# Patient Record
Sex: MALE | Race: WHITE | NOT HISPANIC OR LATINO | Employment: OTHER | URBAN - METROPOLITAN AREA
[De-identification: names, ages, dates, MRNs, and addresses within clinical notes are randomized per-mention and may not be internally consistent; named-entity substitution may affect disease eponyms.]

---

## 2017-01-03 ENCOUNTER — HOSPITAL ENCOUNTER (OUTPATIENT)
Dept: CARDIOLOGY | Facility: HOSPITAL | Age: 82
Discharge: HOME | End: 2017-01-03
Attending: THORACIC SURGERY (CARDIOTHORACIC VASCULAR SURGERY)
Payer: MEDICARE

## 2017-01-03 ENCOUNTER — HOSPITAL ENCOUNTER (OUTPATIENT)
Dept: RADIOLOGY | Facility: HOSPITAL | Age: 82
Discharge: HOME | End: 2017-01-03
Attending: THORACIC SURGERY (CARDIOTHORACIC VASCULAR SURGERY)
Payer: MEDICARE

## 2017-01-03 DIAGNOSIS — Q39.6 ESOPHAGEAL DIVERTICULUM: ICD-10-CM

## 2017-01-03 DIAGNOSIS — K44.9 PARAESOPHAGEAL HERNIA: ICD-10-CM

## 2017-01-03 DIAGNOSIS — I50.32 CHRONIC DIASTOLIC HEART FAILURE: ICD-10-CM

## 2017-01-03 LAB
AORTIC VALVE STENOSIS: ABNORMAL
DIASTOLIC DYSFUNCTION: YES
ESTIMATED PA SYSTOLIC PRESSURE: 66.68
GLOBAL PERICARDIAL EFFUSION: ABNORMAL
MITRAL VALVE REGURGITATION: ABNORMAL
RETIRED EF AND QEF - SEE NOTES: 50 (ref 55–65)
TRICUSPID VALVE REGURGITATION: ABNORMAL

## 2017-01-03 PROCEDURE — 93306 TTE W/DOPPLER COMPLETE: CPT | Mod: 26,,, | Performed by: INTERNAL MEDICINE

## 2017-01-03 PROCEDURE — 74220 X-RAY XM ESOPHAGUS 1CNTRST: CPT | Mod: 26,,, | Performed by: RADIOLOGY

## 2017-01-03 PROCEDURE — 74220 X-RAY XM ESOPHAGUS 1CNTRST: CPT | Mod: TC

## 2017-01-03 PROCEDURE — 93306 TTE W/DOPPLER COMPLETE: CPT

## 2017-01-05 ENCOUNTER — OFFICE VISIT (OUTPATIENT)
Dept: CARDIOTHORACIC SURGERY | Facility: CLINIC | Age: 82
End: 2017-01-05
Payer: MEDICARE

## 2017-01-05 VITALS
DIASTOLIC BLOOD PRESSURE: 74 MMHG | HEIGHT: 70 IN | WEIGHT: 214.5 LBS | HEART RATE: 55 BPM | BODY MASS INDEX: 30.71 KG/M2 | OXYGEN SATURATION: 94 % | SYSTOLIC BLOOD PRESSURE: 170 MMHG

## 2017-01-05 DIAGNOSIS — Q39.6 ESOPHAGEAL DIVERTICULUM: Primary | ICD-10-CM

## 2017-01-05 PROCEDURE — 99999 PR PBB SHADOW E&M-EST. PATIENT-LVL III: CPT | Mod: PBBFAC,,, | Performed by: THORACIC SURGERY (CARDIOTHORACIC VASCULAR SURGERY)

## 2017-01-05 PROCEDURE — 99213 OFFICE O/P EST LOW 20 MIN: CPT | Mod: PBBFAC | Performed by: THORACIC SURGERY (CARDIOTHORACIC VASCULAR SURGERY)

## 2017-01-05 PROCEDURE — 99205 OFFICE O/P NEW HI 60 MIN: CPT | Mod: S$PBB,,, | Performed by: THORACIC SURGERY (CARDIOTHORACIC VASCULAR SURGERY)

## 2017-01-05 NOTE — PROGRESS NOTES
History & Physical    SUBJECTIVE:     History of Present Illness:  Patient is a 81 y.o. male presents with PMH of HTN, AS, GERD with esophagitis, DMII, and esophageal diverticulum for evaluation of surgical resection of large diverticulum. Reports longstanding dysphagia and emesis of undigested food with solids and liquids. Reports intermittent pain with swallowing. Past surgical history only significant for cataract extraction and inguinal hernia repair. Denies history of CVA, MI or cardiac revascularizations. Denies fever, chills, dyspnea on exertion, angina, syncope or changes in bowel, bladder or appetite. Reports he remains active. 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     Former smoker. Quit 20 years ago. 120 pack year history prior to quitting.        Chief Complaint   Patient presents with    Consult       Review of patient's allergies indicates:   Allergen Reactions    Vancomycin analogues Itching    Ciprofloxacin (mixture) Itching     Extreme itching and redness        Current Outpatient Prescriptions   Medication Sig Dispense Refill    ascorbic acid, vitamin C, (VITAMIN C) 1000 MG tablet 1,000 mg.      furosemide (LASIX) 20 MG tablet Take 1 tablet (20 mg total) by mouth once daily. 10 tablet 0    GLUCOSAMINE HCL/CHONDR BAUTISTA A NA (OSTEO BI-FLEX ORAL) Take 2 tablets by mouth once daily.      lisinopril 10 MG tablet Take 1 tablet (10 mg total) by mouth once daily. 30 tablet 0    metformin (GLUCOPHAGE) 500 MG tablet Take 1,000 mg by mouth 2 (two) times daily with meals.       metoprolol succinate (TOPROL-XL) 25 MG 24 hr tablet Take 25 mg by mouth every evening.       No current facility-administered medications for this visit.        Past Medical History   Diagnosis Date    Arthritis     Blind right eye     BPH (benign prostatic hypertrophy)     Bronchitis, chronic     Colon polyp     Diabetes mellitus     GERD (gastroesophageal reflux  "disease)     Hearing aid worn      bilateral    Hernia     Hypertension     Irregular heart beat     Snoring      Past Surgical History   Procedure Laterality Date    Eye surgery      Cataract extraction, bilateral      Retinal detachment surgery       No family history on file.  Social History   Substance Use Topics    Smoking status: Former Smoker     Packs/day: 3.00     Years: 40.00     Quit date: 8/2/1992    Smokeless tobacco: Never Used    Alcohol use No        Review of Systems:  Review of Systems   Constitutional: Negative for activity change, appetite change, fatigue and fever.   HENT: Positive for trouble swallowing.    Eyes: Positive for visual disturbance. Negative for pain.   Respiratory: Positive for shortness of breath. Negative for cough, choking, chest tightness and wheezing.    Cardiovascular: Positive for leg swelling. Negative for chest pain and palpitations.   Gastrointestinal: Positive for vomiting. Negative for abdominal pain, constipation, diarrhea and nausea.   Endocrine: Negative.    Genitourinary: Negative.    Musculoskeletal: Positive for arthralgias and gait problem. Negative for back pain, myalgias and neck stiffness.   Skin: Negative.    Allergic/Immunologic: Negative.    Hematological: Negative.    Psychiatric/Behavioral: Negative.        OBJECTIVE:     Vital Signs (Most Recent)  Pulse: (!) 55 (01/05/17 0940)  BP: (!) 170/74 (01/05/17 0940)  SpO2: (!) 94 % (01/05/17 0940)  5' 10" (1.778 m)  97.3 kg (214 lb 8.1 oz)     Physical Exam:  Physical Exam   Constitutional: He is oriented to person, place, and time. He appears well-developed and well-nourished.   HENT:   Head: Normocephalic and atraumatic.   Mouth/Throat: Oropharynx is clear and moist.   Eyes: Pupils are equal, round, and reactive to light.   Neck: Normal range of motion. Neck supple. No thyromegaly present.   Cardiovascular: Regular rhythm and intact distal pulses.    Murmur heard.   Crescendo systolic murmur is " present   Pulmonary/Chest: Effort normal and breath sounds normal. No respiratory distress. He exhibits no tenderness.   Abdominal: Soft. Bowel sounds are normal. He exhibits no distension. There is no tenderness.   Musculoskeletal: Normal range of motion. He exhibits no edema or deformity.        Right ankle: He exhibits swelling.        Left ankle: He exhibits swelling.   Lymphadenopathy:     He has no cervical adenopathy.   Neurological: He is alert and oriented to person, place, and time.   Skin: Skin is warm and dry.   Psychiatric: He has a normal mood and affect.   Vitals reviewed.      Diagnostic Results:  2D ECHO:   CONCLUSIONS     1 - Low normal to mildly depressed left ventricular systolic function (EF 50-55%).     2 - Eccentric hypertrophy.     3 - Left ventricular diastolic dysfunction.     4 - Pulmonary hypertension. The estimated PA systolic pressure is 67 mmHg.     5 - Moderate aortic stenosis, UNA = 0.79 cm2, mean gradient = 30.25 mmHg.     6 - Moderate mitral regurgitation.     7 - Intermediate central venous pressure.     Chest and Abdomen CT:   1.  Large lower esophageal diverticulum similar to prior study.  Contrast pooling within the diverticulum with reflux to the midesophagus, which is mildly dilated.  2.  Multiple pulmonary nodules, some of which are new.  Recommend followup chest CT in 3 months.  3.  Colonic diverticulosis.    FL Esophagram:   #1. Large grossly stable distal esophageal diverticulum with reflux as above.  #2.  Esophageal dysmotility.    ASSESSMENT/PLAN:     81 y.o. male presents with PMH of HTN, AS, GERD with esophagitis, DMII, and esophageal diverticulum for evaluation of surgical resection of large diverticulum.     PLAN:Plan    Patient with significant aortic stenosis and depressed EF. Will schedule for cardiology visit with Dr. Manzo on the [facility] for cardiac clearance and optimization.   Discussed surgical resection of diverticulum today with patient and his family.  This would require a right thoracotomy for diverticulectomy and repair. RTC after cardiology work up.     ATTENDING ATTESTATION:    I evaluated the patient and I agree with the assessment and plan  82 yo male with large epiphrenic esophageal diverticulum complicated by recurrent aspiration pneumonia.  Needs thoracoscopy, likely thoracotomy for diverticulectomy with esophagogastric myotomy.  Recent ECHO shows mildly depressed EF, moderate MR, moderate to severe TR, PH, and moderate aortic stenosis (appears severe based on UNA 0.7; mean gradient only 30 but may be falsely low due to depressed EF).  He is also symptomatic with RIOS and LE edema.  Arranged appointment with his cardiologist, Dr. Manzo for evaluation and will re-evaluate after his visit on 2/6/2017.

## 2017-01-05 NOTE — LETTER
January 5, 2017      Grayson Palacios MD  [address] [place] [id number]           [facility] Thoracic Surgery  [address] [place] [id number]  Phone: [phone]  Fax: [phone]          Patient: Timbo Gallagher   MR Number: [id number]   YOB: 1935   Date of Visit: 1/5/2017       Dear Dr. Grayson Palacios:    Thank you for referring Timbo Gallagher to me for evaluation. Attached you will find relevant portions of my assessment and plan of care.    If you have questions, please do not hesitate to call me. I look forward to following Timbo Gallagher along with you.    Sincerely,    Ramesh Whiting MD    Enclosure  CC:  No Recipients    If you would like to receive this communication electronically, please contact [email or web address] or [phone] to request more information on [facility] Link access.    For providers and/or their staff who would like to refer a patient to [facility], please contact us through our one-stop-shop provider referral line, [facility] , at [phone].    If you feel you have received this communication in error or would no longer like to receive these types of communications, please e-mail [email or web address]

## 2017-01-06 ENCOUNTER — TELEPHONE (OUTPATIENT)
Dept: CARDIOTHORACIC SURGERY | Facility: CLINIC | Age: 82
End: 2017-01-06

## 2017-01-06 DIAGNOSIS — I50.22 CHRONIC SYSTOLIC HEART FAILURE: Primary | ICD-10-CM

## 2017-01-06 DIAGNOSIS — Q39.6 ESOPHAGEAL DIVERTICULUM: ICD-10-CM

## 2017-01-06 DIAGNOSIS — I50.32 CHRONIC DIASTOLIC HEART FAILURE: ICD-10-CM

## 2017-01-06 NOTE — TELEPHONE ENCOUNTER
Pt did not show up for PFT's after CT scan on 12/30. Called pulmonary lab on [facility], they scheduled the pt for 2/6 at 9a prior to Dr. Manzo's appt on 2/6 at 11a. Attempted the pt twice, no answer, mailed appt slip with both PFT and Dr. Manzo information.

## 2017-01-10 ENCOUNTER — TELEPHONE (OUTPATIENT)
Dept: CARDIOTHORACIC SURGERY | Facility: CLINIC | Age: 82
End: 2017-01-10

## 2017-02-06 ENCOUNTER — HOSPITAL ENCOUNTER (OUTPATIENT)
Dept: RESPIRATORY THERAPY | Facility: HOSPITAL | Age: 82
Discharge: HOME | End: 2017-02-06
Attending: THORACIC SURGERY (CARDIOTHORACIC VASCULAR SURGERY)
Payer: MEDICARE

## 2017-02-06 VITALS — HEART RATE: 67 BPM | OXYGEN SATURATION: 95 % | RESPIRATION RATE: 18 BRPM

## 2017-02-06 DIAGNOSIS — I50.32 CHRONIC DIASTOLIC HEART FAILURE: ICD-10-CM

## 2017-02-06 PROCEDURE — 94060 EVALUATION OF WHEEZING: CPT

## 2017-02-06 PROCEDURE — 25000242 PHARM REV CODE 250 ALT 637 W/ HCPCS: Performed by: THORACIC SURGERY (CARDIOTHORACIC VASCULAR SURGERY)

## 2017-02-06 RX ORDER — ALBUTEROL SULFATE 2.5 MG/.5ML
2.5 SOLUTION RESPIRATORY (INHALATION) ONCE
Status: COMPLETED | OUTPATIENT
Start: 2017-02-06 | End: 2017-02-06

## 2017-02-06 RX ADMIN — ALBUTEROL SULFATE 2.5 MG: 2.5 SOLUTION RESPIRATORY (INHALATION) at 09:02

## 2017-02-21 ENCOUNTER — TELEPHONE (OUTPATIENT)
Dept: CARDIOTHORACIC SURGERY | Facility: CLINIC | Age: 82
End: 2017-02-21

## 2017-02-21 DIAGNOSIS — Z01.818 PREOP TESTING: Primary | ICD-10-CM

## 2017-02-21 NOTE — TELEPHONE ENCOUNTER
Call returned scheduled Dobutamine stress test for 3/6 @1pm.  Agreeable with appointment and time. Appointment slip mailed.

## 2017-03-06 ENCOUNTER — HOSPITAL ENCOUNTER (OUTPATIENT)
Dept: CARDIOLOGY | Facility: CLINIC | Age: 82
Discharge: HOME | End: 2017-03-06
Payer: MEDICARE

## 2017-03-06 DIAGNOSIS — I35.0 NONRHEUMATIC AORTIC VALVE STENOSIS: ICD-10-CM

## 2017-03-06 DIAGNOSIS — Z01.818 PREOP TESTING: ICD-10-CM

## 2017-03-06 LAB
AORTIC VALVE STENOSIS: ABNORMAL
DIASTOLIC DYSFUNCTION: YES
ESTIMATED PA SYSTOLIC PRESSURE: 58
MITRAL VALVE MOBILITY: ABNORMAL
MITRAL VALVE REGURGITATION: ABNORMAL
RETIRED EF AND QEF - SEE NOTES: 40 (ref 55–65)
TRICUSPID VALVE REGURGITATION: ABNORMAL

## 2017-03-06 PROCEDURE — 93306 TTE W/DOPPLER COMPLETE: CPT | Mod: PBBFAC | Performed by: INTERNAL MEDICINE

## 2017-03-23 ENCOUNTER — TELEPHONE (OUTPATIENT)
Dept: CARDIOTHORACIC SURGERY | Facility: CLINIC | Age: 82
End: 2017-03-23

## 2017-03-23 NOTE — TELEPHONE ENCOUNTER
----- Message from Ramesh Whiting MD sent at 3/23/2017  9:24 AM CDT -----  Regarding: follow-up  Can you check with Dr. Manzo's office and see if they have seen him or come up with a plan since his visit on 2/6?    Thanks

## 2017-03-23 NOTE — TELEPHONE ENCOUNTER
Called to confirm rescheduling of April 17th appointment with Dr. Manzo.  New appointment time and date 3/27 for 2:45 @ the [facility].  Patient verbalized understanding and is agreeable with changed date and time.

## 2017-03-27 ENCOUNTER — TELEPHONE (OUTPATIENT)
Dept: CARDIOLOGY | Facility: CLINIC | Age: 82
End: 2017-03-27

## 2017-03-27 DIAGNOSIS — N18.9 CHRONIC KIDNEY DISEASE, UNSPECIFIED STAGE: ICD-10-CM

## 2017-03-27 DIAGNOSIS — I35.0 AORTIC VALVE STENOSIS, UNSPECIFIED ETIOLOGY: Primary | ICD-10-CM

## 2017-03-27 NOTE — TELEPHONE ENCOUNTER
Called patient tos et up to see Dr Odonnell again for TAVR eval.  Patient was scheduled for esophageal surgery and had  echo set up for clearance and Dr Martinez does not want to do until there is more clinical information on the patient.  Set up to see Dr Odonnell then will proceed with TAVR mccoy.

## 2017-04-26 ENCOUNTER — INITIAL CONSULT (OUTPATIENT)
Dept: CARDIOLOGY | Facility: CLINIC | Age: 82
End: 2017-04-26
Payer: MEDICARE

## 2017-04-26 ENCOUNTER — HOSPITAL ENCOUNTER (OUTPATIENT)
Dept: CARDIOLOGY | Facility: CLINIC | Age: 82
Discharge: HOME | End: 2017-04-26
Payer: MEDICARE

## 2017-04-26 VITALS
OXYGEN SATURATION: 95 % | DIASTOLIC BLOOD PRESSURE: 85 MMHG | SYSTOLIC BLOOD PRESSURE: 160 MMHG | BODY MASS INDEX: 29.98 KG/M2 | HEIGHT: 69 IN | WEIGHT: 202.38 LBS

## 2017-04-26 DIAGNOSIS — I35.0 AORTIC STENOSIS, SEVERE: ICD-10-CM

## 2017-04-26 DIAGNOSIS — I35.0 AORTIC STENOSIS, SEVERE: Primary | ICD-10-CM

## 2017-04-26 PROCEDURE — 99204 OFFICE O/P NEW MOD 45 MIN: CPT | Mod: S$PBB,,, | Performed by: INTERNAL MEDICINE

## 2017-04-26 PROCEDURE — 99999 PR PBB SHADOW E&M-EST. PATIENT-LVL III: CPT | Mod: PBBFAC,,, | Performed by: INTERNAL MEDICINE

## 2017-04-26 RX ORDER — LOSARTAN POTASSIUM AND HYDROCHLOROTHIAZIDE 12.5; 1 MG/1; MG/1
1 TABLET ORAL DAILY
COMMUNITY
End: 2018-03-28

## 2017-04-26 NOTE — LETTER
April 26, 2017      David Manzo MD  [address] [facility]  [address]  [facility]  [place] [address]           [facility]  [address]  [facility] [address]  Phone: [phone]          Patient: Timbo Gallagher   MR Number: [id number]   YOB: 1935   Date of Visit: 4/26/2017       Dear Dr. David Manzo:    Thank you for referring Timbo Gallagher to me for evaluation. Attached you will find relevant portions of my assessment and plan of care.    If you have questions, please do not hesitate to call me. I look forward to following Timbo Gallagher along with you.    Sincerely,    Robin Odonnell MD    Enclosure  CC:  No Recipients    If you would like to receive this communication electronically, please contact [email or web address] or [phone] to request more information on "[employer]" Link access.    For providers and/or their staff who would like to refer a patient to [facility], please contact us through our one-stop-shop provider referral line, [facility] , at [phone].    If you feel you have received this communication in error or would no longer like to receive these types of communications, please e-mail [email or web address]

## 2017-04-26 NOTE — PROGRESS NOTES
Subjective:    Patient ID:  Timbo Gallagher is a 82 y.o. male who presents for evaluation of Aortic Stenosis      HPI    81 Y/O M referred from Dr Rachel office in [place] for evaluation of severe AS, severe MR.  Patient has PMH significant for HTN, DM. Over the past 2 years patient has been complaining of fatigue and low energy. In 12/2017 patient was admitted with SOB and was diagnosed with PNA, he was told that he has congestive heart failure. In January patient was seen by CTS for evaluation of Paraesophageal hernia with esophagitis, was seen by CTS who were planning for surgical resection of large diverticulum after cardiology clearance. Patient underwent Echo that revealed depressed EF with low flow severe AS vs mod to severe AS with new drop in EF over 3 months period.   He went to see Dr rachel and was referred here for TAVR evaluation.    Patient underwent Echo in 3/6/2017 that revealed drop in EF now 40% from 50% on prior echo with UNA = 0.78 cm2, peak velocity = 3.3 m/s, mean gradient = 30.0 mmHg.   Severe MR; mild TR. Severe COPD with FEV1 < 53%.    ROS    Constitutional: negative for chills, fevers and night sweats  Ears, nose, mouth, throat, and face: negative for nasal congestion, sore throat and tinnitus  Respiratory: negative for cough, dyspnea on exertion, pleurisy/chest pain, sputum and wheezing  Cardiovascular: See HPI  Gastrointestinal: negative  Genitourinary:negative for dysuria, frequency, hematuria, hesitancy and nocturia  Hematologic/lymphatic: negative for bleeding and easy bruising  Musculoskeletal:negative for muscle weakness and myalgias  Neurological: negative for dizziness, headaches, paresthesia and weakness  Behavioral/Psych: negative for anxiety and bad mood    Objective:    Physical Exam    General: Patient in no acute distress or discomfort  HEENT: No JVD, moist mucous membranes  Cardiac: S1S2 STAR on R parasternum, pan systolic apical murmur radiating to L sternal border.  Chest:  CTABL, no wheezing or rales  Abd:Soft NTND  Ext: No Edema No swelling  Neuro: A and O X 3, non focal      Vitals:    04/26/17 0836   BP: (!) 160/85         Assessment:       1. Aortic stenosis, severe        Plan:       81 Y/O M with PMH of DM, HTN, Esophageal hernia who is here for evaluation of symptomatic Severe AS and depressed EF, severe MR.    Plan:    -Obtain records from Dr Manzo office.  -Plan for Dobutamine stress testing PARTNER Study. (to help differentiate low from AS from mod-severe AS)  -Will refer to CTS for evaluation of SAVR/ MVR    Calculated STS score of 5.6      Mimi Wilson MD

## 2017-05-01 ENCOUNTER — HOSPITAL ENCOUNTER (OUTPATIENT)
Dept: CARDIOLOGY | Facility: CLINIC | Age: 82
Discharge: HOME | End: 2017-05-01
Payer: MEDICARE

## 2017-05-01 LAB
AORTIC VALVE STENOSIS: ABNORMAL
ESTIMATED PA SYSTOLIC PRESSURE: 67.91
MITRAL VALVE REGURGITATION: ABNORMAL
RETIRED EF AND QEF - SEE NOTES: 40 (ref 55–65)
TRICUSPID VALVE REGURGITATION: ABNORMAL

## 2017-05-01 PROCEDURE — 93351 STRESS TTE COMPLETE: CPT | Mod: 26,S$PBB,, | Performed by: INTERNAL MEDICINE

## 2017-05-01 PROCEDURE — 93325 DOPPLER ECHO COLOR FLOW MAPG: CPT | Mod: PBBFAC | Performed by: INTERNAL MEDICINE

## 2017-05-01 PROCEDURE — 93320 DOPPLER ECHO COMPLETE: CPT | Mod: 26,S$PBB,, | Performed by: INTERNAL MEDICINE

## 2017-05-01 NOTE — PROGRESS NOTES
Dobutamine PARTNER echo complete. Pt tolerated test well. IV discontinued and catheter intact. VSS. NAD.

## 2017-05-02 ENCOUNTER — DOCUMENTATION ONLY (OUTPATIENT)
Dept: CARDIOLOGY | Facility: CLINIC | Age: 82
End: 2017-05-02

## 2017-05-02 ENCOUNTER — TELEPHONE (OUTPATIENT)
Dept: CARDIOLOGY | Facility: CLINIC | Age: 82
End: 2017-05-02

## 2017-05-02 DIAGNOSIS — I35.0 AORTIC VALVE STENOSIS, UNSPECIFIED ETIOLOGY: Primary | ICD-10-CM

## 2017-05-02 RX ORDER — SODIUM CHLORIDE 9 MG/ML
3 INJECTION, SOLUTION INTRAVENOUS CONTINUOUS
Status: CANCELLED | OUTPATIENT
Start: 2017-05-02 | End: 2017-05-02

## 2017-05-02 RX ORDER — DIPHENHYDRAMINE HCL 25 MG
50 CAPSULE ORAL ONCE
Status: CANCELLED | OUTPATIENT
Start: 2017-05-02 | End: 2017-05-02

## 2017-05-02 NOTE — PROGRESS NOTES
OUTPATIENT CATHETERIZATION INSTRUCTIONS    You have been scheduled for a procedure in the catheterization lab on Monday, May 8,  2017.     Please report to the Cardiology Waiting Area on the Third floor of the hospital and check in at 7 AM.   You will then be taken to the SSCU (Short Stay Cardiac Unit) and prepared for your procedure. Please be aware that this is not the time of your procedure but the time you are to arrive. The procedures are scheduled on an hourly basis; however, emergency cases take precedence over all other cases.       You may not have anything to eat or drink after midnight the night before your test. You may take your regular morning medications with water. If there are any medications that you should not take you will be instructed to hold them that morning. If you are diabetic and on Metformin (Glucophage) do not take it the day before, the day of, and for 2 days after your procedure.      The procedure will take 1-2 hours to perform. After the procedure, you will return to SSCU on the third floor of the hospital. You will need to lie still (or keep your arm still) for the next 4 to 6 hours to minimize bleeding from the puncture site. Your family may remain in the room with you during this time.       You may be able to be discharged home that same afternoon if there is someone to drive you home and there were no complications. If you have one of the balloon, stent, or device procedures you may spend the night in the hospital. Your doctor will determine, based on your progress, the date and time of your discharge. The results of your procedure will be discussed with you before you are discharged. Any further testing or procedures will be scheduled for you either before you leave or you will be called with these appointments.       If you should have any questions, concerns, or need to change the date of your procedure, please call HOLA Ordonez @ [phone]    Special  Instructions:    Stop taking your Metformin (Glucophage) on Alvaro, May 7, 2017.             THE ABOVE INSTRUCTIONS WERE GIVEN TO THE PATIENT VERBALLY AND THEY VERBALIZED UNDERSTANDING.  THEY DO NOT REQUIRE ANY SPECIAL NEEDS AND DO NOT HAVE ANY LEARNING BARRIERS.          Directions for Reporting to Cardiology Waiting Area in the Hospital  If you park in the Parking Garage:  Take elevators to the1st floor of the parking garage.  Continue past the gift shop, coffee shop, and piano.  Take a right and go to the gold elevators. (Elevator B)  Take the elevator to the 3rd floor.  Follow the arrow on the sign on the wall that says Cath Lab Registration/EP Lab Registration.  Follow the long hallway all the way around until you come to a big open area.  This is the registration area.  Check in at Reception Desk.    OR    If family is dropping you off:  Have them drop you off at the front of the Hospital under the green overhang.  Enter through the doors and take a right.  Take the E elevators to the 3rd floor Cardiology Waiting Area.  Check in at the Reception Desk in the waiting room.

## 2017-05-02 NOTE — TELEPHONE ENCOUNTER
Called patient with results of echo. Patient scheduled for C+/- on 5/8/17. Verbalized understanding.

## 2017-05-08 ENCOUNTER — HOSPITAL ENCOUNTER (OUTPATIENT)
Facility: HOSPITAL | Age: 82
Discharge: HOME | End: 2017-05-08
Attending: INTERNAL MEDICINE | Admitting: INTERNAL MEDICINE
Payer: MEDICARE

## 2017-05-08 VITALS
WEIGHT: 196.38 LBS | HEART RATE: 74 BPM | OXYGEN SATURATION: 94 % | BODY MASS INDEX: 29.09 KG/M2 | TEMPERATURE: 98 F | DIASTOLIC BLOOD PRESSURE: 64 MMHG | SYSTOLIC BLOOD PRESSURE: 137 MMHG | RESPIRATION RATE: 16 BRPM | HEIGHT: 69 IN

## 2017-05-08 DIAGNOSIS — J18.1 LOBAR PNEUMONIA: ICD-10-CM

## 2017-05-08 DIAGNOSIS — I35.0 AORTIC VALVE STENOSIS, UNSPECIFIED ETIOLOGY: ICD-10-CM

## 2017-05-08 LAB
ABO + RH BLD: NORMAL
ANION GAP SERPL CALC-SCNC: 10 MMOL/L
BLD GP AB SCN CELLS X3 SERPL QL: NORMAL
BUN SERPL-MCNC: 20 MG/DL
CALCIUM SERPL-MCNC: 9.5 MG/DL
CHLORIDE SERPL-SCNC: 107 MMOL/L
CO2 SERPL-SCNC: 23 MMOL/L
CORONARY STENOSIS: ABNORMAL
CREAT SERPL-MCNC: 1.1 MG/DL
ERYTHROCYTE [DISTWIDTH] IN BLOOD BY AUTOMATED COUNT: 13.5 %
EST. GFR  (AFRICAN AMERICAN): >60 ML/MIN/1.73 M^2
EST. GFR  (NON AFRICAN AMERICAN): >60 ML/MIN/1.73 M^2
GLUCOSE SERPL-MCNC: 174 MG/DL
HCT VFR BLD AUTO: 37.3 %
HGB BLD-MCNC: 12.7 G/DL
INR PPP: 1
MCH RBC QN AUTO: 31 PG
MCHC RBC AUTO-ENTMCNC: 34 %
MCV RBC AUTO: 91 FL
PLATELET # BLD AUTO: 219 K/UL
PMV BLD AUTO: 11.3 FL
POCT GLUCOSE: 147 MG/DL (ref 70–110)
POCT GLUCOSE: 196 MG/DL (ref 70–110)
POTASSIUM SERPL-SCNC: 3.7 MMOL/L
PROTHROMBIN TIME: 10.3 SEC
RBC # BLD AUTO: 4.1 M/UL
SODIUM SERPL-SCNC: 140 MMOL/L
WBC # BLD AUTO: 9.87 K/UL

## 2017-05-08 PROCEDURE — 93010 ELECTROCARDIOGRAM REPORT: CPT | Mod: ,,, | Performed by: INTERNAL MEDICINE

## 2017-05-08 PROCEDURE — 80048 BASIC METABOLIC PNL TOTAL CA: CPT

## 2017-05-08 PROCEDURE — 82962 GLUCOSE BLOOD TEST: CPT

## 2017-05-08 PROCEDURE — 25000003 PHARM REV CODE 250

## 2017-05-08 PROCEDURE — 25000003 PHARM REV CODE 250: Performed by: INTERNAL MEDICINE

## 2017-05-08 PROCEDURE — 63600175 PHARM REV CODE 636 W HCPCS

## 2017-05-08 PROCEDURE — 93005 ELECTROCARDIOGRAM TRACING: CPT

## 2017-05-08 PROCEDURE — 86900 BLOOD TYPING SEROLOGIC ABO: CPT

## 2017-05-08 PROCEDURE — 85027 COMPLETE CBC AUTOMATED: CPT

## 2017-05-08 PROCEDURE — 85610 PROTHROMBIN TIME: CPT

## 2017-05-08 PROCEDURE — 25500020 PHARM REV CODE 255: Performed by: INTERNAL MEDICINE

## 2017-05-08 PROCEDURE — 86850 RBC ANTIBODY SCREEN: CPT

## 2017-05-08 PROCEDURE — 99205 OFFICE O/P NEW HI 60 MIN: CPT | Mod: ,,, | Performed by: NURSE PRACTITIONER

## 2017-05-08 PROCEDURE — 93010 ELECTROCARDIOGRAM REPORT: CPT | Mod: 77,,, | Performed by: INTERNAL MEDICINE

## 2017-05-08 PROCEDURE — 99152 MOD SED SAME PHYS/QHP 5/>YRS: CPT | Mod: GC,,, | Performed by: INTERNAL MEDICINE

## 2017-05-08 PROCEDURE — 93454 CORONARY ARTERY ANGIO S&I: CPT | Mod: 26,GC,, | Performed by: INTERNAL MEDICINE

## 2017-05-08 RX ORDER — ASPIRIN 325 MG
325 TABLET ORAL ONCE
Status: COMPLETED | OUTPATIENT
Start: 2017-05-08 | End: 2017-05-08

## 2017-05-08 RX ORDER — CLOPIDOGREL 300 MG/1
600 TABLET, FILM COATED ORAL ONCE
Status: COMPLETED | OUTPATIENT
Start: 2017-05-08 | End: 2017-05-08

## 2017-05-08 RX ORDER — SODIUM CHLORIDE 9 MG/ML
3 INJECTION, SOLUTION INTRAVENOUS CONTINUOUS
Status: ACTIVE | OUTPATIENT
Start: 2017-05-08 | End: 2017-05-08

## 2017-05-08 RX ORDER — DIPHENHYDRAMINE HCL 50 MG
50 CAPSULE ORAL ONCE
Status: COMPLETED | OUTPATIENT
Start: 2017-05-08 | End: 2017-05-08

## 2017-05-08 RX ORDER — SODIUM CHLORIDE 9 MG/ML
20 INJECTION, SOLUTION INTRAVENOUS CONTINUOUS
Status: DISCONTINUED | OUTPATIENT
Start: 2017-05-08 | End: 2017-05-08 | Stop reason: HOSPADM

## 2017-05-08 RX ADMIN — IOHEXOL 100 ML: 350 INJECTION, SOLUTION INTRAVENOUS at 11:05

## 2017-05-08 RX ADMIN — ASPIRIN 325 MG ORAL TABLET 325 MG: 325 PILL ORAL at 08:05

## 2017-05-08 RX ADMIN — SODIUM CHLORIDE 3 ML/KG/HR: 0.9 INJECTION, SOLUTION INTRAVENOUS at 08:05

## 2017-05-08 RX ADMIN — CLOPIDOGREL BISULFATE 600 MG: 300 TABLET, FILM COATED ORAL at 08:05

## 2017-05-08 RX ADMIN — DIPHENHYDRAMINE HYDROCHLORIDE 50 MG: 50 CAPSULE ORAL at 08:05

## 2017-05-08 NOTE — CONSULTS
Consult Note  Cardiothoracic Surgery    Consults  SUBJECTIVE:     History of Present Illness:  Patient is a 82 y.o. male presents for TAVR evaluation.   He has a PMH significant for HTN and diabetes. Over the past 2 years patient has been complaining of fatigue.   In January patient was seen by CTS for evaluation of Paraesophageal hernia with esophagitis, was seen by CTS who were planning for surgical resection of large diverticulum after cardiology clearance. Patient underwent Echo that revealed depressed EF with low flow severe AS vs mod to severe AS with new drop in EF over 3 months period.  He is admitted today for C and completion of his TAVR work up.  We have been asked to evaluate cohort status.    Scheduled Meds:   Infusions/Drips:   sodium chloride 0.9% 1,782 mL (05/08/17 1200)     PRN Meds:    Review of patient's allergies indicates:   Allergen Reactions    Vancomycin analogues Itching    Ciprofloxacin (mixture) Itching     Extreme itching and redness        Past Medical History:   Diagnosis Date    Arthritis     Blind right eye     BPH (benign prostatic hypertrophy)     Bronchitis, chronic     CHF (congestive heart failure)     Colon polyp     Diabetes mellitus     Encounter for blood transfusion     GERD (gastroesophageal reflux disease)     Hearing aid worn     bilateral    Hernia     Hypertension     Irregular heart beat     Snoring      Past Surgical History:   Procedure Laterality Date    CATARACT EXTRACTION, BILATERAL      EYE SURGERY      RETINAL DETACHMENT SURGERY       Family History   Problem Relation Age of Onset    No Known Problems Mother     No Known Problems Father     No Known Problems Sister     No Known Problems Brother     No Known Problems Maternal Aunt     No Known Problems Maternal Uncle     No Known Problems Paternal Aunt     No Known Problems Paternal Uncle     No Known Problems Maternal Grandmother     No Known Problems Maternal Grandfather     No  Known Problems Paternal Grandmother     No Known Problems Paternal Grandfather     Anemia Neg Hx     Arrhythmia Neg Hx     Asthma Neg Hx     Clotting disorder Neg Hx     Fainting Neg Hx     Heart attack Neg Hx     Heart disease Neg Hx     Heart failure Neg Hx     Hyperlipidemia Neg Hx     Hypertension Neg Hx     Stroke Neg Hx     Atrial Septal Defect Neg Hx      Social History   Substance Use Topics    Smoking status: Former Smoker     Packs/day: 3.00     Years: 40.00     Quit date: 8/2/1992    Smokeless tobacco: Never Used    Alcohol use No        Review of Systems:  Review of Systems   Constitutional: Negative for fever.  + for fatigue  HENT: Negative for congestion and sore throat.    Eyes: Negative for blurred vision, double vision and discharge.   Respiratory: see HPI   Cardiovascular: see HPI  Gastrointestinal: Negative for abdominal pain, constipation, diarrhea, nausea and vomiting.   Genitourinary: Negative for dysuria, frequency and urgency.   Musculoskeletal: Negative for back pain and myalgias.   Skin: Negative for itching and rash.   Neurological: Negative for dizziness, speech change, seizures, weakness and headaches.   Endo/Heme/Allergies: Does not bruise/bleed easily.   Psychiatric/Behavioral: Negative for depression. The patient is not nervous/anxious.        OBJECTIVE:     Vital Signs (Most Recent)  Temp: 98.2 °F (36.8 °C) (05/08/17 0950)  Pulse: 66 (Simultaneous filing. User may not have seen previous data.) (05/08/17 1230)  Resp: 16 (05/08/17 1230)  BP: (!) 148/69 (Simultaneous filing. User may not have seen previous data.) (05/08/17 1230)  SpO2: (!) 94 % (05/08/17 0950)    Admission Weight: 89.1 kg (196 lb 6.4 oz) (05/08/17 0725)   Most Recent Weight: 89.1 kg (196 lb 6.4 oz) (05/08/17 0725)    Vital Signs Range (Last 24H):  Temp:  [97.7 °F (36.5 °C)-98.2 °F (36.8 °C)]   Pulse:  [66-97]   Resp:  [16-20]   BP: (148-183)/()   SpO2:  [94 %-96 %]     Physical Exam:    Physical  Exam   Constitutional: He is oriented to person, place, and time. He appears well-developed and well-nourished.   HENT:   Head: Normocephalic and atraumatic.   Eyes: Pupils are equal, round, and reactive to light.   Neck: Normal range of motion. Neck supple.   Cardiovascular: Normal rate, regular rhythm and normal heart sounds.  + STAR  Pulmonary/Chest: Effort normal and breath sounds normal.   Abdominal: Soft. Bowel sounds are normal.   Musculoskeletal: Normal range of motion.   Neurological: He is alert and oriented to person, place, and time.   Skin: Skin is warm and dry.   Psychiatric: He has a normal mood and affect. His behavior is normal.       Diagnostic Results:  [facility];reviewed    ASSESSMENT/PLAN:     82 year old male with severe AS presents for TAVR work up.    His STS score is 6%.  He is high risk given his age and severe COPD.

## 2017-05-08 NOTE — PLAN OF CARE
Problem: Patient Care Overview  Goal: Plan of Care Review  Outcome: Ongoing (interventions implemented as appropriate)  Admit assessment completed. IV placed x 2.  Plan of care initiated. Report given to HOLA Davenport.  IVF infusing.  Benadryl, Aspirin, and Plavix given.  Will continue to monitor.

## 2017-05-08 NOTE — PROGRESS NOTES
Pt is AAOx3 and in no apparent distress.  Provided a copy of discharge instructions.  Teaching performed.  Pt verbalized understanding and denied any questions. PIV d/c catheter tip intact.  2x2 applied and no active bleeding noted.  Gave pt's family a wheelchair to escort pt to front of hospital for discharge home.

## 2017-05-08 NOTE — INTERVAL H&P NOTE
The patient has been examined and the H&P has been reviewed:    I concur with the findings and no changes have occurred since H&P was written.   Patient did not get aspirin and plavix pre-procedure, so he is being given those this AM.    He has undergone the following TAVR work-up:   ECHO (Date 5/1/17): UNA= 0.75 cm2, MG= 36mmHg, Peak David= 3.9 m/s, EF= 40%.   [facility] (Date 5/8/17): Pending   STS: ?%   Frailty: ?/4   Iliacs are >? on L and > ? on R   Subclavian angiogram: ?  LVOT area by CTA is ? cm2 and Avg Diameter is ?  He is currently Cohort ?, per Dr ? due to ?    PFTs: FEV1 ?% predicted, FVC ?% predicted, MVV ?% predicted, DLCO ?% predicted     Anesthesia/Surgery risks, benefits and alternative options discussed and understood by patient/family.          Active Hospital Problems    Diagnosis  POA    Aortic valve stenosis [I35.0]  Yes      Resolved Hospital Problems    Diagnosis Date Resolved POA   No resolved problems to display.

## 2017-05-08 NOTE — H&P (VIEW-ONLY)
Subjective:    Patient ID:  Timbo Gallagher is a 82 y.o. male who presents for evaluation of Aortic Stenosis      HPI    81 Y/O M referred from Dr Rachel office in [place] for evaluation of severe AS, severe MR.  Patient has PMH significant for HTN, DM. Over the past 2 years patient has been complaining of fatigue and low energy. In 12/2017 patient was admitted with SOB and was diagnosed with PNA, he was told that he has congestive heart failure. In January patient was seen by CTS for evaluation of Paraesophageal hernia with esophagitis, was seen by CTS who were planning for surgical resection of large diverticulum after cardiology clearance. Patient underwent Echo that revealed depressed EF with low flow severe AS vs mod to severe AS with new drop in EF over 3 months period.   He went to see Dr rachel and was referred here for TAVR evaluation.    Patient underwent Echo in 3/6/2017 that revealed drop in EF now 40% from 50% on prior echo with UNA = 0.78 cm2, peak velocity = 3.3 m/s, mean gradient = 30.0 mmHg.   Severe MR; mild TR. Severe COPD with FEV1 < 53%.    ROS    Constitutional: negative for chills, fevers and night sweats  Ears, nose, mouth, throat, and face: negative for nasal congestion, sore throat and tinnitus  Respiratory: negative for cough, dyspnea on exertion, pleurisy/chest pain, sputum and wheezing  Cardiovascular: See HPI  Gastrointestinal: negative  Genitourinary:negative for dysuria, frequency, hematuria, hesitancy and nocturia  Hematologic/lymphatic: negative for bleeding and easy bruising  Musculoskeletal:negative for muscle weakness and myalgias  Neurological: negative for dizziness, headaches, paresthesia and weakness  Behavioral/Psych: negative for anxiety and bad mood    Objective:    Physical Exam    General: Patient in no acute distress or discomfort  HEENT: No JVD, moist mucous membranes  Cardiac: S1S2 STAR on R parasternum, pan systolic apical murmur radiating to L sternal border.  Chest:  CTABL, no wheezing or rales  Abd:Soft NTND  Ext: No Edema No swelling  Neuro: A and O X 3, non focal      Vitals:    04/26/17 0836   BP: (!) 160/85         Assessment:       1. Aortic stenosis, severe        Plan:       83 Y/O M with PMH of DM, HTN, Esophageal hernia who is here for evaluation of symptomatic Severe AS and depressed EF, severe MR.    Plan:    -Obtain records from Dr Manzo office.  -Plan for Dobutamine stress testing PARTNER Study. (to help differentiate low from AS from mod-severe AS)  -Will refer to CTS for evaluation of SAVR/ MVR    Calculated STS score of 5.6      Mimi Wilson MD

## 2017-05-08 NOTE — DISCHARGE SUMMARY
[facility]  Short Stay  Discharge Summary    Admit Date: 5/8/2017    Discharge Date and Time:  05/08/2017      Discharge Attending Physician: Robin Odonnell MD     Hospital Course   Mr. Gallagher is a 82-yo male with severe AS, severe MR, who presented for diagnostic LHC for AVR workup.  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  CTS was consulted for evaluation, and CTA was performed for further evaluation.  TAVR workup is as follows:     He has undergone the following TAVR work-up:   · ECHO (Date 5/1/17): UNA= 0.75 cm2, MG= 36mmHg, Peak David= 3.9 m/s, EF= 40%.    · LHC (Date 5/8/17): 50% diag, 60% PDA   · STS: 4.6%   · Frailty: ?/4   · Iliacs are >? on L and > ? on R   · Subclavian angiogram: ?  · LVOT area by CTA is ? cm2 and Avg Diameter is ?  · PFT (in media): FEV1 53% predicted, FEV1/FVC 0.56 (90% predicted)    Final Diagnoses:    Principal Problem: Aortic valve stenosis   Secondary Diagnoses:   Active Hospital Problems    Diagnosis  POA    *Aortic valve stenosis [I35.0]  Yes      Resolved Hospital Problems    Diagnosis Date Resolved POA   No resolved problems to display.       Discharged Condition: good    Disposition: Home or Self Care    Follow up/Patient Instructions:    Medications:  Reconciled Home Medications:   Current Discharge Medication List      CONTINUE these medications which have NOT CHANGED    Details   ascorbic acid, vitamin C, (VITAMIN C) 1000 MG tablet 1,000 mg.      furosemide (LASIX) 20 MG tablet Take 1 tablet (20 mg total) by mouth once daily.  Qty: 10 tablet, Refills: 0      GLUCOSAMINE HCL/CHONDR BAUTISTA A NA (OSTEO BI-FLEX ORAL) Take 2 tablets by mouth once daily.      losartan-hydrochlorothiazide 100-12.5 mg (HYZAAR) 100-12.5 mg Tab Take 1 tablet by mouth once daily.      metoprolol succinate (TOPROL-XL) 25 MG 24 hr tablet Take 25 mg by mouth 2 (two) times daily.       lisinopril 10 MG tablet Take 1 tablet (10 mg total) by mouth once  daily.  Qty: 30 tablet, Refills: 0      metformin (GLUCOPHAGE) 500 MG tablet Take 1,000 mg by mouth 2 (two) times daily with meals.            No discharge procedures on file.

## 2017-05-08 NOTE — PLAN OF CARE
Problem: Patient Care Overview  Goal: Plan of Care Review  Outcome: Ongoing (interventions implemented as appropriate)  Received report from [facility]. Patient s/p [facility], AAOx3. VSS, no c/o pain or discomfort at this time, resp even and unlabored. Vasc Band to R wrist is CDI. No active bleeding. No hematoma noted. Post procedure protocol reviewed with patient and patient's family. Understanding verbalized. Family members at bedside. Nurse call bell within reach. Will continue to monitor per post procedure protocol.

## 2017-05-08 NOTE — IP AVS SNAPSHOT
[facility]  [address]  [facility] [address]  Phone: [phone]           Patient Discharge Instructions   Our goal is to set you up for success. This packet includes information on your condition, medications, and your home care.  It will help you care for yourself to prevent having to return to the hospital.     Please ask your nurse if you have any questions.      There are many details to remember when preparing to leave the hospital. Here is what you will need to do:    1. Take your medicine. If you are prescribed medications, review your Medication List on the following pages. You may have new medications to  at the pharmacy and others that you'll need to stop taking. Review the instructions for how and when to take your medications. Talk with your doctor or nurses if you are unsure of what to do.     2. Go to your follow-up appointments. Specific follow-up information is listed in the following pages. Your may be contacted by a nurse or clinical provider about future appointments. Be sure we have all of the phone numbers to reach you. Please contact your provider's office if you are unable to make an appointment.     3. Watch for warning signs. Your doctor or nurse will give you detailed warning signs to watch for and when to call for assistance. These instructions may also include educational information about your condition. If you experience any of warning signs to your health, call your doctor.           [facility]  Unless otherwise directed by your provider, please   contact [facility], our nurse care line   that is available for 24/7 assistance.     [phone] (toll-free)     Registered nurses in the [facility]   provide: appointment scheduling, clinical advisement, health education, and other advisory services.                  ** Verify the list of medication(s) below is accurate and up to date. Carry this with you in case of  emergency. If your medications have changed, please notify your healthcare provider.             Medication List      ASK your doctor about these medications        Additional Info                      furosemide 20 MG tablet   Commonly known as:  LASIX   Quantity:  10 tablet   Refills:  0   Dose:  20 mg    Instructions:  Take 1 tablet (20 mg total) by mouth once daily.     Begin Date    AM    Noon    PM    Bedtime       lisinopril 10 MG tablet   Quantity:  30 tablet   Refills:  0   Dose:  10 mg    Instructions:  Take 1 tablet (10 mg total) by mouth once daily.     Begin Date    AM    Noon    PM    Bedtime       losartan-hydrochlorothiazide 100-12.5 mg 100-12.5 mg Tab   Commonly known as:  HYZAAR   Refills:  0   Dose:  1 tablet    Instructions:  Take 1 tablet by mouth once daily.     Begin Date    AM    Noon    PM    Bedtime       metformin 500 MG tablet   Commonly known as:  GLUCOPHAGE   Refills:  0   Dose:  1000 mg    Instructions:  Take 1,000 mg by mouth 2 (two) times daily with meals.     Begin Date    AM    Noon    PM    Bedtime       metoprolol succinate 25 MG 24 hr tablet   Commonly known as:  TOPROL-XL   Refills:  0   Dose:  25 mg    Instructions:  Take 25 mg by mouth 2 (two) times daily.     Begin Date    AM    Noon    PM    Bedtime       OSTEO BI-FLEX ORAL   Refills:  0   Dose:  2 tablet    Instructions:  Take 2 tablets by mouth once daily.     Begin Date    AM    Noon    PM    Bedtime       VITAMIN C 1000 MG tablet   Refills:  0   Dose:  1000 mg   Generic drug:  ascorbic acid (vitamin C)    Instructions:  1,000 mg.     Begin Date    AM    Noon    PM    Bedtime                  Please bring to all follow up appointments:    1. A copy of your discharge instructions.  2. All medicines you are currently taking in their original bottles.  3. Identification and insurance card.    Please arrive 15 minutes ahead of scheduled appointment time.    Please call 24 hours in advance if you must reschedule your  "appointment and/or time.            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      Admission Information     Date & Time Provider Department CSN    5/8/2017  6:45 AM Robin Odonnell MD [facility] [id number]      Care Providers     Provider Role Specialty Primary office phone    Robin Odonnell MD Attending Provider Cardiology [phone]      Your Vitals Were     BP Pulse Temp Resp Height Weight    137/64 74 98.2 °F (36.8 °C) (Oral) 16 5' 8.5" (1.74 m) 89.1 kg (196 lb 6.4 oz)    SpO2 BMI             94% 29.43 kg/m2         Recent Lab Values        12/3/2016                           5:07 AM           A1C 7.2 (H)           Comment for A1C at  5:07 AM on 12/3/2016:  According to ADA guidelines, hemoglobin A1C <7.0% represents  optimal control in non-pregnant diabetic patients.  Different  metrics may apply to specific populations.   Standards of Medical Care in Diabetes - 2016.  For the purpose of screening for the presence of diabetes:  <5.7%     Consistent with the absence of diabetes  5.7-6.4%  Consistent with increasing risk for diabetes   (prediabetes)  >or=6.5%  Consistent with diabetes  Currently no consensus exists for use of hemoglobin A1C  for diagnosis of diabetes for children.        Allergies as of 5/8/2017        Reactions    Vancomycin Analogues Itching    Ciprofloxacin (Mixture) Itching    Extreme itching and redness       Advance Directives     An advance directive is a document which, in the event you are no longer able to make decisions for yourself, tells your healthcare team what kind of treatment you do or do not want to receive, or who you would like to make those decisions for you.  If you do not currently have an advance directive, [facility] encourages you to create one.  For more information call:  [phone]), [phone] ([phone]),  or log on to [email or web address].        Smoking Cessation     If you " would like to quit smoking:   You may be eligible for free services if you are a [place] resident and started smoking cigarettes before September 1, 1988.  Call the Smoking Cessation Trust (SCT) toll free at (671) 710-0644 or (998) 137-6601.   Call 1-800-QUIT-NOW if you do not meet the above criteria.   Contact us via email: [email or web address]   View our website for more information: [email or web address]        Language Assistance Services     ATTENTION: Language assistance services are available, free of charge. Please call [phone].      ATENCIÓN: Si habla español, tiene a herron disposición servicios gratuitos de asistencia lingüística. Llame al [phone].     CHÚ Ý: N?u b?n nói Ti?ng Vi?t, có các d?ch v? h? tr? ngôn ng? mi?n phí dành cho b?n. G?i s? [phone].        Heart Failure Education       Heart Failure: Being Active  You have a condition called heart failure. Being active doesnt mean that you have to wear yourself out. Even a little movement each day helps to strengthen your heart. If you cant get out to exercise, you can do simple stretching and strengthening exercises at home. These are good ways to keep you well-conditioned and prevent you and your heart from becoming excessively weak.    Ideas to get you started  · Add a little movement to things you do now. Walk to mail letters. Park your car at the far end of the parking lot and walk to the store. Walk up a flight of stairs instead of taking the elevator.  · Choose activities you enjoy. You might walk, swim, or ride an exercise bike. Things like gardening and washing the car count, too. Other possibilities include: washing dishes, walking the dog, walking around the mall, and doing aerobic activities with friends.  · Join a group exercise program at a [facility] or [facility], a senior center, or a community center. Or look into a hospital cardiac rehabilitation program. Ask your doctor if you qualify.  Tips to keep you  going  · Get up and get dressed each day. Go to a coffee shop and read a newspaper or go somewhere that you'll be in the presence of other active people. Youll feel more like being active.  · Make a plan. Choose one or more activities that you enjoy and that you can easily do. Then plan to do at least one each day. You might write your plan on a calendar.  · Go with a friend or a group if you like company. This can help you feel supported and stay motivated, too.  · Plan social events that you enjoy. This will keep you mentally engaged as well as physically motivated to do things you find pleasure in.  For your safety  · Talk with your healthcare provider before starting an exercise program.  · Exercise indoors when its too hot or too cold outside, or when the air quality is poor. Try walking at a shopping mall.  · Wear socks and sturdy shoes to maintain your balance and prevent falls.  · Start slowly. Do a few minutes several times a day at first. Increase your time and speed little by little.  · Stop and rest whenever you feel tired or get short of breath.  · Dont push yourself on days when you dont feel well.  Date Last Reviewed: 3/20/2016  © 0405-8317 [employer]. [address] [place] [address], [place] [address]. All rights reserved. This information is not intended as a substitute for professional medical care. Always follow your healthcare professional's instructions.              Heart Failure: Evaluating Your Heart  You have a condition called heart failure. To evaluate your condition, your doctor will examine you, ask questions, and do some tests. Along with looking for signs of heart failure, the doctor looks for any other health problems that may have led to heart failure. The results of your evaluation will help your doctor form a treatment plan.  Health history and physical exam  Your visit will start with a health history. Tell the doctor about any symptoms youve noticed and about all  medicines you take. Then youll have a physical exam. This includes listening to your heartbeat and breathing. Youll also be checked for swelling (edema) in your legs and neck. When you have fluid buildup or fluid in the lungs, it may be called congestive heart failure.  Diagnosing heart failure     During an echocardiogram, sound waves bounce off the heart. These are converted into a picture on the screen.   The following may be done to help your doctor form a diagnosis:  · X-rays show the size and shape of your heart. These pictures can also show fluid in your lungs.  · An electrocardiogram (ECG or EKG) shows the pattern of your heartbeat. Small pads (electrodes) are placed on your chest, arms, and legs. Wires connect the pads to the ECG machine, which records your hearts electrical signals. This can give the doctor information about heart function.  · An echocardiogram uses ultrasound waves to show the structure and movement of your heart muscle. This shows how well the heart pumps. It also shows the thickness of the heart walls, and if the heart is enlarged. It is one of the most useful, non-invasive tests as it provides information about the heart's general function. This helps your doctor make treatment decisions.  · Lab tests evaluate small amounts of blood or urine for signs of problems. A BNP lab test can help diagnose and evaluate heart failure. BNP stands for B-type natriuretic peptide. The ventricles secrete more BNP when heart failure worsens. Lab tests can also provide information about metabolic dysfunction or heart dysfunction.  Your treatment plan  Based on the results of your evaluation and tests, your doctor will develop a treatment plan. This plan is designed to relieve some of your heart failure symptoms and help make you more comfortable. Your treatment plan may include:  · Medicine to help your heart work better and improve your quality of life  · Changes in what you eat and drink to help  prevent fluid from backing up in your body  · Daily monitoring of your weight and heart failure symptoms to see how well your treatment plan is working  · Exercise to help you stay healthy  · Help with quitting smoking  · Emotional and psychological support to help adjust to the changes  · Referrals to other specialists to make sure you are being treated comprehensively  Date Last Reviewed: 3/21/2016  © 6019-3224 [employer]. [address] [place] [address], [place] [address]. All rights reserved. This information is not intended as a substitute for professional medical care. Always follow your healthcare professional's instructions.              Heart Failure: Making Changes to Your Diet  You have a condition called heart failure. When you have heart failure, excess fluid is more likely to build up in your body because your heart isn't working well. This makes the heart work harder to pump blood. Fluid buildup causes symptoms such as shortness of breath and swelling (edema). This is often referred to as congestive heart failure or CHF. Controlling the amount of salt (sodium) you eat may help stop fluid from building up. Your doctor may also tell you to reduce the amount of fluid you drink.  Reading food labels    Your healthcare provider will tell you how much sodium you can eat each day. Read food labels to keep track. Keep in mind that certain foods are high in salt. These include canned, frozen, and processed foods. Check the amount of sodium in each serving. Watch out for high-sodium ingredients. These include MSG (monosodium glutamate), baking soda, and sodium phosphate.   Eating less salt  Give yourself time to get used to eating less salt. It may take a little while. Here are some tips to help:  · Take the saltshaker off the table. Replace it with salt-free herb mixes and spices.  · Eat fresh or plain frozen vegetables. These have much less salt than canned vegetables.  · Choose low-sodium snacks like  sodium-free pretzels, crackers, or air-popped popcorn.  · Dont add salt to your food when youre cooking. Instead, season your foods with pepper, lemon, garlic, or onion.  · When you eat out, ask that your food be cooked without added salt.  · Avoid eating fried foods as these often have a great deal of salt.  If youre told to limit fluids  You may need to limit how much fluid you have to help prevent swelling. This includes anything that is liquid at room temperature, such as ice cream and soup. If your doctor tells you to limit fluid, try these tips:  · Measure drinks in a measuring cup before you drink them. This will help you meet daily goals.  · Chill drinks to make them more refreshing.  · Suck on frozen lemon wedges to quench thirst.  · Only drink when youre thirsty.  · Chew sugarless gum or suck on hard candy to keep your mouth moist.  · Weigh yourself daily to know if your body's fluid content is rising.  My sodium goal  Your healthcare provider may give you a sodium goal to meet each day. This includes sodium found in food as well as salt that you add. My goal is to eat no more than ___________ mg of sodium per day.     When to call your doctor  Call your doctor right away if you have any symptoms of worsening heart failure. These can include:  · Sudden weight gain  · Increased swelling of your legs or ankles  · Trouble breathing when youre resting or at night  · Increase in the number of pillows you have to sleep on  · Chest pain, pressure, discomfort, or pain in the jaw, neck, or back   Date Last Reviewed: 3/21/2016  © 9980-2883 [employer]. [address] [place] [address], [place] [address]. All rights reserved. This information is not intended as a substitute for professional medical care. Always follow your healthcare professional's instructions.              Heart Failure: Medicines to Help Your Heart    You have a condition called heart failure (also known as congestive heart failure, or  CHF). Your doctor will likely prescribe medicines for heart failure and any underlying health problems you have. Most heart failure patients take one or more types of medicinen. Your healthcare provider will work to find the combination of medicines that works best for you.  Heart failure medicines  Here are the most common heart failure medicines:  · ACE inhibitors lower blood pressure and decrease strain on the heart. This makes it easier for the heart to pump. Angiotensin receptor blockers have similar effects. These are prescribed for some patients instead of ACE inhibitors.  · Beta-blockers relieve stress on the heart. They also improve symptoms. They may also improve the heart's pumping action over time.  · Diuretics (also called water pills) help rid your body of excess water. This can help rid your body of swelling (edema). Having less fluid to pump means your heart doesnt have to work as hard. Some diuretics make your body lose a mineral called potassium. Your doctor will tell you if you need to take supplements or eat more foods high in potassium.  · Digoxin helps your heart pump with more strength. This helps your heart pump more blood with each beat. So, more oxygen-rich blood travels to the rest of the body.  · Aldosterone antagonists help alter hormones and decrease strain on the heart.  · Hydralazine and nitrates are two separate medicines used together to treat heart failure. They may come in one combination pill. They lower blood pressure and decrease how hard the heart has to pump.  Medicines for related conditions  Controlling other heart problems helps keep heart failure under control, too. Depending on other heart problems you have, medicines may be prescribed to:  · Lower blood pressure (antihypertensives).  · Lower cholesterol levels (statins).  · Prevent blood clots (anticoagulants or aspirin).  · Keep the heartbeat steady (antiarrhythmics).  Date Last Reviewed: 3/5/2016  © 1506-0759 [employer]. [address] [place] [address], [place] [address]. All rights reserved. This information is not intended as a substitute for professional medical care. Always follow your healthcare professional's instructions.              Heart Failure: Procedures That May Help    The heart is a muscle that pumps oxygen-rich blood to all parts of the body. When you have heart failure, the heart is not able to pump as well as it should. Blood and fluid may back up into the lungs (congestive heart failure), and some parts of the body dont get enough oxygen-rich blood to work normally. These problems lead to the symptoms of heart failure.     Certain procedures may help the heart pump better in some cases of heart failure. Some procedures are done to treat health problems that may have caused the heart failure such as coronary artery disease or heart rhythm problems. For more serious heart failure, other options are available.  Treating artery and valve problems  If you have coronary artery disease or valve disease, procedures may be done to improve blood flow. This helps the heart pump better, which can improve heart failure symptoms. First, your doctor may do a cardiac catheterization to help detect clogged blood vessels or valve damage. During this procedure, a  thin tube (catheter) in inserted into a blood vessel and guided to the heart. There a dye is injected and a special type of X-ray (angiogram) is taken of the blood vessels. Procedures to open a blocked artery or fix damaged valves can also be done using catheterization.  · Angioplasty uses a balloon-tipped instrument at the end of the catheter. The balloon is inflated to widen the narrowed artery. In many cases, a stent is expanded to further support the narrowed artery. A stent is a metal mesh tube.  · Valve surgery repairs or replacement of faulty valves can also be done during catheterization so blood can flow properly through the chambers of the  heart.  Bypass surgery is another option to help treat blocked arteries. It uses a healthy blood vessel from elsewhere in the body. The healthy blood vessel is attached above and below the blocked area so that blood can flow around the blocked artery.  Treating heart rhythm problems  A device may be placed in the chest to help a weak heart maintain a healthy, heartbeat so the heart can pump more effectively:  · Pacemaker. A pacemaker is an implanted device that regulates your heartbeat electronically. It monitors your heart's rhythm and generates a painless electric impulse that helps the heart beat in a regular rhythm. A pacemaker is programmed to meet your specific heart rhythm needs.  · Biventricular pacing/cardiac resynchronization therapy. A type of pacemaker that paces both pumping chambers of the heart at the same time to coordinate contractions and to improve the heart's function. Some people with heart failure are candidates for this therapy.  · Implantable cardioverter defibrillator. A device similar to a pacemaker that senses when the heart is beating too fast and delivers an electrical shock to convert the fast rhythm to a normal rhythm. This can be a life saving device.  In severe cases  In more serious cases of heart failure when other treatments no longer work, other options may include:  · Ventricular assist devices (VADs). These are mechanical devices used to take over the pumping function for one or both of the heart's ventricles, or pumping chambers. A VAD may be necessary when heart failure progresses to the point that medicines and other treatments no longer help. In some cases, a VAD may be used as a bridge to transplant.  · Heart transplant. This is replacing the diseased heart with a healthy one from a donor. This is an option for a few people who are very sick. A heart transplant is very serious and not an option for all patients. Your doctor can tell you more.  Date Last Reviewed:  3/20/2016  © 1001-2889 [employer]. [address] [place] [address], [place] [address]. All rights reserved. This information is not intended as a substitute for professional medical care. Always follow your healthcare professional's instructions.              Heart Failure: Tracking Your Weight  You have a condition called heart failure. When you have heart failure, a sudden weight gain or a steady rise in weight is a warning sign that your body is retaining too much water and salt. This could mean your heart failure is getting worse. If left untreated, it can cause problems for your lungs and result in shortness of breath. Weighing yourself each day is the best way to know if youre retaining water. If your weight goes up quickly, call your doctor. You will be given instructions on how to get rid of the excess water. You will likely need medicines and to avoid salt. This will help your heart work better.  Call your doctor if you gain more than 2 pounds in 1 day, more than 5 pounds in 1 week, or whatever weight gain you were told to report by your doctor. This is often a sign of worsening heart failure and needs to be evaluated and treated. Your doctor will tell you what to do next.   Tips for weighing yourself    · Weigh yourself at the same time each morning, wearing the same clothes. Weigh yourself after urinating and before eating.  · Use the same scale each day. Make sure the numbers are easy to read. Put the scale on a flat, hard surface -- not on a rug or carpet.  · Do not stop weighing yourself. If you forget one day, weigh again the next morning.  How to use your weight chart  · Keep your weight chart near the scale. Write your weight on the chart as soon as you get off the scale.  · Fill in the month and the start date on the chart. Then write down your weight each day. Your chart will look like this:    · If you miss a day, leave the space blank. Weigh yourself the next day and write your weight in  the next space.  · Take your weight chart with you when you go to see your doctor.  Date Last Reviewed: 3/20/2016  © 4061-5561 [employer]. [address] [place] [address], [place] [address]. All rights reserved. This information is not intended as a substitute for professional medical care. Always follow your healthcare professional's instructions.              Heart Failure: Warning Signs of a Flare-Up  You have a condition called heart failure. Once you have heart failure, flare-ups can happen. Below are signs that can mean your heart failure is getting worse. If you notice any of these warning signs, call your healthcare provider.  Swelling    · Your feet, ankles, or lower legs get puffier.  · You notice skin changes on your lower legs.  · Your shoes feel too tight.  · Your clothes are tighter in the waist.  · You have trouble getting rings on or off your fingers.  Shortness of breath  · You have to breathe harder even when youre doing your normal activities or when youre resting.  · You are short of breath walking up stairs or even short distances.  · You wake up at night short of breath or coughing.  · You need to use more pillows or sit up to sleep.  · You wake up tired or restless.  Other warning signs  · You feel weaker, dizzy, or more tired.  · You have chest pain or changes in your heartbeat.  · You have a cough that wont go away.  · You cant remember things or dont feel like eating.  Tracking your weight  Gaining weight is often the first warning sign that heart failure is getting worse. Gaining even a few pounds can be a sign that your body is retaining excess water and salt. Weighing yourself each day in the morning after you urinate and before you eat, is the best way to know if you're retaining water. Get a scale that is easy to read and make sure you wear the same clothes and use the same scale every time you weigh. Your healthcare provider will show you how to track your weight. Call your  doctor if you gain more than 2 pounds in 1 day, 5 pounds in 1 week, or whatever weight gain you were told to report by your doctor. This is often a sign of worsening heart failure and needs to be evaluated and treated before it compromises your breathing. Your doctor will tell you what to do next.    Date Last Reviewed: 3/15/2016  © 4028-7604 [employer]. [address] [place] [address], [place] [address]. All rights reserved. This information is not intended as a substitute for professional medical care. Always follow your healthcare professional's instructions.              Pneumonmia Discharge Instructions                Diabetes Discharge Instructions                                   [facility] Sign-Up     Activating your [facility] account is as easy as 1-2-3!     1) Visit [email or web address], select Sign Up Now, enter this activation code and your date of birth, then select Next.  [email or web address]  Expires: 6/22/2017  3:02 PM      2) Create a username and password to use when you visit [facility] in the future and select a security question in case you lose your password and select Next.    3) Enter your e-mail address and click Sign Up!    Additional Information  If you have questions, please e-mail [email or web address] or call [phone] to talk to our [facility] staff. Remember, [facility] is NOT to be used for urgent needs. For medical emergencies, dial 911.          [facility] complies with applicable Federal civil rights laws and does not discriminate on the basis of race, color, national origin, age, disability, or sex.

## 2017-07-18 DIAGNOSIS — R91.8 LUNG NODULES: Primary | ICD-10-CM

## 2017-08-16 ENCOUNTER — DOCUMENTATION ONLY (OUTPATIENT)
Dept: CARDIOLOGY | Facility: CLINIC | Age: 82
End: 2017-08-16

## 2017-08-16 NOTE — PROGRESS NOTES
Mr. Gallagher is referred by Dr. Manzo for evaluation of severe AS (NYHA Class II sx)    He has undergone the following TAVR work-up:  · Dobutamine Echo (5/1/17): UNA= 0.75 cm2, MG= 57 mmHg, PV= 4.8 m/s, EF= 40%  · LHC (5/8/17): Normal LM and LCx, LI in LAD and RCA, 70% mid-PDA stenosis, 50% D1 stenosis  · STS= 4.7%  · Needs frailty  · PFTs: FEV1= 1.37/53% predicted, FVC = 2.42/56% predicted, DLCO = 17.93/78% predicted   · Iliacs are >6.3 on R and >5.0 on L  · LVOT (per JDT): Area= 4.48 cm2, Avg Diam= 23.9 mm (27.7 x 21.4 mm)  · He is high risk for SAVR due to age and severe COPD  · Rhythm issues: IRBBB  · Incidental CT findings: Innumerable scattered pulmonary nodules, the largest of which measures 1.0 cm within the right upper lobe, increased in size from prior examination.  While these findings can be seen in the setting of aspiration and multiple infectious etiologies, metastatic disease cannot be excluded. Consultation with pulmonary medicine and further followup are recommended.            He is scheduled to see Pulmonology ([facility]) on 8/18 for further evaluation of his pulmonary nodules.     Screen fail for [place] due to severe MRBriana   Will proceed with 26mm Brent S3 (18% oversized) TAVR via R TF access when he is cleared by Pulmonology.

## 2017-09-07 ENCOUNTER — DOCUMENTATION ONLY (OUTPATIENT)
Dept: CARDIOLOGY | Facility: CLINIC | Age: 82
End: 2017-09-07

## 2017-09-07 NOTE — PROGRESS NOTES
[facility]    I spoke with Dr Fox, pulmonologist at [facility] regarding incidental findings of pulmonary nodules. He saw the patient and reviewed the CT scan. He believes findings most likely represent benign etiology especially with history of infectious process. He has him scheduled to repeat study in the next 3-6 months.    Will proceed with TAVR as planned and let him follow for his pulmonary nodules with Dr Dominique Reeder MD  Interventional Cardiology  Structural/Valvular heart disease  [phone]

## 2017-09-19 DIAGNOSIS — N18.9 CHRONIC KIDNEY DISEASE, UNSPECIFIED CKD STAGE: ICD-10-CM

## 2017-09-19 DIAGNOSIS — I35.0 AORTIC VALVE STENOSIS, UNSPECIFIED ETIOLOGY: Primary | ICD-10-CM

## 2017-09-19 RX ORDER — DEXTROSE MONOHYDRATE AND SODIUM CHLORIDE 5; .45 G/100ML; G/100ML
INJECTION, SOLUTION INTRAVENOUS CONTINUOUS
Status: CANCELLED | OUTPATIENT
Start: 2017-09-19

## 2017-09-19 RX ORDER — DIPHENHYDRAMINE HCL 25 MG
50 CAPSULE ORAL ONCE
Status: CANCELLED | OUTPATIENT
Start: 2017-09-19 | End: 2017-09-19

## 2017-10-16 ENCOUNTER — OFFICE VISIT (OUTPATIENT)
Dept: CARDIOLOGY | Facility: CLINIC | Age: 82
DRG: 266 | End: 2017-10-16
Payer: MEDICARE

## 2017-10-16 ENCOUNTER — ANESTHESIA EVENT (OUTPATIENT)
Dept: MEDSURG UNIT | Facility: HOSPITAL | Age: 82
DRG: 266 | End: 2017-10-16
Payer: MEDICARE

## 2017-10-16 VITALS
WEIGHT: 196 LBS | HEART RATE: 60 BPM | SYSTOLIC BLOOD PRESSURE: 156 MMHG | OXYGEN SATURATION: 96 % | HEIGHT: 67 IN | DIASTOLIC BLOOD PRESSURE: 74 MMHG | BODY MASS INDEX: 30.76 KG/M2

## 2017-10-16 DIAGNOSIS — I35.0 AORTIC VALVE STENOSIS, ETIOLOGY OF CARDIAC VALVE DISEASE UNSPECIFIED: ICD-10-CM

## 2017-10-16 DIAGNOSIS — K44.9 HIATAL HERNIA WITH GERD AND ESOPHAGITIS: ICD-10-CM

## 2017-10-16 DIAGNOSIS — D63.8 ANEMIA OF CHRONIC DISEASE: ICD-10-CM

## 2017-10-16 DIAGNOSIS — I10 HTN (HYPERTENSION), MALIGNANT: Primary | ICD-10-CM

## 2017-10-16 DIAGNOSIS — I10 ESSENTIAL HYPERTENSION: ICD-10-CM

## 2017-10-16 DIAGNOSIS — E13.9 DM (DIABETES MELLITUS), SECONDARY: ICD-10-CM

## 2017-10-16 DIAGNOSIS — I50.32 CHRONIC DIASTOLIC HEART FAILURE: ICD-10-CM

## 2017-10-16 DIAGNOSIS — K21.00 HIATAL HERNIA WITH GERD AND ESOPHAGITIS: ICD-10-CM

## 2017-10-16 PROCEDURE — 99999 PR PBB SHADOW E&M-EST. PATIENT-LVL IV: CPT | Mod: PBBFAC,,,

## 2017-10-16 PROCEDURE — 99214 OFFICE O/P EST MOD 30 MIN: CPT | Mod: PBBFAC

## 2017-10-16 PROCEDURE — 99214 OFFICE O/P EST MOD 30 MIN: CPT | Mod: S$PBB,,, | Performed by: NURSE PRACTITIONER

## 2017-10-16 RX ORDER — ASPIRIN 81 MG/1
81 TABLET ORAL DAILY
Refills: 0 | COMMUNITY
Start: 2017-10-16 | End: 2018-12-03 | Stop reason: HOSPADM

## 2017-10-16 RX ORDER — CLOPIDOGREL BISULFATE 75 MG/1
75 TABLET ORAL DAILY
Qty: 30 TABLET | Refills: 11 | Status: SHIPPED | OUTPATIENT
Start: 2017-10-16 | End: 2018-08-27 | Stop reason: ALTCHOICE

## 2017-10-16 NOTE — PROGRESS NOTES
"Subjective:    Patient ID:  Timbo Gallagher is a 82 y.o. male who presents for evaluation of TAVR.  Referring: Dr. Manzo     HPI    83 Y/O M referred from Dr Manzo's office in [facility] for evaluation of severe AS, severe MR. Patient has PMH significant for HTN, Dm, paraesophageal hernia with esophagitis severe As, severe Mr, COPD, A Fib (now NSR). Over the past 2 years patient has been complaining of fatigue and low energy. In 12/2017 patient was admitted with SOB and was diagnosed with PNA, he was told that he has congestive heart failure for which he was started on Lasix. In January patient was seen by CTS for evaluation of Paraesophageal hernia with esophagitis, was seen by CTS who were planning for surgical resection of large diverticulum after cardiology clearance. Patient underwent Echo that revealed depressed EF with low flow severe AS vs mod to severe AS with new drop in EF over 3 months period. He was told not to proceed with surgical resection of diverticulum. He presented to Dr. Manzo who was referred to TAVR evaluation. He states he walks outside in his yard but admits to SOB with walking "~5 minutes".  He denies chest pain, syncope, claudication, PND, LE edema.  He was seen by CTS Dr. Barnhart for SAVR risk assessment.     (NYHA Class II sx)    He has undergone the following TAVR work-up:  · Dobutamine Echo (5/1/17): UNA= 0.75 cm2, MG= 57 mmHg, PV= 4.8 m/s, EF= 40%  · LHC (5/8/17): Normal LM and LCx, LI in LAD and RCA, 70% mid-PDA stenosis, 50% D1 stenosis  · STS= 4.7%  · 1/4 (albumin 3.4)  · PFTs: Severe, FEV1= 1.37/53% predicted, FVC = 2.42/56% predicted, DLCO = 17.93/78% predicted   · Iliacs are >6.3 on R and >5.0 on L  · LVOT (per JDT): Area= 4.48 cm2, Avg Diam= 23.9 mm (27.7 x 21.4 mm)  · He is high risk for SAVR due to age and severe COPD  · Rhythm issues: IRBBB  · Incidental CT findings: Innumerable scattered pulmonary nodules, the largest of which measures 1.0 cm within the right upper lobe, " increased in size from prior examination.  While these findings can be seen in the setting of aspiration and multiple infectious etiologies, metastatic disease cannot be excluded. Dr. Reeder spoke with Dr. Fox who believes findings most likely represent benign etiology especially with history of infectious process. Pt is scheduled to repeat study in the next 3-6 months.          Will proceed with 26mm Brent S3 (18% oversized) TAVR via R TF access      Review of Systems   Constitution: Positive for weakness and malaise/fatigue. Negative for chills, diaphoresis, fever, weight gain and weight loss.   HENT: Negative for sore throat.    Eyes: Negative for blurred vision, vision loss in left eye, vision loss in right eye and visual disturbance.   Cardiovascular: Positive for dyspnea on exertion and irregular heartbeat. Negative for chest pain, claudication, leg swelling, near-syncope, orthopnea, palpitations, paroxysmal nocturnal dyspnea and syncope.   Respiratory: Positive for shortness of breath. Negative for cough, hemoptysis, sputum production and wheezing.    Endocrine: Negative for cold intolerance and heat intolerance.   Hematologic/Lymphatic: Negative for adenopathy. Does not bruise/bleed easily.   Skin: Negative for rash.   Musculoskeletal: Negative for falls, muscle weakness and myalgias.   Gastrointestinal: Negative for abdominal pain, change in bowel habit, constipation, diarrhea, melena and nausea.   Genitourinary: Negative for bladder incontinence.   Neurological: Negative for dizziness, focal weakness, headaches, light-headedness and numbness.   Psychiatric/Behavioral: Negative for altered mental status.       Constitutional: negative for chills, fevers and night sweats  Ears, nose, mouth, throat, and face: negative for nasal congestion, sore throat and tinnitus  Respiratory: negative for cough, dyspnea on exertion, pleurisy/chest pain, sputum and wheezing  Cardiovascular: See HPI  Gastrointestinal:  negative  Genitourinary:negative for dysuria, frequency, hematuria, hesitancy and nocturia  Hematologic/lymphatic: negative for bleeding and easy bruising  Musculoskeletal:negative for muscle weakness and myalgias  Neurological: negative for dizziness, headaches, paresthesia and weakness  Behavioral/Psych: negative for anxiety and bad mood    Objective:    Physical Exam   Constitutional: He is oriented to person, place, and time. He appears well-developed and well-nourished.   HENT:   Head: Normocephalic and atraumatic.   Eyes: EOM are normal. Pupils are equal, round, and reactive to light.   Neck: Neck supple. No JVD present. No tracheal deviation present. No thyromegaly present.   Cardiovascular: Normal rate, regular rhythm, S1 normal, S2 normal, intact distal pulses and normal pulses.  PMI is not displaced.  Exam reveals no gallop and no friction rub.    Murmur heard.  Pulmonary/Chest: Effort normal and breath sounds normal. No respiratory distress. He has no wheezes. He has no rales. He exhibits no tenderness.   Abdominal: Soft. Bowel sounds are normal. He exhibits no distension and no mass. There is no hepatosplenomegaly. There is no tenderness.   Musculoskeletal: Normal range of motion. He exhibits no edema or tenderness.   Neurological: He is alert and oriented to person, place, and time.   Skin: Skin is warm and dry. No rash noted.   Psychiatric: He has a normal mood and affect. His behavior is normal.       General: Patient in no acute distress or discomfort  HEENT: No JVD, moist mucous membranes  Cardiac: S1S2 STAR on R parasternum, pan systolic apical murmur radiating to L sternal border.  Chest: CTABL, no wheezing or rales  Abd:Soft NTND  Ext: No Edema No swelling  Neuro: A and O X 3, non focal      There were no vitals filed for this visit.      Assessment:       1.  Aortic valve stenosis, etiology of cardiac valve disease unspecified - He has undergone the following TAVR work-up:  · Dobutamine Echo  (5/1/17): UNA= 0.75 cm2, MG= 57 mmHg, PV= 4.8 m/s, EF= 40%  · LHC (5/8/17): Normal LM and LCx, LI in LAD and RCA, 70% mid-PDA stenosis, 50% D1 stenosis  · STS= 4.7%  · 1/4 (albumin 3.4)  · PFTs: Severe, FEV1= 1.37/53% predicted, FVC = 2.42/56% predicted, DLCO = 17.93/78% predicted   · Iliacs are >6.3 on R and >5.0 on L  · LVOT (per JDT): Area= 4.48 cm2, Avg Diam= 23.9 mm (27.7 x 21.4 mm)  · He is high risk for SAVR due to age and severe COPD  · Rhythm issues: IRBBB  · Incidental CT findings: Innumerable scattered pulmonary nodules, the largest of which measures 1.0 cm within the right upper lobe, increased in size from prior examination.  While these findings can be seen in the setting of aspiration and multiple infectious etiologies, metastatic disease cannot be excluded. Dr. Reeder spoke with Dr. Fox who believes findings most likely represent benign etiology especially with history of infectious process. Pt is scheduled to repeat study in the next 3-6 months.          Will proceed with 26mm Brent S3 (18% oversized) TAVR via R TF access   2. Chronic diastolic heart failure - On Lasix    3. Essential hypertension - Controlled on Toprol Xl, Hyzaar, Lisinopril    4. HTN (hypertension), malignant   5. DM (diabetes mellitus), secondary - On Metformin    6. Hiatal hernia with GERD and esophagitis    7. Anemia of chronic disease        Plan:       Proceed with 26mm S3 TAVR via R TF access scheduled for tomorrow, 10/17/2017  Risks, Benefits, and alternatives of the procedure were discussed in detail with the patient. Questions were answered and patient has voiced understanding. He is agreeable to proceed. Informed consent obtained.   Load ASA/Plavix tonight then 1 tab daily. DAPT with ASA/Plavix s/p TAVR   Pt to follow with Dr. Fox in 3-6 months as scheduled for f/u of pulm nodules     Staff:  I have personally taken the history and examined this patient and agree with the fellow's note as stated above and amended  it accordingly :-)

## 2017-10-16 NOTE — ANESTHESIA PREPROCEDURE EVALUATION
10/16/2017  Timbo Gallagher is a 82 y.o., male with aortic stenosis, DM2, GERD, and diastolic HF here for the procedure below.    Pre-operative evaluation for REPLACEMENT-VALVE-AORTIC (N/A)    Previous Airway:  Placement Date: 09/11/13; Placement Time: 1021; Method of Intubation: Direct laryngoscopy; Inserted by: CRNA; Airway Device: Endotracheal Tube-Hi/Lo; Mask Ventilation: Easy - oral; Intubated: Postinduction; Blade: Gastelum #2; Airway Device Size: 7.5; Style: Cuffed (lido 2% jelly applied to ETT); Cuff Inflation: Minimal occlusive pressure; Placement Verified By: Auscultation, Capnometry; Grade: Grade I; Complicating Factors: None; Intubation Findings: Positive EtCO2, Bilateral breath sounds, Atraumatic/Condition of teeth unchanged;  Depth of Insertion: 21; Securment: Lips; Complications: None; Breath Sounds: Equal Bilateral; Insertion Attempts: 1; Removal Date: 09/11/13;  Removal Time: 1130       Past Surgical History:   Procedure Laterality Date    CATARACT EXTRACTION, BILATERAL      EYE SURGERY      RETINAL DETACHMENT SURGERY           Vital Signs Range (Last 24H):  BP: ()/()   Arterial Line BP: ()/()       CBC:   No results for input(s): WBC, RBC, HGB, HCT, PLT, MCV, MCH, MCHC in the last 720 hours.    CMP: No results for input(s): NA, K, CL, CO2, BUN, CREATININE, GLU, MG, PHOS, CALCIUM, ALBUMIN, PROT, ALKPHOS, ALT, AST, BILITOT in the last 720 hours.    INR:  No results for input(s): INR, PROTIME, APTT in the last 720 hours.    Invalid input(s): PT      Diagnostic Studies:  Stress test 5/1/17:  EKG Conclusions:  1. The EKG portion of this study is negative for ischemia at a peak heart rate of 131 bpm (95% of predicted).   2. Blood pressure remained stable throughout the protocol  (Presenting BP: 183/84 Peak BP: 185/77).   3. The following arrhythmias were present: very frequent PVCs.   4.  There were no symptoms of chest discomfort or significant dyspnea throughout the protocol.  ECHO:    1 - Mild left ventricular enlargement.     2 - Mildly to moderately depressed left ventricular systolic function (EF 40-45%).     3 - Normal right ventricular systolic function .     4 - Biatrial enlargement.     5 - Moderate to severe tricuspid regurgitation.     6 - Severe mitral regurgitation.     7 - Severe aortic stenosis, UNA = 0.75 cm2, peak velocity = 3.9 m/s, mean gradient = 36.0 mmHg.  Velocity and gradient increase with dobutamine as above    8 - Pulmonary hypertension. The estimated PA systolic pressure is 68 mmHg.     9 - Mildly elevated central venous pressure.        Left Heart Cath:     - Left Main Coronary Artery:             The LM is normal. There is ANASTASIIA 3 flow.     - Left Anterior Descending Artery:             The LAD has luminal irregularities. There is ANASTASIIA 3 flow.     - Left Circumflex Artery:             The LCX is normal. There is ANASTASIIA 3 flow.     - Right Coronary Artery:             The RCA has luminal irregularities. There is ANASTASIIA 3 flow.     - Radial Artery:             The Radial artery was not studied.     - D1:             The D1 has a 50% stenosis. There is ANASTASIIA 3 flow.     - Posterior Descending Artery:             The mid PDA has a 70% stenosis. There is ANASTASIIA 3 flow.    EKG:  Vent. Rate : 077 BPM     Atrial Rate : 077 BPM     P-R Int : 146 ms          QRS Dur : 100 ms      QT Int : 426 ms       P-R-T Axes : 066 067 058 degrees     QTc Int : 482 ms    Normal sinus rhythm  Intermittent Premature ventricular complexes and prematutre atrial  complexes  Incomplete right bundle branch block  Nonspecific ST abnormality  Abnormal ECG  when compared to previous EKG there were no significant changes    Confirmed by fellow MD Alessandro (Fellow's Unofficial Report), Vernon ([id number])  on 5/8/2017 10:34:06 AM  Confirmed by Marleen Huerta MD ([id number]) on 5/8/2017 11:31:53 AM    2D Echo:  Aorta: The aortic  root is normal in size, measuring 2.7 cm at sinotubular junction and 3.5 cm at Sinuses of Valsalva. The proximal ascending aorta is normal in size, measuring 3.0 cm across.     Left Atrium: The left atrial volume index is moderately enlarged, measuring 47.78 cc/m2.     Left Ventricle: The left ventricle is normal in size, with an end-diastolic diameter of 5.3 cm, and an end-systolic diameter of 3.9 cm. LV wall thickness is normal, with the septum measuring 1.0 cm and the posterior wall measuring 0.8 cm across. Relative   wall thickness was normal at 0.30, and the LV mass index was 91.0 g/m2 consistent with normal left ventricular mass. The inferolateral wall is akinetic. The lateral wall is hypokinetic. The following segments were akinetic: basal inferior wall.  The following segments were hypokinetic: mid anterior wall.  Global left ventricular systolic function appears mildly to moderately depressed. Visually estimated ejection fraction is 40-45%. The LV Doppler derived stroke volume equals 61.0 ccs.   There is blunted systolic/diastolic flow in the pulmonary vein indicating increased left atrial pressures. The E/e'(sep) is 15.  This along with the following abnormalities (RICHARD = 47.78) suggests significant diastolic dysfunction.     Right Atrium: The right atrium is normal in size, measuring 5.5 cm in length and 4.0 cm in width in the apical view.     Right Ventricle: The right ventricle is normal in size measuring 3.7 cm at the base in the apical right ventricle-focused view. Global right ventricular systolic function appears normal. Tricuspid annular plane systolic excursion (TAPSE) is 3.0 cm.   Tissue Doppler-derived tricuspid annular peak systolic velocity (S prime) is 14.3 cm/s. The estimated PA systolic pressure is 58 mmHg.     Aortic Valve:  The aortic valve is mildly sclerotic with markedly restricted leaflet mobility. The aortic valve is tri-leaflet in structure. The peak velocity obtained across the  aortic valve is 3.3 m/s, which translates to a peak gradient of 44.0 mmHg.   The mean gradient is 30.0 mmHg. Using a left ventricular outflow tract diameter of 2.2 cm, a left ventricular outflow tract velocity time integral of 16 cm, and a peak instantaneous transvalvular velocity time integral of 78 cm, the calculated aortic   valve area is 0.78 cm2, consistent with moderate to severe aortic stenosis. Consistent with low output AS.    Mitral Valve:  The mitral valve is normal in structure with mildly restricted leaflet mobility. There is severe mitral regurgitation.     Tricuspid Valve:  The tricuspid valve is normal in structure with normal leaflet mobility. There is mild tricuspid regurgitation.     Pulmonary Valve:  The pulmonic valve is normal in structure with normal leaflet mobility.     IVC: IVC is enlarged but collapses > 50% with a sniff, suggesting intermediate right atrial pressure of 8 mmHg.     Intracavitary: There is no evidence of pericardial effusion, intracavity mass, thrombi, or vegetation.     CONCLUSIONS     1 - Mildly to moderately depressed left ventricular systolic function (EF 40-45%).     2 - Wall motion abnormality.     3 - Moderate left atrial enlargement.     4 - Left ventricular diastolic dysfunction.     5 - Low output AS.moderate to severe aortic stenosis, UNA = 0.78 cm2, peak velocity = 3.3 m/s, mean gradient = 30.0 mmHg.     6 - Severe mitral regurgitation.     7 - Mild tricuspid regurgitation.     8 - Pulmonary hypertension. The estimated PA systolic pressure is 58 mmHg.       Anesthesia Evaluation    I have reviewed the Patient Summary Reports.    I have reviewed the Nursing Notes.   I have reviewed the Medications.     Review of Systems  Anesthesia Hx:  No problems with previous Anesthesia  History of prior surgery of interest to airway management or planning: Denies Family Hx of Anesthesia complications.   Denies Personal Hx of Anesthesia complications.   Social:  Smoker     Cardiovascular:   Hypertension Valvular problems/Murmurs, AS Dysrhythmias CHF    Pulmonary:   Denies COPD.  Denies Asthma.    Hepatic/GI:   Hiatal Hernia, GERD    Neurological:   Denies CVA. Denies Seizures.    Endocrine:   Diabetes        Physical Exam  General:  Well nourished    Airway/Jaw/Neck:  Airway Findings: Mouth Opening: Normal Tongue: Normal  General Airway Assessment: Adult  Mallampati: III  Improves to II with phonation.  TM Distance: Normal, at least 6 cm  Jaw/Neck Findings:  Neck ROM: Normal ROM      Dental:  Dental Findings: In tact, Upper Dentures, Lower Dentures   Chest/Lungs:  Chest/Lungs Findings: Clear to auscultation, Normal Respiratory Rate     Heart/Vascular:  Heart Findings: Rate: Normal  Rhythm: Regular Rhythm  Sounds: Normal        Mental Status:  Mental Status Findings:  Cooperative, Alert and Oriented         Anesthesia Plan  Type of Anesthesia, risks & benefits discussed:  Anesthesia Type:  general  Patient's Preference:   Intra-op Monitoring Plan: arterial line, central line, [place] and standard ASA monitors  Intra-op Monitoring Plan Comments:   Post Op Pain Control Plan: multimodal analgesia  Post Op Pain Control Plan Comments:   Induction:   IV  Beta Blocker:  Patient is on a Beta-Blocker and has received one dose within the past 24 hours (No further documentation required).       Informed Consent: Patient understands risks and agrees with Anesthesia plan.  Questions answered. Anesthesia consent signed with patient.  ASA Score: 4     Day of Surgery Review of History & Physical:    H&P update referred to the surgeon.         Ready For Surgery From Anesthesia Perspective.

## 2017-10-17 ENCOUNTER — ANESTHESIA (OUTPATIENT)
Dept: MEDSURG UNIT | Facility: HOSPITAL | Age: 82
DRG: 266 | End: 2017-10-17
Payer: MEDICARE

## 2017-10-17 ENCOUNTER — HOSPITAL ENCOUNTER (INPATIENT)
Facility: HOSPITAL | Age: 82
LOS: 1 days | Discharge: HOME | DRG: 266 | End: 2017-10-18
Attending: INTERNAL MEDICINE | Admitting: INTERNAL MEDICINE
Payer: MEDICARE

## 2017-10-17 ENCOUNTER — SURGERY (OUTPATIENT)
Age: 82
End: 2017-10-17

## 2017-10-17 DIAGNOSIS — I35.0 AORTIC VALVE STENOSIS, UNSPECIFIED ETIOLOGY: Primary | ICD-10-CM

## 2017-10-17 DIAGNOSIS — J18.1 LOBAR PNEUMONIA: ICD-10-CM

## 2017-10-17 DIAGNOSIS — Z95.2 S/P TAVR (TRANSCATHETER AORTIC VALVE REPLACEMENT): ICD-10-CM

## 2017-10-17 LAB
ABO + RH BLD: NORMAL
BLD GP AB SCN CELLS X3 SERPL QL: NORMAL
POCT GLUCOSE: 199 MG/DL (ref 70–110)

## 2017-10-17 PROCEDURE — D9220A PRA ANESTHESIA: Mod: Q0,,, | Performed by: ANESTHESIOLOGY

## 2017-10-17 PROCEDURE — 25000003 PHARM REV CODE 250: Performed by: ANESTHESIOLOGY

## 2017-10-17 PROCEDURE — 86901 BLOOD TYPING SEROLOGIC RH(D): CPT

## 2017-10-17 PROCEDURE — S5010 5% DEXTROSE AND 0.45% SALINE: HCPCS | Performed by: INTERNAL MEDICINE

## 2017-10-17 PROCEDURE — 25000003 PHARM REV CODE 250: Performed by: INTERNAL MEDICINE

## 2017-10-17 PROCEDURE — 93010 ELECTROCARDIOGRAM REPORT: CPT | Mod: ,,, | Performed by: INTERNAL MEDICINE

## 2017-10-17 PROCEDURE — 63600175 PHARM REV CODE 636 W HCPCS: Performed by: INTERNAL MEDICINE

## 2017-10-17 PROCEDURE — 37000009 HC ANESTHESIA EA ADD 15 MINS: Performed by: INTERNAL MEDICINE

## 2017-10-17 PROCEDURE — 33361 REPLACE AORTIC VALVE PERQ: CPT | Mod: 62,Q0,, | Performed by: THORACIC SURGERY (CARDIOTHORACIC VASCULAR SURGERY)

## 2017-10-17 PROCEDURE — 33361 REPLACE AORTIC VALVE PERQ: CPT | Mod: 62,Q0,, | Performed by: INTERNAL MEDICINE

## 2017-10-17 PROCEDURE — 33210 INSERT ELECTRD/PM CATH SNGL: CPT | Mod: 59,GC,, | Performed by: ANESTHESIOLOGY

## 2017-10-17 PROCEDURE — 37000008 HC ANESTHESIA 1ST 15 MINUTES: Performed by: INTERNAL MEDICINE

## 2017-10-17 PROCEDURE — A4216 STERILE WATER/SALINE, 10 ML: HCPCS | Performed by: ANESTHESIOLOGY

## 2017-10-17 PROCEDURE — 63600175 PHARM REV CODE 636 W HCPCS: Performed by: ANESTHESIOLOGY

## 2017-10-17 PROCEDURE — 20000000 HC ICU ROOM

## 2017-10-17 PROCEDURE — 93005 ELECTROCARDIOGRAM TRACING: CPT

## 2017-10-17 PROCEDURE — 99223 1ST HOSP IP/OBS HIGH 75: CPT | Mod: ,,, | Performed by: INTERNAL MEDICINE

## 2017-10-17 PROCEDURE — 02RF3JZ REPLACEMENT OF AORTIC VALVE WITH SYNTHETIC SUBSTITUTE, PERCUTANEOUS APPROACH: ICD-10-PCS | Performed by: INTERNAL MEDICINE

## 2017-10-17 PROCEDURE — 86900 BLOOD TYPING SEROLOGIC ABO: CPT

## 2017-10-17 PROCEDURE — 27000239 HC STAND-BY BYPASS PUMP

## 2017-10-17 PROCEDURE — 99900035 HC TECH TIME PER 15 MIN (STAT)

## 2017-10-17 PROCEDURE — 36620 INSERTION CATHETER ARTERY: CPT | Mod: 59,,, | Performed by: ANESTHESIOLOGY

## 2017-10-17 PROCEDURE — 27000191 HC C-V MONITORING

## 2017-10-17 PROCEDURE — 82962 GLUCOSE BLOOD TEST: CPT

## 2017-10-17 RX ORDER — POTASSIUM CHLORIDE 20 MEQ/15ML
40 SOLUTION ORAL
Status: DISCONTINUED | OUTPATIENT
Start: 2017-10-17 | End: 2017-10-18 | Stop reason: HOSPADM

## 2017-10-17 RX ORDER — SODIUM CHLORIDE 9 MG/ML
INJECTION, SOLUTION INTRAVENOUS CONTINUOUS
Status: ACTIVE | OUTPATIENT
Start: 2017-10-17 | End: 2017-10-17

## 2017-10-17 RX ORDER — CLOPIDOGREL BISULFATE 75 MG/1
75 TABLET ORAL DAILY
Status: DISCONTINUED | OUTPATIENT
Start: 2017-10-17 | End: 2017-10-17

## 2017-10-17 RX ORDER — PHENYLEPHRINE HYDROCHLORIDE 10 MG/ML
INJECTION INTRAVENOUS
Status: DISCONTINUED | OUTPATIENT
Start: 2017-10-17 | End: 2017-10-17

## 2017-10-17 RX ORDER — HEPARIN SODIUM 1000 [USP'U]/ML
INJECTION, SOLUTION INTRAVENOUS; SUBCUTANEOUS
Status: DISCONTINUED | OUTPATIENT
Start: 2017-10-17 | End: 2017-10-17

## 2017-10-17 RX ORDER — DEXTROSE MONOHYDRATE AND SODIUM CHLORIDE 5; .45 G/100ML; G/100ML
INJECTION, SOLUTION INTRAVENOUS CONTINUOUS
Status: DISCONTINUED | OUTPATIENT
Start: 2017-10-17 | End: 2017-10-17

## 2017-10-17 RX ORDER — CLOPIDOGREL BISULFATE 75 MG/1
75 TABLET ORAL DAILY
Status: DISCONTINUED | OUTPATIENT
Start: 2017-10-18 | End: 2017-10-18 | Stop reason: HOSPADM

## 2017-10-17 RX ORDER — LANOLIN ALCOHOL/MO/W.PET/CERES
800 CREAM (GRAM) TOPICAL
Status: DISCONTINUED | OUTPATIENT
Start: 2017-10-17 | End: 2017-10-18 | Stop reason: HOSPADM

## 2017-10-17 RX ORDER — ASPIRIN 81 MG/1
81 TABLET ORAL DAILY
Status: DISCONTINUED | OUTPATIENT
Start: 2017-10-18 | End: 2017-10-18 | Stop reason: HOSPADM

## 2017-10-17 RX ORDER — ACETAMINOPHEN 325 MG/1
650 TABLET ORAL EVERY 4 HOURS PRN
Status: DISCONTINUED | OUTPATIENT
Start: 2017-10-17 | End: 2017-10-18 | Stop reason: HOSPADM

## 2017-10-17 RX ORDER — POTASSIUM CHLORIDE 20 MEQ/15ML
60 SOLUTION ORAL
Status: DISCONTINUED | OUTPATIENT
Start: 2017-10-17 | End: 2017-10-18 | Stop reason: HOSPADM

## 2017-10-17 RX ORDER — LIDOCAINE HCL/PF 100 MG/5ML
SYRINGE (ML) INTRAVENOUS
Status: DISCONTINUED | OUTPATIENT
Start: 2017-10-17 | End: 2017-10-17

## 2017-10-17 RX ORDER — DIPHENHYDRAMINE HCL 50 MG
50 CAPSULE ORAL ONCE
Status: COMPLETED | OUTPATIENT
Start: 2017-10-17 | End: 2017-10-17

## 2017-10-17 RX ORDER — FENTANYL CITRATE 50 UG/ML
INJECTION, SOLUTION INTRAMUSCULAR; INTRAVENOUS
Status: DISCONTINUED | OUTPATIENT
Start: 2017-10-17 | End: 2017-10-17

## 2017-10-17 RX ORDER — ASPIRIN 81 MG/1
81 TABLET ORAL DAILY
Status: DISCONTINUED | OUTPATIENT
Start: 2017-10-17 | End: 2017-10-17

## 2017-10-17 RX ORDER — CEFAZOLIN SODIUM 2 G/50ML
2 SOLUTION INTRAVENOUS
Status: COMPLETED | OUTPATIENT
Start: 2017-10-17 | End: 2017-10-17

## 2017-10-17 RX ADMIN — PHENYLEPHRINE HYDROCHLORIDE 100 MCG: 10 INJECTION INTRAVENOUS at 02:10

## 2017-10-17 RX ADMIN — EPINEPHRINE 0.02 MCG/KG/MIN: 1 INJECTION INTRAMUSCULAR; INTRAVENOUS; SUBCUTANEOUS at 03:10

## 2017-10-17 RX ADMIN — CEFAZOLIN SODIUM 2 G: 2 SOLUTION INTRAVENOUS at 09:10

## 2017-10-17 RX ADMIN — FENTANYL CITRATE 25 MCG: 50 INJECTION, SOLUTION INTRAMUSCULAR; INTRAVENOUS at 02:10

## 2017-10-17 RX ADMIN — DEXMEDETOMIDINE HYDROCHLORIDE 1 MCG/KG/HR: 100 INJECTION, SOLUTION, CONCENTRATE INTRAVENOUS at 01:10

## 2017-10-17 RX ADMIN — DEXTROSE 2 G: 50 INJECTION, SOLUTION INTRAVENOUS at 02:10

## 2017-10-17 RX ADMIN — DIPHENHYDRAMINE HYDROCHLORIDE 50 MG: 50 CAPSULE ORAL at 11:10

## 2017-10-17 RX ADMIN — NOREPINEPHRINE BITARTRATE 0.05 MCG/KG/MIN: 1 INJECTION, SOLUTION, CONCENTRATE INTRAVENOUS at 03:10

## 2017-10-17 RX ADMIN — CLOPIDOGREL 75 MG: 75 TABLET, FILM COATED ORAL at 11:10

## 2017-10-17 RX ADMIN — HEPARIN SODIUM 10000 UNITS: 1000 INJECTION INTRAVENOUS; SUBCUTANEOUS at 02:10

## 2017-10-17 RX ADMIN — DEXTROSE MONOHYDRATE AND SODIUM CHLORIDE: 5; .45 INJECTION, SOLUTION INTRAVENOUS at 11:10

## 2017-10-17 RX ADMIN — ASPIRIN 81 MG: 81 TABLET, COATED ORAL at 11:10

## 2017-10-17 RX ADMIN — FENTANYL CITRATE 50 MCG: 50 INJECTION, SOLUTION INTRAMUSCULAR; INTRAVENOUS at 01:10

## 2017-10-17 RX ADMIN — SODIUM CHLORIDE: 0.9 INJECTION, SOLUTION INTRAVENOUS at 04:10

## 2017-10-17 RX ADMIN — LIDOCAINE HYDROCHLORIDE 100 MG: 20 INJECTION, SOLUTION INTRAVENOUS at 03:10

## 2017-10-17 RX ADMIN — SODIUM CHLORIDE, SODIUM GLUCONATE, SODIUM ACETATE, POTASSIUM CHLORIDE, MAGNESIUM CHLORIDE, SODIUM PHOSPHATE, DIBASIC, AND POTASSIUM PHOSPHATE: .53; .5; .37; .037; .03; .012; .00082 INJECTION, SOLUTION INTRAVENOUS at 01:10

## 2017-10-17 NOTE — PROGRESS NOTES
REceived from anesthesia post-op.  Awake follows commands.  Transvenous pacer set at 45 in sinus daniel at present time.

## 2017-10-17 NOTE — ANESTHESIA PROCEDURE NOTES
[place] [facility]    Diagnosis: aortic stenosis  Patient location during procedure: done in OR  Procedure start time: 10/17/2017 1:55 PM  Timeout: 10/17/2017 1:55 PM  Procedure end time: 10/17/2017 2:25 PM  Staffing  Anesthesiologist: NATHANAEL WEI  Resident/CRNA: LUISANA MCINTOSH  Performed: resident/CRNA   Anesthesiologist was present at the time of the procedure.  Preanesthetic Checklist  Completed: patient identified, site marked, surgical consent, pre-op evaluation, timeout performed, IV checked, risks and benefits discussed, monitors and equipment checked and anesthesia consent given  [place] [facility]  Skin Prep: chlorhexidine gluconate and isopropyl alcohol  Local Infiltration: lidocaine  Location: right,  internal jugular vein  Vessel Caliber: medium, patent, compressibility normal  Coaxial introducer size: 6.5 Fr. manometry used.  Device: transvenous pacing catheter  Catheter Size: 6.5 Fr  Catheter placement by yes. Heme positive aspiration all ports.Sterile sheath used  Indication: intravenous therapy, hemodynamic monitoring, transvenous pacing  Ultrasound Guidance  Sterile sheath used.  Assessment  Central Line Bundle Protocol followed. Hand hygiene before procedure, surgical cap worn, surgical mask worn, sterile surgical gloves worn, large sterile drape used.  Dressing: secured with tape and tegaderm  Patient: Tolerated Well

## 2017-10-17 NOTE — ANESTHESIA PROCEDURE NOTES
Arterial    Diagnosis: aortic stenosis    Patient location during procedure: done in OR  Procedure start time: 10/17/2017 1:45 PM  Timeout: 10/17/2017 1:45 PM  Procedure end time: 10/17/2017 1:51 PM  Staffing  Anesthesiologist: TATIANNA CANO  Resident/CRNA: LUISANA MCINTOSH  Performed: resident/CRNA   Anesthesiologist was present at the time of the procedure.  Preanesthetic Checklist  Completed: patient identified, site marked, surgical consent, pre-op evaluation, timeout performed, IV checked, risks and benefits discussed, monitors and equipment checked and anesthesia consent givenArterial  Skin Prep: chlorhexidine gluconate and isopropyl alcohol  Local Infiltration: lidocaine  Orientation: right  Location: radial  Catheter Size: 20 G  Catheter placement by Ultrasound guidance. Heme positive aspiration all ports.  Vessel Caliber: medium, patent, compressibility normalInsertion Attempts: 1  Assessment  Dressing: secured with tape and tegaderm  Patient: Tolerated well

## 2017-10-17 NOTE — INTERVAL H&P NOTE
The patient has been examined and the H&P has been reviewed:    I concur with the findings and no changes have occurred since H&P was written.   NPO since 8PM yesterday.  Did not take ASA and Plavix this AM, will give now.    Anesthesia/Surgery risks, benefits and alternative options discussed and understood by patient/family.          Active Hospital Problems    Diagnosis  POA    Aortic valve stenosis, unspecified etiology [I35.0]  Yes      Resolved Hospital Problems    Diagnosis Date Resolved POA   No resolved problems to display.

## 2017-10-17 NOTE — TRANSFER OF CARE
"Anesthesia Transfer of Care Note    Patient: Timbo Gallagher    Procedure(s) Performed: Procedure(s) (LRB):  REPLACEMENT-VALVE-AORTIC (N/A)    Patient location: ICU    Anesthesia Type: MAC    Transport from OR: Transported from OR on 6-10 L/min O2 by face mask with adequate spontaneous ventilation    Post pain: adequate analgesia    Post assessment: no apparent anesthetic complications    Post vital signs: stable    Level of consciousness: sedated    Nausea/Vomiting: no nausea/vomiting    Complications: none    Transfer of care protocol was followed      Last vitals:   Visit Vitals  BP (!) 162/101 (BP Location: Left arm, Patient Position: Sitting)   Pulse (!) 47   Temp 36.1 °C (96.9 °F)   Resp 17   Ht 5' 7" (1.702 m)   Wt 88.9 kg (195 lb 15.8 oz)   SpO2 95%   BMI 30.70 kg/m²     "

## 2017-10-17 NOTE — NURSING
Pt ambulated on unit with wife at side.  No c/o pain or SOB.  Verbalized understanding of procedure.  Will continue to monitor.

## 2017-10-17 NOTE — H&P (VIEW-ONLY)
"Subjective:    Patient ID:  Timbo Gallagher is a 82 y.o. male who presents for evaluation of TAVR.  Referring: Dr. Manzo     HPI    81 Y/O M referred from Dr Manzo's office in [place] for evaluation of severe AS, severe MR. Patient has PMH significant for HTN, Dm, paraesophageal hernia with esophagitis severe As, severe Mr, COPD, A Fib (now NSR). Over the past 2 years patient has been complaining of fatigue and low energy. In 12/2017 patient was admitted with SOB and was diagnosed with PNA, he was told that he has congestive heart failure for which he was started on Lasix. In January patient was seen by CTS for evaluation of Paraesophageal hernia with esophagitis, was seen by CTS who were planning for surgical resection of large diverticulum after cardiology clearance. Patient underwent Echo that revealed depressed EF with low flow severe AS vs mod to severe AS with new drop in EF over 3 months period. He was told not to proceed with surgical resection of diverticulum. He presented to Dr. Manzo who was referred to TAVR evaluation. He states he walks outside in his yard but admits to SOB with walking "~5 minutes".  He denies chest pain, syncope, claudication, PND, LE edema.  He was seen by CTS Dr. Barnhart for SAVR risk assessment.     (NYHA Class II sx)    He has undergone the following TAVR work-up:  · Dobutamine Echo (5/1/17): UNA= 0.75 cm2, MG= 57 mmHg, PV= 4.8 m/s, EF= 40%  · LHC (5/8/17): Normal LM and LCx, LI in LAD and RCA, 70% mid-PDA stenosis, 50% D1 stenosis  · STS= 4.7%  · 1/4 (albumin 3.4)  · PFTs: Severe, FEV1= 1.37/53% predicted, FVC = 2.42/56% predicted, DLCO = 17.93/78% predicted   · Iliacs are >6.3 on R and >5.0 on L  · LVOT (per JDT): Area= 4.48 cm2, Avg Diam= 23.9 mm (27.7 x 21.4 mm)  · He is high risk for SAVR due to age and severe COPD  · Rhythm issues: IRBBB  · Incidental CT findings: Innumerable scattered pulmonary nodules, the largest of which measures 1.0 cm within the right upper lobe, " increased in size from prior examination.  While these findings can be seen in the setting of aspiration and multiple infectious etiologies, metastatic disease cannot be excluded. Dr. Reeder spoke with Dr. Fox who believes findings most likely represent benign etiology especially with history of infectious process. Pt is scheduled to repeat study in the next 3-6 months.          Will proceed with 26mm Brent S3 (18% oversized) TAVR via R TF access      Review of Systems   Constitution: Positive for weakness and malaise/fatigue. Negative for chills, diaphoresis, fever, weight gain and weight loss.   HENT: Negative for sore throat.    Eyes: Negative for blurred vision, vision loss in left eye, vision loss in right eye and visual disturbance.   Cardiovascular: Positive for dyspnea on exertion and irregular heartbeat. Negative for chest pain, claudication, leg swelling, near-syncope, orthopnea, palpitations, paroxysmal nocturnal dyspnea and syncope.   Respiratory: Positive for shortness of breath. Negative for cough, hemoptysis, sputum production and wheezing.    Endocrine: Negative for cold intolerance and heat intolerance.   Hematologic/Lymphatic: Negative for adenopathy. Does not bruise/bleed easily.   Skin: Negative for rash.   Musculoskeletal: Negative for falls, muscle weakness and myalgias.   Gastrointestinal: Negative for abdominal pain, change in bowel habit, constipation, diarrhea, melena and nausea.   Genitourinary: Negative for bladder incontinence.   Neurological: Negative for dizziness, focal weakness, headaches, light-headedness and numbness.   Psychiatric/Behavioral: Negative for altered mental status.       Constitutional: negative for chills, fevers and night sweats  Ears, nose, mouth, throat, and face: negative for nasal congestion, sore throat and tinnitus  Respiratory: negative for cough, dyspnea on exertion, pleurisy/chest pain, sputum and wheezing  Cardiovascular: See HPI  Gastrointestinal:  negative  Genitourinary:negative for dysuria, frequency, hematuria, hesitancy and nocturia  Hematologic/lymphatic: negative for bleeding and easy bruising  Musculoskeletal:negative for muscle weakness and myalgias  Neurological: negative for dizziness, headaches, paresthesia and weakness  Behavioral/Psych: negative for anxiety and bad mood    Objective:    Physical Exam   Constitutional: He is oriented to person, place, and time. He appears well-developed and well-nourished.   HENT:   Head: Normocephalic and atraumatic.   Eyes: EOM are normal. Pupils are equal, round, and reactive to light.   Neck: Neck supple. No JVD present. No tracheal deviation present. No thyromegaly present.   Cardiovascular: Normal rate, regular rhythm, S1 normal, S2 normal, intact distal pulses and normal pulses.  PMI is not displaced.  Exam reveals no gallop and no friction rub.    Murmur heard.  Pulmonary/Chest: Effort normal and breath sounds normal. No respiratory distress. He has no wheezes. He has no rales. He exhibits no tenderness.   Abdominal: Soft. Bowel sounds are normal. He exhibits no distension and no mass. There is no hepatosplenomegaly. There is no tenderness.   Musculoskeletal: Normal range of motion. He exhibits no edema or tenderness.   Neurological: He is alert and oriented to person, place, and time.   Skin: Skin is warm and dry. No rash noted.   Psychiatric: He has a normal mood and affect. His behavior is normal.       General: Patient in no acute distress or discomfort  HEENT: No JVD, moist mucous membranes  Cardiac: S1S2 STAR on R parasternum, pan systolic apical murmur radiating to L sternal border.  Chest: CTABL, no wheezing or rales  Abd:Soft NTND  Ext: No Edema No swelling  Neuro: A and O X 3, non focal      There were no vitals filed for this visit.      Assessment:       1.  Aortic valve stenosis, etiology of cardiac valve disease unspecified - He has undergone the following TAVR work-up:  · Dobutamine Echo  (5/1/17): UNA= 0.75 cm2, MG= 57 mmHg, PV= 4.8 m/s, EF= 40%  · LHC (5/8/17): Normal LM and LCx, LI in LAD and RCA, 70% mid-PDA stenosis, 50% D1 stenosis  · STS= 4.7%  · 1/4 (albumin 3.4)  · PFTs: Severe, FEV1= 1.37/53% predicted, FVC = 2.42/56% predicted, DLCO = 17.93/78% predicted   · Iliacs are >6.3 on R and >5.0 on L  · LVOT (per JDT): Area= 4.48 cm2, Avg Diam= 23.9 mm (27.7 x 21.4 mm)  · He is high risk for SAVR due to age and severe COPD  · Rhythm issues: IRBBB  · Incidental CT findings: Innumerable scattered pulmonary nodules, the largest of which measures 1.0 cm within the right upper lobe, increased in size from prior examination.  While these findings can be seen in the setting of aspiration and multiple infectious etiologies, metastatic disease cannot be excluded. Dr. Reeder spoke with Dr. Fox who believes findings most likely represent benign etiology especially with history of infectious process. Pt is scheduled to repeat study in the next 3-6 months.          Will proceed with 26mm Brent S3 (18% oversized) TAVR via R TF access   2. Chronic diastolic heart failure - On Lasix    3. Essential hypertension - Controlled on Toprol Xl, Hyzaar, Lisinopril    4. HTN (hypertension), malignant   5. DM (diabetes mellitus), secondary - On Metformin    6. Hiatal hernia with GERD and esophagitis    7. Anemia of chronic disease        Plan:       Proceed with 26mm S3 TAVR via R TF access scheduled for tomorrow, 10/17/2017  Risks, Benefits, and alternatives of the procedure were discussed in detail with the patient. Questions were answered and patient has voiced understanding. He is agreeable to proceed. Informed consent obtained.   Load ASA/Plavix tonight then 1 tab daily. DAPT with ASA/Plavix s/p TAVR   Pt to follow with Dr. Fox in 3-6 months as scheduled for f/u of pulm nodules     Staff:  I have personally taken the history and examined this patient and agree with the fellow's note as stated above and amended  it accordingly :-)

## 2017-10-18 VITALS
BODY MASS INDEX: 30.76 KG/M2 | TEMPERATURE: 98 F | HEIGHT: 67 IN | RESPIRATION RATE: 26 BRPM | SYSTOLIC BLOOD PRESSURE: 138 MMHG | WEIGHT: 196 LBS | HEART RATE: 93 BPM | OXYGEN SATURATION: 93 % | DIASTOLIC BLOOD PRESSURE: 93 MMHG

## 2017-10-18 DIAGNOSIS — I35.0 NODULAR CALCIFIC AORTIC VALVE STENOSIS: Primary | ICD-10-CM

## 2017-10-18 PROBLEM — Z95.2 S/P TAVR (TRANSCATHETER AORTIC VALVE REPLACEMENT): Status: ACTIVE | Noted: 2017-10-18

## 2017-10-18 LAB
ALBUMIN SERPL BCP-MCNC: 3.2 G/DL
ALP SERPL-CCNC: 93 U/L
ALT SERPL W/O P-5'-P-CCNC: 5 U/L
ANION GAP SERPL CALC-SCNC: 11 MMOL/L
AST SERPL-CCNC: 15 U/L
BASOPHILS # BLD AUTO: 0.03 K/UL
BASOPHILS NFR BLD: 0.2 %
BILIRUB SERPL-MCNC: 0.9 MG/DL
BUN SERPL-MCNC: 18 MG/DL
CALCIUM SERPL-MCNC: 10 MG/DL
CHLORIDE SERPL-SCNC: 105 MMOL/L
CO2 SERPL-SCNC: 24 MMOL/L
CREAT SERPL-MCNC: 1.2 MG/DL
DIFFERENTIAL METHOD: ABNORMAL
EOSINOPHIL # BLD AUTO: 0.1 K/UL
EOSINOPHIL NFR BLD: 0.9 %
ERYTHROCYTE [DISTWIDTH] IN BLOOD BY AUTOMATED COUNT: 13.2 %
EST. GFR  (AFRICAN AMERICAN): >60 ML/MIN/1.73 M^2
EST. GFR  (NON AFRICAN AMERICAN): 56 ML/MIN/1.73 M^2
GLUCOSE SERPL-MCNC: 215 MG/DL
HCT VFR BLD AUTO: 38.3 %
HGB BLD-MCNC: 12.7 G/DL
IMM GRANULOCYTES # BLD AUTO: 0.05 K/UL
IMM GRANULOCYTES NFR BLD AUTO: 0.4 %
LYMPHOCYTES # BLD AUTO: 1.2 K/UL
LYMPHOCYTES NFR BLD: 9.7 %
MCH RBC QN AUTO: 31 PG
MCHC RBC AUTO-ENTMCNC: 33.2 G/DL
MCV RBC AUTO: 93 FL
MONOCYTES # BLD AUTO: 1.2 K/UL
MONOCYTES NFR BLD: 9.1 %
NEUTROPHILS # BLD AUTO: 10.1 K/UL
NEUTROPHILS NFR BLD: 79.7 %
NRBC BLD-RTO: 0 /100 WBC
PLATELET # BLD AUTO: 177 K/UL
PMV BLD AUTO: 11.5 FL
POC ACTIVATED CLOTTING TIME K: 136 SEC (ref 74–137)
POC ACTIVATED CLOTTING TIME K: 175 SEC (ref 74–137)
POC ACTIVATED CLOTTING TIME K: 279 SEC (ref 74–137)
POCT GLUCOSE: 220 MG/DL (ref 70–110)
POTASSIUM SERPL-SCNC: 3.8 MMOL/L
PROT SERPL-MCNC: 7.4 G/DL
RBC # BLD AUTO: 4.1 M/UL
SAMPLE: ABNORMAL
SAMPLE: ABNORMAL
SAMPLE: NORMAL
SODIUM SERPL-SCNC: 140 MMOL/L
WBC # BLD AUTO: 12.62 K/UL

## 2017-10-18 PROCEDURE — 97161 PT EVAL LOW COMPLEX 20 MIN: CPT

## 2017-10-18 PROCEDURE — 93010 ELECTROCARDIOGRAM REPORT: CPT | Mod: ,,, | Performed by: INTERNAL MEDICINE

## 2017-10-18 PROCEDURE — 93005 ELECTROCARDIOGRAM TRACING: CPT

## 2017-10-18 PROCEDURE — 94761 N-INVAS EAR/PLS OXIMETRY MLT: CPT

## 2017-10-18 PROCEDURE — G8980 MOBILITY D/C STATUS: HCPCS | Mod: CK

## 2017-10-18 PROCEDURE — G8979 MOBILITY GOAL STATUS: HCPCS | Mod: CK

## 2017-10-18 PROCEDURE — 85025 COMPLETE CBC W/AUTO DIFF WBC: CPT

## 2017-10-18 PROCEDURE — 25000003 PHARM REV CODE 250: Performed by: INTERNAL MEDICINE

## 2017-10-18 PROCEDURE — 63600175 PHARM REV CODE 636 W HCPCS: Performed by: INTERNAL MEDICINE

## 2017-10-18 PROCEDURE — 80053 COMPREHEN METABOLIC PANEL: CPT

## 2017-10-18 PROCEDURE — 99239 HOSP IP/OBS DSCHRG MGMT >30: CPT | Mod: ,,, | Performed by: INTERNAL MEDICINE

## 2017-10-18 PROCEDURE — G8978 MOBILITY CURRENT STATUS: HCPCS | Mod: CK

## 2017-10-18 PROCEDURE — 99232 SBSQ HOSP IP/OBS MODERATE 35: CPT | Mod: ,,, | Performed by: INTERNAL MEDICINE

## 2017-10-18 RX ORDER — FUROSEMIDE 10 MG/ML
10 INJECTION INTRAMUSCULAR; INTRAVENOUS ONCE
Status: COMPLETED | OUTPATIENT
Start: 2017-10-18 | End: 2017-10-18

## 2017-10-18 RX ADMIN — ACETAMINOPHEN 650 MG: 325 TABLET ORAL at 10:10

## 2017-10-18 RX ADMIN — ACETAMINOPHEN 650 MG: 325 TABLET ORAL at 02:10

## 2017-10-18 RX ADMIN — CLOPIDOGREL 75 MG: 75 TABLET, FILM COATED ORAL at 08:10

## 2017-10-18 RX ADMIN — ASPIRIN 81 MG: 81 TABLET, COATED ORAL at 08:10

## 2017-10-18 RX ADMIN — FUROSEMIDE 10 MG: 10 INJECTION, SOLUTION INTRAMUSCULAR; INTRAVENOUS at 08:10

## 2017-10-18 NOTE — DISCHARGE SUMMARY
"[facility]  Discharge Summary      Admit Date: 10/17/2017    Discharge Date and Time:  10/18/2017 2:51 PM    Attending Physician: Martínez Keene MD     Reason for Admission: 83 Y/O M referred from Dr Manzo's office in [place] for evaluation of severe AS, severe MR. Patient has PMH significant for HTN, Dm, paraesophageal hernia with esophagitis severe As, severe Mr, COPD, A Fib (now NSR). Over the past 2 years patient has been complaining of fatigue and low energy. In 12/2017 patient was admitted with SOB and was diagnosed with PNA, he was told that he has congestive heart failure for which he was started on Lasix. In January patient was seen by CTS for evaluation of Paraesophageal hernia with esophagitis, was seen by CTS who were planning for surgical resection of large diverticulum after cardiology clearance. Patient underwent Echo that revealed depressed EF with low flow severe AS vs mod to severe AS with new drop in EF over 3 months period. He was told not to proceed with surgical resection of diverticulum. He presented to Dr. Manzo who was referred to TAVR evaluation. He states he walks outside in his yard but admits to SOB with walking "~5 minutes".  He denies chest pain, syncope, claudication, PND, LE edema.  He was seen by CTS Dr. Barnhart for SAVR risk assessment.      (NYHA Class II sx)     He has undergone the following TAVR work-up:  ? Dobutamine Echo (5/1/17): UNA= 0.75 cm2, MG= 57 mmHg, PV= 4.8 m/s, EF= 40%  ? LHC (5/8/17): Normal LM and LCx, LI in LAD and RCA, 70% mid-PDA stenosis, 50% D1 stenosis  ? STS= 4.7%  ? 1/4 (albumin 3.4)  ? PFTs: Severe, FEV1= 1.37/53% predicted, FVC = 2.42/56% predicted, DLCO = 17.93/78% predicted   ? Iliacs are >6.3 on R and >5.0 on L  ? LVOT (per JDT): Area= 4.48 cm2, Avg Diam= 23.9 mm (27.7 x 21.4 mm)  ? He is high risk for SAVR due to age and severe COPD  ? Rhythm issues: IRBBB  ? Incidental CT findings: Innumerable scattered pulmonary nodules, the " largest of which measures 1.0 cm within the right upper lobe, increased in size from prior examination.  While these findings can be seen in the setting of aspiration and multiple infectious etiologies, metastatic disease cannot be excluded. Dr. Reeder spoke with Dr. Fox who believes findings most likely represent benign etiology especially with history of infectious process. Pt is scheduled to repeat study in the next 3-6 months.          Will proceed with 26mm Brent S3 (18% oversized) TAVR via R TF access    Procedures Performed: Procedure(s) (LRB):  REPLACEMENT-VALVE-AORTIC (N/A)    Hospital Course (synopsis of major diagnoses, care, treatment, and services provided during the course of the hospital stay): Mr. Gallagher was admitted and underwent successful placement of a 26 mm Brent S3 TAVR via TF access. There were no complications and he was taken to the CCU in stable condition. TVP was left in place and EP was consulted per protocol. He remained stable overnight. The following morning, he required diuresis for acute on chronic diastolic heart failure. EP felt there was no indication for PPM and his TVP was removed. That afternoon, he ambulated in the mills without difficulty. He was eager to go home. It was felt he was stable for discharge.        Consults: PT and EP    Final Diagnoses:    Principal Problem: Aortic valve stenosis, unspecified etiology   Secondary Diagnoses:   Active Hospital Problems    Diagnosis  POA    *Aortic valve stenosis, unspecified etiology [I35.0]  Yes    S/P TAVR (transcatheter aortic valve replacement) [Z95.2]  Not Applicable    Essential hypertension [I10]  Yes    Chronic diastolic heart failure [I50.32]  Yes      Resolved Hospital Problems    Diagnosis Date Resolved POA   No resolved problems to display.       Discharged Condition: stable    Disposition: Home or Self Care    Follow Up/Patient Instructions: Follow up in 1 month with echo    Medications:  Reconciled Home  Medications:   Discharge Medication List as of 10/18/2017 12:40 PM      CONTINUE these medications which have NOT CHANGED    Details   ascorbic acid, vitamin C, (VITAMIN C) 1000 MG tablet 1,000 mg., Until Discontinued, Historical Med      aspirin (ECOTRIN) 81 MG EC tablet Take 1 tablet (81 mg total) by mouth once daily. Take 4 tab tonight then 1 tab daily, Starting Mon 10/16/2017, Until Tue 10/16/2018, OTC      clopidogrel (PLAVIX) 75 mg tablet Take 1 tablet (75 mg total) by mouth once daily. Take 4 tab tonight then 1 tab daily, Starting Mon 10/16/2017, Until Tue 10/16/2018, Normal      furosemide (LASIX) 20 MG tablet Take 1 tablet (20 mg total) by mouth once daily., Starting Sun 10/16/2016, Until Tue 10/17/2017, Print      GLUCOSAMINE HCL/CHONDR BAUTISTA A NA (OSTEO BI-FLEX ORAL) Take 2 tablets by mouth once daily., Until Discontinued, Historical Med      lisinopril 10 MG tablet Take 1 tablet (10 mg total) by mouth once daily., Starting Tue 12/6/2016, Until Tue 10/17/2017, Normal      losartan-hydrochlorothiazide 100-12.5 mg (HYZAAR) 100-12.5 mg Tab Take 1 tablet by mouth once daily., Until Discontinued, Historical Med      metformin (GLUCOPHAGE) 500 MG tablet Take 1,000 mg by mouth 2 (two) times daily with meals. , Until Discontinued, Historical Med      metoprolol succinate (TOPROL-XL) 25 MG 24 hr tablet Take 25 mg by mouth 2 (two) times daily. , Until Discontinued, Historical Med             Discharge Procedure Orders  Diet general   Order Specific Question Answer Comments   Additional restrictions: Cardiac (Low Na/Chol)      Activity as tolerated     Lifting restrictions   Scheduling Instructions: No lifting more than 5 lbs for 5 days     Call MD for:  temperature >100.4     Call MD for:  persistent nausea and vomiting or diarrhea     Call MD for:  severe uncontrolled pain     Call MD for:  redness, tenderness, or signs of infection (pain, swelling, redness, odor or green/yellow discharge around incision site)      Call MD for:  difficulty breathing or increased cough     Call MD for:  severe persistent headache     Call MD for:  worsening rash     Call MD for:  persistent dizziness, light-headedness, or visual disturbances     Call MD for:  increased confusion or weakness

## 2017-10-18 NOTE — PLAN OF CARE
Problem: Patient Care Overview  Goal: Plan of Care Review  Outcome: Ongoing (interventions implemented as appropriate)  All VSS. No significant events occurred throughout the shift. Patient got up from the bed to the chair with minimal assist. Patient on room air with no distress noted. Ricco lcontinue to monitor. Patient and spouse aware of the plan of care. Plan for today is to discharge home

## 2017-10-18 NOTE — PT/OT/SLP EVAL
Physical Therapy  Evaluation/Discharge    Timbo Gallagher   MRN: [id number]   Admitting Diagnosis: Aortic valve stenosis, unspecified etiology    PT Received On: 10/18/17  PT Start Time: 1030     PT Stop Time: 1039    PT Total Time (min): 9 min       Billable Minutes:  Evaluation 9 min    Diagnosis: Aortic valve stenosis, unspecified etiology  S/p TAVR 10/17/17    Past Medical History:   Diagnosis Date    Arthritis     Blind right eye     BPH (benign prostatic hypertrophy)     Bronchitis, chronic     CHF (congestive heart failure)     Colon polyp     Diabetes mellitus     Encounter for blood transfusion     GERD (gastroesophageal reflux disease)     Hearing aid worn     bilateral    Hernia     Hypertension     Irregular heart beat     Snoring       Past Surgical History:   Procedure Laterality Date    CATARACT EXTRACTION, BILATERAL      EYE SURGERY      RETINAL DETACHMENT SURGERY         Referring physician: Robin Bello  Date referred to PT: 10/17/17    General Precautions: Standard, fall  Orthopedic Precautions:     Braces:         Do you have any cultural, spiritual, Denominational conflicts, given your current situation?: none    Patient History:  Lives With: spouse (pt is retired and lives with his homemaker wife who will be of little physical assist after discharge. )  Living Arrangements: house (1 story, 3 steps with B handrails)  Equipment Currently Used at Home: none  DME owned (not currently used): none    Previous Level of Function:  Ambulation Skills: independent  Transfer Skills: independent    Subjective:  Communicated with nurse prior to session.    Chief Complaint: pt c/o pain during treatment.   Patient goals: to get better and go home.     Pain/Comfort  Pain Rating 1: 7/10 back, B hips and B shoulders  Pain Rating Post-Intervention 1: 7/10      Objective:   Patient found with: telemetry, pulse ox (continuous)     Cognitive Exam:  Oriented to: Person, Place, Time and  Situation    Follows Commands/attention: Follows multistep  commands  Communication: clear/fluent  Safety awareness/insight to disability: intact    Physical Exam:  Lower Extremity Range of Motion:  Right Lower Extremity: WFL  Left Lower Extremity: WFL    Lower Extremity Strength:  Right Lower Extremity: WFL  Left Lower Extremity: WFL       Functional Mobility:  Bed Mobility:  Rolling/Turning to Left:  (not tested. pt was sitting in chair for treatment. )    Transfers:  Sit <> Stand Assistance: Contact Guard Assistance  Sit <> Stand Assistive Device: No Assistive Device    Gait:   Gait Distance: 290 ft with nursing with portable monitor.  Assistance 1: Contact Guard Assistance (pt had 1 episode of loss of balance and was min assist to recover. )  Gait Assistive Device: Hand held assist  Gait Pattern: 2-point gait    AM-PAC 6 CLICK MOBILITY  How much help from another person does this patient currently need?   1 = Unable, Total/Dependent Assistance  2 = A lot, Maximum/Moderate Assistance  3 = A little, Minimum/Contact Guard/Supervision  4 = None, Modified [place]/Independent    Turning over in bed (including adjusting bedclothes, sheets and blankets)?: 4  Sitting down on and standing up from a chair with arms (e.g., wheelchair, bedside commode, etc.): 3  Moving from lying on back to sitting on the side of the bed?: 3  Moving to and from a bed to a chair (including a wheelchair)?: 3  Need to walk in hospital room?: 3  Climbing 3-5 steps with a railing?: 3  Total Score: 19     AM-PAC Raw Score CMS G-Code Modifier Level of Impairment Assistance   6 % Total / Unable   7 - 9 CM 80 - 100% Maximal Assist   10 - 14 CL 60 - 80% Moderate Assist   15 - 19 CK 40 - 60% Moderate Assist   20 - 22 CJ 20 - 40% Minimal Assist   23 CI 1-20% SBA / CGA   24 CH 0% Independent/ Mod I     Patient left up in chair with all lines intact, call button in reach and wife. present.    Assessment:   Timbo Gallagher is a 82 y.o.  male with a medical diagnosis of Aortic valve stenosis, unspecified etiology and presents with no therapy needs. Pt is safe to discharge home with no therapy or DME needs. Pt is s/p TAVR 10/17/17.    Rehab identified problem list/impairments: Rehab identified problem list/impairments:  (no rehab problems.)    Rehab potential is good.    Activity tolerance: Good    Discharge recommendations: Discharge Facility/Level Of Care Needs:  (no further PT will be needed.)     Barriers to discharge:      Equipment recommendations: Equipment Needed After Discharge: none     GOALS:    Physical Therapy Goals     Not on file                PLAN:    Patient to be seen  (pt is being discharged from PT.) to address the above listed problems via    Plan of Care expires:    Plan of Care reviewed with: patient, spouse    Functional Assessment Tool Used: FIM  Score: 4  Functional Limitation: Mobility: Walking and moving around  Mobility: Walking and Moving Around Current Status (): CK  Mobility: Walking and Moving Around Goal Status (): CK  Mobility: Walking and Moving Around Discharge Status (): DENIS Swann, PT  10/18/2017

## 2017-10-18 NOTE — DISCHARGE INSTRUCTIONS
Weigh yourself on the same scale every morning. If you gain more than 3 lbs in 1 day or more than 5 lbs in 1 week, or if you experience worsening shortness of breath, swelling in your feet or legs, or difficulty laying flat, take an extra Lasix pill until you reach your normal weight or your symptoms have resolved. If you have any questions, you can call our office at [phone].     You will need to take a single dose of antibiotics prior to certain dental procedures, colonoscopies, or bladder procedures. We can call this prescription in for you or the physician performing the procedure can call it in for you. If you have questions about whether or not a procedure will require antibiotics, you can call our office. Always let your physician know that you have an artificial heart valve.

## 2017-10-18 NOTE — NURSING
Discharge instructions given to pt and wife. Pt and wife verbalized understanding. Vital signs stable. There are no new prescriptions to take home. IV to left forearm d/c'd, dressing applied. Pt has no complaints at present.

## 2017-10-18 NOTE — NURSING
Blood pressure 200/110 on automatic blood pressure cuff on right arm. Manual  Blood pressure to right arm

## 2017-10-18 NOTE — PROGRESS NOTES
Progress Note  Interventional Cardiology  [facility]      Hospital Day: 2    Subjective:  Interval History: No events overnight. No complaints this AM. OK to remove TVP per EP.     Scheduled Meds:   aspirin  81 mg Oral Daily    clopidogrel  75 mg Oral Daily     Continuous Infusions:   PRN Meds:acetaminophen, magnesium oxide, magnesium oxide, potassium chloride 10%, potassium chloride 10%, potassium chloride 10%    Objective:  Vital signs in last 24 hours:   Temp:  [97.6 °F (36.4 °C)-99 °F (37.2 °C)] 97.9 °F (36.6 °C)  Pulse:  [] 86  Resp:  [17-48] 30  SpO2:  [91 %-100 %] 93 %  BP: (141-170)/(65-80) 170/80  Arterial Line BP: ()/(36-98) 170/90    Intake/Output last 3 shifts:  I/O last 3 completed shifts:  In: 1156 [I.V.:1156]  Out: 700 [Urine:700]  Intake/Output this shift:  I/O this shift:  In: -   Out: 400 [Urine:400]    Physical Exam:  General Appearance:    Alert, cooperative, no distress, appears stated age   Head:    Normocephalic, without obvious abnormality, atraumatic   Eyes:    PERRL, conjunctiva/corneas clear, EOM's intact, both eyes   Neck:   Supple, symmetrical, trachea midline, no carotid    bruit or JVD   Lungs:     Decreased bibasilar BS, respirations unlabored   Chest Wall:    No tenderness or deformity    Heart:    Regular rate and rhythm, S1 and S2 normal, no murmur, rub   or gallop   Abdomen:     Soft, non-tender, bowel sounds active all four quadrants,     no masses, no organomegaly   Extremities:   Extremities normal, atraumatic, no cyanosis or edema   Pulses:   2+ and symmetric all extremities   Skin:   Skin color, texture, turgor normal, no rashes or lesions   Neurologic:   Grossly intact       Lab Review  Lab Results   Component Value Date     10/18/2017    K 3.8 10/18/2017     10/18/2017    CO2 24 10/18/2017    BUN 18 10/18/2017    CREATININE 1.2 10/18/2017    CALCIUM 10.0 10/18/2017     Lab Results   Component Value Date    WBC 12.62 10/18/2017    HGB 12.7 (L)  10/18/2017    HCT 38.3 (L) 10/18/2017    MCV 93 10/18/2017     10/18/2017        Assessment/Plan:        1. Aortic valve stenosis, etiology of cardiac valve disease unspecified - s/p 26mm Brent S3 TAVR via TF access. Doing well. On ASA and Plavix.    2. Acute on Chronic diastolic heart failure - Evidence of volume overload on physical exam. WIll diurese with IV Lasix and continue PO Lasix at discharge.    3. Essential hypertension - Controlled on Toprol Xl, Hyzaar, Lisinopril    5. DM (diabetes mellitus), secondary - On Metformin    7. Stage III CKD - stable     IV Lasix for diuresis.   OOB to Chair, PT today.  Anticipate d/c home this afternoon if stable.

## 2017-10-18 NOTE — ASSESSMENT & PLAN NOTE
81 Y/O M with severe AS S/P TAVR S3 26 mm valve. TVP was placed and EP consulted to evaluate patient for possible requirement for PPM. EKG with am with NSR and narrow QRS. Tele with no sign of advanced blocks. OK to remove TVP, no further intervention from EP standpoint.

## 2017-10-18 NOTE — CONSULTS
"Cardiac Rehab     Timbo Gallagher   [id number]   10/18/2017       Activity taught: Yes    Cardiac Rehab Phase Taught: Phase 1 & 2    Risk Factors-Modifiable: diabetes, nutrition, hypertension, exercise    Risk Factors-Non modifiable: age, gender    Teaching Method: Verbal, Written, Living with Heart Disease Booklet    Understanding: Yes    Response: Patient and wife verbalized understanding. He is interested in doing cardaic rehab, but the distance to either [facility] or [facility] is too far a drive. He would prefer to go to the gym close to his him with a specified program form the cardiologist.    Comments: S/P TAVR. Discussed cardiac rehab and risk factor modification. Team to refer patient to [facility] Phase II. Educational materials were used in the process and given to the patient. They included "Your Guide to Living with Heart Disease", Phase Two Cardiac Rehabilitation information along with a sample Mediterranean diet.The patient expressed understanding of the teaching and expressed desire to take a role in modifying the risk factors when they return home.        "

## 2017-10-18 NOTE — PROGRESS NOTES
[facility]  Cardiac Electrophysiology  Progress Note    Admission Date: 10/17/2017  Code Status: Prior   Attending Physician: Martínez Keene MD   Expected Discharge Date:   Principal Problem:<principal problem not specified>    Subjective:     Interval History: Pt feels well ready to go home.     Review of Systems   Constitution: Negative for decreased appetite and weakness.   HENT: Negative for congestion and hearing loss.    Eyes: Negative for double vision.   Cardiovascular: Negative for chest pain and palpitations.   Respiratory: Negative for cough and shortness of breath.    Hematologic/Lymphatic: Does not bruise/bleed easily.   Gastrointestinal: Negative for abdominal pain, nausea and vomiting.   Neurological: Negative for dizziness and light-headedness.     Objective:     Vital Signs (Most Recent):  Temp: 97.9 °F (36.6 °C) (10/18/17 0700)  Pulse: 89 (10/18/17 0800)  Resp: (!) 31 (10/18/17 0800)  BP: (!) 141/65 (10/17/17 2200)  SpO2: (!) 94 % (10/18/17 0745) Vital Signs (24h Range):  Temp:  [96.9 °F (36.1 °C)-99 °F (37.2 °C)] 97.9 °F (36.6 °C)  Pulse:  [47-89] 89  Resp:  [17-41] 31  SpO2:  [91 %-100 %] 94 %  BP: (141-162)/() 141/65  Arterial Line BP: ()/(36-98) 165/51     Weight: 88.9 kg (195 lb 15.8 oz)  Body mass index is 30.7 kg/m².     SpO2: (!) 94 %  O2 Device (Oxygen Therapy): room air    Physical Exam   Constitutional: He is oriented to person, place, and time. He appears well-developed and well-nourished.   HENT:   Head: Normocephalic and atraumatic.   Neck: Normal range of motion. Neck supple.   Cardiovascular: Normal rate and regular rhythm.    Pulmonary/Chest: Effort normal and breath sounds normal.   Abdominal: Soft. Bowel sounds are normal.   Musculoskeletal: He exhibits no edema.   Neurological: He is alert and oriented to person, place, and time.   Skin: Skin is warm and dry.       Significant Labs: All pertinent lab results from the last 24 hours have been  reviewed.    Significant Imaging: EKG: NSR with narrow QRS     Assessment and Plan:     S/P TAVR (transcatheter aortic valve replacement)    81 Y/O M with severe AS S/P TAVR S3 26 mm valve. TVP was placed and EP consulted to evaluate patient for possible requirement for PPM. EKG with am with NSR and narrow QRS. Tele with no sign of advanced blocks. OK to remove TVP, no further intervention from EP standpoint.             Suzanne Servin MD  Cardiac Electrophysiology  [facility]

## 2017-10-18 NOTE — ANESTHESIA POSTPROCEDURE EVALUATION
"Anesthesia Post Evaluation    Patient: Timbo Gallagher    Procedure(s) Performed: Procedure(s) (LRB):  REPLACEMENT-VALVE-AORTIC (N/A)    Final Anesthesia Type: general  Patient location during evaluation: ICU  Patient participation: Yes- Able to Participate  Level of consciousness: awake and alert and oriented  Post-procedure vital signs: reviewed and stable  Pain management: adequate  Airway patency: patent  PONV status at discharge: No PONV  Anesthetic complications: no      Cardiovascular status: blood pressure returned to baseline and hemodynamically stable  Respiratory status: unassisted and spontaneous ventilation  Hydration status: euvolemic  Follow-up not needed.        Visit Vitals  BP (!) 141/65 (BP Location: Left arm, Patient Position: Lying)   Pulse 72   Temp 36.7 °C (98.1 °F) (Oral)   Resp (!) 23   Ht 5' 7" (1.702 m)   Wt 88.9 kg (195 lb 15.8 oz)   SpO2 (!) 92%   BMI 30.70 kg/m²       Pain/Asael Score: Pain Assessment Performed: Yes (10/18/2017  3:00 AM)  Presence of Pain: denies (10/18/2017  3:00 AM)  Pain Rating Prior to Med Admin: 6 (10/18/2017  2:41 AM)      "

## 2017-10-18 NOTE — SUBJECTIVE & OBJECTIVE
Interval History: Pt feels well ready to go home.     Review of Systems   Constitution: Negative for decreased appetite and weakness.   HENT: Negative for congestion and hearing loss.    Eyes: Negative for double vision.   Cardiovascular: Negative for chest pain and palpitations.   Respiratory: Negative for cough and shortness of breath.    Hematologic/Lymphatic: Does not bruise/bleed easily.   Gastrointestinal: Negative for abdominal pain, nausea and vomiting.   Neurological: Negative for dizziness and light-headedness.     Objective:     Vital Signs (Most Recent):  Temp: 97.9 °F (36.6 °C) (10/18/17 0700)  Pulse: 89 (10/18/17 0800)  Resp: (!) 31 (10/18/17 0800)  BP: (!) 141/65 (10/17/17 2200)  SpO2: (!) 94 % (10/18/17 0745) Vital Signs (24h Range):  Temp:  [96.9 °F (36.1 °C)-99 °F (37.2 °C)] 97.9 °F (36.6 °C)  Pulse:  [47-89] 89  Resp:  [17-41] 31  SpO2:  [91 %-100 %] 94 %  BP: (141-162)/() 141/65  Arterial Line BP: ()/(36-98) 165/51     Weight: 88.9 kg (195 lb 15.8 oz)  Body mass index is 30.7 kg/m².     SpO2: (!) 94 %  O2 Device (Oxygen Therapy): room air    Physical Exam   Constitutional: He is oriented to person, place, and time. He appears well-developed and well-nourished.   HENT:   Head: Normocephalic and atraumatic.   Neck: Normal range of motion. Neck supple.   Cardiovascular: Normal rate and regular rhythm.    Pulmonary/Chest: Effort normal and breath sounds normal.   Abdominal: Soft. Bowel sounds are normal.   Musculoskeletal: He exhibits no edema.   Neurological: He is alert and oriented to person, place, and time.   Skin: Skin is warm and dry.       Significant Labs: All pertinent lab results from the last 24 hours have been reviewed.    Significant Imaging: EKG: NSR with narrow QRS

## 2017-10-18 NOTE — PLAN OF CARE
10/18/17 1113   Discharge Assessment   Assessment Type Discharge Planning Assessment   Confirmed/corrected address and phone number on facesheet? Yes   Assessment information obtained from? Medical Record   Expected Length of Stay (days) 1   Communicated expected length of stay with patient/caregiver yes   Prior to hospitilization cognitive status: Alert/Oriented   Prior to hospitalization functional status: Independent   Current cognitive status: Alert/Oriented   Current Functional Status: Independent   Lives With sibling(s)   Able to Return to Prior Arrangements yes   Is patient able to care for self after discharge? Yes   Readmission Within The Last 30 Days no previous admission in last 30 days   Patient currently being followed by outpatient case management? No   Patient currently receives any other outside agency services? No   Equipment Currently Used at Home none   Do you have any problems affording any of your prescribed medications? No   Is the patient taking medications as prescribed? yes   Does the patient have transportation home? Yes   Transportation Available car;family or friend will provide   Does the patient receive services at the [facility]? No   Discharge Plan A Home with family   Patient/Family In Agreement With Plan yes     Patient admitted for TAVR. Independent with ADL's. Has transportation home with family. No needs.

## 2017-10-18 NOTE — NURSING
Dr. Stanley at bedside. She d/c'd transvenous pacer and cordis introducer to right ij. Pt tolerated well. Orders received for right radial alex to be d/c'd, done. IS to bedside. Will work with pt when done breakfast. Pt up to chair eating breakfast. No complaints at present. Will continue to monitor.

## 2017-10-19 ENCOUNTER — PATIENT OUTREACH (OUTPATIENT)
Dept: ADMINISTRATIVE | Facility: CLINIC | Age: 82
End: 2017-10-19

## 2017-10-19 NOTE — PATIENT INSTRUCTIONS
AVS DISCHARGE SUMMARY - POST-OP  INSTRUCTIONS REVIEWED IN FULL WITH PATIENT.    After Your Transcatheter Aortic Valve Replacement (TAVR)  You have just had surgery to replace your aortic valve with a new biological tissue valve. When you go home, follow all instructions for medicines, pain control, diet, activity, and wound care. Make sure to keep all your follow-up appointments. You should feel better after your surgery. Complete recovery may take several weeks. How long depends on whether the surgery was done through your groin, underneath the collarbone, or between your ribs. All of these methods are considered less invasive than open heart surgery. This means fewer complications and a shorter recovery time. Below are some general guidelines to follow as you heal.     A pill organizer can help you keep track of the medicines you take each day.   Recovering at home  Follow your healthcare providers directions for recovery at home. It may take several weeks to get back to your normal routine. Using the groin artery is the least invasive method and heals the fastest. TAVR done between your ribs requires a larger incision takes longer to recover from.  During your recovery:  · Take medicine as directed. Take pain medicine, blood thinners, and any other medicine exactly as your healthcare provider advises. You will likely need to take aspirin and another blood thinner (antiplatelets) for a period. Youll need to take the aspirin for the rest of your life. Be sure your doctor knows about all other medicines you take, including over-the-counter medicines and dietary supplements.  · Care for your incision. Its normal for your incision to be bruised, itchy, or sore while its healing. Your incision may take a week or more to heal. A surgery site between your ribs will take longer to heal than one in your groin. Care for the bandage and incision as advised. Wash it every day with warm water and soap. Gently pat it dry.  Dont put powder, lotion, or ointment on the incision until its healed.  · Shower carefully. Unless youre told otherwise, you can shower once you get home. Use warm water and a mild soap. Avoid hot water because it can make you feel lightheaded. Dont take a bath until your healthcare provider says its OK. Also dont sit in a swimming pool or hot tub until your doctor says its OK. If your surgery was between your ribs, you may need to keep the incision site dry for a certain period.  · Avoid activities as directed. Your doctor may tell you to avoid strenuous activities for a week or more. This includes no heavy lifting. Instructions on what to do are different depending on how your surgery was done. Your doctor will give you specific instructions that are appropriate for you.  · Dont drive if you are taking opioid pain medicine. These medicines can make you feel sleepy and it is unsafe to drive or operate heavy machinery while taking them.  · Walk regularly. One of the best ways to get stronger is to walk. If your doctor agrees, start with short walks at home. Walk a little more each day. Take someone with you until you feel OK to walk alone. Your healthcare provider may recommend a cardiac rehab program. This will allow you to be closely monitored  by trained personnel during exercise. Most insurance companies will cover these programs.  Call 911  Call 911 for immediate care if you have:  · Chest pain  · Severe difficulty breathing  · Signs of stroke such as sudden numbness or weakness in the face, arms, or legs  When should I call my healthcare provider?  Seek immediate medical help if you have any of the following symptoms:  · Bowel movement that is bright red  · Bleeding that is frequent or persistent, such as nosebleeds  · Chills or fever of 100.4°F (38°C) or higher  · Dizziness, lightheadedness, or fainting  · Redness, swelling, bleeding, warmth, or fluid draining at the incision site  · Weight gain of  more than 2 to 3 pounds in 24 hours or more than 5 pounds in 1 week  · Swelling in your hands, feet, or ankles  · Shortness of breath that doesnt get better when you rest  · Pain that gets worse or doesnt go away  · Fast, slow, or irregular pulse  · Worsening or severe fatigue  · Other signs or symptoms as indicated by your healthcare provider   Follow-up care  Follow-up visits with your healthcare provider help make sure youre recovering well. In fact, to keep feeling your best, youll need regular checkups for the rest of your life. During these visits, you may have:  · Blood tests to monitor the function of your heart and kidneys, and check for anemia  · Echocardiograms to check how well your heart and new heart valve are working  · Electrocardiograms (ECGs) to show if your heart rhythm has changed after your valve replacement  Staying healthy after a heart valve replacement  · Learn to take your own blood pressure and pulse. Keep a record of your results. Ask your healthcare provider which readings mean that you need medical care.  · Weigh yourself everyday and keep a record of this. It is normal for your weight to fluctuate 2 to 3 pounds in a day, anything more than this could mean you are retaining fluid and developing heart failure  · Tell all your healthcare providers and dentists youve had a valve replacement. Before any dental procedure, you will need to take an antibiotic to protect your new heart valve.  · Take any prescribed blood thinners exactly as directed.  · Make lifestyle changes as advised by your healthcare provider. Keep in mind that healthy habits such as exercise, and a healthy diet can strengthen your heart.  · Alert your healthcare provider to any new symptoms.  Date Last Reviewed: 5/1/2016  © 8105-1237 [employer]. [address] [place] [address], [place] [address]. All rights reserved. This information is not intended as a substitute for professional medical care. Always follow  your healthcare professional's instructions.

## 2017-10-27 NOTE — OP NOTE
DATE OF PROCEDURE: 10/17/2017    ATTENDING SURGEONS: CHYNA Love MD, and Ar Garrido M.D.    PREOPERATIVE DIAGNOSES:  1. Severe aortic stenosis.    POSTOPERATIVE DIAGNOSES:  1. Severe aortic stenosis      OPERATION PERFORMED: Transcatheter aortic valve insertion via right transfemoral   approach using a 26 mm [employer] S3 valve    ANESTHESIA: General.    ESTIMATED BLOOD LOSS: 10 mL.    BRIEF HISTORY: This patient is a 82-year-old man with severe aortic stenosis.  The patient has had progressive dyspnea on exertion. This prompted a thoughtful and thorough evaluation, which demonstrated severe aortic stenosis. Given the comorbid conditions, a transcatheter valve insertion was recommended. The patient now presents for transcatheter aortic valve insertion.    PROCEDURE: After obtaining informed and written consent, the patient was   brought to the cath lab and placed on the cath table in supine position. After   induction of adequate anesthesia, a transvenous pacing wire was placed.   Bilateral femoral arterial and femoral venous access was obtained.  A wire was advanced across the aortic valve. It was exchanged for stiff wire, and  a 22 mm Comfrey balloon was placed. Under rapid ventricular pacing, balloon valvuloplasty was performed. The balloon was then withdrawn, and a 26 mm S3 valve was advanced to the level of the aortic annulus. Once the team was satisfied that the valve was in proper position, it was deployed. Excellent positioning was obtained. Post-procedure echo demonstrated no aortic insufficiency. The femoral access sites were controlled using percutaneous techniques. The patient tolerated the procedure well, there were no complications. At the conclusion of the case, sponge and instrument counts were correct.

## 2017-11-22 ENCOUNTER — HOSPITAL ENCOUNTER (OUTPATIENT)
Dept: CARDIOLOGY | Facility: CLINIC | Age: 82
Discharge: HOME | End: 2017-11-22
Payer: MEDICARE

## 2017-11-22 ENCOUNTER — OFFICE VISIT (OUTPATIENT)
Dept: CARDIOLOGY | Facility: CLINIC | Age: 82
End: 2017-11-22
Payer: MEDICARE

## 2017-11-22 VITALS
SYSTOLIC BLOOD PRESSURE: 154 MMHG | WEIGHT: 184.94 LBS | HEIGHT: 67 IN | OXYGEN SATURATION: 98 % | DIASTOLIC BLOOD PRESSURE: 72 MMHG | BODY MASS INDEX: 29.03 KG/M2 | HEART RATE: 60 BPM

## 2017-11-22 DIAGNOSIS — N18.30 TYPE 2 DIABETES MELLITUS WITH STAGE 3 CHRONIC KIDNEY DISEASE, WITHOUT LONG-TERM CURRENT USE OF INSULIN: ICD-10-CM

## 2017-11-22 DIAGNOSIS — I10 ESSENTIAL HYPERTENSION: ICD-10-CM

## 2017-11-22 DIAGNOSIS — Z95.2 S/P TAVR (TRANSCATHETER AORTIC VALVE REPLACEMENT): Primary | ICD-10-CM

## 2017-11-22 DIAGNOSIS — E11.22 TYPE 2 DIABETES MELLITUS WITH STAGE 3 CHRONIC KIDNEY DISEASE, WITHOUT LONG-TERM CURRENT USE OF INSULIN: ICD-10-CM

## 2017-11-22 DIAGNOSIS — I50.32 CHRONIC DIASTOLIC HEART FAILURE: ICD-10-CM

## 2017-11-22 DIAGNOSIS — N18.30 CKD (CHRONIC KIDNEY DISEASE), STAGE III: ICD-10-CM

## 2017-11-22 DIAGNOSIS — I35.0 NODULAR CALCIFIC AORTIC VALVE STENOSIS: ICD-10-CM

## 2017-11-22 LAB
AORTIC VALVE REGURGITATION: ABNORMAL
ESTIMATED PA SYSTOLIC PRESSURE: 42.69
MITRAL VALVE MOBILITY: ABNORMAL
MITRAL VALVE REGURGITATION: ABNORMAL
RETIRED EF AND QEF - SEE NOTES: 45 (ref 55–65)
TRICUSPID VALVE REGURGITATION: ABNORMAL

## 2017-11-22 PROCEDURE — 99999 PR PBB SHADOW E&M-EST. PATIENT-LVL III: CPT | Mod: PBBFAC,,,

## 2017-11-22 PROCEDURE — 99214 OFFICE O/P EST MOD 30 MIN: CPT | Mod: S$PBB,,, | Performed by: INTERNAL MEDICINE

## 2017-11-22 PROCEDURE — 93306 TTE W/DOPPLER COMPLETE: CPT | Mod: PBBFAC | Performed by: INTERNAL MEDICINE

## 2017-11-22 PROCEDURE — 99213 OFFICE O/P EST LOW 20 MIN: CPT | Mod: PBBFAC

## 2017-11-22 NOTE — ASSESSMENT & PLAN NOTE
S/p 26mm Brent S3 TAVR via TF access.   Doing well.   On ASA and Plavix. OK to d/c Plavix in 5 months.   SBEP for life.

## 2017-11-22 NOTE — PROGRESS NOTES
"Subjective:    Patient ID:  Timbo Gallagher is a 82 y.o. male who presents for follow-up of TAVR.     Referring: Dr. Manzo    HPI  Mr. Gallagher presents for 1 mon f/u s/p 26mm Brent S3 TAVR via TF access. He has done well since his TAVR and he is without complaints today. His RIOS and stamina have improved. He is eager to resume exercising. He denies CP, PND, orthopnea, or LE edema. He continues to follow with Dr. Manzo.     NYHA Class II, CCS Class I    Review of Systems   Constitution: Negative for chills, diaphoresis, fever, weakness, weight gain and weight loss.   HENT: Negative for sore throat.    Eyes: Negative for blurred vision, vision loss in left eye, vision loss in right eye and visual disturbance.   Cardiovascular: Negative for chest pain, claudication, dyspnea on exertion, leg swelling, near-syncope, orthopnea, palpitations, paroxysmal nocturnal dyspnea and syncope.   Respiratory: Negative for cough, hemoptysis, shortness of breath, sputum production and wheezing.    Endocrine: Negative for cold intolerance and heat intolerance.   Hematologic/Lymphatic: Negative for adenopathy. Does not bruise/bleed easily.   Skin: Negative for rash.   Musculoskeletal: Negative for falls, muscle weakness and myalgias.   Gastrointestinal: Negative for abdominal pain, change in bowel habit, constipation, diarrhea, melena and nausea.   Genitourinary: Negative for bladder incontinence.   Neurological: Negative for dizziness, focal weakness, headaches, light-headedness and numbness.   Psychiatric/Behavioral: Negative for altered mental status.         Vitals:    11/22/17 1125 11/22/17 1128   BP: (!) 155/64 (!) 154/72   BP Location: Right arm Left arm   Patient Position: Sitting Sitting   BP Method: Large (Automatic) Large (Automatic)   Pulse: 60 60   SpO2: 98%    Weight: 83.9 kg (184 lb 15.5 oz)    Height: 5' 7.4" (1.712 m)    Body mass index is 28.63 kg/m².    Objective:    Physical Exam   Constitutional: He is oriented " to person, place, and time. He appears well-developed and well-nourished.   HENT:   Head: Normocephalic and atraumatic.   Eyes: EOM are normal. Pupils are equal, round, and reactive to light.   Neck: Neck supple. No JVD present. No tracheal deviation present. No thyromegaly present.   Cardiovascular: Normal rate, regular rhythm, S1 normal, S2 normal, intact distal pulses and normal pulses.  PMI is not displaced.  Exam reveals no gallop and no friction rub.    Murmur heard.  Pulmonary/Chest: Effort normal and breath sounds normal. No respiratory distress. He has no wheezes. He has no rales. He exhibits no tenderness.   Abdominal: Soft. Bowel sounds are normal. He exhibits no distension and no mass. There is no tenderness.   Musculoskeletal: Normal range of motion. He exhibits no edema or tenderness.   Neurological: He is alert and oriented to person, place, and time.   Skin: Skin is warm and dry. No rash noted.   Psychiatric: He has a normal mood and affect. His behavior is normal.         Assessment and Plan:           Chronic diastolic heart failure  Well compensated clinically at this time.   On Lasix 20mg daily.     Essential hypertension  Controlled on lisinopril, Toprol XL, and Hyzaar.     S/P TAVR (transcatheter aortic valve replacement)  S/p 26mm Brent S3 TAVR via TF access.   Doing well.   On ASA and Plavix. OK to d/c Plavix in 5 months.   SBEP for life.     Type 2 diabetes mellitus with renal complication  On PO meds.     CKD (chronic kidney disease), stage III  GFR 50.8. Stable.

## 2018-01-27 ENCOUNTER — HOSPITAL ENCOUNTER (EMERGENCY)
Facility: HOSPITAL | Age: 83
Discharge: HOME | End: 2018-01-27
Attending: EMERGENCY MEDICINE
Payer: MEDICARE

## 2018-01-27 VITALS
RESPIRATION RATE: 16 BRPM | TEMPERATURE: 101 F | BODY MASS INDEX: 28.88 KG/M2 | WEIGHT: 184 LBS | HEIGHT: 67 IN | OXYGEN SATURATION: 93 % | HEART RATE: 104 BPM | SYSTOLIC BLOOD PRESSURE: 157 MMHG | DIASTOLIC BLOOD PRESSURE: 67 MMHG

## 2018-01-27 DIAGNOSIS — R53.1 WEAKNESS: ICD-10-CM

## 2018-01-27 DIAGNOSIS — J10.1 INFLUENZA A: Primary | ICD-10-CM

## 2018-01-27 LAB
ALBUMIN SERPL BCP-MCNC: 3.3 G/DL
ALP SERPL-CCNC: 89 U/L
ALT SERPL W/O P-5'-P-CCNC: 11 U/L
ANION GAP SERPL CALC-SCNC: 10 MMOL/L
AST SERPL-CCNC: 16 U/L
BACTERIA #/AREA URNS HPF: NORMAL /HPF
BASOPHILS # BLD AUTO: 0.02 K/UL
BASOPHILS NFR BLD: 0.2 %
BILIRUB SERPL-MCNC: 0.4 MG/DL
BILIRUB UR QL STRIP: NEGATIVE
BNP SERPL-MCNC: 356 PG/ML
BUN SERPL-MCNC: 36 MG/DL
CALCIUM SERPL-MCNC: 9.1 MG/DL
CHLORIDE SERPL-SCNC: 103 MMOL/L
CLARITY UR: CLEAR
CO2 SERPL-SCNC: 23 MMOL/L
COLOR UR: YELLOW
CREAT SERPL-MCNC: 1.8 MG/DL
DIFFERENTIAL METHOD: ABNORMAL
EOSINOPHIL # BLD AUTO: 0 K/UL
EOSINOPHIL NFR BLD: 0 %
ERYTHROCYTE [DISTWIDTH] IN BLOOD BY AUTOMATED COUNT: 13.2 %
EST. GFR  (AFRICAN AMERICAN): 40 ML/MIN/1.73 M^2
EST. GFR  (NON AFRICAN AMERICAN): 34 ML/MIN/1.73 M^2
FLUAV AG SPEC QL IA: POSITIVE
FLUBV AG SPEC QL IA: NEGATIVE
GLUCOSE SERPL-MCNC: 171 MG/DL
GLUCOSE UR QL STRIP: NEGATIVE
HCT VFR BLD AUTO: 36.9 %
HGB BLD-MCNC: 12.3 G/DL
HGB UR QL STRIP: NEGATIVE
HYALINE CASTS #/AREA URNS LPF: 0 /LPF
KETONES UR QL STRIP: ABNORMAL
LACTATE SERPL-SCNC: 1.7 MMOL/L
LEUKOCYTE ESTERASE UR QL STRIP: NEGATIVE
LIPASE SERPL-CCNC: 37 U/L
LYMPHOCYTES # BLD AUTO: 0.5 K/UL
LYMPHOCYTES NFR BLD: 4.2 %
MAGNESIUM SERPL-MCNC: 1.8 MG/DL
MCH RBC QN AUTO: 31.9 PG
MCHC RBC AUTO-ENTMCNC: 33.3 G/DL
MCV RBC AUTO: 96 FL
MICROSCOPIC COMMENT: NORMAL
MONOCYTES # BLD AUTO: 1.2 K/UL
MONOCYTES NFR BLD: 10.4 %
NEUTROPHILS # BLD AUTO: 10.1 K/UL
NEUTROPHILS NFR BLD: 84.9 %
NITRITE UR QL STRIP: NEGATIVE
PH UR STRIP: 5 [PH] (ref 5–8)
PLATELET # BLD AUTO: 168 K/UL
PMV BLD AUTO: 10.9 FL
POTASSIUM SERPL-SCNC: 4.2 MMOL/L
PROT SERPL-MCNC: 6.8 G/DL
PROT UR QL STRIP: ABNORMAL
RBC # BLD AUTO: 3.86 M/UL
RBC #/AREA URNS HPF: 1 /HPF (ref 0–4)
SODIUM SERPL-SCNC: 136 MMOL/L
SP GR UR STRIP: 1.01 (ref 1–1.03)
SPECIMEN SOURCE: ABNORMAL
SQUAMOUS #/AREA URNS HPF: 2 /HPF
URN SPEC COLLECT METH UR: ABNORMAL
UROBILINOGEN UR STRIP-ACNC: NEGATIVE EU/DL
WBC # BLD AUTO: 11.91 K/UL
WBC #/AREA URNS HPF: 0 /HPF (ref 0–5)

## 2018-01-27 PROCEDURE — 87400 INFLUENZA A/B EACH AG IA: CPT

## 2018-01-27 PROCEDURE — 96361 HYDRATE IV INFUSION ADD-ON: CPT

## 2018-01-27 PROCEDURE — 85025 COMPLETE CBC W/AUTO DIFF WBC: CPT

## 2018-01-27 PROCEDURE — 81000 URINALYSIS NONAUTO W/SCOPE: CPT

## 2018-01-27 PROCEDURE — 93010 ELECTROCARDIOGRAM REPORT: CPT | Mod: ,,, | Performed by: INTERNAL MEDICINE

## 2018-01-27 PROCEDURE — 83880 ASSAY OF NATRIURETIC PEPTIDE: CPT

## 2018-01-27 PROCEDURE — 83605 ASSAY OF LACTIC ACID: CPT

## 2018-01-27 PROCEDURE — 94761 N-INVAS EAR/PLS OXIMETRY MLT: CPT

## 2018-01-27 PROCEDURE — 99285 EMERGENCY DEPT VISIT HI MDM: CPT | Mod: 25

## 2018-01-27 PROCEDURE — 80053 COMPREHEN METABOLIC PANEL: CPT

## 2018-01-27 PROCEDURE — 94640 AIRWAY INHALATION TREATMENT: CPT

## 2018-01-27 PROCEDURE — 96374 THER/PROPH/DIAG INJ IV PUSH: CPT

## 2018-01-27 PROCEDURE — 25000242 PHARM REV CODE 250 ALT 637 W/ HCPCS: Performed by: EMERGENCY MEDICINE

## 2018-01-27 PROCEDURE — 63600175 PHARM REV CODE 636 W HCPCS: Performed by: EMERGENCY MEDICINE

## 2018-01-27 PROCEDURE — 83735 ASSAY OF MAGNESIUM: CPT

## 2018-01-27 PROCEDURE — 93005 ELECTROCARDIOGRAM TRACING: CPT

## 2018-01-27 PROCEDURE — 25000003 PHARM REV CODE 250: Performed by: EMERGENCY MEDICINE

## 2018-01-27 PROCEDURE — 83690 ASSAY OF LIPASE: CPT

## 2018-01-27 RX ORDER — IPRATROPIUM BROMIDE AND ALBUTEROL SULFATE 2.5; .5 MG/3ML; MG/3ML
3 SOLUTION RESPIRATORY (INHALATION)
Status: COMPLETED | OUTPATIENT
Start: 2018-01-27 | End: 2018-01-27

## 2018-01-27 RX ORDER — OSELTAMIVIR PHOSPHATE 75 MG/1
75 CAPSULE ORAL 2 TIMES DAILY
Qty: 10 CAPSULE | Refills: 0 | Status: SHIPPED | OUTPATIENT
Start: 2018-01-27 | End: 2018-02-01

## 2018-01-27 RX ORDER — ONDANSETRON 2 MG/ML
4 INJECTION INTRAMUSCULAR; INTRAVENOUS
Status: COMPLETED | OUTPATIENT
Start: 2018-01-27 | End: 2018-01-27

## 2018-01-27 RX ORDER — ACETAMINOPHEN 325 MG/1
650 TABLET ORAL
Status: COMPLETED | OUTPATIENT
Start: 2018-01-27 | End: 2018-01-27

## 2018-01-27 RX ORDER — OSELTAMIVIR PHOSPHATE 75 MG/1
75 CAPSULE ORAL
Status: COMPLETED | OUTPATIENT
Start: 2018-01-27 | End: 2018-01-27

## 2018-01-27 RX ADMIN — SODIUM CHLORIDE 1000 ML: 0.9 INJECTION, SOLUTION INTRAVENOUS at 07:01

## 2018-01-27 RX ADMIN — ONDANSETRON 4 MG: 2 INJECTION INTRAMUSCULAR; INTRAVENOUS at 07:01

## 2018-01-27 RX ADMIN — IPRATROPIUM BROMIDE AND ALBUTEROL SULFATE 3 ML: .5; 3 SOLUTION RESPIRATORY (INHALATION) at 08:01

## 2018-01-27 RX ADMIN — ACETAMINOPHEN 650 MG: 325 TABLET ORAL at 07:01

## 2018-01-27 RX ADMIN — OSELTAMIVIR PHOSPHATE 75 MG: 75 CAPSULE ORAL at 08:01

## 2018-01-27 NOTE — ED TRIAGE NOTES
"Pt arrived via [facility] from home. CC of cough and fatigue. Pt stated "I think I have the flu, I just feel weak and I have this cough." Pt presents with infrequent; productive cough, pt reports "white mucous", pt also reports nausea, weakness and decreased appetite. Pt denies F/V/D/SOB. NAD noted at this time.  "

## 2018-01-27 NOTE — ED PROVIDER NOTES
Encounter Date: 1/27/2018    SCRIBE #1 NOTE: I, Ericka Solo, am scribing for, and in the presence of, Saad Lopez MD. Other sections scribed: HPI/ROS.       History     Chief Complaint   Patient presents with    Cough     x 1 week, white sputum    Fatigue     x 1 week , with n/v/d      CC: Cough    Pt is a 82 y.o. male former smoker w/ DM, HTN, GERD, BPH, arthritis, CHF and hx of chronic bronchitis, blood transfusion, cardiac valve replacement (10/17/18) presenting to the ED via EMS with his stepdaughter c/o generalized weakness, fatigue, nausea, decreased appetite, cough (intermittently productive w/ white sputum), sinus congestion, and sore throat x 1 week. Pt also c/o insomnia 2nd/to increased nocturnal polyuria and weaker urinary stream than usual. Pt reports last BM 2 days ago. Pt c/o frequent reflux at night, but denies heartburn currently.    Pt denies fever, SOB, V/D, abdominal pain, or dysuria. Pt reports no further symptoms. No prior attempted treatment.      The history is provided by the patient and a relative.     Review of patient's allergies indicates:   Allergen Reactions    Vancomycin analogues Itching    Ciprofloxacin (mixture) Itching     Extreme itching and redness      Past Medical History:   Diagnosis Date    Arthritis     Blind right eye     BPH (benign prostatic hypertrophy)     Bronchitis, chronic     CHF (congestive heart failure)     Colon polyp     Diabetes mellitus     GERD (gastroesophageal reflux disease)     Hearing aid worn     bilateral    Hernia     Hypertension     Irregular heart beat     Snoring      Past Surgical History:   Procedure Laterality Date    CARDIAC VALVE SURGERY      CATARACT EXTRACTION, BILATERAL      EYE SURGERY      RETINAL DETACHMENT SURGERY       Family History   Problem Relation Age of Onset    Arthritis Mother     Heart disease Father     Heart failure Father     Diabetes Sister     Heart attack Brother     No Known  Problems Maternal Aunt     No Known Problems Maternal Uncle     No Known Problems Paternal Aunt     No Known Problems Paternal Uncle     No Known Problems Maternal Grandmother     No Known Problems Maternal Grandfather     No Known Problems Paternal Grandmother     No Known Problems Paternal Grandfather     Anemia Neg Hx     Arrhythmia Neg Hx     Asthma Neg Hx     Clotting disorder Neg Hx     Fainting Neg Hx     Hyperlipidemia Neg Hx     Hypertension Neg Hx     Stroke Neg Hx     Atrial Septal Defect Neg Hx      Social History   Substance Use Topics    Smoking status: Former Smoker     Packs/day: 3.00     Years: 40.00     Quit date: 8/2/1990    Smokeless tobacco: Never Used    Alcohol use No     Review of Systems   Constitutional: Positive for appetite change (+) decrease and fatigue. Negative for fever.   HENT: Positive for congestion and sore throat.    Eyes: Negative for pain.   Respiratory: Positive for cough. Negative for shortness of breath.    Cardiovascular: Negative for chest pain.   Gastrointestinal: Positive for nausea. Negative for abdominal pain, diarrhea and vomiting.   Genitourinary: Negative for dysuria.   Musculoskeletal: Negative for neck pain.   Skin: Negative for rash and wound.   Neurological: Positive for weakness.       Physical Exam     Initial Vitals   BP Pulse Resp Temp SpO2   01/27/18 0709 01/27/18 0647 01/27/18 0647 01/27/18 0647 01/27/18 0647   (!) 186/76 110 20 99.8 °F (37.7 °C) 97 %      MAP       01/27/18 0709       112.67         Physical Exam    Nursing note and vitals reviewed.  Constitutional: He appears well-developed and well-nourished. No distress.   HENT:   Head: Atraumatic.   Mouth/Throat: Oropharynx is clear and moist.   Eyes: EOM are normal. Pupils are equal, round, and reactive to light.   Neck: Normal range of motion. Neck supple. No JVD present.   Cardiovascular: Normal rate, regular rhythm, normal heart sounds and intact distal pulses. Exam reveals no  gallop and no friction rub.    No murmur heard.  Pulmonary/Chest: Breath sounds normal. No respiratory distress. He has no wheezes. He has no rhonchi. He has no rales.   Abdominal: Soft. Bowel sounds are normal. He exhibits no distension. There is no tenderness. There is no rebound and no guarding.   Musculoskeletal: Normal range of motion.   Lymphadenopathy:     He has no cervical adenopathy.   Neurological: He is alert and oriented to person, place, and time. He has normal strength.   Skin: Skin is warm and dry.   Psychiatric: He has a normal mood and affect. Thought content normal.         ED Course   Procedures  Labs Reviewed   CBC W/ AUTO DIFFERENTIAL - Abnormal; Notable for the following:        Result Value    RBC 3.86 (*)     Hemoglobin 12.3 (*)     Hematocrit 36.9 (*)     MCH 31.9 (*)     Gran # (ANC) 10.1 (*)     Lymph # 0.5 (*)     Mono # 1.2 (*)     Gran% 84.9 (*)     Lymph% 4.2 (*)     All other components within normal limits   COMPREHENSIVE METABOLIC PANEL - Abnormal; Notable for the following:     Glucose 171 (*)     BUN, Bld 36 (*)     Creatinine 1.8 (*)     Albumin 3.3 (*)     eGFR if  40 (*)     eGFR if non  34 (*)     All other components within normal limits   URINALYSIS - Abnormal; Notable for the following:     Protein, UA 2+ (*)     Ketones, UA Trace (*)     All other components within normal limits   B-TYPE NATRIURETIC PEPTIDE - Abnormal; Notable for the following:      (*)     All other components within normal limits   INFLUENZA A AND B ANTIGEN - Abnormal; Notable for the following:     Influenza A Ag, EIA Positive (*)     All other components within normal limits   LACTIC ACID, PLASMA   LIPASE   MAGNESIUM   URINALYSIS MICROSCOPIC     EKG Readings: (Independently Interpreted)   Initial Reading: No STEMI. Rhythm: Normal Sinus Rhythm. Heart Rate: 92. Ectopy: PVCs. Conduction: RBBB.       X-Rays:   Independently Interpreted Readings:   Chest X-Ray:  Normal heart size.  No infiltrates.  No acute abnormalities. Hiatal hernia       patient is influenza positive.  We'll treat with Tamiflu.          Scribe Attestation:   Scribe #1: I performed the above scribed service and the documentation accurately describes the services I performed. I attest to the accuracy of the note.    Attending Attestation:           Physician Attestation for Scribe:  Physician Attestation Statement for Scribe #1: I, Saad Lopez MD, reviewed documentation, as scribed by Ericka Solo in my presence, and it is both accurate and complete.                    Clinical Impression:   The primary encounter diagnosis was Influenza A. A diagnosis of Weakness was also pertinent to this visit.                           Saad Lopez MD  02/26/18 0002

## 2018-01-27 NOTE — ED NOTES
Pt step daughter called and made aware of pt discharge. Pt daughter stated she will come pick him up shortly

## 2018-02-02 ENCOUNTER — HOSPITAL ENCOUNTER (OUTPATIENT)
Dept: RADIOLOGY | Facility: HOSPITAL | Age: 83
Discharge: HOME | End: 2018-02-02
Payer: MEDICARE

## 2018-02-02 DIAGNOSIS — I49.9 IRREGULAR HEARTBEAT: ICD-10-CM

## 2018-02-02 DIAGNOSIS — J10.1 INFLUENZA A: ICD-10-CM

## 2018-02-02 DIAGNOSIS — G45.3 AMAUROSIS FUGAX OF LEFT EYE: Primary | ICD-10-CM

## 2018-02-02 PROCEDURE — 71046 X-RAY EXAM CHEST 2 VIEWS: CPT | Mod: 26,,, | Performed by: RADIOLOGY

## 2018-02-02 PROCEDURE — 71046 X-RAY EXAM CHEST 2 VIEWS: CPT | Mod: TC,FY

## 2018-02-17 ENCOUNTER — HOSPITAL ENCOUNTER (INPATIENT)
Facility: HOSPITAL | Age: 83
LOS: 4 days | Discharge: NURSING, REHAB OR LONG-TERM CARE | DRG: 037 | End: 2018-02-22
Attending: EMERGENCY MEDICINE
Payer: MEDICARE

## 2018-02-17 DIAGNOSIS — N17.9 AKI (ACUTE KIDNEY INJURY): ICD-10-CM

## 2018-02-17 DIAGNOSIS — G45.1 TIA INVOLVING CAROTID ARTERY: ICD-10-CM

## 2018-02-17 DIAGNOSIS — R79.89 CREATININE ELEVATION: ICD-10-CM

## 2018-02-17 DIAGNOSIS — N18.30 TYPE 2 DIABETES MELLITUS WITH STAGE 3 CHRONIC KIDNEY DISEASE, WITHOUT LONG-TERM CURRENT USE OF INSULIN: ICD-10-CM

## 2018-02-17 DIAGNOSIS — E11.22 TYPE 2 DIABETES MELLITUS WITH STAGE 3 CHRONIC KIDNEY DISEASE, WITHOUT LONG-TERM CURRENT USE OF INSULIN: ICD-10-CM

## 2018-02-17 DIAGNOSIS — G45.9 TIA (TRANSIENT ISCHEMIC ATTACK): ICD-10-CM

## 2018-02-17 DIAGNOSIS — N18.30 CKD (CHRONIC KIDNEY DISEASE), STAGE III: ICD-10-CM

## 2018-02-17 DIAGNOSIS — I50.32 CHRONIC DIASTOLIC HEART FAILURE: ICD-10-CM

## 2018-02-17 DIAGNOSIS — Z95.2 S/P TAVR (TRANSCATHETER AORTIC VALVE REPLACEMENT): ICD-10-CM

## 2018-02-17 DIAGNOSIS — G45.3 AMAUROSIS FUGAX: Primary | ICD-10-CM

## 2018-02-17 DIAGNOSIS — H54.40 BLINDNESS OF ONE EYE: ICD-10-CM

## 2018-02-17 DIAGNOSIS — R00.2 PALPITATIONS: ICD-10-CM

## 2018-02-17 DIAGNOSIS — J96.01 ACUTE RESPIRATORY FAILURE WITH HYPOXIA: ICD-10-CM

## 2018-02-17 DIAGNOSIS — I10 ESSENTIAL HYPERTENSION: ICD-10-CM

## 2018-02-17 DIAGNOSIS — I65.22 CAROTID STENOSIS, LEFT: ICD-10-CM

## 2018-02-17 PROBLEM — Z87.09 HISTORY OF INFLUENZA: Status: ACTIVE | Noted: 2018-02-17

## 2018-02-17 LAB
ALBUMIN SERPL BCP-MCNC: 3.3 G/DL
ALP SERPL-CCNC: 90 U/L
ALT SERPL W/O P-5'-P-CCNC: <5 U/L
ANION GAP SERPL CALC-SCNC: 10 MMOL/L
AST SERPL-CCNC: 11 U/L
BASOPHILS # BLD AUTO: 0.04 K/UL
BASOPHILS NFR BLD: 0.3 %
BILIRUB SERPL-MCNC: 0.3 MG/DL
BUN SERPL-MCNC: 33 MG/DL
CALCIUM SERPL-MCNC: 9.6 MG/DL
CHLORIDE SERPL-SCNC: 109 MMOL/L
CHOLEST SERPL-MCNC: 147 MG/DL
CHOLEST/HDLC SERPL: 4 {RATIO}
CO2 SERPL-SCNC: 23 MMOL/L
CREAT SERPL-MCNC: 1.3 MG/DL
CRP SERPL-MCNC: 4 MG/L
DIFFERENTIAL METHOD: ABNORMAL
EOSINOPHIL # BLD AUTO: 0.2 K/UL
EOSINOPHIL NFR BLD: 1.7 %
ERYTHROCYTE [DISTWIDTH] IN BLOOD BY AUTOMATED COUNT: 13 %
EST. GFR  (AFRICAN AMERICAN): 59 ML/MIN/1.73 M^2
EST. GFR  (NON AFRICAN AMERICAN): 51 ML/MIN/1.73 M^2
GLUCOSE SERPL-MCNC: 188 MG/DL
HCT VFR BLD AUTO: 37.8 %
HDLC SERPL-MCNC: 37 MG/DL
HDLC SERPL: 25.2 %
HGB BLD-MCNC: 12.5 G/DL
INR PPP: 1
LDLC SERPL CALC-MCNC: 95.6 MG/DL
LYMPHOCYTES # BLD AUTO: 1.7 K/UL
LYMPHOCYTES NFR BLD: 14.6 %
MCH RBC QN AUTO: 31.3 PG
MCHC RBC AUTO-ENTMCNC: 33.1 G/DL
MCV RBC AUTO: 95 FL
MONOCYTES # BLD AUTO: 1 K/UL
MONOCYTES NFR BLD: 8.2 %
NEUTROPHILS # BLD AUTO: 8.7 K/UL
NEUTROPHILS NFR BLD: 74.9 %
NONHDLC SERPL-MCNC: 110 MG/DL
PLATELET # BLD AUTO: 222 K/UL
PMV BLD AUTO: 11.5 FL
POCT GLUCOSE: 136 MG/DL (ref 70–110)
POTASSIUM SERPL-SCNC: 4.4 MMOL/L
PROT SERPL-MCNC: 7.3 G/DL
PROTHROMBIN TIME: 10.5 SEC
RBC # BLD AUTO: 3.99 M/UL
SODIUM SERPL-SCNC: 142 MMOL/L
TRIGL SERPL-MCNC: 72 MG/DL
TROPONIN I SERPL DL<=0.01 NG/ML-MCNC: 0.02 NG/ML
TROPONIN I SERPL DL<=0.01 NG/ML-MCNC: 0.03 NG/ML
TSH SERPL DL<=0.005 MIU/L-ACNC: 1.45 UIU/ML
WBC # BLD AUTO: 11.64 K/UL

## 2018-02-17 PROCEDURE — 93005 ELECTROCARDIOGRAM TRACING: CPT

## 2018-02-17 PROCEDURE — 99285 EMERGENCY DEPT VISIT HI MDM: CPT | Mod: 25

## 2018-02-17 PROCEDURE — 93010 ELECTROCARDIOGRAM REPORT: CPT | Mod: ,,, | Performed by: INTERNAL MEDICINE

## 2018-02-17 PROCEDURE — G0378 HOSPITAL OBSERVATION PER HR: HCPCS

## 2018-02-17 PROCEDURE — 96360 HYDRATION IV INFUSION INIT: CPT

## 2018-02-17 PROCEDURE — 25000003 PHARM REV CODE 250: Performed by: INTERNAL MEDICINE

## 2018-02-17 PROCEDURE — 25000003 PHARM REV CODE 250: Performed by: EMERGENCY MEDICINE

## 2018-02-17 PROCEDURE — 36415 COLL VENOUS BLD VENIPUNCTURE: CPT

## 2018-02-17 PROCEDURE — 99204 OFFICE O/P NEW MOD 45 MIN: CPT | Mod: ,,, | Performed by: PSYCHIATRY & NEUROLOGY

## 2018-02-17 PROCEDURE — 99214 OFFICE O/P EST MOD 30 MIN: CPT | Mod: ,,, | Performed by: INTERNAL MEDICINE

## 2018-02-17 PROCEDURE — 80061 LIPID PANEL: CPT

## 2018-02-17 PROCEDURE — 85025 COMPLETE CBC W/AUTO DIFF WBC: CPT

## 2018-02-17 PROCEDURE — 80053 COMPREHEN METABOLIC PANEL: CPT

## 2018-02-17 PROCEDURE — 84484 ASSAY OF TROPONIN QUANT: CPT

## 2018-02-17 PROCEDURE — 84443 ASSAY THYROID STIM HORMONE: CPT

## 2018-02-17 PROCEDURE — 82962 GLUCOSE BLOOD TEST: CPT

## 2018-02-17 PROCEDURE — 85610 PROTHROMBIN TIME: CPT

## 2018-02-17 PROCEDURE — 86140 C-REACTIVE PROTEIN: CPT

## 2018-02-17 PROCEDURE — 84484 ASSAY OF TROPONIN QUANT: CPT | Mod: 91

## 2018-02-17 RX ORDER — ACETAMINOPHEN 325 MG/1
650 TABLET ORAL EVERY 8 HOURS PRN
Status: DISCONTINUED | OUTPATIENT
Start: 2018-02-17 | End: 2018-02-19

## 2018-02-17 RX ORDER — LOSARTAN POTASSIUM AND HYDROCHLOROTHIAZIDE 12.5; 1 MG/1; MG/1
1 TABLET ORAL DAILY
Status: DISCONTINUED | OUTPATIENT
Start: 2018-02-17 | End: 2018-02-17

## 2018-02-17 RX ORDER — CLOPIDOGREL BISULFATE 75 MG/1
75 TABLET ORAL DAILY
Status: DISCONTINUED | OUTPATIENT
Start: 2018-02-17 | End: 2018-02-17 | Stop reason: SDUPTHER

## 2018-02-17 RX ORDER — ATORVASTATIN CALCIUM 40 MG/1
40 TABLET, FILM COATED ORAL NIGHTLY
Status: DISCONTINUED | OUTPATIENT
Start: 2018-02-17 | End: 2018-02-19

## 2018-02-17 RX ORDER — LOSARTAN POTASSIUM 25 MG/1
100 TABLET ORAL DAILY
Status: DISCONTINUED | OUTPATIENT
Start: 2018-02-17 | End: 2018-02-19

## 2018-02-17 RX ORDER — METOPROLOL SUCCINATE 25 MG/1
25 TABLET, EXTENDED RELEASE ORAL 2 TIMES DAILY
Status: DISCONTINUED | OUTPATIENT
Start: 2018-02-17 | End: 2018-02-19

## 2018-02-17 RX ORDER — NAPROXEN SODIUM 220 MG/1
81 TABLET, FILM COATED ORAL DAILY
Status: DISCONTINUED | OUTPATIENT
Start: 2018-02-17 | End: 2018-02-19

## 2018-02-17 RX ORDER — ASPIRIN 81 MG/1
81 TABLET ORAL DAILY
Status: DISCONTINUED | OUTPATIENT
Start: 2018-02-17 | End: 2018-02-17

## 2018-02-17 RX ORDER — METFORMIN HYDROCHLORIDE 500 MG/1
1000 TABLET ORAL 2 TIMES DAILY WITH MEALS
Status: DISCONTINUED | OUTPATIENT
Start: 2018-02-17 | End: 2018-02-19

## 2018-02-17 RX ORDER — FUROSEMIDE 20 MG/1
20 TABLET ORAL DAILY
Status: DISCONTINUED | OUTPATIENT
Start: 2018-02-17 | End: 2018-02-19

## 2018-02-17 RX ORDER — ONDANSETRON 4 MG/1
8 TABLET, ORALLY DISINTEGRATING ORAL EVERY 8 HOURS PRN
Status: DISCONTINUED | OUTPATIENT
Start: 2018-02-17 | End: 2018-02-19

## 2018-02-17 RX ORDER — CLOPIDOGREL BISULFATE 75 MG/1
75 TABLET ORAL DAILY
Status: DISCONTINUED | OUTPATIENT
Start: 2018-02-17 | End: 2018-02-19

## 2018-02-17 RX ORDER — ASCORBIC ACID 500 MG
1000 TABLET ORAL DAILY
Status: DISCONTINUED | OUTPATIENT
Start: 2018-02-17 | End: 2018-02-19

## 2018-02-17 RX ORDER — SODIUM CHLORIDE 9 MG/ML
500 INJECTION, SOLUTION INTRAVENOUS
Status: COMPLETED | OUTPATIENT
Start: 2018-02-17 | End: 2018-02-17

## 2018-02-17 RX ORDER — HYDROCHLOROTHIAZIDE 12.5 MG/1
12.5 TABLET ORAL DAILY
Status: DISCONTINUED | OUTPATIENT
Start: 2018-02-17 | End: 2018-02-19

## 2018-02-17 RX ADMIN — METOPROLOL SUCCINATE 25 MG: 25 TABLET, EXTENDED RELEASE ORAL at 09:02

## 2018-02-17 RX ADMIN — ATORVASTATIN CALCIUM 40 MG: 40 TABLET, FILM COATED ORAL at 09:02

## 2018-02-17 RX ADMIN — HYDROCHLOROTHIAZIDE 12.5 MG: 12.5 TABLET ORAL at 03:02

## 2018-02-17 RX ADMIN — FUROSEMIDE 20 MG: 20 TABLET ORAL at 03:02

## 2018-02-17 RX ADMIN — ACETAMINOPHEN 650 MG: 325 TABLET, FILM COATED ORAL at 07:02

## 2018-02-17 RX ADMIN — METFORMIN HYDROCHLORIDE 1000 MG: 500 TABLET, FILM COATED ORAL at 05:02

## 2018-02-17 RX ADMIN — SODIUM CHLORIDE 500 ML: 0.9 INJECTION, SOLUTION INTRAVENOUS at 10:02

## 2018-02-17 RX ADMIN — ASPIRIN 81 MG 81 MG: 81 TABLET ORAL at 03:02

## 2018-02-17 RX ADMIN — OXYCODONE HYDROCHLORIDE AND ACETAMINOPHEN 1000 MG: 500 TABLET ORAL at 03:02

## 2018-02-17 RX ADMIN — CLOPIDOGREL BISULFATE 75 MG: 75 TABLET ORAL at 03:02

## 2018-02-17 RX ADMIN — LOSARTAN POTASSIUM 100 MG: 25 TABLET, FILM COATED ORAL at 03:02

## 2018-02-17 NOTE — ED NOTES
Pt states feeling uncomfortable while lying down in the bed. Pt requested sitting in the wheelchair. A wheel chair provide at the bedside. Pt sat in the wheel chair, states that feeling much better, call light provide within reach of pt. Instructed pt to call for help.

## 2018-02-17 NOTE — HPI
82 y.o male who has CHF, DM, Right eye blindness, GERD, HTN, Arthritis, BPH, Colon polyp, Irregular heart beat, and Encounter for blood transfusion presents to the ED for an evaluation for an episode of blurred vision with associated vision loss that occurred last night at approximately 10:30 pm.  Patient reports while seated watching television, wearing his eyeglasses, he suddenly began having blurred vision.  He reports a few seconds after onset, his vision went completely black and was not able to see any images or light.  He reports during the episode, he began having the feeling as though someone was hitting him on his hips, legs, abdomen, and chest.  Patient reports the episode lasting 4-5 minutes in duration.  Patient denies any loss of consciousness and reports he was aware of the entire incident.  Patient reports checking his blood pressure shortly after and reports a .  Patient reports last checking his blood glucose on yesterday and reports having normal readings.  Patient denies fever, chills, nausea, emesis, diarrhea, abdominal pain, chest pain, cough, shortness of breath, generalized weakness, rash, or any other associated symptoms.  No prior tx.  No alleviating factors.  No previous episodes.  Patient is compliant with his Plavix and daily ASA regimen.    Follows with Dr. Manzo.  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  The patient denies any prior history of stroke or TIA, nor any prior history of arrhythmia.  He does have a history of right retinal detachment and is blind in that eye.  He describes a feeling of disequilibrium in association with the several minutes of left eye blindness which is since resolved.  His left cardiac gram in the ER reveals a sinus rhythm with multifocal bigeminal PVCs.  Carotid ultrasonography is notable for significant left ICA stenosis.

## 2018-02-17 NOTE — ED NOTES
Pt was given a cup of water, pt drank water, no aspiration noted. Dr. Lyles notified and verbalized understanding.

## 2018-02-17 NOTE — ASSESSMENT & PLAN NOTE
Dr. Toribio consulted - notes junctional rhythm with fused beats - not yet afib  On tele  To have echo

## 2018-02-17 NOTE — SUBJECTIVE & OBJECTIVE
Past Medical History:   Diagnosis Date    Arthritis     Blind right eye     BPH (benign prostatic hypertrophy)     Bronchitis, chronic     CHF (congestive heart failure)     Colon polyp     Diabetes mellitus     GERD (gastroesophageal reflux disease)     Hearing aid worn     bilateral    Hernia     Hypertension     Irregular heart beat     Snoring        Past Surgical History:   Procedure Laterality Date    CARDIAC VALVE SURGERY      CATARACT EXTRACTION, BILATERAL      EYE SURGERY      RETINAL DETACHMENT SURGERY         Review of patient's allergies indicates:   Allergen Reactions    Vancomycin analogues Itching    Ciprofloxacin (mixture) Itching     Extreme itching and redness        No current facility-administered medications on file prior to encounter.      Current Outpatient Prescriptions on File Prior to Encounter   Medication Sig    ascorbic acid, vitamin C, (VITAMIN C) 1000 MG tablet Take 1,000 mg by mouth once daily.     aspirin (ECOTRIN) 81 MG EC tablet Take 1 tablet (81 mg total) by mouth once daily. Take 4 tab tonight then 1 tab daily    clopidogrel (PLAVIX) 75 mg tablet Take 1 tablet (75 mg total) by mouth once daily. Take 4 tab tonight then 1 tab daily    furosemide (LASIX) 20 MG tablet Take 1 tablet (20 mg total) by mouth once daily.    lisinopril 10 MG tablet Take 1 tablet (10 mg total) by mouth once daily.    losartan-hydrochlorothiazide 100-12.5 mg (HYZAAR) 100-12.5 mg Tab Take 1 tablet by mouth once daily.    metformin (GLUCOPHAGE) 500 MG tablet Take 1,000 mg by mouth 2 (two) times daily with meals.     metoprolol succinate (TOPROL-XL) 25 MG 24 hr tablet Take 25 mg by mouth 2 (two) times daily.     GLUCOSAMINE HCL/CHONDR BAUTISTA A NA (OSTEO BI-FLEX ORAL) Take 2 tablets by mouth once daily.     Family History     Problem Relation (Age of Onset)    Arthritis Mother    Diabetes Sister    Heart attack Brother    Heart disease Father    Heart failure Father    No Known Problems  Maternal Aunt, Maternal Uncle, Paternal Aunt, Paternal Uncle, Maternal Grandmother, Maternal Grandfather, Paternal Grandmother, Paternal Grandfather        Social History Main Topics    Smoking status: Former Smoker     Packs/day: 3.00     Years: 40.00     Quit date: 8/2/1990    Smokeless tobacco: Never Used    Alcohol use No    Drug use: No    Sexual activity: Not on file     Review of Systems   R eye blindness  Resolution of L visual deficit  Loose cough since having influenza    Objective:     Vital Signs (Most Recent):  Temp: 97.7 °F (36.5 °C) (02/17/18 1440)  Pulse: 63 (02/17/18 1444)  Resp: 16 (02/17/18 1211)  BP: 135/64 (02/17/18 1440)  SpO2: 98 % (02/17/18 1440) Vital Signs (24h Range):  Temp:  [97.7 °F (36.5 °C)-98.3 °F (36.8 °C)] 97.7 °F (36.5 °C)  Pulse:  [52-84] 63  Resp:  [16-18] 16  SpO2:  [95 %-98 %] 98 %  BP: (121-188)/(64-82) 135/64     Weight: 80.9 kg (178 lb 5.6 oz)  Body mass index is 26.34 kg/m².    Physical Exam   seen in ER room  Wife and son in attendance  Elderly WM, not in acute distress  At first meeting, he appeared a bit confused (I had woken him)  He seemed to have trouble answering some questions and his  history was unclear.  His details got a bit better over time.  He is aware that he is in the hospital ([facility]) and that today's  Date is Feb 17, 2018.  HIs R eye is darkened.  He has a L carotid bruit.  Heart rhythm is irregular, but appears to be repetitive in it's irregularity (as Dr. Toribio noted)  Lungs are coarse - he has occasional loose cough with mucous noise  abd obese, soft, BS+  No ext edema         Significant Labs:    WBC 11.6  hct 30  plt 222  gfr 51  Glu 188  LDL 95  Trop 0.019  bnp 356  Lactate 1.7  tsh 1.4    Significant Imaging:  CT head:  There is slightly age is generalized cerebral volume loss.  There is prominence of the extra-axial space along the left frontal, parietal and temporal convexity which is most likely related to central atrophy rather than a  chronic subdural hygroma.  There is no evidence for acute intracranial hemorrhage.  No abnormal sulcal effacement is identified.  No mass effect or midline shift.  Ventricles are normal in size and configuration without hydrocephalus.  We'll bring him lucencies within the calvarium may be related to postoperative change.  Correlation with patient's surgical history is recommended.  There is near-complete opacification of the right maxillary antrum.  Remaining paranasal sinuses are clear.  Postoperative changes of the right lobe are noted.    CUS:   Mild plaque is seen in the bilateral carotid bulbs with moderate plaque in the right proximal ICA.  Incidentally noted is an irregular heartbeat.   RIGHT:   CCA: 97cm/s   ICA PSV: 123cm/s  ICA EDV: 30cm/s  ECA: 165cm/s  Right ICA/CCA systolic ratio: 1.3  LEFT:  CCA: 96cm/s   ICA PSV: 241cm/s  ICA EDV: 41cm/s  ECA: 139cm/s  Left ICA/CCA systolic ratio: 2.5  Antegrade flow in the bilateral vertebral arteries.    CXR:  Prominent right cardiophrenic angle fat pad, stable.  Chronic lung markings are seen bilaterally.  No consolidation or pleural effusions.  The heart is normal in size.  Prosthetic cardiac valve is noted.  Calcified atheromatous disease affects the aorta.  Large hiatal hernia is present.  The skeletal structures are intact.

## 2018-02-17 NOTE — ASSESSMENT & PLAN NOTE
One month prior to admit; was treated with Tamiflu  Since then has loose cough and congestion  CXR shows no acute findings of pneumonia

## 2018-02-17 NOTE — SUBJECTIVE & OBJECTIVE
Past Medical History:   Diagnosis Date    Arthritis     Blind right eye     BPH (benign prostatic hypertrophy)     Bronchitis, chronic     CHF (congestive heart failure)     Colon polyp     Diabetes mellitus     Encounter for blood transfusion     GERD (gastroesophageal reflux disease)     Hearing aid worn     bilateral    Hernia     Hypertension     Irregular heart beat     Snoring        Past Surgical History:   Procedure Laterality Date    CARDIAC VALVE SURGERY      CATARACT EXTRACTION, BILATERAL      EYE SURGERY      RETINAL DETACHMENT SURGERY         Review of patient's allergies indicates:   Allergen Reactions    Vancomycin analogues Itching    Ciprofloxacin (mixture) Itching     Extreme itching and redness        No current facility-administered medications on file prior to encounter.      Current Outpatient Prescriptions on File Prior to Encounter   Medication Sig    ascorbic acid, vitamin C, (VITAMIN C) 1000 MG tablet Take 1,000 mg by mouth once daily.     aspirin (ECOTRIN) 81 MG EC tablet Take 1 tablet (81 mg total) by mouth once daily. Take 4 tab tonight then 1 tab daily    clopidogrel (PLAVIX) 75 mg tablet Take 1 tablet (75 mg total) by mouth once daily. Take 4 tab tonight then 1 tab daily    furosemide (LASIX) 20 MG tablet Take 1 tablet (20 mg total) by mouth once daily.    losartan-hydrochlorothiazide 100-12.5 mg (HYZAAR) 100-12.5 mg Tab Take 1 tablet by mouth once daily.    metformin (GLUCOPHAGE) 500 MG tablet Take 1,000 mg by mouth 2 (two) times daily with meals.     metoprolol succinate (TOPROL-XL) 25 MG 24 hr tablet Take 25 mg by mouth 2 (two) times daily.     GLUCOSAMINE HCL/CHONDR BAUTISTA A NA (OSTEO BI-FLEX ORAL) Take 2 tablets by mouth once daily.    lisinopril 10 MG tablet Take 1 tablet (10 mg total) by mouth once daily.     Family History     Problem Relation (Age of Onset)    No Known Problems Mother, Father, Sister, Brother, Maternal Aunt, Maternal Uncle, Paternal  Aunt, Paternal Uncle, Maternal Grandmother, Maternal Grandfather, Paternal Grandmother, Paternal Grandfather        Social History Main Topics    Smoking status: Former Smoker     Packs/day: 3.00     Years: 40.00     Quit date: 8/2/1990    Smokeless tobacco: Never Used    Alcohol use No    Drug use: No    Sexual activity: Not on file     Review of Systems   Constitution: Negative for chills, diaphoresis, fever, weakness and malaise/fatigue.   HENT: Negative for nosebleeds.    Eyes: Positive for vision loss in left eye (acute) and vision loss in right eye (chronic). Negative for blurred vision and double vision.   Cardiovascular: Negative for chest pain, claudication, cyanosis, dyspnea on exertion, leg swelling, orthopnea, palpitations, paroxysmal nocturnal dyspnea and syncope.   Respiratory: Negative for cough, shortness of breath and wheezing.    Skin: Negative for dry skin and poor wound healing.   Musculoskeletal: Negative for back pain, joint swelling and myalgias.   Gastrointestinal: Negative for abdominal pain, nausea and vomiting.   Genitourinary: Negative for hematuria.   Neurological: Positive for dizziness (dysequilibrium). Negative for headaches, numbness and seizures.   Psychiatric/Behavioral: Negative for altered mental status and depression.     Objective:     Vital Signs (Most Recent):  Temp: 98 °F (36.7 °C) (02/17/18 0815)  Pulse: 78 (02/17/18 1211)  Resp: 18 (02/17/18 0815)  BP: (!) 166/76 (02/17/18 1211)  SpO2: 95 % (02/17/18 1211) Vital Signs (24h Range):  Temp:  [98 °F (36.7 °C)] 98 °F (36.7 °C)  Pulse:  [63-84] 78  Resp:  [18] 18  SpO2:  [95 %-98 %] 95 %  BP: (121-188)/(66-82) 166/76     Weight: 81.6 kg (180 lb)  Body mass index is 26.58 kg/m².    SpO2: 95 %  O2 Device (Oxygen Therapy): room air    No intake or output data in the 24 hours ending 02/17/18 1334    Lines/Drains/Airways     Peripheral Intravenous Line                 Peripheral IV - Single Lumen 02/17/18 0830 Right Antecubital  less than 1 day                Physical Exam   Constitutional: He is oriented to person, place, and time. He appears well-developed and well-nourished.   HENT:   Head: Normocephalic and atraumatic.   Eyes: Conjunctivae and EOM are normal. No scleral icterus.   R eye with chr dilated pupil.   Neck: Normal range of motion. Neck supple. No JVD present. Carotid bruit is not present. No tracheal deviation present. No thyromegaly present.   Cardiovascular: Normal rate, S1 normal and S2 normal.  A regularly irregular rhythm present. Frequent extrasystoles are present. Exam reveals no gallop and no friction rub.    Murmur heard.   Systolic murmur is present with a grade of 2/6   Pulses:       Carotid pulses are 2+ on the right side, and 2+ on the left side with bruit.  Pulmonary/Chest: Effort normal and breath sounds normal. No respiratory distress. He has no wheezes. He has no rales. He exhibits no tenderness.   Abdominal: Soft. Bowel sounds are normal. He exhibits no distension.   Musculoskeletal: He exhibits no edema.   Neurological: He is alert and oriented to person, place, and time. He has normal strength. No cranial nerve deficit (R eye blind).   Skin: Skin is warm and dry. No rash noted.   Psychiatric: He has a normal mood and affect. His behavior is normal.       Current Medications:          Laboratory:  CBC:    Recent Labs  Lab 11/22/17  0950 01/27/18  0736 02/17/18  0831   WHITE BLOOD CELL COUNT 12.92 H 11.91 11.64   HEMOGLOBIN 12.2 L 12.3 L 12.5 L   HEMATOCRIT 37.5 L 36.9 L 37.8 L   PLATELETS 232 168 222       CHEMISTRIES:    Recent Labs  Lab 10/16/16  1050 12/02/16  0513  10/18/17  0815 11/22/17  0950 01/27/18  0736 02/17/18  0831   GLUCOSE 219 H 215 H  < > 215 H  --  171 H 188 H   SODIUM 141 144  < > 140  --  136 142   POTASSIUM 4.1 4.6  < > 3.8  --  4.2 4.4   BUN BLD 12 16  < > 18  --  36 H 33 H   CREATININE 0.9 1.2  < > 1.2 1.3 1.8 H 1.3   EGFR IF  >60 >60  < > >60.0 58.7 A 40 A 59 A   EGFR  IF NON- >60 56 A  < > 56.0 A 50.8 A 34 A 51 A   CALCIUM 9.4 9.4  < > 10.0  --  9.1 9.6   MAGNESIUM 1.8 1.8  --   --   --  1.8  --    < > = values in this interval not displayed.    CARDIAC BIOMARKERS:    Recent Labs  Lab 12/02/16  1154 12/02/16  1735 02/17/18  0831   TROPONIN I 0.340 H 0.288 H 0.019       COAGS:    Recent Labs  Lab 05/08/17  0719 10/16/17  1343 02/17/18  0831   INR 1.0 1.0 1.0       LIPIDS/LFTS:    Recent Labs  Lab 10/18/17  0815 01/27/18  0736 02/17/18  0831   CHOLESTEROL  --   --  147   TRIGLYCERIDES  --   --  72   HDL  --   --  37 L   LDL CHOLESTEROL  --   --  95.6   NON-HDL CHOLESTEROL  --   --  110   AST 15 16 11   ALT 5 L 11 <5 L     Lab Results   Component Value Date    TSH 1.450 02/17/2018         Diagnostic Results:  ECG (personally reviewed tracings):   2/17/18 0828 SR 87, multifocal PVCs    Chest X-Ray (personally reviewed image(s)): 2/17/18 NAD    CT-Head 2/17/18  Age-appropriate generalized cerebral volume loss with mild chronic microvascular ischemic change.  Prominence of the extra-axial space along the left frontal, parietal and temporal convexity is most likely related to central atrophy rather than a chronic subdural hygroma.  No evidence for acute intracranial hemorrhage correlation and further evaluation as warranted.    Echo: 11/22/17 (repeat pending)  EF 45-50%, basal inferolat HK  BioAVR (TAVR), UNA 1.3cm2, peak oz 2.1m/s, mean grad 8mmHg, trivial AI  Mod-sev functional MR with tethered posterior leaflet    Carotid US 2/17/18  #1. Findings consistent with less than 50% stenosis in the Right internal carotid artery .  #2. Findings consistent with greater than 70% stenosis in the Left internal carotid artery based on peak systolic velocity.  #3.  Incidentally noted is an irregular heartbeat.    TAVR 10/17/17  B. 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    No perivalvular leak post  procedure per 2D echo.    Post deployment AV mean gradient 2.5 mmHg, Vmax 1.09 m/s.    Cath: 5/8/17  D. Hemodynamic Results  AO: 122/68 (93)  E. Angiographic Results       Patient has a right dominant coronary artery.      - Left Main Coronary Artery:             The LM is normal. There is ANASTASIIA 3 flow.     - Left Anterior Descending Artery:             The LAD has luminal irregularities. There is ANASTASIIA 3 flow.     - Left Circumflex Artery:             The LCX is normal. There is ANASTASIIA 3 flow.     - Right Coronary Artery:             The RCA has luminal irregularities. There is ANASTASIIA 3 flow.     - Radial Artery:             The Radial artery was not studied.     - D1:             The D1 has a 50% stenosis. There is ANASTASIIA 3 flow.     - Posterior Descending Artery:             The mid PDA has a 70% stenosis. There is ANASTASIIA 3 flow.

## 2018-02-17 NOTE — ED TRIAGE NOTES
"Pt presents to ED states that he was watching TV and started having blurry vision, LOC for 5 minutes. Then pt states that his vision was back, "i think I have a stroke". Pt is AAOx3, blood pressure 121/66, 97%RA.   "

## 2018-02-17 NOTE — PLAN OF CARE
Problem: Diabetes, Type 2 (Adult)  Intervention: Support/Optimize Psychosocial Response to Condition   02/17/18 1740   Coping/Psychosocial Interventions   Supportive Measures active listening utilized;self-care encouraged;verbalization of feelings encouraged   Environmental Support calm environment promoted;environmental consistency promoted     Intervention: Optimize Glycemic Control   02/17/18 1740   Nutrition Interventions   Glycemic Management blood glucose monitoring       Goal: Signs and Symptoms of Listed Potential Problems Will be Absent, Minimized or Managed (Diabetes, Type 2)  Signs and symptoms of listed potential problems will be absent, minimized or managed by discharge/transition of care (reference Diabetes, Type 2 (Adult) CPG).   Outcome: Ongoing (interventions implemented as appropriate)   02/17/18 1740   Diabetes, Type 2   Problems Assessed (Type 2 Diabetes) all

## 2018-02-17 NOTE — CONSULTS
[facility]  Neurology  Consult Note    Patient Name: Timbo Gallagher  MRN: [id number]  Admission Date: 2/17/2018  Hospital Length of Stay: 0 days  Code Status: Prior   Attending Provider: Angy Lyles MD   Consulting Provider: Hector Chino MD  Primary Care Physician: Enrique Rivera MD  Principal Problem:Amaurosis fugax    Inpatient consult to Neurology  Consult performed by: HECTOR CHINO  Consult ordered by: ANGY LYLES        Subjective:     Chief Complaint: Left eye blindness     HPI: 81 y/o male with medical Hx as below comes to ED after experiencing loss of vision. Pt states that episode lasted 4-5 minutes. He is already blind on the right eye. Mr. Gallagher reports that he seemed to feel unbalanced as well. No weakness or numbness, speech disturbances. No previous episodes. Denies Hx of stroke.    Past Medical History:   Diagnosis Date    Arthritis     Blind right eye     BPH (benign prostatic hypertrophy)     Bronchitis, chronic     CHF (congestive heart failure)     Colon polyp     Diabetes mellitus     Encounter for blood transfusion     GERD (gastroesophageal reflux disease)     Hearing aid worn     bilateral    Hernia     Hypertension     Irregular heart beat     Snoring        Past Surgical History:   Procedure Laterality Date    CARDIAC VALVE SURGERY      CATARACT EXTRACTION, BILATERAL      EYE SURGERY      RETINAL DETACHMENT SURGERY         Review of patient's allergies indicates:   Allergen Reactions    Vancomycin analogues Itching    Ciprofloxacin (mixture) Itching     Extreme itching and redness        Current Neurological Medications:     No current facility-administered medications on file prior to encounter.      Current Outpatient Prescriptions on File Prior to Encounter   Medication Sig    ascorbic acid, vitamin C, (VITAMIN C) 1000 MG tablet Take 1,000 mg by mouth once daily.     aspirin (ECOTRIN) 81 MG EC tablet Take 1 tablet (81  mg total) by mouth once daily. Take 4 tab tonight then 1 tab daily    clopidogrel (PLAVIX) 75 mg tablet Take 1 tablet (75 mg total) by mouth once daily. Take 4 tab tonight then 1 tab daily    furosemide (LASIX) 20 MG tablet Take 1 tablet (20 mg total) by mouth once daily.    losartan-hydrochlorothiazide 100-12.5 mg (HYZAAR) 100-12.5 mg Tab Take 1 tablet by mouth once daily.    metformin (GLUCOPHAGE) 500 MG tablet Take 1,000 mg by mouth 2 (two) times daily with meals.     metoprolol succinate (TOPROL-XL) 25 MG 24 hr tablet Take 25 mg by mouth 2 (two) times daily.     GLUCOSAMINE HCL/CHONDR BAUTISTA A NA (OSTEO BI-FLEX ORAL) Take 2 tablets by mouth once daily.    lisinopril 10 MG tablet Take 1 tablet (10 mg total) by mouth once daily.      Family History     Problem Relation (Age of Onset)    No Known Problems Mother, Father, Sister, Brother, Maternal Aunt, Maternal Uncle, Paternal Aunt, Paternal Uncle, Maternal Grandmother, Maternal Grandfather, Paternal Grandmother, Paternal Grandfather        Social History Main Topics    Smoking status: Former Smoker     Packs/day: 3.00     Years: 40.00     Quit date: 8/2/1990    Smokeless tobacco: Never Used    Alcohol use No    Drug use: No    Sexual activity: Not on file     Review of Systems   Constitutional: Negative for fever.   HENT: Negative for trouble swallowing.    Eyes: Negative for photophobia.   Respiratory: Negative for shortness of breath.    Cardiovascular: Negative for palpitations.   Gastrointestinal: Negative for abdominal pain.   Genitourinary: Negative for dysuria.   Musculoskeletal: Negative for back pain.   Neurological: Negative for facial asymmetry and light-headedness.          Objective:     Vital Signs (Most Recent):  Temp: 98.3 °F (36.8 °C) (02/17/18 1211)  Pulse: 78 (02/17/18 1211)  Resp: 16 (02/17/18 1211)  BP: (!) 166/76 (02/17/18 1211)  SpO2: 95 % (02/17/18 1211) Vital Signs (24h Range):  Temp:  [97.9 °F (36.6 °C)-98.3 °F (36.8 °C)] 98.3 °F  (36.8 °C)  Pulse:  [63-84] 78  Resp:  [16-18] 16  SpO2:  [95 %-98 %] 95 %  BP: (121-188)/(66-82) 166/76     Weight: 81.6 kg (180 lb)  Body mass index is 26.58 kg/m².    Physical Exam   Constitutional: He is oriented to person, place, and time. No distress.   HENT:   Head: Normocephalic.   Eyes: Right eye exhibits no discharge. Left eye exhibits no discharge.   Neck: Normal range of motion.   Cardiovascular: Normal rate and regular rhythm.    Pulmonary/Chest: Effort normal and breath sounds normal.   Abdominal: Bowel sounds are normal.   Musculoskeletal: He exhibits no deformity.   Neurological: He is oriented to person, place, and time. He has normal strength.   Skin: He is not diaphoretic.   Psychiatric: His speech is normal.       NEUROLOGICAL EXAMINATION:     MENTAL STATUS   Oriented to person, place, and time.   Speech: speech is normal   Level of consciousness: alert    CRANIAL NERVES     CN III, IV, VI   Right pupil: Size: 2 mm. Shape: regular.   Left pupil: Size: 2 mm. Shape: regular.   Nystagmus: none   Ophthalmoparesis: none    CN V   Right facial sensation deficit: none  Left facial sensation deficit: none    CN VII   Right facial weakness: none  Left facial weakness: none    CN XI   Right sternocleidomastoid strength: normal  Left sternocleidomastoid strength: normal  Right trapezius strength: normal  Left trapezius strength: normal    CN XII   Tongue deviation: none    MOTOR EXAM     Strength   Strength 5/5 throughout.       Significant Labs:   CBC:   Recent Labs  Lab 02/17/18  0831   WBC 11.64   HGB 12.5*   HCT 37.8*        CMP:   Recent Labs  Lab 02/17/18  0831   *      K 4.4      CO2 23   BUN 33*   CREATININE 1.3   CALCIUM 9.6   PROT 7.3   ALBUMIN 3.3*   BILITOT 0.3   ALKPHOS 90   AST 11   ALT <5*   ANIONGAP 10   EGFRNONAA 51*       Significant Imaging:  Carotid US      Assessment and Plan:     81 y/o male with transient monocular visual loss    1. 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 Per US pt has a more than 70% stenosis of left ICA.   Temporal arteritis may cause TMVL but normal CRP, no headaches or jaw claudiation. Will obtain ESR.   -Vascular surgery consult. CTA of head and neck vs conventional angiogram to confirm US findings.   -For now continue clopidogrel and ASA as well as statin.   -Do not treat BP aggressively as it may cause symptoms.       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 I will follow-up with patient. Please contact us if you have any additional questions.    Hector Henderson MD  Neurology  [facility]

## 2018-02-17 NOTE — ASSESSMENT & PLAN NOTE
Sxs c/w L ocular TIA.  Significant L ICA stenosis noted.  Pt already on ASA/Plavix.  Case d/w Dr. Coleman ([facility]) who suggests consulting Dr. Alberto ([facility] on call).  Order placed.  Add statin.  Cont antihtn regimen.  Check echo.  MRI/A as per neuro if needed.

## 2018-02-17 NOTE — H&P
"[facility] Hospital Medicine  History & Physical    Patient Name: Timbo Gallagher  MRN: [id number]  Admission Date: 2/17/2018  Attending Physician: Cirilo Montero MD  Primary Care Provider: Cirilo Montero MD       Subjective:     Principal Problem:Amaurosis fugax    Chief Complaint:   Chief Complaint   Patient presents with    Loss of Consciousness     "I think I may have had a stroke last night."  approx 10:30 pm pt states his vision went away and he started shaking.  denies pain throughout.  + DM.  denies n/v/d or fever.        HPI: 83 yo WM who was previously seen by Dr. Jacobsen, now me, presents after experiencing L Amaurosis Fugax last night 2/16.  He said it occurred at 10 pm while he was sitting, and lasted 5 minutes.  He had no headache.  He felt his heart palpitating and felt as though something was spinning him around.  He did not pass out.  He did have some mild nausea.  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  CT of head shows chronic ischemic changes and central frontal atrophy.  He has a history of TAVR in Oct 2017 and follows Dr. Manzo (who no longer comes here).   He has a L carotid bruit (and ER US shows >70% stenosis on L, <50% on R)  He has an irregular heart rhythm - Dr. Toribio saw pt and noted junctional rhythm and fused beats, but not full A fib.  He had the flu about a month ago, and was treated with Tamiflu, and since that time has had residual lower respiratory symptoms with cough and congestion.  He had MDI at home for what he says is chronic bronchitis.  He has a loose cough here.  He is being admitted for cardiac and neuro evaluation.    Past Medical History:   Diagnosis Date    Arthritis     Blind right eye     BPH (benign prostatic hypertrophy)     Bronchitis, chronic     CHF (congestive heart failure)     Colon polyp     Diabetes " mellitus     GERD (gastroesophageal reflux disease)     Hearing aid worn     bilateral    Hernia     Hypertension     Irregular heart beat     Snoring        Past Surgical History:   Procedure Laterality Date    CARDIAC VALVE SURGERY      CATARACT EXTRACTION, BILATERAL      EYE SURGERY      RETINAL DETACHMENT SURGERY         Review of patient's allergies indicates:   Allergen Reactions    Vancomycin analogues Itching    Ciprofloxacin (mixture) Itching     Extreme itching and redness        No current facility-administered medications on file prior to encounter.      Current Outpatient Prescriptions on File Prior to Encounter   Medication Sig    ascorbic acid, vitamin C, (VITAMIN C) 1000 MG tablet Take 1,000 mg by mouth once daily.     aspirin (ECOTRIN) 81 MG EC tablet Take 1 tablet (81 mg total) by mouth once daily. Take 4 tab tonight then 1 tab daily    clopidogrel (PLAVIX) 75 mg tablet Take 1 tablet (75 mg total) by mouth once daily. Take 4 tab tonight then 1 tab daily    furosemide (LASIX) 20 MG tablet Take 1 tablet (20 mg total) by mouth once daily.    lisinopril 10 MG tablet Take 1 tablet (10 mg total) by mouth once daily.    losartan-hydrochlorothiazide 100-12.5 mg (HYZAAR) 100-12.5 mg Tab Take 1 tablet by mouth once daily.    metformin (GLUCOPHAGE) 500 MG tablet Take 1,000 mg by mouth 2 (two) times daily with meals.     metoprolol succinate (TOPROL-XL) 25 MG 24 hr tablet Take 25 mg by mouth 2 (two) times daily.     GLUCOSAMINE HCL/CHONDR BAUTISTA A NA (OSTEO BI-FLEX ORAL) Take 2 tablets by mouth once daily.     Family History     Problem Relation (Age of Onset)    Arthritis Mother    Diabetes Sister    Heart attack Brother    Heart disease Father    Heart failure Father    No Known Problems Maternal Aunt, Maternal Uncle, Paternal Aunt, Paternal Uncle, Maternal Grandmother, Maternal Grandfather, Paternal Grandmother, Paternal Grandfather        Social History Main Topics    Smoking status:  Former Smoker     Packs/day: 3.00     Years: 40.00     Quit date: 8/2/1990    Smokeless tobacco: Never Used    Alcohol use No    Drug use: No    Sexual activity: Not on file     Review of Systems   R eye blindness  Resolution of L visual deficit  Loose cough since having influenza    Objective:     Vital Signs (Most Recent):  Temp: 97.7 °F (36.5 °C) (02/17/18 1440)  Pulse: 63 (02/17/18 1444)  Resp: 16 (02/17/18 1211)  BP: 135/64 (02/17/18 1440)  SpO2: 98 % (02/17/18 1440) Vital Signs (24h Range):  Temp:  [97.7 °F (36.5 °C)-98.3 °F (36.8 °C)] 97.7 °F (36.5 °C)  Pulse:  [52-84] 63  Resp:  [16-18] 16  SpO2:  [95 %-98 %] 98 %  BP: (121-188)/(64-82) 135/64     Weight: 80.9 kg (178 lb 5.6 oz)  Body mass index is 26.34 kg/m².    Physical Exam   seen in ER room  Wife and son in attendance  Elderly WM, not in acute distress  At first meeting, he appeared a bit confused (I had woken him)  He seemed to have trouble answering some questions and his  history was unclear.  His details got a bit better over time.  He is aware that he is in the hospital ([facility]) and that today's  Date is Feb 17, 2018.  HIs R eye is darkened.  He has a L carotid bruit.  Heart rhythm is irregular, but appears to be repetitive in it's irregularity (as Dr. Toribio noted)  Lungs are coarse - he has occasional loose cough with mucous noise  abd obese, soft, BS+  No ext edema         Significant Labs:    WBC 11.6  hct 30  plt 222  gfr 51  Glu 188  LDL 95  Trop 0.019  bnp 356  Lactate 1.7  tsh 1.4    Significant Imaging:  CT head:  There is slightly age is generalized cerebral volume loss.  There is prominence of the extra-axial space along the left frontal, parietal and temporal convexity which is most likely related to central atrophy rather than a chronic subdural hygroma.  There is no evidence for acute intracranial hemorrhage.  No abnormal sulcal effacement is identified.  No mass effect or midline shift.  Ventricles are normal in size and  configuration without hydrocephalus.  We'll bring him lucencies within the calvarium may be related to postoperative change.  Correlation with patient's surgical history is recommended.  There is near-complete opacification of the right maxillary antrum.  Remaining paranasal sinuses are clear.  Postoperative changes of the right lobe are noted.    CUS:   Mild plaque is seen in the bilateral carotid bulbs with moderate plaque in the right proximal ICA.  Incidentally noted is an irregular heartbeat.   RIGHT:   CCA: 97cm/s   ICA PSV: 123cm/s  ICA EDV: 30cm/s  ECA: 165cm/s  Right ICA/CCA systolic ratio: 1.3  LEFT:  CCA: 96cm/s   ICA PSV: 241cm/s  ICA EDV: 41cm/s  ECA: 139cm/s  Left ICA/CCA systolic ratio: 2.5  Antegrade flow in the bilateral vertebral arteries.    CXR:  Prominent right cardiophrenic angle fat pad, stable.  Chronic lung markings are seen bilaterally.  No consolidation or pleural effusions.  The heart is normal in size.  Prosthetic cardiac valve is noted.  Calcified atheromatous disease affects the aorta.  Large hiatal hernia is present.  The skeletal structures are intact.    Assessment/Plan:     * Amaurosis fugax    Transient in L eye  Associated with palpitations and sensation of vertigo  Felt due to L carotid stenosis  Neuro consulted  Dr. Toribio rec's vasc surg consult          History of influenza    One month prior to admit; was treated with Tamiflu  Since then has loose cough and congestion  CXR shows no acute findings of pneumonia        Palpitations    Dr. Toribio consulted - notes junctional rhythm with fused beats - not yet afib  On tele  To have echo        Blindness of one eye    R eye blindness since 2010 due to retinal detachment          S/P TAVR (transcatheter aortic valve replacement)    October 2017  Followed by Dr. Manzo as OP          Essential hypertension    135/64  Had both losartan hct AND lisinopril on home list  Can only have one; cancel lisinopril        Type 2 diabetes  mellitus with renal complication    Glu 136  At home takes metformin 1000 bid  GFR 51 - will need to watch          VTE Risk Mitigation         Ordered     Low Risk of VTE  Once      02/17/18 4135             Cirilo Montero MD  Department of Hospital Medicine   [facility]

## 2018-02-17 NOTE — NURSING
Transferred to unit via wheelchair from ED. AAOx4.  Denies blurry vision, HA, dizziness, SOB, discomfort or pain. Respirations coarse and coughing yellow sputum frequently. 98% - 02 sat RA. SR on tele. No acute distress noted. Bed locked in lowest position. Daughter and son at bedside.

## 2018-02-17 NOTE — HPI
81 yo WM who was previously seen by Dr. Jacobsen, now me, presents after experiencing L Amaurosis Fugax last night 2/16.  He said it occurred at 10 pm while he was sitting, and lasted 5 minutes.  He had no headache.  He felt his heart palpitating and felt as though something was spinning him around.  He did not pass out.  He did have some mild nausea.  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  CT of head shows chronic ischemic changes and central frontal atrophy.  He has a history of TAVR in Oct 2017 and follows Dr. Manzo (who no longer comes here).   He has a L carotid bruit (and ER US shows >70% stenosis on L, <50% on R)  He has an irregular heart rhythm - Dr. Toribio saw pt and noted junctional rhythm and fused beats, but not full A fib.  He had the flu about a month ago, and was treated with Tamiflu, and since that time has had residual lower respiratory symptoms with cough and congestion.  He had MDI at home for what he says is chronic bronchitis.  He has a loose cough here.  He is being admitted for cardiac and neuro evaluation.

## 2018-02-17 NOTE — CONSULTS
[facility]  Cardiology  Consult Note    Patient Name: Timbo Gallagher  MRN: [id number]  Admission Date: 2/17/2018  Hospital Length of Stay: 0 days  Code Status: Prior   Attending Provider: Angy Lyles MD   Consulting Provider: Ned Parr MD  Primary Care Physician: Enrique Rivera MD  Principal Problem:Amaurosis fugax    Patient information was obtained from patient and ER records.     Inpatient consult to Cardiology  Consult performed by: NED PARR  Consult ordered by: ANGY LYLES  Reason for consult: L ocular TIA        Subjective:     Chief Complaint:  L ocular TIA     HPI:   82 y.o male who has CHF, DM, Right eye blindness, GERD, HTN, Arthritis, BPH, Colon polyp, Irregular heart beat, and Encounter for blood transfusion presents to the ED for an evaluation for an episode of blurred vision with associated vision loss that occurred last night at approximately 10:30 pm.  Patient reports while seated watching television, wearing his eyeglasses, he suddenly began having blurred vision.  He reports a few seconds after onset, his vision went completely black and was not able to see any images or light.  He reports during the episode, he began having the feeling as though someone was hitting him on his hips, legs, abdomen, and chest.  Patient reports the episode lasting 4-5 minutes in duration.  Patient denies any loss of consciousness and reports he was aware of the entire incident.  Patient reports checking his blood pressure shortly after and reports a .  Patient reports last checking his blood glucose on yesterday and reports having normal readings.  Patient denies fever, chills, nausea, emesis, diarrhea, abdominal pain, chest pain, cough, shortness of breath, generalized weakness, rash, or any other associated symptoms.  No prior tx.  No alleviating factors.  No previous episodes.  Patient is compliant with his Plavix and daily ASA  regimen.    Follows with Dr. Manzo.  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  The patient denies any prior history of stroke or TIA, nor any prior history of arrhythmia.  He does have a history of right retinal detachment and is blind in that eye.  He describes a feeling of disequilibrium in association with the several minutes of left eye blindness which is since resolved.  His left cardiac gram in the ER reveals a sinus rhythm with multifocal bigeminal PVCs.  Carotid ultrasonography is notable for significant left ICA stenosis.    Past Medical History:   Diagnosis Date    Arthritis     Blind right eye     BPH (benign prostatic hypertrophy)     Bronchitis, chronic     CHF (congestive heart failure)     Colon polyp     Diabetes mellitus     Encounter for blood transfusion     GERD (gastroesophageal reflux disease)     Hearing aid worn     bilateral    Hernia     Hypertension     Irregular heart beat     Snoring        Past Surgical History:   Procedure Laterality Date    CARDIAC VALVE SURGERY      CATARACT EXTRACTION, BILATERAL      EYE SURGERY      RETINAL DETACHMENT SURGERY         Review of patient's allergies indicates:   Allergen Reactions    Vancomycin analogues Itching    Ciprofloxacin (mixture) Itching     Extreme itching and redness        No current facility-administered medications on file prior to encounter.      Current Outpatient Prescriptions on File Prior to Encounter   Medication Sig    ascorbic acid, vitamin C, (VITAMIN C) 1000 MG tablet Take 1,000 mg by mouth once daily.     aspirin (ECOTRIN) 81 MG EC tablet Take 1 tablet (81 mg total) by mouth once daily. Take 4 tab tonight then 1 tab daily    clopidogrel (PLAVIX) 75 mg tablet Take 1 tablet (75 mg total) by mouth once daily. Take 4 tab tonight then 1 tab daily    furosemide (LASIX) 20 MG tablet Take 1 tablet (20 mg total) by mouth once daily.    losartan-hydrochlorothiazide 100-12.5 mg (HYZAAR) 100-12.5 mg Tab Take 1 tablet by  mouth once daily.    metformin (GLUCOPHAGE) 500 MG tablet Take 1,000 mg by mouth 2 (two) times daily with meals.     metoprolol succinate (TOPROL-XL) 25 MG 24 hr tablet Take 25 mg by mouth 2 (two) times daily.     GLUCOSAMINE HCL/CHONDR BAUTISTA A NA (OSTEO BI-FLEX ORAL) Take 2 tablets by mouth once daily.    lisinopril 10 MG tablet Take 1 tablet (10 mg total) by mouth once daily.     Family History     Problem Relation (Age of Onset)    No Known Problems Mother, Father, Sister, Brother, Maternal Aunt, Maternal Uncle, Paternal Aunt, Paternal Uncle, Maternal Grandmother, Maternal Grandfather, Paternal Grandmother, Paternal Grandfather        Social History Main Topics    Smoking status: Former Smoker     Packs/day: 3.00     Years: 40.00     Quit date: 8/2/1990    Smokeless tobacco: Never Used    Alcohol use No    Drug use: No    Sexual activity: Not on file     Review of Systems   Constitution: Negative for chills, diaphoresis, fever, weakness and malaise/fatigue.   HENT: Negative for nosebleeds.    Eyes: Positive for vision loss in left eye (acute) and vision loss in right eye (chronic). Negative for blurred vision and double vision.   Cardiovascular: Negative for chest pain, claudication, cyanosis, dyspnea on exertion, leg swelling, orthopnea, palpitations, paroxysmal nocturnal dyspnea and syncope.   Respiratory: Negative for cough, shortness of breath and wheezing.    Skin: Negative for dry skin and poor wound healing.   Musculoskeletal: Negative for back pain, joint swelling and myalgias.   Gastrointestinal: Negative for abdominal pain, nausea and vomiting.   Genitourinary: Negative for hematuria.   Neurological: Positive for dizziness (dysequilibrium). Negative for headaches, numbness and seizures.   Psychiatric/Behavioral: Negative for altered mental status and depression.     Objective:     Vital Signs (Most Recent):  Temp: 98 °F (36.7 °C) (02/17/18 0815)  Pulse: 78 (02/17/18 1211)  Resp: 18 (02/17/18  0815)  BP: (!) 166/76 (02/17/18 1211)  SpO2: 95 % (02/17/18 1211) Vital Signs (24h Range):  Temp:  [98 °F (36.7 °C)] 98 °F (36.7 °C)  Pulse:  [63-84] 78  Resp:  [18] 18  SpO2:  [95 %-98 %] 95 %  BP: (121-188)/(66-82) 166/76     Weight: 81.6 kg (180 lb)  Body mass index is 26.58 kg/m².    SpO2: 95 %  O2 Device (Oxygen Therapy): room air    No intake or output data in the 24 hours ending 02/17/18 1334    Lines/Drains/Airways     Peripheral Intravenous Line                 Peripheral IV - Single Lumen 02/17/18 0830 Right Antecubital less than 1 day                Physical Exam   Constitutional: He is oriented to person, place, and time. He appears well-developed and well-nourished.   HENT:   Head: Normocephalic and atraumatic.   Eyes: Conjunctivae and EOM are normal. No scleral icterus.   R eye with chr dilated pupil.   Neck: Normal range of motion. Neck supple. No JVD present. Carotid bruit is not present. No tracheal deviation present. No thyromegaly present.   Cardiovascular: Normal rate, S1 normal and S2 normal.  A regularly irregular rhythm present. Frequent extrasystoles are present. Exam reveals no gallop and no friction rub.    Murmur heard.   Systolic murmur is present with a grade of 2/6   Pulses:       Carotid pulses are 2+ on the right side, and 2+ on the left side with bruit.  Pulmonary/Chest: Effort normal and breath sounds normal. No respiratory distress. He has no wheezes. He has no rales. He exhibits no tenderness.   Abdominal: Soft. Bowel sounds are normal. He exhibits no distension.   Musculoskeletal: He exhibits no edema.   Neurological: He is alert and oriented to person, place, and time. He has normal strength. No cranial nerve deficit (R eye blind).   Skin: Skin is warm and dry. No rash noted.   Psychiatric: He has a normal mood and affect. His behavior is normal.       Current Medications:          Laboratory:  CBC:    Recent Labs  Lab 11/22/17  0950 01/27/18  0736 02/17/18  0831   WHITE BLOOD  CELL COUNT 12.92 H 11.91 11.64   HEMOGLOBIN 12.2 L 12.3 L 12.5 L   HEMATOCRIT 37.5 L 36.9 L 37.8 L   PLATELETS 232 168 222       CHEMISTRIES:    Recent Labs  Lab 10/16/16  1050 12/02/16  0513  10/18/17  0815 11/22/17  0950 01/27/18  0736 02/17/18  0831   GLUCOSE 219 H 215 H  < > 215 H  --  171 H 188 H   SODIUM 141 144  < > 140  --  136 142   POTASSIUM 4.1 4.6  < > 3.8  --  4.2 4.4   BUN BLD 12 16  < > 18  --  36 H 33 H   CREATININE 0.9 1.2  < > 1.2 1.3 1.8 H 1.3   EGFR IF  >60 >60  < > >60.0 58.7 A 40 A 59 A   EGFR IF NON- >60 56 A  < > 56.0 A 50.8 A 34 A 51 A   CALCIUM 9.4 9.4  < > 10.0  --  9.1 9.6   MAGNESIUM 1.8 1.8  --   --   --  1.8  --    < > = values in this interval not displayed.    CARDIAC BIOMARKERS:    Recent Labs  Lab 12/02/16  1154 12/02/16  1735 02/17/18  0831   TROPONIN I 0.340 H 0.288 H 0.019       COAGS:    Recent Labs  Lab 05/08/17  0719 10/16/17  1343 02/17/18  0831   INR 1.0 1.0 1.0       LIPIDS/LFTS:    Recent Labs  Lab 10/18/17  0815 01/27/18  0736 02/17/18  0831   CHOLESTEROL  --   --  147   TRIGLYCERIDES  --   --  72   HDL  --   --  37 L   LDL CHOLESTEROL  --   --  95.6   NON-HDL CHOLESTEROL  --   --  110   AST 15 16 11   ALT 5 L 11 <5 L     Lab Results   Component Value Date    TSH 1.450 02/17/2018         Diagnostic Results:  ECG (personally reviewed tracings):   2/17/18 0828 SR 87, multifocal PVCs    Chest X-Ray (personally reviewed image(s)): 2/17/18 NAD    CT-Head 2/17/18  Age-appropriate generalized cerebral volume loss with mild chronic microvascular ischemic change.  Prominence of the extra-axial space along the left frontal, parietal and temporal convexity is most likely related to central atrophy rather than a chronic subdural hygroma.  No evidence for acute intracranial hemorrhage correlation and further evaluation as warranted.    Echo: 11/22/17 (repeat pending)  EF 45-50%, basal inferolat HK  BioAVR (TAVR), UNA 1.3cm2, peak oz 2.1m/s, mean grad  8mmHg, trivial AI  Mod-sev functional MR with tethered posterior leaflet    Carotid US 2/17/18  #1. Findings consistent with less than 50% stenosis in the Right internal carotid artery .  #2. Findings consistent with greater than 70% stenosis in the Left internal carotid artery based on peak systolic velocity.  #3.  Incidentally noted is an irregular heartbeat.    TAVR 10/17/17  B. 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    No perivalvular leak post procedure per 2D echo.    Post deployment AV mean gradient 2.5 mmHg, Vmax 1.09 m/s.    Cath: 5/8/17  D. Hemodynamic Results  AO: 122/68 (93)  E. Angiographic Results       Patient has a right dominant coronary artery.      - Left Main Coronary Artery:             The LM is normal. There is ANASTASIIA 3 flow.     - Left Anterior Descending Artery:             The LAD has luminal irregularities. There is ANASTASIIA 3 flow.     - Left Circumflex Artery:             The LCX is normal. There is ANASTASIIA 3 flow.     - Right Coronary Artery:             The RCA has luminal irregularities. There is ANASTASIIA 3 flow.     - Radial Artery:             The Radial artery was not studied.     - D1:             The D1 has a 50% stenosis. There is ANASTASIIA 3 flow.     - Posterior Descending Artery:             The mid PDA has a 70% stenosis. There is ANASTASIIA 3 flow.            Assessment and Plan:     * Amaurosis fugax    Sxs c/w L ocular TIA.  Significant L ICA stenosis noted.  Pt already on ASA/Plavix.  Case d/w Dr. Coleman ([facility]) who suggests consulting Dr. Alberto ([facility] on call).  Order placed.  Add statin.  Cont antihtn regimen.  Check echo.  MRI/A as per neuro if needed.        Essential hypertension    Cont med rx.        Type 2 diabetes mellitus with renal complication    Per IM  Add statin        S/P TAVR (transcatheter aortic valve replacement)    Normally functioning per echo 11/2017.        Blindness of one eye    L  ocular TIA, vision restored at present  Chr R retinal detachment            VTE Risk Mitigation     None          Thank you for your consult. I will follow-up with patient. Please contact us if you have any additional questions.    Ned Toribio MD  Cardiology   [facility]

## 2018-02-17 NOTE — ASSESSMENT & PLAN NOTE
Transient in L eye  Associated with palpitations and sensation of vertigo  Felt due to L carotid stenosis  Neuro consulted  Dr. Malu romero's vasc surg consult

## 2018-02-18 PROBLEM — G45.1 TIA INVOLVING CAROTID ARTERY: Status: ACTIVE | Noted: 2018-02-18

## 2018-02-18 PROBLEM — Z01.810 PREOP CARDIOVASCULAR EXAM: Status: ACTIVE | Noted: 2018-02-18

## 2018-02-18 LAB
ANION GAP SERPL CALC-SCNC: 7 MMOL/L
BASOPHILS # BLD AUTO: 0.03 K/UL
BASOPHILS NFR BLD: 0.3 %
BUN SERPL-MCNC: 24 MG/DL
CALCIUM SERPL-MCNC: 9.3 MG/DL
CHLORIDE SERPL-SCNC: 111 MMOL/L
CO2 SERPL-SCNC: 24 MMOL/L
CREAT SERPL-MCNC: 1.1 MG/DL
DIASTOLIC DYSFUNCTION: YES
DIFFERENTIAL METHOD: ABNORMAL
EOSINOPHIL # BLD AUTO: 0.3 K/UL
EOSINOPHIL NFR BLD: 3.3 %
ERYTHROCYTE [DISTWIDTH] IN BLOOD BY AUTOMATED COUNT: 13.1 %
EST. GFR  (AFRICAN AMERICAN): >60 ML/MIN/1.73 M^2
EST. GFR  (NON AFRICAN AMERICAN): >60 ML/MIN/1.73 M^2
ESTIMATED AVG GLUCOSE: 140 MG/DL
ESTIMATED PA SYSTOLIC PRESSURE: 61
GLOBAL PERICARDIAL EFFUSION: ABNORMAL
GLUCOSE SERPL-MCNC: 125 MG/DL
HBA1C MFR BLD HPLC: 6.5 %
HCT VFR BLD AUTO: 37.2 %
HGB BLD-MCNC: 12.1 G/DL
LYMPHOCYTES # BLD AUTO: 1.6 K/UL
LYMPHOCYTES NFR BLD: 16.2 %
MCH RBC QN AUTO: 31.4 PG
MCHC RBC AUTO-ENTMCNC: 32.5 G/DL
MCV RBC AUTO: 97 FL
MITRAL VALVE MOBILITY: NORMAL
MITRAL VALVE REGURGITATION: ABNORMAL
MONOCYTES # BLD AUTO: 0.9 K/UL
MONOCYTES NFR BLD: 9.3 %
NEUTROPHILS # BLD AUTO: 6.9 K/UL
NEUTROPHILS NFR BLD: 70.6 %
PLATELET # BLD AUTO: 214 K/UL
PMV BLD AUTO: 11.4 FL
POCT GLUCOSE: 208 MG/DL (ref 70–110)
POTASSIUM SERPL-SCNC: 4.1 MMOL/L
RBC # BLD AUTO: 3.85 M/UL
RETIRED EF AND QEF - SEE NOTES: 45 (ref 55–65)
SODIUM SERPL-SCNC: 142 MMOL/L
TRICUSPID VALVE REGURGITATION: ABNORMAL
TROPONIN I SERPL DL<=0.01 NG/ML-MCNC: 0.02 NG/ML
WBC # BLD AUTO: 9.71 K/UL

## 2018-02-18 PROCEDURE — 83036 HEMOGLOBIN GLYCOSYLATED A1C: CPT

## 2018-02-18 PROCEDURE — 25000003 PHARM REV CODE 250: Performed by: EMERGENCY MEDICINE

## 2018-02-18 PROCEDURE — 25000003 PHARM REV CODE 250: Performed by: INTERNAL MEDICINE

## 2018-02-18 PROCEDURE — 93306 TTE W/DOPPLER COMPLETE: CPT

## 2018-02-18 PROCEDURE — 93306 TTE W/DOPPLER COMPLETE: CPT | Mod: 26,,, | Performed by: INTERNAL MEDICINE

## 2018-02-18 PROCEDURE — 99223 1ST HOSP IP/OBS HIGH 75: CPT | Mod: ,,, | Performed by: SURGERY

## 2018-02-18 PROCEDURE — 85025 COMPLETE CBC W/AUTO DIFF WBC: CPT

## 2018-02-18 PROCEDURE — 99214 OFFICE O/P EST MOD 30 MIN: CPT | Mod: ,,, | Performed by: INTERNAL MEDICINE

## 2018-02-18 PROCEDURE — 80048 BASIC METABOLIC PNL TOTAL CA: CPT

## 2018-02-18 PROCEDURE — 99214 OFFICE O/P EST MOD 30 MIN: CPT | Mod: ,,, | Performed by: PSYCHIATRY & NEUROLOGY

## 2018-02-18 PROCEDURE — 36415 COLL VENOUS BLD VENIPUNCTURE: CPT

## 2018-02-18 PROCEDURE — 21400001 HC TELEMETRY ROOM

## 2018-02-18 RX ADMIN — HYDROCHLOROTHIAZIDE 12.5 MG: 12.5 TABLET ORAL at 08:02

## 2018-02-18 RX ADMIN — LOSARTAN POTASSIUM 100 MG: 25 TABLET, FILM COATED ORAL at 08:02

## 2018-02-18 RX ADMIN — FUROSEMIDE 20 MG: 20 TABLET ORAL at 08:02

## 2018-02-18 RX ADMIN — METFORMIN HYDROCHLORIDE 1000 MG: 500 TABLET, FILM COATED ORAL at 08:02

## 2018-02-18 RX ADMIN — METFORMIN HYDROCHLORIDE 1000 MG: 500 TABLET, FILM COATED ORAL at 05:02

## 2018-02-18 RX ADMIN — ATORVASTATIN CALCIUM 40 MG: 40 TABLET, FILM COATED ORAL at 08:02

## 2018-02-18 RX ADMIN — OXYCODONE HYDROCHLORIDE AND ACETAMINOPHEN 1000 MG: 500 TABLET ORAL at 08:02

## 2018-02-18 RX ADMIN — METOPROLOL SUCCINATE 25 MG: 25 TABLET, EXTENDED RELEASE ORAL at 08:02

## 2018-02-18 RX ADMIN — CLOPIDOGREL BISULFATE 75 MG: 75 TABLET ORAL at 08:02

## 2018-02-18 RX ADMIN — ASPIRIN 81 MG 81 MG: 81 TABLET ORAL at 08:02

## 2018-02-18 NOTE — PROGRESS NOTES
[facility]  Neurology  Progress Note    Patient Name: Timbo Gallagher  MRN: [id number]  Admission Date: 2/17/2018  Hospital Length of Stay: 0 days  Code Status: Full Code   Attending Provider: Cirilo Montero MD  Primary Care Physician: Enrique Rivera MD   Principal Problem:Amaurosis fugax    Subjective:     Interval History: 81 y/o male with medical Hx as below comes to ED after experiencing loss of vision. Pt states that episode lasted 4-5 minutes. He is already blind on the right eye. Mr. Gallagher reports that he seemed to feel unbalanced as well. No weakness or numbness, speech disturbances. No previous episodes. Denies Hx of stroke.    -2/18/18: No visual or speech disturbances. Denies loss of vision.    Current Neurological Medications:     Current Facility-Administered Medications   Medication Dose Route Frequency Provider Last Rate Last Dose    acetaminophen tablet 650 mg  650 mg Oral Q8H PRN Brigid Lyles MD   650 mg at 02/17/18 1955    ascorbic acid (vitamin C) tablet 1,000 mg  1,000 mg Oral Daily Brigid Lyles MD   1,000 mg at 02/18/18 0811    aspirin chewable tablet 81 mg  81 mg Oral Daily Ned Toribio MD   81 mg at 02/18/18 0811    atorvastatin tablet 40 mg  40 mg Oral QHS Ned Toribio MD   40 mg at 02/17/18 2159    clopidogrel tablet 75 mg  75 mg Oral Daily Ned Toribio MD   75 mg at 02/18/18 0811    furosemide tablet 20 mg  20 mg Oral Daily Brigid Lyles MD   20 mg at 02/18/18 0811    losartan tablet 100 mg  100 mg Oral Daily Brigid Lyles MD   100 mg at 02/18/18 0811    And    hydroCHLOROthiazide tablet 12.5 mg  12.5 mg Oral Daily Brigid Lyles MD   12.5 mg at 02/18/18 0811    influenza (FLUZONE HIGH-DOSE) vaccine 0.5 mL  0.5 mL Intramuscular vaccine x 1 dose Brigid Lyles MD        metFORMIN tablet 1,000 mg  1,000 mg Oral BID  Brigid Lyles MD   1,000 mg at 02/18/18 0811    metoprolol succinate  (TOPROL-XL) 24 hr tablet 25 mg  25 mg Oral BID Brigid Lylse MD   25 mg at 02/18/18 0811    ondansetron disintegrating tablet 8 mg  8 mg Oral Q8H PRN Brigid Lyles MD        pneumoc 13-morro conj-dip cr(PF) 0.5 mL  0.5 mL Intramuscular vaccine x 1 dose Brigid Lyles MD           Review of Systems   Constitutional: Negative for fever.   HENT: Negative for trouble swallowing.    Eyes: Negative for photophobia.   Respiratory: Negative for shortness of breath.    Cardiovascular: Negative for palpitations.   Gastrointestinal: Negative for abdominal pain.   Genitourinary: Negative for dysuria.   Musculoskeletal: Negative for back pain.   Neurological: Negative for facial asymmetry and light-headedness.          Objective:     Vital Signs (Most Recent):  Temp: 98.1 °F (36.7 °C) (02/18/18 1110)  Pulse: 75 (02/18/18 1110)  Resp: 18 (02/18/18 1110)  BP: 137/67 (02/18/18 1110)  SpO2: 96 % (02/18/18 1110) Vital Signs (24h Range):  Temp:  [97.7 °F (36.5 °C)-98.8 °F (37.1 °C)] 98.1 °F (36.7 °C)  Pulse:  [34-78] 75  Resp:  [16-20] 18  SpO2:  [95 %-98 %] 96 %  BP: (129-182)/(64-81) 137/67     Weight: 80.9 kg (178 lb 5.6 oz)  Body mass index is 26.34 kg/m².    Physical Exam  Constitutional: He is oriented to person, place, and time. No distress.   HENT:   Head: Normocephalic.   Eyes: Right eye exhibits no discharge. Left eye exhibits no discharge.   Neck: Normal range of motion.   Cardiovascular: Normal rate and regular rhythm.    Pulmonary/Chest: Effort normal and breath sounds normal.   Abdominal: Bowel sounds are normal.   Musculoskeletal: He exhibits no deformity.   Neurological: He is oriented to person, place, and time. He has normal strength.   Skin: He is not diaphoretic.   Psychiatric: His speech is normal.         NEUROLOGICAL EXAMINATION:      MENTAL STATUS   Oriented to person, place, and time.   Speech: speech is normal   Level of consciousness: alert     CRANIAL NERVES      CN III, IV, VI   Right pupil:  Size: 2 mm. Shape: regular.   Left pupil: Size: 2 mm. Shape: regular.   Nystagmus: none   Ophthalmoparesis: none     CN V   Right facial sensation deficit: none  Left facial sensation deficit: none     CN VII   Right facial weakness: none  Left facial weakness: none     CN XI   Right sternocleidomastoid strength: normal  Left sternocleidomastoid strength: normal  Right trapezius strength: normal  Left trapezius strength: normal     CN XII   Tongue deviation: none     MOTOR EXAM      Strength   Strength 5/5 throughout.        Significant Labs:   CBC:   Recent Labs  Lab 02/17/18  0831 02/18/18  0447   WBC 11.64 9.71   HGB 12.5* 12.1*   HCT 37.8* 37.2*    214     CMP:   Recent Labs  Lab 02/17/18  0831 02/18/18 0447   * 125*    142   K 4.4 4.1    111*   CO2 23 24   BUN 33* 24*   CREATININE 1.3 1.1   CALCIUM 9.6 9.3   PROT 7.3  --    ALBUMIN 3.3*  --    BILITOT 0.3  --    ALKPHOS 90  --    AST 11  --    ALT <5*  --    ANIONGAP 10 7*   EGFRNONAA 51* >60         Assessment and Plan:     81 y/o male with transient monocular visual loss (TMVL)     1. TMVL: this suggests involvement of ophthalmic/central retinal artery that is direct tributary of carotid. Per US pt has a more than 70% stenosis of left ICA.           Temporal arteritis may cause TMVL and pt is the age group but normal CRP, no headaches or jaw claudication should rule it out.            -Vascular surgery consult. CTA of head and neck vs conventional angiogram to confirm US findings.           -For now continue clopidogrel and ASA as well as statin.           -Do not treat BP aggressively as it may cause symptoms.    Active Diagnoses:    Diagnosis Date Noted POA    PRINCIPAL PROBLEM:  Amaurosis fugax [G45.3] 02/17/2018 Yes    Preop cardiovascular exam [Z01.810] 02/18/2018 Not Applicable    Blindness of one eye [H54.40] 02/17/2018 Yes    Palpitations [R00.2] 02/17/2018 Unknown    History of influenza [Z87.09] 02/17/2018 Yes    S/P  TAVR (transcatheter aortic valve replacement) [Z95.2] 10/18/2017 Not Applicable    Essential hypertension [I10] 12/02/2016 Yes    Type 2 diabetes mellitus with renal complication [E11.29] 02/15/2016 Yes      Problems Resolved During this Admission:    Diagnosis Date Noted Date Resolved POA       VTE Risk Mitigation         Ordered     Low Risk of VTE  Once      02/17/18 4663          Hector Henderson MD  Neurology  [facility]

## 2018-02-18 NOTE — SUBJECTIVE & OBJECTIVE
"Interval History:  Doing better    Review of Systems   No further visual disturbances  Says he feels "blah"    Objective:     Vital Signs (Most Recent):  Temp: 98.1 °F (36.7 °C) (02/18/18 1110)  Pulse: 75 (02/18/18 1110)  Resp: 18 (02/18/18 1110)  BP: 137/67 (02/18/18 1110)  SpO2: 96 % (02/18/18 1110) Vital Signs (24h Range):  Temp:  [97.7 °F (36.5 °C)-98.8 °F (37.1 °C)] 98.1 °F (36.7 °C)  Pulse:  [34-76] 75  Resp:  [18-20] 18  SpO2:  [95 %-98 %] 96 %  BP: (129-182)/(64-81) 137/67     Weight: 80.9 kg (178 lb 5.6 oz)  Body mass index is 26.34 kg/m².    Intake/Output Summary (Last 24 hours) at 02/18/18 1350  Last data filed at 02/18/18 0800   Gross per 24 hour   Intake              716 ml   Output                0 ml   Net              716 ml      Physical Exam  Awake, alert, conversant  No distress  Sees with his normal clarity out of L eye  Carotid sounds are diminshed  Heart is irregular - rate 71; PVCs and couplets on monitor  abd benign  No leg edema  "

## 2018-02-18 NOTE — PROGRESS NOTES
[facility]  Cardiology  Progress Note    Patient Name: Timbo Gallagher  MRN: [id number]  Admission Date: 2/17/2018  Hospital Length of Stay: 0 days  Code Status: Full Code   Attending Physician: Cirilo Montero MD   Primary Care Physician: Enrique Rivera MD  Expected Discharge Date:   Principal Problem:Amaurosis fugax    Subjective:     Hospital Course:   2/16/18: admitted with L ocular TIA, note made of severe L ICA stenosis.    Interval Hx: c/o sinus congestion.  No cp/sob.  States he is able to climb a flight of stairs.    Tele: SR, bigeminy/trigeminy, no AF (pers rev)        Review of Systems   Gastrointestinal: Negative for melena.   Genitourinary: Negative for hematuria.     Objective:     Vital Signs (Most Recent):  Temp: 97.8 °F (36.6 °C) (02/18/18 0716)  Pulse: 68 (02/18/18 0716)  Resp: 18 (02/18/18 0716)  BP: 129/74 (02/18/18 0716)  SpO2: 95 % (02/18/18 0716) Vital Signs (24h Range):  Temp:  [97.7 °F (36.5 °C)-98.8 °F (37.1 °C)] 97.8 °F (36.6 °C)  Pulse:  [34-84] 68  Resp:  [16-20] 18  SpO2:  [95 %-98 %] 95 %  BP: (129-182)/(64-81) 129/74     Weight: 80.9 kg (178 lb 5.6 oz)  Body mass index is 26.34 kg/m².     SpO2: 95 %  O2 Device (Oxygen Therapy): room air      Intake/Output Summary (Last 24 hours) at 02/18/18 0906  Last data filed at 02/18/18 0800   Gross per 24 hour   Intake              716 ml   Output                0 ml   Net              716 ml       Lines/Drains/Airways     Peripheral Intravenous Line                 Peripheral IV - Single Lumen 02/17/18 0830 Right Antecubital 1 day                Physical Exam   Constitutional: He is oriented to person, place, and time. He appears well-developed and well-nourished.   HENT:   Head: Normocephalic and atraumatic.   Eyes: Conjunctivae and EOM are normal. No scleral icterus.   R pupil chr dilated   Neck: Normal range of motion. Neck supple. No JVD present. Carotid bruit is not present. No tracheal deviation present. No  thyromegaly present.   Cardiovascular: Normal rate, regular rhythm, S1 normal and S2 normal.   Occasional extrasystoles are present. Exam reveals distant heart sounds. Exam reveals no gallop and no friction rub.    Murmur heard.   Systolic murmur is present with a grade of 2/6   Pulses:       Carotid pulses are 2+ on the right side, and 2+ on the left side with bruit.  Pulmonary/Chest: Effort normal and breath sounds normal. No respiratory distress. He has no wheezes. He has no rales. He exhibits no tenderness.   Abdominal: Soft. Bowel sounds are normal. He exhibits no distension. There is no tenderness.   Musculoskeletal: He exhibits no edema.   Neurological: He is alert and oriented to person, place, and time. He has normal strength. A cranial nerve deficit (R eye blind) is present.   Skin: Skin is warm and dry. No rash noted.   Psychiatric: He has a normal mood and affect. His behavior is normal.       Current Medications:   ascorbic acid (vitamin C)  1,000 mg Oral Daily    aspirin  81 mg Oral Daily    atorvastatin  40 mg Oral QHS    clopidogrel  75 mg Oral Daily    furosemide  20 mg Oral Daily    losartan  100 mg Oral Daily    And    hydroCHLOROthiazide  12.5 mg Oral Daily    metFORMIN  1,000 mg Oral BID WM    metoprolol succinate  25 mg Oral BID       acetaminophen, influenza, ondansetron, pneumoc 13-morro conj-dip cr(PF)    Laboratory:  CBC:    Recent Labs  Lab 01/27/18  0736 02/17/18  0831 02/18/18  0447   WHITE BLOOD CELL COUNT 11.91 11.64 9.71   HEMOGLOBIN 12.3 L 12.5 L 12.1 L   HEMATOCRIT 36.9 L 37.8 L 37.2 L   PLATELETS 168 222 214       CHEMISTRIES:    Recent Labs  Lab 10/16/16  1050 12/02/16  0513  01/27/18  0736 02/17/18  0831 02/18/18  0447   GLUCOSE 219 H 215 H  < > 171 H 188 H 125 H   SODIUM 141 144  < > 136 142 142   POTASSIUM 4.1 4.6  < > 4.2 4.4 4.1   BUN BLD 12 16  < > 36 H 33 H 24 H   CREATININE 0.9 1.2  < > 1.8 H 1.3 1.1   EGFR IF  >60 >60  < > 40 A 59 A >60   EGFR IF  NON- >60 56 A  < > 34 A 51 A >60   CALCIUM 9.4 9.4  < > 9.1 9.6 9.3   MAGNESIUM 1.8 1.8  --  1.8  --   --    < > = values in this interval not displayed.    CARDIAC BIOMARKERS:    Recent Labs  Lab 02/17/18  0831 02/17/18  1723 02/17/18  2345   TROPONIN I 0.019 0.029 H 0.023       COAGS:    Recent Labs  Lab 05/08/17  0719 10/16/17  1343 02/17/18  0831   INR 1.0 1.0 1.0       LIPIDS/LFTS:    Recent Labs  Lab 10/18/17  0815 01/27/18  0736 02/17/18  0831   CHOLESTEROL  --   --  147   TRIGLYCERIDES  --   --  72   HDL  --   --  37 L   LDL CHOLESTEROL  --   --  95.6   NON-HDL CHOLESTEROL  --   --  110   AST 15 16 11   ALT 5 L 11 <5 L     Lab Results   Component Value Date    TSH 1.450 02/17/2018           Diagnostic Results:  ECG (personally reviewed tracings):   2/17/18 0828 SR 87, multifocal PVCs     Chest X-Ray (personally reviewed image(s)): 2/17/18 NAD     CT-Head 2/17/18  Age-appropriate generalized cerebral volume loss with mild chronic microvascular ischemic change.  Prominence of the extra-axial space along the left frontal, parietal and temporal convexity is most likely related to central atrophy rather than a chronic subdural hygroma.  No evidence for acute intracranial hemorrhage correlation and further evaluation as warranted.     Echo: 11/22/17 (repeat pending)  EF 45-50%, basal inferolat HK  BioAVR (TAVR), UNA 1.3cm2, peak oz 2.1m/s, mean grad 8mmHg, trivial AI  Mod-sev functional MR with tethered posterior leaflet     Carotid US 2/17/18  #1. Findings consistent with less than 50% stenosis in the Right internal carotid artery .  #2. Findings consistent with greater than 70% stenosis in the Left internal carotid artery based on peak systolic velocity.  #3.  Incidentally noted is an irregular heartbeat.     TAVR 10/17/17  B. 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    No perivalvular leak post procedure per 2D  echo.    Post deployment AV mean gradient 2.5 mmHg, Vmax 1.09 m/s.     Cath: 5/8/17 (images pers rev)  D. Hemodynamic Results  AO: 122/68 (93)  E. Angiographic Results       Patient has a right dominant coronary artery.      - Left Main Coronary Artery:             The LM is normal. There is ANASTASIIA 3 flow.     - Left Anterior Descending Artery:             The LAD has luminal irregularities. There is ANASTASIIA 3 flow.     - Left Circumflex Artery:             The LCX is normal. There is ANASTASIIA 3 flow.     - Right Coronary Artery:             The RCA has luminal irregularities. There is ANASTASIIA 3 flow.     - Radial Artery:             The Radial artery was not studied.     - D1:             The D1 has a 50% stenosis. There is ANASTASIIA 3 flow.     - Posterior Descending Artery:             The mid PDA has a 70% stenosis. There is ANASTASIIA 3 flow.      Assessment and Plan:     * Amaurosis fugax    Sxs c/w L ocular TIA.  Significant L ICA stenosis noted.  Pt already on ASA/Plavix.  Case d/w Dr. Coleman ([facility]) with plans for possible L CEA tomorrow.  Cont atorva 40mg qhs  Check lipids/LFT 3 months (mid May 2018)  Cont antihtn regimen.  Check echo.  MRI/A as per neuro if needed.        Preop cardiovascular exam    The pt has only mild CAD by cath 5/2017 (PDA stenosis noted without PCI prior to TAVR).  Mild LV dysfxn noted on last echo.  His activity capacity is acceptable.  While his cardiac risk is not low, it is not prohibitive for CEA.  Given recent L ocular TIA in setting of significant L ICA stenosis, would suggest proceeding to OR.  No preop CV testing required.  Case d/w Dr. Coleman.        Essential hypertension    Cont med rx.        Type 2 diabetes mellitus with renal complication    Per IM  Cont statin        S/P TAVR (transcatheter aortic valve replacement)    Normally functioning per echo 11/2017.        Blindness of one eye    L ocular TIA, vision restored at present  Chr R retinal detachment            VTE Risk Mitigation          Ordered     Low Risk of VTE  Once      02/17/18 7737          Ned Toribio MD  Cardiology  [facility]

## 2018-02-18 NOTE — PLAN OF CARE
Problem: Fall Risk (Adult)  Intervention: Monitor/Assist with Self Care   02/18/18 0812 02/18/18 0934 02/18/18 1059   Daily Care Interventions   Self-Care Promotion --  --  independence encouraged;BADL personal objects within reach   Functional Level Current   Ambulation 0 - independent --  --    Transferring 0 - independent --  --    Toileting 0 - independent --  --    Bathing 0 - independent --  --    Dressing 0 - independent --  --    Eating 0 - independent --  --    Communication 0 - understands/communicates without difficulty --  --    Swallowing 0 - swallows foods/liquids without difficulty --  --    Activity   Activity Assistance Provided --  independent --      Intervention: Reduce Risk/Promote Restraint Free Environment   02/18/18 1059   Safety Interventions   Environmental Safety Modification assistive device/personal items within reach;clutter free environment maintained;lighting adjusted;room near unit station;room organization consistent     Intervention: Review Medications/Identify Contributors to Fall Risk   02/18/18 0804   Safety Interventions   Medication Review/Management medications reviewed     Intervention: Patient Rounds   02/18/18 0934   Safety Interventions   Patient Rounds bed in low position;bed wheels locked;call light in reach;clutter free environment maintained;ID band on;placement of personal items at bedside;toileting offered;visualized patient     Intervention: Safety Promotion/Fall Prevention   02/18/18 0934   Safety Interventions   Safety Promotion/Fall Prevention assistive device/personal item within reach;commode/urinal/bedpan at bedside;instructed to call staff for mobility;side rails raised x 2;room near unit station;nonskid shoes/socks when out of bed;medications reviewed;lighting adjusted       Goal: Identify Related Risk Factors and Signs and Symptoms  Related risk factors and signs and symptoms are identified upon initiation of Human Response Clinical Practice Guideline  (CPG)   Outcome: Ongoing (interventions implemented as appropriate)   02/18/18 7325   Fall Risk   Related Risk Factors (Fall Risk) age-related changes;environment unfamiliar   Signs and Symptoms (Fall Risk) presence of risk factors

## 2018-02-18 NOTE — ASSESSMENT & PLAN NOTE
Sxs c/w L ocular TIA.  Significant L ICA stenosis noted.  Pt already on ASA/Plavix.  Case d/w Dr. Coleman ([facility]) with plans for possible L CEA tomorrow.  Cont atorva 40mg qhs  Check lipids/LFT 3 months (mid May 2018)  Cont antihtn regimen.  Check echo.  MRI/A as per neuro if needed.
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
## 2018-02-18 NOTE — ASSESSMENT & PLAN NOTE
129/74  Had both losartan hct AND lisinopril on home list  Can only have one; cancel lisinopril  Dr. Hednerson cautions against going too low

## 2018-02-18 NOTE — ASSESSMENT & PLAN NOTE
-L eye amaurosis fugax consistent with TIA and L ICA stenosis 60-69% on US - recommend L CEA to prevent future stroke. Risks/benefit discussed with patient who states understanding and desire to proceed with surgery.  He will discuss with his family today and will contact me or I can be present to discuss any changes or questions  -Echo reviewed by Cardiology without prohibitive risk for L CEA  -Cont ASA, Plavix and statin  -BP control  -NPO at midnight

## 2018-02-18 NOTE — PLAN OF CARE
Problem: Fall Risk (Adult)  Intervention: Reduce Risk/Promote Restraint Free Environment   02/18/18 0600   Safety Interventions   Safety Precautions emergency equipment at bedside     Intervention: Review Medications/Identify Contributors to Fall Risk   02/18/18 0804   Safety Interventions   Medication Review/Management medications reviewed       Goal: Absence of Falls  Patient will demonstrate the desired outcomes by discharge/transition of care.    02/18/18 0804   Fall Risk (Adult)   Absence of Falls making progress toward outcome       Comments: Skin remains intact. Call light and urinal at bedside.

## 2018-02-18 NOTE — ASSESSMENT & PLAN NOTE
The pt has only mild CAD by cath 5/2017 (PDA stenosis noted without PCI prior to TAVR).  Mild LV dysfxn noted on last echo.  His activity capacity is acceptable.  While his cardiac risk is not low, it is not prohibitive for CEA.  Given recent L ocular TIA in setting of significant L ICA stenosis, would suggest proceeding to OR.  No preop CV testing required.  Case d/w Dr. Coleman.

## 2018-02-18 NOTE — SUBJECTIVE & OBJECTIVE
Prescriptions Prior to Admission   Medication Sig Dispense Refill Last Dose    ascorbic acid, vitamin C, (VITAMIN C) 1000 MG tablet Take 1,000 mg by mouth once daily.    2/16/2018 at Unknown time    aspirin (ECOTRIN) 81 MG EC tablet Take 1 tablet (81 mg total) by mouth once daily. Take 4 tab tonight then 1 tab daily  0 2/16/2018 at Unknown time    clopidogrel (PLAVIX) 75 mg tablet Take 1 tablet (75 mg total) by mouth once daily. Take 4 tab tonight then 1 tab daily 30 tablet 11 2/16/2018 at Unknown time    furosemide (LASIX) 20 MG tablet Take 1 tablet (20 mg total) by mouth once daily. 10 tablet 0 2/16/2018 at Unknown time    lisinopril 10 MG tablet Take 1 tablet (10 mg total) by mouth once daily. 30 tablet 0 2/16/2018 at Unknown time    losartan-hydrochlorothiazide 100-12.5 mg (HYZAAR) 100-12.5 mg Tab Take 1 tablet by mouth once daily.   2/16/2018 at Unknown time    metformin (GLUCOPHAGE) 500 MG tablet Take 1,000 mg by mouth 2 (two) times daily with meals.    2/16/2018 at Unknown time    metoprolol succinate (TOPROL-XL) 25 MG 24 hr tablet Take 25 mg by mouth 2 (two) times daily.    2/16/2018 at Unknown time    GLUCOSAMINE HCL/CHONDR BAUTISTA A NA (OSTEO BI-FLEX ORAL) Take 2 tablets by mouth once daily.   1/26/2018       Review of patient's allergies indicates:   Allergen Reactions    Vancomycin analogues Itching    Ciprofloxacin (mixture) Itching     Extreme itching and redness        Past Medical History:   Diagnosis Date    Arthritis     Blind right eye     BPH (benign prostatic hypertrophy)     Bronchitis, chronic     CHF (congestive heart failure)     Colon polyp     Diabetes mellitus     GERD (gastroesophageal reflux disease)     Hearing aid worn     bilateral    Hernia     Hypertension     Irregular heart beat     Snoring      Past Surgical History:   Procedure Laterality Date    CARDIAC VALVE SURGERY      CATARACT EXTRACTION, BILATERAL      EYE SURGERY      RETINAL DETACHMENT SURGERY        Family History     Problem Relation (Age of Onset)    Arthritis Mother    Diabetes Sister    Heart attack Brother    Heart disease Father    Heart failure Father    No Known Problems Maternal Aunt, Maternal Uncle, Paternal Aunt, Paternal Uncle, Maternal Grandmother, Maternal Grandfather, Paternal Grandmother, Paternal Grandfather        Social History Main Topics    Smoking status: Former Smoker     Packs/day: 3.00     Years: 40.00     Quit date: 8/2/1990    Smokeless tobacco: Never Used    Alcohol use No    Drug use: No    Sexual activity: Not on file     Review of Systems   Constitutional: Negative for chills and fever.   HENT: Negative for congestion.    Eyes: Negative for discharge.   Respiratory: Negative for shortness of breath and wheezing.    Cardiovascular: Negative for chest pain and leg swelling.   Gastrointestinal: Negative for abdominal distention, abdominal pain, diarrhea, nausea and vomiting.   Endocrine: Negative for polyuria.   Genitourinary: Negative for dysuria.   Musculoskeletal: Negative for back pain.   Allergic/Immunologic: Negative for immunocompromised state.   Neurological: Negative for dizziness, syncope, weakness and headaches.   Hematological: Does not bruise/bleed easily.   Psychiatric/Behavioral: Negative for agitation.     Objective:     Vital Signs (Most Recent):  Temp: 98.1 °F (36.7 °C) (02/18/18 1110)  Pulse: 75 (02/18/18 1110)  Resp: 18 (02/18/18 1110)  BP: 137/67 (02/18/18 1110)  SpO2: 96 % (02/18/18 1110) Vital Signs (24h Range):  Temp:  [97.7 °F (36.5 °C)-98.8 °F (37.1 °C)] 98.1 °F (36.7 °C)  Pulse:  [34-76] 75  Resp:  [18-20] 18  SpO2:  [95 %-98 %] 96 %  BP: (129-182)/(64-81) 137/67     Weight: 80.9 kg (178 lb 5.6 oz)  Body mass index is 26.34 kg/m².    Physical Exam   Constitutional: He is oriented to person, place, and time. He appears well-developed. No distress.   HENT:   Head: Normocephalic and atraumatic.   Eyes: Conjunctivae are normal.   Neck: Neck supple.    Cardiovascular: Normal rate.    Pulmonary/Chest: Effort normal. No respiratory distress. He has no wheezes.   Abdominal: Soft. He exhibits no distension and no mass. There is no tenderness. There is no rebound and no guarding. No hernia.   Musculoskeletal: Normal range of motion. He exhibits no edema or tenderness.   Neurological: He is alert and oriented to person, place, and time. No cranial nerve deficit or sensory deficit.   Tongue midline, EOMI, face symmetric and sensation intact bilaterally to touch   Skin: Skin is warm. Capillary refill takes less than 2 seconds. No rash noted.   Vitals reviewed.      Significant Labs:  All pertinent labs from the last 24 hours have been reviewed.    Significant Diagnostics:  I have reviewed and interpreted all pertinent imaging results/findings within the past 24 hours.

## 2018-02-18 NOTE — PROGRESS NOTES
Reported 4 beats of V Tach and frequent PVCs to Dr. Montero. Explained that pt is not in any distress, denies  CP, dizziness or nausea. Reported vital signs. Gave order to call Dr. Toribio if > 10 beats of V-tach. Order placed in chart immediately.

## 2018-02-18 NOTE — NURSING
Bedside report given to EMILIANO Booth. Resting comfortably in bed. No acute distress. Made no request. Safety measures maintained. Chart check completed.

## 2018-02-18 NOTE — SUBJECTIVE & OBJECTIVE
Review of Systems   Gastrointestinal: Negative for melena.   Genitourinary: Negative for hematuria.     Objective:     Vital Signs (Most Recent):  Temp: 97.8 °F (36.6 °C) (02/18/18 0716)  Pulse: 68 (02/18/18 0716)  Resp: 18 (02/18/18 0716)  BP: 129/74 (02/18/18 0716)  SpO2: 95 % (02/18/18 0716) Vital Signs (24h Range):  Temp:  [97.7 °F (36.5 °C)-98.8 °F (37.1 °C)] 97.8 °F (36.6 °C)  Pulse:  [34-84] 68  Resp:  [16-20] 18  SpO2:  [95 %-98 %] 95 %  BP: (129-182)/(64-81) 129/74     Weight: 80.9 kg (178 lb 5.6 oz)  Body mass index is 26.34 kg/m².     SpO2: 95 %  O2 Device (Oxygen Therapy): room air      Intake/Output Summary (Last 24 hours) at 02/18/18 0906  Last data filed at 02/18/18 0800   Gross per 24 hour   Intake              716 ml   Output                0 ml   Net              716 ml       Lines/Drains/Airways     Peripheral Intravenous Line                 Peripheral IV - Single Lumen 02/17/18 0830 Right Antecubital 1 day                Physical Exam   Constitutional: He is oriented to person, place, and time. He appears well-developed and well-nourished.   HENT:   Head: Normocephalic and atraumatic.   Eyes: Conjunctivae and EOM are normal. No scleral icterus.   R pupil chr dilated   Neck: Normal range of motion. Neck supple. No JVD present. Carotid bruit is not present. No tracheal deviation present. No thyromegaly present.   Cardiovascular: Normal rate, regular rhythm, S1 normal and S2 normal.   Occasional extrasystoles are present. Exam reveals distant heart sounds. Exam reveals no gallop and no friction rub.    Murmur heard.   Systolic murmur is present with a grade of 2/6   Pulses:       Carotid pulses are 2+ on the right side, and 2+ on the left side with bruit.  Pulmonary/Chest: Effort normal and breath sounds normal. No respiratory distress. He has no wheezes. He has no rales. He exhibits no tenderness.   Abdominal: Soft. Bowel sounds are normal. He exhibits no distension. There is no tenderness.    Musculoskeletal: He exhibits no edema.   Neurological: He is alert and oriented to person, place, and time. He has normal strength. A cranial nerve deficit (R eye blind) is present.   Skin: Skin is warm and dry. No rash noted.   Psychiatric: He has a normal mood and affect. His behavior is normal.       Current Medications:   ascorbic acid (vitamin C)  1,000 mg Oral Daily    aspirin  81 mg Oral Daily    atorvastatin  40 mg Oral QHS    clopidogrel  75 mg Oral Daily    furosemide  20 mg Oral Daily    losartan  100 mg Oral Daily    And    hydroCHLOROthiazide  12.5 mg Oral Daily    metFORMIN  1,000 mg Oral BID WM    metoprolol succinate  25 mg Oral BID       acetaminophen, influenza, ondansetron, pneumoc 13-morro conj-dip cr(PF)    Laboratory:  CBC:    Recent Labs  Lab 01/27/18  0736 02/17/18  0831 02/18/18  0447   WHITE BLOOD CELL COUNT 11.91 11.64 9.71   HEMOGLOBIN 12.3 L 12.5 L 12.1 L   HEMATOCRIT 36.9 L 37.8 L 37.2 L   PLATELETS 168 222 214       CHEMISTRIES:    Recent Labs  Lab 10/16/16  1050 12/02/16  0513  01/27/18  0736 02/17/18  0831 02/18/18  0447   GLUCOSE 219 H 215 H  < > 171 H 188 H 125 H   SODIUM 141 144  < > 136 142 142   POTASSIUM 4.1 4.6  < > 4.2 4.4 4.1   BUN BLD 12 16  < > 36 H 33 H 24 H   CREATININE 0.9 1.2  < > 1.8 H 1.3 1.1   EGFR IF  >60 >60  < > 40 A 59 A >60   EGFR IF NON- >60 56 A  < > 34 A 51 A >60   CALCIUM 9.4 9.4  < > 9.1 9.6 9.3   MAGNESIUM 1.8 1.8  --  1.8  --   --    < > = values in this interval not displayed.    CARDIAC BIOMARKERS:    Recent Labs  Lab 02/17/18  0831 02/17/18  1723 02/17/18  2345   TROPONIN I 0.019 0.029 H 0.023       COAGS:    Recent Labs  Lab 05/08/17  0719 10/16/17  1343 02/17/18  0831   INR 1.0 1.0 1.0       LIPIDS/LFTS:    Recent Labs  Lab 10/18/17  0815 01/27/18  0736 02/17/18  0831   CHOLESTEROL  --   --  147   TRIGLYCERIDES  --   --  72   HDL  --   --  37 L   LDL CHOLESTEROL  --   --  95.6   NON-HDL CHOLESTEROL  --   --   110   AST 15 16 11   ALT 5 L 11 <5 L     Lab Results   Component Value Date    TSH 1.450 02/17/2018           Diagnostic Results:  ECG (personally reviewed tracings):   2/17/18 0828 SR 87, multifocal PVCs     Chest X-Ray (personally reviewed image(s)): 2/17/18 NAD     CT-Head 2/17/18  Age-appropriate generalized cerebral volume loss with mild chronic microvascular ischemic change.  Prominence of the extra-axial space along the left frontal, parietal and temporal convexity is most likely related to central atrophy rather than a chronic subdural hygroma.  No evidence for acute intracranial hemorrhage correlation and further evaluation as warranted.     Echo: 11/22/17 (repeat pending)  EF 45-50%, basal inferolat HK  BioAVR (TAVR), UNA 1.3cm2, peak oz 2.1m/s, mean grad 8mmHg, trivial AI  Mod-sev functional MR with tethered posterior leaflet     Carotid US 2/17/18  #1. Findings consistent with less than 50% stenosis in the Right internal carotid artery .  #2. Findings consistent with greater than 70% stenosis in the Left internal carotid artery based on peak systolic velocity.  #3.  Incidentally noted is an irregular heartbeat.     TAVR 10/17/17  B. 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    No perivalvular leak post procedure per 2D echo.    Post deployment AV mean gradient 2.5 mmHg, Vmax 1.09 m/s.     Cath: 5/8/17 (images pers rev)  D. Hemodynamic Results  AO: 122/68 (93)  E. Angiographic Results       Patient has a right dominant coronary artery.      - Left Main Coronary Artery:             The LM is normal. There is ANASTASIIA 3 flow.     - Left Anterior Descending Artery:             The LAD has luminal irregularities. There is ANASTASIIA 3 flow.     - Left Circumflex Artery:             The LCX is normal. There is ANASTASIIA 3 flow.     - Right Coronary Artery:             The RCA has luminal irregularities. There is ANASTASIIA 3 flow.     - Radial  Artery:             The Radial artery was not studied.     - D1:             The D1 has a 50% stenosis. There is ANASTASIIA 3 flow.     - Posterior Descending Artery:             The mid PDA has a 70% stenosis. There is ANASTASIIA 3 flow.

## 2018-02-18 NOTE — PROGRESS NOTES
"[facility]  Progress Note    Patient Name: Timbo Gallagher  MRN: [id number]  Patient Class: IP- Inpatient   Admission Date: 2/17/2018  Length of Stay: 0 days  Attending Physician: Cirilo Montero MD  Primary Care Provider: Enrique Rivera MD        Subjective:     Principal Problem:Amaurosis fugax    HPI:  81 yo WM who was previously seen by Dr. Jacobsen, now me, presents after experiencing L Amaurosis Fugax last night 2/16.  He said it occurred at 10 pm while he was sitting, and lasted 5 minutes.  He had no headache.  He felt his heart palpitating and felt as though something was spinning him around.  He did not pass out.  He did have some mild nausea.  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  CT of head shows chronic ischemic changes and central frontal atrophy.  He has a history of TAVR in Oct 2017 and follows Dr. Manzo (who no longer comes here).   He has a L carotid bruit (and ER US shows >70% stenosis on L, <50% on R)  He has an irregular heart rhythm - Dr. Toribio saw pt and noted junctional rhythm and fused beats, but not full A fib.  He had the flu about a month ago, and was treated with Tamiflu, and since that time has had residual lower respiratory symptoms with cough and congestion.  He had MDI at home for what he says is chronic bronchitis.  He has a loose cough here.  He is being admitted for cardiac and neuro evaluation.    Hospital Course:  Pt has been seen by cardiology and neuro  Dr. Coleman has seen pt for vasc surgery and is planning to take him for L CEA tomorrow    Interval History:  Doing better    Review of Systems   No further visual disturbances  Says he feels "blah"    Objective:     Vital Signs (Most Recent):  Temp: 98.1 °F (36.7 °C) (02/18/18 1110)  Pulse: 75 (02/18/18 1110)  Resp: 18 (02/18/18 1110)  BP: 137/67 " (02/18/18 1110)  SpO2: 96 % (02/18/18 1110) Vital Signs (24h Range):  Temp:  [97.7 °F (36.5 °C)-98.8 °F (37.1 °C)] 98.1 °F (36.7 °C)  Pulse:  [34-76] 75  Resp:  [18-20] 18  SpO2:  [95 %-98 %] 96 %  BP: (129-182)/(64-81) 137/67     Weight: 80.9 kg (178 lb 5.6 oz)  Body mass index is 26.34 kg/m².    Intake/Output Summary (Last 24 hours) at 02/18/18 1350  Last data filed at 02/18/18 0800   Gross per 24 hour   Intake              716 ml   Output                0 ml   Net              716 ml      Physical Exam  Awake, alert, conversant  No distress  Sees with his normal clarity out of L eye  Carotid sounds are diminshed  Heart is irregular - rate 71; PVCs and couplets on monitor  abd benign  No leg edema    Assessment/Plan:      * Amaurosis fugax    Transient in L eye  Associated with palpitations and sensation of vertigo  Felt due to L carotid stenosis  Dr. Malu romero's vasc surg consult  Also appreciate Dr. Henderson's neuro recs:  Temporal arteritis may cause TMVL but normal CRP, no headaches or jaw claudiation. Will obtain ESR.           -Vascular surgery consult. CTA of head and neck vs conventional angiogram to confirm US findings.           -For now continue clopidogrel and ASA as well as statin.           -Do not treat BP aggressively as it may cause symptoms          TIA involving carotid artery    CT head:  There is slightly age is generalized cerebral volume loss.  There is prominence of the extra-axial space along the left frontal, parietal and temporal convexity which is most likely related to central atrophy rather than a chronic subdural hygroma.  There is no evidence for acute intracranial hemorrhage.  No abnormal sulcal effacement is identified.  No mass effect or midline shift.  Ventricles are normal in size and configuration without hydrocephalus.  (We'll bring him) - error;  lucencies within the calvarium may be related to postoperative change.  Correlation with patient's surgical history is  recommended.  There is near-complete opacification of the right maxillary antrum.  Remaining paranasal sinuses are clear.  Postoperative changes of the right lobe are noted.  To have L CEA tomorrow          Preop cardiovascular exam              History of influenza    One month prior to admit; was treated with Tamiflu  Since then has loose cough and congestion  CXR shows no acute findings of pneumonia  Afebrile, WBC 9K        Palpitations    Dr. Toribio consulted - notes junctional rhythm with fused beats - not yet afib  On tele  To have echo        Blindness of one eye    R eye blindness since 2010 due to retinal detachment          S/P TAVR (transcatheter aortic valve replacement)    October 2017  Followed by Dr. Manzo as OP  No current issues; Dr. Toribio feels this is unrelated to any current symptoms        Essential hypertension    129/74  Had both losartan hct AND lisinopril on home list  Can only have one; cancel lisinopril  Dr. Henderson cautions against going too low        Type 2 diabetes mellitus with renal complication    Glu 125  At home takes metformin 1000 bid  GFR 51 - will need to watch  (today back >60)          VTE Risk Mitigation         Ordered     Low Risk of VTE  Once      02/17/18 1435              Cirilo Montero MD  Department of Hospital Medicine   [facility]

## 2018-02-18 NOTE — ASSESSMENT & PLAN NOTE
CT head:  There is slightly age is generalized cerebral volume loss.  There is prominence of the extra-axial space along the left frontal, parietal and temporal convexity which is most likely related to central atrophy rather than a chronic subdural hygroma.  There is no evidence for acute intracranial hemorrhage.  No abnormal sulcal effacement is identified.  No mass effect or midline shift.  Ventricles are normal in size and configuration without hydrocephalus.  (We'll bring him) - error;  lucencies within the calvarium may be related to postoperative change.  Correlation with patient's surgical history is recommended.  There is near-complete opacification of the right maxillary antrum.  Remaining paranasal sinuses are clear.  Postoperative changes of the right lobe are noted.  To have L CEA tomorrow

## 2018-02-18 NOTE — ASSESSMENT & PLAN NOTE
One month prior to admit; was treated with Tamiflu  Since then has loose cough and congestion  CXR shows no acute findings of pneumonia  Afebrile, WBC 9K

## 2018-02-18 NOTE — NURSING
"Bedside report by Emmy. Patient is resting comfortably in bed with no acute signs of distress. Respirations are even and unlabored. Consistently denying pain or discomfort. Has no request. Bed locked in the lowest position with the call bell in reach. Sr x 2 up and urinal at bedside.     @ 1147 - recieved call from mary in care management stating that pt must be changed from observation to inpatient if he will be staying longer than 24 hrs. Messaged relayed to [place]. Stated that mary "can do whatever she wants".   "

## 2018-02-18 NOTE — ASSESSMENT & PLAN NOTE
October 2017  Followed by Dr. Manzo as OP  No current issues; Dr. Toribio feels this is unrelated to any current symptoms
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
## 2018-02-18 NOTE — HOSPITAL COURSE
2/16/18: admitted with L ocular TIA, note made of severe L ICA stenosis.  2/19/18: L CEA, creat up to 1.9 post-op  2/20/18: creat up to 2.2 but making quite a bit of urine off diuretics.  2/21/18: transferred out of ICU    Interval Hx: No cp/sob.  L eye vision preserved. Wants to go home.

## 2018-02-19 ENCOUNTER — ANESTHESIA (OUTPATIENT)
Dept: SURGERY | Facility: HOSPITAL | Age: 83
DRG: 037 | End: 2018-02-19
Payer: MEDICARE

## 2018-02-19 ENCOUNTER — ANESTHESIA EVENT (OUTPATIENT)
Dept: SURGERY | Facility: HOSPITAL | Age: 83
DRG: 037 | End: 2018-02-19
Payer: MEDICARE

## 2018-02-19 PROBLEM — R00.2 PALPITATIONS: Status: RESOLVED | Noted: 2018-02-17 | Resolved: 2018-02-19

## 2018-02-19 PROBLEM — Z87.09 HISTORY OF INFLUENZA: Status: RESOLVED | Noted: 2018-02-17 | Resolved: 2018-02-19

## 2018-02-19 PROBLEM — I65.22 CAROTID STENOSIS, LEFT: Status: ACTIVE | Noted: 2018-02-19

## 2018-02-19 PROBLEM — Z01.810 PREOP CARDIOVASCULAR EXAM: Status: RESOLVED | Noted: 2018-02-18 | Resolved: 2018-02-19

## 2018-02-19 LAB
ABO GROUP BLD: NORMAL
ALLENS TEST: ABNORMAL
ANION GAP SERPL CALC-SCNC: 10 MMOL/L
ANION GAP SERPL CALC-SCNC: 8 MMOL/L
APTT BLDCRRT: 23.8 SEC
BASOPHILS # BLD AUTO: 0.02 K/UL
BASOPHILS # BLD AUTO: 0.03 K/UL
BASOPHILS NFR BLD: 0.2 %
BASOPHILS NFR BLD: 0.2 %
BLD GP AB SCN CELLS X3 SERPL QL: NORMAL
BUN SERPL-MCNC: 21 MG/DL
BUN SERPL-MCNC: 23 MG/DL
CALCIUM SERPL-MCNC: 8.7 MG/DL
CALCIUM SERPL-MCNC: 9.5 MG/DL
CHLORIDE SERPL-SCNC: 109 MMOL/L
CHLORIDE SERPL-SCNC: 109 MMOL/L
CO2 SERPL-SCNC: 22 MMOL/L
CO2 SERPL-SCNC: 24 MMOL/L
CREAT SERPL-MCNC: 1.1 MG/DL
CREAT SERPL-MCNC: 1.3 MG/DL
DELSYS: ABNORMAL
DIFFERENTIAL METHOD: ABNORMAL
DIFFERENTIAL METHOD: ABNORMAL
EOSINOPHIL # BLD AUTO: 0.1 K/UL
EOSINOPHIL # BLD AUTO: 0.3 K/UL
EOSINOPHIL NFR BLD: 0.3 %
EOSINOPHIL NFR BLD: 3 %
EP: 5
ERYTHROCYTE [DISTWIDTH] IN BLOOD BY AUTOMATED COUNT: 13.1 %
ERYTHROCYTE [DISTWIDTH] IN BLOOD BY AUTOMATED COUNT: 13.3 %
ERYTHROCYTE [SEDIMENTATION RATE] IN BLOOD BY WESTERGREN METHOD: 20 MM/H
EST. GFR  (AFRICAN AMERICAN): 59 ML/MIN/1.73 M^2
EST. GFR  (AFRICAN AMERICAN): >60 ML/MIN/1.73 M^2
EST. GFR  (NON AFRICAN AMERICAN): 51 ML/MIN/1.73 M^2
EST. GFR  (NON AFRICAN AMERICAN): >60 ML/MIN/1.73 M^2
FIO2: 100
FIO2: 100
FLOW: 3
GLUCOSE SERPL-MCNC: 132 MG/DL
GLUCOSE SERPL-MCNC: 216 MG/DL
HCO3 UR-SCNC: 22.1 MMOL/L (ref 24–28)
HCO3 UR-SCNC: 25.3 MMOL/L (ref 24–28)
HCO3 UR-SCNC: 26 MMOL/L (ref 24–28)
HCT VFR BLD AUTO: 36.8 %
HCT VFR BLD AUTO: 38.1 %
HGB BLD-MCNC: 11.9 G/DL
HGB BLD-MCNC: 12.7 G/DL
INR PPP: 1.1
IP: 15
LYMPHOCYTES # BLD AUTO: 0.7 K/UL
LYMPHOCYTES # BLD AUTO: 1.4 K/UL
LYMPHOCYTES NFR BLD: 13.4 %
LYMPHOCYTES NFR BLD: 3.8 %
MAGNESIUM SERPL-MCNC: 1 MG/DL
MCH RBC QN AUTO: 31.1 PG
MCH RBC QN AUTO: 31.6 PG
MCHC RBC AUTO-ENTMCNC: 32.3 G/DL
MCHC RBC AUTO-ENTMCNC: 33.3 G/DL
MCV RBC AUTO: 95 FL
MCV RBC AUTO: 96 FL
MODE: ABNORMAL
MONOCYTES # BLD AUTO: 0.9 K/UL
MONOCYTES # BLD AUTO: 0.9 K/UL
MONOCYTES NFR BLD: 4.9 %
MONOCYTES NFR BLD: 8.6 %
NEUTROPHILS # BLD AUTO: 16 K/UL
NEUTROPHILS # BLD AUTO: 7.6 K/UL
NEUTROPHILS NFR BLD: 74.8 %
NEUTROPHILS NFR BLD: 90.3 %
PCO2 BLDA: 33.7 MMHG (ref 35–45)
PCO2 BLDA: 49.4 MMHG (ref 35–45)
PCO2 BLDA: 76.6 MMHG (ref 35–45)
PH SMN: 7.13 [PH] (ref 7.35–7.45)
PH SMN: 7.33 [PH] (ref 7.35–7.45)
PH SMN: 7.42 [PH] (ref 7.35–7.45)
PLATELET # BLD AUTO: 214 K/UL
PLATELET # BLD AUTO: 215 K/UL
PMV BLD AUTO: 11.2 FL
PMV BLD AUTO: 11.3 FL
PO2 BLDA: 244 MMHG (ref 80–100)
PO2 BLDA: 62 MMHG (ref 80–100)
PO2 BLDA: 72 MMHG (ref 80–100)
POC ACTIVATED CLOTTING TIME K: 103 SEC (ref 74–137)
POC ACTIVATED CLOTTING TIME K: 263 SEC (ref 74–137)
POC BE: -2 MMOL/L
POC BE: -5 MMOL/L
POC BE: 0 MMOL/L
POC SATURATED O2: 100 % (ref 95–100)
POC SATURATED O2: 81 % (ref 95–100)
POC SATURATED O2: 95 % (ref 95–100)
POC TCO2: 23 MMOL/L (ref 23–27)
POC TCO2: 28 MMOL/L (ref 23–27)
POC TCO2: 28 MMOL/L (ref 23–27)
POCT GLUCOSE: 169 MG/DL (ref 70–110)
POCT GLUCOSE: 185 MG/DL (ref 70–110)
POCT GLUCOSE: 196 MG/DL (ref 70–110)
POTASSIUM SERPL-SCNC: 3.9 MMOL/L
POTASSIUM SERPL-SCNC: 4 MMOL/L
PROTHROMBIN TIME: 11.7 SEC
RBC # BLD AUTO: 3.83 M/UL
RBC # BLD AUTO: 4.02 M/UL
RH BLD: NORMAL
SAMPLE: ABNORMAL
SAMPLE: NORMAL
SITE: ABNORMAL
SODIUM SERPL-SCNC: 141 MMOL/L
SODIUM SERPL-SCNC: 141 MMOL/L
SP02: 92
SP02: 98
WBC # BLD AUTO: 10.15 K/UL
WBC # BLD AUTO: 17.71 K/UL

## 2018-02-19 PROCEDURE — 94660 CPAP INITIATION&MGMT: CPT

## 2018-02-19 PROCEDURE — 25000003 PHARM REV CODE 250: Performed by: ANESTHESIOLOGY

## 2018-02-19 PROCEDURE — 80048 BASIC METABOLIC PNL TOTAL CA: CPT | Mod: 91

## 2018-02-19 PROCEDURE — 85610 PROTHROMBIN TIME: CPT | Mod: 91

## 2018-02-19 PROCEDURE — 27200676 HC TRANSDUCER MONITOR KIT DOUBLE: Performed by: NURSE ANESTHETIST, CERTIFIED REGISTERED

## 2018-02-19 PROCEDURE — 99232 SBSQ HOSP IP/OBS MODERATE 35: CPT | Mod: ,,, | Performed by: PSYCHIATRY & NEUROLOGY

## 2018-02-19 PROCEDURE — 85610 PROTHROMBIN TIME: CPT

## 2018-02-19 PROCEDURE — 03BL0ZZ EXCISION OF LEFT INTERNAL CAROTID ARTERY, OPEN APPROACH: ICD-10-PCS | Performed by: SURGERY

## 2018-02-19 PROCEDURE — 03CJ0ZZ EXTIRPATION OF MATTER FROM LEFT COMMON CAROTID ARTERY, OPEN APPROACH: ICD-10-PCS | Performed by: SURGERY

## 2018-02-19 PROCEDURE — 99900035 HC TECH TIME PER 15 MIN (STAT)

## 2018-02-19 PROCEDURE — 35301 RECHANNELING OF ARTERY: CPT | Mod: 62,22,LT, | Performed by: SURGERY

## 2018-02-19 PROCEDURE — 36000706: Performed by: SURGERY

## 2018-02-19 PROCEDURE — 84100 ASSAY OF PHOSPHORUS: CPT

## 2018-02-19 PROCEDURE — 99233 SBSQ HOSP IP/OBS HIGH 50: CPT | Mod: 57,,, | Performed by: SURGERY

## 2018-02-19 PROCEDURE — 88304 TISSUE EXAM BY PATHOLOGIST: CPT | Performed by: PATHOLOGY

## 2018-02-19 PROCEDURE — 71000033 HC RECOVERY, INTIAL HOUR: Performed by: SURGERY

## 2018-02-19 PROCEDURE — 82803 BLOOD GASES ANY COMBINATION: CPT

## 2018-02-19 PROCEDURE — 37000009 HC ANESTHESIA EA ADD 15 MINS: Performed by: SURGERY

## 2018-02-19 PROCEDURE — 86920 COMPATIBILITY TEST SPIN: CPT

## 2018-02-19 PROCEDURE — 36000707: Performed by: SURGERY

## 2018-02-19 PROCEDURE — 20000000 HC ICU ROOM

## 2018-02-19 PROCEDURE — 37799 UNLISTED PX VASCULAR SURGERY: CPT

## 2018-02-19 PROCEDURE — 86901 BLOOD TYPING SEROLOGIC RH(D): CPT

## 2018-02-19 PROCEDURE — D9220A PRA ANESTHESIA: Mod: CRNA,,, | Performed by: NURSE ANESTHETIST, CERTIFIED REGISTERED

## 2018-02-19 PROCEDURE — 88304 TISSUE EXAM BY PATHOLOGIST: CPT | Mod: 26,,, | Performed by: PATHOLOGY

## 2018-02-19 PROCEDURE — 37000008 HC ANESTHESIA 1ST 15 MINUTES: Performed by: SURGERY

## 2018-02-19 PROCEDURE — 85025 COMPLETE CBC W/AUTO DIFF WBC: CPT | Mod: 91

## 2018-02-19 PROCEDURE — 27000190 HC CPAP FULL FACE MASK W/VALVE

## 2018-02-19 PROCEDURE — 63600175 PHARM REV CODE 636 W HCPCS: Performed by: ANESTHESIOLOGY

## 2018-02-19 PROCEDURE — 83735 ASSAY OF MAGNESIUM: CPT

## 2018-02-19 PROCEDURE — 94761 N-INVAS EAR/PLS OXIMETRY MLT: CPT

## 2018-02-19 PROCEDURE — 86850 RBC ANTIBODY SCREEN: CPT

## 2018-02-19 PROCEDURE — 36415 COLL VENOUS BLD VENIPUNCTURE: CPT

## 2018-02-19 PROCEDURE — 85730 THROMBOPLASTIN TIME PARTIAL: CPT

## 2018-02-19 PROCEDURE — 63600175 PHARM REV CODE 636 W HCPCS: Performed by: STUDENT IN AN ORGANIZED HEALTH CARE EDUCATION/TRAINING PROGRAM

## 2018-02-19 PROCEDURE — 25000003 PHARM REV CODE 250: Performed by: SURGERY

## 2018-02-19 PROCEDURE — 71000039 HC RECOVERY, EACH ADD'L HOUR: Performed by: SURGERY

## 2018-02-19 PROCEDURE — 03CL0ZZ EXTIRPATION OF MATTER FROM LEFT INTERNAL CAROTID ARTERY, OPEN APPROACH: ICD-10-PCS | Performed by: SURGERY

## 2018-02-19 PROCEDURE — 85730 THROMBOPLASTIN TIME PARTIAL: CPT | Mod: 91

## 2018-02-19 PROCEDURE — 03CN0ZZ EXTIRPATION OF MATTER FROM LEFT EXTERNAL CAROTID ARTERY, OPEN APPROACH: ICD-10-PCS | Performed by: SURGERY

## 2018-02-19 PROCEDURE — 25000003 PHARM REV CODE 250: Performed by: INTERNAL MEDICINE

## 2018-02-19 PROCEDURE — 99233 SBSQ HOSP IP/OBS HIGH 50: CPT | Mod: ,,, | Performed by: INTERNAL MEDICINE

## 2018-02-19 PROCEDURE — 83605 ASSAY OF LACTIC ACID: CPT

## 2018-02-19 PROCEDURE — 63600175 PHARM REV CODE 636 W HCPCS: Performed by: NURSE ANESTHETIST, CERTIFIED REGISTERED

## 2018-02-19 PROCEDURE — 27201423 OPTIME MED/SURG SUP & DEVICES STERILE SUPPLY: Performed by: SURGERY

## 2018-02-19 PROCEDURE — 27800505 HC CATH, RADIAL ARTERY KIT: Performed by: NURSE ANESTHETIST, CERTIFIED REGISTERED

## 2018-02-19 PROCEDURE — 25000003 PHARM REV CODE 250: Performed by: EMERGENCY MEDICINE

## 2018-02-19 PROCEDURE — 63600175 PHARM REV CODE 636 W HCPCS: Performed by: SURGERY

## 2018-02-19 PROCEDURE — 25000003 PHARM REV CODE 250: Performed by: NURSE ANESTHETIST, CERTIFIED REGISTERED

## 2018-02-19 PROCEDURE — D9220A PRA ANESTHESIA: Mod: ANES,,, | Performed by: ANESTHESIOLOGY

## 2018-02-19 PROCEDURE — 83735 ASSAY OF MAGNESIUM: CPT | Mod: 91

## 2018-02-19 PROCEDURE — 25000003 PHARM REV CODE 250

## 2018-02-19 PROCEDURE — 63600175 PHARM REV CODE 636 W HCPCS

## 2018-02-19 RX ORDER — ONDANSETRON 2 MG/ML
4 INJECTION INTRAMUSCULAR; INTRAVENOUS ONCE
Status: COMPLETED | OUTPATIENT
Start: 2018-02-19 | End: 2018-02-19

## 2018-02-19 RX ORDER — MEPERIDINE HYDROCHLORIDE 50 MG/ML
12.5 INJECTION INTRAMUSCULAR; INTRAVENOUS; SUBCUTANEOUS ONCE AS NEEDED
Status: DISCONTINUED | OUTPATIENT
Start: 2018-02-19 | End: 2018-02-19 | Stop reason: HOSPADM

## 2018-02-19 RX ORDER — ACETAMINOPHEN 10 MG/ML
1000 INJECTION, SOLUTION INTRAVENOUS ONCE
Status: DISCONTINUED | OUTPATIENT
Start: 2018-02-19 | End: 2018-02-19

## 2018-02-19 RX ORDER — HYDROMORPHONE HYDROCHLORIDE 2 MG/ML
0.2 INJECTION, SOLUTION INTRAMUSCULAR; INTRAVENOUS; SUBCUTANEOUS EVERY 5 MIN PRN
Status: DISCONTINUED | OUTPATIENT
Start: 2018-02-19 | End: 2018-02-19

## 2018-02-19 RX ORDER — SODIUM CHLORIDE, SODIUM LACTATE, POTASSIUM CHLORIDE, CALCIUM CHLORIDE 600; 310; 30; 20 MG/100ML; MG/100ML; MG/100ML; MG/100ML
INJECTION, SOLUTION INTRAVENOUS CONTINUOUS PRN
Status: DISCONTINUED | OUTPATIENT
Start: 2018-02-19 | End: 2018-02-19

## 2018-02-19 RX ORDER — HEPARIN SODIUM 1000 [USP'U]/ML
INJECTION, SOLUTION INTRAVENOUS; SUBCUTANEOUS
Status: DISCONTINUED | OUTPATIENT
Start: 2018-02-19 | End: 2018-02-19 | Stop reason: HOSPADM

## 2018-02-19 RX ORDER — HEPARIN SODIUM 1000 [USP'U]/ML
INJECTION, SOLUTION INTRAVENOUS; SUBCUTANEOUS
Status: DISCONTINUED | OUTPATIENT
Start: 2018-02-19 | End: 2018-02-19

## 2018-02-19 RX ORDER — PANTOPRAZOLE SODIUM 40 MG/10ML
40 INJECTION, POWDER, LYOPHILIZED, FOR SOLUTION INTRAVENOUS
Status: DISCONTINUED | OUTPATIENT
Start: 2018-02-20 | End: 2018-02-22 | Stop reason: HOSPADM

## 2018-02-19 RX ORDER — LABETALOL HYDROCHLORIDE 5 MG/ML
10 INJECTION, SOLUTION INTRAVENOUS
Status: DISCONTINUED | OUTPATIENT
Start: 2018-02-19 | End: 2018-02-22 | Stop reason: HOSPADM

## 2018-02-19 RX ORDER — LIDOCAINE HYDROCHLORIDE 10 MG/ML
INJECTION, SOLUTION EPIDURAL; INFILTRATION; INTRACAUDAL; PERINEURAL
Status: DISCONTINUED | OUTPATIENT
Start: 2018-02-19 | End: 2018-02-19 | Stop reason: HOSPADM

## 2018-02-19 RX ORDER — METOPROLOL TARTRATE 1 MG/ML
INJECTION, SOLUTION INTRAVENOUS
Status: COMPLETED
Start: 2018-02-19 | End: 2018-02-19

## 2018-02-19 RX ORDER — PHENYLEPHRINE HYDROCHLORIDE 10 MG/ML
INJECTION INTRAVENOUS
Status: DISPENSED
Start: 2018-02-19 | End: 2018-02-20

## 2018-02-19 RX ORDER — CEFAZOLIN SODIUM 1 G/50ML
1 SOLUTION INTRAVENOUS
Status: DISCONTINUED | OUTPATIENT
Start: 2018-02-19 | End: 2018-02-19 | Stop reason: HOSPADM

## 2018-02-19 RX ORDER — NEOSTIGMINE METHYLSULFATE 1 MG/ML
INJECTION, SOLUTION INTRAVENOUS
Status: DISCONTINUED | OUTPATIENT
Start: 2018-02-19 | End: 2018-02-19

## 2018-02-19 RX ORDER — BACITRACIN 50000 [IU]/1
INJECTION, POWDER, FOR SOLUTION INTRAMUSCULAR
Status: DISCONTINUED | OUTPATIENT
Start: 2018-02-19 | End: 2018-02-19 | Stop reason: HOSPADM

## 2018-02-19 RX ORDER — INSULIN ASPART 100 [IU]/ML
0-5 INJECTION, SOLUTION INTRAVENOUS; SUBCUTANEOUS EVERY 6 HOURS PRN
Status: DISCONTINUED | OUTPATIENT
Start: 2018-02-19 | End: 2018-02-22 | Stop reason: HOSPADM

## 2018-02-19 RX ORDER — PROTAMINE SULFATE 10 MG/ML
INJECTION, SOLUTION INTRAVENOUS
Status: DISCONTINUED | OUTPATIENT
Start: 2018-02-19 | End: 2018-02-19

## 2018-02-19 RX ORDER — FUROSEMIDE 10 MG/ML
40 INJECTION INTRAMUSCULAR; INTRAVENOUS ONCE
Status: COMPLETED | OUTPATIENT
Start: 2018-02-19 | End: 2018-02-19

## 2018-02-19 RX ORDER — ONDANSETRON 2 MG/ML
INJECTION INTRAMUSCULAR; INTRAVENOUS
Status: COMPLETED
Start: 2018-02-19 | End: 2018-02-19

## 2018-02-19 RX ORDER — ONDANSETRON 2 MG/ML
4 INJECTION INTRAMUSCULAR; INTRAVENOUS EVERY 12 HOURS PRN
Status: DISCONTINUED | OUTPATIENT
Start: 2018-02-19 | End: 2018-02-22 | Stop reason: HOSPADM

## 2018-02-19 RX ORDER — METOPROLOL TARTRATE 1 MG/ML
5 INJECTION, SOLUTION INTRAVENOUS EVERY 6 HOURS
Status: DISCONTINUED | OUTPATIENT
Start: 2018-02-20 | End: 2018-02-20

## 2018-02-19 RX ORDER — METOPROLOL TARTRATE 1 MG/ML
INJECTION, SOLUTION INTRAVENOUS
Status: DISCONTINUED | OUTPATIENT
Start: 2018-02-19 | End: 2018-02-19

## 2018-02-19 RX ORDER — ACETAMINOPHEN 10 MG/ML
1000 INJECTION, SOLUTION INTRAVENOUS EVERY 8 HOURS
Status: ACTIVE | OUTPATIENT
Start: 2018-02-19 | End: 2018-02-19

## 2018-02-19 RX ORDER — PROPOFOL 10 MG/ML
VIAL (ML) INTRAVENOUS
Status: DISCONTINUED | OUTPATIENT
Start: 2018-02-19 | End: 2018-02-19

## 2018-02-19 RX ORDER — FENTANYL CITRATE 50 UG/ML
INJECTION, SOLUTION INTRAMUSCULAR; INTRAVENOUS
Status: DISCONTINUED | OUTPATIENT
Start: 2018-02-19 | End: 2018-02-19

## 2018-02-19 RX ORDER — GLUCAGON 1 MG
1 KIT INJECTION
Status: DISCONTINUED | OUTPATIENT
Start: 2018-02-19 | End: 2018-02-22 | Stop reason: HOSPADM

## 2018-02-19 RX ORDER — MAGNESIUM SULFATE 1 G/100ML
1 INJECTION INTRAVENOUS ONCE
Status: COMPLETED | OUTPATIENT
Start: 2018-02-19 | End: 2018-02-20

## 2018-02-19 RX ORDER — METOPROLOL TARTRATE 1 MG/ML
5 INJECTION, SOLUTION INTRAVENOUS CONTINUOUS PRN
Status: DISCONTINUED | OUTPATIENT
Start: 2018-02-19 | End: 2018-02-22 | Stop reason: HOSPADM

## 2018-02-19 RX ORDER — SODIUM CHLORIDE 0.9 % (FLUSH) 0.9 %
3 SYRINGE (ML) INJECTION
Status: DISCONTINUED | OUTPATIENT
Start: 2018-02-19 | End: 2018-02-19 | Stop reason: HOSPADM

## 2018-02-19 RX ORDER — METOCLOPRAMIDE HYDROCHLORIDE 5 MG/ML
10 INJECTION INTRAMUSCULAR; INTRAVENOUS EVERY 10 MIN PRN
Status: DISCONTINUED | OUTPATIENT
Start: 2018-02-19 | End: 2018-02-19 | Stop reason: HOSPADM

## 2018-02-19 RX ORDER — ROCURONIUM BROMIDE 10 MG/ML
INJECTION, SOLUTION INTRAVENOUS
Status: DISCONTINUED | OUTPATIENT
Start: 2018-02-19 | End: 2018-02-19

## 2018-02-19 RX ORDER — SODIUM CHLORIDE, SODIUM LACTATE, POTASSIUM CHLORIDE, CALCIUM CHLORIDE 600; 310; 30; 20 MG/100ML; MG/100ML; MG/100ML; MG/100ML
INJECTION, SOLUTION INTRAVENOUS CONTINUOUS
Status: DISCONTINUED | OUTPATIENT
Start: 2018-02-19 | End: 2018-02-20

## 2018-02-19 RX ORDER — NICARDIPINE HYDROCHLORIDE 0.2 MG/ML
1 INJECTION INTRAVENOUS CONTINUOUS
Status: DISCONTINUED | OUTPATIENT
Start: 2018-02-19 | End: 2018-02-19

## 2018-02-19 RX ORDER — HYDRALAZINE HYDROCHLORIDE 20 MG/ML
10 INJECTION INTRAMUSCULAR; INTRAVENOUS EVERY 4 HOURS PRN
Status: DISCONTINUED | OUTPATIENT
Start: 2018-02-19 | End: 2018-02-22 | Stop reason: HOSPADM

## 2018-02-19 RX ORDER — FUROSEMIDE 10 MG/ML
INJECTION INTRAMUSCULAR; INTRAVENOUS
Status: COMPLETED
Start: 2018-02-19 | End: 2018-02-19

## 2018-02-19 RX ORDER — GLYCOPYRROLATE 0.2 MG/ML
INJECTION INTRAMUSCULAR; INTRAVENOUS
Status: DISCONTINUED | OUTPATIENT
Start: 2018-02-19 | End: 2018-02-19

## 2018-02-19 RX ORDER — NITROGLYCERIN 5 MG/ML
INJECTION, SOLUTION INTRAVENOUS
Status: DISCONTINUED | OUTPATIENT
Start: 2018-02-19 | End: 2018-02-19

## 2018-02-19 RX ORDER — METOPROLOL TARTRATE 1 MG/ML
5 INJECTION, SOLUTION INTRAVENOUS ONCE
Status: COMPLETED | OUTPATIENT
Start: 2018-02-19 | End: 2018-02-19

## 2018-02-19 RX ORDER — LIDOCAINE HCL/PF 100 MG/5ML
SYRINGE (ML) INTRAVENOUS
Status: DISCONTINUED | OUTPATIENT
Start: 2018-02-19 | End: 2018-02-19

## 2018-02-19 RX ORDER — METOPROLOL TARTRATE 1 MG/ML
5 INJECTION, SOLUTION INTRAVENOUS CONTINUOUS PRN
Status: DISCONTINUED | OUTPATIENT
Start: 2018-02-19 | End: 2018-02-19

## 2018-02-19 RX ORDER — ACETAMINOPHEN 10 MG/ML
1000 INJECTION, SOLUTION INTRAVENOUS ONCE
Status: COMPLETED | OUTPATIENT
Start: 2018-02-19 | End: 2018-02-19

## 2018-02-19 RX ORDER — DIPHENHYDRAMINE HYDROCHLORIDE 50 MG/ML
25 INJECTION INTRAMUSCULAR; INTRAVENOUS EVERY 6 HOURS PRN
Status: DISCONTINUED | OUTPATIENT
Start: 2018-02-19 | End: 2018-02-19

## 2018-02-19 RX ORDER — ONDANSETRON 2 MG/ML
4 INJECTION INTRAMUSCULAR; INTRAVENOUS EVERY 4 HOURS
Status: DISCONTINUED | OUTPATIENT
Start: 2018-02-19 | End: 2018-02-22 | Stop reason: HOSPADM

## 2018-02-19 RX ORDER — ACETAMINOPHEN 10 MG/ML
1000 INJECTION, SOLUTION INTRAVENOUS EVERY 8 HOURS
Status: DISCONTINUED | OUTPATIENT
Start: 2018-02-19 | End: 2018-02-20

## 2018-02-19 RX ORDER — MUPIROCIN 20 MG/G
1 OINTMENT TOPICAL 2 TIMES DAILY
Status: DISCONTINUED | OUTPATIENT
Start: 2018-02-19 | End: 2018-02-22 | Stop reason: HOSPADM

## 2018-02-19 RX ORDER — ACETAMINOPHEN 650 MG/1
650 SUPPOSITORY RECTAL EVERY 4 HOURS PRN
Status: DISCONTINUED | OUTPATIENT
Start: 2018-02-19 | End: 2018-02-22 | Stop reason: HOSPADM

## 2018-02-19 RX ADMIN — GLYCOPYRROLATE 0.6 MG: 0.2 INJECTION, SOLUTION INTRAMUSCULAR; INTRAVENOUS at 03:02

## 2018-02-19 RX ADMIN — SODIUM CHLORIDE, SODIUM LACTATE, POTASSIUM CHLORIDE, AND CALCIUM CHLORIDE: .6; .31; .03; .02 INJECTION, SOLUTION INTRAVENOUS at 01:02

## 2018-02-19 RX ADMIN — METOPROLOL TARTRATE 5 MG: 5 INJECTION, SOLUTION INTRAVENOUS at 11:02

## 2018-02-19 RX ADMIN — NITROGLYCERIN 10 MCG: 5 INJECTION, SOLUTION INTRAVENOUS at 04:02

## 2018-02-19 RX ADMIN — OXYCODONE HYDROCHLORIDE AND ACETAMINOPHEN 1000 MG: 500 TABLET ORAL at 08:02

## 2018-02-19 RX ADMIN — EPHEDRINE SULFATE 20 MG: 50 INJECTION, SOLUTION INTRAMUSCULAR; INTRAVENOUS; SUBCUTANEOUS at 03:02

## 2018-02-19 RX ADMIN — FUROSEMIDE 40 MG: 10 INJECTION INTRAMUSCULAR; INTRAVENOUS at 04:02

## 2018-02-19 RX ADMIN — PROTAMINE SULFATE 80 MG: 10 INJECTION, SOLUTION INTRAVENOUS at 03:02

## 2018-02-19 RX ADMIN — SODIUM CHLORIDE, SODIUM LACTATE, POTASSIUM CHLORIDE, AND CALCIUM CHLORIDE: .6; .31; .03; .02 INJECTION, SOLUTION INTRAVENOUS at 11:02

## 2018-02-19 RX ADMIN — ACETAMINOPHEN 1000 MG: 10 INJECTION, SOLUTION INTRAVENOUS at 04:02

## 2018-02-19 RX ADMIN — NEOSTIGMINE METHYLSULFATE 5 MG: 1 INJECTION INTRAVENOUS at 03:02

## 2018-02-19 RX ADMIN — METFORMIN HYDROCHLORIDE 1000 MG: 500 TABLET, FILM COATED ORAL at 07:02

## 2018-02-19 RX ADMIN — ONDANSETRON 8 MG: 8 TABLET, ORALLY DISINTEGRATING ORAL at 07:02

## 2018-02-19 RX ADMIN — ASPIRIN 81 MG 81 MG: 81 TABLET ORAL at 08:02

## 2018-02-19 RX ADMIN — METOPROLOL TARTRATE 5 MG: 1 INJECTION, SOLUTION INTRAVENOUS at 04:02

## 2018-02-19 RX ADMIN — METOPROLOL TARTRATE 5 MG: 5 INJECTION, SOLUTION INTRAVENOUS at 04:02

## 2018-02-19 RX ADMIN — FUROSEMIDE 20 MG: 20 TABLET ORAL at 08:02

## 2018-02-19 RX ADMIN — ONDANSETRON 4 MG: 2 INJECTION INTRAMUSCULAR; INTRAVENOUS at 05:02

## 2018-02-19 RX ADMIN — METOPROLOL SUCCINATE 25 MG: 25 TABLET, EXTENDED RELEASE ORAL at 08:02

## 2018-02-19 RX ADMIN — MUPIROCIN 1 G: 20 OINTMENT TOPICAL at 11:02

## 2018-02-19 RX ADMIN — NITROGLYCERIN 10 MCG: 5 INJECTION, SOLUTION INTRAVENOUS at 03:02

## 2018-02-19 RX ADMIN — HEPARIN SODIUM 8000 UNITS: 1000 INJECTION, SOLUTION INTRAVENOUS; SUBCUTANEOUS at 02:02

## 2018-02-19 RX ADMIN — PROPOFOL 150 MG: 10 INJECTION, EMULSION INTRAVENOUS at 01:02

## 2018-02-19 RX ADMIN — METOPROLOL TARTRATE 2.5 MG: 1 INJECTION, SOLUTION INTRAVENOUS at 01:02

## 2018-02-19 RX ADMIN — EPHEDRINE SULFATE 10 MG: 50 INJECTION, SOLUTION INTRAMUSCULAR; INTRAVENOUS; SUBCUTANEOUS at 02:02

## 2018-02-19 RX ADMIN — NICARDIPINE HYDROCHLORIDE 5 MG/HR: 0.2 INJECTION, SOLUTION INTRAVENOUS at 03:02

## 2018-02-19 RX ADMIN — ROCURONIUM BROMIDE 50 MG: 10 INJECTION, SOLUTION INTRAVENOUS at 01:02

## 2018-02-19 RX ADMIN — NITROGLYCERIN 10 MCG: 5 INJECTION, SOLUTION INTRAVENOUS at 02:02

## 2018-02-19 RX ADMIN — CLOPIDOGREL BISULFATE 75 MG: 75 TABLET ORAL at 08:02

## 2018-02-19 RX ADMIN — ACETAMINOPHEN 1000 MG: 10 INJECTION, SOLUTION INTRAVENOUS at 11:02

## 2018-02-19 RX ADMIN — HYDROCHLOROTHIAZIDE 12.5 MG: 12.5 TABLET ORAL at 08:02

## 2018-02-19 RX ADMIN — ONDANSETRON 4 MG: 2 INJECTION, SOLUTION INTRAMUSCULAR; INTRAVENOUS at 05:02

## 2018-02-19 RX ADMIN — LOSARTAN POTASSIUM 100 MG: 25 TABLET, FILM COATED ORAL at 08:02

## 2018-02-19 RX ADMIN — FENTANYL CITRATE 50 MCG: 50 INJECTION INTRAMUSCULAR; INTRAVENOUS at 01:02

## 2018-02-19 RX ADMIN — MAGNESIUM SULFATE 1 G: 1 INJECTION INTRAVENOUS at 11:02

## 2018-02-19 RX ADMIN — FUROSEMIDE 40 MG: 10 INJECTION, SOLUTION INTRAMUSCULAR; INTRAVENOUS at 04:02

## 2018-02-19 RX ADMIN — LIDOCAINE HYDROCHLORIDE 100 MG: 20 INJECTION, SOLUTION INTRAVENOUS at 01:02

## 2018-02-19 RX ADMIN — ROCURONIUM BROMIDE 20 MG: 10 INJECTION, SOLUTION INTRAVENOUS at 01:02

## 2018-02-19 RX ADMIN — PHENYLEPHRINE HYDROCHLORIDE 0.5 MCG/KG/MIN: 10 INJECTION INTRAVENOUS at 01:02

## 2018-02-19 NOTE — SUBJECTIVE & OBJECTIVE
Medications:  Continuous Infusions:   niCARdipine      phenylephrine       Scheduled Meds:   ascorbic acid (vitamin C)  1,000 mg Oral Daily    aspirin  81 mg Oral Daily    atorvastatin  40 mg Oral QHS    clopidogrel  75 mg Oral Daily    furosemide  20 mg Oral Daily    losartan  100 mg Oral Daily    And    hydroCHLOROthiazide  12.5 mg Oral Daily    metFORMIN  1,000 mg Oral BID WM    metoprolol succinate  25 mg Oral BID     PRN Meds:acetaminophen, influenza, ondansetron, pneumoc 13-morro conj-dip cr(PF)     Objective:     Vital Signs (Most Recent):  Temp: 98 °F (36.7 °C) (02/19/18 1108)  Pulse: 60 (02/19/18 1108)  Resp: 18 (02/19/18 1108)  BP: 139/63 (02/19/18 1108)  SpO2: (!) 93 % (02/19/18 1108) Vital Signs (24h Range):  Temp:  [98 °F (36.7 °C)-98.7 °F (37.1 °C)] 98 °F (36.7 °C)  Pulse:  [56-77] 60  Resp:  [17-18] 18  SpO2:  [93 %-97 %] 93 %  BP: (127-162)/(56-83) 139/63          Physical Exam   Constitutional: He is oriented to person, place, and time. He appears well-developed. No distress.   HENT:   Head: Normocephalic and atraumatic.   Eyes: Conjunctivae are normal.   Neck: Neck supple.   Cardiovascular: Normal rate.    Pulmonary/Chest: Effort normal. No respiratory distress. He has no wheezes.   Abdominal: Soft. He exhibits no distension and no mass. There is no tenderness. There is no rebound and no guarding. No hernia.   Musculoskeletal: Normal range of motion. He exhibits no edema or tenderness.   Neurological: He is alert and oriented to person, place, and time. No cranial nerve deficit or sensory deficit.   Tongue midline, EOMI, face symmetric and sensation intact bilaterally to touch   Skin: Skin is warm. Capillary refill takes less than 2 seconds. No rash noted.   Vitals reviewed.      Significant Labs:  All pertinent labs from the last 24 hours have been reviewed.    Significant Diagnostics:  I have reviewed and interpreted all pertinent imaging results/findings within the past 24 hours.

## 2018-02-19 NOTE — OP NOTE
Surgeon: Silvio Coleman     Co-surgeon: MIGUEL Alberto    There were no qualified residents to participate in this case.    Anesthesia: General endotracheal anesthesia    ASA Class: 2     Preoperative diagnosis:   1. Symptomatic left carotid stenosis  2. Left amaurosis fugax  3. Asymptomatic right carotid stenosis  4. HTN  5. HLD  6. R eye retinal detachment    Postoperative diagnosis:   1. same    Blood loss: 50cc    Complications: None     Procedure: Left carotid endarterectomy with carotid artery shortening    Description of the procedure: After an informed consent was obtained, the patient was taken to the operating room, placed in supine position with arms tucked. The patients left neck was prepped and draped in sterile fashion. A time out was performed.  A longitudinal incision was made along the anterior border of the sternocleidomastoid and carried down through subcutaneous fat and fascia using electrocautery. The platysmal muscle was divided. The sternocleidomastoid was retracted laterally, the facial vein was identified, isolated and divided. The dissection was continued through the carotid sheath, the internal jugular vein was retracted laterally using self-retaining retractor. The vagus nerve and hypoglossal nerves were identified and avoided. The common, internal and external carotids were then dissected free from the surrounding tissue. An umbilical tape and Rumel tourniquet were placed around the common carotid and internal carotid. The external carotid artery was isolated and vessel loops were placed around it. The superior thyroid artery was identified and controlled using 3-0 silk suture.  At this point, the patient was systemically anticoagulated with IV Heparin and an activated clotting time (ACT) was checked and maintained at >250 throughout the case. An 10Fr Argyl shunt was prepared in standard fashion. The internal carotid artery was controlled with an vessel loop, the common carotid  was controlled with the Rumel tourniquet and the external carotid artery was controlled with a vessel loop. A longitudinal arteriotomy was performed using an 11 blade scalpel and the arteriotomy was extended with Kuhn scissors beyond the lesion to the level of normal internal carotid artery. The plaque was noted to be in the proximal left ICA. The distal shunt was then placed through the internal carotid artery and allowed to back-bleed to fill the shunt. The proximal end of the shunt was then placed into the common carotid artery and the Rumel tourniquets were again tightened. Careful endarterectomy was then performed using a [place] elevator.  The plaque was transected proximally in the common carotid artery. The plaque within the distal internal carotid artery was feathered off to a smooth endpoint. Eversion endarterectomy of the external carotid artery was then performed with adequate backbleeding from the external carotid artery at the completion of the eversion endarterectomy. This was flushed forward with heparinized saline. The endarterectomy surface was irrigated with heparinized saline and all loose debris was removed. The distal endarterectomy endpoint was evaluated without any moving debris noted. Next, a carotid artery shortening was performed with spatulation of the ICA.  Prior to completing the suture line, the shunt was removed first from the ICA and backbleeding was allowed to fill the carotid artery and then the ICA was controlled again with the vessel loop. Next the common carotid Rumel shunt was loosened  and the shunt was removed from the CCA which was allowed to forward bleeding and the Rumel was replaced. The suture line was then completed and tied down to re-establish flow, first of the common and external and then the internal carotid artery. Doppler evaluation noted excellent flow signals through the common, internal and external carotid arteries. The heparin was reversed using 80 mg  protamine, and hemostasis was obtained.  The sternocleidomastoid was reapproximated with one 2-0 vicryl interrupted figure of eight suture. The platysma was then reapproximated using 3-0 Vicryl in interrupted fashion and the skin was closed using 4-0 Monocryl in a continuous fashion. The wound was cleaned and dressed with dermabond non-adherent dressing and tegaderm. The patient was then awakened from anesthesia, extubated and after noting a normal neurologic examination, the patient was taken to the PACU in good condition.

## 2018-02-19 NOTE — OR NURSING
Pt arrived from or at 1609with tabitha guzman crna. Resperations gaspy and agonal, pt restless,on 100 moving constantly, sats below 90 on 99% face tent and 100% non rebreather. Pressures markedly elevated. Dr haynes and Dr page at bedside

## 2018-02-19 NOTE — OR NURSING
tabitha guzman assisted respirations with ambu, abgs available, soft restraints applied as pt thrashes in bed, meds lasix, metropolol, tylenol given. 440 watson placed.

## 2018-02-19 NOTE — TRANSFER OF CARE
"Anesthesia Transfer of Care Note    Patient: Timbo Gallagher    Procedure(s) Performed: Procedure(s) (LRB):  ENDARTERECTOMY-CAROTID (Left)    Patient location: PACU    Anesthesia Type: general    Transport from OR: Transported from OR on room air with adequate spontaneous ventilation    Post pain: adequate analgesia    Post assessment: no apparent anesthetic complications    Level of consciousness: confused    Nausea/Vomiting: no nausea/vomiting    Complications: none    Transfer of care protocol was followed      Last vitals:   Visit Vitals  /63   Pulse 60   Temp 36.7 °C (98 °F)   Resp 18   Ht 5' 9" (1.753 m)   Wt 84.4 kg (186 lb 1.1 oz)   SpO2 (!) 93%   BMI 27.48 kg/m²     "

## 2018-02-19 NOTE — PROGRESS NOTES
[facility] Hospital Medicine  Progress Note    Patient Name: Timbo Gallagher  MRN: [id number]  Patient Class: IP- Inpatient   Admission Date: 2/17/2018  Length of Stay: 1 days  Attending Physician: Cirilo Montero MD  Primary Care Provider: Enrique Rivera MD        Subjective:     Principal Problem:Amaurosis fugax    HPI:  81 yo WM who was previously seen by Dr. Jacobsen, now me, presents after experiencing L Amaurosis Fugax last night 2/16.  He said it occurred at 10 pm while he was sitting, and lasted 5 minutes.  He had no headache.  He felt his heart palpitating and felt as though something was spinning him around.  He did not pass out.  He did have some mild nausea.  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  CT of head shows chronic ischemic changes and central frontal atrophy.  He has a history of TAVR in Oct 2017 and follows Dr. Manzo (who no longer comes here).   He has a L carotid bruit (and ER US shows >70% stenosis on L, <50% on R)  He has an irregular heart rhythm - Dr. Toribio saw pt and noted junctional rhythm and fused beats, but not full A fib.  He had the flu about a month ago, and was treated with Tamiflu, and since that time has had residual lower respiratory symptoms with cough and congestion.  He had MDI at home for what he says is chronic bronchitis.  He has a loose cough here.  He is being admitted for cardiac and neuro evaluation.    Hospital Course:  Pt has been seen by cardiology and neuro  Dr. Coleman has seen pt for vasc surgery and is planning to take him for L CEA tomorrow    Interval History: Cards cleared for L CEA today.  Pt reports no further symptoms    Review of Systems   All other systems reviewed and are negative.    Scheduled Meds:   ascorbic acid (vitamin C)  1,000 mg Oral Daily    aspirin  81 mg Oral Daily    atorvastatin   40 mg Oral QHS    clopidogrel  75 mg Oral Daily    furosemide  20 mg Oral Daily    losartan  100 mg Oral Daily    And    hydroCHLOROthiazide  12.5 mg Oral Daily    metFORMIN  1,000 mg Oral BID WM    metoprolol succinate  25 mg Oral BID     Continuous Infusions:  PRN Meds:.acetaminophen, influenza, ondansetron, pneumoc 13-morro conj-dip cr(PF)    Objective:     Vital Signs (Most Recent):  Temp: 98.4 °F (36.9 °C) (02/19/18 0718)  Pulse: 68 (02/19/18 0805)  Resp: 17 (02/19/18 0718)  BP: (!) 158/83 (02/19/18 0805)  SpO2: (!) 93 % (02/19/18 0718) Vital Signs (24h Range):  Temp:  [98.1 °F (36.7 °C)-98.7 °F (37.1 °C)] 98.4 °F (36.9 °C)  Pulse:  [56-77] 68  Resp:  [17-18] 17  SpO2:  [93 %-97 %] 93 %  BP: (127-162)/(56-83) 158/83     Weight: 84.4 kg (186 lb 1.1 oz)  Body mass index is 27.48 kg/m².    Intake/Output Summary (Last 24 hours) at 02/19/18 0850  Last data filed at 02/19/18 0626   Gross per 24 hour   Intake              240 ml   Output             1450 ml   Net            -1210 ml      Physical Exam   Constitutional: He is oriented to person, place, and time. He appears well-developed and well-nourished.   HENT:   Head: Normocephalic and atraumatic.   Eyes: EOM are normal. Pupils are equal, round, and reactive to light.   Neck: Neck supple.   Cardiovascular: Normal rate, regular rhythm and normal heart sounds.    Pulmonary/Chest: Effort normal and breath sounds normal.   Abdominal: Soft. Bowel sounds are normal.   Neurological: He is alert and oriented to person, place, and time.   Skin: Skin is warm and dry.   Psychiatric: He has a normal mood and affect.   Vitals reviewed.      Significant Labs:   CBC:   Recent Labs  Lab 02/18/18  0447 02/19/18  0524   WBC 9.71 10.15   HGB 12.1* 12.7*   HCT 37.2* 38.1*    214     CMP:   Recent Labs  Lab 02/18/18  0447 02/19/18  0524    141   K 4.1 3.9   * 109   CO2 24 24   * 132*   BUN 24* 21   CREATININE 1.1 1.1   CALCIUM 9.3 9.5   ANIONGAP 7* 8    EGFRNONAA >60 >60     POCT Glucose:   Recent Labs  Lab 02/17/18  1439   POCTGLUCOSE 136*       Significant Imaging: no new imaging    Assessment/Plan:      * Amaurosis fugax    Due to tia          Carotid stenosis, left      For CEA today        TIA involving carotid artery    No further symptoms.  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 Manzo as OP  No current issues; Dr. Toribio feels this is unrelated to any current symptoms        Essential hypertension    bp MANAGED FOR NOW        Type 2 diabetes mellitus with renal complication    Continue metformin and sliding scale          VTE Risk Mitigation         Ordered     Low Risk of VTE  Once      02/17/18 1435              Dayna Campbell MD  Department of Hospital Medicine   [facility]

## 2018-02-19 NOTE — PROGRESS NOTES
[facility]  Neurology  Progress Note    Patient Name: Timbo Gallagher  MRN: [id number]  Admission Date: 2/17/2018  Hospital Length of Stay: 1 days  Code Status: Full Code   Attending Provider: Cirilo Montero MD  Primary Care Physician: Enrique Rivera MD   Principal Problem:Amaurosis fugax    Subjective:     Interval History: 83 y/o male with medical Hx as below comes to ED after experiencing loss of vision. Pt states that episode lasted 4-5 minutes. He is already blind on the right eye. Mr. Gallagher reports that he seemed to feel unbalanced as well. No weakness or numbness, speech disturbances. No previous episodes. Denies Hx of stroke.     -2/18/18: No visual or speech disturbances. Denies loss of vision.    -2/9/18: Mr. Gallagher reports no new symptoms.    Current Neurological Medications:     Current Facility-Administered Medications   Medication Dose Route Frequency Provider Last Rate Last Dose    acetaminophen tablet 650 mg  650 mg Oral Q8H PRN Brigid Lyles MD   650 mg at 02/17/18 1955    ascorbic acid (vitamin C) tablet 1,000 mg  1,000 mg Oral Daily Brigid Lyles MD   1,000 mg at 02/19/18 0806    aspirin chewable tablet 81 mg  81 mg Oral Daily Ned Toribio MD   81 mg at 02/19/18 0809    atorvastatin tablet 40 mg  40 mg Oral QHS Ned Toribio MD   40 mg at 02/18/18 2044    clopidogrel tablet 75 mg  75 mg Oral Daily Ned Toribio MD   75 mg at 02/19/18 0806    furosemide tablet 20 mg  20 mg Oral Daily Brigid Lyles MD   20 mg at 02/19/18 0806    losartan tablet 100 mg  100 mg Oral Daily Brigid Lyles MD   100 mg at 02/19/18 0806    And    hydroCHLOROthiazide tablet 12.5 mg  12.5 mg Oral Daily Brigid Lyles MD   12.5 mg at 02/19/18 0806    influenza (FLUZONE HIGH-DOSE) vaccine 0.5 mL  0.5 mL Intramuscular vaccine x 1 dose Brigid Lyles MD        metFORMIN tablet 1,000 mg  1,000 mg Oral BID  Brigid Lyles MD   1,000 mg at  02/19/18 0741    metoprolol succinate (TOPROL-XL) 24 hr tablet 25 mg  25 mg Oral BID Brigid Lyles MD   25 mg at 02/19/18 0806    ondansetron disintegrating tablet 8 mg  8 mg Oral Q8H PRN Brigid Lyles MD   8 mg at 02/19/18 0747    pneumoc 13-morro conj-dip cr(PF) 0.5 mL  0.5 mL Intramuscular vaccine x 1 dose Brigid Lyles MD           Review of Systems   Constitutional: Negative for fever.   HENT: Negative for trouble swallowing.    Eyes: Negative for photophobia.   Respiratory: Negative for shortness of breath.    Cardiovascular: Negative for palpitations.   Gastrointestinal: Negative for abdominal pain.   Genitourinary: Negative for dysuria.   Musculoskeletal: Negative for back pain.   Neurological: Negative for facial asymmetry and light-headedness.     Objective:     Vital Signs (Most Recent):  Temp: 98.4 °F (36.9 °C) (02/19/18 0718)  Pulse: 68 (02/19/18 0805)  Resp: 17 (02/19/18 0718)  BP: (!) 158/83 (02/19/18 0805)  SpO2: (!) 93 % (02/19/18 0718) Vital Signs (24h Range):  Temp:  [98.1 °F (36.7 °C)-98.7 °F (37.1 °C)] 98.4 °F (36.9 °C)  Pulse:  [56-77] 68  Resp:  [17-18] 17  SpO2:  [93 %-97 %] 93 %  BP: (127-162)/(56-83) 158/83     Weight: 84.4 kg (186 lb 1.1 oz)  Body mass index is 27.48 kg/m².    Physical Exam  Constitutional: He is oriented to person, place, and time. No distress.   Head: Normocephalic.   Eyes: Right eye exhibits no discharge. Left eye exhibits no discharge.   Neck: Normal range of motion.   Cardiovascular: Normal rate and regular rhythm.    Pulmonary/Chest: Effort normal and breath sounds normal.   Abdominal: Bowel sounds are normal.   Musculoskeletal: He exhibits no deformity.   Neurological: He is oriented to person, place, and time. He has normal strength.   Skin: He is not diaphoretic.   Psychiatric: His speech is normal.         NEUROLOGICAL EXAMINATION:      MENTAL STATUS   Oriented to person, place, and time.   Speech: speech is normal   Level of consciousness:  alert     CRANIAL NERVES      CN III, IV, VI   Right pupil: Size: 6 mm. Shape: regular. Not reactive to light  Left pupil: Size: 2 mm. Shape: regular.   Nystagmus: none   Ophthalmoparesis: none     CN V   Right facial sensation deficit: none  Left facial sensation deficit: none     CN VII   Right facial weakness: none  Left facial weakness: none     CN XI   Right sternocleidomastoid strength: normal  Left sternocleidomastoid strength: normal  Right trapezius strength: normal  Left trapezius strength: normal     CN XII   Tongue deviation: none     MOTOR EXAM      Strength   Strength 5/5 throughout.             Significant Labs:   CBC:   Recent Labs  Lab 02/18/18 0447 02/19/18 0524   WBC 9.71 10.15   HGB 12.1* 12.7*   HCT 37.2* 38.1*    214     CMP:   Recent Labs  Lab 02/18/18 0447 02/19/18 0524   * 132*    141   K 4.1 3.9   * 109   CO2 24 24   BUN 24* 21   CREATININE 1.1 1.1   CALCIUM 9.3 9.5   ANIONGAP 7* 8   EGFRNONAA >60 >60         Assessment and Plan:     83 y/o male with transient monocular visual loss (TMVL)     1. TMVL: this suggests involvement of ophthalmic/central retinal artery that is direct tributary of carotid. Per US pt has a more than 70% stenosis of left ICA.           Temporal arteritis may cause TMVL and pt is the age group but normal CRP, no headaches or jaw claudication should rule it out.            -Vascular surgery consulted; likely to proceed with left CEA as pt is at risk of loss of vision in left eye and blind on right as well as risk of stroke.           -For now continue clopidogrel and ASA as well as statin.    Active Diagnoses:    Diagnosis Date Noted POA    PRINCIPAL PROBLEM:  Amaurosis fugax [G45.3] 02/17/2018 Yes    Carotid stenosis, left [I65.22] 02/19/2018 Yes    TIA involving carotid artery [G45.1] 02/18/2018 Yes    Blindness of one eye [H54.40] 02/17/2018 Yes    S/P TAVR (transcatheter aortic valve replacement) [Z95.2] 10/18/2017 Not Applicable     Essential hypertension [I10] 12/02/2016 Yes    Type 2 diabetes mellitus with renal complication [E11.29] 02/15/2016 Yes      Problems Resolved During this Admission:    Diagnosis Date Noted Date Resolved POA    Preop cardiovascular exam [Z01.810] 02/18/2018 02/19/2018 Not Applicable    Palpitations [R00.2] 02/17/2018 02/19/2018 Unknown    History of influenza [Z87.09] 02/17/2018 02/19/2018 Yes       VTE Risk Mitigation         Ordered     Low Risk of VTE  Once      02/17/18 6630          Hector Henderson MD  Neurology  [facility]

## 2018-02-19 NOTE — PROGRESS NOTES
[facility]  Vascular Surgery  Progress Note    Patient Name: Timbo Gallagher  MRN: [id number]  Admission Date: 2/17/2018  Primary Care Provider: Enrique Rivera MD    Subjective:     Interval History: No new issues or complaints this morning.  NPO and states he is ready for surgery.    Post-Op Info:  Procedure(s) (LRB):  ENDARTERECTOMY-CAROTID (Left)   Day of Surgery       Medications:  Continuous Infusions:   niCARdipine      phenylephrine       Scheduled Meds:   ascorbic acid (vitamin C)  1,000 mg Oral Daily    aspirin  81 mg Oral Daily    atorvastatin  40 mg Oral QHS    clopidogrel  75 mg Oral Daily    furosemide  20 mg Oral Daily    losartan  100 mg Oral Daily    And    hydroCHLOROthiazide  12.5 mg Oral Daily    metFORMIN  1,000 mg Oral BID WM    metoprolol succinate  25 mg Oral BID     PRN Meds:acetaminophen, influenza, ondansetron, pneumoc 13-morro conj-dip cr(PF)     Objective:     Vital Signs (Most Recent):  Temp: 98 °F (36.7 °C) (02/19/18 1108)  Pulse: 60 (02/19/18 1108)  Resp: 18 (02/19/18 1108)  BP: 139/63 (02/19/18 1108)  SpO2: (!) 93 % (02/19/18 1108) Vital Signs (24h Range):  Temp:  [98 °F (36.7 °C)-98.7 °F (37.1 °C)] 98 °F (36.7 °C)  Pulse:  [56-77] 60  Resp:  [17-18] 18  SpO2:  [93 %-97 %] 93 %  BP: (127-162)/(56-83) 139/63          Physical Exam   Constitutional: He is oriented to person, place, and time. He appears well-developed. No distress.   HENT:   Head: Normocephalic and atraumatic.   Eyes: Conjunctivae are normal.   Neck: Neck supple.   Cardiovascular: Normal rate.    Pulmonary/Chest: Effort normal. No respiratory distress. He has no wheezes.   Abdominal: Soft. He exhibits no distension and no mass. There is no tenderness. There is no rebound and no guarding. No hernia.   Musculoskeletal: Normal range of motion. He exhibits no edema or tenderness.   Neurological: He is alert and oriented to person, place, and time. No cranial nerve deficit or sensory  deficit.   Tongue midline, EOMI, face symmetric and sensation intact bilaterally to touch   Skin: Skin is warm. Capillary refill takes less than 2 seconds. No rash noted.   Vitals reviewed.      Significant Labs:  All pertinent labs from the last 24 hours have been reviewed.    Significant Diagnostics:  I have reviewed and interpreted all pertinent imaging results/findings within the past 24 hours.    Assessment/Plan:     TIA involving carotid artery    -L amaurosis fugax consistent with TIA and L ICA stenosis 60-69% on US - plan for L CEA today to prevent future stroke. Risks/benefit discussed with patient who states understanding and desire to proceed with surgery.  He will discuss with his family today and will contact me or I can be present to discuss any changes or questions  -Echo reviewed by Cardiology without prohibitive risk for L CEA  -Cont ASA, Plavix and statin  -BP control  -NPO            Silvio Coleman MD  Vascular Surgery  [facility]

## 2018-02-19 NOTE — PROGRESS NOTES
[facility]  Cardiology  Progress Note    Patient Name: Timbo Gallagher  MRN: [id number]  Admission Date: 2/17/2018  Hospital Length of Stay: 1 days  Code Status: Full Code   Attending Physician: Cirilo Montero MD   Primary Care Physician: Enrique Rivera MD  Expected Discharge Date:   Principal Problem:Amaurosis fugax    Subjective:     Hospital Course:   2/16/18: admitted with L ocular TIA, note made of severe L ICA stenosis.    Interval Hx: c/o constipation relieved by BM.  No cp/sob.  L CEA planned today.    Tele: SR, bigeminy/trigeminy, no AF (pers rev)        Review of Systems   Gastrointestinal: Negative for melena.   Genitourinary: Negative for hematuria.     Objective:     Vital Signs (Most Recent):  Temp: 98.4 °F (36.9 °C) (02/19/18 0718)  Pulse: (!) 56 (02/19/18 0718)  Resp: 17 (02/19/18 0718)  BP: (!) 127/56 (02/19/18 0718)  SpO2: (!) 93 % (02/19/18 0718) Vital Signs (24h Range):  Temp:  [98.1 °F (36.7 °C)-98.7 °F (37.1 °C)] 98.4 °F (36.9 °C)  Pulse:  [56-77] 56  Resp:  [17-18] 17  SpO2:  [93 %-97 %] 93 %  BP: (127-162)/(56-77) 127/56     Weight: 84.4 kg (186 lb 1.1 oz)  Body mass index is 27.48 kg/m².     SpO2: (!) 93 %  O2 Device (Oxygen Therapy): room air      Intake/Output Summary (Last 24 hours) at 02/19/18 0802  Last data filed at 02/19/18 0626   Gross per 24 hour   Intake              240 ml   Output             1750 ml   Net            -1510 ml       Lines/Drains/Airways     Peripheral Intravenous Line                 Peripheral IV - Single Lumen 02/17/18 0830 Right Antecubital 1 day                Physical Exam   Constitutional: He is oriented to person, place, and time. He appears well-developed and well-nourished.   HENT:   Head: Normocephalic and atraumatic.   Eyes: Conjunctivae and EOM are normal. No scleral icterus.   R pupil chr dilated   Neck: Normal range of motion. Neck supple. No JVD present. Carotid bruit is not present. No tracheal deviation present. No  thyromegaly present.   Cardiovascular: Normal rate, regular rhythm, S1 normal and S2 normal.   Occasional extrasystoles are present. Exam reveals distant heart sounds. Exam reveals no gallop and no friction rub.    Murmur heard.   Systolic murmur is present with a grade of 2/6   Pulses:       Carotid pulses are 2+ on the right side, and 2+ on the left side with bruit.  Pulmonary/Chest: Effort normal and breath sounds normal. No respiratory distress. He has no wheezes. He has no rales. He exhibits no tenderness.   Abdominal: Soft. Bowel sounds are normal. He exhibits no distension. There is no tenderness.   Musculoskeletal: He exhibits no edema.   Neurological: He is alert and oriented to person, place, and time. He has normal strength. A cranial nerve deficit (R eye blind) is present.   Skin: Skin is warm and dry. No rash noted.   Psychiatric: He has a normal mood and affect. His behavior is normal.       Current Medications:   ascorbic acid (vitamin C)  1,000 mg Oral Daily    aspirin  81 mg Oral Daily    atorvastatin  40 mg Oral QHS    clopidogrel  75 mg Oral Daily    furosemide  20 mg Oral Daily    losartan  100 mg Oral Daily    And    hydroCHLOROthiazide  12.5 mg Oral Daily    metFORMIN  1,000 mg Oral BID WM    metoprolol succinate  25 mg Oral BID       acetaminophen, influenza, ondansetron, pneumoc 13-morro conj-dip cr(PF)    Laboratory:  CBC:    Recent Labs  Lab 02/17/18  0831 02/18/18  0447 02/19/18  0524   WHITE BLOOD CELL COUNT 11.64 9.71 10.15   HEMOGLOBIN 12.5 L 12.1 L 12.7 L   HEMATOCRIT 37.8 L 37.2 L 38.1 L   PLATELETS 222 214 214       CHEMISTRIES:    Recent Labs  Lab 10/16/16  1050 12/02/16  0513  01/27/18  0736 02/17/18  0831 02/18/18  0447 02/19/18  0524   GLUCOSE 219 H 215 H  < > 171 H 188 H 125 H 132 H   SODIUM 141 144  < > 136 142 142 141   POTASSIUM 4.1 4.6  < > 4.2 4.4 4.1 3.9   BUN BLD 12 16  < > 36 H 33 H 24 H 21   CREATININE 0.9 1.2  < > 1.8 H 1.3 1.1 1.1   EGFR IF  >60  >60  < > 40 A 59 A >60 >60   EGFR IF NON- >60 56 A  < > 34 A 51 A >60 >60   CALCIUM 9.4 9.4  < > 9.1 9.6 9.3 9.5   MAGNESIUM 1.8 1.8  --  1.8  --   --   --    < > = values in this interval not displayed.    CARDIAC BIOMARKERS:    Recent Labs  Lab 02/17/18  0831 02/17/18  1723 02/17/18  2345   TROPONIN I 0.019 0.029 H 0.023       COAGS:    Recent Labs  Lab 05/08/17  0719 10/16/17  1343 02/17/18  0831   INR 1.0 1.0 1.0       LIPIDS/LFTS:    Recent Labs  Lab 10/18/17  0815 01/27/18  0736 02/17/18  0831   CHOLESTEROL  --   --  147   TRIGLYCERIDES  --   --  72   HDL  --   --  37 L   LDL CHOLESTEROL  --   --  95.6   NON-HDL CHOLESTEROL  --   --  110   AST 15 16 11   ALT 5 L 11 <5 L     Lab Results   Component Value Date    TSH 1.450 02/17/2018           Diagnostic Results:  ECG (personally reviewed tracings):   2/17/18 0828 SR 87, multifocal PVCs     Chest X-Ray (personally reviewed image(s)): 2/17/18 NAD     CT-Head 2/17/18  Age-appropriate generalized cerebral volume loss with mild chronic microvascular ischemic change.  Prominence of the extra-axial space along the left frontal, parietal and temporal convexity is most likely related to central atrophy rather than a chronic subdural hygroma.  No evidence for acute intracranial hemorrhage correlation and further evaluation as warranted.     Echo: 2/18/18 (images pers rev)    1 - Mildly depressed left ventricular systolic function (EF 45-50%).  Inferoposterior hypokinesis.     2 - Concentric hypertrophy.     3 - Impaired LV relaxation, elevated LAP (grade 2 diastolic dysfunction).     4 - S/P transcatheter AVR, UNA = 1.85 cm2, AVAi = 0.94 cm2/m2, mean gradient = 9 mmHg.     5 - Moderate mitral regurgitation.     6 - Mild tricuspid regurgitation.     7 - Pulmonary hypertension. The estimated PA systolic pressure is 61 mmHg.      Carotid US 2/17/18  #1. Findings consistent with less than 50% stenosis in the Right internal carotid artery .  #2. Findings  consistent with greater than 70% stenosis in the Left internal carotid artery based on peak systolic velocity.  #3.  Incidentally noted is an irregular heartbeat.     TAVR 10/17/17  B. 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    No perivalvular leak post procedure per 2D echo.    Post deployment AV mean gradient 2.5 mmHg, Vmax 1.09 m/s.     Cath: 5/8/17 (images pers rev)  D. Hemodynamic Results  AO: 122/68 (93)  E. Angiographic Results       Patient has a right dominant coronary artery.      - Left Main Coronary Artery:             The LM is normal. There is ANASTASIIA 3 flow.     - Left Anterior Descending Artery:             The LAD has luminal irregularities. There is ANASTASIIA 3 flow.     - Left Circumflex Artery:             The LCX is normal. There is ANASTASIIA 3 flow.     - Right Coronary Artery:             The RCA has luminal irregularities. There is ANASTASIIA 3 flow.     - Radial Artery:             The Radial artery was not studied.     - D1:             The D1 has a 50% stenosis. There is ANASTASIIA 3 flow.     - Posterior Descending Artery:             The mid PDA has a 70% stenosis. There is ANASTASIIA 3 flow.      Assessment and Plan:     * Amaurosis fugax    Sxs c/w L ocular TIA.  Significant L ICA stenosis noted.  Pt already on ASA/Plavix.  Case d/w Dr. Coleman ([facility]) with plans for L CEA today.  Cont atorva 40mg qhs  Check lipids/LFT 3 months (mid May 2018)  Cont antihtn regimen.          Preop cardiovascular exam    The pt has only mild CAD by cath 5/2017 (PDA stenosis noted without PCI prior to TAVR).  Mild LV dysfxn noted on last echo.  His activity capacity is acceptable.  While his cardiac risk is not low, it is not prohibitive for CEA.  Given recent L ocular TIA in setting of significant L ICA stenosis, would suggest proceeding to OR.  No preop CV testing required.  Case d/w Dr. Coleman.        Essential hypertension    Cont med rx.        Type 2  diabetes mellitus with renal complication    Per IM  Cont statin        S/P TAVR (transcatheter aortic valve replacement)    Normally functioning per echo 2/2018.        Blindness of one eye    L ocular TIA, vision restored at present  Chr R retinal detachment            VTE Risk Mitigation         Ordered     Low Risk of VTE  Once      02/17/18 5546          Ned Toribio MD  Cardiology  [facility]

## 2018-02-19 NOTE — INTERVAL H&P NOTE
The patient has been examined and the H&P has been reviewed:    I concur with the findings and no changes have occurred since H&P was written.    Anesthesia/Surgery risks, benefits and alternative options discussed and understood by patient/family.          Active Hospital Problems    Diagnosis  POA    *Amaurosis fugax [G45.3]  Yes     Carotiid  2/17/18:  #1. Findings consistent with less than 50% stenosis in the Right internal carotid artery .  #2. Findings consistent with greater than 70% stenosis in the Left internal carotid artery based on peak systolic velocity.  #3.  Incidentally noted is an irregular heartbeat.      Carotid stenosis, left [I65.22]  Yes    TIA involving carotid artery [G45.1]  Yes    Blindness of one eye [H54.40]  Yes    S/P TAVR (transcatheter aortic valve replacement) [Z95.2]  Not Applicable    Essential hypertension [I10]  Yes    Type 2 diabetes mellitus with renal complication [E11.29]  Yes      Resolved Hospital Problems    Diagnosis Date Resolved POA    Preop cardiovascular exam [Z01.810] 02/19/2018 Not Applicable    Palpitations [R00.2] 02/19/2018 Unknown    History of influenza [Z87.09] 02/19/2018 Yes
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
## 2018-02-19 NOTE — ASSESSMENT & PLAN NOTE
-L amaurosis fugax consistent with TIA and L ICA stenosis 60-69% on US - plan for L CEA today to prevent future stroke. Risks/benefit discussed with patient who states understanding and desire to proceed with surgery.  He will discuss with his family today and will contact me or I can be present to discuss any changes or questions  -Echo reviewed by Cardiology without prohibitive risk for L CEA  -Cont ASA, Plavix and statin  -BP control  -NPO

## 2018-02-19 NOTE — BRIEF OP NOTE
[facility]  Brief Operative Note    SUMMARY     Surgery Date: 2/19/2018     Surgeon(s) and Role:     * iSlvio Coleman MD - Primary     * WERNER Alberto III, MD - Co-surgeon    Pre-op Diagnosis:   1. Left amaurosis fugax  2. HTN  3. HLD  4. R eye retinal detachment    Post-op Diagnosis:  Post-Op Diagnosis Codes:  1. same    Procedure(s) (LRB):  ENDARTERECTOMY-CAROTID (Left)    Anesthesia: General    Description of Procedure: shortening of left ICA    Description of the findings of the procedure: good flow in left carotid and neuro intact postoperatively    Estimated Blood Loss: 50cc         Specimens:   Specimen (12h ago through future)    Start     Ordered    02/19/18 1543  Specimen to Pathology - Surgery  Once     Comments:  LEFT CAROTID PLAQUE      02/19/18 0132

## 2018-02-19 NOTE — SUBJECTIVE & OBJECTIVE
Interval History: Cards cleared for L CEA today.  Pt reports no further symptoms    Review of Systems   All other systems reviewed and are negative.    Scheduled Meds:   ascorbic acid (vitamin C)  1,000 mg Oral Daily    aspirin  81 mg Oral Daily    atorvastatin  40 mg Oral QHS    clopidogrel  75 mg Oral Daily    furosemide  20 mg Oral Daily    losartan  100 mg Oral Daily    And    hydroCHLOROthiazide  12.5 mg Oral Daily    metFORMIN  1,000 mg Oral BID WM    metoprolol succinate  25 mg Oral BID     Continuous Infusions:  PRN Meds:.acetaminophen, influenza, ondansetron, pneumoc 13-morro conj-dip cr(PF)    Objective:     Vital Signs (Most Recent):  Temp: 98.4 °F (36.9 °C) (02/19/18 0718)  Pulse: 68 (02/19/18 0805)  Resp: 17 (02/19/18 0718)  BP: (!) 158/83 (02/19/18 0805)  SpO2: (!) 93 % (02/19/18 0718) Vital Signs (24h Range):  Temp:  [98.1 °F (36.7 °C)-98.7 °F (37.1 °C)] 98.4 °F (36.9 °C)  Pulse:  [56-77] 68  Resp:  [17-18] 17  SpO2:  [93 %-97 %] 93 %  BP: (127-162)/(56-83) 158/83     Weight: 84.4 kg (186 lb 1.1 oz)  Body mass index is 27.48 kg/m².    Intake/Output Summary (Last 24 hours) at 02/19/18 0850  Last data filed at 02/19/18 0626   Gross per 24 hour   Intake              240 ml   Output             1450 ml   Net            -1210 ml      Physical Exam   Constitutional: He is oriented to person, place, and time. He appears well-developed and well-nourished.   HENT:   Head: Normocephalic and atraumatic.   Eyes: EOM are normal. Pupils are equal, round, and reactive to light.   Neck: Neck supple.   Cardiovascular: Normal rate, regular rhythm and normal heart sounds.    Pulmonary/Chest: Effort normal and breath sounds normal.   Abdominal: Soft. Bowel sounds are normal.   Neurological: He is alert and oriented to person, place, and time.   Skin: Skin is warm and dry.   Psychiatric: He has a normal mood and affect.   Vitals reviewed.      Significant Labs:   CBC:   Recent Labs  Lab 02/18/18  8119  02/19/18  0524   WBC 9.71 10.15   HGB 12.1* 12.7*   HCT 37.2* 38.1*    214     CMP:   Recent Labs  Lab 02/18/18  0447 02/19/18  0524    141   K 4.1 3.9   * 109   CO2 24 24   * 132*   BUN 24* 21   CREATININE 1.1 1.1   CALCIUM 9.3 9.5   ANIONGAP 7* 8   EGFRNONAA >60 >60     POCT Glucose:   Recent Labs  Lab 02/17/18  1439   POCTGLUCOSE 136*       Significant Imaging: no new imaging

## 2018-02-19 NOTE — PLAN OF CARE
02/19/18 1200   Discharge Reassessment   Assessment Type Discharge Planning Reassessment   Do you have any problems affording any of your prescribed medications? No   Discharge Plan A Home with family   Discharge Plan B Home with family  (Surgery today with [place]. )   Patient choice form signed by patient/caregiver N/A   Can the patient answer the patient profile reliably? Yes, cognitively intact   How does the patient rate their overall health at the present time? Good   Describe the patient's ability to walk at the present time. Minor restrictions or changes   How often would a person be available to care for the patient? Occasionally   Number of comorbid conditions (as recorded on the chart) Five or more

## 2018-02-19 NOTE — ASSESSMENT & PLAN NOTE
Sxs c/w L ocular TIA.  Significant L ICA stenosis noted.  Pt already on ASA/Plavix.  Case d/w Dr. Coleman ([facility]) with plans for L CEA today.  Cont atorva 40mg qhs  Check lipids/LFT 3 months (mid May 2018)  Cont antihtn regimen.

## 2018-02-19 NOTE — SUBJECTIVE & OBJECTIVE
Review of Systems   Gastrointestinal: Negative for melena.   Genitourinary: Negative for hematuria.     Objective:     Vital Signs (Most Recent):  Temp: 98.4 °F (36.9 °C) (02/19/18 0718)  Pulse: (!) 56 (02/19/18 0718)  Resp: 17 (02/19/18 0718)  BP: (!) 127/56 (02/19/18 0718)  SpO2: (!) 93 % (02/19/18 0718) Vital Signs (24h Range):  Temp:  [98.1 °F (36.7 °C)-98.7 °F (37.1 °C)] 98.4 °F (36.9 °C)  Pulse:  [56-77] 56  Resp:  [17-18] 17  SpO2:  [93 %-97 %] 93 %  BP: (127-162)/(56-77) 127/56     Weight: 84.4 kg (186 lb 1.1 oz)  Body mass index is 27.48 kg/m².     SpO2: (!) 93 %  O2 Device (Oxygen Therapy): room air      Intake/Output Summary (Last 24 hours) at 02/19/18 0802  Last data filed at 02/19/18 0626   Gross per 24 hour   Intake              240 ml   Output             1750 ml   Net            -1510 ml       Lines/Drains/Airways     Peripheral Intravenous Line                 Peripheral IV - Single Lumen 02/17/18 0830 Right Antecubital 1 day                Physical Exam   Constitutional: He is oriented to person, place, and time. He appears well-developed and well-nourished.   HENT:   Head: Normocephalic and atraumatic.   Eyes: Conjunctivae and EOM are normal. No scleral icterus.   R pupil chr dilated   Neck: Normal range of motion. Neck supple. No JVD present. Carotid bruit is not present. No tracheal deviation present. No thyromegaly present.   Cardiovascular: Normal rate, regular rhythm, S1 normal and S2 normal.   Occasional extrasystoles are present. Exam reveals distant heart sounds. Exam reveals no gallop and no friction rub.    Murmur heard.   Systolic murmur is present with a grade of 2/6   Pulses:       Carotid pulses are 2+ on the right side, and 2+ on the left side with bruit.  Pulmonary/Chest: Effort normal and breath sounds normal. No respiratory distress. He has no wheezes. He has no rales. He exhibits no tenderness.   Abdominal: Soft. Bowel sounds are normal. He exhibits no distension. There is no  tenderness.   Musculoskeletal: He exhibits no edema.   Neurological: He is alert and oriented to person, place, and time. He has normal strength. A cranial nerve deficit (R eye blind) is present.   Skin: Skin is warm and dry. No rash noted.   Psychiatric: He has a normal mood and affect. His behavior is normal.       Current Medications:   ascorbic acid (vitamin C)  1,000 mg Oral Daily    aspirin  81 mg Oral Daily    atorvastatin  40 mg Oral QHS    clopidogrel  75 mg Oral Daily    furosemide  20 mg Oral Daily    losartan  100 mg Oral Daily    And    hydroCHLOROthiazide  12.5 mg Oral Daily    metFORMIN  1,000 mg Oral BID WM    metoprolol succinate  25 mg Oral BID       acetaminophen, influenza, ondansetron, pneumoc 13-morro conj-dip cr(PF)    Laboratory:  CBC:    Recent Labs  Lab 02/17/18  0831 02/18/18  0447 02/19/18  0524   WHITE BLOOD CELL COUNT 11.64 9.71 10.15   HEMOGLOBIN 12.5 L 12.1 L 12.7 L   HEMATOCRIT 37.8 L 37.2 L 38.1 L   PLATELETS 222 214 214       CHEMISTRIES:    Recent Labs  Lab 10/16/16  1050 12/02/16  0513  01/27/18  0736 02/17/18  0831 02/18/18  0447 02/19/18  0524   GLUCOSE 219 H 215 H  < > 171 H 188 H 125 H 132 H   SODIUM 141 144  < > 136 142 142 141   POTASSIUM 4.1 4.6  < > 4.2 4.4 4.1 3.9   BUN BLD 12 16  < > 36 H 33 H 24 H 21   CREATININE 0.9 1.2  < > 1.8 H 1.3 1.1 1.1   EGFR IF  >60 >60  < > 40 A 59 A >60 >60   EGFR IF NON- >60 56 A  < > 34 A 51 A >60 >60   CALCIUM 9.4 9.4  < > 9.1 9.6 9.3 9.5   MAGNESIUM 1.8 1.8  --  1.8  --   --   --    < > = values in this interval not displayed.    CARDIAC BIOMARKERS:    Recent Labs  Lab 02/17/18  0831 02/17/18  1723 02/17/18  2345   TROPONIN I 0.019 0.029 H 0.023       COAGS:    Recent Labs  Lab 05/08/17  0719 10/16/17  1343 02/17/18  0831   INR 1.0 1.0 1.0       LIPIDS/LFTS:    Recent Labs  Lab 10/18/17  0815 01/27/18  0736 02/17/18  0831   CHOLESTEROL  --   --  147   TRIGLYCERIDES  --   --  72   HDL  --   --  37 L    LDL CHOLESTEROL  --   --  95.6   NON-HDL CHOLESTEROL  --   --  110   AST 15 16 11   ALT 5 L 11 <5 L     Lab Results   Component Value Date    TSH 1.450 02/17/2018           Diagnostic Results:  ECG (personally reviewed tracings):   2/17/18 0828 SR 87, multifocal PVCs     Chest X-Ray (personally reviewed image(s)): 2/17/18 NAD     CT-Head 2/17/18  Age-appropriate generalized cerebral volume loss with mild chronic microvascular ischemic change.  Prominence of the extra-axial space along the left frontal, parietal and temporal convexity is most likely related to central atrophy rather than a chronic subdural hygroma.  No evidence for acute intracranial hemorrhage correlation and further evaluation as warranted.     Echo: 2/18/18 (images pers rev)    1 - Mildly depressed left ventricular systolic function (EF 45-50%).  Inferoposterior hypokinesis.     2 - Concentric hypertrophy.     3 - Impaired LV relaxation, elevated LAP (grade 2 diastolic dysfunction).     4 - S/P transcatheter AVR, UNA = 1.85 cm2, AVAi = 0.94 cm2/m2, mean gradient = 9 mmHg.     5 - Moderate mitral regurgitation.     6 - Mild tricuspid regurgitation.     7 - Pulmonary hypertension. The estimated PA systolic pressure is 61 mmHg.      Carotid US 2/17/18  #1. Findings consistent with less than 50% stenosis in the Right internal carotid artery .  #2. Findings consistent with greater than 70% stenosis in the Left internal carotid artery based on peak systolic velocity.  #3.  Incidentally noted is an irregular heartbeat.     TAVR 10/17/17  B. 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    No perivalvular leak post procedure per 2D echo.    Post deployment AV mean gradient 2.5 mmHg, Vmax 1.09 m/s.     Cath: 5/8/17 (images pers rev)  D. Hemodynamic Results  AO: 122/68 (93)  E. Angiographic Results       Patient has a right dominant coronary artery.      - Left Main Coronary  Artery:             The LM is normal. There is ANASTASIIA 3 flow.     - Left Anterior Descending Artery:             The LAD has luminal irregularities. There is ANASTASIIA 3 flow.     - Left Circumflex Artery:             The LCX is normal. There is ANASTASIIA 3 flow.     - Right Coronary Artery:             The RCA has luminal irregularities. There is ANASTASIIA 3 flow.     - Radial Artery:             The Radial artery was not studied.     - D1:             The D1 has a 50% stenosis. There is ANASTASIIA 3 flow.     - Posterior Descending Artery:             The mid PDA has a 70% stenosis. There is ANASTASIIA 3 flow.

## 2018-02-20 PROBLEM — J90 PLEURAL EFFUSION: Status: ACTIVE | Noted: 2018-02-20

## 2018-02-20 PROBLEM — R79.89 CREATININE ELEVATION: Status: ACTIVE | Noted: 2018-02-20

## 2018-02-20 PROBLEM — N17.9 AKI (ACUTE KIDNEY INJURY): Status: ACTIVE | Noted: 2018-02-20

## 2018-02-20 LAB
ALLENS TEST: ABNORMAL
ANION GAP SERPL CALC-SCNC: 10 MMOL/L
ANION GAP SERPL CALC-SCNC: 8 MMOL/L
ANION GAP SERPL CALC-SCNC: 8 MMOL/L
ANION GAP SERPL CALC-SCNC: 9 MMOL/L
APTT BLDCRRT: 25.2 SEC
APTT BLDCRRT: 25.5 SEC
APTT BLDCRRT: 27.3 SEC
BASOPHILS # BLD AUTO: 0.01 K/UL
BASOPHILS # BLD AUTO: 0.02 K/UL
BASOPHILS NFR BLD: 0.1 %
BUN SERPL-MCNC: 27 MG/DL
BUN SERPL-MCNC: 28 MG/DL
BUN SERPL-MCNC: 30 MG/DL
BUN SERPL-MCNC: 34 MG/DL
CALCIUM SERPL-MCNC: 8.8 MG/DL
CALCIUM SERPL-MCNC: 8.9 MG/DL
CALCIUM SERPL-MCNC: 9 MG/DL
CALCIUM SERPL-MCNC: 9 MG/DL
CHLORIDE SERPL-SCNC: 106 MMOL/L
CHLORIDE SERPL-SCNC: 107 MMOL/L
CHLORIDE SERPL-SCNC: 108 MMOL/L
CHLORIDE SERPL-SCNC: 108 MMOL/L
CO2 SERPL-SCNC: 20 MMOL/L
CO2 SERPL-SCNC: 22 MMOL/L
CO2 SERPL-SCNC: 22 MMOL/L
CO2 SERPL-SCNC: 23 MMOL/L
CREAT SERPL-MCNC: 1.5 MG/DL
CREAT SERPL-MCNC: 1.8 MG/DL
CREAT SERPL-MCNC: 1.9 MG/DL
CREAT SERPL-MCNC: 2.2 MG/DL
CREAT UR-MCNC: 106.6 MG/DL
DELSYS: ABNORMAL
DIFFERENTIAL METHOD: ABNORMAL
EOSINOPHIL # BLD AUTO: 0 K/UL
EOSINOPHIL # BLD AUTO: 0.1 K/UL
EOSINOPHIL NFR BLD: 0 %
EOSINOPHIL NFR BLD: 0.1 %
EOSINOPHIL NFR BLD: 0.1 %
EOSINOPHIL NFR BLD: 0.3 %
ERYTHROCYTE [DISTWIDTH] IN BLOOD BY AUTOMATED COUNT: 13.2 %
ERYTHROCYTE [DISTWIDTH] IN BLOOD BY AUTOMATED COUNT: 13.2 %
ERYTHROCYTE [DISTWIDTH] IN BLOOD BY AUTOMATED COUNT: 13.3 %
ERYTHROCYTE [DISTWIDTH] IN BLOOD BY AUTOMATED COUNT: 13.4 %
EST. GFR  (AFRICAN AMERICAN): 31 ML/MIN/1.73 M^2
EST. GFR  (AFRICAN AMERICAN): 37 ML/MIN/1.73 M^2
EST. GFR  (AFRICAN AMERICAN): 40 ML/MIN/1.73 M^2
EST. GFR  (AFRICAN AMERICAN): 49 ML/MIN/1.73 M^2
EST. GFR  (NON AFRICAN AMERICAN): 27 ML/MIN/1.73 M^2
EST. GFR  (NON AFRICAN AMERICAN): 32 ML/MIN/1.73 M^2
EST. GFR  (NON AFRICAN AMERICAN): 34 ML/MIN/1.73 M^2
EST. GFR  (NON AFRICAN AMERICAN): 43 ML/MIN/1.73 M^2
FIO2: 21
FLOW: 3
FLOW: 3
GLUCOSE SERPL-MCNC: 180 MG/DL
GLUCOSE SERPL-MCNC: 195 MG/DL
GLUCOSE SERPL-MCNC: 200 MG/DL
GLUCOSE SERPL-MCNC: 221 MG/DL
HCO3 UR-SCNC: 20 MMOL/L (ref 24–28)
HCO3 UR-SCNC: 20.6 MMOL/L (ref 24–28)
HCO3 UR-SCNC: 21.9 MMOL/L (ref 24–28)
HCT VFR BLD AUTO: 32.9 %
HCT VFR BLD AUTO: 33.3 %
HCT VFR BLD AUTO: 33.4 %
HCT VFR BLD AUTO: 35 %
HGB BLD-MCNC: 10.8 G/DL
HGB BLD-MCNC: 11 G/DL
HGB BLD-MCNC: 11.2 G/DL
HGB BLD-MCNC: 11.4 G/DL
INR PPP: 1.1
LACTATE SERPL-SCNC: 1.1 MMOL/L
LACTATE SERPL-SCNC: 1.2 MMOL/L
LYMPHOCYTES # BLD AUTO: 0.4 K/UL
LYMPHOCYTES # BLD AUTO: 0.7 K/UL
LYMPHOCYTES # BLD AUTO: 1.2 K/UL
LYMPHOCYTES # BLD AUTO: 1.3 K/UL
LYMPHOCYTES NFR BLD: 2.1 %
LYMPHOCYTES NFR BLD: 3.5 %
LYMPHOCYTES NFR BLD: 6.2 %
LYMPHOCYTES NFR BLD: 6.4 %
MAGNESIUM SERPL-MCNC: 1.3 MG/DL
MAGNESIUM SERPL-MCNC: 2.1 MG/DL
MAGNESIUM SERPL-MCNC: 2.2 MG/DL
MCH RBC QN AUTO: 31 PG
MCH RBC QN AUTO: 31.1 PG
MCH RBC QN AUTO: 31.4 PG
MCH RBC QN AUTO: 31.5 PG
MCHC RBC AUTO-ENTMCNC: 32.6 G/DL
MCHC RBC AUTO-ENTMCNC: 32.8 G/DL
MCHC RBC AUTO-ENTMCNC: 33 G/DL
MCHC RBC AUTO-ENTMCNC: 33.5 G/DL
MCV RBC AUTO: 94 FL
MCV RBC AUTO: 95 FL
MODE: ABNORMAL
MONOCYTES # BLD AUTO: 0.9 K/UL
MONOCYTES # BLD AUTO: 1 K/UL
MONOCYTES # BLD AUTO: 1.2 K/UL
MONOCYTES # BLD AUTO: 1.3 K/UL
MONOCYTES NFR BLD: 4.5 %
MONOCYTES NFR BLD: 4.9 %
MONOCYTES NFR BLD: 6.1 %
MONOCYTES NFR BLD: 6.3 %
NEUTROPHILS # BLD AUTO: 16 K/UL
NEUTROPHILS # BLD AUTO: 17.9 K/UL
NEUTROPHILS # BLD AUTO: 18 K/UL
NEUTROPHILS # BLD AUTO: 18 K/UL
NEUTROPHILS NFR BLD: 86.6 %
NEUTROPHILS NFR BLD: 87.3 %
NEUTROPHILS NFR BLD: 91.9 %
NEUTROPHILS NFR BLD: 93.3 %
PCO2 BLDA: 32.4 MMHG (ref 35–45)
PCO2 BLDA: 34.2 MMHG (ref 35–45)
PCO2 BLDA: 38.1 MMHG (ref 35–45)
PH SMN: 7.37 [PH] (ref 7.35–7.45)
PH SMN: 7.39 [PH] (ref 7.35–7.45)
PH SMN: 7.4 [PH] (ref 7.35–7.45)
PHOSPHATE SERPL-MCNC: 4 MG/DL
PHOSPHATE SERPL-MCNC: 5.1 MG/DL
PHOSPHATE SERPL-MCNC: 5.3 MG/DL
PLATELET # BLD AUTO: 175 K/UL
PLATELET # BLD AUTO: 175 K/UL
PLATELET # BLD AUTO: 189 K/UL
PLATELET # BLD AUTO: 203 K/UL
PMV BLD AUTO: 11 FL
PMV BLD AUTO: 11.1 FL
PMV BLD AUTO: 11.2 FL
PMV BLD AUTO: 11.4 FL
PO2 BLDA: 107 MMHG (ref 80–100)
PO2 BLDA: 61 MMHG (ref 80–100)
PO2 BLDA: 90 MMHG (ref 80–100)
POC BE: -3 MMOL/L
POC BE: -4 MMOL/L
POC BE: -4 MMOL/L
POC SATURATED O2: 90 % (ref 95–100)
POC SATURATED O2: 97 % (ref 95–100)
POC SATURATED O2: 98 % (ref 95–100)
POC TCO2: 21 MMOL/L (ref 23–27)
POC TCO2: 22 MMOL/L (ref 23–27)
POC TCO2: 23 MMOL/L (ref 23–27)
POCT GLUCOSE: 128 MG/DL (ref 70–110)
POCT GLUCOSE: 136 MG/DL (ref 70–110)
POCT GLUCOSE: 173 MG/DL (ref 70–110)
POCT GLUCOSE: 174 MG/DL (ref 70–110)
POCT GLUCOSE: 232 MG/DL (ref 70–110)
POTASSIUM SERPL-SCNC: 4.3 MMOL/L
POTASSIUM SERPL-SCNC: 4.4 MMOL/L
POTASSIUM SERPL-SCNC: 4.5 MMOL/L
POTASSIUM SERPL-SCNC: 4.6 MMOL/L
PROTHROMBIN TIME: 11.4 SEC
PROTHROMBIN TIME: 11.4 SEC
PROTHROMBIN TIME: 11.8 SEC
RBC # BLD AUTO: 3.48 M/UL
RBC # BLD AUTO: 3.49 M/UL
RBC # BLD AUTO: 3.57 M/UL
RBC # BLD AUTO: 3.67 M/UL
SAMPLE: ABNORMAL
SITE: ABNORMAL
SODIUM SERPL-SCNC: 136 MMOL/L
SODIUM SERPL-SCNC: 138 MMOL/L
SODIUM SERPL-SCNC: 138 MMOL/L
SODIUM SERPL-SCNC: 139 MMOL/L
SODIUM UR-SCNC: 35 MMOL/L
SP02: 95
SP02: 97
SP02: 98
WBC # BLD AUTO: 18.43 K/UL
WBC # BLD AUTO: 19.34 K/UL
WBC # BLD AUTO: 19.61 K/UL
WBC # BLD AUTO: 20.51 K/UL

## 2018-02-20 PROCEDURE — 94799 UNLISTED PULMONARY SVC/PX: CPT

## 2018-02-20 PROCEDURE — 99223 1ST HOSP IP/OBS HIGH 75: CPT | Mod: ,,, | Performed by: INTERNAL MEDICINE

## 2018-02-20 PROCEDURE — G8997 SWALLOW GOAL STATUS: HCPCS | Mod: CI

## 2018-02-20 PROCEDURE — 82803 BLOOD GASES ANY COMBINATION: CPT

## 2018-02-20 PROCEDURE — 27000221 HC OXYGEN, UP TO 24 HOURS

## 2018-02-20 PROCEDURE — 25000003 PHARM REV CODE 250: Performed by: SURGERY

## 2018-02-20 PROCEDURE — 63600175 PHARM REV CODE 636 W HCPCS: Performed by: HOSPITALIST

## 2018-02-20 PROCEDURE — P9047 ALBUMIN (HUMAN), 25%, 50ML: HCPCS | Mod: JG | Performed by: INTERNAL MEDICINE

## 2018-02-20 PROCEDURE — G8998 SWALLOW D/C STATUS: HCPCS | Mod: CI

## 2018-02-20 PROCEDURE — 80048 BASIC METABOLIC PNL TOTAL CA: CPT | Mod: 91

## 2018-02-20 PROCEDURE — 84100 ASSAY OF PHOSPHORUS: CPT

## 2018-02-20 PROCEDURE — 92610 EVALUATE SWALLOWING FUNCTION: CPT

## 2018-02-20 PROCEDURE — 99232 SBSQ HOSP IP/OBS MODERATE 35: CPT | Mod: ,,, | Performed by: PSYCHIATRY & NEUROLOGY

## 2018-02-20 PROCEDURE — 20000000 HC ICU ROOM

## 2018-02-20 PROCEDURE — G8996 SWALLOW CURRENT STATUS: HCPCS | Mod: CI

## 2018-02-20 PROCEDURE — 82570 ASSAY OF URINE CREATININE: CPT

## 2018-02-20 PROCEDURE — 85730 THROMBOPLASTIN TIME PARTIAL: CPT | Mod: 91

## 2018-02-20 PROCEDURE — C9113 INJ PANTOPRAZOLE SODIUM, VIA: HCPCS | Performed by: SURGERY

## 2018-02-20 PROCEDURE — 99291 CRITICAL CARE FIRST HOUR: CPT | Mod: ,,, | Performed by: INTERNAL MEDICINE

## 2018-02-20 PROCEDURE — 83605 ASSAY OF LACTIC ACID: CPT

## 2018-02-20 PROCEDURE — 37799 UNLISTED PX VASCULAR SURGERY: CPT

## 2018-02-20 PROCEDURE — 85025 COMPLETE CBC W/AUTO DIFF WBC: CPT | Mod: 91

## 2018-02-20 PROCEDURE — 94761 N-INVAS EAR/PLS OXIMETRY MLT: CPT

## 2018-02-20 PROCEDURE — 63600175 PHARM REV CODE 636 W HCPCS: Performed by: SURGERY

## 2018-02-20 PROCEDURE — 94664 DEMO&/EVAL PT USE INHALER: CPT

## 2018-02-20 PROCEDURE — 85610 PROTHROMBIN TIME: CPT | Mod: 91

## 2018-02-20 PROCEDURE — 84300 ASSAY OF URINE SODIUM: CPT

## 2018-02-20 PROCEDURE — 36415 COLL VENOUS BLD VENIPUNCTURE: CPT

## 2018-02-20 PROCEDURE — 83735 ASSAY OF MAGNESIUM: CPT

## 2018-02-20 PROCEDURE — 99900035 HC TECH TIME PER 15 MIN (STAT)

## 2018-02-20 PROCEDURE — 63600175 PHARM REV CODE 636 W HCPCS: Mod: JG | Performed by: INTERNAL MEDICINE

## 2018-02-20 RX ORDER — ALBUMIN HUMAN 250 G/1000ML
25 SOLUTION INTRAVENOUS ONCE
Status: COMPLETED | OUTPATIENT
Start: 2018-02-20 | End: 2018-02-20

## 2018-02-20 RX ORDER — HEPARIN SODIUM 5000 [USP'U]/ML
5000 INJECTION, SOLUTION INTRAVENOUS; SUBCUTANEOUS EVERY 8 HOURS
Status: DISCONTINUED | OUTPATIENT
Start: 2018-02-20 | End: 2018-02-22 | Stop reason: HOSPADM

## 2018-02-20 RX ORDER — METOPROLOL TARTRATE 1 MG/ML
5 INJECTION, SOLUTION INTRAVENOUS ONCE
Status: COMPLETED | OUTPATIENT
Start: 2018-02-20 | End: 2018-02-20

## 2018-02-20 RX ORDER — NAPROXEN SODIUM 220 MG/1
81 TABLET, FILM COATED ORAL DAILY
Status: DISCONTINUED | OUTPATIENT
Start: 2018-02-20 | End: 2018-02-22 | Stop reason: HOSPADM

## 2018-02-20 RX ORDER — METOPROLOL SUCCINATE 50 MG/1
50 TABLET, EXTENDED RELEASE ORAL DAILY
Status: DISCONTINUED | OUTPATIENT
Start: 2018-02-20 | End: 2018-02-20

## 2018-02-20 RX ORDER — MAGNESIUM SULFATE 1 G/100ML
1 INJECTION INTRAVENOUS ONCE
Status: COMPLETED | OUTPATIENT
Start: 2018-02-20 | End: 2018-02-20

## 2018-02-20 RX ORDER — FUROSEMIDE 20 MG/1
20 TABLET ORAL DAILY
Status: DISCONTINUED | OUTPATIENT
Start: 2018-02-20 | End: 2018-02-20

## 2018-02-20 RX ORDER — METOPROLOL SUCCINATE 25 MG/1
25 TABLET, EXTENDED RELEASE ORAL DAILY
Status: DISCONTINUED | OUTPATIENT
Start: 2018-02-20 | End: 2018-02-21

## 2018-02-20 RX ORDER — ACETAMINOPHEN 325 MG/1
650 TABLET ORAL EVERY 6 HOURS
Status: DISCONTINUED | OUTPATIENT
Start: 2018-02-20 | End: 2018-02-22 | Stop reason: HOSPADM

## 2018-02-20 RX ORDER — CLOPIDOGREL BISULFATE 75 MG/1
75 TABLET ORAL DAILY
Status: DISCONTINUED | OUTPATIENT
Start: 2018-02-20 | End: 2018-02-22 | Stop reason: HOSPADM

## 2018-02-20 RX ADMIN — MUPIROCIN 1 G: 20 OINTMENT TOPICAL at 09:02

## 2018-02-20 RX ADMIN — ONDANSETRON HYDROCHLORIDE 4 MG: 2 INJECTION, SOLUTION INTRAMUSCULAR; INTRAVENOUS at 06:02

## 2018-02-20 RX ADMIN — ONDANSETRON HYDROCHLORIDE 4 MG: 2 INJECTION, SOLUTION INTRAMUSCULAR; INTRAVENOUS at 02:02

## 2018-02-20 RX ADMIN — ALBUMIN (HUMAN) 25 G: 12.5 SOLUTION INTRAVENOUS at 11:02

## 2018-02-20 RX ADMIN — ALBUMIN (HUMAN) 25 G: 12.5 SOLUTION INTRAVENOUS at 10:02

## 2018-02-20 RX ADMIN — ACETAMINOPHEN 650 MG: 325 TABLET, FILM COATED ORAL at 06:02

## 2018-02-20 RX ADMIN — MAGNESIUM SULFATE 1 G: 1 INJECTION INTRAVENOUS at 01:02

## 2018-02-20 RX ADMIN — CLOPIDOGREL BISULFATE 75 MG: 75 TABLET ORAL at 09:02

## 2018-02-20 RX ADMIN — ACETAMINOPHEN 650 MG: 325 TABLET, FILM COATED ORAL at 12:02

## 2018-02-20 RX ADMIN — ASPIRIN 81 MG 81 MG: 81 TABLET ORAL at 09:02

## 2018-02-20 RX ADMIN — ACETAMINOPHEN 650 MG: 325 TABLET, FILM COATED ORAL at 11:02

## 2018-02-20 RX ADMIN — PANTOPRAZOLE SODIUM 40 MG: 40 INJECTION, POWDER, FOR SOLUTION INTRAVENOUS at 06:02

## 2018-02-20 RX ADMIN — FUROSEMIDE 20 MG: 20 TABLET ORAL at 09:02

## 2018-02-20 RX ADMIN — HEPARIN SODIUM 5000 UNITS: 5000 INJECTION, SOLUTION INTRAVENOUS; SUBCUTANEOUS at 11:02

## 2018-02-20 RX ADMIN — ONDANSETRON HYDROCHLORIDE 4 MG: 2 INJECTION, SOLUTION INTRAMUSCULAR; INTRAVENOUS at 10:02

## 2018-02-20 RX ADMIN — HYDRALAZINE HYDROCHLORIDE 10 MG: 20 INJECTION INTRAMUSCULAR; INTRAVENOUS at 11:02

## 2018-02-20 RX ADMIN — ACETAMINOPHEN 1000 MG: 10 INJECTION, SOLUTION INTRAVENOUS at 06:02

## 2018-02-20 RX ADMIN — METOPROLOL TARTRATE 5 MG: 5 INJECTION, SOLUTION INTRAVENOUS at 06:02

## 2018-02-20 NOTE — PLAN OF CARE
Problem: Patient Care Overview  Goal: Plan of Care Review  Outcome: Ongoing (interventions implemented as appropriate)  Pt admitted is s/p Lt Endarterectomy.  Pt is in bigeminey and did have a run of 3 beats V-tach.  MD is aware.  Neuro intact.  Blood Glucose is 232.  Pt's Magnesium was 1.  Pt was given 2 gm of Magnesium and is now 2.2.  Pt with A-Line to Lt wrist.  Tylenol controls pain. Drsg to Lt side of neck.  Drsg clean, dry and intact.  Pt  3L NC with sats 94 - 100. No falls/injuries this shift.

## 2018-02-20 NOTE — PROGRESS NOTES
[facility]  Cardiology  Progress Note    Patient Name: Timbo Gallagher  MRN: [id number]  Admission Date: 2/17/2018  Hospital Length of Stay: 2 days  Code Status: Full Code   Attending Physician: Cirilo Montero MD   Primary Care Physician: Enrique Rivera MD  Expected Discharge Date:   Principal Problem:Amaurosis fugax    Subjective:     Hospital Course:   2/16/18: admitted with L ocular TIA, note made of severe L ICA stenosis.  2/19/18: L CEA, creat up to 1.9 post-op    Interval Hx: pt seen in ICU, case d.w Dr. Coleman and RN.  Pt sent to ICU postop for resp issues, now resolved.  No cp/sob.  L eye vision preserved.    Tele: SR, bigeminy/trigeminy, no AF (pers rev)        Review of Systems   Gastrointestinal: Negative for melena.   Genitourinary: Negative for hematuria.     Objective:     Vital Signs (Most Recent):  Temp: 97.7 °F (36.5 °C) (02/20/18 0800)  Pulse: 64 (02/20/18 0915)  Resp: (!) 24 (02/20/18 0915)  BP: (!) 91/50 (02/20/18 0915)  SpO2: 96 % (02/20/18 0915) Vital Signs (24h Range):  Temp:  [97.7 °F (36.5 °C)-98.4 °F (36.9 °C)] 97.7 °F (36.5 °C)  Pulse:  [] 64  Resp:  [10-36] 24  SpO2:  [79 %-100 %] 96 %  BP: ()/() 91/50  Arterial Line BP: ()/(12-90) 118/31     Weight: 84.4 kg (186 lb 1.1 oz)  Body mass index is 27.48 kg/m².     SpO2: 96 %  O2 Device (Oxygen Therapy): nasal cannula      Intake/Output Summary (Last 24 hours) at 02/20/18 0949  Last data filed at 02/20/18 0900   Gross per 24 hour   Intake           2603.5 ml   Output             1685 ml   Net            918.5 ml       Lines/Drains/Airways     Drain                 Urethral Catheter 02/19/18 1650 less than 1 day          Peripheral Intravenous Line                 Peripheral IV - Single Lumen 02/17/18 0830 Right Antecubital 3 days         Peripheral IV - Single Lumen 02/19/18 1500 Right Hand less than 1 day                Physical Exam   Constitutional: He is oriented to person, place,  and time. He appears well-developed and well-nourished.   HENT:   Head: Normocephalic and atraumatic.   Eyes: Conjunctivae and EOM are normal. No scleral icterus.   R pupil chr dilated   Neck: Normal range of motion. Neck supple. No JVD present. Carotid bruit is not present. No tracheal deviation present. No thyromegaly present.   L neck CEA incision c/d/i.   Cardiovascular: Normal rate, regular rhythm, S1 normal and S2 normal.   Occasional extrasystoles are present. Exam reveals distant heart sounds. Exam reveals no gallop and no friction rub.    Murmur heard.   Systolic murmur is present with a grade of 2/6   Pulses:       Carotid pulses are 2+ on the right side, and 2+ on the left side with bruit.  Pulmonary/Chest: Effort normal and breath sounds normal. No respiratory distress. He has no wheezes. He has no rales. He exhibits no tenderness.   Abdominal: Soft. Bowel sounds are normal. He exhibits no distension. There is no tenderness.   Musculoskeletal: He exhibits no edema.   Neurological: He is alert and oriented to person, place, and time. He has normal strength. A cranial nerve deficit (R eye blind) is present.   Skin: Skin is warm and dry. No rash noted.   Psychiatric: He has a normal mood and affect. His behavior is normal.       Current Medications:   acetaminophen  650 mg Oral Q6H    albumin human 25%  25 g Intravenous Once    aspirin  81 mg Oral Daily    clopidogrel  75 mg Oral Daily    furosemide  20 mg Oral Daily    metoprolol succinate  25 mg Oral Daily    mupirocin  1 g Nasal BID    ondansetron  4 mg Intravenous Q4H    pantoprazole  40 mg Intravenous Before breakfast      metoprolol       acetaminophen, dextrose 50%, glucagon (human recombinant), hydrALAZINE, insulin aspart U-100, labetalol, metoprolol, ondansetron    Laboratory:  CBC:    Recent Labs  Lab 02/19/18  2348 02/20/18  0343 02/20/18  0621   WHITE BLOOD CELL COUNT 19.34 H 19.61 H 20.51 H   HEMOGLOBIN 11.4 L 11.0 L 11.2 L    HEMATOCRIT 35.0 L 33.3 L 33.4 L   PLATELETS 175 175 203       CHEMISTRIES:    Recent Labs  Lab 02/19/18  1750 02/19/18  2348 02/20/18  0343 02/20/18  0621   GLUCOSE 216 H 195 H 221 H 200 H   SODIUM 141 138 139 138   POTASSIUM 4.0 4.3 4.6 4.5   BUN BLD 23 27 H 28 H 30 H   CREATININE 1.3 1.5 H 1.8 H 1.9 H   EGFR IF  59 A 49 A 40 A 37 A   EGFR IF NON- 51 A 43 A 34 A 32 A   CALCIUM 8.7 9.0 8.8 8.9   MAGNESIUM 1.0 L 1.3 L 2.2  --        CARDIAC BIOMARKERS:    Recent Labs  Lab 02/17/18  0831 02/17/18  1723 02/17/18  2345   TROPONIN I 0.019 0.029 H 0.023       COAGS:    Recent Labs  Lab 02/19/18  1750 02/19/18  2348 02/20/18  0343   INR 1.1 1.1 1.1       LIPIDS/LFTS:    Recent Labs  Lab 10/18/17  0815 01/27/18  0736 02/17/18  0831   CHOLESTEROL  --   --  147   TRIGLYCERIDES  --   --  72   HDL  --   --  37 L   LDL CHOLESTEROL  --   --  95.6   NON-HDL CHOLESTEROL  --   --  110   AST 15 16 11   ALT 5 L 11 <5 L     Lab Results   Component Value Date    TSH 1.450 02/17/2018           Diagnostic Results:  ECG (personally reviewed tracings):   2/17/18 0828 SR 87, multifocal PVCs     Chest X-Ray (personally reviewed image(s)):   2/17/18 NAD  2/20/18: NAD     CT-Head 2/17/18  Age-appropriate generalized cerebral volume loss with mild chronic microvascular ischemic change.  Prominence of the extra-axial space along the left frontal, parietal and temporal convexity is most likely related to central atrophy rather than a chronic subdural hygroma.  No evidence for acute intracranial hemorrhage correlation and further evaluation as warranted.     Echo: 2/18/18 (images pers rev)    1 - Mildly depressed left ventricular systolic function (EF 45-50%).  Inferoposterior hypokinesis.     2 - Concentric hypertrophy.     3 - Impaired LV relaxation, elevated LAP (grade 2 diastolic dysfunction).     4 - S/P transcatheter AVR, UNA = 1.85 cm2, AVAi = 0.94 cm2/m2, mean gradient = 9 mmHg.     5 - Moderate mitral  regurgitation.     6 - Mild tricuspid regurgitation.     7 - Pulmonary hypertension. The estimated PA systolic pressure is 61 mmHg.      Carotid US 2/17/18  #1. Findings consistent with less than 50% stenosis in the Right internal carotid artery .  #2. Findings consistent with greater than 70% stenosis in the Left internal carotid artery based on peak systolic velocity.  #3.  Incidentally noted is an irregular heartbeat.     TAVR 10/17/17  B. 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    No perivalvular leak post procedure per 2D echo.    Post deployment AV mean gradient 2.5 mmHg, Vmax 1.09 m/s.     Cath: 5/8/17 (images pers rev)  D. Hemodynamic Results  AO: 122/68 (93)  E. Angiographic Results       Patient has a right dominant coronary artery.      - Left Main Coronary Artery:             The LM is normal. There is ANASTASIIA 3 flow.     - Left Anterior Descending Artery:             The LAD has luminal irregularities. There is ANASTASIIA 3 flow.     - Left Circumflex Artery:             The LCX is normal. There is ANASTASIIA 3 flow.     - Right Coronary Artery:             The RCA has luminal irregularities. There is ANASTASIIA 3 flow.     - Radial Artery:             The Radial artery was not studied.     - D1:             The D1 has a 50% stenosis. There is ANASTASIIA 3 flow.     - Posterior Descending Artery:             The mid PDA has a 70% stenosis. There is ANASTASIIA 3 flow.      Assessment and Plan:     * Amaurosis fugax    S/P L CEA 2/19/18  Sxs c/w L ocular TIA.  Significant L ICA stenosis noted.  Cont ASA/Plavix.  Cont atorva 40mg qhs  Check lipids/LFT 3 months (mid May 2018)  Cont antihtn regimen.          Essential hypertension    Cont med rx.  Multifocal PVCs noted  Cont BBl as BP/HR will tolerate        Type 2 diabetes mellitus with renal complication    Per IM  Cont statin        S/P TAVR (transcatheter aortic valve replacement)    Normally functioning per  echo 2/2018.        Creatinine elevation    Noted post-op, ?related to intraop fluid shifts or overdiuresis post-op.  Consider neph eval if creat does not normalize.  Agree with holding ARB and further diuretics.  Resume ARB when creat normalizes.        Blindness of one eye    L ocular TIA, vision restored at present  Chr R retinal detachment            VTE Risk Mitigation         Ordered     Low Risk of VTE  Once      02/19/18 2121        Critical care time 30min    Ned Toribio MD  Cardiology  [facility]

## 2018-02-20 NOTE — ANESTHESIA POSTPROCEDURE EVALUATION
"Anesthesia Post Evaluation    Patient: Timbo Gallagher    Procedure(s) Performed: Procedure(s) (LRB):  ENDARTERECTOMY-CAROTID (Left)    Final Anesthesia Type: general  Patient location during evaluation: PACU  Patient participation: Yes- Able to Participate  Level of consciousness: confused and agitated  Post-procedure vital signs: reviewed and stable  Airway patency: patent  PONV status at discharge: No PONV  Anesthetic complications: no      Cardiovascular status: blood pressure returned to baseline  Respiratory status: BIPAP  Hydration status: euvolemic  Follow-up not needed.        Visit Vitals  BP (!) 91/50   Pulse 64   Temp 36.5 °C (97.7 °F) (Oral)   Resp (!) 24   Ht 5' 9" (1.753 m)   Wt 84.4 kg (186 lb 1.1 oz)   SpO2 96%   BMI 27.48 kg/m²       Pain/Asael Score: Pain Assessment Performed: Yes (2/20/2018  9:00 AM)  Presence of Pain: denies (2/20/2018  9:00 AM)  Pain Rating Prior to Med Admin: 3 (2/20/2018  6:15 AM)  Pain Rating Post Med Admin: 0 (2/20/2018  6:46 AM)  Asael Score: 9 (2/19/2018  6:00 PM)      "

## 2018-02-20 NOTE — ASSESSMENT & PLAN NOTE
S/P L CEA 2/19/18  Sxs c/w L ocular TIA.  Significant L ICA stenosis noted.  Cont ASA/Plavix.  Cont atorva 40mg qhs  Check lipids/LFT 3 months (mid May 2018)  Cont antihtn regimen.

## 2018-02-20 NOTE — SUBJECTIVE & OBJECTIVE
Review of Systems   Gastrointestinal: Negative for melena.   Genitourinary: Negative for hematuria.     Objective:     Vital Signs (Most Recent):  Temp: 97.7 °F (36.5 °C) (02/20/18 0800)  Pulse: 64 (02/20/18 0915)  Resp: (!) 24 (02/20/18 0915)  BP: (!) 91/50 (02/20/18 0915)  SpO2: 96 % (02/20/18 0915) Vital Signs (24h Range):  Temp:  [97.7 °F (36.5 °C)-98.4 °F (36.9 °C)] 97.7 °F (36.5 °C)  Pulse:  [] 64  Resp:  [10-36] 24  SpO2:  [79 %-100 %] 96 %  BP: ()/() 91/50  Arterial Line BP: ()/(12-90) 118/31     Weight: 84.4 kg (186 lb 1.1 oz)  Body mass index is 27.48 kg/m².     SpO2: 96 %  O2 Device (Oxygen Therapy): nasal cannula      Intake/Output Summary (Last 24 hours) at 02/20/18 0949  Last data filed at 02/20/18 0900   Gross per 24 hour   Intake           2603.5 ml   Output             1685 ml   Net            918.5 ml       Lines/Drains/Airways     Drain                 Urethral Catheter 02/19/18 1650 less than 1 day          Peripheral Intravenous Line                 Peripheral IV - Single Lumen 02/17/18 0830 Right Antecubital 3 days         Peripheral IV - Single Lumen 02/19/18 1500 Right Hand less than 1 day                Physical Exam   Constitutional: He is oriented to person, place, and time. He appears well-developed and well-nourished.   HENT:   Head: Normocephalic and atraumatic.   Eyes: Conjunctivae and EOM are normal. No scleral icterus.   R pupil chr dilated   Neck: Normal range of motion. Neck supple. No JVD present. Carotid bruit is not present. No tracheal deviation present. No thyromegaly present.   L neck CEA incision c/d/i.   Cardiovascular: Normal rate, regular rhythm, S1 normal and S2 normal.   Occasional extrasystoles are present. Exam reveals distant heart sounds. Exam reveals no gallop and no friction rub.    Murmur heard.   Systolic murmur is present with a grade of 2/6   Pulses:       Carotid pulses are 2+ on the right side, and 2+ on the left side with  bruit.  Pulmonary/Chest: Effort normal and breath sounds normal. No respiratory distress. He has no wheezes. He has no rales. He exhibits no tenderness.   Abdominal: Soft. Bowel sounds are normal. He exhibits no distension. There is no tenderness.   Musculoskeletal: He exhibits no edema.   Neurological: He is alert and oriented to person, place, and time. He has normal strength. A cranial nerve deficit (R eye blind) is present.   Skin: Skin is warm and dry. No rash noted.   Psychiatric: He has a normal mood and affect. His behavior is normal.       Current Medications:   acetaminophen  650 mg Oral Q6H    albumin human 25%  25 g Intravenous Once    aspirin  81 mg Oral Daily    clopidogrel  75 mg Oral Daily    furosemide  20 mg Oral Daily    metoprolol succinate  25 mg Oral Daily    mupirocin  1 g Nasal BID    ondansetron  4 mg Intravenous Q4H    pantoprazole  40 mg Intravenous Before breakfast      metoprolol       acetaminophen, dextrose 50%, glucagon (human recombinant), hydrALAZINE, insulin aspart U-100, labetalol, metoprolol, ondansetron    Laboratory:  CBC:    Recent Labs  Lab 02/19/18 2348 02/20/18  0343 02/20/18  0621   WHITE BLOOD CELL COUNT 19.34 H 19.61 H 20.51 H   HEMOGLOBIN 11.4 L 11.0 L 11.2 L   HEMATOCRIT 35.0 L 33.3 L 33.4 L   PLATELETS 175 175 203       CHEMISTRIES:    Recent Labs  Lab 02/19/18  1750 02/19/18  2348 02/20/18  0343 02/20/18  0621   GLUCOSE 216 H 195 H 221 H 200 H   SODIUM 141 138 139 138   POTASSIUM 4.0 4.3 4.6 4.5   BUN BLD 23 27 H 28 H 30 H   CREATININE 1.3 1.5 H 1.8 H 1.9 H   EGFR IF  59 A 49 A 40 A 37 A   EGFR IF NON- 51 A 43 A 34 A 32 A   CALCIUM 8.7 9.0 8.8 8.9   MAGNESIUM 1.0 L 1.3 L 2.2  --        CARDIAC BIOMARKERS:    Recent Labs  Lab 02/17/18  0831 02/17/18  1723 02/17/18  2345   TROPONIN I 0.019 0.029 H 0.023       COAGS:    Recent Labs  Lab 02/19/18  1750 02/19/18  2348 02/20/18  0343   INR 1.1 1.1 1.1       LIPIDS/LFTS:    Recent  Labs  Lab 10/18/17  0815 01/27/18  0736 02/17/18  0831   CHOLESTEROL  --   --  147   TRIGLYCERIDES  --   --  72   HDL  --   --  37 L   LDL CHOLESTEROL  --   --  95.6   NON-HDL CHOLESTEROL  --   --  110   AST 15 16 11   ALT 5 L 11 <5 L     Lab Results   Component Value Date    TSH 1.450 02/17/2018           Diagnostic Results:  ECG (personally reviewed tracings):   2/17/18 0828 SR 87, multifocal PVCs     Chest X-Ray (personally reviewed image(s)):   2/17/18 NAD  2/20/18: NAD     CT-Head 2/17/18  Age-appropriate generalized cerebral volume loss with mild chronic microvascular ischemic change.  Prominence of the extra-axial space along the left frontal, parietal and temporal convexity is most likely related to central atrophy rather than a chronic subdural hygroma.  No evidence for acute intracranial hemorrhage correlation and further evaluation as warranted.     Echo: 2/18/18 (images pers rev)    1 - Mildly depressed left ventricular systolic function (EF 45-50%).  Inferoposterior hypokinesis.     2 - Concentric hypertrophy.     3 - Impaired LV relaxation, elevated LAP (grade 2 diastolic dysfunction).     4 - S/P transcatheter AVR, UNA = 1.85 cm2, AVAi = 0.94 cm2/m2, mean gradient = 9 mmHg.     5 - Moderate mitral regurgitation.     6 - Mild tricuspid regurgitation.     7 - Pulmonary hypertension. The estimated PA systolic pressure is 61 mmHg.      Carotid US 2/17/18  #1. Findings consistent with less than 50% stenosis in the Right internal carotid artery .  #2. Findings consistent with greater than 70% stenosis in the Left internal carotid artery based on peak systolic velocity.  #3.  Incidentally noted is an irregular heartbeat.     TAVR 10/17/17  B. 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    No perivalvular leak post procedure per 2D echo.    Post deployment AV mean gradient 2.5 mmHg, Vmax 1.09 m/s.     Cath: 5/8/17 (images pers rev)  STEPHANIE  Hemodynamic Results  AO: 122/68 (93)  E. Angiographic Results       Patient has a right dominant coronary artery.      - Left Main Coronary Artery:             The LM is normal. There is ANASTASIIA 3 flow.     - Left Anterior Descending Artery:             The LAD has luminal irregularities. There is ANASTASIIA 3 flow.     - Left Circumflex Artery:             The LCX is normal. There is ANASTASIIA 3 flow.     - Right Coronary Artery:             The RCA has luminal irregularities. There is ANASTASIIA 3 flow.     - Radial Artery:             The Radial artery was not studied.     - D1:             The D1 has a 50% stenosis. There is ANASTASIIA 3 flow.     - Posterior Descending Artery:             The mid PDA has a 70% stenosis. There is ANASTASIIA 3 flow.

## 2018-02-20 NOTE — PT/OT/SLP EVAL
Speech Language Pathology Evaluation  Bedside Swallow    Patient Name:  Timbo Gallagher   MRN:  [id number]  Admitting Diagnosis: Amaurosis fugax    Recommendations:                 General Recommendations:  Follow-up not indicated  Diet recommendations:  Dental Soft, Thin   Aspiration Precautions: Meds crushed in puree followed by thin liquids  General Precautions: Standard,    Communication strategies:  none    History:     Past Medical History:   Diagnosis Date    Arthritis     Blind right eye     BPH (benign prostatic hypertrophy)     Bronchitis, chronic     CHF (congestive heart failure)     Colon polyp     Diabetes mellitus     GERD (gastroesophageal reflux disease)     Hearing aid worn     bilateral    Hernia     Hypertension     Irregular heart beat     Snoring        Past Surgical History:   Procedure Laterality Date    CARDIAC VALVE SURGERY      CATARACT EXTRACTION, BILATERAL      EYE SURGERY      RETINAL DETACHMENT SURGERY         Social History: Patient lives with wife who is currently in SNF per Pt report    Modified Barium Swallow: n/a for MBSS however esophagram in late 2016 revealed:  #1. Large grossly stable distal esophageal diverticulum with reflux as above.  #2.  Esophageal dysmotility.    Chest X-Rays: 2/20 Mild pulmonary vascular congestion and perihilar edema.    Prior diet: unrestricted    Subjective   Pt reporting he keeps a basin by his bed at night and by the end of the night it is full of refluxed material. Pt reports cognitive linguistic skills are at baseline, language skills at the  level of conversation was wnl.     Patient goals: d/c to home    Objective:     Oral Musculature Evaluation  · Oral Musculature: WFL  · Dentition: edentulous (reports he only uses dentures when he's eating in public)  · Mandibular Strength and Mobility: WFL  · Oral Labial Strength and Mobility: WFL  · Lingual Strength and Mobility: WFL  · Voice Prior to PO Intake: wfl    Bedside Swallow  Eval:   Consistencies Assessed:  · Thin liquids 3oz across multiple trials via straw  · Puree X1  · Solids X1 cracker     Oral Phase:   · WFL    Pharyngeal Phase:   · no overt clinical signs/symptoms of aspiration    Compensatory Strategies  · None    Treatment: no further ST is warranted.    Assessment:     Timbo Gallagher is a 82 y.o. male with  diagnosis of  Amaurosis fugax he presents with functional oral and pharyngeal phases of swallow he has PMHx of esophageal dysphagia. Please note silent aspiration cannot be r/o at bedside.     Goals:    SLP Goals        Problem: SLP Goal    Goal Priority Disciplines Outcome   SLP Goal    Low SLP Ongoing (interventions implemented as appropriate)   Description:  Pt will participate in bedside swallow study.                     Plan:     · Plan of Care expires:  02/20/18  · Plan of Care reviewed with:  patient   · SLP Follow-Up:  No       Discharge recommendations:  home   Barriers to Discharge:  None- from ST standpoint    Time Tracking:     SLP Treatment Date:   02/20/18  Speech Start Time:  1010  Speech Stop Time:  1020     Speech Total Time (min):  10 min    Billable Minutes: Eval Swallow and Oral Function 10    Deisy Love CCC-SLP  02/20/2018

## 2018-02-20 NOTE — PROGRESS NOTES
Dr. Coleman phoned to notify of low UOP via watson catheter. 1200: 30ml, 1300: 15ml, 1400: 15ml. Urine appears red, blood tinged.   New orders received: flush watson catheter, obtain bladder scan. Bladder scan volume: 85ml. Watson catheter flushed without difficulty. No clots returned. No pain or distress caused to patient. All volume returned easily.     1450: Dr. Coleman at bedside. Aware of BS results. No new orders at this time.

## 2018-02-20 NOTE — OR NURSING
Dr Lyons in attendance upon transfer to ICU.  Patient follows commands.  Able to stick tongue out in midline and wiggle it left and right.  Resp effort WNL.  O2 in use per NC.

## 2018-02-20 NOTE — PLAN OF CARE
Problem: SLP Goal  Goal: SLP Goal  Pt will participate in bedside swallow study.   Outcome: Ongoing (interventions implemented as appropriate)  2/20 ST RECS: dental soft with thin liquids, oral meds crushed in puree followed by thin liquids (Pt with reported esophageal diverticula) NO further ST is warranted. Deisy Love, PATTI-SLP

## 2018-02-20 NOTE — ANESTHESIA PREPROCEDURE EVALUATION
02/20/2018  Timbo Gallagher is a 82 y.o., male with aortic stenosis, DM2, GERD, and diastolic HF here for the procedure below.    Pre-operative evaluation for ENDARTERECTOMY-CAROTID (Left)    Previous Airway:  Placement Date: 09/11/13; Placement Time: 1021; Method of Intubation: Direct laryngoscopy; Inserted by: CRNA; Airway Device: Endotracheal Tube-Hi/Lo; Mask Ventilation: Easy - oral; Intubated: Postinduction; Blade: Gastelum #2; Airway Device Size: 7.5; Style: Cuffed (lido 2% jelly applied to ETT); Cuff Inflation: Minimal occlusive pressure; Placement Verified By: Auscultation, Capnometry; Grade: Grade I; Complicating Factors: None; Intubation Findings: Positive EtCO2, Bilateral breath sounds, Atraumatic/Condition of teeth unchanged;  Depth of Insertion: 21; Securment: Lips; Complications: None; Breath Sounds: Equal Bilateral; Insertion Attempts: 1; Removal Date: 09/11/13;  Removal Time: 1130       Past Surgical History:   Procedure Laterality Date    CARDIAC VALVE SURGERY      CATARACT EXTRACTION, BILATERAL      EYE SURGERY      RETINAL DETACHMENT SURGERY           Vital Signs Range (Last 24H):  Temp:  [36.7 °C (98 °F)-36.9 °C (98.4 °F)]   Pulse:  []   Resp:  [10-36]   BP: (116-223)/()   SpO2:  [79 %-100 %]   Arterial Line BP: ()/(12-90)       CBC:       Recent Labs  Lab 01/27/18  0736 02/17/18  0831 02/18/18  0447 02/19/18  0524 02/19/18  1750 02/19/18  2348 02/20/18  0343 02/20/18  0621   WBC 11.91 11.64 9.71 10.15 17.71* 19.34* 19.61* 20.51*   RBC 3.86* 3.99* 3.85* 4.02* 3.83* 3.67* 3.49* 3.57*   HGB 12.3* 12.5* 12.1* 12.7* 11.9* 11.4* 11.0* 11.2*   HCT 36.9* 37.8* 37.2* 38.1* 36.8* 35.0* 33.3* 33.4*    222 214 214 215 175 175 203   MCV 96 95 97 95 96 95 95 94   MCH 31.9* 31.3* 31.4* 31.6* 31.1* 31.1* 31.5* 31.4*   MCHC 33.3 33.1 32.5 33.3 32.3 32.6 33.0 33.5       CMP:      Recent Labs  Lab 01/27/18  0736 02/17/18  0831 02/18/18  0447 02/19/18  0524 02/19/18  1750 02/19/18  2348 02/20/18  0343 02/20/18  0621    142 142 141 141 138 139 138   K 4.2 4.4 4.1 3.9 4.0 4.3 4.6 4.5    109 111* 109 109 108 108 107   CO2 23 23 24 24 22* 20* 22* 23   BUN 36* 33* 24* 21 23 27* 28* 30*   CREATININE 1.8* 1.3 1.1 1.1 1.3 1.5* 1.8* 1.9*   * 188* 125* 132* 216* 195* 221* 200*   MG 1.8  --   --   --  1.0* 1.3* 2.2  --    PHOS  --   --   --   --   --  4.0 5.3*  --    CALCIUM 9.1 9.6 9.3 9.5 8.7 9.0 8.8 8.9   ALBUMIN 3.3* 3.3*  --   --   --   --   --   --    PROT 6.8 7.3  --   --   --   --   --   --    ALKPHOS 89 90  --   --   --   --   --   --    ALT 11 <5*  --   --   --   --   --   --    AST 16 11  --   --   --   --   --   --    BILITOT 0.4 0.3  --   --   --   --   --   --        INR:    Recent Labs  Lab 02/17/18  0831 02/19/18 1750 02/19/18 2348 02/20/18  0343   INR 1.0 1.1 1.1 1.1   APTT  --  23.8 25.5 25.2         Diagnostic Studies:  Stress test 5/1/17:  EKG Conclusions:  1. The EKG portion of this study is negative for ischemia at a peak heart rate of 131 bpm (95% of predicted).   2. Blood pressure remained stable throughout the protocol  (Presenting BP: 183/84 Peak BP: 185/77).   3. The following arrhythmias were present: very frequent PVCs.   4. There were no symptoms of chest discomfort or significant dyspnea throughout the protocol.  ECHO:    1 - Mild left ventricular enlargement.     2 - Mildly to moderately depressed left ventricular systolic function (EF 40-45%).     3 - Normal right ventricular systolic function .     4 - Biatrial enlargement.     5 - Moderate to severe tricuspid regurgitation.     6 - Severe mitral regurgitation.     7 - Severe aortic stenosis, UNA = 0.75 cm2, peak velocity = 3.9 m/s, mean gradient = 36.0 mmHg.  Velocity and gradient increase with dobutamine as above    8 - Pulmonary hypertension. The estimated PA systolic pressure is 68 mmHg.      9 - Mildly elevated central venous pressure.        Left Heart Cath:     - Left Main Coronary Artery:             The LM is normal. There is ANASTASIIA 3 flow.     - Left Anterior Descending Artery:             The LAD has luminal irregularities. There is ANASTASIIA 3 flow.     - Left Circumflex Artery:             The LCX is normal. There is ANASTASIIA 3 flow.     - Right Coronary Artery:             The RCA has luminal irregularities. There is ANASTASIIA 3 flow.     - Radial Artery:             The Radial artery was not studied.     - D1:             The D1 has a 50% stenosis. There is ANASTASIIA 3 flow.     - Posterior Descending Artery:             The mid PDA has a 70% stenosis. There is ANASTASIIA 3 flow.    EKG:  Vent. Rate : 077 BPM     Atrial Rate : 077 BPM     P-R Int : 146 ms          QRS Dur : 100 ms      QT Int : 426 ms       P-R-T Axes : 066 067 058 degrees     QTc Int : 482 ms    Normal sinus rhythm  Intermittent Premature ventricular complexes and prematutre atrial  complexes  Incomplete right bundle branch block  Nonspecific ST abnormality  Abnormal ECG  when compared to previous EKG there were no significant changes    Confirmed by fellow MD Alessandro (Fellow's Unofficial Report), Vernon ([id number])  on 5/8/2017 10:34:06 AM  Confirmed by Marleen Huerta MD ([id number]) on 5/8/2017 11:31:53 AM    2D Echo:  Aorta: The aortic root is normal in size, measuring 2.7 cm at sinotubular junction and 3.5 cm at Sinuses of Valsalva. The proximal ascending aorta is normal in size, measuring 3.0 cm across.     Left Atrium: The left atrial volume index is moderately enlarged, measuring 47.78 cc/m2.     Left Ventricle: The left ventricle is normal in size, with an end-diastolic diameter of 5.3 cm, and an end-systolic diameter of 3.9 cm. LV wall thickness is normal, with the septum measuring 1.0 cm and the posterior wall measuring 0.8 cm across. Relative   wall thickness was normal at 0.30, and the LV mass index was 91.0 g/m2 consistent with normal left  ventricular mass. The inferolateral wall is akinetic. The lateral wall is hypokinetic. The following segments were akinetic: basal inferior wall.  The following segments were hypokinetic: mid anterior wall.  Global left ventricular systolic function appears mildly to moderately depressed. Visually estimated ejection fraction is 40-45%. The LV Doppler derived stroke volume equals 61.0 ccs.   There is blunted systolic/diastolic flow in the pulmonary vein indicating increased left atrial pressures. The E/e'(sep) is 15.  This along with the following abnormalities (RICHARD = 47.78) suggests significant diastolic dysfunction.     Right Atrium: The right atrium is normal in size, measuring 5.5 cm in length and 4.0 cm in width in the apical view.     Right Ventricle: The right ventricle is normal in size measuring 3.7 cm at the base in the apical right ventricle-focused view. Global right ventricular systolic function appears normal. Tricuspid annular plane systolic excursion (TAPSE) is 3.0 cm.   Tissue Doppler-derived tricuspid annular peak systolic velocity (S prime) is 14.3 cm/s. The estimated PA systolic pressure is 58 mmHg.     Aortic Valve:  The aortic valve is mildly sclerotic with markedly restricted leaflet mobility. The aortic valve is tri-leaflet in structure. The peak velocity obtained across the aortic valve is 3.3 m/s, which translates to a peak gradient of 44.0 mmHg.   The mean gradient is 30.0 mmHg. Using a left ventricular outflow tract diameter of 2.2 cm, a left ventricular outflow tract velocity time integral of 16 cm, and a peak instantaneous transvalvular velocity time integral of 78 cm, the calculated aortic   valve area is 0.78 cm2, consistent with moderate to severe aortic stenosis. Consistent with low output AS.    Mitral Valve:  The mitral valve is normal in structure with mildly restricted leaflet mobility. There is severe mitral regurgitation.     Tricuspid Valve:  The tricuspid valve is normal in  structure with normal leaflet mobility. There is mild tricuspid regurgitation.     Pulmonary Valve:  The pulmonic valve is normal in structure with normal leaflet mobility.     IVC: IVC is enlarged but collapses > 50% with a sniff, suggesting intermediate right atrial pressure of 8 mmHg.     Intracavitary: There is no evidence of pericardial effusion, intracavity mass, thrombi, or vegetation.     CONCLUSIONS     1 - Mildly to moderately depressed left ventricular systolic function (EF 40-45%).     2 - Wall motion abnormality.     3 - Moderate left atrial enlargement.     4 - Left ventricular diastolic dysfunction.     5 - Low output AS.moderate to severe aortic stenosis, UNA = 0.78 cm2, peak velocity = 3.3 m/s, mean gradient = 30.0 mmHg.     6 - Severe mitral regurgitation.     7 - Mild tricuspid regurgitation.     8 - Pulmonary hypertension. The estimated PA systolic pressure is 58 mmHg.       Pre-op Assessment    I have reviewed the Patient Summary Reports.     I have reviewed the Nursing Notes.   I have reviewed the Medications.     Review of Systems  Anesthesia Hx:  No problems with previous Anesthesia  History of prior surgery of interest to airway management or planning: Denies Family Hx of Anesthesia complications.   Denies Personal Hx of Anesthesia complications.   Social:  Smoker    Cardiovascular:   Hypertension Valvular problems/Murmurs, AS Dysrhythmias CHF    Pulmonary:   Denies COPD.  Denies Asthma.    Renal/:   Chronic Renal Disease    Hepatic/GI:   Hiatal Hernia, GERD    Neurological:   Denies CVA. Denies Seizures.    Endocrine:   Diabetes        Physical Exam  General:  Well nourished    Airway/Jaw/Neck:  Airway Findings: Mouth Opening: Normal Tongue: Normal  General Airway Assessment: Adult  Mallampati: III  Improves to II with phonation.  TM Distance: Normal, at least 6 cm  Jaw/Neck Findings:  Neck ROM: Normal ROM      Dental:  Dental Findings: In tact, Upper Dentures, Lower Dentures    Chest/Lungs:  Chest/Lungs Findings: Clear to auscultation, Normal Respiratory Rate     Heart/Vascular:  Heart Findings: Rate: Normal  Rhythm: Regular Rhythm  Sounds: Normal        Mental Status:  Mental Status Findings:  Cooperative, Alert and Oriented         Anesthesia Plan  Type of Anesthesia, risks & benefits discussed:  Anesthesia Type:  general  Patient's Preference:   Intra-op Monitoring Plan: arterial line, central line, [place] and standard ASA monitors  Intra-op Monitoring Plan Comments:   Post Op Pain Control Plan: multimodal analgesia  Post Op Pain Control Plan Comments:   Induction:   IV  Beta Blocker:  Patient is on a Beta-Blocker and has received one dose within the past 24 hours (No further documentation required).       Informed Consent: Patient understands risks and agrees with Anesthesia plan.  Questions answered. Anesthesia consent signed with patient.  ASA Score: 4     Day of Surgery Review of History & Physical:    H&P update referred to the surgeon.         Ready For Surgery From Anesthesia Perspective.

## 2018-02-20 NOTE — SUBJECTIVE & OBJECTIVE
Interval History: Pt is s/p CEA.  Had some difficulties post-op with respiratory distress and was placed on BiPap and admitted to ICU.  Pt still having elevations in his respiratory rate.  Pt was given 2 doses of Lasix post-op as CXR showed pulmonary edema and a right pleural effusion    Review of Systems   All other systems reviewed and are negative.    Scheduled Meds:   acetaminophen  650 mg Oral Q6H    aspirin  81 mg Oral Daily    clopidogrel  75 mg Oral Daily    furosemide  20 mg Oral Daily    metoprolol succinate  25 mg Oral Daily    mupirocin  1 g Nasal BID    ondansetron  4 mg Intravenous Q4H    pantoprazole  40 mg Intravenous Before breakfast     Continuous Infusions:   lactated ringers 50 mL/hr at 02/20/18 0700    metoprolol       PRN Meds:.acetaminophen, dextrose 50%, glucagon (human recombinant), hydrALAZINE, insulin aspart U-100, labetalol, metoprolol, ondansetron    Objective:     Vital Signs (Most Recent):  Temp: 98.4 °F (36.9 °C) (02/20/18 0400)  Pulse: 70 (02/20/18 0805)  Resp: (!) 36 (02/20/18 0805)  BP: 116/66 (02/20/18 0805)  SpO2: 100 % (02/20/18 0805) Vital Signs (24h Range):  Temp:  [98 °F (36.7 °C)-98.4 °F (36.9 °C)] 98.4 °F (36.9 °C)  Pulse:  [] 70  Resp:  [10-36] 36  SpO2:  [79 %-100 %] 100 %  BP: (116-223)/() 116/66  Arterial Line BP: ()/(12-90) 113/38     Weight: 84.4 kg (186 lb 1.1 oz)  Body mass index is 27.48 kg/m².    Intake/Output Summary (Last 24 hours) at 02/20/18 0833  Last data filed at 02/20/18 0700   Gross per 24 hour   Intake             2036 ml   Output             1645 ml   Net              391 ml      Physical Exam   Constitutional: He is oriented to person, place, and time. He appears well-developed and well-nourished.   HENT:   Head: Normocephalic and atraumatic.   Eyes: EOM are normal. Pupils are equal, round, and reactive to light.   Neck: Neck supple.   Cardiovascular: Normal rate, regular rhythm and normal heart sounds.    Pulmonary/Chest:  Effort normal. He has wheezes in the right lower field.   Abdominal: Soft. Bowel sounds are normal.   Neurological: He is alert and oriented to person, place, and time.   Skin: Skin is warm and dry.   Psychiatric: He has a normal mood and affect.   Vitals reviewed.      Significant Labs:   CBC:     Recent Labs  Lab 02/19/18 2348 02/20/18  0343 02/20/18  0621   WBC 19.34* 19.61* 20.51*   HGB 11.4* 11.0* 11.2*   HCT 35.0* 33.3* 33.4*    175 203     CMP:     Recent Labs  Lab 02/19/18  2348 02/20/18  0343 02/20/18  0621    139 138   K 4.3 4.6 4.5    108 107   CO2 20* 22* 23   * 221* 200*   BUN 27* 28* 30*   CREATININE 1.5* 1.8* 1.9*   CALCIUM 9.0 8.8 8.9   ANIONGAP 10 9 8   EGFRNONAA 43* 34* 32*     POCT Glucose:     Recent Labs  Lab 02/19/18  2349 02/20/18  0420 02/20/18  0749   POCTGLUCOSE 185* 232* 174*       Significant Imaging: no new imaging

## 2018-02-20 NOTE — ASSESSMENT & PLAN NOTE
Noted post-op, ?related to intraop fluid shifts or overdiuresis post-op.  Consider neph eval if creat does not normalize.  Agree with holding ARB and further diuretics.  Resume ARB when creat normalizes.

## 2018-02-20 NOTE — PROGRESS NOTES
Discharge planning--discussion with Dr Coleman regarding discharge plan. Due to patient limited help at home, and patient current issues, SNF may be the best plan at this time. This was discussed with patient, wife and daughter Michell earlier today including [facility] and [facility]. STACY called [facility] STACY updated with daughter Michell who is driving with her mother just now. Michell will update other family. Patient is sleeping, will update with him at later time.   Spoke with Moriah at [facility]--expect beds this week--can end information. Spoke with Elvia at [facility]--will have bed by tomorrow--can send preliminary information.  STACY will continue to follow.

## 2018-02-20 NOTE — PROGRESS NOTES
[facility]  Neurology  Progress Note    Patient Name: Timbo Gallagher  MRN: [id number]  Admission Date: 2/17/2018  Hospital Length of Stay: 2 days  Code Status: Full Code   Attending Provider: Cirilo Montero MD  Primary Care Physician: Cirilo Montero MD   Principal Problem:Amaurosis fugax    Subjective:     Interval History: 83 y/o male with medical Hx as below comes to ED after experiencing loss of vision. Pt states that episode lasted 4-5 minutes. He is already blind on the right eye. Mr. Gallagher reports that he seemed to feel unbalanced as well. No weakness or numbness, speech disturbances. No previous episodes. Denies Hx of stroke.     -2/18/18: No visual or speech disturbances. Denies loss of vision.     -2/9/18: Mr. Gallagher reports no new symptoms.    -2/20/18: Pt is POD #1. Had episode of hypoxia and agitation right after surgerybut today back to his basline. Alert and fully oriented.    Current Neurological Medications:     Current Facility-Administered Medications   Medication Dose Route Frequency Provider Last Rate Last Dose    acetaminophen suppository 650 mg  650 mg Rectal Q4H PRN Silvio Coleman MD        acetaminophen tablet 650 mg  650 mg Oral Q6H Silvio Coleman MD   650 mg at 02/20/18 1246    aspirin chewable tablet 81 mg  81 mg Oral Daily Silvio Coleman MD   81 mg at 02/20/18 0907    clopidogrel tablet 75 mg  75 mg Oral Daily Silvio Coleman MD   75 mg at 02/20/18 0907    dextrose 50% injection 12.5 g  12.5 g Intravenous PRN Silvio Coleman MD        glucagon (human recombinant) injection 1 mg  1 mg Intramuscular PRN Silvio Coleman MD        hydrALAZINE injection 10 mg  10 mg Intravenous Q4H PRN Silvio Coleman MD        insulin aspart U-100 pen 0-5 Units  0-5 Units Subcutaneous Q6H PRN Silvio Coleman MD        labetalol injection 10 mg  10 mg Intravenous Q2H PRN Silvio Coleman MD        metoprolol injection 5 mg  5 mg  Intravenous Continuous PRN Silvio Coleman MD        metoprolol succinate (TOPROL-XL) 24 hr tablet 25 mg  25 mg Oral Daily Sherry Boone MD        mupirocin 2 % ointment 1 g  1 g Nasal BID Silvio Coleman MD   1 g at 02/20/18 0907    ondansetron injection 4 mg  4 mg Intravenous Q4H Silvio Coleman MD   4 mg at 02/20/18 1014    ondansetron injection 4 mg  4 mg Intravenous Q12H PRN Silvio Coleman MD        pantoprazole injection 40 mg  40 mg Intravenous Before breakfast Silvio Coleman MD   40 mg at 02/20/18 0615       Review of Systems   Constitutional: Negative for fever.   HENT: Negative for trouble swallowing.    Eyes: Negative for photophobia.   Respiratory: Negative for shortness of breath.    Cardiovascular: Negative for palpitations.   Gastrointestinal: Negative for abdominal pain.   Genitourinary: Negative for dysuria.   Musculoskeletal: Negative for back pain.   Neurological: Negative for facial asymmetry and light-headedness.     Objective:     Vital Signs (Most Recent):  Temp: 97.7 °F (36.5 °C) (02/20/18 0800)  Pulse: 66 (02/20/18 1425)  Resp: 20 (02/20/18 1425)  BP: (!) 132/59 (02/20/18 1415)  SpO2: 96 % (02/20/18 1400) Vital Signs (24h Range):  Temp:  [97.7 °F (36.5 °C)-98.4 °F (36.9 °C)] 97.7 °F (36.5 °C)  Pulse:  [] 66  Resp:  [10-49] 20  SpO2:  [79 %-100 %] 96 %  BP: ()/() 132/59  Arterial Line BP: ()/(12-90) 129/33     Weight: 84.4 kg (186 lb 1.1 oz)  Body mass index is 27.48 kg/m².    Physical Exam  Constitutional: He is oriented to person, place, and time. No distress.   Head: Normocephalic.   Eyes: Right eye exhibits no discharge. Left eye exhibits no discharge.   Neck: Normal range of motion.   Cardiovascular: Normal rate and regular rhythm.    Pulmonary/Chest: Effort normal and breath sounds normal.   Abdominal: Bowel sounds are normal.   Musculoskeletal: He exhibits no deformity.   Neurological: He is oriented to person, place, and  time. He has normal strength.   Skin: He is not diaphoretic.   Psychiatric: His speech is normal.         NEUROLOGICAL EXAMINATION:      MENTAL STATUS   Oriented to person, place, and time.   Speech: speech is normal   Level of consciousness: alert     CRANIAL NERVES      CN III, IV, VI   Right pupil: Size: 6 mm. Shape: regular. Not reactive to light  Left pupil: Size: 2 mm. Shape: regular.   Nystagmus: none   Ophthalmoparesis: none     CN V   Right facial sensation deficit: none  Left facial sensation deficit: none     CN VII   Right facial weakness: none  Left facial weakness: none     CN XI   Right sternocleidomastoid strength: normal  Left sternocleidomastoid strength: normal  Right trapezius strength: normal  Left trapezius strength: normal     CN XII   Tongue deviation: none     MOTOR EXAM      Strength   Strength 5/5 in UE's and LE's              Significant Labs:   CBC:   Recent Labs  Lab 02/20/18  0343 02/20/18  0621 02/20/18  0957   WBC 19.61* 20.51* 18.43*   HGB 11.0* 11.2* 10.8*   HCT 33.3* 33.4* 32.9*    203 189     CMP:   Recent Labs  Lab 02/19/18  2348 02/20/18  0343 02/20/18  0621 02/20/18  0957   * 221* 200* 180*    139 138 136   K 4.3 4.6 4.5 4.4    108 107 106   CO2 20* 22* 23 22*   BUN 27* 28* 30* 34*   CREATININE 1.5* 1.8* 1.9* 2.2*   CALCIUM 9.0 8.8 8.9 9.0   MG 1.3* 2.2  --  2.1   ANIONGAP 10 9 8 8   EGFRNONAA 43* 34* 32* 27*         Assessment and Plan:     81 y/o male with transient monocular visual loss (TMVL)     1. TMVL: this suggests involvement of ophthalmic/central retinal artery that is direct tributary of carotid. Per US pt has a more than 70% stenosis of left ICA.    Pt is POD#1. No focal neurological findings suggesting stroke or reperfusion injury.   -Will follow as needed.    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

## 2018-02-20 NOTE — PLAN OF CARE
"SW met with patient, wife and step daughter Michell in ICU, explained role of SW/CM with treatment team, provided contact information with the "discharge planning begins on admission" checklist handout.   Confirmed information in demographics: updated.   SW reviewed the discharge planning folder, explained to leave in room with patient so that team/patient can place all written discharge information throughout hospital stay. Provided education regarding the importance of using written discharge information to help manage health care at home.   SW provided education regarding the importance of obtaining, taking all medications at discharge. [facility] [address] [facility] [address]  Phone: [phone] Fax: [phone]  SW provided education regarding importance of going to all follow up appointments to help manage health care at home. Patient prefers medical appointment mid day.   Challenges with discharge  1) wife in SNF ([facility]) until at least next Tuesday after extensive hospital stay  2) patient has been ill off and on over a month 1st with flu like symptoms, describes other upper respiratory symptoms  3) has limited support system--step children live out of area, Michell in [place], Sean in [place], Sherwin in [place], 2 others are not reliable per family. Does not get along with his brother. Does have a neighbor who assists by bringing some meals at times.   4) rejects many suggestions, is suspicious and controlling at baseline--example--provides phone number; is connected to answer machine. Does not answer--checks calls and calls back. Does have cell with wife but does not provide number or use for calls except to call out long distance; example, has changed the dosage on his metformin but did not tell the doctor "the doctor did not ask, he just prescribes it".  Current plan  1) probably home--DME--walker  2) home health with RN, " therapist for at least home safety eval, possible therapy as has recent health issues with decreased stamina.   3) patient informs plan to hire house keeper--SW encouraged to do so prior to wife coming home next week.   Alternate plan--daughter is helping patient and wife explore senior housing such as [place] as the couple do not believe can afford [facility].   Additional information  When wife was at hospital last month, patient was assessment to need [facility] level of care by his PCP.   Patient has become more controlling and suspicious since heart valve surgery in October per step daughter.  Patient wife unlikely to be able to provide care as in past.  Patient states would use SNF if he needs but can't afford [facility].  SW will follow in ICU and assist as needed.

## 2018-02-20 NOTE — ASSESSMENT & PLAN NOTE
ABG's look great and pt is managed on 3L O2.  Doing better after getting lasix overnight.  Pulmonary consulted.  Will repeat CXR today

## 2018-02-20 NOTE — PROGRESS NOTES
[facility] Hospital Medicine  Progress Note    Patient Name: Timbo Gallagher  MRN: [id number]  Patient Class: IP- Inpatient   Admission Date: 2/17/2018  Length of Stay: 2 days  Attending Physician: Cirilo Montero MD  Primary Care Provider: Enrique Rivera MD        Subjective:     Principal Problem:Amaurosis fugax    HPI:  83 yo WM who was previously seen by Dr. Jacobsen, now me, presents after experiencing L Amaurosis Fugax last night 2/16.  He said it occurred at 10 pm while he was sitting, and lasted 5 minutes.  He had no headache.  He felt his heart palpitating and felt as though something was spinning him around.  He did not pass out.  He did have some mild nausea.  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  CT of head shows chronic ischemic changes and central frontal atrophy.  He has a history of TAVR in Oct 2017 and follows Dr. Manzo (who no longer comes here).   He has a L carotid bruit (and ER US shows >70% stenosis on L, <50% on R)  He has an irregular heart rhythm - Dr. Toribio saw pt and noted junctional rhythm and fused beats, but not full A fib.  He had the flu about a month ago, and was treated with Tamiflu, and since that time has had residual lower respiratory symptoms with cough and congestion.  He had MDI at home for what he says is chronic bronchitis.  He has a loose cough here.  He is being admitted for cardiac and neuro evaluation.    Hospital Course:  Pt has been seen by cardiology and neuro  Dr. Coleman has seen pt for vasc surgery and is planning to take him for L CEA tomorrow    Interval History: Pt is s/p CEA.  Had some difficulties post-op with respiratory distress and was placed on BiPap and admitted to ICU.  Pt still having elevations in his respiratory rate.  Pt was given 2 doses of Lasix post-op as CXR showed pulmonary edema and a  right pleural effusion    Review of Systems   All other systems reviewed and are negative.    Scheduled Meds:   acetaminophen  650 mg Oral Q6H    aspirin  81 mg Oral Daily    clopidogrel  75 mg Oral Daily    furosemide  20 mg Oral Daily    metoprolol succinate  25 mg Oral Daily    mupirocin  1 g Nasal BID    ondansetron  4 mg Intravenous Q4H    pantoprazole  40 mg Intravenous Before breakfast     Continuous Infusions:   lactated ringers 50 mL/hr at 02/20/18 0700    metoprolol       PRN Meds:.acetaminophen, dextrose 50%, glucagon (human recombinant), hydrALAZINE, insulin aspart U-100, labetalol, metoprolol, ondansetron    Objective:     Vital Signs (Most Recent):  Temp: 98.4 °F (36.9 °C) (02/20/18 0400)  Pulse: 70 (02/20/18 0805)  Resp: (!) 36 (02/20/18 0805)  BP: 116/66 (02/20/18 0805)  SpO2: 100 % (02/20/18 0805) Vital Signs (24h Range):  Temp:  [98 °F (36.7 °C)-98.4 °F (36.9 °C)] 98.4 °F (36.9 °C)  Pulse:  [] 70  Resp:  [10-36] 36  SpO2:  [79 %-100 %] 100 %  BP: (116-223)/() 116/66  Arterial Line BP: ()/(12-90) 113/38     Weight: 84.4 kg (186 lb 1.1 oz)  Body mass index is 27.48 kg/m².    Intake/Output Summary (Last 24 hours) at 02/20/18 0833  Last data filed at 02/20/18 0700   Gross per 24 hour   Intake             2036 ml   Output             1645 ml   Net              391 ml      Physical Exam   Constitutional: He is oriented to person, place, and time. He appears well-developed and well-nourished.   HENT:   Head: Normocephalic and atraumatic.   Eyes: EOM are normal. Pupils are equal, round, and reactive to light.   Neck: Neck supple.   Cardiovascular: Normal rate, regular rhythm and normal heart sounds.    Pulmonary/Chest: Effort normal. He has wheezes in the right lower field.   Abdominal: Soft. Bowel sounds are normal.   Neurological: He is alert and oriented to person, place, and time.   Skin: Skin is warm and dry.   Psychiatric: He has a normal mood and affect.   Vitals  reviewed.      Significant Labs:   CBC:     Recent Labs  Lab 02/19/18  2348 02/20/18  0343 02/20/18  0621   WBC 19.34* 19.61* 20.51*   HGB 11.4* 11.0* 11.2*   HCT 35.0* 33.3* 33.4*    175 203     CMP:     Recent Labs  Lab 02/19/18  2348 02/20/18  0343 02/20/18  0621    139 138   K 4.3 4.6 4.5    108 107   CO2 20* 22* 23   * 221* 200*   BUN 27* 28* 30*   CREATININE 1.5* 1.8* 1.9*   CALCIUM 9.0 8.8 8.9   ANIONGAP 10 9 8   EGFRNONAA 43* 34* 32*     POCT Glucose:     Recent Labs  Lab 02/19/18  2349 02/20/18  0420 02/20/18  0749   POCTGLUCOSE 185* 232* 174*       Significant Imaging: no new imaging    Assessment/Plan:      * Amaurosis fugax    Due to tia          Pleural effusion    ABG's look great and pt is managed on 3L O2.  Doing better after getting lasix overnight.  Pulmonary consulted.  Will repeat CXR today          Carotid stenosis, left      S/p CEA POD#1.  Looks great this morning.  Continue as per vascular surgery        TIA involving carotid artery    No further symptoms.            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 Manzo as OP  No current issues; Dr. Toribio feels this is unrelated to any current symptoms        Essential hypertension    bp MANAGED FOR NOW        Type 2 diabetes mellitus with renal complication    Continue metformin and sliding scale          VTE Risk Mitigation         Ordered     Low Risk of VTE  Once      02/19/18 2121          Critical care time spent on the evaluation and treatment of severe organ dysfunction, review of pertinent labs and imaging studies, discussions with consulting providers and discussions with patient/family: <30 minutes.    Dayna Campbell MD  Department of Hospital Medicine   [facility]

## 2018-02-21 PROBLEM — G45.9 TIA (TRANSIENT ISCHEMIC ATTACK): Status: ACTIVE | Noted: 2018-02-18

## 2018-02-21 LAB
ALLENS TEST: ABNORMAL
ANION GAP SERPL CALC-SCNC: 8 MMOL/L
BASOPHILS # BLD AUTO: 0.02 K/UL
BASOPHILS NFR BLD: 0.2 %
BUN SERPL-MCNC: 36 MG/DL
CALCIUM SERPL-MCNC: 9.2 MG/DL
CHLORIDE SERPL-SCNC: 107 MMOL/L
CO2 SERPL-SCNC: 25 MMOL/L
CREAT SERPL-MCNC: 2.2 MG/DL
DELSYS: ABNORMAL
DIFFERENTIAL METHOD: ABNORMAL
EOSINOPHIL # BLD AUTO: 0.3 K/UL
EOSINOPHIL NFR BLD: 2.8 %
ERYTHROCYTE [DISTWIDTH] IN BLOOD BY AUTOMATED COUNT: 13.1 %
ERYTHROCYTE [SEDIMENTATION RATE] IN BLOOD BY WESTERGREN METHOD: 20 MM/H
EST. GFR  (AFRICAN AMERICAN): 31 ML/MIN/1.73 M^2
EST. GFR  (NON AFRICAN AMERICAN): 27 ML/MIN/1.73 M^2
FLOW: 3
GLUCOSE SERPL-MCNC: 123 MG/DL
HCO3 UR-SCNC: 23.6 MMOL/L (ref 24–28)
HCT VFR BLD AUTO: 29.6 %
HGB BLD-MCNC: 9.6 G/DL
LYMPHOCYTES # BLD AUTO: 1.1 K/UL
LYMPHOCYTES NFR BLD: 10.7 %
MCH RBC QN AUTO: 31.7 PG
MCHC RBC AUTO-ENTMCNC: 32.4 G/DL
MCV RBC AUTO: 98 FL
MODE: ABNORMAL
MONOCYTES # BLD AUTO: 0.9 K/UL
MONOCYTES NFR BLD: 8.9 %
NEUTROPHILS # BLD AUTO: 8 K/UL
NEUTROPHILS NFR BLD: 77.1 %
PCO2 BLDA: 40.6 MMHG (ref 35–45)
PH SMN: 7.37 [PH] (ref 7.35–7.45)
PLATELET # BLD AUTO: 165 K/UL
PMV BLD AUTO: 11.4 FL
PO2 BLDA: 124 MMHG (ref 80–100)
POC BE: -2 MMOL/L
POC SATURATED O2: 99 % (ref 95–100)
POC TCO2: 25 MMOL/L (ref 23–27)
POCT GLUCOSE: 123 MG/DL (ref 70–110)
POCT GLUCOSE: 152 MG/DL (ref 70–110)
POCT GLUCOSE: 155 MG/DL (ref 70–110)
POCT GLUCOSE: 179 MG/DL (ref 70–110)
POTASSIUM SERPL-SCNC: 3.9 MMOL/L
RBC # BLD AUTO: 3.03 M/UL
SAMPLE: ABNORMAL
SITE: ABNORMAL
SODIUM SERPL-SCNC: 140 MMOL/L
WBC # BLD AUTO: 10.32 K/UL

## 2018-02-21 PROCEDURE — 63600175 PHARM REV CODE 636 W HCPCS: Performed by: SURGERY

## 2018-02-21 PROCEDURE — 80048 BASIC METABOLIC PNL TOTAL CA: CPT

## 2018-02-21 PROCEDURE — 97165 OT EVAL LOW COMPLEX 30 MIN: CPT

## 2018-02-21 PROCEDURE — G8978 MOBILITY CURRENT STATUS: HCPCS | Mod: CJ

## 2018-02-21 PROCEDURE — 99900035 HC TECH TIME PER 15 MIN (STAT)

## 2018-02-21 PROCEDURE — 25000003 PHARM REV CODE 250: Performed by: INTERNAL MEDICINE

## 2018-02-21 PROCEDURE — 25000003 PHARM REV CODE 250: Performed by: SURGERY

## 2018-02-21 PROCEDURE — 97161 PT EVAL LOW COMPLEX 20 MIN: CPT

## 2018-02-21 PROCEDURE — G8979 MOBILITY GOAL STATUS: HCPCS | Mod: CH

## 2018-02-21 PROCEDURE — 99024 POSTOP FOLLOW-UP VISIT: CPT | Mod: POP,,, | Performed by: SURGERY

## 2018-02-21 PROCEDURE — 11000001 HC ACUTE MED/SURG PRIVATE ROOM

## 2018-02-21 PROCEDURE — C9113 INJ PANTOPRAZOLE SODIUM, VIA: HCPCS | Performed by: SURGERY

## 2018-02-21 PROCEDURE — 37799 UNLISTED PX VASCULAR SURGERY: CPT

## 2018-02-21 PROCEDURE — G8987 SELF CARE CURRENT STATUS: HCPCS | Mod: CI

## 2018-02-21 PROCEDURE — 99233 SBSQ HOSP IP/OBS HIGH 50: CPT | Mod: ,,, | Performed by: INTERNAL MEDICINE

## 2018-02-21 PROCEDURE — 27000221 HC OXYGEN, UP TO 24 HOURS

## 2018-02-21 PROCEDURE — G8988 SELF CARE GOAL STATUS: HCPCS | Mod: CH

## 2018-02-21 PROCEDURE — 94761 N-INVAS EAR/PLS OXIMETRY MLT: CPT

## 2018-02-21 PROCEDURE — 85025 COMPLETE CBC W/AUTO DIFF WBC: CPT

## 2018-02-21 PROCEDURE — 94664 DEMO&/EVAL PT USE INHALER: CPT

## 2018-02-21 PROCEDURE — 82803 BLOOD GASES ANY COMBINATION: CPT

## 2018-02-21 PROCEDURE — 36415 COLL VENOUS BLD VENIPUNCTURE: CPT

## 2018-02-21 RX ORDER — METOPROLOL SUCCINATE 50 MG/1
50 TABLET, EXTENDED RELEASE ORAL DAILY
Status: DISCONTINUED | OUTPATIENT
Start: 2018-02-21 | End: 2018-02-22 | Stop reason: HOSPADM

## 2018-02-21 RX ORDER — HYDROCHLOROTHIAZIDE 12.5 MG/1
12.5 TABLET ORAL DAILY
Status: DISCONTINUED | OUTPATIENT
Start: 2018-02-21 | End: 2018-02-22 | Stop reason: HOSPADM

## 2018-02-21 RX ORDER — LOSARTAN POTASSIUM 25 MG/1
100 TABLET ORAL DAILY
Status: DISCONTINUED | OUTPATIENT
Start: 2018-02-21 | End: 2018-02-22 | Stop reason: HOSPADM

## 2018-02-21 RX ADMIN — ASPIRIN 81 MG 81 MG: 81 TABLET ORAL at 10:02

## 2018-02-21 RX ADMIN — METOPROLOL SUCCINATE 50 MG: 50 TABLET, EXTENDED RELEASE ORAL at 10:02

## 2018-02-21 RX ADMIN — PANTOPRAZOLE SODIUM 40 MG: 40 INJECTION, POWDER, FOR SOLUTION INTRAVENOUS at 06:02

## 2018-02-21 RX ADMIN — HYDROCHLOROTHIAZIDE 12.5 MG: 12.5 TABLET ORAL at 05:02

## 2018-02-21 RX ADMIN — LOSARTAN POTASSIUM 100 MG: 25 TABLET, FILM COATED ORAL at 05:02

## 2018-02-21 RX ADMIN — CLOPIDOGREL BISULFATE 75 MG: 75 TABLET ORAL at 10:02

## 2018-02-21 RX ADMIN — ACETAMINOPHEN 650 MG: 325 TABLET, FILM COATED ORAL at 05:02

## 2018-02-21 RX ADMIN — ONDANSETRON HYDROCHLORIDE 4 MG: 2 INJECTION, SOLUTION INTRAMUSCULAR; INTRAVENOUS at 10:02

## 2018-02-21 RX ADMIN — MUPIROCIN 1 G: 20 OINTMENT TOPICAL at 10:02

## 2018-02-21 RX ADMIN — ACETAMINOPHEN 650 MG: 325 TABLET, FILM COATED ORAL at 06:02

## 2018-02-21 RX ADMIN — HEPARIN SODIUM 5000 UNITS: 5000 INJECTION, SOLUTION INTRAVENOUS; SUBCUTANEOUS at 09:02

## 2018-02-21 RX ADMIN — HEPARIN SODIUM 5000 UNITS: 5000 INJECTION, SOLUTION INTRAVENOUS; SUBCUTANEOUS at 06:02

## 2018-02-21 RX ADMIN — MUPIROCIN 1 G: 20 OINTMENT TOPICAL at 09:02

## 2018-02-21 RX ADMIN — ONDANSETRON HYDROCHLORIDE 4 MG: 2 INJECTION, SOLUTION INTRAMUSCULAR; INTRAVENOUS at 09:02

## 2018-02-21 RX ADMIN — HEPARIN SODIUM 5000 UNITS: 5000 INJECTION, SOLUTION INTRAVENOUS; SUBCUTANEOUS at 02:02

## 2018-02-21 NOTE — PROGRESS NOTES
Discharge planning-reviewed chart, spoke with Dr Campbell this am. Updated with patient, his preference remains [facility] where is wife is already SNF. Reminded him Carlos also consulted in event [facility] does not have the bed when he needs.   STACY sent fax to [facility] with [facility]. Awaiting 142.   Provided report to STACY Cavazos with team who will assist now that patient has transferred to med/surg floor.   Will assist as needed.

## 2018-02-21 NOTE — SUBJECTIVE & OBJECTIVE
Medications:  Continuous Infusions:   metoprolol       Scheduled Meds:   acetaminophen  650 mg Oral Q6H    aspirin  81 mg Oral Daily    clopidogrel  75 mg Oral Daily    heparin (porcine)  5,000 Units Subcutaneous Q8H    metoprolol succinate  50 mg Oral Daily    mupirocin  1 g Nasal BID    ondansetron  4 mg Intravenous Q4H    pantoprazole  40 mg Intravenous Before breakfast     PRN Meds:acetaminophen, dextrose 50%, glucagon (human recombinant), hydrALAZINE, influenza, insulin aspart U-100, labetalol, metoprolol, ondansetron, pneumoc 13-morro conj-dip cr(PF)     Objective:     Vital Signs (Most Recent):  Temp: 98 °F (36.7 °C) (02/21/18 0400)  Pulse: 86 (02/21/18 0702)  Resp: 16 (02/21/18 0702)  BP: (!) 151/103 (02/21/18 0702)  SpO2: 96 % (02/21/18 0507) Vital Signs (24h Range):  Temp:  [97.7 °F (36.5 °C)-98.4 °F (36.9 °C)] 98 °F (36.7 °C)  Pulse:  [] 86  Resp:  [15-49] 16  SpO2:  [81 %-100 %] 96 %  BP: ()/() 151/103  Arterial Line BP: (104-159)/(26-53) 133/39          Physical Exam   Constitutional: He is oriented to person, place, and time. He appears well-developed. No distress.   HENT:   Head: Normocephalic and atraumatic.   Eyes: Conjunctivae are normal.   Neck: Neck supple.   Cardiovascular: Normal rate.    Pulmonary/Chest: Effort normal. No respiratory distress. He has no wheezes.   Abdominal: Soft. He exhibits no distension and no mass. There is no tenderness. There is no rebound and no guarding. No hernia.   Musculoskeletal: Normal range of motion. He exhibits no edema or tenderness.   Neurological: He is alert and oriented to person, place, and time. No cranial nerve deficit or sensory deficit.   Tongue midline, EOMI, face symmetric and sensation intact bilaterally to touch   Skin: Skin is warm. Capillary refill takes less than 2 seconds. No rash noted.        Vitals reviewed.      Significant Labs:  All pertinent labs from the last 24 hours have been reviewed.    Significant  Diagnostics:  I have reviewed and interpreted all pertinent imaging results/findings within the past 24 hours.

## 2018-02-21 NOTE — PROGRESS NOTES
[facility]  Cardiology  Progress Note    Patient Name: Timbo Gallagher  MRN: [id number]  Admission Date: 2/17/2018  Hospital Length of Stay: 3 days  Code Status: Full Code   Attending Physician: Cirilo Montero MD   Primary Care Physician: Cirilo Montero MD  Expected Discharge Date:   Principal Problem:Amaurosis fugax    Subjective:     Hospital Course:   2/16/18: admitted with L ocular TIA, note made of severe L ICA stenosis.  2/19/18: L CEA, creat up to 1.9 post-op  2/20/18: creat up to 2.2 but making quite a bit of urine off diuretics.    Interval Hx: pt seen in ICU, case d/w RN.  No cp/sob.  L eye vision preserved.    Tele: SR, bigeminy/trigeminy, 4 beats NSVT, no AF (pers rev)        Review of Systems   Gastrointestinal: Negative for melena.   Genitourinary: Negative for hematuria.     Objective:     Vital Signs (Most Recent):  Temp: 98 °F (36.7 °C) (02/21/18 0400)  Pulse: 84 (02/21/18 0507)  Resp: (!) 21 (02/21/18 0507)  BP: 138/63 (02/21/18 0507)  SpO2: 96 % (02/21/18 0507) Vital Signs (24h Range):  Temp:  [97.7 °F (36.5 °C)-98.4 °F (36.9 °C)] 98 °F (36.7 °C)  Pulse:  [] 84  Resp:  [15-49] 21  SpO2:  [81 %-100 %] 96 %  BP: ()/(39-99) 138/63  Arterial Line BP: ()/(12-53) 124/38     Weight: 84.4 kg (186 lb 1.1 oz)  Body mass index is 27.48 kg/m².     SpO2: 96 %  O2 Device (Oxygen Therapy): nasal cannula      Intake/Output Summary (Last 24 hours) at 02/21/18 0655  Last data filed at 02/20/18 1600   Gross per 24 hour   Intake           1067.5 ml   Output              178 ml   Net            889.5 ml       Lines/Drains/Airways     Drain                 Urethral Catheter 02/19/18 1650 1 day          Peripheral Intravenous Line                 Peripheral IV - Single Lumen 02/17/18 0830 Right Antecubital 3 days         Peripheral IV - Single Lumen 02/19/18 1500 Right Hand 1 day                Physical Exam   Constitutional: He is oriented to person, place, and time. He  appears well-developed and well-nourished.   HENT:   Head: Normocephalic and atraumatic.   Eyes: Conjunctivae and EOM are normal. No scleral icterus.   R pupil chr dilated   Neck: Normal range of motion. Neck supple. No JVD present. Carotid bruit is not present. No tracheal deviation present. No thyromegaly present.   L neck CEA incision c/d/i.   Cardiovascular: Normal rate, regular rhythm, S1 normal and S2 normal.   Occasional extrasystoles are present. Exam reveals distant heart sounds. Exam reveals no gallop and no friction rub.    Murmur heard.   Systolic murmur is present with a grade of 2/6   Pulses:       Carotid pulses are 2+ on the right side, and 2+ on the left side with bruit.  Pulmonary/Chest: Effort normal and breath sounds normal. No respiratory distress. He has no wheezes. He has no rales. He exhibits no tenderness.   Abdominal: Soft. Bowel sounds are normal. He exhibits no distension. There is no tenderness.   Musculoskeletal: He exhibits no edema.   Neurological: He is alert and oriented to person, place, and time. He has normal strength. A cranial nerve deficit (R eye blind) is present.   Skin: Skin is warm and dry. No rash noted.   Psychiatric: He has a normal mood and affect. His behavior is normal.       Current Medications:   acetaminophen  650 mg Oral Q6H    aspirin  81 mg Oral Daily    clopidogrel  75 mg Oral Daily    heparin (porcine)  5,000 Units Subcutaneous Q8H    metoprolol succinate  25 mg Oral Daily    mupirocin  1 g Nasal BID    ondansetron  4 mg Intravenous Q4H    pantoprazole  40 mg Intravenous Before breakfast      metoprolol       acetaminophen, dextrose 50%, glucagon (human recombinant), hydrALAZINE, influenza, insulin aspart U-100, labetalol, metoprolol, ondansetron, pneumoc 13-morro conj-dip cr(PF)    Laboratory:  CBC:    Recent Labs  Lab 02/20/18  0621 02/20/18  0957 02/21/18  0530   WHITE BLOOD CELL COUNT 20.51 H 18.43 H 10.32   HEMOGLOBIN 11.2 L 10.8 L 9.6 L    HEMATOCRIT 33.4 L 32.9 L 29.6 L   PLATELETS 203 189 165       CHEMISTRIES:    Recent Labs  Lab 02/19/18  2348 02/20/18  0343 02/20/18  0621 02/20/18  0957 02/21/18  0530   GLUCOSE 195 H 221 H 200 H 180 H 123 H   SODIUM 138 139 138 136 140   POTASSIUM 4.3 4.6 4.5 4.4 3.9   BUN BLD 27 H 28 H 30 H 34 H 36 H   CREATININE 1.5 H 1.8 H 1.9 H 2.2 H 2.2 H   EGFR IF  49 A 40 A 37 A 31 A 31 A   EGFR IF NON- 43 A 34 A 32 A 27 A 27 A   CALCIUM 9.0 8.8 8.9 9.0 9.2   MAGNESIUM 1.3 L 2.2  --  2.1  --        CARDIAC BIOMARKERS:    Recent Labs  Lab 02/17/18  0831 02/17/18  1723 02/17/18  2345   TROPONIN I 0.019 0.029 H 0.023       COAGS:    Recent Labs  Lab 02/19/18  2348 02/20/18  0343 02/20/18  0957   INR 1.1 1.1 1.1       LIPIDS/LFTS:    Recent Labs  Lab 10/18/17  0815 01/27/18  0736 02/17/18  0831   CHOLESTEROL  --   --  147   TRIGLYCERIDES  --   --  72   HDL  --   --  37 L   LDL CHOLESTEROL  --   --  95.6   NON-HDL CHOLESTEROL  --   --  110   AST 15 16 11   ALT 5 L 11 <5 L     Lab Results   Component Value Date    TSH 1.450 02/17/2018           Diagnostic Results:  ECG (personally reviewed tracings):   2/17/18 0828 SR 87, multifocal PVCs     Chest X-Ray (personally reviewed image(s)):   2/17/18 NAD  2/20/18: NAD     CT-Head 2/17/18  Age-appropriate generalized cerebral volume loss with mild chronic microvascular ischemic change.  Prominence of the extra-axial space along the left frontal, parietal and temporal convexity is most likely related to central atrophy rather than a chronic subdural hygroma.  No evidence for acute intracranial hemorrhage correlation and further evaluation as warranted.     Echo: 2/18/18 (images pers rev)    1 - Mildly depressed left ventricular systolic function (EF 45-50%).  Inferoposterior hypokinesis.     2 - Concentric hypertrophy.     3 - Impaired LV relaxation, elevated LAP (grade 2 diastolic dysfunction).     4 - S/P transcatheter AVR, UNA = 1.85 cm2, AVAi = 0.94  cm2/m2, mean gradient = 9 mmHg.     5 - Moderate mitral regurgitation.     6 - Mild tricuspid regurgitation.     7 - Pulmonary hypertension. The estimated PA systolic pressure is 61 mmHg.      Carotid US 2/17/18  #1. Findings consistent with less than 50% stenosis in the Right internal carotid artery .  #2. Findings consistent with greater than 70% stenosis in the Left internal carotid artery based on peak systolic velocity.  #3.  Incidentally noted is an irregular heartbeat.     TAVR 10/17/17  B. 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    No perivalvular leak post procedure per 2D echo.    Post deployment AV mean gradient 2.5 mmHg, Vmax 1.09 m/s.     Cath: 5/8/17 (images pers rev)  D. Hemodynamic Results  AO: 122/68 (93)  E. Angiographic Results       Patient has a right dominant coronary artery.      - Left Main Coronary Artery:             The LM is normal. There is ANASTASIIA 3 flow.     - Left Anterior Descending Artery:             The LAD has luminal irregularities. There is ANASTASIIA 3 flow.     - Left Circumflex Artery:             The LCX is normal. There is ANASTASIIA 3 flow.     - Right Coronary Artery:             The RCA has luminal irregularities. There is ANASTASIIA 3 flow.     - Radial Artery:             The Radial artery was not studied.     - D1:             The D1 has a 50% stenosis. There is ANASTASIIA 3 flow.     - Posterior Descending Artery:             The mid PDA has a 70% stenosis. There is ANASTASIIA 3 flow.      Assessment and Plan:     * Amaurosis fugax    S/P L CEA 2/19/18  Presenting sxs c/w L ocular TIA.  Significant L ICA stenosis noted.  Cont ASA/Plavix.  Cont atorva 40mg qhs  Check lipids/LFT 3 months (mid May 2018)  Cont antihtn regimen.          Essential hypertension    Cont med rx.  Multifocal PVCs noted  Cont BBl as BP/HR will tolerate        Type 2 diabetes mellitus with renal complication    Per IM  Cont statin        S/P TAVR  (transcatheter aortic valve replacement)    Normally functioning per echo 2/2018.        Creatinine elevation    Noted post-op, ?related to intraop fluid shifts or overdiuresis post-op.  Consider neph eval if creat does not normalize.  Agree with holding ARB and further diuretics.  Resume ARB when creat normalizes.        Blindness of one eye    L ocular TIA, vision restored at present  Chr R retinal detachment            VTE Risk Mitigation         Ordered     heparin (porcine) injection 5,000 Units  Every 8 hours     Route:  Subcutaneous        02/20/18 2207     Low Risk of VTE  Once      02/19/18 2121          Ned Toribio MD  Cardiology  [facility]

## 2018-02-21 NOTE — PROGRESS NOTES
[facility]  Vascular Surgery  Progress Note    Patient Name: Timbo Gallagher  MRN: [id number]  Admission Date: 2/17/2018  Primary Care Provider: Cirilo Montero MD     Date: 2/20/18    Subjective:     Interval History: No complaints this morning.  Breathing better.  No neuro symptoms.    Post-Op Info:  Procedure(s) (LRB):  ENDARTERECTOMY-CAROTID (Left)   2 Days Post-Op       Medications:  Continuous Infusions:   metoprolol       Scheduled Meds:   acetaminophen  650 mg Oral Q6H    aspirin  81 mg Oral Daily    clopidogrel  75 mg Oral Daily    heparin (porcine)  5,000 Units Subcutaneous Q8H    metoprolol succinate  25 mg Oral Daily    mupirocin  1 g Nasal BID    ondansetron  4 mg Intravenous Q4H    pantoprazole  40 mg Intravenous Before breakfast     PRN Meds:acetaminophen, dextrose 50%, glucagon (human recombinant), hydrALAZINE, influenza, insulin aspart U-100, labetalol, metoprolol, ondansetron, pneumoc 13-morro conj-dip cr(PF)     Objective:     Vital Signs (Most Recent):  Temp: 98 °F (36.7 °C) (02/21/18 0400)  Pulse: 86 (02/21/18 0702)  Resp: 16 (02/21/18 0702)  BP: (!) 151/103 (02/21/18 0702)  SpO2: 96 % (02/21/18 0507) Vital Signs (24h Range):  Temp:  [97.7 °F (36.5 °C)-98.4 °F (36.9 °C)] 98 °F (36.7 °C)  Pulse:  [] 86  Resp:  [15-49] 16  SpO2:  [81 %-100 %] 96 %  BP: ()/() 151/103  Arterial Line BP: (102-159)/(26-53) 133/39          Physical Exam   Constitutional: He is oriented to person, place, and time. He appears well-developed. No distress.   HENT:   Head: Normocephalic and atraumatic.   Eyes: Conjunctivae are normal.   Neck: Neck supple.   Cardiovascular: Normal rate.    Pulmonary/Chest: Effort normal. No respiratory distress. He has no wheezes.   Abdominal: Soft. He exhibits no distension and no mass. There is no tenderness. There is no rebound and no guarding. No hernia.   Musculoskeletal: Normal range of motion. He exhibits no edema or tenderness.    Neurological: He is alert and oriented to person, place, and time. No cranial nerve deficit or sensory deficit.   Tongue midline, EOMI, face symmetric and sensation intact bilaterally to touch   Skin: Skin is warm. Capillary refill takes less than 2 seconds. No rash noted.   Vitals reviewed.      Significant Labs:  All pertinent labs from the last 24 hours have been reviewed.    Significant Diagnostics:  I have reviewed and interpreted all pertinent imaging results/findings within the past 24 hours.    Assessment/Plan:     TIA involving carotid artery    -L amaurosis fugax consistent with TIA and L ICA stenosis 60-69% on US s/p L CEA 2/19/18 - stable after diuresis and optimization of respiratory status  -Cont neuro checks  -Cont to monitor UOP closely  -Resume home medications  -HLIV  -Clear liquid ADA diet   -Cont ASA, Plavix and statin  -BP control - IV antihypertensives prn  -Respiratory therapy consult - cont IS  -Cont following intensivist recommendations  -OOB to chair            Silvio Coleman MD  Vascular Surgery  [facility]

## 2018-02-21 NOTE — ASSESSMENT & PLAN NOTE
Likely pre-renal. In setting of pulmonary edema and recent respiratory failure albumin given. Will order renal ultrasound and consider IVF if no improvement tomorrow.

## 2018-02-21 NOTE — SUBJECTIVE & OBJECTIVE
Interval History: Pt looking great this morning.  Misses his wife.    Review of Systems   All other systems reviewed and are negative.    Scheduled Meds:   acetaminophen  650 mg Oral Q6H    aspirin  81 mg Oral Daily    clopidogrel  75 mg Oral Daily    heparin (porcine)  5,000 Units Subcutaneous Q8H    metoprolol succinate  50 mg Oral Daily    mupirocin  1 g Nasal BID    ondansetron  4 mg Intravenous Q4H    pantoprazole  40 mg Intravenous Before breakfast     Continuous Infusions:   metoprolol       PRN Meds:.acetaminophen, dextrose 50%, glucagon (human recombinant), hydrALAZINE, influenza, insulin aspart U-100, labetalol, metoprolol, ondansetron, pneumoc 13-morro conj-dip cr(PF)    Objective:     Vital Signs (Most Recent):  Temp: 98 °F (36.7 °C) (02/21/18 0400)  Pulse: 86 (02/21/18 0702)  Resp: 16 (02/21/18 0702)  BP: (!) 151/103 (02/21/18 0702)  SpO2: 96 % (02/21/18 0507) Vital Signs (24h Range):  Temp:  [97.7 °F (36.5 °C)-98.4 °F (36.9 °C)] 98 °F (36.7 °C)  Pulse:  [] 86  Resp:  [15-49] 16  SpO2:  [81 %-100 %] 96 %  BP: ()/() 151/103  Arterial Line BP: (104-159)/(26-53) 133/39     Weight: 84.4 kg (186 lb 1.1 oz)  Body mass index is 27.48 kg/m².    Intake/Output Summary (Last 24 hours) at 02/21/18 0850  Last data filed at 02/21/18 0500   Gross per 24 hour   Intake            497.5 ml   Output              753 ml   Net           -255.5 ml      Physical Exam   Constitutional: He is oriented to person, place, and time. He appears well-developed and well-nourished.   HENT:   Head: Normocephalic and atraumatic.   Eyes: EOM are normal. Pupils are equal, round, and reactive to light.   Neck: Neck supple.   Cardiovascular: Normal rate, regular rhythm and normal heart sounds.    Pulmonary/Chest: Effort normal. He has wheezes in the right lower field.   Abdominal: Soft. Bowel sounds are normal.   Neurological: He is alert and oriented to person, place, and time.   Skin: Skin is warm and dry.    Psychiatric: He has a normal mood and affect.   Vitals reviewed.      Significant Labs:   CBC:     Recent Labs  Lab 02/20/18  0621 02/20/18  0957 02/21/18  0530   WBC 20.51* 18.43* 10.32   HGB 11.2* 10.8* 9.6*   HCT 33.4* 32.9* 29.6*    189 165     CMP:     Recent Labs  Lab 02/20/18  0621 02/20/18  0957 02/21/18  0530    136 140   K 4.5 4.4 3.9    106 107   CO2 23 22* 25   * 180* 123*   BUN 30* 34* 36*   CREATININE 1.9* 2.2* 2.2*   CALCIUM 8.9 9.0 9.2   ANIONGAP 8 8 8   EGFRNONAA 32* 27* 27*     POCT Glucose:     Recent Labs  Lab 02/20/18  1630 02/20/18  2124 02/21/18  0538   POCTGLUCOSE 128* 136* 123*       Significant Imaging: CXR: I have reviewed all pertinent results/findings within the past 24 hours and my personal findings are:  Multiple overlying cardiac monitoring leads. The cardiomediastinal silhouette is prominent but stable in size. It again appears slightly deviated to the right, similar to prior studies. Aortic stent in place. Mild pulmonary vascular congestion and perihilar edema. Mild bibasilar atelectasis. No new focal consolidation. Small pleural effusions suggested, particularly on the right. No pneumothorax.

## 2018-02-21 NOTE — PLAN OF CARE
Problem: Patient Care Overview  Goal: Plan of Care Review  02/20/2018    Recommendations    Recommendation/Intervention: 1) Dental Soft, 1800 calorie Diabetic, Renal diet 2) Boost Glucose Control vanilla BID 3) Provide diet education 4) Monitor oral intake and tolerance 5) RD to monitor  Goals: 1) Patient to consume >=85% of EEN with tolerance x 3days  Nutrition Goal Status: new  Communication of RD Recs: reviewed with HOLA Starks, MPH, RD, LDN

## 2018-02-21 NOTE — ASSESSMENT & PLAN NOTE
S/P L CEA 2/19/18  Presenting sxs c/w L ocular TIA.  Significant L ICA stenosis noted.  Cont ASA/Plavix.  Cont atorva 40mg qhs  Check lipids/LFT 3 months (mid May 2018)  Cont antihtn regimen.

## 2018-02-21 NOTE — SUBJECTIVE & OBJECTIVE
Review of Systems   Gastrointestinal: Negative for melena.   Genitourinary: Negative for hematuria.     Objective:     Vital Signs (Most Recent):  Temp: 98 °F (36.7 °C) (02/21/18 0400)  Pulse: 84 (02/21/18 0507)  Resp: (!) 21 (02/21/18 0507)  BP: 138/63 (02/21/18 0507)  SpO2: 96 % (02/21/18 0507) Vital Signs (24h Range):  Temp:  [97.7 °F (36.5 °C)-98.4 °F (36.9 °C)] 98 °F (36.7 °C)  Pulse:  [] 84  Resp:  [15-49] 21  SpO2:  [81 %-100 %] 96 %  BP: ()/(39-99) 138/63  Arterial Line BP: ()/(12-53) 124/38     Weight: 84.4 kg (186 lb 1.1 oz)  Body mass index is 27.48 kg/m².     SpO2: 96 %  O2 Device (Oxygen Therapy): nasal cannula      Intake/Output Summary (Last 24 hours) at 02/21/18 0655  Last data filed at 02/20/18 1600   Gross per 24 hour   Intake           1067.5 ml   Output              178 ml   Net            889.5 ml       Lines/Drains/Airways     Drain                 Urethral Catheter 02/19/18 1650 1 day          Peripheral Intravenous Line                 Peripheral IV - Single Lumen 02/17/18 0830 Right Antecubital 3 days         Peripheral IV - Single Lumen 02/19/18 1500 Right Hand 1 day                Physical Exam   Constitutional: He is oriented to person, place, and time. He appears well-developed and well-nourished.   HENT:   Head: Normocephalic and atraumatic.   Eyes: Conjunctivae and EOM are normal. No scleral icterus.   R pupil chr dilated   Neck: Normal range of motion. Neck supple. No JVD present. Carotid bruit is not present. No tracheal deviation present. No thyromegaly present.   L neck CEA incision c/d/i.   Cardiovascular: Normal rate, regular rhythm, S1 normal and S2 normal.   Occasional extrasystoles are present. Exam reveals distant heart sounds. Exam reveals no gallop and no friction rub.    Murmur heard.   Systolic murmur is present with a grade of 2/6   Pulses:       Carotid pulses are 2+ on the right side, and 2+ on the left side with bruit.  Pulmonary/Chest: Effort  normal and breath sounds normal. No respiratory distress. He has no wheezes. He has no rales. He exhibits no tenderness.   Abdominal: Soft. Bowel sounds are normal. He exhibits no distension. There is no tenderness.   Musculoskeletal: He exhibits no edema.   Neurological: He is alert and oriented to person, place, and time. He has normal strength. A cranial nerve deficit (R eye blind) is present.   Skin: Skin is warm and dry. No rash noted.   Psychiatric: He has a normal mood and affect. His behavior is normal.       Current Medications:   acetaminophen  650 mg Oral Q6H    aspirin  81 mg Oral Daily    clopidogrel  75 mg Oral Daily    heparin (porcine)  5,000 Units Subcutaneous Q8H    metoprolol succinate  25 mg Oral Daily    mupirocin  1 g Nasal BID    ondansetron  4 mg Intravenous Q4H    pantoprazole  40 mg Intravenous Before breakfast      metoprolol       acetaminophen, dextrose 50%, glucagon (human recombinant), hydrALAZINE, influenza, insulin aspart U-100, labetalol, metoprolol, ondansetron, pneumoc 13-morro conj-dip cr(PF)    Laboratory:  CBC:    Recent Labs  Lab 02/20/18  0621 02/20/18  0957 02/21/18  0530   WHITE BLOOD CELL COUNT 20.51 H 18.43 H 10.32   HEMOGLOBIN 11.2 L 10.8 L 9.6 L   HEMATOCRIT 33.4 L 32.9 L 29.6 L   PLATELETS 203 189 165       CHEMISTRIES:    Recent Labs  Lab 02/19/18  2348 02/20/18  0343 02/20/18  0621 02/20/18  0957 02/21/18  0530   GLUCOSE 195 H 221 H 200 H 180 H 123 H   SODIUM 138 139 138 136 140   POTASSIUM 4.3 4.6 4.5 4.4 3.9   BUN BLD 27 H 28 H 30 H 34 H 36 H   CREATININE 1.5 H 1.8 H 1.9 H 2.2 H 2.2 H   EGFR IF  49 A 40 A 37 A 31 A 31 A   EGFR IF NON- 43 A 34 A 32 A 27 A 27 A   CALCIUM 9.0 8.8 8.9 9.0 9.2   MAGNESIUM 1.3 L 2.2  --  2.1  --        CARDIAC BIOMARKERS:    Recent Labs  Lab 02/17/18  0831 02/17/18  1723 02/17/18  2345   TROPONIN I 0.019 0.029 H 0.023       COAGS:    Recent Labs  Lab 02/19/18  2348 02/20/18  0343 02/20/18  0957    INR 1.1 1.1 1.1       LIPIDS/LFTS:    Recent Labs  Lab 10/18/17  0815 01/27/18  0736 02/17/18  0831   CHOLESTEROL  --   --  147   TRIGLYCERIDES  --   --  72   HDL  --   --  37 L   LDL CHOLESTEROL  --   --  95.6   NON-HDL CHOLESTEROL  --   --  110   AST 15 16 11   ALT 5 L 11 <5 L     Lab Results   Component Value Date    TSH 1.450 02/17/2018           Diagnostic Results:  ECG (personally reviewed tracings):   2/17/18 0828 SR 87, multifocal PVCs     Chest X-Ray (personally reviewed image(s)):   2/17/18 NAD  2/20/18: NAD     CT-Head 2/17/18  Age-appropriate generalized cerebral volume loss with mild chronic microvascular ischemic change.  Prominence of the extra-axial space along the left frontal, parietal and temporal convexity is most likely related to central atrophy rather than a chronic subdural hygroma.  No evidence for acute intracranial hemorrhage correlation and further evaluation as warranted.     Echo: 2/18/18 (images pers rev)    1 - Mildly depressed left ventricular systolic function (EF 45-50%).  Inferoposterior hypokinesis.     2 - Concentric hypertrophy.     3 - Impaired LV relaxation, elevated LAP (grade 2 diastolic dysfunction).     4 - S/P transcatheter AVR, UNA = 1.85 cm2, AVAi = 0.94 cm2/m2, mean gradient = 9 mmHg.     5 - Moderate mitral regurgitation.     6 - Mild tricuspid regurgitation.     7 - Pulmonary hypertension. The estimated PA systolic pressure is 61 mmHg.      Carotid US 2/17/18  #1. Findings consistent with less than 50% stenosis in the Right internal carotid artery .  #2. Findings consistent with greater than 70% stenosis in the Left internal carotid artery based on peak systolic velocity.  #3.  Incidentally noted is an irregular heartbeat.     TAVR 10/17/17  B. 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    No perivalvular leak post procedure per 2D echo.    Post deployment AV mean gradient 2.5 mmHg, Vmax  1.09 m/s.     Cath: 5/8/17 (images pers rev)  D. Hemodynamic Results  AO: 122/68 (93)  E. Angiographic Results       Patient has a right dominant coronary artery.      - Left Main Coronary Artery:             The LM is normal. There is ANASTASIIA 3 flow.     - Left Anterior Descending Artery:             The LAD has luminal irregularities. There is ANASTASIIA 3 flow.     - Left Circumflex Artery:             The LCX is normal. There is ANASTASIIA 3 flow.     - Right Coronary Artery:             The RCA has luminal irregularities. There is ANASTASIIA 3 flow.     - Radial Artery:             The Radial artery was not studied.     - D1:             The D1 has a 50% stenosis. There is ANASTASIIA 3 flow.     - Posterior Descending Artery:             The mid PDA has a 70% stenosis. There is ANASTASIIA 3 flow.

## 2018-02-21 NOTE — PROGRESS NOTES
Arrived to floor via wheelchair, no distress noted, ambulated to bed w/o incident,can make his need known, denies any discomfort at this time, POC reviewed, safety maintained.

## 2018-02-21 NOTE — PROGRESS NOTES
[facility]  Vascular Surgery  Progress Note    Patient Name: Timbo Gallagher  MRN: [id number]  Admission Date: 2/17/2018  Primary Care Provider: Cirilo Montero MD    Subjective:     Interval History: Sitting in chair this morning.  Difficulty eating without dentures.  Feels better.    Post-Op Info:  Procedure(s) (LRB):  ENDARTERECTOMY-CAROTID (Left)   2 Days Post-Op       Medications:  Continuous Infusions:   metoprolol       Scheduled Meds:   acetaminophen  650 mg Oral Q6H    aspirin  81 mg Oral Daily    clopidogrel  75 mg Oral Daily    heparin (porcine)  5,000 Units Subcutaneous Q8H    metoprolol succinate  50 mg Oral Daily    mupirocin  1 g Nasal BID    ondansetron  4 mg Intravenous Q4H    pantoprazole  40 mg Intravenous Before breakfast     PRN Meds:acetaminophen, dextrose 50%, glucagon (human recombinant), hydrALAZINE, influenza, insulin aspart U-100, labetalol, metoprolol, ondansetron, pneumoc 13-morro conj-dip cr(PF)     Objective:     Vital Signs (Most Recent):  Temp: 98 °F (36.7 °C) (02/21/18 0400)  Pulse: 86 (02/21/18 0702)  Resp: 16 (02/21/18 0702)  BP: (!) 151/103 (02/21/18 0702)  SpO2: 96 % (02/21/18 0507) Vital Signs (24h Range):  Temp:  [97.7 °F (36.5 °C)-98.4 °F (36.9 °C)] 98 °F (36.7 °C)  Pulse:  [] 86  Resp:  [15-49] 16  SpO2:  [81 %-100 %] 96 %  BP: ()/() 151/103  Arterial Line BP: (104-159)/(26-53) 133/39          Physical Exam   Constitutional: He is oriented to person, place, and time. He appears well-developed. No distress.   HENT:   Head: Normocephalic and atraumatic.   Eyes: Conjunctivae are normal.   Neck: Neck supple.   Cardiovascular: Normal rate.    Pulmonary/Chest: Effort normal. No respiratory distress. He has no wheezes.   Abdominal: Soft. He exhibits no distension and no mass. There is no tenderness. There is no rebound and no guarding. No hernia.   Musculoskeletal: Normal range of motion. He exhibits no edema or tenderness.    Neurological: He is alert and oriented to person, place, and time. No cranial nerve deficit or sensory deficit.   Tongue midline, EOMI, face symmetric and sensation intact bilaterally to touch   Skin: Skin is warm. Capillary refill takes less than 2 seconds. No rash noted.        Vitals reviewed.      Significant Labs:  All pertinent labs from the last 24 hours have been reviewed.    Significant Diagnostics:  I have reviewed and interpreted all pertinent imaging results/findings within the past 24 hours.    Assessment/Plan:     BERNY (acute kidney injury)    -UOP adeqaute, hematuria resolved - cont to monitor UOP closely  -Remove watson today  -FeNa prerenal yesterday - cont to hydrate PO        TIA involving carotid artery    -L amaurosis fugax consistent with TIA and L ICA stenosis 60-69% on US s/p L CEA 2/19/18 - stable after diuresis and optimization of respiratory status  -Cont neuro checks  -Cont to monitor UOP closely  -Clear liquid ADA diet   -Cont ASA, Plavix and statin  -BP control - IV antihypertensives prn  -Respiratory therapy - cont IS  -Cont following intensivist and primary MD recommendations  -OOB to chair  -If pt remains stable this Am, transfer to floor        S/P TAVR (transcatheter aortic valve replacement)    -cont Cardiology recommendations        Aortic valve stenosis    -continue Cardiology recommendations        Acute respiratory failure with hypoxia    -Oxygenation improved, hypercapnea resolved - cont respiratory therapy and IS   -Cont home Lasix        Chronic diastolic heart failure    -cont Cardiology recommendations and home Lasix, antihypertensives and ASA            Silvio Coleman MD  Vascular Surgery  [facility] [place]

## 2018-02-21 NOTE — PLAN OF CARE
Problem: Physical Therapy Goal  Goal: Physical Therapy Goal  Goals to be met by: 3/7/18     Patient will increase functional independence with mobility by performin. Supine to sit with Supervision  2. Rolling to Left and Right with Supervision  3. Sit to stand transfer with Supervision  4. Bed to chair transfer with Supervision   5. Gait  x 250 feet with Supervision with or without Rolling Walker   6. Lower extremity exercise program x 30 reps per handout, with supervision    Please assist pt with ambulation to the bathroom.  Pt with minimal unsteadiness and decreased safety awareness.

## 2018-02-21 NOTE — PLAN OF CARE
Problem: Occupational Therapy Goal  Goal: Occupational Therapy Goal  Goals to be met by: 2/28/18    Patient will increase functional independence with ADLs by performing:    UE Dressing with Supervision.  LE Dressing with Supervision.  Grooming while standing at sink with Supervision.  Toileting from toilet with Modified [place] and Supervision for hygiene and clothing management.   Supine to sit with Supervision.  Stand pivot transfers with Supervision.  Upper extremity exercise program x15 reps per handout, with assistance as needed.    Outcome: Ongoing (interventions implemented as appropriate)  The patient tolerated OT eval. The patient with occasional LOB while amb without AD from the bathroom.  SBA is recommended while amb to the bathroom for safety.    The patient will benefit from  OT and 24* (S).

## 2018-02-21 NOTE — SUBJECTIVE & OBJECTIVE
Medications:  Continuous Infusions:   metoprolol       Scheduled Meds:   acetaminophen  650 mg Oral Q6H    aspirin  81 mg Oral Daily    clopidogrel  75 mg Oral Daily    heparin (porcine)  5,000 Units Subcutaneous Q8H    metoprolol succinate  25 mg Oral Daily    mupirocin  1 g Nasal BID    ondansetron  4 mg Intravenous Q4H    pantoprazole  40 mg Intravenous Before breakfast     PRN Meds:acetaminophen, dextrose 50%, glucagon (human recombinant), hydrALAZINE, influenza, insulin aspart U-100, labetalol, metoprolol, ondansetron, pneumoc 13-morro conj-dip cr(PF)     Objective:     Vital Signs (Most Recent):  Temp: 98 °F (36.7 °C) (02/21/18 0400)  Pulse: 86 (02/21/18 0702)  Resp: 16 (02/21/18 0702)  BP: (!) 151/103 (02/21/18 0702)  SpO2: 96 % (02/21/18 0507) Vital Signs (24h Range):  Temp:  [97.7 °F (36.5 °C)-98.4 °F (36.9 °C)] 98 °F (36.7 °C)  Pulse:  [] 86  Resp:  [15-49] 16  SpO2:  [81 %-100 %] 96 %  BP: ()/() 151/103  Arterial Line BP: (102-159)/(26-53) 133/39          Physical Exam   Constitutional: He is oriented to person, place, and time. He appears well-developed. No distress.   HENT:   Head: Normocephalic and atraumatic.   Eyes: Conjunctivae are normal.   Neck: Neck supple.   Cardiovascular: Normal rate.    Pulmonary/Chest: Effort normal. No respiratory distress. He has no wheezes.   Abdominal: Soft. He exhibits no distension and no mass. There is no tenderness. There is no rebound and no guarding. No hernia.   Musculoskeletal: Normal range of motion. He exhibits no edema or tenderness.   Neurological: He is alert and oriented to person, place, and time. No cranial nerve deficit or sensory deficit.   Tongue midline, EOMI, face symmetric and sensation intact bilaterally to touch   Skin: Skin is warm. Capillary refill takes less than 2 seconds. No rash noted.   Vitals reviewed.      Significant Labs:  All pertinent labs from the last 24 hours have been reviewed.    Significant  Diagnostics:  I have reviewed and interpreted all pertinent imaging results/findings within the past 24 hours.

## 2018-02-21 NOTE — PLAN OF CARE
Problem: Patient Care Overview  Goal: Plan of Care Review  Outcome: Ongoing (interventions implemented as appropriate)  Pt admitted is s/p Lt Endarterectomy.  Pt is in bigeminey. Neuro intact.  Blood Glucose is 123.  Pt with A-Line to Lt wrist.  Tylenol controls pain. Incision to Lt side of neck open to air..  Drsg clean, dry and intact.  Pt  3L NC with sats 94 - 100. No falls/injuries this shift.

## 2018-02-21 NOTE — ASSESSMENT & PLAN NOTE
-UOP adeqaute, hematuria resolved - cont to monitor UOP closely  -Remove watson today  -FeNa prerenal yesterday - cont to hydrate PO

## 2018-02-21 NOTE — PROGRESS NOTES
[facility] Hospital Medicine  Progress Note    Patient Name: Timbo Gallagher  MRN: [id number]  Patient Class: IP- Inpatient   Admission Date: 2/17/2018  Length of Stay: 3 days  Attending Physician: Cirilo Montero MD  Primary Care Provider: Cirilo Montero MD        Subjective:     Principal Problem:Amaurosis fugax    HPI:  81 yo WM who was previously seen by Dr. Jacobsen, now me, presents after experiencing L Amaurosis Fugax last night 2/16.  He said it occurred at 10 pm while he was sitting, and lasted 5 minutes.  He had no headache.  He felt his heart palpitating and felt as though something was spinning him around.  He did not pass out.  He did have some mild nausea.  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  CT of head shows chronic ischemic changes and central frontal atrophy.  He has a history of TAVR in Oct 2017 and follows Dr. Manzo (who no longer comes here).   He has a L carotid bruit (and ER US shows >70% stenosis on L, <50% on R)  He has an irregular heart rhythm - Dr. Toribio saw pt and noted junctional rhythm and fused beats, but not full A fib.  He had the flu about a month ago, and was treated with Tamiflu, and since that time has had residual lower respiratory symptoms with cough and congestion.  He had MDI at home for what he says is chronic bronchitis.  He has a loose cough here.  He is being admitted for cardiac and neuro evaluation.    Hospital Course:  Pt has been seen by cardiology and neuro  Dr. Coleman has seen pt for vasc surgery and is planning to take him for L CEA tomorrow    Interval History: Pt looking great this morning.  Misses his wife.    Review of Systems   All other systems reviewed and are negative.    Scheduled Meds:   acetaminophen  650 mg Oral Q6H    aspirin  81 mg Oral Daily    clopidogrel  75 mg Oral Daily    heparin  (porcine)  5,000 Units Subcutaneous Q8H    metoprolol succinate  50 mg Oral Daily    mupirocin  1 g Nasal BID    ondansetron  4 mg Intravenous Q4H    pantoprazole  40 mg Intravenous Before breakfast     Continuous Infusions:   metoprolol       PRN Meds:.acetaminophen, dextrose 50%, glucagon (human recombinant), hydrALAZINE, influenza, insulin aspart U-100, labetalol, metoprolol, ondansetron, pneumoc 13-morro conj-dip cr(PF)    Objective:     Vital Signs (Most Recent):  Temp: 98 °F (36.7 °C) (02/21/18 0400)  Pulse: 86 (02/21/18 0702)  Resp: 16 (02/21/18 0702)  BP: (!) 151/103 (02/21/18 0702)  SpO2: 96 % (02/21/18 0507) Vital Signs (24h Range):  Temp:  [97.7 °F (36.5 °C)-98.4 °F (36.9 °C)] 98 °F (36.7 °C)  Pulse:  [] 86  Resp:  [15-49] 16  SpO2:  [81 %-100 %] 96 %  BP: ()/() 151/103  Arterial Line BP: (104-159)/(26-53) 133/39     Weight: 84.4 kg (186 lb 1.1 oz)  Body mass index is 27.48 kg/m².    Intake/Output Summary (Last 24 hours) at 02/21/18 0850  Last data filed at 02/21/18 0500   Gross per 24 hour   Intake            497.5 ml   Output              753 ml   Net           -255.5 ml      Physical Exam   Constitutional: He is oriented to person, place, and time. He appears well-developed and well-nourished.   HENT:   Head: Normocephalic and atraumatic.   Eyes: EOM are normal. Pupils are equal, round, and reactive to light.   Neck: Neck supple.   Cardiovascular: Normal rate, regular rhythm and normal heart sounds.    Pulmonary/Chest: Effort normal. He has wheezes in the right lower field.   Abdominal: Soft. Bowel sounds are normal.   Neurological: He is alert and oriented to person, place, and time.   Skin: Skin is warm and dry.   Psychiatric: He has a normal mood and affect.   Vitals reviewed.      Significant Labs:   CBC:     Recent Labs  Lab 02/20/18  0621 02/20/18  0957 02/21/18  0530   WBC 20.51* 18.43* 10.32   HGB 11.2* 10.8* 9.6*   HCT 33.4* 32.9* 29.6*    189 165     CMP:      Recent Labs  Lab 02/20/18  0621 02/20/18  0957 02/21/18  0530    136 140   K 4.5 4.4 3.9    106 107   CO2 23 22* 25   * 180* 123*   BUN 30* 34* 36*   CREATININE 1.9* 2.2* 2.2*   CALCIUM 8.9 9.0 9.2   ANIONGAP 8 8 8   EGFRNONAA 32* 27* 27*     POCT Glucose:     Recent Labs  Lab 02/20/18  1630 02/20/18  2124 02/21/18  0538   POCTGLUCOSE 128* 136* 123*       Significant Imaging: CXR: I have reviewed all pertinent results/findings within the past 24 hours and my personal findings are:  Multiple overlying cardiac monitoring leads. The cardiomediastinal silhouette is prominent but stable in size. It again appears slightly deviated to the right, similar to prior studies. Aortic stent in place. Mild pulmonary vascular congestion and perihilar edema. Mild bibasilar atelectasis. No new focal consolidation. Small pleural effusions suggested, particularly on the right. No pneumothorax.    Assessment/Plan:      * Amaurosis fugax    Due to tia          BERNY (acute kidney injury)    Continue to monitor          Pleural effusion    CXR looking better          Carotid stenosis, left      S/p CEA POD#2.  COntinue as per vascular surgery        TIA involving carotid artery    No further symptoms.            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 Manzo as OP  No current issues; Dr. Toribio feels this is unrelated to any current symptoms        Acute respiratory failure with hypoxia    Loooking much better.  A line removed today        Essential hypertension    bp MANAGED FOR NOW        Chronic diastolic heart failure              Type 2 diabetes mellitus with renal complication    Continue metformin and sliding scale          VTE Risk Mitigation         Ordered     heparin (porcine) injection 5,000 Units  Every 8 hours     Route:  Subcutaneous        02/20/18 2207     Low Risk of VTE   Once      02/19/18 2121          Critical care time spent on the evaluation and treatment of severe organ dysfunction, review of pertinent labs and imaging studies, discussions with consulting providers and discussions with patient/family: <30 minutes.    Dayna Campbell MD  Department of Hospital Medicine   [facility]

## 2018-02-21 NOTE — PT/OT/SLP EVAL
Physical Therapy Evaluation    Patient Name:  Timbo Gallagher   MRN:  [id number]    Recommendations:     Discharge Recommendations:  home health PT (with 24 hour supervision/assistance (Pt with decreased safety awareness.))   Discharge Equipment Recommendations: walker, rolling, shower chair   Barriers to discharge: Decreased caregiver support and Pt with decreased safety awareness.     Assessment:     Timbo Gallagher is a 82 y.o. male admitted with a medical diagnosis of Amaurosis fugax.  He presents with the following impairments/functional limitations:  weakness, impaired endurance, gait instability, impaired balance, visual deficits, decreased safety awareness.    Rehab Prognosis:  Fair+; patient would benefit from acute skilled PT services to address these deficits and reach maximum level of function.      Recent Surgery: Procedure(s) (LRB):  ENDARTERECTOMY-CAROTID (Left) 2 Days Post-Op    Plan:     During this hospitalization, patient to be seen 3 x/week (M-F) to address the above listed problems via gait training, therapeutic activities, therapeutic exercises  · Plan of Care Expires:  03/07/18   Plan of Care Reviewed with: patient    Subjective   Patient found in bed upon PT entry to room, agreeable to evaluation.      Chief Complaint: N/A  Patient comments/goals: Pt reported that he wants to go to [facility] today to be with his wife.    Pain/Comfort:  · Pain Rating 1:  (Pt c/o occasional HA. )    Living Environment:  Pt reported living with spouse in a SS house with 2-3 steps at entry.  Currently, pt's spouse is at [facility] ~2-3 weeks now.  Pt has no children.  Pt's spouse has a child in [place] and one in [place].  Prior to admission, patients level of function was independent and driving.  Patient has the following equipment: none.  Upon discharge, patient will have limited assistance from neighbor.    Objective:     Patient found with: peripheral IV     General Precautions: Standard, fall, hearing impaired (Pt  reported his hearing aids are out of batteries.), blind (R eye), DM     Exams:  · Cognitive Exam:  Patient was able to follow multiple commands.  Pt slightly impulsive with decreased safety awareness.   · Gross Motor Coordination:  WFL  · Postural Exam:  Patient presented with the following abnormalities:    · -       Rounded shoulders  · -       Forward head  · Sensation:    · -       Intact  light/touch BLE  · Skin Integrity/Edema:      · -       Skin integrity: Visible skin intact  · RLE ROM: WFL  · RLE Strength: WFL  · LLE ROM: WFL  · LLE Strength: WFL    Functional Mobility:  · Bed Mobility:     · Scooting: stand by assistance  · Supine to Sit: stand by assistance  · Transfers:     · Sit to Stand:  stand by assistance and contact guard assistance with no AD  · Bed to Chair: stand by assistance and contact guard assistance with  no AD  using  Step Transfer  · Toilet Transfer: stand by assistance and contact guard assistance with  no AD  using  Step Transfer  · Gait: Pt ambulated ~20 ft without AD/min A-CGA.  Pt with minimal unsteadiness with LOB.  Pt ambulated ~200 ft with CGA/RW.  Pt with forward flexed posture, decreased foot clearance, step length, and mike.    · Balance: Pt with fair+ balance.     AM-PAC 6 CLICK MOBILITY  Total Score:21     Patient left up in chair with all lines intact and call button in reach.    GOALS:    Physical Therapy Goals        Problem: Physical Therapy Goal    Goal Priority Disciplines Outcome Goal Variances Interventions   Physical Therapy Goal     PT/OT, PT      Description:  Goals to be met by: 3/7/18     Patient will increase functional independence with mobility by performin. Supine to sit with Supervision  2. Rolling to Left and Right with Supervision  3. Sit to stand transfer with Supervision  4. Bed to chair transfer with Supervision   5. Gait  x 250 feet with Supervision with or without Rolling Walker   6. Lower extremity exercise program x 30 reps per handout,  with supervision                      History:     Past Medical History:   Diagnosis Date    Arthritis     Blind right eye     BPH (benign prostatic hypertrophy)     Bronchitis, chronic     CHF (congestive heart failure)     Colon polyp     Diabetes mellitus     GERD (gastroesophageal reflux disease)     Hearing aid worn     bilateral    Hernia     Hypertension     Irregular heart beat     Snoring        Past Surgical History:   Procedure Laterality Date    CARDIAC VALVE SURGERY      CATARACT EXTRACTION, BILATERAL      EYE SURGERY      RETINAL DETACHMENT SURGERY         Clinical Decision Making:     History  Co-morbidities and personal factors that may impact the plan of care Examination  Body Structures and Functions, activity limitations and participation restrictions that may impact the plan of care Clinical Presentation   Decision Making/ Complexity Score   Co-morbidities:   [] Time since onset of injury / illness / exacerbation  [x] Status of current condition  [x]Patient's cognitive status and safety concerns    [x] Multiple Medical Problems (see med hx)  Personal Factors:   [x] Patient's age  [] Prior Level of function   [x] Patient's home situation (environment and family support)  [] Patient's level of motivation  [] Expected progression of patient      HISTORY:(criteria)    [] 25782 - no personal factors/history    [] 34506 - has 1-2 personal factor/comorbidity     [x] 86194 - has >3 personal factor/comorbidity     Body Regions:  [] Objective examination findings  [] Head     []  Neck  [] Trunk   [] Upper Extremity  [] Lower Extremity    Body Systems:  [x] For communication ability, affect, cognition, language, and learning style: the assessment of the ability to make needs known, consciousness, orientation (person, place, and time), expected emotional /behavioral responses, and learning preferences (eg, learning barriers, education  needs)  [x] For the neuromuscular system: a general  assessment of gross coordinated movement (eg, balance, gait, locomotion, transfers, and transitions) and motor function  (motor control and motor learning)  [x] For the musculoskeletal system: the assessment of gross symmetry, gross range of motion, gross strength, height, and weight  [x] For the integumentary system: the assessment of pliability(texture), presence of scar formation, skin color, and skin integrity  [x] For cardiovascular/pulmonary system: the assessment of heart rate, respiratory rate, blood pressure, and edema     Activity limitations:    [x] Patient's cognitive status and saf ety concerns          [x] Status of current condition      [] Weight bearing restriction  [] Cardiopulmunary Restriction    Participation Restrictions:   [] Goals and goal agreement with the patient     [] Rehab potential (prognosis) and probable outcome      Examination of Body System: (criteria)    [] 54717 - addressing 1-2 elements    [] 31324 - addressing a total of 3 or more elements     [x] 50364 -  Addressing a total of 4 or more elements         Clinical Presentation: (criteria)  Stable - 92243     On examination of body system using standardized tests and measures patient presents with 4 or more elements from any of the following: body structures and functions, activity limitations, and/or participation restrictions.  Leading to a clinical presentation that is considered stable and/or uncomplicated                              Clinical Decision Making  (Eval Complexity):  Low- 10519     Time Tracking:     PT Received On: 02/21/18  PT Start Time: 1354     PT Stop Time: 1409  PT Total Time (min): 15 min     Billable Minutes: Evaluation 15 min co-eval with TONY José, PT  02/21/2018

## 2018-02-21 NOTE — PLAN OF CARE
Problem: Patient Care Overview  Goal: Plan of Care Review  Outcome: Ongoing (interventions implemented as appropriate)  Pt remains in ICU. Colunga remains in place. MD notified of red, blood tinged urine and low UOP. Colunga flushed with no difficulty; no clots returned. All volume returned. Over last few hours of shift, urine started to decrease in red color. Has been OOB to chair for all meals. Tolerated ambulation moderately well. Tolerating PO intake well. Passed nursing dysphagia screen. Encouraged IS use. Pulls 1500 consistently. Denies any needs.

## 2018-02-21 NOTE — PROGRESS NOTES
[facility]  Adult Nutrition  Progress Note    SUMMARY     Recommendations    Recommendation/Intervention: 1) Dental Soft, 1800 calorie Diabetic, Renal diet 2) Boost Glucose Control vanilla BID 3) Provide diet education 4) Monitor oral intake and tolerance 5) RD to monitor  Goals: 1) Patient to consume >=85% of EEN with tolerance x 3days  Nutrition Goal Status: new  Communication of RD Recs: reviewed with RN    Continuum of Care Plan     Nutrition Discharge Planning: Patient to discharge on a Dental Soft, Diabetic 1800 calorie Diet; thin liquids.       Reason for Assessment    Reason for Assessment: identified at risk by screening criteria (dysphagia or difficulty swallowing)  Diagnosis: other (see comments) (TIA)  Relevent Medical History: CHF, DM, HTN, CKD3     General Information Comments: Patient consuming 50 -100% of meals with fair tolerance per RN. Patient to benefit from oral nutritional supplement. RN receptive.       Nutrition Prescription Ordered    Current Diet Order: Dental Soft, Diabetic 1800 calories; thin    Evaluation of Received Nutrients/Fluid Intake       Energy Calories Required: not meeting needs  Protein Required: not meeting needs    I/O: 2036/1650         Fluid Required: meeting needs  Comments: LBM: 02/18  Tolerance: tolerating  % Intake of Estimated Energy Needs: Other: 75%  % Meal Intake: Other: 50 - 100%     Nutrition Risk Screen     Nutrition Risk Screen: dysphagia or difficulty swallowing    Nutrition/Diet History    Food Preferences: No cultural, Quaker or ethnic food preferences noted.     Factors Affecting Nutritional Intake: difficulty/impaired swallowing    Labs/Tests/Procedures/Meds    Diagnostic Test/Procedure Review: reviewed, pertinent (passed nursing dysphagia screen)  Pertinent Labs Reviewed: reviewed  Pertinent Labs Comments: CO2 22 (L), BUN 34 (H), Creat 2.2 (H), GFR 27 (L), Phos 5.1 (H)  Pertinent Medications Reviewed: reviewed       Physical  "Findings    Overall Physical Appearance: on oxygen therapy  Skin: incision    Anthropometrics    Temp: 97.8 °F (36.6 °C)     Height: 5' 9" (175.3 cm)  Weight Method: Bed Scale  Weight: 84.4 kg (186 lb 1.1 oz)  Ideal Body Weight (IBW), Male: 160 lb     % Ideal Body Weight, Male (lb): 116.29 lb     BMI (Calculated): 27.5     Estimated/Assessed Needs    Weight Used For Calorie Calculations: 84.4 kg (186 lb 1.1 oz)     Energy Need Method: [place]-St Jeor (1840 - 1994)  RMR ([place]-St. Jeor Equation): 1534.38  Weight Used For Protein Calculations: 84.4 kg (186 lb 1.1 oz)  Protein Requirements: 80 g  Fluid Need Method: RDA Method, other (see comments) (or per MD)    CHO Requirement: 200 g     Assessment and Plan    Nutrition Diagnosis:  Problem: Inadequate oral intake  Etiology: difficulty swallowing s/p TIA  Signs/symptoms: dental soft diet, thin liquids  Status:  new      Monitor and Evaluation    Food and Nutrient Intake: energy intake, food and beverage intake  Food and Nutrient Adminstration: diet order, other (specify) (supplement order)  Knowledge/Beliefs/Attitudes: food and nutrition knowledge/skill, beliefs and attitudes  Physical Activity and Function: nutrition-related ADLs and IADLs  Anthropometric Measurements: weight, weight change, body mass index  Biochemical Data, Medical Tests and Procedures: electrolyte and renal panel, gastrointestinal profile, glucose/endocrine profile  Nutrition-Focused Physical Findings: overall appearance    Nutrition Risk    Level of Risk: other (see comments) (F/U 2x weekly)    Nutrition Follow-Up    RD Follow-up?: Yes    "

## 2018-02-21 NOTE — ASSESSMENT & PLAN NOTE
-L amaurosis fugax consistent with TIA and L ICA stenosis 60-69% on US s/p L CEA 2/19/18 - stable after diuresis and optimization of respiratory status  -Cont neuro checks  -Cont to monitor UOP closely  -Resume home medications  -HLIV  -Clear liquid ADA diet   -Cont ASA, Plavix and statin  -BP control - IV antihypertensives prn  -Respiratory therapy consult - cont IS  -Cont following intensivist recommendations  -OOB to chair

## 2018-02-21 NOTE — PT/OT/SLP EVAL
Occupational Therapy   Evaluation    Name: Timbo Gallagher  MRN: [id number]  Admitting Diagnosis:  Amaurosis fugax 2 Days Post-Op    Recommendations:     Discharge Recommendations: home with home health (with (S) 2* decreased safety awareness)  Discharge Equipment Recommendations:  walker, rolling, shower chair  Barriers to discharge:  Decreased caregiver support (spouse is at SNF)    History:     Occupational Profile:  Living Environment: The patient lives with his spouse in a SS house with 3 JASON with B HR.  Previous level of function: (I) with all self care and amb  Roles and Routines: drives and cooks his meals  Equipment Owned:  none  Assistance upon Discharge: Per patient, he will have assist from his neighbor only. His spouse is at [facility]. The patient does not have children. His spouse has children whon live in [facility]    Past Medical History:   Diagnosis Date    Arthritis     Blind right eye     BPH (benign prostatic hypertrophy)     Bronchitis, chronic     CHF (congestive heart failure)     Colon polyp     Diabetes mellitus     GERD (gastroesophageal reflux disease)     Hearing aid worn     bilateral    Hernia     Hypertension     Irregular heart beat     Snoring        Past Surgical History:   Procedure Laterality Date    CARDIAC VALVE SURGERY      CATARACT EXTRACTION, BILATERAL      EYE SURGERY      RETINAL DETACHMENT SURGERY         Subjective     Chief Complaint: Patient is anxious to to be D/C to [facility] so he can see his wife.  Patient/Family stated goals: go to [facility] to be with his wife    Pain/Comfort:  · Pain Rating 1: 0/10    Patients cultural, spiritual, Shinto conflicts given the current situation: none    Objective:     Patient found with: peripheral IV    General Precautions: Standard, fall, diabetic, vision impaired (right eye blind)   Orthopedic Precautions:N/A   Braces: N/A     Occupational Performance:    Bed Mobility:    · Patient completed Supine to Sit  "with supervision    Functional Mobility/Transfers:  · Patient completed Sit <> Stand Transfer with stand by assistance  with  no assistive device   · Patient completed Toilet Transfer Stand Pivot technique with stand by assistance with  grab bars  · Functional Mobility: The patient amb without AD to/from the toilet with LOB x1. The patient required CGA to correct LOB. The patient was given a RW by PT but the patient pushed the RW aside, stating "I don't need this".    Activities of Daily Living:  The patient was found wearing his street clothes (shirt, pants, belt, shoes and socks). The patient reports dresssing himself in anticipation of D/C to home.  · Grooming: stand by assistance to stand at the sink to wash his hands.       Cognitive/Visual Perceptual:  Cognitive/Psychosocial Skills:     -       Oriented to: Person, Place and Situation   -       Follows Commands/attention:Follows two-step commands  -       Communication: clear/fluent  -       Memory: No Deficits noted  -       Safety awareness/insight to disability: impaired   -       Mood/Affect/Coping skills/emotional control: Appropriate to situation  Visual/Perceptual:      -Impaired  right eye blind    Physical Exam:  Balance: -       good sitting, fair+ stand  Postural examination/scapula alignment:    -       No postural abnormalities identified  Skin integrity: incision to left neck 2* L CEA  Edema:  None noted  Sensation:    -       Intact  Dominant hand: -       right  Upper Extremity Range of Motion:     -       Right Upper Extremity: WFL  -       Left Upper Extremity: WFL  Upper Extremity Strength:    -       Right Upper Extremity: WFL  -       Left Upper Extremity: WFL   Strength:    -       Right Upper Extremity: WFL  -       Left Upper Extremity: WFL    Patient left up in chair with all lines intact and call button in reach    [facility] 6 Click:  [facility] Total Score: 23    Treatment & Education:  The patient was agreeable to OT eval. The patient " "was educated re: OT role and need to request assist from the nursing staff to amb to the bathroom for safety.  Education:    Assessment:     Timbo Gallagher is a 82 y.o. male with a medical diagnosis of Amaurosis fugax.    Performance deficits affecting function are impaired endurance, weakness, impaired self care skills, gait instability, impaired functional mobilty, impaired balance, decreased safety awareness, decreased upper extremity function, decreased lower extremity function, impaired skin.      Rehab Prognosis:  good; patient would benefit from acute skilled OT services to address these deficits and reach maximum level of function.         Clinical Decision Makin.  OT Low:  "Pt evaluation falls under low complexity for evaluation coding due to performance deficits noted in 1-3 areas as stated above and 0 co-morbities affecting current functional status. Data obtained from problem focused assessments. No modifications or assistance was required for completion of evaluation. Only brief occupational profile and history review completed."     Plan:     Patient to be seen 3 x/week to address the above listed problems via self-care/home management, therapeutic activities, therapeutic exercises  · Plan of Care Expires: 18  · Plan of Care Reviewed with: patient    This Plan of care has been discussed with the patient who was involved in its development and understands and is in agreement with the identified goals and treatment plan    GOALS:    Occupational Therapy Goals        Problem: Occupational Therapy Goal    Goal Priority Disciplines Outcome Interventions   Occupational Therapy Goal     OT, PT/OT Ongoing (interventions implemented as appropriate)    Description:  Goals to be met by: 18    Patient will increase functional independence with ADLs by performing:    UE Dressing with Supervision.  LE Dressing with Supervision.  Grooming while standing at sink with Supervision.  Toileting " from toilet with Modified [place] and Supervision for hygiene and clothing management.   Supine to sit with Supervision.  Stand pivot transfers with Supervision.  Upper extremity exercise program x15 reps per handout, with assistance as needed.                      Time Tracking:     OT Date of Treatment: 02/21/18  OT Start Time: 1353  OT Stop Time: 1410  OT Total Time (min): 17 min    Billable Minutes:Evaluation 17 (co-tx with PT)    Ebony Coates OT  2/21/2018

## 2018-02-21 NOTE — PROGRESS NOTES
Discharge planning--[facility] message that patient accepted at [facility].   Patient will need PPD

## 2018-02-21 NOTE — SUBJECTIVE & OBJECTIVE
Past Medical History:   Diagnosis Date    Arthritis     Blind right eye     BPH (benign prostatic hypertrophy)     Bronchitis, chronic     CHF (congestive heart failure)     Colon polyp     Diabetes mellitus     GERD (gastroesophageal reflux disease)     Hearing aid worn     bilateral    Hernia     Hypertension     Irregular heart beat     Snoring        Past Surgical History:   Procedure Laterality Date    CARDIAC VALVE SURGERY      CATARACT EXTRACTION, BILATERAL      EYE SURGERY      RETINAL DETACHMENT SURGERY         Review of patient's allergies indicates:   Allergen Reactions    Vancomycin analogues Itching    Ciprofloxacin (mixture) Itching     Extreme itching and redness     Antibiotic hc        Family History     Problem Relation (Age of Onset)    Arthritis Mother    Diabetes Sister    Heart attack Brother    Heart disease Father    Heart failure Father    No Known Problems Maternal Aunt, Maternal Uncle, Paternal Aunt, Paternal Uncle, Maternal Grandmother, Maternal Grandfather, Paternal Grandmother, Paternal Grandfather        Social History Main Topics    Smoking status: Former Smoker     Packs/day: 3.00     Years: 40.00     Quit date: 8/2/1990    Smokeless tobacco: Never Used    Alcohol use No    Drug use: No    Sexual activity: Not on file         Review of Systems   Constitutional: Negative for activity change and appetite change.   HENT: Positive for congestion. Negative for sneezing.    Respiratory: Negative for cough and shortness of breath.    Cardiovascular: Negative for chest pain and leg swelling.   Gastrointestinal: Negative for abdominal distention and abdominal pain.   Genitourinary: Negative for difficulty urinating and dysuria.   Musculoskeletal: Negative for arthralgias.   Neurological: Negative for dizziness.   Psychiatric/Behavioral: Negative for agitation and behavioral problems.     Objective:     Vital Signs (Most Recent):  Temp: 97.8 °F (36.6 °C) (02/20/18  1600)  Pulse: 63 (02/20/18 1953)  Resp: (!) 23 (02/20/18 1953)  BP: (!) 160/67 (02/20/18 1830)  SpO2: 98 % (02/20/18 1953) Vital Signs (24h Range):  Temp:  [97.7 °F (36.5 °C)-98.4 °F (36.9 °C)] 97.8 °F (36.6 °C)  Pulse:  [] 63  Resp:  [10-49] 23  SpO2:  [81 %-100 %] 98 %  BP: ()/(46-99) 160/67  Arterial Line BP: ()/(12-51) 128/45     Weight: 84.4 kg (186 lb 1.1 oz)  Body mass index is 27.48 kg/m².      Intake/Output Summary (Last 24 hours) at 02/20/18 2127  Last data filed at 02/20/18 1600   Gross per 24 hour   Intake           1543.5 ml   Output              443 ml   Net           1100.5 ml       Physical Exam    Vents:  Oxygen Concentration (%): 70 (02/19/18 1700)    Lines/Drains/Airways     Drain                 Urethral Catheter 02/19/18 1650 1 day          Peripheral Intravenous Line                 Peripheral IV - Single Lumen 02/17/18 0830 Right Antecubital 3 days         Peripheral IV - Single Lumen 02/19/18 1500 Right Hand 1 day                Significant Labs:    CBC/Anemia Profile:    Recent Labs  Lab 02/20/18  0343 02/20/18  0621 02/20/18  0957   WBC 19.61* 20.51* 18.43*   HGB 11.0* 11.2* 10.8*   HCT 33.3* 33.4* 32.9*    203 189   MCV 95 94 95   RDW 13.4 13.3 13.2        Chemistries:    Recent Labs  Lab 02/19/18  2348 02/20/18  0343 02/20/18  0621 02/20/18  0957    139 138 136   K 4.3 4.6 4.5 4.4    108 107 106   CO2 20* 22* 23 22*   BUN 27* 28* 30* 34*   CREATININE 1.5* 1.8* 1.9* 2.2*   CALCIUM 9.0 8.8 8.9 9.0   MG 1.3* 2.2  --  2.1   PHOS 4.0 5.3*  --  5.1*       All pertinent labs within the past 24 hours have been reviewed.    Significant Imaging:   I have reviewed all pertinent imaging results/findings within the past 24 hours.

## 2018-02-21 NOTE — HPI
82 year old male with hx of TAVR, combined systolic and diastolic CHF who presented to ER with L vision loss who is s/p carotid endarterectomy on 2/19. Patient with worsening SOB last night. Pulmonary edema on CXR. Patient transferred to the ICU. Placed on NIPPV and given Lasix. Improvement in respiratory status.     Pulmonary/CC consulted for assistance with ICU management.

## 2018-02-21 NOTE — CONSULTS
[facility]  Pulmonology  Consult Note    Patient Name: Timbo Gallagher  MRN: [id number]  Admission Date: 2/17/2018  Hospital Length of Stay: 2 days  Code Status: Full Code  Attending Physician: Cirilo Montero MD  Primary Care Provider: Cirilo Montero MD   Principal Problem: Amaurosis fugax    Inpatient consult to Pulmonology  Consult performed by: SRAVAN ROBISON  Consult ordered by: SUZETTE ESTRADA        Subjective:     HPI:  82 year old male with hx of TAVR, combined systolic and diastolic CHF who presented to ER with L vision loss who is s/p carotid endarterectomy on 2/19. Patient with worsening SOB last night. Pulmonary edema on CXR. Patient transferred to the ICU. Placed on NIPPV and given Lasix. Improvement in respiratory status.     Pulmonary/CC consulted for assistance with ICU management.     Past Medical History:   Diagnosis Date    Arthritis     Blind right eye     BPH (benign prostatic hypertrophy)     Bronchitis, chronic     CHF (congestive heart failure)     Colon polyp     Diabetes mellitus     GERD (gastroesophageal reflux disease)     Hearing aid worn     bilateral    Hernia     Hypertension     Irregular heart beat     Snoring        Past Surgical History:   Procedure Laterality Date    CARDIAC VALVE SURGERY      CATARACT EXTRACTION, BILATERAL      EYE SURGERY      RETINAL DETACHMENT SURGERY         Review of patient's allergies indicates:   Allergen Reactions    Vancomycin analogues Itching    Ciprofloxacin (mixture) Itching     Extreme itching and redness     Antibiotic hc        Family History     Problem Relation (Age of Onset)    Arthritis Mother    Diabetes Sister    Heart attack Brother    Heart disease Father    Heart failure Father    No Known Problems Maternal Aunt, Maternal Uncle, Paternal Aunt, Paternal Uncle, Maternal Grandmother, Maternal Grandfather, Paternal Grandmother, Paternal Grandfather        Social History Main Topics     Smoking status: Former Smoker     Packs/day: 3.00     Years: 40.00     Quit date: 8/2/1990    Smokeless tobacco: Never Used    Alcohol use No    Drug use: No    Sexual activity: Not on file         Review of Systems   Constitutional: Negative for activity change and appetite change.   HENT: Positive for congestion. Negative for sneezing.    Respiratory: Negative for cough and shortness of breath.    Cardiovascular: Negative for chest pain and leg swelling.   Gastrointestinal: Negative for abdominal distention and abdominal pain.   Genitourinary: Negative for difficulty urinating and dysuria.   Musculoskeletal: Negative for arthralgias.   Neurological: Negative for dizziness.   Psychiatric/Behavioral: Negative for agitation and behavioral problems.     Objective:     Vital Signs (Most Recent):  Temp: 97.8 °F (36.6 °C) (02/20/18 1600)  Pulse: 63 (02/20/18 1953)  Resp: (!) 23 (02/20/18 1953)  BP: (!) 160/67 (02/20/18 1830)  SpO2: 98 % (02/20/18 1953) Vital Signs (24h Range):  Temp:  [97.7 °F (36.5 °C)-98.4 °F (36.9 °C)] 97.8 °F (36.6 °C)  Pulse:  [] 63  Resp:  [10-49] 23  SpO2:  [81 %-100 %] 98 %  BP: ()/(46-99) 160/67  Arterial Line BP: ()/(12-51) 128/45     Weight: 84.4 kg (186 lb 1.1 oz)  Body mass index is 27.48 kg/m².      Intake/Output Summary (Last 24 hours) at 02/20/18 2127  Last data filed at 02/20/18 1600   Gross per 24 hour   Intake           1543.5 ml   Output              443 ml   Net           1100.5 ml       Physical Exam    Vents:  Oxygen Concentration (%): 70 (02/19/18 1700)    Lines/Drains/Airways     Drain                 Urethral Catheter 02/19/18 1650 1 day          Peripheral Intravenous Line                 Peripheral IV - Single Lumen 02/17/18 0830 Right Antecubital 3 days         Peripheral IV - Single Lumen 02/19/18 1500 Right Hand 1 day                Significant Labs:    CBC/Anemia Profile:    Recent Labs  Lab 02/20/18  0343 02/20/18  0621 02/20/18  0957   WBC  19.61* 20.51* 18.43*   HGB 11.0* 11.2* 10.8*   HCT 33.3* 33.4* 32.9*    203 189   MCV 95 94 95   RDW 13.4 13.3 13.2        Chemistries:    Recent Labs  Lab 02/19/18  2348 02/20/18  0343 02/20/18  0621 02/20/18  0957    139 138 136   K 4.3 4.6 4.5 4.4    108 107 106   CO2 20* 22* 23 22*   BUN 27* 28* 30* 34*   CREATININE 1.5* 1.8* 1.9* 2.2*   CALCIUM 9.0 8.8 8.9 9.0   MG 1.3* 2.2  --  2.1   PHOS 4.0 5.3*  --  5.1*       All pertinent labs within the past 24 hours have been reviewed.    Significant Imaging:   I have reviewed all pertinent imaging results/findings within the past 24 hours.    Assessment/Plan:     * Amaurosis fugax    S/p CEA        BERNY (acute kidney injury)    Likely pre-renal. In setting of pulmonary edema and recent respiratory failure albumin given. Will order renal ultrasound and consider IVF if no improvement tomorrow.         Pleural effusion    Very small right effusion vs atelectasis. Consider CT if no improvement.         Acute respiratory failure with hypoxia    Patient with pulmonary edema on CXR with improvement with diureses and NIPPV. Increase in Cr. Albumin given for low UOP in setting of possible pulmonary edema. Leukocytosis but no signs and symptoms of acute infection.         Chronic diastolic heart failure    On lasix daily. Will consider holding give Cr.         Type 2 diabetes mellitus with renal complication    BG goal 140-180              Thank you for your consult. I will follow-up with patient. Please contact us if you have any additional questions.     Sherry Boone MD  Pulmonology  [facility] [place] [facility]

## 2018-02-21 NOTE — ASSESSMENT & PLAN NOTE
-L amaurosis fugax consistent with TIA and L ICA stenosis 60-69% on US s/p L CEA 2/19/18 - stable after diuresis and optimization of respiratory status  -Cont neuro checks  -Cont to monitor UOP closely  -Clear liquid ADA diet   -Cont ASA, Plavix and statin  -BP control - IV antihypertensives prn  -Respiratory therapy - cont IS  -Cont following intensivist and primary MD recommendations  -OOB to chair  -If pt remains stable this Am, transfer to floor

## 2018-02-21 NOTE — PROGRESS NOTES
Discharge plannin received, printed for chart and printed to [facility]. Sent to [facility] with progress notes update from yesterday thru 9:33 today.

## 2018-02-21 NOTE — ASSESSMENT & PLAN NOTE
Patient with pulmonary edema on CXR with improvement with diureses and NIPPV. Increase in Cr. Albumin given for low UOP in setting of possible pulmonary edema. Leukocytosis but no signs and symptoms of acute infection.

## 2018-02-22 VITALS
BODY MASS INDEX: 27.56 KG/M2 | SYSTOLIC BLOOD PRESSURE: 136 MMHG | HEART RATE: 59 BPM | HEIGHT: 69 IN | WEIGHT: 186.06 LBS | RESPIRATION RATE: 18 BRPM | OXYGEN SATURATION: 96 % | DIASTOLIC BLOOD PRESSURE: 79 MMHG | TEMPERATURE: 99 F

## 2018-02-22 PROBLEM — G45.3 AMAUROSIS FUGAX: Status: RESOLVED | Noted: 2018-02-17 | Resolved: 2018-02-22

## 2018-02-22 PROBLEM — N17.9 AKI (ACUTE KIDNEY INJURY): Status: RESOLVED | Noted: 2018-02-20 | Resolved: 2018-02-22

## 2018-02-22 PROBLEM — I65.22 CAROTID STENOSIS, LEFT: Status: RESOLVED | Noted: 2018-02-19 | Resolved: 2018-02-22

## 2018-02-22 PROBLEM — R79.89 CREATININE ELEVATION: Status: RESOLVED | Noted: 2018-02-20 | Resolved: 2018-02-22

## 2018-02-22 LAB
ANION GAP SERPL CALC-SCNC: 9 MMOL/L
BASOPHILS # BLD AUTO: 0.02 K/UL
BASOPHILS NFR BLD: 0.2 %
BUN SERPL-MCNC: 33 MG/DL
CALCIUM SERPL-MCNC: 9.5 MG/DL
CHLORIDE SERPL-SCNC: 105 MMOL/L
CO2 SERPL-SCNC: 22 MMOL/L
CREAT SERPL-MCNC: 1.5 MG/DL
DIFFERENTIAL METHOD: ABNORMAL
EOSINOPHIL # BLD AUTO: 0.2 K/UL
EOSINOPHIL NFR BLD: 1.8 %
ERYTHROCYTE [DISTWIDTH] IN BLOOD BY AUTOMATED COUNT: 13.1 %
EST. GFR  (AFRICAN AMERICAN): 49 ML/MIN/1.73 M^2
EST. GFR  (NON AFRICAN AMERICAN): 43 ML/MIN/1.73 M^2
GLUCOSE SERPL-MCNC: 143 MG/DL
HCT VFR BLD AUTO: 33.7 %
HGB BLD-MCNC: 10.9 G/DL
LYMPHOCYTES # BLD AUTO: 1 K/UL
LYMPHOCYTES NFR BLD: 8.9 %
MCH RBC QN AUTO: 31.2 PG
MCHC RBC AUTO-ENTMCNC: 32.3 G/DL
MCV RBC AUTO: 97 FL
MONOCYTES # BLD AUTO: 1.1 K/UL
MONOCYTES NFR BLD: 10 %
NEUTROPHILS # BLD AUTO: 9 K/UL
NEUTROPHILS NFR BLD: 78.9 %
PLATELET # BLD AUTO: 190 K/UL
PMV BLD AUTO: 11.6 FL
POCT GLUCOSE: 151 MG/DL (ref 70–110)
POCT GLUCOSE: 173 MG/DL (ref 70–110)
POTASSIUM SERPL-SCNC: 4 MMOL/L
RBC # BLD AUTO: 3.49 M/UL
SODIUM SERPL-SCNC: 136 MMOL/L
WBC # BLD AUTO: 11.37 K/UL

## 2018-02-22 PROCEDURE — 86580 TB INTRADERMAL TEST: CPT | Performed by: INTERNAL MEDICINE

## 2018-02-22 PROCEDURE — 85025 COMPLETE CBC W/AUTO DIFF WBC: CPT

## 2018-02-22 PROCEDURE — 97116 GAIT TRAINING THERAPY: CPT

## 2018-02-22 PROCEDURE — G8979 MOBILITY GOAL STATUS: HCPCS | Mod: CH

## 2018-02-22 PROCEDURE — 99233 SBSQ HOSP IP/OBS HIGH 50: CPT | Mod: ,,, | Performed by: INTERNAL MEDICINE

## 2018-02-22 PROCEDURE — C9113 INJ PANTOPRAZOLE SODIUM, VIA: HCPCS | Performed by: SURGERY

## 2018-02-22 PROCEDURE — 80048 BASIC METABOLIC PNL TOTAL CA: CPT

## 2018-02-22 PROCEDURE — 94761 N-INVAS EAR/PLS OXIMETRY MLT: CPT

## 2018-02-22 PROCEDURE — G8987 SELF CARE CURRENT STATUS: HCPCS | Mod: CH

## 2018-02-22 PROCEDURE — 25000003 PHARM REV CODE 250: Performed by: SURGERY

## 2018-02-22 PROCEDURE — 36415 COLL VENOUS BLD VENIPUNCTURE: CPT

## 2018-02-22 PROCEDURE — 94664 DEMO&/EVAL PT USE INHALER: CPT

## 2018-02-22 PROCEDURE — G8978 MOBILITY CURRENT STATUS: HCPCS | Mod: CJ

## 2018-02-22 PROCEDURE — 63600175 PHARM REV CODE 636 W HCPCS: Performed by: INTERNAL MEDICINE

## 2018-02-22 PROCEDURE — 63600175 PHARM REV CODE 636 W HCPCS: Performed by: SURGERY

## 2018-02-22 PROCEDURE — 97110 THERAPEUTIC EXERCISES: CPT

## 2018-02-22 PROCEDURE — 25000003 PHARM REV CODE 250: Performed by: INTERNAL MEDICINE

## 2018-02-22 PROCEDURE — G8989 SELF CARE D/C STATUS: HCPCS | Mod: CH

## 2018-02-22 PROCEDURE — G8980 MOBILITY D/C STATUS: HCPCS | Mod: CJ

## 2018-02-22 PROCEDURE — G8988 SELF CARE GOAL STATUS: HCPCS | Mod: CH

## 2018-02-22 RX ORDER — OXYCODONE AND ACETAMINOPHEN 5; 325 MG/1; MG/1
1 TABLET ORAL EVERY 6 HOURS PRN
Qty: 25 TABLET | Refills: 0 | Status: SHIPPED | OUTPATIENT
Start: 2018-02-22 | End: 2018-03-08

## 2018-02-22 RX ADMIN — ONDANSETRON HYDROCHLORIDE 4 MG: 2 INJECTION, SOLUTION INTRAMUSCULAR; INTRAVENOUS at 05:02

## 2018-02-22 RX ADMIN — HYDROCHLOROTHIAZIDE 12.5 MG: 12.5 TABLET ORAL at 08:02

## 2018-02-22 RX ADMIN — ASPIRIN 81 MG 81 MG: 81 TABLET ORAL at 08:02

## 2018-02-22 RX ADMIN — ONDANSETRON HYDROCHLORIDE 4 MG: 2 INJECTION, SOLUTION INTRAMUSCULAR; INTRAVENOUS at 08:02

## 2018-02-22 RX ADMIN — CLOPIDOGREL BISULFATE 75 MG: 75 TABLET ORAL at 08:02

## 2018-02-22 RX ADMIN — PANTOPRAZOLE SODIUM 40 MG: 40 INJECTION, POWDER, FOR SOLUTION INTRAVENOUS at 05:02

## 2018-02-22 RX ADMIN — METOPROLOL SUCCINATE 50 MG: 50 TABLET, EXTENDED RELEASE ORAL at 08:02

## 2018-02-22 RX ADMIN — HEPARIN SODIUM 5000 UNITS: 5000 INJECTION, SOLUTION INTRAVENOUS; SUBCUTANEOUS at 05:02

## 2018-02-22 RX ADMIN — ACETAMINOPHEN 650 MG: 325 TABLET, FILM COATED ORAL at 05:02

## 2018-02-22 RX ADMIN — ONDANSETRON HYDROCHLORIDE 4 MG: 2 INJECTION, SOLUTION INTRAMUSCULAR; INTRAVENOUS at 02:02

## 2018-02-22 RX ADMIN — Medication 5 UNITS: at 11:02

## 2018-02-22 RX ADMIN — LOSARTAN POTASSIUM 100 MG: 25 TABLET, FILM COATED ORAL at 08:02

## 2018-02-22 RX ADMIN — MUPIROCIN 1 G: 20 OINTMENT TOPICAL at 08:02

## 2018-02-22 NOTE — SUBJECTIVE & OBJECTIVE
Medications:  Continuous Infusions:   metoprolol       Scheduled Meds:   acetaminophen  650 mg Oral Q6H    aspirin  81 mg Oral Daily    clopidogrel  75 mg Oral Daily    heparin (porcine)  5,000 Units Subcutaneous Q8H    losartan  100 mg Oral Daily    And    hydroCHLOROthiazide  12.5 mg Oral Daily    metoprolol succinate  50 mg Oral Daily    mupirocin  1 g Nasal BID    ondansetron  4 mg Intravenous Q4H    pantoprazole  40 mg Intravenous Before breakfast    tuberculin  5 Units Intradermal Once     PRN Meds:acetaminophen, dextrose 50%, glucagon (human recombinant), hydrALAZINE, influenza, insulin aspart U-100, labetalol, metoprolol, ondansetron, pneumoc 13-morro conj-dip cr(PF)     Objective:     Vital Signs (Most Recent):  Temp: 98.4 °F (36.9 °C) (02/22/18 0751)  Pulse: 63 (02/22/18 0751)  Resp: 18 (02/22/18 0751)  BP: 134/69 (02/22/18 0751)  SpO2: (!) 92 % (02/22/18 0751) Vital Signs (24h Range):  Temp:  [97.7 °F (36.5 °C)-99.4 °F (37.4 °C)] 98.4 °F (36.9 °C)  Pulse:  [38-91] 63  Resp:  [18-38] 18  SpO2:  [91 %-97 %] 92 %  BP: (132-183)/(59-94) 134/69          Physical Exam   Constitutional: He is oriented to person, place, and time. He appears well-developed. No distress.   HENT:   Head: Normocephalic and atraumatic.   Eyes: Conjunctivae are normal.   Neck: Neck supple.   Cardiovascular: Normal rate.    Pulmonary/Chest: Effort normal. No respiratory distress. He has no wheezes.   Abdominal: Soft. He exhibits no distension and no mass. There is no tenderness. There is no rebound and no guarding. No hernia.   Musculoskeletal: Normal range of motion. He exhibits no edema or tenderness.   Neurological: He is alert and oriented to person, place, and time. No cranial nerve deficit or sensory deficit.   Tongue midline, EOMI, face symmetric and sensation intact bilaterally to touch   Skin: Skin is warm. Capillary refill takes less than 2 seconds. No rash noted.        Vitals reviewed.      Significant Labs:  All  pertinent labs from the last 24 hours have been reviewed.    Significant Diagnostics:  I have reviewed and interpreted all pertinent imaging results/findings within the past 24 hours.

## 2018-02-22 NOTE — ASSESSMENT & PLAN NOTE
Patient with pulmonary edema on CXR with improvement with diureses and NIPPV. Cr stable today. Good UOP. Leukocytosis but no signs and symptoms of acute infection.

## 2018-02-22 NOTE — ASSESSMENT & PLAN NOTE
S/P L CEA 2/19/18  Presenting sxs c/w L ocular TIA and significant L ICA stenosis noted.  Cont ASA/Plavix.  Cont atorva 40mg qhs  Check lipids/LFT 3 months (mid May 2018)  Cont antihtn regimen.

## 2018-02-22 NOTE — PROGRESS NOTES
TN contacted [facility] at [phone] to inquire of an update; spoke with Elvia who stated the pt's spouse was at the facility. Elvia requested discharge orders and the PPD; she has the 142.     TN contacted Dr. Coleman's office at [phone];  spoke with nurse Suárez to convey pt needs a discharge summary and Plan of Care.

## 2018-02-22 NOTE — OP NOTE
DATE OF PROCEDURE:  02/19/2018    PREOPERATIVE DIAGNOSIS:  Symptomatic left 70% carotid stenosis.    POSTOPERATIVE DIAGNOSIS:  Symptomatic left 70% carotid stenosis, and severe ICA tortuosity/kink    PROCEDURE:  Urgent left carotid endarterectomy  and ICA shortening    CO-SURGEON:  Silvio Coleman MD and Silvio Alberto M.D.    ANESTHESIA:  GETA.    CODING NOTE: -22 Modifier appropriate given the increased complexity and length of this case due to the need for ICA shortening    INDICATIONS:  An 82-year-old male who presented with episode of left amaurosis   fugax and was found to have approximately 70% left ICA stenosis.  The patient   was admitted to [facility] and medically optimized and then taken to the   Operating Room for urgent endarterectomy.  The patient had already been on dual   antiplatelet therapy.    PROCEDURE IN DETAILS:  The patient was brought to the Operating Room, placed in   supine position.  After through induction of general anesthesia, the patient was   appropriately positioned.  After sterile prep and drape, oblique and a skin   incision was made on the anterior border of the sternocleidomastoid on the left   neck.  Subcutaneous tissues and fascia selected platysma were divided.  The   sternocleidomastoid was mobilized laterally exposing the carotid sheath.  The   common carotid artery was first exposed.  The facial vein was then divided   between 2-0 silk ties.  Dissection then continued up the ICA.  He was found to   have a severe tortuosity/kink approximately 1.5 to 2 cm after the bulb.  ICA was   dissected substantially distally to this freed up circumferentially.  Umbilical   tapes were placed around the distal ICA and common carotid artery.  The   external carotid artery was encircled with a silastic loop.    The patient was systemically heparinized.  The vessels were then clamped   internal, external and common carotid in that order.  Longitudinal arteriotomy   was then  performed starting with #11 blade and extending with Kuhn scissors as   well in to the ICA and above the level of stenosis.    A 10-[place] shunt was then placed without difficulty.    A standard endarterectomy was then performed.  After suitable plane was   established proximally, this was brought around the common carotid artery and   sharply divided.  Endarterectomy was then continued to the external where   incontinuity eversion endarterectomy was performed.  The endarterectomy was then   continued up the ICA, where it feathered nicely without need for any tacking   sutures.  The endarterectomized surface was then copiously irrigated with hep   saline and all loose debris removed under loupe magnification.    Because of the severe tortuosity/kink it was felt necessary to shorten the ICA.    The endarterectomy was actually extended.  We were able to bring this down   farther.  The ICA was then transected at the bulb, pulled down over the shunt to   remove all redundancy.  A primary anastomosis of the ICA and the shorted form   was then done after rotating the ICA approximately 100 degrees.  This was begun   with a 6-0 Prolene suture using a BV-1 needle and brought around on either side.    The redundant ICA was used as an [place] patch on the common carotid artery.    Before completion of this, the shunt was removed.  There was appropriate back   flushing and forward flushing were performed.  A flow was reestablished first   common and second to the external and finally to the internal.  One additional   suture was needed for good hemostasis.    The protamine was then used to reverse the heparin, and with the aid of topical   agents, meticulous hemostasis was achieved.  The incision was then closed in a   running subcuticular stitch and the platysma and subcuticular stitch in the   skin.  Sterile dressing was applied.  The patient was awakened from anesthesia   and taken to Recovery Room in stable  condition.      CS/HN  dd: 02/20/2018 16:14:21 (CST)  td: 02/21/2018 16:26:26 (CST)  Doc ID   #[id number]  Job ID #[id number]    CC:

## 2018-02-22 NOTE — SUBJECTIVE & OBJECTIVE
Review of Systems   Gastrointestinal: Negative for melena.   Genitourinary: Negative for hematuria.     Objective:     Vital Signs (Most Recent):  Temp: 98.4 °F (36.9 °C) (02/22/18 0751)  Pulse: 63 (02/22/18 0751)  Resp: 18 (02/22/18 0751)  BP: 134/69 (02/22/18 0751)  SpO2: (!) 92 % (02/22/18 0751) Vital Signs (24h Range):  Temp:  [97.7 °F (36.5 °C)-99.4 °F (37.4 °C)] 98.4 °F (36.9 °C)  Pulse:  [38-91] 63  Resp:  [18-38] 18  SpO2:  [91 %-97 %] 92 %  BP: (132-183)/(59-94) 134/69     Weight: 84.4 kg (186 lb 1.1 oz)  Body mass index is 27.48 kg/m².     SpO2: (!) 92 %  O2 Device (Oxygen Therapy): room air      Intake/Output Summary (Last 24 hours) at 02/22/18 0816  Last data filed at 02/22/18 0400   Gross per 24 hour   Intake             1100 ml   Output              700 ml   Net              400 ml       Lines/Drains/Airways     Peripheral Intravenous Line                 Peripheral IV - Single Lumen 02/19/18 1500 Right Hand 2 days                Physical Exam   Constitutional: He is oriented to person, place, and time. He appears well-developed and well-nourished.   HENT:   Head: Normocephalic and atraumatic.   Eyes: Conjunctivae and EOM are normal. No scleral icterus.   R pupil chr dilated   Neck: Normal range of motion. Neck supple. No JVD present. Carotid bruit is not present. No tracheal deviation present. No thyromegaly present.   L neck CEA incision c/d/i.   Cardiovascular: Normal rate, regular rhythm, S1 normal and S2 normal.   Occasional extrasystoles are present. Exam reveals distant heart sounds. Exam reveals no gallop and no friction rub.    Murmur heard.   Systolic murmur is present with a grade of 2/6   Pulses:       Carotid pulses are 2+ on the right side, and 2+ on the left side.  Pulmonary/Chest: Effort normal and breath sounds normal. No respiratory distress. He has no wheezes. He has no rales. He exhibits no tenderness.   Abdominal: Soft. Bowel sounds are normal. He exhibits no distension. There is  no tenderness.   Musculoskeletal: He exhibits no edema.   Neurological: He is alert and oriented to person, place, and time. He has normal strength. A cranial nerve deficit (R eye blind) is present.   Skin: Skin is warm and dry. No rash noted.   Psychiatric: He has a normal mood and affect. His behavior is normal.       Current Medications:   acetaminophen  650 mg Oral Q6H    aspirin  81 mg Oral Daily    clopidogrel  75 mg Oral Daily    heparin (porcine)  5,000 Units Subcutaneous Q8H    losartan  100 mg Oral Daily    And    hydroCHLOROthiazide  12.5 mg Oral Daily    metoprolol succinate  50 mg Oral Daily    mupirocin  1 g Nasal BID    ondansetron  4 mg Intravenous Q4H    pantoprazole  40 mg Intravenous Before breakfast    tuberculin  5 Units Intradermal Once      metoprolol       acetaminophen, dextrose 50%, glucagon (human recombinant), hydrALAZINE, influenza, insulin aspart U-100, labetalol, metoprolol, ondansetron, pneumoc 13-morro conj-dip cr(PF)    Laboratory:  CBC:    Recent Labs  Lab 02/20/18  0957 02/21/18  0530 02/22/18  0423   WHITE BLOOD CELL COUNT 18.43 H 10.32 11.37   HEMOGLOBIN 10.8 L 9.6 L 10.9 L   HEMATOCRIT 32.9 L 29.6 L 33.7 L   PLATELETS 189 165 190       CHEMISTRIES:    Recent Labs  Lab 02/19/18  2348 02/20/18  0343  02/20/18  0957 02/21/18  0530 02/22/18  0423   GLUCOSE 195 H 221 H  < > 180 H 123 H 143 H   SODIUM 138 139  < > 136 140 136   POTASSIUM 4.3 4.6  < > 4.4 3.9 4.0   BUN BLD 27 H 28 H  < > 34 H 36 H 33 H   CREATININE 1.5 H 1.8 H  < > 2.2 H 2.2 H 1.5 H   EGFR IF  49 A 40 A  < > 31 A 31 A 49 A   EGFR IF NON- 43 A 34 A  < > 27 A 27 A 43 A   CALCIUM 9.0 8.8  < > 9.0 9.2 9.5   MAGNESIUM 1.3 L 2.2  --  2.1  --   --    < > = values in this interval not displayed.    CARDIAC BIOMARKERS:    Recent Labs  Lab 02/17/18  0831 02/17/18  1723 02/17/18  2345   TROPONIN I 0.019 0.029 H 0.023       COAGS:    Recent Labs  Lab 02/19/18  2348 02/20/18  9038  02/20/18  0957   INR 1.1 1.1 1.1       LIPIDS/LFTS:    Recent Labs  Lab 10/18/17  0815 01/27/18  0736 02/17/18  0831   CHOLESTEROL  --   --  147   TRIGLYCERIDES  --   --  72   HDL  --   --  37 L   LDL CHOLESTEROL  --   --  95.6   NON-HDL CHOLESTEROL  --   --  110   AST 15 16 11   ALT 5 L 11 <5 L     Lab Results   Component Value Date    TSH 1.450 02/17/2018           Diagnostic Results:  ECG (personally reviewed tracings):   2/17/18 0828 SR 87, multifocal PVCs     Chest X-Ray (personally reviewed image(s)):   2/17/18 NAD  2/20/18: NAD     CT-Head 2/17/18  Age-appropriate generalized cerebral volume loss with mild chronic microvascular ischemic change.  Prominence of the extra-axial space along the left frontal, parietal and temporal convexity is most likely related to central atrophy rather than a chronic subdural hygroma.  No evidence for acute intracranial hemorrhage correlation and further evaluation as warranted.     Echo: 2/18/18 (images pers rev)    1 - Mildly depressed left ventricular systolic function (EF 45-50%).  Inferoposterior hypokinesis.     2 - Concentric hypertrophy.     3 - Impaired LV relaxation, elevated LAP (grade 2 diastolic dysfunction).     4 - S/P transcatheter AVR, UNA = 1.85 cm2, AVAi = 0.94 cm2/m2, mean gradient = 9 mmHg.     5 - Moderate mitral regurgitation.     6 - Mild tricuspid regurgitation.     7 - Pulmonary hypertension. The estimated PA systolic pressure is 61 mmHg.      Carotid US 2/17/18  #1. Findings consistent with less than 50% stenosis in the Right internal carotid artery .  #2. Findings consistent with greater than 70% stenosis in the Left internal carotid artery based on peak systolic velocity.  #3.  Incidentally noted is an irregular heartbeat.     TAVR 10/17/17  B. 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    No perivalvular leak post procedure per 2D echo.    Post deployment AV mean gradient  2.5 mmHg, Vmax 1.09 m/s.     Cath: 5/8/17 (images pers rev)  D. Hemodynamic Results  AO: 122/68 (93)  E. Angiographic Results       Patient has a right dominant coronary artery.      - Left Main Coronary Artery:             The LM is normal. There is ANASTASIIA 3 flow.     - Left Anterior Descending Artery:             The LAD has luminal irregularities. There is ANASTASIIA 3 flow.     - Left Circumflex Artery:             The LCX is normal. There is ANASTASIIA 3 flow.     - Right Coronary Artery:             The RCA has luminal irregularities. There is ANASTASIIA 3 flow.     - Radial Artery:             The Radial artery was not studied.     - D1:             The D1 has a 50% stenosis. There is ANASTASIIA 3 flow.     - Posterior Descending Artery:             The mid PDA has a 70% stenosis. There is ANASTASIIA 3 flow.

## 2018-02-22 NOTE — PLAN OF CARE
Problem: Occupational Therapy Goal  Goal: Occupational Therapy Goal  Goals to be met by: 2/28/18    Patient will increase functional independence with ADLs by performing:    UE Dressing with Supervision.  LE Dressing with Supervision.  Grooming while standing at sink with Supervision. Met 2/22/18  Toileting from toilet with Modified [place] and Supervision for hygiene and clothing management.   Supine to sit with Supervision.  Stand pivot transfers with Supervision.   Upper extremity exercise program x15 reps per handout, with assistance as needed. Met 2/22/18     Outcome: Ongoing (interventions implemented as appropriate)  Patient eager to be discharged to be with wife. Pt tolerated treatment well and was observed ambulating in room to perform self care with supervision.

## 2018-02-22 NOTE — PLAN OF CARE
Problem: Diabetes, Type 2 (Adult)  Intervention: Support/Optimize Psychosocial Response to Condition   18 0702   Coping/Psychosocial Interventions   Supportive Measures active listening utilized;relaxation techniques promoted;verbalization of feelings encouraged   Environmental Support calm environment promoted     Intervention: Optimize Glycemic Control   18 0800   Nutrition Interventions   Glycemic Management blood glucose monitoring       Goal: Signs and Symptoms of Listed Potential Problems Will be Absent, Minimized or Managed (Diabetes, Type 2)  Signs and symptoms of listed potential problems will be absent, minimized or managed by discharge/transition of care (reference Diabetes, Type 2 (Adult) CPG).   Outcome: Ongoing (interventions implemented as appropriate)   18 224   Diabetes, Type 2   Problems Present (Type 2 Diabetes) none       Problem: Fall Risk (Adult)  Intervention: Monitor/Assist with Self Care   18 0718 1600   Daily Care Interventions   Self-Care Promotion independence encouraged --  --    Functional Level Current   Ambulation --  2 - assistive person --    Transferring --  2 - assistive person --    Toileting --  2 - assistive person --    Bathing --  2 - assistive person --    Dressing --  2 - assistive person --    Eating --  2 - assistive person --    Communication --  0 - understands/communicates without difficulty --    Swallowing --  2 - difficulty swallowing liquids --    Activity   Activity Assistance Provided --  --  independent     Intervention: Reduce Risk/Promote Restraint Free Environment   18 1059 18 224   Safety Interventions   Environmental Safety Modification assistive device/personal items within reach;clutter free environment maintained;lighting adjusted;room near unit station;room organization consistent --    Prevent  Drop/Fall   Safety/Security Measures --  bed alarm set     Intervention: Review  Medications/Identify Contributors to Fall Risk   02/18/18 0804   Safety Interventions   Medication Review/Management medications reviewed     Intervention: Patient Rounds   02/21/18 2200   Safety Interventions   Patient Rounds bed in low position;bed wheels locked;call light in reach;clutter free environment maintained;ID band on;placement of personal items at bedside;toileting offered;visualized patient     Intervention: Safety Promotion/Fall Prevention   02/21/18 2200   Safety Interventions   Safety Promotion/Fall Prevention bed alarm set;assistive device/personal item within reach;commode/urinal/bedpan at bedside;Fall Risk reviewed with patient/family;Fall Risk signage in place;high risk medications identified;medications reviewed;nonskid shoes/socks when out of bed;side rails raised x 3;instructed to call staff for mobility       Goal: Identify Related Risk Factors and Signs and Symptoms  Related risk factors and signs and symptoms are identified upon initiation of Human Response Clinical Practice Guideline (CPG)   Outcome: Ongoing (interventions implemented as appropriate)   02/18/18 1059   Fall Risk   Related Risk Factors (Fall Risk) age-related changes;environment unfamiliar   Signs and Symptoms (Fall Risk) presence of risk factors     Goal: Absence of Falls  Patient will demonstrate the desired outcomes by discharge/transition of care.   Outcome: Ongoing (interventions implemented as appropriate)   02/18/18 0804   Fall Risk (Adult)   Absence of Falls making progress toward outcome       Problem: Infection, Risk/Actual (Adult)  Intervention: Manage Suspected/Actual Infection   02/21/18 2249   Safety Interventions   Isolation Precautions environmental surveillance     Intervention: Prevent Infection/Maximize Resistance   02/21/18 2249   Nutrition Interventions   Glycemic Management blood glucose monitoring   Pain/Comfort/Sleep Interventions   Sleep/Rest Enhancement consistent schedule promoted;awakenings  minimized;regular sleep/rest pattern promoted;relaxation techniques promoted       Goal: Identify Related Risk Factors and Signs and Symptoms  Related risk factors and signs and symptoms are identified upon initiation of Human Response Clinical Practice Guideline (CPG)   Outcome: Ongoing (interventions implemented as appropriate)   02/21/18 2249   Infection, Risk/Actual   Related Risk Factors (Infection, Risk/Actual) age extremes;surgery/procedure     Goal: Infection Prevention/Resolution  Patient will demonstrate the desired outcomes by discharge/transition of care.   Outcome: Ongoing (interventions implemented as appropriate)   02/21/18 2249   Infection, Risk/Actual (Adult)   Infection Prevention/Resolution making progress toward outcome

## 2018-02-22 NOTE — SUBJECTIVE & OBJECTIVE
Interval History: Patient improved this morning. Good UOP    Review of Systems   All other systems reviewed and are negative.    Scheduled Meds:   acetaminophen  650 mg Oral Q6H    aspirin  81 mg Oral Daily    clopidogrel  75 mg Oral Daily    heparin (porcine)  5,000 Units Subcutaneous Q8H    losartan  100 mg Oral Daily    And    hydroCHLOROthiazide  12.5 mg Oral Daily    metoprolol succinate  50 mg Oral Daily    mupirocin  1 g Nasal BID    ondansetron  4 mg Intravenous Q4H    pantoprazole  40 mg Intravenous Before breakfast     Continuous Infusions:   metoprolol       PRN Meds:.acetaminophen, dextrose 50%, glucagon (human recombinant), hydrALAZINE, influenza, insulin aspart U-100, labetalol, metoprolol, ondansetron, pneumoc 13-morro conj-dip cr(PF)    Objective:     Vital Signs (Most Recent):  Temp: 98.2 °F (36.8 °C) (02/21/18 1644)  Pulse: 83 (02/21/18 1645)  Resp: 18 (02/21/18 1644)  BP: (!) 154/61 (02/21/18 1820)  SpO2: (!) 94 % (02/21/18 1645) Vital Signs (24h Range):  Temp:  [97.7 °F (36.5 °C)-98.4 °F (36.9 °C)] 98.2 °F (36.8 °C)  Pulse:  [53-91] 83  Resp:  [15-38] 18  SpO2:  [91 %-100 %] 94 %  BP: (125-183)/() 154/61  Arterial Line BP: (110-152)/(31-53) 133/39     Weight: 84.4 kg (186 lb 1.1 oz)  Body mass index is 27.48 kg/m².    Intake/Output Summary (Last 24 hours) at 02/21/18 1840  Last data filed at 02/21/18 1330   Gross per 24 hour   Intake              240 ml   Output              855 ml   Net             -615 ml      Physical Exam   Constitutional: He is oriented to person, place, and time. He appears well-developed and well-nourished.   HENT:   Head: Normocephalic and atraumatic.   Eyes: EOM are normal. Pupils are equal, round, and reactive to light.   Neck: Neck supple.   Cardiovascular: Normal rate, regular rhythm and normal heart sounds.    Pulmonary/Chest: Effort normal. He has wheezes in the right lower field.   Abdominal: Soft. Bowel sounds are normal.   Neurological: He is alert  and oriented to person, place, and time.   Skin: Skin is warm and dry.   Psychiatric: He has a normal mood and affect.   Vitals reviewed.      Significant Labs:   CBC:     Recent Labs  Lab 02/20/18  0621 02/20/18  0957 02/21/18  0530   WBC 20.51* 18.43* 10.32   HGB 11.2* 10.8* 9.6*   HCT 33.4* 32.9* 29.6*    189 165     CMP:     Recent Labs  Lab 02/20/18  0621 02/20/18  0957 02/21/18  0530    136 140   K 4.5 4.4 3.9    106 107   CO2 23 22* 25   * 180* 123*   BUN 30* 34* 36*   CREATININE 1.9* 2.2* 2.2*   CALCIUM 8.9 9.0 9.2   ANIONGAP 8 8 8   EGFRNONAA 32* 27* 27*     POCT Glucose:     Recent Labs  Lab 02/21/18  0538 02/21/18  1148 02/21/18  1642   POCTGLUCOSE 123* 155* 179*       Significant Imaging: CXR: I have reviewed all pertinent results/findings within the past 24 hours and my personal findings are:  Multiple overlying cardiac monitoring leads. The cardiomediastinal silhouette is prominent but stable in size. It again appears slightly deviated to the right, similar to prior studies. Aortic stent in place. Mild pulmonary vascular congestion and perihilar edema. Mild bibasilar atelectasis. No new focal consolidation. Small pleural effusions suggested, particularly on the right. No pneumothorax.

## 2018-02-22 NOTE — PLAN OF CARE
Problem: Patient Care Overview  Goal: Plan of Care Review  Patient on room air sitting in chair. Patient states he does not wear bipap at night.

## 2018-02-22 NOTE — PROGRESS NOTES
STACY spoke with MD Jayashree's nurse. STACY inquired about pt's POC for pt to transfer to nursing home. Jayashree stated that Md went into a meeting and now is seeing pts. STACY stated that we were waiting on POC to move pt.. Jayashree stated that she will speak with MD.

## 2018-02-22 NOTE — PT/OT/SLP PROGRESS
"Occupational Therapy   Treatment    Name: Timbo Gallagher  MRN: [id number]  Admitting Diagnosis:  TIA involving carotid artery  3 Days Post-Op    Recommendations:     Discharge Recommendations: home with home health  Discharge Equipment Recommendations: walker, rolling, shower chair    Barriers to discharge:  Decreased caregiver support (spouse at SNF)    Subjective     Communicated with: Nurse Rey prior to session.  Patient agreeable to therapy. Patient stated "I'm ready to go home"  Pain/Comfort:  · Pain Rating 1: 0/10    Patients cultural, spiritual, Cheondoism conflicts given the current situation: none    Objective:     Patient found with: peripheral IV    General Precautions: Standard, fall, diabetic, vision impaired   Orthopedic Precautions:N/A   Braces: N/A     Occupational Performance:    Bed Mobility:    Patient found sitting up in bedside chair.     Functional Mobility/Transfers:  · Patient completed Sit <> Stand Transfer with stand by assistance  with  no assistive device x 2 from the bedside chair.  · Functional Mobility: Patient ambulated in room with SBA/no AD.  Encouraged patient to use rolling walker, however patient stated  " I don't need that" and put it aside.    Activities of Daily Living:  Grooming: Observed patient washing hands standing at sink with supervision.  Toileting: Observed patient ambulating to/from bathroom with supervision to perform toileting.     Patient left up in chair with all lines intact, call button in reach, nurse notified and nurse present    [facility] 6 Click:  [facility] Total Score: 24    Treatment & Education:  Patient educated on BUE therex with yellow theraband. Patient given HEP handout; verbalizes understanding.  Patient performed B shoulder flex/ext, elbow flex/ext, shoulder diagonals, and chest pull x 15 reps between rest.   Education:    Assessment:     Timbo Gallagher is a 82 y.o. male with a medical diagnosis of TIA involving carotid artery.  He presents " with the following performance deficits affecting function are weakness, impaired self care skills, impaired functional mobilty, gait instability, impaired balance, decreased safety awareness.      Rehab Prognosis:  good; patient would benefit from acute skilled OT services to address these deficits and reach maximum level of function.       Plan:     Patient to be seen 3 x/week to address the above listed problems via self-care/home management, therapeutic activities, therapeutic exercises  · Plan of Care Expires: 02/28/18  · Plan of Care Reviewed with: patient    This Plan of care has been discussed with the patient who was involved in its development and understands and is in agreement with the identified goals and treatment plan    GOALS:    Occupational Therapy Goals        Problem: Occupational Therapy Goal    Goal Priority Disciplines Outcome Interventions   Occupational Therapy Goal     OT, PT/OT Ongoing (interventions implemented as appropriate)    Description:  Goals to be met by: 2/28/18    Patient will increase functional independence with ADLs by performing:    UE Dressing with Supervision.  LE Dressing with Supervision.  Grooming while standing at sink with Supervision.  Toileting from toilet with Modified Drew and Supervision for hygiene and clothing management.   Supine to sit with Supervision.  Stand pivot transfers with Supervision. Met 2/22/18  Upper extremity exercise program x15 reps per handout, with assistance as needed. Met 2/22/18                      Time Tracking:     OT Date of Treatment: 02/22/18  OT Start Time: 1118  OT Stop Time: 1127  OT Total Time (min): 9 min    Billable Minutes:Therapeutic Exercise 9    JT Newberry  2/22/2018

## 2018-02-22 NOTE — PROGRESS NOTES
[facility]  Vascular Surgery  Progress Note    Patient Name: Timbo Gallagher  MRN: [id number]  Admission Date: 2/17/2018  Primary Care Provider: Cirilo Montero MD    Subjective:     Interval History: Feels well today.  Good UOP.  BP better controlled with addition of home medications, now normotensive.  Ambulating without issues.  Pain controlled.    Post-Op Info:  Procedure(s) (LRB):  ENDARTERECTOMY-CAROTID (Left)   3 Days Post-Op       Medications:  Continuous Infusions:   metoprolol       Scheduled Meds:   acetaminophen  650 mg Oral Q6H    aspirin  81 mg Oral Daily    clopidogrel  75 mg Oral Daily    heparin (porcine)  5,000 Units Subcutaneous Q8H    losartan  100 mg Oral Daily    And    hydroCHLOROthiazide  12.5 mg Oral Daily    metoprolol succinate  50 mg Oral Daily    mupirocin  1 g Nasal BID    ondansetron  4 mg Intravenous Q4H    pantoprazole  40 mg Intravenous Before breakfast    tuberculin  5 Units Intradermal Once     PRN Meds:acetaminophen, dextrose 50%, glucagon (human recombinant), hydrALAZINE, influenza, insulin aspart U-100, labetalol, metoprolol, ondansetron, pneumoc 13-morro conj-dip cr(PF)     Objective:     Vital Signs (Most Recent):  Temp: 98.4 °F (36.9 °C) (02/22/18 0751)  Pulse: 63 (02/22/18 0751)  Resp: 18 (02/22/18 0751)  BP: 134/69 (02/22/18 0751)  SpO2: (!) 92 % (02/22/18 0751) Vital Signs (24h Range):  Temp:  [97.7 °F (36.5 °C)-99.4 °F (37.4 °C)] 98.4 °F (36.9 °C)  Pulse:  [38-91] 63  Resp:  [18-38] 18  SpO2:  [91 %-97 %] 92 %  BP: (132-183)/(59-94) 134/69          Physical Exam   Constitutional: He is oriented to person, place, and time. He appears well-developed. No distress.   HENT:   Head: Normocephalic and atraumatic.   Eyes: Conjunctivae are normal.   Neck: Neck supple.   Cardiovascular: Normal rate.    Pulmonary/Chest: Effort normal. No respiratory distress. He has no wheezes.   Abdominal: Soft. He exhibits no distension and no mass. There is no  tenderness. There is no rebound and no guarding. No hernia.   Musculoskeletal: Normal range of motion. He exhibits no edema or tenderness.   Neurological: He is alert and oriented to person, place, and time. No cranial nerve deficit or sensory deficit.   Tongue midline, EOMI, face symmetric and sensation intact bilaterally to touch   Skin: Skin is warm. Capillary refill takes less than 2 seconds. No rash noted.        Vitals reviewed.      Significant Labs:  All pertinent labs from the last 24 hours have been reviewed.    Significant Diagnostics:  I have reviewed and interpreted all pertinent imaging results/findings within the past 24 hours.    Assessment/Plan:     TIA (transient ischemic attack)    -L amaurosis fugax consistent with TIA and L ICA stenosis 60-69% on US s/p L CEA 2/19/18 - recovering well  -Cont neuro checks   -Cont to monitor UOP closely  -Clear ADA diet   -Cont ASA, Plavix and statin  -BP control with home PO meds  -cont IS  -Cont following primary MD recommendations  -OOB to chair  -f/u with SW re: SNF disposition        S/P TAVR (transcatheter aortic valve replacement)    -cont Cardiology recommendations        Aortic valve stenosis    -continue Cardiology recommendations        Chronic diastolic heart failure    -cont Cardiology recommendations and home Lasix, antihypertensives and ASA            Silvio Coleman MD  Vascular Surgery  [facility]

## 2018-02-22 NOTE — PLAN OF CARE
[facility]    FACILITY TRANSFER ORDERS      Patient Name: Timbo Gallagher  YOB: 1935    PCP: Cirilo Montero MD   PCP Address: [address] [place] [id number]  PCP Phone Number: [phone]  PCP Fax: [phone]    Encounter Date: 2018    Admit to: [facility]    Vital Signs:  Routine    Diagnoses:   Active Hospital Problems    Diagnosis  POA    *TIA involving carotid artery [G45.1]  Yes    Pleural effusion [J90]  No    Blindness of one eye [H54.40]  Yes    S/P TAVR (transcatheter aortic valve replacement) [Z95.2]  Not Applicable    Essential hypertension [I10]  Yes    Chronic diastolic heart failure [I50.32]  Yes    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 Findings consistent with less than 50% stenosis in the Right internal carotid artery .  #2. Findings consistent with greater than 70% stenosis in the Left internal carotid artery based on peak systolic velocity.  #3.  Incidentally noted is an irregular heartbeat.      Palpitations [R00.2] 2018 Unknown    History of influenza [Z87.09] 2018 Yes    Acute respiratory failure with hypoxia [J96.01] 2018 Yes       Allergies:  Review of patient's allergies indicates:   Allergen Reactions    Vancomycin analogues Itching    Ciprofloxacin (mixture) Itching     Extreme itching and redness     Antibiotic hc        Diet: diabetic diet: 1800 calorie    Activities: Up with assistance    Nursin. Keep left neck incision clean and dry with warm soapy water daily showers, no submergence or ointment/peroxide.    2.  BP normalization and checks.     Labs: No new labs needed     CONSULTS:    Physical Therapy to evaluate and treat.     MISCELLANEOUS CARE:  Diabetes care    WOUND CARE ORDERS  Keep left neck incision clean and dry with warm soapy water daily showers, no submergence or ointment/peroxide.     Medications: Review discharge medications with patient and family and provide education.      Current Discharge Medication List      START taking these medications    Details   !! oxyCODONE-acetaminophen (PERCOCET) 5-325 mg per tablet Take 1 tablet by mouth every 6 (six) hours as needed for Pain.  Qty: 25 tablet, Refills: 0              !! - Potential duplicate medications found. Please discuss with provider.      CONTINUE these medications which have NOT CHANGED    Details   ascorbic acid, vitamin C, (VITAMIN C) 1000 MG tablet Take 1,000 mg by mouth once daily.       aspirin (ECOTRIN) 81 MG EC tablet Take 1 tablet (81 mg total) by mouth once daily. Take 4 tab tonight then 1 tab daily  Refills: 0    Associated Diagnoses: Aortic valve stenosis, etiology of cardiac valve disease unspecified      clopidogrel (PLAVIX) 75 mg tablet Take 1 tablet (75 mg total) by mouth once daily. Take 4 tab tonight then 1 tab daily  Qty: 30 tablet, Refills: 11    Associated Diagnoses: Aortic valve stenosis, etiology of cardiac valve disease unspecified      furosemide (LASIX) 20 MG tablet Take 1 tablet (20 mg total) by mouth once daily.  Qty: 10 tablet, Refills: 0             losartan-hydrochlorothiazide 100-12.5 mg (HYZAAR) 100-12.5 mg Tab Take 1 tablet by mouth once daily.      metformin (GLUCOPHAGE) 500 MG tablet Take 1,000 mg by mouth 2 (two) times daily with meals.       metoprolol succinate (TOPROL-XL) 25 MG 24 hr tablet Take 25 mg by mouth 2 (two) times daily.       GLUCOSAMINE HCL/CHONDR BAUTISTA A NA (OSTEO BI-FLEX ORAL) Take 2 tablets by mouth once daily.                  _________________________________  Silvio Coleman  MD  02/22/2018

## 2018-02-22 NOTE — PROGRESS NOTES
[facility] Hospital Medicine  Progress Note    Patient Name: Timbo Gallagher  MRN: [id number]  Patient Class: IP- Inpatient   Admission Date: 2/17/2018  Length of Stay: 4 days  Attending Physician: Silvio Coleman MD  Primary Care Provider: Cirilo Montero MD        Subjective:     Principal Problem:TIA involving carotid artery    HPI:  83 yo WM who was previously seen by Dr. Jacobsen, now me, presents after experiencing L Amaurosis Fugax last night 2/16.  He said it occurred at 10 pm while he was sitting, and lasted 5 minutes.  He had no headache.  He felt his heart palpitating and felt as though something was spinning him around.  He did not pass out.  He did have some mild nausea.  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  CT of head shows chronic ischemic changes and central frontal atrophy.  He has a history of TAVR in Oct 2017 and follows Dr. Manzo (who no longer comes here).   He has a L carotid bruit (and ER US shows >70% stenosis on L, <50% on R)  He has an irregular heart rhythm - Dr. Toribio saw pt and noted junctional rhythm and fused beats, but not full A fib.  He had the flu about a month ago, and was treated with Tamiflu, and since that time has had residual lower respiratory symptoms with cough and congestion.  He had MDI at home for what he says is chronic bronchitis.  He has a loose cough here.  He is being admitted for cardiac and neuro evaluation.    Hospital Course:  Pt has been seen by cardiology and neuro  Dr. Coleman has seen pt for vasc surgery and is planning to take him for L CEA tomorrow    Interval History: Pt now on the floor.  Apparently has a bed at [facility]    Review of Systems   All other systems reviewed and are negative.    Scheduled Meds:   acetaminophen  650 mg Oral Q6H    aspirin  81 mg Oral Daily    clopidogrel  75  mg Oral Daily    heparin (porcine)  5,000 Units Subcutaneous Q8H    losartan  100 mg Oral Daily    And    hydroCHLOROthiazide  12.5 mg Oral Daily    metoprolol succinate  50 mg Oral Daily    mupirocin  1 g Nasal BID    ondansetron  4 mg Intravenous Q4H    pantoprazole  40 mg Intravenous Before breakfast    tuberculin  5 Units Intradermal Once     Continuous Infusions:   metoprolol       PRN Meds:.acetaminophen, dextrose 50%, glucagon (human recombinant), hydrALAZINE, influenza, insulin aspart U-100, labetalol, metoprolol, ondansetron, pneumoc 13-morro conj-dip cr(PF)    Objective:     Vital Signs (Most Recent):  Temp: 98.4 °F (36.9 °C) (02/22/18 0751)  Pulse: 63 (02/22/18 0751)  Resp: 18 (02/22/18 0751)  BP: 134/69 (02/22/18 0751)  SpO2: (!) 92 % (02/22/18 0751) Vital Signs (24h Range):  Temp:  [97.7 °F (36.5 °C)-99.4 °F (37.4 °C)] 98.4 °F (36.9 °C)  Pulse:  [38-91] 63  Resp:  [18-38] 18  SpO2:  [91 %-97 %] 92 %  BP: (132-183)/(59-94) 134/69     Weight: 84.4 kg (186 lb 1.1 oz)  Body mass index is 27.48 kg/m².    Intake/Output Summary (Last 24 hours) at 02/22/18 0901  Last data filed at 02/22/18 0400   Gross per 24 hour   Intake             1100 ml   Output              650 ml   Net              450 ml      Physical Exam   Constitutional: He is oriented to person, place, and time. He appears well-developed and well-nourished.   HENT:   Head: Normocephalic and atraumatic.   Eyes: EOM are normal. Pupils are equal, round, and reactive to light.   Neck: Neck supple.   Cardiovascular: Normal rate, regular rhythm and normal heart sounds.    Pulmonary/Chest: Effort normal. He has wheezes in the right lower field.   Abdominal: Soft. Bowel sounds are normal.   Neurological: He is alert and oriented to person, place, and time.   Skin: Skin is warm and dry.   Psychiatric: He has a normal mood and affect.   Vitals reviewed.      Significant Labs:   CBC:     Recent Labs  Lab 02/20/18  0957 02/21/18  0530 02/22/18  0427    WBC 18.43* 10.32 11.37   HGB 10.8* 9.6* 10.9*   HCT 32.9* 29.6* 33.7*    165 190     CMP:     Recent Labs  Lab 02/20/18  0957 02/21/18  0530 02/22/18  0423    140 136   K 4.4 3.9 4.0    107 105   CO2 22* 25 22*   * 123* 143*   BUN 34* 36* 33*   CREATININE 2.2* 2.2* 1.5*   CALCIUM 9.0 9.2 9.5   ANIONGAP 8 8 9   EGFRNONAA 27* 27* 43*     POCT Glucose:     Recent Labs  Lab 02/21/18  1148 02/21/18  1642 02/21/18  2106   POCTGLUCOSE 155* 179* 152*       Significant Imaging: CXR: I have reviewed all pertinent results/findings within the past 24 hours and my personal findings are:  Multiple overlying cardiac monitoring leads. The cardiomediastinal silhouette is prominent but stable in size. It again appears slightly deviated to the right, similar to prior studies. Aortic stent in place. Mild pulmonary vascular congestion and perihilar edema. Mild bibasilar atelectasis. No new focal consolidation. Small pleural effusions suggested, particularly on the right. No pneumothorax.    Assessment/Plan:      * TIA involving carotid artery    No further symptoms.  S/p CEA.  Discharge as per vascular surgery          Pleural effusion    CXR looking better          Blindness of one eye    R eye blindness since 2010 due to retinal detachment          S/P TAVR (transcatheter aortic valve replacement)    October 2017  Followed by Dr. Manzo as OP  No current issues; Dr. Toribio feels this is unrelated to any current symptoms        Essential hypertension    bp MANAGED FOR NOW        Chronic diastolic heart failure              Type 2 diabetes mellitus with renal complication    Continue metformin and sliding scale          VTE Risk Mitigation         Ordered     heparin (porcine) injection 5,000 Units  Every 8 hours     Route:  Subcutaneous        02/20/18 2207     Low Risk of VTE  Once      02/19/18 2121              Dayna Campbell MD  Department of Hospital Medicine   [facility]

## 2018-02-22 NOTE — ASSESSMENT & PLAN NOTE
Likely pre-renal. Cr stable. Good UOP. Patient is taking liquids. I wouldn't give any extra fluid.

## 2018-02-22 NOTE — SUBJECTIVE & OBJECTIVE
Interval History: Pt now on the floor.  Apparently has a bed at [facility]    Review of Systems   All other systems reviewed and are negative.    Scheduled Meds:   acetaminophen  650 mg Oral Q6H    aspirin  81 mg Oral Daily    clopidogrel  75 mg Oral Daily    heparin (porcine)  5,000 Units Subcutaneous Q8H    losartan  100 mg Oral Daily    And    hydroCHLOROthiazide  12.5 mg Oral Daily    metoprolol succinate  50 mg Oral Daily    mupirocin  1 g Nasal BID    ondansetron  4 mg Intravenous Q4H    pantoprazole  40 mg Intravenous Before breakfast    tuberculin  5 Units Intradermal Once     Continuous Infusions:   metoprolol       PRN Meds:.acetaminophen, dextrose 50%, glucagon (human recombinant), hydrALAZINE, influenza, insulin aspart U-100, labetalol, metoprolol, ondansetron, pneumoc 13-morro conj-dip cr(PF)    Objective:     Vital Signs (Most Recent):  Temp: 98.4 °F (36.9 °C) (02/22/18 0751)  Pulse: 63 (02/22/18 0751)  Resp: 18 (02/22/18 0751)  BP: 134/69 (02/22/18 0751)  SpO2: (!) 92 % (02/22/18 0751) Vital Signs (24h Range):  Temp:  [97.7 °F (36.5 °C)-99.4 °F (37.4 °C)] 98.4 °F (36.9 °C)  Pulse:  [38-91] 63  Resp:  [18-38] 18  SpO2:  [91 %-97 %] 92 %  BP: (132-183)/(59-94) 134/69     Weight: 84.4 kg (186 lb 1.1 oz)  Body mass index is 27.48 kg/m².    Intake/Output Summary (Last 24 hours) at 02/22/18 0901  Last data filed at 02/22/18 0400   Gross per 24 hour   Intake             1100 ml   Output              650 ml   Net              450 ml      Physical Exam   Constitutional: He is oriented to person, place, and time. He appears well-developed and well-nourished.   HENT:   Head: Normocephalic and atraumatic.   Eyes: EOM are normal. Pupils are equal, round, and reactive to light.   Neck: Neck supple.   Cardiovascular: Normal rate, regular rhythm and normal heart sounds.    Pulmonary/Chest: Effort normal. He has wheezes in the right lower field.   Abdominal: Soft. Bowel sounds are normal.    Neurological: He is alert and oriented to person, place, and time.   Skin: Skin is warm and dry.   Psychiatric: He has a normal mood and affect.   Vitals reviewed.      Significant Labs:   CBC:     Recent Labs  Lab 02/20/18  0957 02/21/18  0530 02/22/18  0423   WBC 18.43* 10.32 11.37   HGB 10.8* 9.6* 10.9*   HCT 32.9* 29.6* 33.7*    165 190     CMP:     Recent Labs  Lab 02/20/18  0957 02/21/18  0530 02/22/18  0423    140 136   K 4.4 3.9 4.0    107 105   CO2 22* 25 22*   * 123* 143*   BUN 34* 36* 33*   CREATININE 2.2* 2.2* 1.5*   CALCIUM 9.0 9.2 9.5   ANIONGAP 8 8 9   EGFRNONAA 27* 27* 43*     POCT Glucose:     Recent Labs  Lab 02/21/18  1148 02/21/18  1642 02/21/18  2106   POCTGLUCOSE 155* 179* 152*       Significant Imaging: CXR: I have reviewed all pertinent results/findings within the past 24 hours and my personal findings are:  Multiple overlying cardiac monitoring leads. The cardiomediastinal silhouette is prominent but stable in size. It again appears slightly deviated to the right, similar to prior studies. Aortic stent in place. Mild pulmonary vascular congestion and perihilar edema. Mild bibasilar atelectasis. No new focal consolidation. Small pleural effusions suggested, particularly on the right. No pneumothorax.

## 2018-02-22 NOTE — PT/OT/SLP PROGRESS
"Physical Therapy Treatment    Patient Name:  Timbo Gallagher   MRN:  [id number]    Recommendations:     Discharge Recommendations:  home health PT (with 24hr supervision/assistance and pt with decreased safety awareness)   Discharge Equipment Recommendations: walker, rolling, shower chair   Barriers to discharge: Decreased caregiver support and decreased safety awareness    Assessment:     Timbo Gallagher is a 82 y.o. male admitted with a medical diagnosis of TIA involving carotid artery.  He presents with the following impairments/functional limitations:  weakness, impaired self care skills, impaired functional mobilty, gait instability, decreased safety awareness. Pt very pleasant and motivated to participate.  Pt ambulated ~500ft with RW, SBA/SUP and ~48ft with no AD, CGA.      Rehab Prognosis:  good; patient would benefit from acute skilled PT services to address these deficits and reach maximum level of function.      Recent Surgery: Procedure(s) (LRB):  ENDARTERECTOMY-CAROTID (Left) 3 Days Post-Op    Plan:     During this hospitalization, patient to be seen 3 x/week (M-F) to address the above listed problems via gait training, therapeutic activities, therapeutic exercises  · Plan of Care Expires:  03/07/18   Plan of Care Reviewed with: patient    Subjective     Communicated with pt's nurse, Krissy, prior to session.  Patient found seated in [place] upon PT entry to room, agreeable to treatment.      Chief Complaint: want to see his wife  Patient comments/goals: Pt states " Yes, lets walk."  Pain/Comfort:  · Pain Rating 1: 0/10    Patients cultural, spiritual, Restorationist conflicts given the current situation:      Objective:     Patient found with: peripheral IV     General Precautions: Standard, fall, diabetic, vision impaired   Orthopedic Precautions:N/A   Braces: N/A     Functional Mobility:  · Transfers:     · Sit to Stand:  stand by assistance with no AD  · Gait: Pt ambulated ~500ft with RW, " SBA/SUP and ~48ft with no AD, CGA.  Pt with decreased clearance of R foot, decreased step length, and weight shifting ability  · Balance: good sitting, fair standing      AM-PAC 6 CLICK MOBILITY  Turning over in bed (including adjusting bedclothes, sheets and blankets)?: 4  Sitting down on and standing up from a chair with arms (e.g., wheelchair, bedside commode, etc.): 4  Moving from lying on back to sitting on the side of the bed?: 4  Moving to and from a bed to a chair (including a wheelchair)?: 4  Need to walk in hospital room?: 3  Climbing 3-5 steps with a railing?: 3  Total Score: 22       Therapeutic Activities and Exercises:   Pt performed transfers and gait training.    Patient left up in chair with all lines intact, call button in reach and pt's nurseKrissy, notified..    GOALS:    Physical Therapy Goals        Problem: Physical Therapy Goal    Goal Priority Disciplines Outcome Goal Variances Interventions   Physical Therapy Goal     PT/OT, PT      Description:  Goals to be met by: 3/7/18     Patient will increase functional independence with mobility by performin. Supine to sit with Supervision  2. Rolling to Left and Right with Supervision  3. Sit to stand transfer with Supervision  4. Bed to chair transfer with Supervision   5. Gait  x 250 feet with Supervision with or without Rolling Walker   6. Lower extremity exercise program x 30 reps per handout, with supervision                      Time Tracking:     PT Received On: 18  PT Start Time: 1150     PT Stop Time: 1200  PT Total Time (min): 10 min     Billable Minutes: Gait Training 10     Treatment Type: Treatment  PT/PTA: PTA     PTA Visit Number: 1     Alexandra Rivera, PTA  2018

## 2018-02-22 NOTE — PLAN OF CARE
Discharge planning complete.  Narcotic Rx placed on chart and f/u arranged.  Pt stable for discharge to SNF at this time.

## 2018-02-22 NOTE — PROGRESS NOTES
[facility]  Cardiology  Progress Note    Patient Name: Timbo Gallagher  MRN: [id number]  Admission Date: 2/17/2018  Hospital Length of Stay: 4 days  Code Status: Full Code   Attending Physician: Silvio Coleman MD   Primary Care Physician: Cirilo Montero MD  Expected Discharge Date:   Principal Problem:Amaurosis fugax    Subjective:     Hospital Course:   2/16/18: admitted with L ocular TIA, note made of severe L ICA stenosis.  2/19/18: L CEA, creat up to 1.9 post-op  2/20/18: creat up to 2.2 but making quite a bit of urine off diuretics.  2/21/18: transferred out of ICU    Interval Hx: No cp/sob.  L eye vision preserved. Wants to go home.        Review of Systems   Gastrointestinal: Negative for melena.   Genitourinary: Negative for hematuria.     Objective:     Vital Signs (Most Recent):  Temp: 98.4 °F (36.9 °C) (02/22/18 0751)  Pulse: 63 (02/22/18 0751)  Resp: 18 (02/22/18 0751)  BP: 134/69 (02/22/18 0751)  SpO2: (!) 92 % (02/22/18 0751) Vital Signs (24h Range):  Temp:  [97.7 °F (36.5 °C)-99.4 °F (37.4 °C)] 98.4 °F (36.9 °C)  Pulse:  [38-91] 63  Resp:  [18-38] 18  SpO2:  [91 %-97 %] 92 %  BP: (132-183)/(59-94) 134/69     Weight: 84.4 kg (186 lb 1.1 oz)  Body mass index is 27.48 kg/m².     SpO2: (!) 92 %  O2 Device (Oxygen Therapy): room air      Intake/Output Summary (Last 24 hours) at 02/22/18 0816  Last data filed at 02/22/18 0400   Gross per 24 hour   Intake             1100 ml   Output              700 ml   Net              400 ml       Lines/Drains/Airways     Peripheral Intravenous Line                 Peripheral IV - Single Lumen 02/19/18 1500 Right Hand 2 days                Physical Exam   Constitutional: He is oriented to person, place, and time. He appears well-developed and well-nourished.   HENT:   Head: Normocephalic and atraumatic.   Eyes: Conjunctivae and EOM are normal. No scleral icterus.   R pupil chr dilated   Neck: Normal range of motion. Neck supple. No JVD  present. Carotid bruit is not present. No tracheal deviation present. No thyromegaly present.   L neck CEA incision c/d/i.   Cardiovascular: Normal rate, regular rhythm, S1 normal and S2 normal.   Occasional extrasystoles are present. Exam reveals distant heart sounds. Exam reveals no gallop and no friction rub.    Murmur heard.   Systolic murmur is present with a grade of 2/6   Pulses:       Carotid pulses are 2+ on the right side, and 2+ on the left side.  Pulmonary/Chest: Effort normal and breath sounds normal. No respiratory distress. He has no wheezes. He has no rales. He exhibits no tenderness.   Abdominal: Soft. Bowel sounds are normal. He exhibits no distension. There is no tenderness.   Musculoskeletal: He exhibits no edema.   Neurological: He is alert and oriented to person, place, and time. He has normal strength. A cranial nerve deficit (R eye blind) is present.   Skin: Skin is warm and dry. No rash noted.   Psychiatric: He has a normal mood and affect. His behavior is normal.       Current Medications:   acetaminophen  650 mg Oral Q6H    aspirin  81 mg Oral Daily    clopidogrel  75 mg Oral Daily    heparin (porcine)  5,000 Units Subcutaneous Q8H    losartan  100 mg Oral Daily    And    hydroCHLOROthiazide  12.5 mg Oral Daily    metoprolol succinate  50 mg Oral Daily    mupirocin  1 g Nasal BID    ondansetron  4 mg Intravenous Q4H    pantoprazole  40 mg Intravenous Before breakfast    tuberculin  5 Units Intradermal Once      metoprolol       acetaminophen, dextrose 50%, glucagon (human recombinant), hydrALAZINE, influenza, insulin aspart U-100, labetalol, metoprolol, ondansetron, pneumoc 13-morro conj-dip cr(PF)    Laboratory:  CBC:    Recent Labs  Lab 02/20/18  0957 02/21/18  0530 02/22/18  0423   WHITE BLOOD CELL COUNT 18.43 H 10.32 11.37   HEMOGLOBIN 10.8 L 9.6 L 10.9 L   HEMATOCRIT 32.9 L 29.6 L 33.7 L   PLATELETS 189 165 190       CHEMISTRIES:    Recent Labs  Lab 02/19/18  6037  02/20/18  0343  02/20/18  0957 02/21/18  0530 02/22/18  0423   GLUCOSE 195 H 221 H  < > 180 H 123 H 143 H   SODIUM 138 139  < > 136 140 136   POTASSIUM 4.3 4.6  < > 4.4 3.9 4.0   BUN BLD 27 H 28 H  < > 34 H 36 H 33 H   CREATININE 1.5 H 1.8 H  < > 2.2 H 2.2 H 1.5 H   EGFR IF  49 A 40 A  < > 31 A 31 A 49 A   EGFR IF NON- 43 A 34 A  < > 27 A 27 A 43 A   CALCIUM 9.0 8.8  < > 9.0 9.2 9.5   MAGNESIUM 1.3 L 2.2  --  2.1  --   --    < > = values in this interval not displayed.    CARDIAC BIOMARKERS:    Recent Labs  Lab 02/17/18  0831 02/17/18  1723 02/17/18  2345   TROPONIN I 0.019 0.029 H 0.023       COAGS:    Recent Labs  Lab 02/19/18  2348 02/20/18  0343 02/20/18  0957   INR 1.1 1.1 1.1       LIPIDS/LFTS:    Recent Labs  Lab 10/18/17  0815 01/27/18  0736 02/17/18  0831   CHOLESTEROL  --   --  147   TRIGLYCERIDES  --   --  72   HDL  --   --  37 L   LDL CHOLESTEROL  --   --  95.6   NON-HDL CHOLESTEROL  --   --  110   AST 15 16 11   ALT 5 L 11 <5 L     Lab Results   Component Value Date    TSH 1.450 02/17/2018           Diagnostic Results:  ECG (personally reviewed tracings):   2/17/18 0828 SR 87, multifocal PVCs     Chest X-Ray (personally reviewed image(s)):   2/17/18 NAD  2/20/18: NAD     CT-Head 2/17/18  Age-appropriate generalized cerebral volume loss with mild chronic microvascular ischemic change.  Prominence of the extra-axial space along the left frontal, parietal and temporal convexity is most likely related to central atrophy rather than a chronic subdural hygroma.  No evidence for acute intracranial hemorrhage correlation and further evaluation as warranted.     Echo: 2/18/18 (images pers rev)    1 - Mildly depressed left ventricular systolic function (EF 45-50%).  Inferoposterior hypokinesis.     2 - Concentric hypertrophy.     3 - Impaired LV relaxation, elevated LAP (grade 2 diastolic dysfunction).     4 - S/P transcatheter AVR, UNA = 1.85 cm2, AVAi = 0.94 cm2/m2, mean gradient  = 9 mmHg.     5 - Moderate mitral regurgitation.     6 - Mild tricuspid regurgitation.     7 - Pulmonary hypertension. The estimated PA systolic pressure is 61 mmHg.      Carotid US 2/17/18  #1. Findings consistent with less than 50% stenosis in the Right internal carotid artery .  #2. Findings consistent with greater than 70% stenosis in the Left internal carotid artery based on peak systolic velocity.  #3.  Incidentally noted is an irregular heartbeat.     TAVR 10/17/17  B. 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    No perivalvular leak post procedure per 2D echo.    Post deployment AV mean gradient 2.5 mmHg, Vmax 1.09 m/s.     Cath: 5/8/17 (images pers rev)  D. Hemodynamic Results  AO: 122/68 (93)  E. Angiographic Results       Patient has a right dominant coronary artery.      - Left Main Coronary Artery:             The LM is normal. There is ANASTASIIA 3 flow.     - Left Anterior Descending Artery:             The LAD has luminal irregularities. There is ANASTASIIA 3 flow.     - Left Circumflex Artery:             The LCX is normal. There is ANASTASIIA 3 flow.     - Right Coronary Artery:             The RCA has luminal irregularities. There is ANASTASIIA 3 flow.     - Radial Artery:             The Radial artery was not studied.     - D1:             The D1 has a 50% stenosis. There is ANASTASIIA 3 flow.     - Posterior Descending Artery:             The mid PDA has a 70% stenosis. There is ANASTASIIA 3 flow.      Assessment and Plan:     * Amaurosis fugax    S/P L CEA 2/19/18  Presenting sxs c/w L ocular TIA and significant L ICA stenosis noted.  Cont ASA/Plavix.  Cont atorva 40mg qhs  Check lipids/LFT 3 months (mid May 2018)  Cont antihtn regimen.          Essential hypertension    Cont med rx.  Multifocal PVCs noted  Cont BBl as BP/HR will tolerate        Type 2 diabetes mellitus with renal complication    Per IM  Cont statin        S/P TAVR (transcatheter aortic  valve replacement)    Normally functioning per echo 2/2018.        Creatinine elevation    Noted post-op, ?related to intraop fluid shifts or overdiuresis post-op.  Creat normalizing.        Blindness of one eye    L ocular TIA, vision restored at present  Chr R retinal detachment            VTE Risk Mitigation         Ordered     heparin (porcine) injection 5,000 Units  Every 8 hours     Route:  Subcutaneous        02/20/18 2207     Low Risk of VTE  Once      02/19/18 2121        Cardiology will sign off, pls call with questions.  Pt to follow up with Dr. Manzo (or myself if he wishes to switch to [facility]) in 1-2 weeks.    Ned Toribio MD  Cardiology  [facility]

## 2018-02-22 NOTE — PROGRESS NOTES
POC, PPD, PASRR, 142 and hardscript sent to [facility] via [facility] [place].

## 2018-02-22 NOTE — ASSESSMENT & PLAN NOTE
-L amaurosis fugax consistent with TIA and L ICA stenosis 60-69% on US s/p L CEA 2/19/18 - recovering well  -Cont neuro checks   -Cont to monitor UOP closely  -Clear ADA diet   -Cont ASA, Plavix and statin  -BP control with home PO meds  -cont IS  -Cont following primary MD recommendations  -OOB to chair  -f/u with SW re: SNF disposition

## 2018-02-22 NOTE — PLAN OF CARE
Problem: Physical Therapy Goal  Goal: Physical Therapy Goal  Goals to be met by: 3/7/18     Patient will increase functional independence with mobility by performin. Supine to sit with Supervision  2. Rolling to Left and Right with Supervision  3. Sit to stand transfer with Supervision  4. Bed to chair transfer with Supervision   5. Gait  x 250 feet with Supervision with or without Rolling Walker   6. Lower extremity exercise program x 30 reps per handout, with supervision     Outcome: Ongoing (interventions implemented as appropriate)  Pt very pleasant and motivated to participate.  Pt ambulated ~500ft with RW, SBA/SUP and ~48ft with no AD, CGA.

## 2018-02-22 NOTE — PLAN OF CARE
[facility] going to [facility]. RN name is Griselda. #[phone].    SPD called. Transfer set for 6pm.      02/22/18 2816   Final Note   Assessment Type Final Discharge Note   Discharge Disposition SNF   What phone number can be called within the next 1-3 days to see how you are doing after discharge? (listed in chart)   Hospital Follow Up  Appt(s) scheduled? Yes   Discharge plans and expectations educations in teach back method with documentation complete? Yes   Right Care Referral Info   Post Acute Recommendation SNF / Sub-Acute Rehab   Referral Type SNF   Facility Name [facility]

## 2018-02-22 NOTE — PROGRESS NOTES
[facility]  Pulmonology  Progress Note    Patient Name: Timbo Gallagher  MRN: [id number]  Admission Date: 2/17/2018  Hospital Length of Stay: 3 days  Code Status: Full Code  Attending Provider: Silvio Coleman MD  Primary Care Provider: Cirilo Montero MD   Principal Problem: Amaurosis fugax    Subjective:     Interval History: Patient improved this morning. Good UOP    Review of Systems   All other systems reviewed and are negative.    Scheduled Meds:   acetaminophen  650 mg Oral Q6H    aspirin  81 mg Oral Daily    clopidogrel  75 mg Oral Daily    heparin (porcine)  5,000 Units Subcutaneous Q8H    losartan  100 mg Oral Daily    And    hydroCHLOROthiazide  12.5 mg Oral Daily    metoprolol succinate  50 mg Oral Daily    mupirocin  1 g Nasal BID    ondansetron  4 mg Intravenous Q4H    pantoprazole  40 mg Intravenous Before breakfast     Continuous Infusions:   metoprolol       PRN Meds:.acetaminophen, dextrose 50%, glucagon (human recombinant), hydrALAZINE, influenza, insulin aspart U-100, labetalol, metoprolol, ondansetron, pneumoc 13-morro conj-dip cr(PF)    Objective:     Vital Signs (Most Recent):  Temp: 98.2 °F (36.8 °C) (02/21/18 1644)  Pulse: 83 (02/21/18 1645)  Resp: 18 (02/21/18 1644)  BP: (!) 154/61 (02/21/18 1820)  SpO2: (!) 94 % (02/21/18 1645) Vital Signs (24h Range):  Temp:  [97.7 °F (36.5 °C)-98.4 °F (36.9 °C)] 98.2 °F (36.8 °C)  Pulse:  [53-91] 83  Resp:  [15-38] 18  SpO2:  [91 %-100 %] 94 %  BP: (125-183)/() 154/61  Arterial Line BP: (110-152)/(31-53) 133/39     Weight: 84.4 kg (186 lb 1.1 oz)  Body mass index is 27.48 kg/m².    Intake/Output Summary (Last 24 hours) at 02/21/18 1840  Last data filed at 02/21/18 1330   Gross per 24 hour   Intake              240 ml   Output              855 ml   Net             -615 ml      Physical Exam   Constitutional: He is oriented to person, place, and time. He appears well-developed and well-nourished.   HENT:   Head:  Normocephalic and atraumatic.   Eyes: EOM are normal. Pupils are equal, round, and reactive to light.   Neck: Neck supple.   Cardiovascular: Normal rate, regular rhythm and normal heart sounds.    Pulmonary/Chest: Effort normal. He has wheezes in the right lower field.   Abdominal: Soft. Bowel sounds are normal.   Neurological: He is alert and oriented to person, place, and time.   Skin: Skin is warm and dry.   Psychiatric: He has a normal mood and affect.   Vitals reviewed.      Significant Labs:   CBC:     Recent Labs  Lab 02/20/18  0621 02/20/18  0957 02/21/18  0530   WBC 20.51* 18.43* 10.32   HGB 11.2* 10.8* 9.6*   HCT 33.4* 32.9* 29.6*    189 165     CMP:     Recent Labs  Lab 02/20/18  0621 02/20/18  0957 02/21/18  0530    136 140   K 4.5 4.4 3.9    106 107   CO2 23 22* 25   * 180* 123*   BUN 30* 34* 36*   CREATININE 1.9* 2.2* 2.2*   CALCIUM 8.9 9.0 9.2   ANIONGAP 8 8 8   EGFRNONAA 32* 27* 27*     POCT Glucose:     Recent Labs  Lab 02/21/18  0538 02/21/18  1148 02/21/18  1642   POCTGLUCOSE 123* 155* 179*       Significant Imaging: CXR: I have reviewed all pertinent results/findings within the past 24 hours and my personal findings are:  Multiple overlying cardiac monitoring leads. The cardiomediastinal silhouette is prominent but stable in size. It again appears slightly deviated to the right, similar to prior studies. Aortic stent in place. Mild pulmonary vascular congestion and perihilar edema. Mild bibasilar atelectasis. No new focal consolidation. Small pleural effusions suggested, particularly on the right. No pneumothorax.    Assessment/Plan:     * Amaurosis fugax    S/p CEA        BERNY (acute kidney injury)    Likely pre-renal. Cr stable. Good UOP. Patient is taking liquids. I wouldn't give any extra fluid.         Acute respiratory failure with hypoxia    Patient with pulmonary edema on CXR with improvement with diureses and NIPPV. Cr stable today. Good UOP. Leukocytosis but no  signs and symptoms of acute infection.         Chronic diastolic heart failure    On lasix daily.                Sherry Boone MD  Pulmonology  [facility]

## 2018-02-22 NOTE — NURSING
Report given to Amanda at [facility]. Patient awaiting transportation at this time. Will continue to monitor patient.

## 2018-02-23 ENCOUNTER — PATIENT OUTREACH (OUTPATIENT)
Dept: ADMINISTRATIVE | Facility: CLINIC | Age: 83
End: 2018-02-23

## 2018-02-23 LAB
BLD PROD TYP BPU: NORMAL
BLD PROD TYP BPU: NORMAL
BLOOD UNIT EXPIRATION DATE: NORMAL
BLOOD UNIT EXPIRATION DATE: NORMAL
BLOOD UNIT TYPE CODE: 5100
BLOOD UNIT TYPE CODE: 5100
BLOOD UNIT TYPE: NORMAL
BLOOD UNIT TYPE: NORMAL
CODING SYSTEM: NORMAL
CODING SYSTEM: NORMAL
DISPENSE STATUS: NORMAL
DISPENSE STATUS: NORMAL
TRANS ERYTHROCYTES VOL PATIENT: NORMAL ML
TRANS ERYTHROCYTES VOL PATIENT: NORMAL ML

## 2018-02-23 NOTE — PHYSICIAN QUERY
"PT Name: Timbo Gallagher  MR #: [id number]    Physician Query Form - Heart  Condition Clarification     CDS/: Yemi Nguyễn Jr, RN              Contact information:[phone]  This form is a permanent document in the medical record.     Query Date: February 23, 2018    By submitting this query, we are merely seeking further clarification of documentation. Please utilize your independent clinical judgment when addressing the question(s) below.    The medical record contains the following   Indicators     Supporting Clinical Findings Location in Medical Record    BNP      EF     x Radiology findings Patient with pulmonary edema on CXR with improvement with diureses and NIPPV. 2/21 Pulmonology Progress Note   x Echo Results Mildly depressed left ventricular systolic function (EF 45-50%).    Impaired LV relaxation, elevated LAP (grade 2 diastolic dysfunction).  2/18 2D Echo    "Ascites" documented     x "SOB" or "RIOS" documented Patient with worsening SOB last night. Pulmonary edema on CXR. Patient transferred to the ICU. Placed on NIPPV and given Lasix. Improvement in respiratory status.  2/20 Pulmonology Consult   x "Hypoxia" documented Acute respiratory failure with hypoxia   2/21 Vascular Surgery Progress Note   x Heart Failure documented Chronic diastolic heart failure 2/18 H&P (Past Medical History)   x "Edema" documented Musculoskeletal: Normal range of motion. He exhibits no edema or tenderness.  2/19 Vascular Surgery Progress Note   x Diuretics/Meds furosemide injection 40 mg once 2/19 MAR    Treatment:      Other:          Provider, please specify diagnosis or diagnoses associated with above clinical findings.                               [ x ] Acute on Chronic Diastolic Heart Failure( EF > 40)*  [  ] Acute on Chronic Combined Systolic and Diastolic Heart Failure  [  ] Other (please specify): ___________________________________  [  ] Clinically Undetermined            *American Heart Association    "                                                                                                       Please document in your progress notes daily for the duration of treatment until resolved and include in your discharge summary.

## 2018-02-23 NOTE — PT/OT/SLP DISCHARGE
Physical Therapy Discharge Summary    Name: Timbo Gallagher  MRN: [id number]   Principal Problem: TIA involving carotid artery     Patient Discharged from acute Physical Therapy on 18.  Please refer to prior PT notes for functional status.     Assessment:     Patient was discharged unexpectedly.  Information required to complete an accurate discharge summary is unknown.  Refer to therapy initial evaluation and last progress note for initial and most recent functional status and goal achievement.  Recommendations made may be found in medical record.    Objective:     GOALS:    Physical Therapy Goals     Not on file          Multidisciplinary Problems (Resolved)        Problem: Physical Therapy Goal    Goal Priority Disciplines Outcome Goal Variances Interventions   Physical Therapy Goal   (Resolved)     PT/OT, PT Outcome(s) achieved     Description:  Goals to be met by: 3/7/18     Patient will increase functional independence with mobility by performin. Supine to sit with Supervision  2. Rolling to Left and Right with Supervision  3. Sit to stand transfer with Supervision  4. Bed to chair transfer with Supervision   5. Gait  x 250 feet with Supervision with or without Rolling Walker   6. Lower extremity exercise program x 30 reps per handout, with supervision                      Reasons for Discontinuation of Therapy Services  Transfer to alternate level of care.      Plan:     Patient Discharged to: Skilled Nursing Facility.    Olga José, PT  2018

## 2018-02-23 NOTE — DISCHARGE SUMMARY
SERVICE:  Vascular Surgery.    ATTENDING:  Silvio Coleman MD.    ADMISSION DIAGNOSES:  1.  Symptomatic left carotid artery occlusive disease.  2.  Amaurosis fugax, left eye.  3.  Hypertension.  4.  Hyperlipidemia.  5.  Diabetes mellitus.    DISCHARGE DIAGNOSES:  1.  Symptomatic left carotid artery occlusive disease.  2.  Amaurosis fugax, left eye.  3.  Hypertension.  4.  Hyperlipidemia.  5.  Diabetes mellitus.    DATE OF ADMISSION:  02/17/2018.    DATE OF DISCHARGE:  02/22/2018.    PROCEDURE PERFORMED:  Left carotid endarterectomy.    HISTORY OF PRESENT ILLNESS:  This is an 82-year-old white male with the above   listed history and past medical history including chronic diastolic heart   failure, aortic valve stenosis, status post TAVR in 2017, CKD stage III, who   presented to the Emergency Room with a TIA involving the left carotid artery   associated with amaurosis fugax on 02/17/2018.  The patient has no other neuro   symptoms and his symptoms of his left eye occurred approximately six hours prior   to his presentation.  He had no recurrence of symptoms and no previous CVA or   TIA.  He has known right eye blindness to the retinal detachment.  No neck   surgeries.  After review of his imaging, it was determined that the patient will   benefit from a left carotid endarterectomy.  He was admitted to the Hospitalist   Service for optimization of his comorbidities and obtained a cardiac evaluation   and was deemed stable for surgery.  He also was seen by Stroke Neurology and   their recommendations were followed as well.  Vascular Surgery was consulted for   further care and management.    PAST MEDICAL HISTORY:  1.  Hypertension.  2.  Type 2 diabetes.  3.  GERD.  4.  BPH.  5.  Anemia of chronic disease.  6.  Mild protein malnutrition.  7.  Chronic diastolic heart failure.  8.  Essential hypertension.  9.  Hiatal hernia with GERD and esophagitis.  10.  Aortic valve stenosis.  11.  Status post transcatheter  aortic valve replacement.  12.  CKD stage III.  13.  Blindness of one eye.    PAST SURGICAL HISTORY:  1.  TAVR.  2.  Cataract extraction, bilateral.  3.  Right eye retinal detachment surgery.    HOSPITAL COURSE:  The patient was admitted to the Hospitalist Service for   further care and management and observation of his comorbidities.  After review   of his imaging and examination of the patient, it was determined that the   patient would benefit from a left carotid endarterectomy.  Risks and benefits   were discussed with the patient and the patient desired to proceed with surgery.    The patient was taken to the Operating Room on 02/19/2018 for the above listed   procedure.  The patient tolerated the procedure well and there were no   complications.  Please see the separately dictated operative report for the   details the procedure.  The patient was extubated in the Operating Room and was   noted to be neuro intact.  In the Recovery Room, he developed hypercapnia and   volume overload for which he was diuresed and placed on BiPAP with resolution of   his hypercapnia.  He was transferred to the ICU for further care and management   of his volume and no diastolic heart failure.  His volume status was optimized   and his blood pressure was also normalized.  His urine output was adequate while   he was in the ICU, although he did experience a bump in his creatinine.  His   urine output remained adequate.  His home medications were resumed on postop day   #1 and his blood pressure remained within goal range.  He was started on a   clear diabetic diet at postop day one morning which he tolerated well.  He was   also able to get out of bed to his chair and work with respiratory therapy.  ICU   Critical Care was consulted and the recommendations were also followed.  The   patient remained neuro intact and his vital signs were stable.  On postop day   #2, his creatinine had normalized and his [place] catheter was removed.   His urine   output remained adequate and he was hydrating by mouth appropriately.  His   volume status remained optimized and he was without any signs of respiratory   distress or volume overload.  On postop day #2, he was transferred to the floor   and his pain was controlled.  He was tolerating a diabetic diet and he was   urinating on his own without difficulty.  He is ambulating and getting out of   bed to the chair on his own without issues.  There were no other new issues or   acute events during the remainder of the patient's hospitalization and he was   stable for discharge on 02/22/2018.    DISCHARGE MEDICATIONS:  The patient was instructed to continue all home   medications previously described other than stopping lisinopril per his primary   care doctor's recommendations.  He was given prescription for the following   medications:  Percocet 5/325 one tab p.o. q. 6 hours p.r.n. pain.    FOLLOWUP:  The patient will follow up with Vascular Surgery in four weeks with a   carotid ultrasound.  He will also follow up with his primary care doctor for   further titration and optimization of his blood pressure regimen and for   comprehensive evaluation postoperatively.  He was also instructed to follow up   with Dr. Toribio with Cardiology within 2 weeks of his discharge.    DISCHARGE INSTRUCTIONS:  The patient was instructed to call the Vascular Surgery   Clinic with any questions or concerns, intractable nausea or vomiting, any   fevers greater than 101.5, not controlled over-the-counter pain medication or   any severe pain, erythema or purulence noted to his wound.  He was instructed to   not drive while he was on narcotics and did not saturate his wound in any water   or ointments or peroxide.      JOLLY/GELA  dd: 02/22/2018 14:10:01 (CST)  td: 02/22/2018 22:01:04 (CST)  Doc ID   #[id number]  Job ID #[id number]    CC:

## 2018-02-25 PROBLEM — R00.2 PALPITATIONS: Status: RESOLVED | Noted: 2018-02-25 | Resolved: 2018-02-19

## 2018-03-20 ENCOUNTER — PATIENT OUTREACH (OUTPATIENT)
Dept: ADMINISTRATIVE | Facility: CLINIC | Age: 83
End: 2018-03-20

## 2018-03-22 ENCOUNTER — TELEPHONE (OUTPATIENT)
Dept: VASCULAR SURGERY | Facility: CLINIC | Age: 83
End: 2018-03-22

## 2018-03-22 ENCOUNTER — OFFICE VISIT (OUTPATIENT)
Dept: VASCULAR SURGERY | Facility: CLINIC | Age: 83
End: 2018-03-22
Payer: MEDICARE

## 2018-03-22 ENCOUNTER — HOSPITAL ENCOUNTER (OUTPATIENT)
Dept: CARDIOLOGY | Facility: HOSPITAL | Age: 83
Discharge: HOME | End: 2018-03-22
Attending: SURGERY
Payer: MEDICARE

## 2018-03-22 VITALS
WEIGHT: 181.88 LBS | HEIGHT: 67 IN | BODY MASS INDEX: 28.55 KG/M2 | SYSTOLIC BLOOD PRESSURE: 132 MMHG | DIASTOLIC BLOOD PRESSURE: 70 MMHG

## 2018-03-22 DIAGNOSIS — N18.30 CKD (CHRONIC KIDNEY DISEASE), STAGE III: ICD-10-CM

## 2018-03-22 DIAGNOSIS — E11.22 TYPE 2 DIABETES MELLITUS WITH STAGE 3 CHRONIC KIDNEY DISEASE, WITHOUT LONG-TERM CURRENT USE OF INSULIN: ICD-10-CM

## 2018-03-22 DIAGNOSIS — G45.3 AMAUROSIS FUGAX: ICD-10-CM

## 2018-03-22 DIAGNOSIS — I65.21 CAROTID STENOSIS, ASYMPTOMATIC, RIGHT: ICD-10-CM

## 2018-03-22 DIAGNOSIS — I10 ESSENTIAL HYPERTENSION: ICD-10-CM

## 2018-03-22 DIAGNOSIS — G45.1 TIA INVOLVING CAROTID ARTERY: ICD-10-CM

## 2018-03-22 DIAGNOSIS — N18.30 TYPE 2 DIABETES MELLITUS WITH STAGE 3 CHRONIC KIDNEY DISEASE, WITHOUT LONG-TERM CURRENT USE OF INSULIN: ICD-10-CM

## 2018-03-22 DIAGNOSIS — I50.32 CHRONIC DIASTOLIC HEART FAILURE: ICD-10-CM

## 2018-03-22 DIAGNOSIS — Z98.890 S/P CAROTID ENDARTERECTOMY: Primary | ICD-10-CM

## 2018-03-22 DIAGNOSIS — I65.22 CAROTID STENOSIS, LEFT: ICD-10-CM

## 2018-03-22 LAB — INTERNAL CAROTID STENOSIS: NORMAL

## 2018-03-22 PROCEDURE — 93880 EXTRACRANIAL BILAT STUDY: CPT | Mod: 26,,, | Performed by: SURGERY

## 2018-03-22 PROCEDURE — 93880 EXTRACRANIAL BILAT STUDY: CPT

## 2018-03-22 PROCEDURE — 99024 POSTOP FOLLOW-UP VISIT: CPT | Mod: S$PBB,,, | Performed by: SURGERY

## 2018-03-22 PROCEDURE — 99213 OFFICE O/P EST LOW 20 MIN: CPT | Mod: PBBFAC | Performed by: SURGERY

## 2018-03-22 PROCEDURE — 99999 PR PBB SHADOW E&M-EST. PATIENT-LVL III: CPT | Mod: PBBFAC,,, | Performed by: SURGERY

## 2018-03-22 RX ORDER — ATORVASTATIN CALCIUM 40 MG/1
40 TABLET, FILM COATED ORAL DAILY
Qty: 90 TABLET | Refills: 11 | Status: SHIPPED | OUTPATIENT
Start: 2018-03-22 | End: 2019-05-19

## 2018-03-22 NOTE — LETTER
March 22, 2018        Cirilo Montero MD  [address]  [facility] [address]             [facility]  [address]  [facility] [address]  Phone: [phone]  Fax: [phone]   Patient: Timbo Gallagher   MR Number: [id number]   YOB: 1935   Date of Visit: 3/22/2018       Dear Dr. Montero:    Thank you for referring Timbo Gallagher to me for evaluation. He is recovering well after his left carotid endarterectomy without further neurologic events.  I appreciate your help with his secondary stroke prevention.  I prescribed him a statin today; please assist with monitoring his hepatic function and let me know if I can do anything else to help you.  I will continue to monitor his carotid disease and see him back in 6 months.  Below are the relevant portions of my assessment and plan of care.    If you have questions, please do not hesitate to call me. I look forward to following Timbo along with you.    Sincerely,      Silvio Coleman MD           CC  No Recipients

## 2018-03-22 NOTE — PATIENT INSTRUCTIONS
After Carotid Artery Surgery: At Home  Youll start feeling back to normal within a day or 2 of getting home. But dont forget that you just had surgery. You need to take it easy, even if you feel fine. Follow any instructions your doctor gives you. In most cases, stitches dissolve on their own.    Speeding your recovery  The following tips can help speed your recovery:  · Spend your first few days relaxing at home. You can read, watch TV, or do other quiet, restful activities.  · Dont do strenuous activities until your doctor says its OK (usually 1 to 2 weeks).   · Take medications as instructed.  · Dont drive until your doctor says its OK. This will most likely take 1 to 2 weeks.  · Once your doctor says you can shower again, its OK to get the area of your incision wet. But dont scrub it.  · If you shave, be careful around the wound. You may want to use an electric razor.  When to call your doctor  Contact your doctor right away if you have any of the following:  · Your neck swells.  · Redness or fluid is coming from the wound.  · Your face, an arm or a leg becomes numb or weak.  · You have sudden changes in vision or loss of vision in 1 eye.  · You have trouble speaking, swallowing, or breathing.  · You have a fever above 100.0°F (37.7°C).   · You have a severe headache or eye pain on the same side of the body you had surgery on.  If your doctors office is closed, go to the hospital emergency room.   Date Last Reviewed: 6/13/2015 © 2000-2017 [employer]. [address] [place] [address], [place] [address]. All rights reserved. This information is not intended as a substitute for professional medical care. Always follow your healthcare professional's instructions.        Discharge Instructions for Carotid Endarterectomy   Your doctor performed surgery called a carotid endarterectomy. This is the most common way to restore normal blood flow through the vessels that carry blood to your brain. These  vessels are called the carotid arteries. During the surgery, a surgeon made a small incisionin the side of your neck, just below your jaw. The artery was opened and the blockage was cleared. This procedure was done to reduce your risk of a stroke, which can occur when the carotid arteries are severely blocked or narrowed.     Ask a friend or family member to help with chores, especially those that involve lifting.   Home care  · Spend your first few days after surgery relaxing at home. It's OK to do quiet activities such as reading or watching TV.  · Take your medications exactly as instructed. Dont skip doses.  · Dont drive until your doctor says its OK. This will most likely take 1 to 2 weeks.  · Keep the wound dry until your doctor says it's OK to shower. Don't scrub your incision.  · Avoid strenuous activity for 7 to 10 days after your surgery.  · Dont lift anything heavier than 10 pounds for 2 to 3 weeks after your surgery.  · Ask your doctor when you can expect to return to work.  · Shave carefully around your incision. You may want to use an electric razor.  · Gradually increase your activity. It may take some time for you to return to your normal activities.  · Check your incision every day for signs of infection (redness, swelling, drainage, or warmth).  · Dont be alarmed if you have some loss of feeling along your jaw line, the incision line, and earlobe. This is a result of the incision and usually goes away after 6 to 12 months.  Long-term changes at home  · Eat a healthy, low-fat, low cholesterol, and low calorie diet. Ask your doctor for menus and other diet information.  · Maintain your ideal body weight.  · After you have recovered from surgery, try to exercise more, especially walking. Ask your doctor for guidance.  · If you smoke ask your doctor for help quitting.   Follow-up care  Make a follow-up appointment with your doctor as directed.     When to call your doctor  Call your doctor  immediately if you have any of the following (or go to the emergency room if your doctor's office is closed):  · Neck swelling  · Headache, particularly if it does not go away after a couple of hours  · Redness, pain, swelling, or drainage from your incision  · Fever above 100°F (37.7°C)  · Numbness or weakness in your face, arms, or legs  · Sudden changes in your vision  · Loss of vision in 1 eye  · Trouble speaking  · Trouble breathing  · Trouble swallowing   Date Last Reviewed: 6/13/2015  © 7525-0875 [employer]. [address] [place] [address]. All rights reserved. This information is not intended as a substitute for professional medical care. Always follow your healthcare professional's instructions.

## 2018-03-22 NOTE — PROGRESS NOTES
Silvio Coleman MD [facility]                         03/22/2018    HPI:  Timbo Gallagher is a 82 y.o. male with   Patient Active Problem List   Diagnosis    HTN (hypertension), malignant    Type 2 diabetes mellitus with renal complication    Diverticular disease of esophagus    Gastroesophageal reflux disease without esophagitis    Benign prostatic hyperplasia without lower urinary tract symptoms    Anemia of chronic disease    Mild protein malnutrition    Incarcerated hiatal hernia    Chronic diastolic heart failure    Essential hypertension    Hiatal hernia with GERD and esophagitis    Aortic valve stenosis    Aortic valve stenosis, unspecified etiology    S/P TAVR (transcatheter aortic valve replacement)    CKD (chronic kidney disease), stage III    Influenza A    Blindness of one eye    TIA involving carotid artery    Pleural effusion    being managed by PCP and specialists who is here today for evaluation of carotid occlusive disease s/p L CEA 2/19/18.  Pt doing well, no complaints today.  BP well controlled.  No Ha.  No TIA or CVA.  L neck inc healing well.  Not taking narcotics, no neck pain near incision.    Past Medical History:   Diagnosis Date    Arthritis     Blind right eye     BPH (benign prostatic hypertrophy)     Bronchitis, chronic     CHF (congestive heart failure)     Colon polyp     Diabetes mellitus     GERD (gastroesophageal reflux disease)     Hearing aid worn     bilateral    Hernia     Hypertension     Irregular heart beat     Snoring      Past Surgical History:   Procedure Laterality Date    CARDIAC VALVE SURGERY      CATARACT EXTRACTION, BILATERAL      EYE SURGERY      RETINAL DETACHMENT SURGERY       Family History   Problem Relation Age of Onset    Arthritis Mother     Heart disease Father     Heart failure Father     Diabetes Sister     Heart attack Brother     No Known Problems Maternal  Aunt     No Known Problems Maternal Uncle     No Known Problems Paternal Aunt     No Known Problems Paternal Uncle     No Known Problems Maternal Grandmother     No Known Problems Maternal Grandfather     No Known Problems Paternal Grandmother     No Known Problems Paternal Grandfather     Anemia Neg Hx     Arrhythmia Neg Hx     Asthma Neg Hx     Clotting disorder Neg Hx     Fainting Neg Hx     Hyperlipidemia Neg Hx     Hypertension Neg Hx     Stroke Neg Hx     Atrial Septal Defect Neg Hx      Social History     Social History    Marital status:      Spouse name: N/A    Number of children: N/A    Years of education: N/A     Occupational History    Not on file.     Social History Main Topics    Smoking status: Former Smoker     Packs/day: 3.00     Years: 40.00     Quit date: 8/2/1990    Smokeless tobacco: Never Used    Alcohol use No    Drug use: No    Sexual activity: Not on file     Other Topics Concern    Not on file     Social History Narrative    ** Merged History Encounter **            Current Outpatient Prescriptions:     aspirin (ECOTRIN) 81 MG EC tablet, Take 1 tablet (81 mg total) by mouth once daily. Take 4 tab tonight then 1 tab daily, Disp: , Rfl: 0    clopidogrel (PLAVIX) 75 mg tablet, Take 1 tablet (75 mg total) by mouth once daily. Take 4 tab tonight then 1 tab daily, Disp: 30 tablet, Rfl: 11    GLUCOSAMINE HCL/CHONDR BAUTISTA A NA (OSTEO BI-FLEX ORAL), Take 2 tablets by mouth once daily., Disp: , Rfl:     losartan-hydrochlorothiazide 100-12.5 mg (HYZAAR) 100-12.5 mg Tab, Take 1 tablet by mouth once daily., Disp: , Rfl:     metformin (GLUCOPHAGE) 500 MG tablet, Take 1,000 mg by mouth 2 (two) times daily with meals. , Disp: , Rfl:     metoprolol succinate (TOPROL-XL) 25 MG 24 hr tablet, Take 25 mg by mouth 2 (two) times daily. , Disp: , Rfl:     ascorbic acid, vitamin C, (VITAMIN C) 1000 MG tablet, Take 1,000 mg by mouth once daily. , Disp: , Rfl:     furosemide  (LASIX) 20 MG tablet, Take 1 tablet (20 mg total) by mouth once daily., Disp: 10 tablet, Rfl: 0    lisinopril 10 MG tablet, Take 1 tablet (10 mg total) by mouth once daily., Disp: 30 tablet, Rfl: 0    REVIEW OF SYSTEMS:  General: No fevers or chills; ENT: No sore throat; Allergy and Immunology: no persistent infections; Hematological and Lymphatic: No history of bleeding or easy bruising; Endocrine: negative; Respiratory: no cough, shortness of breath, or wheezing; Cardiovascular: no chest pain or dyspnea on exertion; Gastrointestinal: no abdominal pain/back, change in bowel habits, or bloody stools; Genito-Urinary: no dysuria, trouble voiding, or hematuria; Musculoskeletal: negative; Neurological: no TIA or stroke symptoms; Psychiatric: no nervousness, anxiety or depression.    PHYSICAL EXAM:   Right Arm BP - Sittin/70 (18 0940)  Left Arm BP - Sittin/70 (18 0940)            General appearance:  Alert, well-appearing, and in no distress.  Oriented to person, place, and time                    Neurological: Normal speech, no focal findings noted; CN II - XII grossly intact. All extremities with sensation to light touch.            Musculoskeletal: Digits/nail without cyanosis/clubbing.  Strength 5/5 all ext.                    Neck: Supple, no significant adenopathy, no carotid bruit can be auscultated                  Chest:  Clear to auscultation, no wheezes, rales or rhonchi, symmetric air entry. No use of accessory muscles               Cardiac: Normal rate and regular rhythm, S1 and S2 normal            Abdomen: Soft, nontender, nondistended, no masses or organomegaly, no hernia     No rebound tenderness noted; bowel sounds normal     No groin adenopathy      Extremities:  2+ radial pulse bilat     No extremity edema    Skin: L neck inc healing well, no infection    LAB RESULTS:  No results found for: CBC  Lab Results   Component Value Date    LABPROT 11.8 2018    INR 1.1  02/20/2018     Lab Results   Component Value Date     02/22/2018    K 4.0 02/22/2018     02/22/2018    CO2 22 (L) 02/22/2018     (H) 02/22/2018    BUN 33 (H) 02/22/2018    CREATININE 1.5 (H) 02/22/2018    CALCIUM 9.5 02/22/2018    ANIONGAP 9 02/22/2018    EGFRNONAA 43 (A) 02/22/2018     Lab Results   Component Value Date    WBC 11.37 02/22/2018    RBC 3.49 (L) 02/22/2018    HGB 10.9 (L) 02/22/2018    HCT 33.7 (L) 02/22/2018    MCV 97 02/22/2018    MCH 31.2 (H) 02/22/2018    MCHC 32.3 02/22/2018    RDW 13.1 02/22/2018     02/22/2018    MPV 11.6 02/22/2018    GRAN 9.0 (H) 02/22/2018    GRAN 78.9 (H) 02/22/2018    LYMPH 1.0 02/22/2018    LYMPH 8.9 (L) 02/22/2018    MONO 1.1 (H) 02/22/2018    MONO 10.0 02/22/2018    EOS 0.2 02/22/2018    BASO 0.02 02/22/2018    EOSINOPHIL 1.8 02/22/2018    BASOPHIL 0.2 02/22/2018    DIFFMETHOD Automated 02/22/2018     .  Lab Results   Component Value Date    HGBA1C 6.5 (H) 02/18/2018       IMAGING:  All pertinent imaging has been reviewed and interpreted independently.    Carotid US reviewed 3/22/18: No evidence of recurrent L ICA stenosis.  R ICA without hemodynamically significant arterial occlusive disease.    IMP/PLAN:  82 y.o. male with   Patient Active Problem List   Diagnosis    HTN (hypertension), malignant    Type 2 diabetes mellitus with renal complication    Diverticular disease of esophagus    Gastroesophageal reflux disease without esophagitis    Benign prostatic hyperplasia without lower urinary tract symptoms    Anemia of chronic disease    Mild protein malnutrition    Incarcerated hiatal hernia    Chronic diastolic heart failure    Essential hypertension    Hiatal hernia with GERD and esophagitis    Aortic valve stenosis    Aortic valve stenosis, unspecified etiology    S/P TAVR (transcatheter aortic valve replacement)    CKD (chronic kidney disease), stage III    Influenza A    Blindness of one eye    TIA involving carotid  artery    Pleural effusion    being managed by PCP and specialists who is here today for evaluation of carotid occlusive disease s/p L CEA 2/19/18.    -Recovering well - no evidence of recurrent carotid stenosis left side  -Right asymptomatic R carotid stenosis  -Cont routine surveillance  -Cont ASA, Plavix and BP control  -Will Rx statin and communicate with Dr. Montero re: checking hepatic panel and counseled pt on side effects     I spent 45 minutes evaluating this patient and greater than 50% of the time was spent counseling, coordinator care and discussing the plan of care.  All questions were answered and patient stated understanding with agreement with the above treatment plan.    Silvio Coleman MD [facility]  Vascular and Endovascular Surgery

## 2018-03-28 ENCOUNTER — OFFICE VISIT (OUTPATIENT)
Dept: CARDIOLOGY | Facility: CLINIC | Age: 83
End: 2018-03-28
Payer: MEDICARE

## 2018-03-28 VITALS
DIASTOLIC BLOOD PRESSURE: 65 MMHG | HEIGHT: 67 IN | OXYGEN SATURATION: 95 % | HEART RATE: 58 BPM | WEIGHT: 178 LBS | RESPIRATION RATE: 15 BRPM | BODY MASS INDEX: 27.94 KG/M2 | SYSTOLIC BLOOD PRESSURE: 125 MMHG

## 2018-03-28 DIAGNOSIS — N18.30 TYPE 2 DIABETES MELLITUS WITH STAGE 3 CHRONIC KIDNEY DISEASE, WITHOUT LONG-TERM CURRENT USE OF INSULIN: ICD-10-CM

## 2018-03-28 DIAGNOSIS — N18.30 CKD (CHRONIC KIDNEY DISEASE), STAGE III: ICD-10-CM

## 2018-03-28 DIAGNOSIS — Z95.2 S/P TAVR (TRANSCATHETER AORTIC VALVE REPLACEMENT): ICD-10-CM

## 2018-03-28 DIAGNOSIS — G45.1 TIA INVOLVING CAROTID ARTERY: Primary | ICD-10-CM

## 2018-03-28 DIAGNOSIS — E78.2 MIXED HYPERLIPIDEMIA: ICD-10-CM

## 2018-03-28 DIAGNOSIS — E11.22 TYPE 2 DIABETES MELLITUS WITH STAGE 3 CHRONIC KIDNEY DISEASE, WITHOUT LONG-TERM CURRENT USE OF INSULIN: ICD-10-CM

## 2018-03-28 DIAGNOSIS — I10 ESSENTIAL HYPERTENSION: ICD-10-CM

## 2018-03-28 PROCEDURE — 99213 OFFICE O/P EST LOW 20 MIN: CPT | Mod: PBBFAC | Performed by: INTERNAL MEDICINE

## 2018-03-28 PROCEDURE — 99999 PR PBB SHADOW E&M-EST. PATIENT-LVL III: CPT | Mod: PBBFAC,,, | Performed by: INTERNAL MEDICINE

## 2018-03-28 PROCEDURE — 99215 OFFICE O/P EST HI 40 MIN: CPT | Mod: S$PBB,,, | Performed by: INTERNAL MEDICINE

## 2018-03-28 RX ORDER — LOSARTAN POTASSIUM 100 MG/1
100 TABLET ORAL DAILY
Qty: 90 TABLET | Refills: 3
Start: 2018-03-28 | End: 2019-01-22

## 2018-03-28 NOTE — PROGRESS NOTES
CARDIOVASCULAR PROGRESS NOTE    REASON FOR CONSULT:   Timbo Gallagher is a 82 y.o. male who presents for follow up of TIA/CEA, TAVR.    PCP: Winston Negron: [place]  HISTORY OF PRESENT ILLNESS:   The patient comes in today for follow-up.  He is accompanied by his wife.  [place] [facility] and underwent urgent left carotid endarterectomy.  He comes in today without any recurrent amaurosis.  He denies any chest pain or shortness of breath.  There's been no palpitations, lightheadedness, dizziness, or syncope.  He denies PND, orthopnea, or lower extremity edema.  There's been no melena, hematuria, or claudicant symptoms.    His medication list has been reviewed and adjusted as per his home list.  I've also instructed him to stop taking indapamide (on his home list, but not ours).  It appears that he is only taking losartan 100 mg daily, not losartan/HCTZ.  He is also not taking lisinopril and this is been removed from his med list.  He was recently prescribed atorvastatin 40 mg daily and I've encouraged him to fill this prescription.  It is not clear why this was not continued after his most recent hospitalization.    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 Coleman    CATARACT EXTRACTION, BILATERAL      EYE SURGERY      RETINAL DETACHMENT SURGERY         ALLERGIES AND MEDICATION:     Review of patient's allergies indicates:   Allergen Reactions    Vancomycin analogues Itching    Ciprofloxacin (mixture) Itching     Extreme itching and redness     Antibiotic hc      Previous Medications    ASCORBIC ACID, VITAMIN C, (VITAMIN C) 1000 MG TABLET    Take 1,000 mg by mouth once daily.     ASPIRIN (ECOTRIN) 81 MG EC TABLET    Take 1 tablet (81 mg total) by mouth once daily. Take 4 tab tonight then 1 tab daily    ATORVASTATIN (LIPITOR) 40 MG TABLET    Take 1 tablet (40 mg total) by mouth once daily.    CLOPIDOGREL (PLAVIX) 75 MG TABLET    Take 1 tablet (75 mg total) by mouth once daily. Take 4 tab tonight then 1 tab daily    FUROSEMIDE (LASIX) 20 MG TABLET    Take 1 tablet (20 mg total) by mouth once daily.    GLUCOSAMINE HCL/CHONDR BAUTISTA A NA (OSTEO BI-FLEX ORAL)    Take 2 tablets by mouth once daily.    METFORMIN (GLUCOPHAGE) 500 MG TABLET    Take 1,000 mg by mouth 2 (two) times daily with meals.     METOPROLOL SUCCINATE (TOPROL-XL) 25 MG 24 HR TABLET    Take 25 mg by mouth 2 (two) times daily.        SOCIAL HISTORY:     Social History     Social History    Marital status:      Spouse name: N/A    Number of children: N/A    Years of education: N/A     Occupational History    Not on file.     Social History Main Topics    Smoking status: Former Smoker     Packs/day: 3.00     Years: 40.00     Quit date: 8/2/1990    Smokeless tobacco: Never Used    Alcohol use No    Drug use: No    Sexual activity: Not on file     Other Topics Concern    Not on file     Social History Narrative    ** Merged History Encounter **            FAMILY HISTORY:     Family History   Problem Relation Age of Onset    Arthritis Mother     Heart disease Father     Heart failure Father     Diabetes Sister     Heart attack Brother     No Known  "Problems Maternal Aunt     No Known Problems Maternal Uncle     No Known Problems Paternal Aunt     No Known Problems Paternal Uncle     No Known Problems Maternal Grandmother     No Known Problems Maternal Grandfather     No Known Problems Paternal Grandmother     No Known Problems Paternal Grandfather     Anemia Neg Hx     Arrhythmia Neg Hx     Asthma Neg Hx     Clotting disorder Neg Hx     Fainting Neg Hx     Hyperlipidemia Neg Hx     Hypertension Neg Hx     Stroke Neg Hx     Atrial Septal Defect Neg Hx        REVIEW OF SYSTEMS:   Review of Systems   Constitutional: Negative for chills, diaphoresis and fever.   HENT: Negative for nosebleeds.    Eyes: Negative for blurred vision, double vision and photophobia.   Respiratory: Negative for hemoptysis, shortness of breath and wheezing.    Cardiovascular: Negative for chest pain, palpitations, orthopnea, claudication, leg swelling and PND.   Gastrointestinal: Negative for abdominal pain, blood in stool, heartburn, melena, nausea and vomiting.   Genitourinary: Negative for flank pain and hematuria.   Musculoskeletal: Negative for falls, myalgias and neck pain.   Skin: Negative for rash.   Neurological: Positive for sensory change (R eye chr blind (retinal detachment)). Negative for dizziness, seizures, loss of consciousness, weakness and headaches.   Endo/Heme/Allergies: Negative for polydipsia. Does not bruise/bleed easily.   Psychiatric/Behavioral: Negative for depression and memory loss. The patient is not nervous/anxious.        PHYSICAL EXAM:     Vitals:    03/28/18 1312   BP: 125/65   Pulse: (!) 58   Resp: 15    Body mass index is 27.88 kg/m².  Weight: 80.7 kg (178 lb)   Height: 5' 7" (170.2 cm)     Physical Exam   Constitutional: He is oriented to person, place, and time. He appears well-developed and well-nourished. He is cooperative.  Non-toxic appearance. No distress.   HENT:   Head: Normocephalic and atraumatic.   Eyes: Conjunctivae and EOM " are normal. Pupils are equal, round, and reactive to light. No scleral icterus.   Neck: Trachea normal and normal range of motion. Neck supple. Normal carotid pulses and no JVD present. Carotid bruit is not present. No neck rigidity. No edema present. No thyromegaly present.   L CEA scar well healed   Cardiovascular: Normal rate, regular rhythm, S1 normal and S2 normal.  Frequent extrasystoles are present. PMI is not displaced.  Exam reveals no gallop and no friction rub.    Murmur heard.   Systolic murmur is present with a grade of 1/6   Pulses:       Carotid pulses are 2+ on the right side, and 2+ on the left side.  Pulmonary/Chest: Effort normal and breath sounds normal. No respiratory distress. He has no wheezes. He has no rales. He exhibits no tenderness.   Abdominal: Soft. Bowel sounds are normal. He exhibits no distension. There is no hepatosplenomegaly.   Musculoskeletal: He exhibits no edema or tenderness.   Feet:   Right Foot:   Skin Integrity: Negative for ulcer.   Left Foot:   Skin Integrity: Negative for ulcer.   Neurological: He is alert and oriented to person, place, and time. No cranial nerve deficit.   Skin: Skin is warm and dry. No rash noted. No erythema.   Psychiatric: He has a normal mood and affect. His speech is normal and behavior is normal.   Vitals reviewed.      DATA:   EKG: (personally reviewed tracing)  2/17/18 SR 87, multifocal PVCs    Laboratory:  CBC:    Recent Labs  Lab 02/20/18  0957 02/21/18  0530 02/22/18  0423   WHITE BLOOD CELL COUNT 18.43 H 10.32 11.37   HEMOGLOBIN 10.8 L 9.6 L 10.9 L   HEMATOCRIT 32.9 L 29.6 L 33.7 L   PLATELETS 189 165 190       CHEMISTRIES:    Recent Labs  Lab 02/19/18  2348 02/20/18  0343  02/20/18  0957 02/21/18  0530 02/22/18  0423   GLUCOSE 195 H 221 H  < > 180 H 123 H 143 H   SODIUM 138 139  < > 136 140 136   POTASSIUM 4.3 4.6  < > 4.4 3.9 4.0   BUN BLD 27 H 28 H  < > 34 H 36 H 33 H   CREATININE 1.5 H 1.8 H  < > 2.2 H 2.2 H 1.5 H   EGFR IF   AMERICAN 49 A 40 A  < > 31 A 31 A 49 A   EGFR IF NON- 43 A 34 A  < > 27 A 27 A 43 A   CALCIUM 9.0 8.8  < > 9.0 9.2 9.5   MAGNESIUM 1.3 L 2.2  --  2.1  --   --    < > = values in this interval not displayed.    CARDIAC BIOMARKERS:    Recent Labs  Lab 02/17/18  0831 02/17/18  1723 02/17/18  2345   TROPONIN I 0.019 0.029 H 0.023       COAGS:    Recent Labs  Lab 02/19/18  2348 02/20/18  0343 02/20/18  0957   INR 1.1 1.1 1.1       LIPIDS/LFTS:    Recent Labs  Lab 10/18/17  0815 01/27/18  0736 02/17/18  0831   CHOLESTEROL  --   --  147   TRIGLYCERIDES  --   --  72   HDL  --   --  37 L   LDL CHOLESTEROL  --   --  95.6   NON-HDL CHOLESTEROL  --   --  110   AST 15 16 11   ALT 5 L 11 <5 L       Cardiovascular Testing:  Carotid US 3/22/18 (s/p L CEA)  There is 20 - 39% right Internal Carotid stenosis.  There is 20 - 39% left Internal Carotid stenosis.    Echo: 2/18/18     1 - Mildly depressed left ventricular systolic function (EF 45-50%).  Inferoposterior hypokinesis.     2 - Concentric hypertrophy.     3 - Impaired LV relaxation, elevated LAP (grade 2 diastolic dysfunction).     4 - S/P transcatheter AVR, UNA = 1.85 cm2, AVAi = 0.94 cm2/m2, mean gradient = 9 mmHg.     5 - Moderate mitral regurgitation.     6 - Mild tricuspid regurgitation.     7 - Pulmonary hypertension. The estimated PA systolic pressure is 61 mmHg.      TAVR 10/17/17  B. 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    No perivalvular leak post procedure per 2D echo.    Post deployment AV mean gradient 2.5 mmHg, Vmax 1.09 m/s.     Cath: 5/8/17  D. Hemodynamic Results  AO: 122/68 (93)  E. Angiographic Results       Patient has a right dominant coronary artery.      - Left Main Coronary Artery:             The LM is normal. There is ANASTASIIA 3 flow.     - Left Anterior Descending Artery:             The LAD has luminal irregularities. There is ANASTASIIA 3 flow.     - Left  Circumflex Artery:             The LCX is normal. There is ANASTASIIA 3 flow.     - Right Coronary Artery:             The RCA has luminal irregularities. There is ANASTASIIA 3 flow.     - Radial Artery:             The Radial artery was not studied.     - D1:             The D1 has a 50% stenosis. There is ANASTASIIA 3 flow.     - Posterior Descending Artery:             The mid PDA has a 70% stenosis. There is ANASTASIIA 3 flow.    ASSESSMENT:   # TIA s/p L CEA 2/2018, followed by Dr. Coleman  # AS s/p TAVR 10/2017, normally functioning by echo 2/2018  # HTN, controlled  # HLP, recently started (re-rx) atorva 40mg qd  # CRI  # DM  # hx of R retinal detachment (chr R eye blindness)    PLAN:   Cont med rx  Agree with statin rx, I have instructed pt to  atorva 40mg rx today  Check lipids/LFT 3 months (late June 2018)  TAVR follow up as per [facility]-  RTC 3 months      Ned Toribio MD, FACC

## 2018-04-17 ENCOUNTER — HOSPITAL ENCOUNTER (INPATIENT)
Facility: HOSPITAL | Age: 83
LOS: 7 days | Discharge: HOME HEALTH | DRG: 222 | End: 2018-04-24
Attending: EMERGENCY MEDICINE | Admitting: INTERNAL MEDICINE
Payer: MEDICARE

## 2018-04-17 DIAGNOSIS — E11.22 TYPE 2 DIABETES MELLITUS WITH STAGE 3 CHRONIC KIDNEY DISEASE, WITHOUT LONG-TERM CURRENT USE OF INSULIN: ICD-10-CM

## 2018-04-17 DIAGNOSIS — K21.00 HIATAL HERNIA WITH GERD AND ESOPHAGITIS: ICD-10-CM

## 2018-04-17 DIAGNOSIS — D63.8 ANEMIA OF CHRONIC DISEASE: ICD-10-CM

## 2018-04-17 DIAGNOSIS — Q39.6: ICD-10-CM

## 2018-04-17 DIAGNOSIS — I50.32 CHRONIC DIASTOLIC HEART FAILURE: ICD-10-CM

## 2018-04-17 DIAGNOSIS — J90 PLEURAL EFFUSION: ICD-10-CM

## 2018-04-17 DIAGNOSIS — E44.1 MILD PROTEIN MALNUTRITION: ICD-10-CM

## 2018-04-17 DIAGNOSIS — I13.0 BENIGN HYPERTENSIVE HEART AND RENAL DISEASE WITH HEART FAILURE: ICD-10-CM

## 2018-04-17 DIAGNOSIS — Z95.2 S/P TAVR (TRANSCATHETER AORTIC VALVE REPLACEMENT): ICD-10-CM

## 2018-04-17 DIAGNOSIS — N18.30 TYPE 2 DIABETES MELLITUS WITH STAGE 3 CHRONIC KIDNEY DISEASE, WITHOUT LONG-TERM CURRENT USE OF INSULIN: ICD-10-CM

## 2018-04-17 DIAGNOSIS — N18.30 CKD (CHRONIC KIDNEY DISEASE), STAGE III: ICD-10-CM

## 2018-04-17 DIAGNOSIS — E11.29 TYPE 2 DIABETES MELLITUS WITH OTHER DIABETIC KIDNEY COMPLICATION: ICD-10-CM

## 2018-04-17 DIAGNOSIS — I35.0 AORTIC VALVE STENOSIS, ETIOLOGY OF CARDIAC VALVE DISEASE UNSPECIFIED: ICD-10-CM

## 2018-04-17 DIAGNOSIS — E78.2 MIXED HYPERLIPIDEMIA: ICD-10-CM

## 2018-04-17 DIAGNOSIS — I50.9 CHF (CONGESTIVE HEART FAILURE): ICD-10-CM

## 2018-04-17 DIAGNOSIS — J96.01 ACUTE HYPOXEMIC RESPIRATORY FAILURE: ICD-10-CM

## 2018-04-17 DIAGNOSIS — G45.1 TIA INVOLVING CAROTID ARTERY: ICD-10-CM

## 2018-04-17 DIAGNOSIS — J18.9 PNEUMONIA OF RIGHT LOWER LOBE DUE TO INFECTIOUS ORGANISM: ICD-10-CM

## 2018-04-17 DIAGNOSIS — I50.9 CONGESTIVE HEART FAILURE, UNSPECIFIED CONGESTIVE HEART FAILURE CHRONICITY, UNSPECIFIED CONGESTIVE HEART FAILURE TYPE: ICD-10-CM

## 2018-04-17 DIAGNOSIS — R06.00 DYSPNEA: ICD-10-CM

## 2018-04-17 DIAGNOSIS — H54.40 BLINDNESS OF ONE EYE: ICD-10-CM

## 2018-04-17 DIAGNOSIS — I35.0 AORTIC VALVE STENOSIS, UNSPECIFIED ETIOLOGY: ICD-10-CM

## 2018-04-17 DIAGNOSIS — R06.00 DYSPNEA, UNSPECIFIED TYPE: Primary | ICD-10-CM

## 2018-04-17 DIAGNOSIS — K44.0 INCARCERATED HIATAL HERNIA: ICD-10-CM

## 2018-04-17 DIAGNOSIS — K44.9 HIATAL HERNIA WITH GERD AND ESOPHAGITIS: ICD-10-CM

## 2018-04-17 DIAGNOSIS — I50.33 ACUTE ON CHRONIC DIASTOLIC HEART FAILURE: ICD-10-CM

## 2018-04-17 DIAGNOSIS — R06.02 SHORTNESS OF BREATH: ICD-10-CM

## 2018-04-17 DIAGNOSIS — J10.1 INFLUENZA A: ICD-10-CM

## 2018-04-17 DIAGNOSIS — I10 ESSENTIAL HYPERTENSION: ICD-10-CM

## 2018-04-17 DIAGNOSIS — N40.0 BENIGN PROSTATIC HYPERPLASIA WITHOUT LOWER URINARY TRACT SYMPTOMS: ICD-10-CM

## 2018-04-17 DIAGNOSIS — K21.9 GASTROESOPHAGEAL REFLUX DISEASE WITHOUT ESOPHAGITIS: ICD-10-CM

## 2018-04-17 DIAGNOSIS — I47.29 NSVT (NONSUSTAINED VENTRICULAR TACHYCARDIA): ICD-10-CM

## 2018-04-17 LAB
ALBUMIN SERPL BCP-MCNC: 3.2 G/DL
ALLENS TEST: ABNORMAL
ALLENS TEST: ABNORMAL
ALP SERPL-CCNC: 114 U/L
ALT SERPL W/O P-5'-P-CCNC: 20 U/L
ANION GAP SERPL CALC-SCNC: 9 MMOL/L
AST SERPL-CCNC: 21 U/L
BASOPHILS # BLD AUTO: 0.04 K/UL
BASOPHILS NFR BLD: 0.3 %
BILIRUB SERPL-MCNC: 0.3 MG/DL
BNP SERPL-MCNC: 1335 PG/ML
BUN SERPL-MCNC: 18 MG/DL
CALCIUM SERPL-MCNC: 8.8 MG/DL
CHLORIDE SERPL-SCNC: 113 MMOL/L
CO2 SERPL-SCNC: 20 MMOL/L
CREAT SERPL-MCNC: 1.2 MG/DL
DELSYS: ABNORMAL
DELSYS: ABNORMAL
DIFFERENTIAL METHOD: ABNORMAL
EOSINOPHIL # BLD AUTO: 0.3 K/UL
EOSINOPHIL NFR BLD: 2.1 %
ERYTHROCYTE [DISTWIDTH] IN BLOOD BY AUTOMATED COUNT: 14.6 %
EST. GFR  (AFRICAN AMERICAN): >60 ML/MIN/1.73 M^2
EST. GFR  (NON AFRICAN AMERICAN): 56 ML/MIN/1.73 M^2
ESTIMATED AVG GLUCOSE: 128 MG/DL
FIO2: 100
FLOW: 5
GLUCOSE SERPL-MCNC: 208 MG/DL
HBA1C MFR BLD HPLC: 6.1 %
HCO3 UR-SCNC: 22.2 MMOL/L (ref 24–28)
HCO3 UR-SCNC: 26.4 MMOL/L (ref 24–28)
HCT VFR BLD AUTO: 36.2 %
HGB BLD-MCNC: 11.6 G/DL
INR PPP: 1.2
LYMPHOCYTES # BLD AUTO: 2.1 K/UL
LYMPHOCYTES NFR BLD: 14 %
MCH RBC QN AUTO: 31.3 PG
MCHC RBC AUTO-ENTMCNC: 32 G/DL
MCV RBC AUTO: 98 FL
MODE: ABNORMAL
MODE: ABNORMAL
MONOCYTES # BLD AUTO: 1.1 K/UL
MONOCYTES NFR BLD: 7.3 %
NEUTROPHILS # BLD AUTO: 11.3 K/UL
NEUTROPHILS NFR BLD: 76 %
PCO2 BLDA: 42.9 MMHG (ref 35–45)
PCO2 BLDA: 47.7 MMHG (ref 35–45)
PH SMN: 7.28 [PH] (ref 7.35–7.45)
PH SMN: 7.4 [PH] (ref 7.35–7.45)
PLATELET # BLD AUTO: 214 K/UL
PMV BLD AUTO: 11.1 FL
PO2 BLDA: 59 MMHG (ref 80–100)
PO2 BLDA: 93 MMHG (ref 80–100)
POC BE: -5 MMOL/L
POC BE: 1 MMOL/L
POC SATURATED O2: 87 % (ref 95–100)
POC SATURATED O2: 97 % (ref 95–100)
POC TCO2: 24 MMOL/L (ref 23–27)
POC TCO2: 28 MMOL/L (ref 23–27)
POCT GLUCOSE: 114 MG/DL (ref 70–110)
POCT GLUCOSE: 120 MG/DL (ref 70–110)
POCT GLUCOSE: 133 MG/DL (ref 70–110)
POTASSIUM SERPL-SCNC: 4.1 MMOL/L
PROT SERPL-MCNC: 6.6 G/DL
PROTHROMBIN TIME: 12.3 SEC
RBC # BLD AUTO: 3.71 M/UL
SAMPLE: ABNORMAL
SAMPLE: ABNORMAL
SITE: ABNORMAL
SITE: ABNORMAL
SODIUM SERPL-SCNC: 142 MMOL/L
SP02: 98
TROPONIN I SERPL DL<=0.01 NG/ML-MCNC: 0.04 NG/ML
WBC # BLD AUTO: 14.83 K/UL

## 2018-04-17 PROCEDURE — 87040 BLOOD CULTURE FOR BACTERIA: CPT

## 2018-04-17 PROCEDURE — 83880 ASSAY OF NATRIURETIC PEPTIDE: CPT

## 2018-04-17 PROCEDURE — 25000003 PHARM REV CODE 250: Performed by: EMERGENCY MEDICINE

## 2018-04-17 PROCEDURE — 99223 1ST HOSP IP/OBS HIGH 75: CPT | Mod: ,,, | Performed by: INTERNAL MEDICINE

## 2018-04-17 PROCEDURE — 94761 N-INVAS EAR/PLS OXIMETRY MLT: CPT

## 2018-04-17 PROCEDURE — 27000190 HC CPAP FULL FACE MASK W/VALVE

## 2018-04-17 PROCEDURE — 36600 WITHDRAWAL OF ARTERIAL BLOOD: CPT

## 2018-04-17 PROCEDURE — A4216 STERILE WATER/SALINE, 10 ML: HCPCS | Performed by: EMERGENCY MEDICINE

## 2018-04-17 PROCEDURE — 80053 COMPREHEN METABOLIC PANEL: CPT

## 2018-04-17 PROCEDURE — 94660 CPAP INITIATION&MGMT: CPT

## 2018-04-17 PROCEDURE — 93010 ELECTROCARDIOGRAM REPORT: CPT | Mod: ,,, | Performed by: INTERNAL MEDICINE

## 2018-04-17 PROCEDURE — 82803 BLOOD GASES ANY COMBINATION: CPT

## 2018-04-17 PROCEDURE — 85025 COMPLETE CBC W/AUTO DIFF WBC: CPT

## 2018-04-17 PROCEDURE — 96374 THER/PROPH/DIAG INJ IV PUSH: CPT

## 2018-04-17 PROCEDURE — 96375 TX/PRO/DX INJ NEW DRUG ADDON: CPT

## 2018-04-17 PROCEDURE — 21400001 HC TELEMETRY ROOM

## 2018-04-17 PROCEDURE — 85610 PROTHROMBIN TIME: CPT

## 2018-04-17 PROCEDURE — 25000003 PHARM REV CODE 250

## 2018-04-17 PROCEDURE — 99900035 HC TECH TIME PER 15 MIN (STAT)

## 2018-04-17 PROCEDURE — 99291 CRITICAL CARE FIRST HOUR: CPT | Mod: 25

## 2018-04-17 PROCEDURE — 25000003 PHARM REV CODE 250: Performed by: INTERNAL MEDICINE

## 2018-04-17 PROCEDURE — 63600175 PHARM REV CODE 636 W HCPCS: Performed by: EMERGENCY MEDICINE

## 2018-04-17 PROCEDURE — 63600175 PHARM REV CODE 636 W HCPCS: Performed by: INTERNAL MEDICINE

## 2018-04-17 PROCEDURE — 84484 ASSAY OF TROPONIN QUANT: CPT

## 2018-04-17 PROCEDURE — 27000221 HC OXYGEN, UP TO 24 HOURS

## 2018-04-17 PROCEDURE — 83036 HEMOGLOBIN GLYCOSYLATED A1C: CPT

## 2018-04-17 PROCEDURE — 93005 ELECTROCARDIOGRAM TRACING: CPT

## 2018-04-17 PROCEDURE — 82962 GLUCOSE BLOOD TEST: CPT

## 2018-04-17 RX ORDER — LOSARTAN POTASSIUM 25 MG/1
100 TABLET ORAL DAILY
Status: DISCONTINUED | OUTPATIENT
Start: 2018-04-17 | End: 2018-04-24 | Stop reason: HOSPADM

## 2018-04-17 RX ORDER — CLOPIDOGREL BISULFATE 75 MG/1
75 TABLET ORAL DAILY
Status: DISCONTINUED | OUTPATIENT
Start: 2018-04-17 | End: 2018-04-22

## 2018-04-17 RX ORDER — GLUCAGON 1 MG
1 KIT INJECTION
Status: DISCONTINUED | OUTPATIENT
Start: 2018-04-17 | End: 2018-04-24 | Stop reason: HOSPADM

## 2018-04-17 RX ORDER — FUROSEMIDE 20 MG/1
20 TABLET ORAL DAILY
Status: CANCELLED | OUTPATIENT
Start: 2018-04-17

## 2018-04-17 RX ORDER — METOPROLOL SUCCINATE 50 MG/1
50 TABLET, EXTENDED RELEASE ORAL DAILY
Status: DISCONTINUED | OUTPATIENT
Start: 2018-04-17 | End: 2018-04-24 | Stop reason: HOSPADM

## 2018-04-17 RX ORDER — FAMOTIDINE 20 MG/1
20 TABLET, FILM COATED ORAL 2 TIMES DAILY
Status: DISCONTINUED | OUTPATIENT
Start: 2018-04-17 | End: 2018-04-24 | Stop reason: HOSPADM

## 2018-04-17 RX ORDER — METOPROLOL SUCCINATE 50 MG/1
50 TABLET, EXTENDED RELEASE ORAL 2 TIMES DAILY
Status: ON HOLD | COMMUNITY
End: 2019-04-29 | Stop reason: HOSPADM

## 2018-04-17 RX ORDER — ASPIRIN 81 MG/1
81 TABLET ORAL DAILY
Status: DISCONTINUED | OUTPATIENT
Start: 2018-04-17 | End: 2018-04-22

## 2018-04-17 RX ORDER — ACETAMINOPHEN 325 MG/1
650 TABLET ORAL EVERY 8 HOURS PRN
Status: DISCONTINUED | OUTPATIENT
Start: 2018-04-17 | End: 2018-04-24 | Stop reason: HOSPADM

## 2018-04-17 RX ORDER — INSULIN ASPART 100 [IU]/ML
0-5 INJECTION, SOLUTION INTRAVENOUS; SUBCUTANEOUS
Status: DISCONTINUED | OUTPATIENT
Start: 2018-04-17 | End: 2018-04-24 | Stop reason: HOSPADM

## 2018-04-17 RX ORDER — SODIUM CHLORIDE 0.9 % (FLUSH) 0.9 %
3 SYRINGE (ML) INJECTION EVERY 8 HOURS
Status: DISCONTINUED | OUTPATIENT
Start: 2018-04-17 | End: 2018-04-24 | Stop reason: HOSPADM

## 2018-04-17 RX ORDER — FUROSEMIDE 10 MG/ML
40 INJECTION INTRAMUSCULAR; INTRAVENOUS
Status: COMPLETED | OUTPATIENT
Start: 2018-04-17 | End: 2018-04-17

## 2018-04-17 RX ORDER — IBUPROFEN 200 MG
16 TABLET ORAL
Status: DISCONTINUED | OUTPATIENT
Start: 2018-04-17 | End: 2018-04-24 | Stop reason: HOSPADM

## 2018-04-17 RX ORDER — CEFEPIME HYDROCHLORIDE 2 G/1
2 INJECTION, POWDER, FOR SOLUTION INTRAVENOUS
Status: COMPLETED | OUTPATIENT
Start: 2018-04-17 | End: 2018-04-17

## 2018-04-17 RX ORDER — IBUPROFEN 200 MG
24 TABLET ORAL
Status: DISCONTINUED | OUTPATIENT
Start: 2018-04-17 | End: 2018-04-24 | Stop reason: HOSPADM

## 2018-04-17 RX ORDER — FUROSEMIDE 10 MG/ML
40 INJECTION INTRAMUSCULAR; INTRAVENOUS 2 TIMES DAILY
Status: DISCONTINUED | OUTPATIENT
Start: 2018-04-17 | End: 2018-04-18

## 2018-04-17 RX ORDER — FAMOTIDINE 20 MG/1
20 TABLET, FILM COATED ORAL 2 TIMES DAILY
COMMUNITY
End: 2018-08-28 | Stop reason: CLARIF

## 2018-04-17 RX ORDER — ATORVASTATIN CALCIUM 40 MG/1
40 TABLET, FILM COATED ORAL DAILY
Status: DISCONTINUED | OUTPATIENT
Start: 2018-04-17 | End: 2018-04-24 | Stop reason: HOSPADM

## 2018-04-17 RX ORDER — ONDANSETRON 4 MG/1
8 TABLET, ORALLY DISINTEGRATING ORAL EVERY 8 HOURS PRN
Status: DISCONTINUED | OUTPATIENT
Start: 2018-04-17 | End: 2018-04-18

## 2018-04-17 RX ADMIN — ATORVASTATIN CALCIUM 40 MG: 40 TABLET, FILM COATED ORAL at 10:04

## 2018-04-17 RX ADMIN — ACETAMINOPHEN 650 MG: 325 TABLET ORAL at 10:04

## 2018-04-17 RX ADMIN — LOSARTAN POTASSIUM 100 MG: 25 TABLET, FILM COATED ORAL at 10:04

## 2018-04-17 RX ADMIN — Medication 3 ML: at 10:04

## 2018-04-17 RX ADMIN — CEFEPIME HYDROCHLORIDE 2 G: 2 INJECTION, POWDER, FOR SOLUTION INTRAVENOUS at 09:04

## 2018-04-17 RX ADMIN — FAMOTIDINE 20 MG: 20 TABLET, FILM COATED ORAL at 09:04

## 2018-04-17 RX ADMIN — NITROGLYCERIN 1 INCH: 20 OINTMENT TOPICAL at 06:04

## 2018-04-17 RX ADMIN — Medication 3 ML: at 02:04

## 2018-04-17 RX ADMIN — METOPROLOL SUCCINATE 50 MG: 50 TABLET, EXTENDED RELEASE ORAL at 10:04

## 2018-04-17 RX ADMIN — FUROSEMIDE 40 MG: 10 INJECTION, SOLUTION INTRAMUSCULAR; INTRAVENOUS at 06:04

## 2018-04-17 RX ADMIN — CLOPIDOGREL BISULFATE 75 MG: 75 TABLET ORAL at 10:04

## 2018-04-17 RX ADMIN — FAMOTIDINE 20 MG: 20 TABLET, FILM COATED ORAL at 10:04

## 2018-04-17 RX ADMIN — ASPIRIN 81 MG: 81 TABLET, COATED ORAL at 10:04

## 2018-04-17 NOTE — SUBJECTIVE & OBJECTIVE
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 Coleman    CATARACT EXTRACTION, BILATERAL      EYE SURGERY      HERNIA REPAIR      RETINAL DETACHMENT SURGERY         Review of patient's allergies indicates:   Allergen Reactions    Vancomycin analogues Itching    Ciprofloxacin (mixture) Itching     Extreme itching and redness     Antibiotic hc        No current facility-administered medications on file prior to encounter.      Current Outpatient Prescriptions on File Prior to Encounter   Medication Sig    aspirin (ECOTRIN) 81 MG EC tablet Take 1 tablet (81 mg total) by mouth once daily. Take 4 tab tonight then 1 tab daily    atorvastatin (LIPITOR) 40 MG tablet Take 1 tablet (40 mg total) by mouth once daily.    clopidogrel (PLAVIX) 75 mg tablet Take 1 tablet (75 mg total) by mouth once daily. Take 4 tab tonight then 1 tab daily    furosemide (LASIX) 20 MG tablet Take 1 tablet (20 mg total) by mouth once daily.    losartan (COZAAR) 100 MG tablet Take 1 tablet (100 mg total) by mouth once daily.    metformin (GLUCOPHAGE) 500 MG tablet Take 1,000 mg by mouth 2 (two) times daily with meals.     [DISCONTINUED] ascorbic acid, vitamin C, (VITAMIN C) 1000 MG tablet Take 1,000 mg by mouth once daily.     [DISCONTINUED] GLUCOSAMINE HCL/CHONDR BAUTISTA A NA (OSTEO BI-FLEX ORAL) Take 2 tablets by mouth once daily.     [DISCONTINUED] metoprolol succinate (TOPROL-XL) 25 MG 24 hr tablet Take 25 mg by mouth 2 (two) times daily.      Family History     Problem Relation (Age of Onset)    Arthritis Mother    Diabetes Sister    Heart attack Brother    Heart disease Father    Heart failure Father    No Known Problems Maternal Aunt, Maternal Uncle, Paternal Aunt, Paternal Uncle, Maternal Grandmother, Maternal Grandfather, Paternal Grandmother, Paternal Grandfather        Social History Main Topics    Smoking status: Former Smoker     Packs/day: 3.00     Years: 40.00     Quit date: 8/2/1990    Smokeless tobacco: Never Used    Alcohol use No    Drug use: No    Sexual activity: Not on file     Review of Systems   Unable to perform ROS: acuity of condition     Objective:     Vital Signs (Most Recent):  Temp: 98.2 °F (36.8 °C) (04/17/18 0711)  Pulse: 64 (04/17/18 0747)  Resp: 20 (04/17/18 0747)  BP: (!) 116/55 (04/17/18 0747)  SpO2: 100 % (04/17/18 0747) Vital Signs (24h Range):  Temp:  [98.2 °F (36.8 °C)] 98.2 °F (36.8 °C)  Pulse:  [] 64  Resp:  [20-27] 20  SpO2:  [97 %-100 %] 100 %  BP: (116-224)/() 116/55     Weight: 80.7 kg (178 lb)  Body mass index is 25.54 kg/m².    SpO2: 100 %  O2 Device (Oxygen Therapy): BiPAP    No intake or output data in the 24 hours ending 04/17/18 0913    Lines/Drains/Airways     Peripheral Intravenous Line                 Peripheral IV - Single Lumen 04/17/18 0501 Left Wrist less than 1 day                Physical Exam   Constitutional: He is oriented to person, place, and time. He appears well-developed and well-nourished.   HENT:   Head: Normocephalic and atraumatic.   Eyes: Conjunctivae are normal. Pupils are equal, round, and reactive to light.   Neck: Normal range of motion. Neck supple.   Cardiovascular: Normal rate, normal heart sounds and intact distal pulses.    Pulmonary/Chest: Effort normal. He has rales.   Abdominal: Soft. Bowel sounds are normal.   Musculoskeletal: Normal range of  motion.   Neurological: He is alert and oriented to person, place, and time.   Skin: Skin is warm and dry.       Significant Labs: All pertinent lab results from the last 24 hours have been reviewed.    Significant Imaging: Echocardiogram:   2D echo with color flow doppler:   Results for orders placed or performed during the hospital encounter of 02/17/18   2D echo with color flow doppler   Result Value Ref Range    EF 45 55 - 65    Mitral Valve Regurgitation MODERATE (A)     Diastolic Dysfunction Yes (A)     Est. PA Systolic Pressure 61 (A)     Pericardial Effusion NONE     Mitral Valve Mobility NORMAL     Tricuspid Valve Regurgitation MILD

## 2018-04-17 NOTE — CONSULTS
[facility]  Cardiology  Consult Note    Patient Name: Timbo Gallagher  MRN: [id number]  Admission Date: 4/17/2018  Hospital Length of Stay: 0 days  Code Status: Full Code   Attending Provider: Vernon Guardado MD   Consulting Provider: Jorge L Black MD  Primary Care Physician: Cirilo Montero MD  Principal Problem:Acute on chronic diastolic heart failure    Patient information was obtained from ER records.     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 Pt reports he was recently taken off of Furosemide by his Cardiologist. He denies chest pain, abdominal pain, cough, fevers, chills, and other associated symptoms. He denies a history of emephysema and COPD.     Currently less SOB on BiPAP  EKG sinus tachycardia - no acute STT changes    Followed by Dr Toribio - last saw him 3/28/18  # TIA s/p L CEA 2/2018, followed by Dr. Coleman  # AS s/p TAVR 10/2017, normally functioning by echo 2/2018  # HTN, controlled  # HLP, recently started (re-rx) atorva 40mg qd  # CRI  # DM  # hx of R retinal detachment (chr R eye blindness)    Carotid US 3/22/18 (s/p L CEA)  There is 20 - 39% right Internal Carotid stenosis.  There is 20 - 39% left Internal Carotid stenosis.     Echo: 2/18/18     1 - Mildly depressed left ventricular systolic function (EF 45-50%).  Inferoposterior hypokinesis.     2 - Concentric hypertrophy.     3 - Impaired  LV relaxation, elevated LAP (grade 2 diastolic dysfunction).     4 - S/P transcatheter AVR, UNA = 1.85 cm2, AVAi = 0.94 cm2/m2, mean gradient = 9 mmHg.     5 - Moderate mitral regurgitation.     6 - Mild tricuspid regurgitation.     7 - Pulmonary hypertension. The estimated PA systolic pressure is 61 mmHg.      TAVR 10/17/17  B. 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    No perivalvular leak post procedure per 2D echo.    Post deployment AV mean gradient 2.5 mmHg, Vmax 1.09 m/s.     Cath: 5/8/17  D. Hemodynamic Results  AO: 122/68 (93)  E. Angiographic Results       Patient has a right dominant coronary artery.      - Left Main Coronary Artery:             The LM is normal. There is ANASTASIIA 3 flow.     - Left Anterior Descending Artery:             The LAD has luminal irregularities. There is ANASTASIIA 3 flow.     - Left Circumflex Artery:             The LCX is normal. There is ANASTASIIA 3 flow.     - Right Coronary Artery:             The RCA has luminal irregularities. There is ANASTASIIA 3 flow.     - Radial Artery:             The Radial artery was not studied.     - D1:             The D1 has a 50% stenosis. There is ANASTASIIA 3 flow.     - Posterior Descending Artery:             The mid PDA has a 70% stenosis. There is ANASTASIIA 3 flow.    Past Medical History:   Diagnosis Date    Arthritis     Blind right eye     BPH (benign prostatic hypertrophy)     Bronchitis, chronic     Carotid artery occlusion     CHF (congestive heart failure)     Colon polyp     Diabetes mellitus     GERD (gastroesophageal reflux disease)     Hearing aid worn     bilateral    Hernia     Hypertension     Irregular heart beat     Snoring     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    AS s/p TAVR    Stroke 02/2018    TIA s/p L CEA       Past Surgical History:   Procedure Laterality Date    CARDIAC VALVE SURGERY  10/2017    AS s/p TAVR    CAROTID ENDARTERECTOMY  Left 02/2018    Dr. Coleman    CATARACT EXTRACTION, BILATERAL      EYE SURGERY      HERNIA REPAIR      RETINAL DETACHMENT SURGERY         Review of patient's allergies indicates:   Allergen Reactions    Vancomycin analogues Itching    Ciprofloxacin (mixture) Itching     Extreme itching and redness     Antibiotic hc        No current facility-administered medications on file prior to encounter.      Current Outpatient Prescriptions on File Prior to Encounter   Medication Sig    aspirin (ECOTRIN) 81 MG EC tablet Take 1 tablet (81 mg total) by mouth once daily. Take 4 tab tonight then 1 tab daily    atorvastatin (LIPITOR) 40 MG tablet Take 1 tablet (40 mg total) by mouth once daily.    clopidogrel (PLAVIX) 75 mg tablet Take 1 tablet (75 mg total) by mouth once daily. Take 4 tab tonight then 1 tab daily    furosemide (LASIX) 20 MG tablet Take 1 tablet (20 mg total) by mouth once daily.    losartan (COZAAR) 100 MG tablet Take 1 tablet (100 mg total) by mouth once daily.    metformin (GLUCOPHAGE) 500 MG tablet Take 1,000 mg by mouth 2 (two) times daily with meals.     [DISCONTINUED] ascorbic acid, vitamin C, (VITAMIN C) 1000 MG tablet Take 1,000 mg by mouth once daily.     [DISCONTINUED] GLUCOSAMINE HCL/CHONDR BAUTISTA A NA (OSTEO BI-FLEX ORAL) Take 2 tablets by mouth once daily.    [DISCONTINUED] metoprolol succinate (TOPROL-XL) 25 MG 24 hr tablet Take 25 mg by mouth 2 (two) times daily.      Family History     Problem Relation (Age of Onset)    Arthritis Mother    Diabetes Sister    Heart attack Brother    Heart disease Father    Heart failure Father    No Known Problems Maternal Aunt, Maternal Uncle, Paternal Aunt, Paternal Uncle, Maternal Grandmother, Maternal Grandfather, Paternal Grandmother, Paternal Grandfather        Social History Main Topics    Smoking status: Former Smoker     Packs/day: 3.00     Years: 40.00     Quit date: 8/2/1990    Smokeless tobacco: Never Used    Alcohol use No    Drug use:  No    Sexual activity: Not on file     Review of Systems   Unable to perform ROS: acuity of condition     Objective:     Vital Signs (Most Recent):  Temp: 98.2 °F (36.8 °C) (04/17/18 0711)  Pulse: 64 (04/17/18 0747)  Resp: 20 (04/17/18 0747)  BP: (!) 116/55 (04/17/18 0747)  SpO2: 100 % (04/17/18 0747) Vital Signs (24h Range):  Temp:  [98.2 °F (36.8 °C)] 98.2 °F (36.8 °C)  Pulse:  [] 64  Resp:  [20-27] 20  SpO2:  [97 %-100 %] 100 %  BP: (116-224)/() 116/55     Weight: 80.7 kg (178 lb)  Body mass index is 25.54 kg/m².    SpO2: 100 %  O2 Device (Oxygen Therapy): BiPAP    No intake or output data in the 24 hours ending 04/17/18 0913    Lines/Drains/Airways     Peripheral Intravenous Line                 Peripheral IV - Single Lumen 04/17/18 0501 Left Wrist less than 1 day                Physical Exam   Constitutional: He is oriented to person, place, and time. He appears well-developed and well-nourished.   HENT:   Head: Normocephalic and atraumatic.   Eyes: Conjunctivae are normal. Pupils are equal, round, and reactive to light.   Neck: Normal range of motion. Neck supple.   Cardiovascular: Normal rate, normal heart sounds and intact distal pulses.    Pulmonary/Chest: Effort normal. He has rales.   Abdominal: Soft. Bowel sounds are normal.   Musculoskeletal: Normal range of motion.   Neurological: He is alert and oriented to person, place, and time.   Skin: Skin is warm and dry.       Significant Labs: All pertinent lab results from the last 24 hours have been reviewed.    Significant Imaging: Echocardiogram:   2D echo with color flow doppler:   Results for orders placed or performed during the hospital encounter of 02/17/18   2D echo with color flow doppler   Result Value Ref Range    EF 45 55 - 65    Mitral Valve Regurgitation MODERATE (A)     Diastolic Dysfunction Yes (A)     Est. PA Systolic Pressure 61 (A)     Pericardial Effusion NONE     Mitral Valve Mobility NORMAL     Tricuspid Valve Regurgitation  MILD      Assessment and Plan:     * Acute on chronic diastolic heart failure    Diuresis and afterload reduction as tolerated. 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             Mixed hyperlipidemia    On statin        TIA involving carotid artery    S/P CEA        CKD (chronic kidney disease), stage III             S/P TAVR (transcatheter aortic valve replacement)    Stable on last echo        Essential hypertension             Type 2 diabetes mellitus with renal complication                 VTE Risk Mitigation         Ordered     Place sequential compression device  Until discontinued      04/17/18 0838     Place DEYA hose  Until discontinued      04/17/18 0838     Place sequential compression device  Until discontinued      04/17/18 0838     IP VTE HIGH RISK PATIENT  Once      04/17/18 0838          Thank you for your consult. I will follow-up with patient. Please contact us if you have any additional questions.    Jorge L Black MD  Cardiology   [facility] [place]

## 2018-04-17 NOTE — CONSULTS
"    [facility]  Adult Nutrition  Consult Note    SUMMARY     Recommendations    Recommendation/Intervention: 1) 2000 calorie Diabetic, Cardiac, Mechanical Soft diet 2) Provide diet education as appropriate. Answer questions and concerns. Provide RD contact information. 4) Monitor oral intake and tolerance   Goals: 1) Patient to consume >=85% of EEN and EPN x5 days with tolerance  Nutrition Goal Status: new  Communication of RD Recs: reviewed with RN    Reason for Assessment    Reason for Assessment: consult, identified at risk by screening criteria (family requesting diet education (low sodium); dysphagia)  Diagnosis: other (see comments) (CHF, SOB, pneumonia of right lower lobe, dyspnea)  Relevant Medical History: DM, CKD3    General Information Comments: Patient consuming 100% of meals. Reports he is tolerating well but discussed his absent teeth. S/p conversation he prefers a mechanical soft diet to improve his comfort while eating. Reviewed with RN for dietary restrictions. RN receptive. Reviewed diet with patient, placed handouts in blue discharge folder.     Nutrition Risk Screen    Nutrition Risk Screen: dysphagia or difficulty swallowing ("if ave too much food i have trouble swallowing")    Nutrition/Diet History    Patient Reported Diet/Restrictions/Preferences: general  Do you have any cultural, spiritual, Jain conflicts, given your current situation?: No cultural, Jain or ethnic food preferences.   Food Allergies: NKFA  Factors Affecting Nutritional Intake: chewing difficulties/inability to chew food    Anthropometrics    Temp: 98 °F (36.7 °C)  Height Method: Stated  Height: 5' 9" (175.3 cm)  Height (inches): 69 in  Weight Method: Bed Scale  Weight: 87.2 kg (192 lb 3.9 oz)  Weight (lb): 192.24 lb  Ideal Body Weight (IBW), Male: 160 lb  % Ideal Body Weight, Male (lb): 120.15 lb  BMI (Calculated): 28.4  BMI Grade: 25 - 29.9 - overweight       Lab/Procedures/Meds    Pertinent " Labs Reviewed: reviewed  Pertinent Labs Comments: Troponin 0.045 (H), BNP 1,335 (H), Hemoglobin A1C 6.5%    Pertinent Medications Reviewed: reviewed  Scheduled Meds:   aspirin  81 mg Oral Daily    atorvastatin  40 mg Oral Daily    clopidogrel  75 mg Oral Daily    famotidine  20 mg Oral BID    furosemide  40 mg Intravenous BID    losartan  100 mg Oral Daily    metoprolol succinate  50 mg Oral Daily    sodium chloride 0.9%  3 mL Intravenous Q8H     Continuous Infusions:  PRN Meds:.acetaminophen, acetaminophen, dextrose 50%, dextrose 50%, glucagon (human recombinant), glucose, glucose, insulin aspart U-100, ondansetron      Physical Findings/Assessment    Overall Physical Appearance: advanced age, overweight, on oxygen therapy  Oral/Mouth Cavity: teeth absent  Skin: intact (Gilberto Score 16)    Estimated/Assessed Needs    Weight Used For Calorie Calculations: 72.7 kg (160 lb 4.4 oz) (Ideal Body Weight)     Energy Need Method: Kcal/kg (1818 - 2181)  Protein Requirements: 65 - 75 g  Weight Used For Protein Calculations: 72.7 kg (160 lb 4.4 oz) (Ideal Body Weight)  Fluid Need Method: RDA Method, other (see comments) (1400 - 1900 ml/day or as restricted by MD)     CHO Requirement: 200 g      Nutrition Prescription Ordered    Current Diet Order: 2000 calorie Diabetic, Cardiac, Mechanical Soft    Evaluation of Received Nutrient/Fluid Intake    I/O: not fully recorded  Energy Calories Required: meeting needs  Protein Required: meeting needs  Fluid Required: other (see comments) (per MD)  Comments: LBM: 4/16  Tolerance: other (see comments) (fair)  % Intake of Estimated Energy Needs: 75 - 100 %  % Meal Intake: 75 - 100 %    Nutrition Risk    Level of Risk/Frequency of Follow-up:  (f/u 2x weekly)     Assessment and Plan    Nutrition Diagnosis:   Problem: Difficulty swallowing secondary to chewing difficulty  Etiology:  Teeth absent  Signs/Symptoms: patient prefers a mechanical soft diet   Status:  New    Nutrition  Diagnosis:  Problem: Altered nutrition related laboratory values  Etiology: excessive sodium intake  Signs/Symptoms: Troponin 0.045, BNP 1,335  Status:  new       Monitor and Evaluation    Food and Nutrient Intake: energy intake, food and beverage intake  Food and Nutrient Adminstration: diet order  Knowledge/Beliefs/Attitudes: food and nutrition knowledge/skill, beliefs and attitudes  Physical Activity and Function: nutrition-related ADLs and IADLs  Anthropometric Measurements: weight, weight change, body mass index  Biochemical Data, Medical Tests and Procedures: electrolyte and renal panel, gastrointestinal profile, glucose/endocrine profile  Nutrition-Focused Physical Findings: overall appearance     Nutrition Follow-Up    RD Follow-up?: Yes

## 2018-04-17 NOTE — ASSESSMENT & PLAN NOTE
Will hold Metformin for now and cover with sliding scale until no Cards intervention planned and tolerating PO

## 2018-04-17 NOTE — PROGRESS NOTES
1515: Previously discussed with Dr Campbell the appropriateness of Mr Gallagher to remain in ICU.  Ordered ABGs to call back with results.     1600: Pt complains on Bilateral leg pain. L greater than R. Requesting Tylenol. Awaiting ABG to be done to call Dr Campbell. Will address pain med with results of ABGs.   Some edema noted to BLE (right more swollen than left) SCD pump improved discomfort.     1725: reported ABG results with Dr Montero. And discussed transferring the patient (ok to transfer pt with these ABG results and pt with no distress /dyspnea on 5 L NC, sats 100%). Discussed need for tylenol for leg pain. Severity in each leg is varying with and without relief from SCD. New order for US of BLE to rule out DVT. SCDs removed until US complete. Okay to transfer.

## 2018-04-17 NOTE — ED NOTES
Pt asking to take bipap off; spoke to resp therapist and she said she spoke with md who said according to blood gases at this time pt needs to continue wearing mask; pt made aware and verbalized understanding

## 2018-04-17 NOTE — H&P
[facility] Hospital Medicine  History & Physical    Patient Name: Timbo Gallagher  MRN: [id number]  Admission Date: 4/17/2018  Attending Physician: Vernon Guardado MD   Primary Care Provider: Cirilo Montero MD         Patient information was obtained from patient, spouse/SO and ER records.     Subjective:     Principal Problem:Acute on chronic diastolic heart failure    Chief Complaint:   Chief Complaint   Patient presents with    Shortness of Breath     since 0400, denies CP, reports recent BP medication changes, 94% on RA upon EMS arrival         HPI: Pt is an 83yo male with a history of CHF routenly followed by Dr Toribio who presented to the ED with shortness of breath.  Pt was seen by cardiology on 3/28 and the wife reports that at that time he was told to discontinue his Lasix.  THere is no record of this continuation in the chart.  Since stopping his lasix he has built up with fluid.  On presentation to the ED his sats were in 50% range.  Pt was started on Bipap and is going to be admitted to the ED with Acute congestive heart failure    Past Medical History:   Diagnosis Date    Arthritis     Blind right eye     BPH (benign prostatic hypertrophy)     Bronchitis, chronic     Carotid artery occlusion     CHF (congestive heart failure)     Colon polyp     Diabetes mellitus     GERD (gastroesophageal reflux disease)     Hearing aid worn     bilateral    Hernia     Hypertension     Irregular heart beat     Snoring     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 Coleman    CATARACT EXTRACTION, BILATERAL      EYE SURGERY      HERNIA REPAIR      RETINAL DETACHMENT SURGERY         Review of patient's allergies indicates:   Allergen Reactions    Vancomycin analogues  Itching    Ciprofloxacin (mixture) Itching     Extreme itching and redness     Antibiotic hc        No current facility-administered medications on file prior to encounter.      Current Outpatient Prescriptions on File Prior to Encounter   Medication Sig    aspirin (ECOTRIN) 81 MG EC tablet Take 1 tablet (81 mg total) by mouth once daily. Take 4 tab tonight then 1 tab daily    atorvastatin (LIPITOR) 40 MG tablet Take 1 tablet (40 mg total) by mouth once daily.    clopidogrel (PLAVIX) 75 mg tablet Take 1 tablet (75 mg total) by mouth once daily. Take 4 tab tonight then 1 tab daily    furosemide (LASIX) 20 MG tablet Take 1 tablet (20 mg total) by mouth once daily.    losartan (COZAAR) 100 MG tablet Take 1 tablet (100 mg total) by mouth once daily.    metformin (GLUCOPHAGE) 500 MG tablet Take 1,000 mg by mouth 2 (two) times daily with meals.     [DISCONTINUED] ascorbic acid, vitamin C, (VITAMIN C) 1000 MG tablet Take 1,000 mg by mouth once daily.     [DISCONTINUED] GLUCOSAMINE HCL/CHONDR BAUTISTA A NA (OSTEO BI-FLEX ORAL) Take 2 tablets by mouth once daily.    [DISCONTINUED] metoprolol succinate (TOPROL-XL) 25 MG 24 hr tablet Take 25 mg by mouth 2 (two) times daily.      Family History     Problem Relation (Age of Onset)    Arthritis Mother    Diabetes Sister    Heart attack Brother    Heart disease Father    Heart failure Father    No Known Problems Maternal Aunt, Maternal Uncle, Paternal Aunt, Paternal Uncle, Maternal Grandmother, Maternal Grandfather, Paternal Grandmother, Paternal Grandfather        Social History Main Topics    Smoking status: Former Smoker     Packs/day: 3.00     Years: 40.00     Quit date: 8/2/1990    Smokeless tobacco: Never Used    Alcohol use No    Drug use: No    Sexual activity: Not on file     Review of Systems   All other systems reviewed and are negative.    Objective:     Vital Signs (Most Recent):  Temp: 98.2 °F (36.8 °C) (04/17/18 0711)  Pulse: 64 (04/17/18 0747)  Resp: 20  (04/17/18 0747)  BP: (!) 116/55 (04/17/18 0747)  SpO2: 100 % (04/17/18 0747) Vital Signs (24h Range):  Temp:  [98.2 °F (36.8 °C)] 98.2 °F (36.8 °C)  Pulse:  [] 64  Resp:  [20-27] 20  SpO2:  [97 %-100 %] 100 %  BP: (116-224)/() 116/55     Weight: 80.7 kg (178 lb)  Body mass index is 25.54 kg/m².    Physical Exam   Constitutional: He is oriented to person, place, and time. He appears well-developed and well-nourished.   HENT:   Head: Normocephalic and atraumatic.   Eyes: EOM are normal. Pupils are equal, round, and reactive to light.   Neck: Neck supple.   Cardiovascular: Normal rate, regular rhythm and normal heart sounds.    Pulmonary/Chest: He is in respiratory distress. He has wheezes.   Abdominal: Soft. Bowel sounds are normal.   Musculoskeletal: Normal range of motion.   Neurological: He is alert and oriented to person, place, and time.   Skin: Skin is warm and dry.   Psychiatric: He has a normal mood and affect.   Vitals reviewed.        CRANIAL NERVES     CN III, IV, VI   Pupils are equal, round, and reactive to light.  Extraocular motions are normal.        Significant Labs:   ABGs:   Recent Labs  Lab 04/17/18  0640   PH 7.276*   PCO2 47.7*   HCO3 22.2*   POCSATURATED 87*   BE -5     CBC:   Recent Labs  Lab 04/17/18  0610   WBC 14.83*   HGB 11.6*   HCT 36.2*        CMP:   Recent Labs  Lab 04/17/18  0610      K 4.1   *   CO2 20*   *   BUN 18   CREATININE 1.2   CALCIUM 8.8   PROT 6.6   ALBUMIN 3.2*   BILITOT 0.3   ALKPHOS 114   AST 21   ALT 20   ANIONGAP 9   EGFRNONAA 56*     Cardiac Markers:   Recent Labs  Lab 04/17/18  0610   BNP 1,335*     Coagulation:   Recent Labs  Lab 04/17/18  0610   INR 1.2     Troponin:   Recent Labs  Lab 04/17/18  0610   TROPONINI 0.045*       Significant Imaging: CXR: I have reviewed all pertinent results/findings within the past 24 hours and my personal findings are:  Increased opacification of the right lower lung zone suggesting a combination  of pleural fluid with associated atelectasis and/or consolidation.  Increased interstitial attenuation bilaterally consistent with edema/CHF.    Assessment/Plan:     * Acute on chronic diastolic heart failure    Cardiology has been consulted and we will diurese with Lasix BID.  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  Will wean as tolerated        Type 2 diabetes mellitus with renal complication    Will hold Metformin for now and cover with sliding scale until no Cards intervention planned and tolerating PO            VTE Risk Mitigation         Ordered     Place sequential compression device  Until discontinued      04/17/18 0838     Place DEYA hose  Until discontinued      04/17/18 0838     Place sequential compression device  Until discontinued      04/17/18 0838     IP VTE HIGH RISK PATIENT  Once      04/17/18 0838             Dayna Campbell MD  Department of Hospital Medicine   [facility]

## 2018-04-17 NOTE — HPI
Pt is an 81yo male with a history of CHF routenly followed by Dr Toribio who presented to the ED with shortness of breath.  Pt was seen by cardiology on 3/28 and the wife reports that at that time he was told to discontinue his Lasix.  THere is no record of this continuation in the chart.  Since stopping his lasix he has built up with fluid.  On presentation to the ED his sats were in 50% range.  Pt was started on Bipap and is going to be admitted to the ED with Acute congestive heart failure

## 2018-04-17 NOTE — ED NOTES
received report from Shannan Rn; pt assessed; vitals assessed; pt in NAD at this time; pt sleeping; wife at bedside; side rails raised times 2 and call light in reach

## 2018-04-17 NOTE — ED PROVIDER NOTES
Encounter Date: 4/17/2018    SCRIBE #1 NOTE: I, Gi Hayes, am scribing for, and in the presence of,  Vernon Guardado MD. I have scribed the following portions of the note - Other sections scribed: HPI, ROS, PE.       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 Pt reports he was recently taken off of Furosemide by his Cardiologist. He denies chest pain, abdominal pain, cough, fevers, chills, and other associated symptoms. He denies a history of emephysema and COPD.       The history is provided by the patient and the spouse. No  was used.     Review of patient's allergies indicates:   Allergen Reactions    Vancomycin analogues Itching    Ciprofloxacin (mixture) Itching     Extreme itching and redness     Antibiotic hc      Past Medical History:   Diagnosis Date    Arthritis     Blind right eye     BPH (benign prostatic hypertrophy)     Bronchitis, chronic     Carotid artery occlusion     CHF (congestive heart failure)     Colon polyp     Diabetes mellitus     GERD (gastroesophageal reflux disease)     Hearing aid worn     bilateral    Hernia     Hypertension     Irregular heart beat     Snoring     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    AS s/p TAVR     Stroke 02/2018    TIA s/p L CEA     Past Surgical History:   Procedure Laterality Date    CARDIAC VALVE SURGERY  10/2017    AS s/p TAVR    CAROTID ENDARTERECTOMY Left 02/2018    Dr. Coleman    CATARACT EXTRACTION, BILATERAL      EYE SURGERY      HERNIA REPAIR      RETINAL DETACHMENT SURGERY       Family History   Problem Relation Age of Onset    Arthritis Mother     Heart disease Father     Heart failure Father     Diabetes Sister     Heart attack Brother     No Known Problems Maternal Aunt     No Known Problems Maternal Uncle     No Known Problems Paternal Aunt     No Known Problems Paternal Uncle     No Known Problems Maternal Grandmother     No Known Problems Maternal Grandfather     No Known Problems Paternal Grandmother     No Known Problems Paternal Grandfather     Anemia Neg Hx     Arrhythmia Neg Hx     Asthma Neg Hx     Clotting disorder Neg Hx     Fainting Neg Hx     Hyperlipidemia Neg Hx     Hypertension Neg Hx     Stroke Neg Hx     Atrial Septal Defect Neg Hx      Social History   Substance Use Topics    Smoking status: Former Smoker     Packs/day: 3.00     Years: 40.00     Quit date: 8/2/1990    Smokeless tobacco: Never Used    Alcohol use No     Review of Systems   Constitutional: Negative for chills and fever.   HENT: Negative for rhinorrhea and sore throat.    Eyes: Negative for redness.   Respiratory: Positive for shortness of breath. Negative for cough.    Cardiovascular: Positive for palpitations and leg swelling. Negative for chest pain.   Gastrointestinal: Negative for abdominal pain, diarrhea, nausea and vomiting.   Genitourinary: Negative for dysuria.   Musculoskeletal: Negative for back pain.   Skin: Negative for color change.   Neurological: Negative for syncope and weakness.   Psychiatric/Behavioral: The patient is not nervous/anxious.        Physical Exam     Initial Vitals   BP Pulse Resp Temp SpO2   04/17/18 0506 04/17/18 0506 04/17/18 0506 04/17/18 0530  04/17/18 0506   (!) 211/125 107 (!) 27 98.2 °F (36.8 °C) 97 %      MAP       04/17/18 0506       153.67         Physical Exam    Constitutional: He appears well-developed and well-nourished. He is not diaphoretic. He appears distressed (moderate).   HENT:   Head: Normocephalic and atraumatic.   Eyes: Conjunctivae and EOM are normal. Pupils are equal, round, and reactive to light. No scleral icterus.   Post surgical changes in bilateral eyes   Neck: Normal range of motion. Neck supple.   Cardiovascular: Normal rate, regular rhythm, normal heart sounds and intact distal pulses. Exam reveals no gallop and no friction rub.    No murmur heard.  Pulmonary/Chest: No stridor. No respiratory distress. He has no wheezes. He has rhonchi (bilateral). He has rales (bilateral).   Abdominal: Soft. Bowel sounds are normal. He exhibits no distension. There is no tenderness. There is no rebound and no guarding.   Musculoskeletal: Normal range of motion. He exhibits edema (2+ pedal). He exhibits no tenderness.   Neurological: He is alert and oriented to person, place, and time. He has normal strength. No cranial nerve deficit.   nonfocal     Skin: Skin is warm and dry. No rash noted.   Psychiatric: He has a normal mood and affect. His behavior is normal.         ED Course   Critical Care  Date/Time: 4/17/2018 8:09 AM  Performed by: YONIS ASHLEY  Authorized by: YONIS ASHLEY   Direct patient critical care time: 10 minutes  Additional history critical care time: 5 minutes  Ordering / reviewing critical care time: 10 minutes  Documentation critical care time: 5 minutes  Consulting other physicians critical care time: 5 minutes  Total critical care time (exclusive of procedural time) : 35 minutes  Critical care was necessary to treat or prevent imminent or life-threatening deterioration of the following conditions: cardiac failure and respiratory failure.  Critical care was time spent personally by me on the following  activities: examination of patient, discussions with primary provider, discussions with consultants, evaluation of patient's response to treatment, pulse oximetry and ventilator management.        Labs Reviewed   CBC W/ AUTO DIFFERENTIAL - Abnormal; Notable for the following:        Result Value    WBC 14.83 (*)     RBC 3.71 (*)     Hemoglobin 11.6 (*)     Hematocrit 36.2 (*)     MCH 31.3 (*)     RDW 14.6 (*)     Gran # (ANC) 11.3 (*)     Mono # 1.1 (*)     Gran% 76.0 (*)     Lymph% 14.0 (*)     All other components within normal limits   COMPREHENSIVE METABOLIC PANEL - Abnormal; Notable for the following:     Chloride 113 (*)     CO2 20 (*)     Glucose 208 (*)     Albumin 3.2 (*)     eGFR if non  56 (*)     All other components within normal limits   TROPONIN I - Abnormal; Notable for the following:     Troponin I 0.045 (*)     All other components within normal limits   B-TYPE NATRIURETIC PEPTIDE - Abnormal; Notable for the following:     BNP 1,335 (*)     All other components within normal limits   ISTAT PROCEDURE - Abnormal; Notable for the following:     POC PH 7.276 (*)     POC PCO2 47.7 (*)     POC PO2 59 (*)     POC HCO3 22.2 (*)     POC SATURATED O2 87 (*)     All other components within normal limits   CULTURE, BLOOD   CULTURE, BLOOD   PROTIME-INR             Medical Decision Making:   Clinical Tests:   Lab Tests: Reviewed  The following lab test(s) were unremarkable: CBC, CMP, BNP and Troponin       <> Summary of Lab: Results for GISELLA BLAKE (MRN [id number]) as of 4/17/2018 08:04    4/17/2018 06:10  WBC: 14.83 (H)  RBC: 3.71 (L)  Hemoglobin: 11.6 (L)  Hematocrit: 36.2 (L)  MCV: 98  MCH: 31.3 (H)  MCHC: 32.0  RDW: 14.6 (H)  Platelets: 214  MPV: 11.1  Gran%: 76.0 (H)  Gran # (ANC): 11.3 (H)  Lymph%: 14.0 (L)  Lymph #: 2.1  Mono%: 7.3  Mono #: 1.1 (H)  Eosinophil%: 2.1  Eos #: 0.3  Basophil%: 0.3  Baso #: 0.04  Protime: 12.3  Coumadin Monitoring INR: 1.2  Sodium: 142  Potassium:  4.1  Chloride: 113 (H)  CO2: 20 (L)  Anion Gap: 9  BUN, Bld: 18  Creatinine: 1.2  eGFR if non : 56 (A)  eGFR if African American: >60  Glucose: 208 (H)  Calcium: 8.8  Alkaline Phosphatase: 114  Total Protein: 6.6  Albumin: 3.2 (L)  Total Bilirubin: 0.3  AST: 21  ALT: 20  Troponin I: 0.045 (H)  BNP: 1,335 (H)    4/17/2018 06:27    4/17/2018 06:40  POC PH: 7.276 (LL)  POC PCO2: 47.7 (H)  POC PO2: 59 (LL)  POC BE: -5  POC HCO3: 22.2 (L)  POC SATURATED O2: 87 (L)  POC TCO2: 24  FiO2: 100  Sample: ARTERIAL  DelSys: NRB  Allens Test: Pass  Site: RR  Mode: SPONT    Radiological Study: Reviewed  ED Management:  EXAMINATION:  XR CHEST AP PORTABLE    CLINICAL HISTORY:  CHF;    TECHNIQUE:  Single frontal view of the chest was performed.    COMPARISON:  02/19/2018    FINDINGS:  Cardiac monitoring leads overlie the chest.  Cardiomediastinal silhouette is enlarged, slightly rightward deviated, similar to prior examination.  There are postoperative changes of prior TAVR.  There is opacification of the right lower lung zone suggesting a combination of pleural fluid with associated atelectasis and/or consolidation.  This appears increased in extent from prior examination.  There is increased interstitial attenuation bilaterally suggesting edema/CHF.  No evidence of pneumothorax.  Visualized osseous structures demonstrate stable degenerative changes.  Impression       Increased opacification of the right lower lung zone suggesting a combination of pleural fluid with associated atelectasis and/or consolidation.  Increased interstitial attenuation bilaterally consistent with edema/CHF.      Electronically signed by: Ramesh Brooks MD  Date: 04/17/2018  Time: 06:35    0838h Patient looks much better  Other:   I have discussed this case with another health care provider.       <> Summary of the Discussion: 0825h Dr. Rashid admitted the patient to ICU            Scribe Attestation:   Scribe #1: I performed the above  scribed service and the documentation accurately describes the services I performed. I attest to the accuracy of the note.    Attending Attestation:           Physician Attestation for Scribe:  Physician Attestation Statement for Scribe #1: I, Vernon Guardado MD, reviewed documentation, as scribed by Gi Hayes in my presence, and it is both accurate and complete.                    Clinical Impression:   The primary encounter diagnosis was Dyspnea, unspecified type. Diagnoses of Shortness of breath, Congestive heart failure, unspecified congestive heart failure chronicity, unspecified congestive heart failure type, and Pneumonia of right lower lobe due to infectious organism were also pertinent to this visit.    Disposition:   Disposition: Admitted                        Vernon Guardado MD  04/17/18 0839

## 2018-04-17 NOTE — PLAN OF CARE
Patient from home with spouse and independent.  Patient reports not having any DME.  He prefers morning appts and his preferred pharmacy is [facility] on [address].  Patient drives self to appts.  His stepson is staying with them for awhile and can help if needed at home.       04/17/18 1730   Discharge Assessment   Assessment Type Discharge Planning Assessment   Confirmed/corrected address and phone number on facesheet? Yes   Assessment information obtained from? Patient   Prior to hospitilization cognitive status: Alert/Oriented   Prior to hospitalization functional status: Independent   Current cognitive status: Alert/Oriented   Current Functional Status: Independent   Facility Arrived From: home   Lives With spouse  (Wife's son is staying with them for a while to help.)   Able to Return to Prior Arrangements yes   Is patient able to care for self after discharge? Yes   Who are your caregiver(s) and their phone number(s)? Corrine Gallagher: [phone]   Patient's perception of discharge disposition home or selfcare   Readmission Within The Last 30 Days no previous admission in last 30 days   Patient currently being followed by outpatient case management? No   Patient currently receives any other outside agency services? No   Equipment Currently Used at Home none   Do you have any problems affording any of your prescribed medications? No  ([facility])   Is the patient taking medications as prescribed? yes   Does the patient have transportation home? Yes  (Family Friend)   Transportation Available car   Dialysis Name and Scheduled days n/a   Discharge Plan A Home   Discharge Plan B Home   Patient/Family In Agreement With Plan yes  (Patient from home with spouse and independent.  Patient's step-son in the home to help for awhile.  )   Lauren Guajardo LMSW, ACMARIAELENA-STACY, CCM  4/17/2018

## 2018-04-17 NOTE — HPI
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 Pt reports he was recently taken off of Furosemide by his Cardiologist. He denies chest pain, abdominal pain, cough, fevers, chills, and other associated symptoms. He denies a history of emephysema and COPD.     Currently less SOB on BiPAP  EKG sinus tachycardia - no acute STT changes    Followed by Dr Toribio - last saw him 3/28/18  # TIA s/p L CEA 2/2018, followed by Dr. Coleman  # AS s/p TAVR 10/2017, normally functioning by echo 2/2018  # HTN, controlled  # HLP, recently started (re-rx) atorva 40mg qd  # CRI  # DM  # hx of R retinal detachment (chr R eye blindness)    Carotid US 3/22/18 (s/p L CEA)  There is 20 - 39% right Internal Carotid stenosis.  There is 20 - 39% left Internal Carotid stenosis.     Echo: 2/18/18     1 - Mildly depressed left ventricular systolic function (EF 45-50%).  Inferoposterior hypokinesis.     2 - Concentric hypertrophy.     3 - Impaired LV relaxation, elevated LAP (grade 2 diastolic dysfunction).     4 - S/P transcatheter AVR, UNA = 1.85 cm2, AVAi = 0.94 cm2/m2, mean gradient = 9 mmHg.     5 - Moderate mitral regurgitation.     6 - Mild tricuspid regurgitation.     7 - Pulmonary hypertension. The estimated PA systolic pressure is 61 mmHg.      TAVR 10/17/17  B. 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    No perivalvular leak post procedure per 2D  echo.    Post deployment AV mean gradient 2.5 mmHg, Vmax 1.09 m/s.     Cath: 5/8/17  D. Hemodynamic Results  AO: 122/68 (93)  E. Angiographic Results       Patient has a right dominant coronary artery.      - Left Main Coronary Artery:             The LM is normal. There is ANASTASIIA 3 flow.     - Left Anterior Descending Artery:             The LAD has luminal irregularities. There is ANASTASIIA 3 flow.     - Left Circumflex Artery:             The LCX is normal. There is ANASTASIIA 3 flow.     - Right Coronary Artery:             The RCA has luminal irregularities. There is ANASTASIIA 3 flow.     - Radial Artery:             The Radial artery was not studied.     - D1:             The D1 has a 50% stenosis. There is ANASTASIIA 3 flow.     - Posterior Descending Artery:             The mid PDA has a 70% stenosis. There is ANASTASIIA 3 flow.

## 2018-04-17 NOTE — SUBJECTIVE & OBJECTIVE
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 Coleman    CATARACT EXTRACTION, BILATERAL      EYE SURGERY      HERNIA REPAIR      RETINAL DETACHMENT SURGERY         Review of patient's allergies indicates:   Allergen Reactions    Vancomycin analogues Itching    Ciprofloxacin (mixture) Itching     Extreme itching and redness     Antibiotic hc        No current facility-administered medications on file prior to encounter.      Current Outpatient Prescriptions on File Prior to Encounter   Medication Sig    aspirin (ECOTRIN) 81 MG EC tablet Take 1 tablet (81 mg total) by mouth once daily. Take 4 tab tonight then 1 tab daily    atorvastatin (LIPITOR) 40 MG tablet Take 1 tablet (40 mg total) by mouth once daily.    clopidogrel (PLAVIX) 75 mg tablet Take 1 tablet (75 mg total) by mouth once daily. Take 4 tab tonight then 1 tab daily    furosemide (LASIX) 20 MG tablet Take 1 tablet (20 mg total) by mouth once daily.    losartan (COZAAR) 100 MG tablet Take 1 tablet (100 mg total) by mouth once daily.    metformin (GLUCOPHAGE) 500 MG tablet Take 1,000 mg by mouth 2 (two) times daily with meals.     [DISCONTINUED] ascorbic acid, vitamin C, (VITAMIN C) 1000 MG tablet Take 1,000 mg by mouth once daily.     [DISCONTINUED] GLUCOSAMINE HCL/CHONDR BAUTISTA A NA (OSTEO BI-FLEX ORAL) Take 2 tablets by mouth once daily.     [DISCONTINUED] metoprolol succinate (TOPROL-XL) 25 MG 24 hr tablet Take 25 mg by mouth 2 (two) times daily.      Family History     Problem Relation (Age of Onset)    Arthritis Mother    Diabetes Sister    Heart attack Brother    Heart disease Father    Heart failure Father    No Known Problems Maternal Aunt, Maternal Uncle, Paternal Aunt, Paternal Uncle, Maternal Grandmother, Maternal Grandfather, Paternal Grandmother, Paternal Grandfather        Social History Main Topics    Smoking status: Former Smoker     Packs/day: 3.00     Years: 40.00     Quit date: 8/2/1990    Smokeless tobacco: Never Used    Alcohol use No    Drug use: No    Sexual activity: Not on file     Review of Systems   All other systems reviewed and are negative.    Objective:     Vital Signs (Most Recent):  Temp: 98.2 °F (36.8 °C) (04/17/18 0711)  Pulse: 64 (04/17/18 0747)  Resp: 20 (04/17/18 0747)  BP: (!) 116/55 (04/17/18 0747)  SpO2: 100 % (04/17/18 0747) Vital Signs (24h Range):  Temp:  [98.2 °F (36.8 °C)] 98.2 °F (36.8 °C)  Pulse:  [] 64  Resp:  [20-27] 20  SpO2:  [97 %-100 %] 100 %  BP: (116-224)/() 116/55     Weight: 80.7 kg (178 lb)  Body mass index is 25.54 kg/m².    Physical Exam   Constitutional: He is oriented to person, place, and time. He appears well-developed and well-nourished.   HENT:   Head: Normocephalic and atraumatic.   Eyes: EOM are normal. Pupils are equal, round, and reactive to light.   Neck: Neck supple.   Cardiovascular: Normal rate, regular rhythm and normal heart sounds.    Pulmonary/Chest: He is in respiratory distress. He has wheezes.   Abdominal: Soft. Bowel sounds are normal.   Musculoskeletal: Normal range of motion.   Neurological: He is alert and oriented to person, place, and time.   Skin: Skin is warm and dry.   Psychiatric: He has a normal mood and affect.   Vitals reviewed.        CRANIAL NERVES     CN III, IV, VI   Pupils are equal, round, and reactive to light.  Extraocular motions are  normal.        Significant Labs:   ABGs:   Recent Labs  Lab 04/17/18  0640   PH 7.276*   PCO2 47.7*   HCO3 22.2*   POCSATURATED 87*   BE -5     CBC:   Recent Labs  Lab 04/17/18  0610   WBC 14.83*   HGB 11.6*   HCT 36.2*        CMP:   Recent Labs  Lab 04/17/18  0610      K 4.1   *   CO2 20*   *   BUN 18   CREATININE 1.2   CALCIUM 8.8   PROT 6.6   ALBUMIN 3.2*   BILITOT 0.3   ALKPHOS 114   AST 21   ALT 20   ANIONGAP 9   EGFRNONAA 56*     Cardiac Markers:   Recent Labs  Lab 04/17/18  0610   BNP 1,335*     Coagulation:   Recent Labs  Lab 04/17/18  0610   INR 1.2     Troponin:   Recent Labs  Lab 04/17/18  0610   TROPONINI 0.045*       Significant Imaging: CXR: I have reviewed all pertinent results/findings within the past 24 hours and my personal findings are:  Increased opacification of the right lower lung zone suggesting a combination of pleural fluid with associated atelectasis and/or consolidation.  Increased interstitial attenuation bilaterally consistent with edema/CHF.

## 2018-04-17 NOTE — PROGRESS NOTES
Results for GISELLA BLAKE (MRN [id number]) as of 4/17/2018 17:17   Ref. Range 4/17/2018 17:09   POC PH Latest Ref Range: 7.35 - 7.45  7.397   POC PCO2 Latest Ref Range: 35 - 45 mmHg 42.9   POC PO2 Latest Ref Range: 80 - 100 mmHg 93   POC BE Latest Ref Range: -2 to 2 mmol/L 1   POC HCO3 Latest Ref Range: 24 - 28 mmol/L 26.4   POC SATURATED O2 Latest Ref Range: 95 - 100 % 97   POC TCO2 Latest Ref Range: 23 - 27 mmol/L 28 (H)   Flow Unknown 5   Sample Unknown ARTERIAL   DelSys Unknown Nasal Can   Allens Test Unknown Pass   Site Unknown RB   Mode Unknown SPONT     Nurse aware with ABG.  Will notified Dr. Campbell with results

## 2018-04-17 NOTE — PLAN OF CARE
Problem: Patient Care Overview  Goal: Plan of Care Review  04/16/2018    Recommendations    Recommendation/Intervention: 1) 2000 calorie Diabetic, Cardiac, Mechanical Soft diet 2) Provide diet education as appropriate. Answer questions and concerns. Provide RD contact information. 4) Monitor oral intake and tolerance   Goals: 1) Patient to consume >=85% of EEN and EPN x5 days with tolerance  Nutrition Goal Status: new  Communication of RD Recs: reviewed with HOLA Starks, MPH, RD, LDN

## 2018-04-17 NOTE — ED TRIAGE NOTES
"Pt reports to ED via EMS from home with c/o SOB ongoing for 1 hour; pt states "I can't breathe"; pt SpO2 was 94% on room air on EMS arrival and increased to 97% on 4L nasal canula; pt also hypertensive and has bilateral lower extremeity edema ongoing for a couple days; pt's wife reports that the pt's cardiologist told the pt to stop taking his prescribed Lasix 1 week ago; pt AAOx4; pt's wife at bedside; will continue to monitor  "

## 2018-04-18 PROBLEM — I47.29 NON-SUSTAINED VENTRICULAR TACHYCARDIA: Status: ACTIVE | Noted: 2018-04-18

## 2018-04-18 PROBLEM — G45.1 TIA INVOLVING CAROTID ARTERY: Status: RESOLVED | Noted: 2018-02-18 | Resolved: 2018-04-18

## 2018-04-18 LAB
ALBUMIN SERPL BCP-MCNC: 2.9 G/DL
ALP SERPL-CCNC: 98 U/L
ALT SERPL W/O P-5'-P-CCNC: 13 U/L
ANION GAP SERPL CALC-SCNC: 9 MMOL/L
AST SERPL-CCNC: 16 U/L
BASOPHILS # BLD AUTO: 0.03 K/UL
BASOPHILS NFR BLD: 0.3 %
BILIRUB SERPL-MCNC: 0.5 MG/DL
BUN SERPL-MCNC: 21 MG/DL
CALCIUM SERPL-MCNC: 9.2 MG/DL
CHLORIDE SERPL-SCNC: 108 MMOL/L
CO2 SERPL-SCNC: 25 MMOL/L
CREAT SERPL-MCNC: 1.2 MG/DL
DIFFERENTIAL METHOD: ABNORMAL
EOSINOPHIL # BLD AUTO: 0.4 K/UL
EOSINOPHIL NFR BLD: 3.7 %
ERYTHROCYTE [DISTWIDTH] IN BLOOD BY AUTOMATED COUNT: 14.7 %
EST. GFR  (AFRICAN AMERICAN): >60 ML/MIN/1.73 M^2
EST. GFR  (NON AFRICAN AMERICAN): 56 ML/MIN/1.73 M^2
GLUCOSE SERPL-MCNC: 107 MG/DL
HCT VFR BLD AUTO: 32.6 %
HGB BLD-MCNC: 10.9 G/DL
LYMPHOCYTES # BLD AUTO: 1.6 K/UL
LYMPHOCYTES NFR BLD: 16.4 %
MAGNESIUM SERPL-MCNC: 1.7 MG/DL
MCH RBC QN AUTO: 31.8 PG
MCHC RBC AUTO-ENTMCNC: 33.4 G/DL
MCV RBC AUTO: 95 FL
MONOCYTES # BLD AUTO: 0.9 K/UL
MONOCYTES NFR BLD: 9.1 %
NEUTROPHILS # BLD AUTO: 6.7 K/UL
NEUTROPHILS NFR BLD: 70.5 %
PLATELET # BLD AUTO: 245 K/UL
PMV BLD AUTO: 11.5 FL
POCT GLUCOSE: 126 MG/DL (ref 70–110)
POCT GLUCOSE: 129 MG/DL (ref 70–110)
POCT GLUCOSE: 188 MG/DL (ref 70–110)
POCT GLUCOSE: 96 MG/DL (ref 70–110)
POTASSIUM SERPL-SCNC: 3.7 MMOL/L
PROT SERPL-MCNC: 6 G/DL
RBC # BLD AUTO: 3.43 M/UL
SODIUM SERPL-SCNC: 142 MMOL/L
WBC # BLD AUTO: 9.5 K/UL

## 2018-04-18 PROCEDURE — 83735 ASSAY OF MAGNESIUM: CPT

## 2018-04-18 PROCEDURE — 80053 COMPREHEN METABOLIC PANEL: CPT

## 2018-04-18 PROCEDURE — 36415 COLL VENOUS BLD VENIPUNCTURE: CPT

## 2018-04-18 PROCEDURE — A4216 STERILE WATER/SALINE, 10 ML: HCPCS | Performed by: EMERGENCY MEDICINE

## 2018-04-18 PROCEDURE — 99233 SBSQ HOSP IP/OBS HIGH 50: CPT | Mod: ,,, | Performed by: INTERNAL MEDICINE

## 2018-04-18 PROCEDURE — 20000000 HC ICU ROOM

## 2018-04-18 PROCEDURE — 25000003 PHARM REV CODE 250: Performed by: INTERNAL MEDICINE

## 2018-04-18 PROCEDURE — 25000003 PHARM REV CODE 250

## 2018-04-18 PROCEDURE — 63600175 PHARM REV CODE 636 W HCPCS: Performed by: INTERNAL MEDICINE

## 2018-04-18 PROCEDURE — 25000003 PHARM REV CODE 250: Performed by: EMERGENCY MEDICINE

## 2018-04-18 PROCEDURE — 85025 COMPLETE CBC W/AUTO DIFF WBC: CPT

## 2018-04-18 RX ORDER — POTASSIUM CHLORIDE 20 MEQ/1
20 TABLET, EXTENDED RELEASE ORAL ONCE
Status: COMPLETED | OUTPATIENT
Start: 2018-04-18 | End: 2018-04-18

## 2018-04-18 RX ORDER — METFORMIN HYDROCHLORIDE 500 MG/1
1000 TABLET ORAL 2 TIMES DAILY WITH MEALS
Status: DISCONTINUED | OUTPATIENT
Start: 2018-04-18 | End: 2018-04-19

## 2018-04-18 RX ORDER — FUROSEMIDE 40 MG/1
40 TABLET ORAL DAILY
Status: DISCONTINUED | OUTPATIENT
Start: 2018-04-18 | End: 2018-04-19

## 2018-04-18 RX ORDER — AMIODARONE HYDROCHLORIDE 200 MG/1
400 TABLET ORAL 2 TIMES DAILY
Status: DISCONTINUED | OUTPATIENT
Start: 2018-04-18 | End: 2018-04-18

## 2018-04-18 RX ADMIN — ATORVASTATIN CALCIUM 40 MG: 40 TABLET, FILM COATED ORAL at 08:04

## 2018-04-18 RX ADMIN — CLOPIDOGREL BISULFATE 75 MG: 75 TABLET ORAL at 08:04

## 2018-04-18 RX ADMIN — LOSARTAN POTASSIUM 100 MG: 25 TABLET, FILM COATED ORAL at 08:04

## 2018-04-18 RX ADMIN — AMIODARONE HYDROCHLORIDE 0.5 MG/MIN: 1.8 INJECTION, SOLUTION INTRAVENOUS at 03:04

## 2018-04-18 RX ADMIN — ASPIRIN 81 MG: 81 TABLET, COATED ORAL at 08:04

## 2018-04-18 RX ADMIN — Medication 3 ML: at 10:04

## 2018-04-18 RX ADMIN — AMIODARONE HYDROCHLORIDE 150 MG: 1.5 INJECTION, SOLUTION INTRAVENOUS at 10:04

## 2018-04-18 RX ADMIN — ACETAMINOPHEN 650 MG: 325 TABLET ORAL at 06:04

## 2018-04-18 RX ADMIN — METFORMIN HYDROCHLORIDE 1000 MG: 500 TABLET, FILM COATED ORAL at 05:04

## 2018-04-18 RX ADMIN — AMIODARONE HYDROCHLORIDE 1 MG/MIN: 1.8 INJECTION, SOLUTION INTRAVENOUS at 10:04

## 2018-04-18 RX ADMIN — METFORMIN HYDROCHLORIDE 1000 MG: 500 TABLET, FILM COATED ORAL at 08:04

## 2018-04-18 RX ADMIN — POTASSIUM CHLORIDE 20 MEQ: 1500 TABLET, EXTENDED RELEASE ORAL at 09:04

## 2018-04-18 RX ADMIN — FUROSEMIDE 40 MG: 40 TABLET ORAL at 08:04

## 2018-04-18 RX ADMIN — FAMOTIDINE 20 MG: 20 TABLET, FILM COATED ORAL at 08:04

## 2018-04-18 RX ADMIN — FAMOTIDINE 20 MG: 20 TABLET, FILM COATED ORAL at 09:04

## 2018-04-18 RX ADMIN — Medication 3 ML: at 06:04

## 2018-04-18 RX ADMIN — AMIODARONE HYDROCHLORIDE 400 MG: 200 TABLET ORAL at 09:04

## 2018-04-18 RX ADMIN — ONDANSETRON 8 MG: 8 TABLET, ORALLY DISINTEGRATING ORAL at 08:04

## 2018-04-18 RX ADMIN — METOPROLOL SUCCINATE 50 MG: 50 TABLET, EXTENDED RELEASE ORAL at 08:04

## 2018-04-18 NOTE — NURSING
Report received from Chrystal. Pt. AAOX3. O2 per NC. Respirations even even and unlabored. No apparent distress noted.  No verbalization of pain or discomfort. Safety measures maintained. Bed alarm engaged.

## 2018-04-18 NOTE — NURSING TRANSFER
Nursing Transfer Note      4/17/2018     Transfer From: 280  To 302    Transfer via bed    Transfer with  to O2, cardiac monitoring    Transported by transport     Medicines sent: none     Chart send with patient: Yes    Notified: spouse, [place]  (left message on home phone number on face sheet)    Patient reassessed at:  4/17/18. 1830    Upon arrival to floor: pt going to US before transferring to 302. On  Portable monitor.

## 2018-04-18 NOTE — SUBJECTIVE & OBJECTIVE
Interval History: Pt stabilized out quickly and was transferred to telemetry yesterday.  Nursing reported a 16 beat run of V-Tach this morning.  Pt was asymptomatic at the time.  Pt a little nauseated this morning but feeling better    Review of Systems   All other systems reviewed and are negative.    Scheduled Meds:   aspirin  81 mg Oral Daily    atorvastatin  40 mg Oral Daily    clopidogrel  75 mg Oral Daily    famotidine  20 mg Oral BID    furosemide  40 mg Intravenous BID    losartan  100 mg Oral Daily    metoprolol succinate  50 mg Oral Daily    sodium chloride 0.9%  3 mL Intravenous Q8H     Continuous Infusions:  PRN Meds:.acetaminophen, acetaminophen, dextrose 50%, dextrose 50%, glucagon (human recombinant), glucose, glucose, insulin aspart U-100, ondansetron    Objective:     Vital Signs (Most Recent):  Temp: 98.1 °F (36.7 °C) (04/18/18 0437)  Pulse: (!) 55 (04/18/18 0748)  Resp: 18 (04/18/18 0437)  BP: (!) 144/81 (04/18/18 0748)  SpO2: (!) 94 % (04/18/18 0748) Vital Signs (24h Range):  Temp:  [97.6 °F (36.4 °C)-98.5 °F (36.9 °C)] 98.1 °F (36.7 °C)  Pulse:  [50-86] 55  Resp:  [18-54] 18  SpO2:  [94 %-100 %] 94 %  BP: (105-180)/(56-81) 144/81     Weight: 83.4 kg (183 lb 13.8 oz)  Body mass index is 27.15 kg/m². Down 4 KG    Intake/Output Summary (Last 24 hours) at 04/18/18 0812  Last data filed at 04/17/18 1700   Gross per 24 hour   Intake              480 ml   Output              750 ml   Net             -270 ml      Physical Exam   Constitutional: He is oriented to person, place, and time. He appears well-developed and well-nourished.   HENT:   Head: Normocephalic and atraumatic.   Eyes: EOM are normal. Pupils are equal, round, and reactive to light.   Neck: Neck supple.   Cardiovascular: Normal rate, regular rhythm and normal heart sounds.    Pulmonary/Chest: No respiratory distress. He has no wheezes.   Abdominal: Soft. Bowel sounds are normal.   Musculoskeletal: Normal range of motion.    Neurological: He is alert and oriented to person, place, and time.   Skin: Skin is warm and dry.   Psychiatric: He has a normal mood and affect.   Vitals reviewed.      Significant Labs:   A1C:   Recent Labs  Lab 02/18/18  0447 04/17/18  1120   HGBA1C 6.5* 6.1*     CBC:   Recent Labs  Lab 04/17/18  0610 04/18/18  0447   WBC 14.83* 9.50   HGB 11.6* 10.9*   HCT 36.2* 32.6*    245     CMP:   Recent Labs  Lab 04/17/18  0610 04/18/18  0447    142   K 4.1 3.7   * 108   CO2 20* 25   * 107   BUN 18 21   CREATININE 1.2 1.2   CALCIUM 8.8 9.2   PROT 6.6 6.0   ALBUMIN 3.2* 2.9*   BILITOT 0.3 0.5   ALKPHOS 114 98   AST 21 16   ALT 20 13   ANIONGAP 9 9   EGFRNONAA 56* 56*     POCT Glucose:   Recent Labs  Lab 04/17/18  1051 04/17/18  1633 04/17/18  2106   POCTGLUCOSE 133* 114* 120*       Significant Imaging: U/S: I have reviewed all pertinent results/findings within the past 24 hours and my personal findings are:  BLE:  No DVT

## 2018-04-18 NOTE — PLAN OF CARE
Problem: Patient Care Overview  Goal: Plan of Care Review  Outcome: Ongoing (interventions implemented as appropriate)  Patient transferred to ICU for ectopy and runs of vtach. Patient given amiodarone bolus and placed on amio drip. Now infusing at 0.5mg/min per protocol. Patient has had not runs of vtach since being admitted to ICU. However, he's had multifocal PVCs. Remains asymptomatic. Remains on 4L nasal cannula. Wife at bedside. Plan of care reviewed with patient and spouse at bedside.

## 2018-04-18 NOTE — HOSPITAL COURSE
4/19/18: transferred back to ICU with recurrent ventricular arrhythmia (?VF)  4/20/18: cath with nonobst CAD  4/23/18: MDT ICD placed by Dr. Black    Interval Hx: pt seen in ICU, case d/w Dr. Black.  No cp/sob.  Wants to go home.    Tele: SR, no further ventricular arrhythmia, TdP noted 4/19/18 (pers rev)

## 2018-04-18 NOTE — ASSESSMENT & PLAN NOTE
Frequent asymptomatic episodes this AM. On metoprolol. Will add po amiodarone and observe on telemetry 1 more day

## 2018-04-18 NOTE — ASSESSMENT & PLAN NOTE
Diuresing well.  Will change back to oral lasix today.  Pt will need home health to assist with medications at home.

## 2018-04-18 NOTE — SUBJECTIVE & OBJECTIVE
Interval History:     Review of Systems   Constitution: Negative for decreased appetite.   HENT: Negative for ear discharge.    Eyes: Negative for blurred vision.   Endocrine: Negative for polyphagia.   Skin: Negative for nail changes.   Neurological: Negative for aphonia.   Psychiatric/Behavioral: Negative for hallucinations.     Objective:     Vital Signs (Most Recent):  Temp: 98.6 °F (37 °C) (04/18/18 0825)  Pulse: (!) 54 (04/18/18 0825)  Resp: 18 (04/18/18 0825)  BP: (!) 180/74 (04/18/18 0825)  SpO2: 95 % (04/18/18 0830) Vital Signs (24h Range):  Temp:  [97.6 °F (36.4 °C)-98.6 °F (37 °C)] 98.6 °F (37 °C)  Pulse:  [50-86] 54  Resp:  [18-54] 18  SpO2:  [91 %-100 %] 95 %  BP: (105-180)/(56-81) 180/74     Weight: 83.4 kg (183 lb 13.8 oz)  Body mass index is 27.15 kg/m².     SpO2: 95 %  O2 Device (Oxygen Therapy): nasal cannula      Intake/Output Summary (Last 24 hours) at 04/18/18 0928  Last data filed at 04/17/18 1700   Gross per 24 hour   Intake              480 ml   Output              750 ml   Net             -270 ml       Lines/Drains/Airways     Peripheral Intravenous Line                 Peripheral IV - Single Lumen 04/17/18 0501 Left Wrist 1 day                Physical Exam   Constitutional: He is oriented to person, place, and time. He appears well-developed and well-nourished.   HENT:   Head: Normocephalic and atraumatic.   Eyes: Conjunctivae are normal. Pupils are equal, round, and reactive to light.   Neck: Normal range of motion. Neck supple.   Cardiovascular: Normal rate, normal heart sounds and intact distal pulses.    Pulmonary/Chest: Effort normal.   Abdominal: Soft. Bowel sounds are normal.   Musculoskeletal: Normal range of motion.   Neurological: He is alert and oriented to person, place, and time.   Skin: Skin is warm and dry.       Significant Labs: All pertinent lab results from the last 24 hours have been reviewed.    Significant Imaging: Echocardiogram:   2D echo with color flow doppler:    Results for orders placed or performed during the hospital encounter of 02/17/18   2D echo with color flow doppler   Result Value Ref Range    EF 45 55 - 65    Mitral Valve Regurgitation MODERATE (A)     Diastolic Dysfunction Yes (A)     Est. PA Systolic Pressure 61 (A)     Pericardial Effusion NONE     Mitral Valve Mobility NORMAL     Tricuspid Valve Regurgitation MILD

## 2018-04-18 NOTE — PLAN OF CARE
Problem: Fall Risk (Adult)  Goal: Absence of Falls  Patient will demonstrate the desired outcomes by discharge/transition of care.   Outcome: Ongoing (interventions implemented as appropriate)   04/18/18 0626   Fall Risk (Adult)   Absence of Falls making progress toward outcome       Problem: Pressure Ulcer Risk (Gilberto Scale) (Adult,Obstetrics,Pediatric)  Goal: Skin Integrity  Patient will demonstrate the desired outcomes by discharge/transition of care.   Outcome: Ongoing (interventions implemented as appropriate)   04/18/18 0626   Pressure Ulcer Risk (Gilberto Scale) (Adult,Obstetrics,Pediatric)   Skin Integrity making progress toward outcome

## 2018-04-18 NOTE — PROGRESS NOTES
04/18/18 0748   Vital Signs   Pulse (!) 55   Heart Rate Source Monitor   SpO2 (!) 94 %   O2 Device (Oxygen Therapy) nasal cannula   BP (!) 144/81   BP Location Left arm   Patient Position Lying     Telemetry monitor exhibits 16 beat run of VT. V/s as above. Pt denies chest pain, or palpitations. Pt. initially complained of nausea, which hads now subsided according to patient. Dr. Campbell notified, verbally acknowledged. No new orders at this time. Will continue to monitor.

## 2018-04-18 NOTE — PROGRESS NOTES
04/18/18 0955   Vital Signs   Pulse (!) 56   SpO2 96 %   O2 Device (Oxygen Therapy) nasal cannula   BP (!) 136/92   Patient Position Lying     Tele monitor exhibits  frequent runs of VT, observed by Dr. Black. V/s above. Pt. complaints of feeling flush. Pt. to be transferred to ICU per Dr. Black. Report called to Kirstin, ICU. Pt. in room 280, Corrine (pt's wife)  notified.

## 2018-04-18 NOTE — NURSING
Pt received in ICU on cardiac monitor and 4L oxygen by nasal cannula. Pt denies pain and symptoms.  Pt in normal sinus rhythm with multifocal PVCs. Tele monitor taken by Filippo. Placed on ICU cardiac monitor, 4L nasal cannula, and oriented to room. Left wrist 18G IV intact. Amiodarone bolus given, then placed on amiodarone drip. Spoke to patient's wife regarding trasnfer to ICU room 280. Plan of care discussed with patient at bedside.

## 2018-04-18 NOTE — PROGRESS NOTES
[facility]  Cardiology  Progress Note    Patient Name: Timbo Gallagher  MRN: [id number]  Admission Date: 4/17/2018  Hospital Length of Stay: 1 days  Code Status: Full Code   Attending Physician: Cirilo Montero MD   Primary Care Physician: Cirilo Montero MD  Expected Discharge Date:   Principal Problem:Acute on chronic diastolic heart failure    Subjective:     Hospital Course:   Frequent brief runs of NSVT this AM - longest 16 beats  Less SOB  Denies CP    Interval History:     Review of Systems   Constitution: Negative for decreased appetite.   HENT: Negative for ear discharge.    Eyes: Negative for blurred vision.   Endocrine: Negative for polyphagia.   Skin: Negative for nail changes.   Neurological: Negative for aphonia.   Psychiatric/Behavioral: Negative for hallucinations.     Objective:     Vital Signs (Most Recent):  Temp: 98.6 °F (37 °C) (04/18/18 0825)  Pulse: (!) 54 (04/18/18 0825)  Resp: 18 (04/18/18 0825)  BP: (!) 180/74 (04/18/18 0825)  SpO2: 95 % (04/18/18 0830) Vital Signs (24h Range):  Temp:  [97.6 °F (36.4 °C)-98.6 °F (37 °C)] 98.6 °F (37 °C)  Pulse:  [50-86] 54  Resp:  [18-54] 18  SpO2:  [91 %-100 %] 95 %  BP: (105-180)/(56-81) 180/74     Weight: 83.4 kg (183 lb 13.8 oz)  Body mass index is 27.15 kg/m².     SpO2: 95 %  O2 Device (Oxygen Therapy): nasal cannula      Intake/Output Summary (Last 24 hours) at 04/18/18 0928  Last data filed at 04/17/18 1700   Gross per 24 hour   Intake              480 ml   Output              750 ml   Net             -270 ml       Lines/Drains/Airways     Peripheral Intravenous Line                 Peripheral IV - Single Lumen 04/17/18 0501 Left Wrist 1 day                Physical Exam   Constitutional: He is oriented to person, place, and time. He appears well-developed and well-nourished.   HENT:   Head: Normocephalic and atraumatic.   Eyes: Conjunctivae are normal. Pupils are equal, round, and reactive to light.   Neck: Normal range of  motion. Neck supple.   Cardiovascular: Normal rate, normal heart sounds and intact distal pulses.    Pulmonary/Chest: Effort normal.   Abdominal: Soft. Bowel sounds are normal.   Musculoskeletal: Normal range of motion.   Neurological: He is alert and oriented to person, place, and time.   Skin: Skin is warm and dry.       Significant Labs: All pertinent lab results from the last 24 hours have been reviewed.    Significant Imaging: Echocardiogram:   2D echo with color flow doppler:   Results for orders placed or performed during the hospital encounter of 02/17/18   2D echo with color flow doppler   Result Value Ref Range    EF 45 55 - 65    Mitral Valve Regurgitation MODERATE (A)     Diastolic Dysfunction Yes (A)     Est. PA Systolic Pressure 61 (A)     Pericardial Effusion NONE     Mitral Valve Mobility NORMAL     Tricuspid Valve Regurgitation MILD      Assessment and Plan:     Brief HPI:     * Acute on chronic diastolic heart failure    Diuresis and afterload reduction as tolerated. Recent echo with mildly depressed EF        NSVT (nonsustained ventricular tachycardia)    Frequent asymptomatic episodes this AM. On metoprolol. 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             Mixed hyperlipidemia    On statin        CKD (chronic kidney disease), stage III             S/P TAVR (transcatheter aortic valve replacement)    Stable on last echo        Essential hypertension             Type 2 diabetes mellitus with renal complication                 VTE Risk Mitigation         Ordered     Place sequential compression device  Until discontinued      04/17/18 0838     Place DEYA hose  Until discontinued      04/17/18 0838     Place sequential compression device  Until discontinued      04/17/18 0838     IP VTE HIGH RISK PATIENT  Once      04/17/18 0838        NSVT episodes worsening - will transfer to ICU for IV amiodarone    Jorge L BEAR  MD [place]  Cardiology  [facility]

## 2018-04-18 NOTE — PROGRESS NOTES
[facility] Hospital Medicine  Progress Note    Patient Name: Timbo Gallagher  MRN: [id number]  Patient Class: IP- Inpatient   Admission Date: 4/17/2018  Length of Stay: 1 days  Attending Physician: Cirilo Montero MD  Primary Care Provider: Cirilo Montero MD        Subjective:     Principal Problem:Acute on chronic diastolic heart failure    HPI:  Pt is an 81yo male with a history of CHF routenly followed by Dr Toribio who presented to the ED with shortness of breath.  Pt was seen by cardiology on 3/28 and the wife reports that at that time he was told to discontinue his Lasix.  THere is no record of this continuation in the chart.  Since stopping his lasix he has built up with fluid.  On presentation to the ED his sats were in 50% range.  Pt was started on Bipap and is going to be admitted to the ED with Acute congestive heart failure    Hospital Course:  No notes on file    Interval History: Pt stabilized out quickly and was transferred to telemetry yesterday.  Nursing reported a 16 beat run of V-Tach this morning.  Pt was asymptomatic at the time.  Pt a little nauseated this morning but feeling better    Review of Systems   All other systems reviewed and are negative.    Scheduled Meds:   aspirin  81 mg Oral Daily    atorvastatin  40 mg Oral Daily    clopidogrel  75 mg Oral Daily    famotidine  20 mg Oral BID    furosemide  40 mg Intravenous BID    losartan  100 mg Oral Daily    metoprolol succinate  50 mg Oral Daily    sodium chloride 0.9%  3 mL Intravenous Q8H     Continuous Infusions:  PRN Meds:.acetaminophen, acetaminophen, dextrose 50%, dextrose 50%, glucagon (human recombinant), glucose, glucose, insulin aspart U-100, ondansetron    Objective:     Vital Signs (Most Recent):  Temp: 98.1 °F (36.7 °C) (04/18/18 0437)  Pulse: (!) 55 (04/18/18 0748)  Resp: 18 (04/18/18 0437)  BP: (!) 144/81 (04/18/18 0748)  SpO2: (!) 94 % (04/18/18 0748) Vital Signs (24h Range):  Temp:  [97.6 °F  (36.4 °C)-98.5 °F (36.9 °C)] 98.1 °F (36.7 °C)  Pulse:  [50-86] 55  Resp:  [18-54] 18  SpO2:  [94 %-100 %] 94 %  BP: (105-180)/(56-81) 144/81     Weight: 83.4 kg (183 lb 13.8 oz)  Body mass index is 27.15 kg/m². Down 4 KG    Intake/Output Summary (Last 24 hours) at 04/18/18 0812  Last data filed at 04/17/18 1700   Gross per 24 hour   Intake              480 ml   Output              750 ml   Net             -270 ml      Physical Exam   Constitutional: He is oriented to person, place, and time. He appears well-developed and well-nourished.   HENT:   Head: Normocephalic and atraumatic.   Eyes: EOM are normal. Pupils are equal, round, and reactive to light.   Neck: Neck supple.   Cardiovascular: Normal rate, regular rhythm and normal heart sounds.    Pulmonary/Chest: No respiratory distress. He has no wheezes.   Abdominal: Soft. Bowel sounds are normal.   Musculoskeletal: Normal range of motion.   Neurological: He is alert and oriented to person, place, and time.   Skin: Skin is warm and dry.   Psychiatric: He has a normal mood and affect.   Vitals reviewed.      Significant Labs:   A1C:   Recent Labs  Lab 02/18/18  0447 04/17/18  1120   HGBA1C 6.5* 6.1*     CBC:   Recent Labs  Lab 04/17/18  0610 04/18/18  0447   WBC 14.83* 9.50   HGB 11.6* 10.9*   HCT 36.2* 32.6*    245     CMP:   Recent Labs  Lab 04/17/18  0610 04/18/18  0447    142   K 4.1 3.7   * 108   CO2 20* 25   * 107   BUN 18 21   CREATININE 1.2 1.2   CALCIUM 8.8 9.2   PROT 6.6 6.0   ALBUMIN 3.2* 2.9*   BILITOT 0.3 0.5   ALKPHOS 114 98   AST 21 16   ALT 20 13   ANIONGAP 9 9   EGFRNONAA 56* 56*     POCT Glucose:   Recent Labs  Lab 04/17/18  1051 04/17/18  1633 04/17/18  2106   POCTGLUCOSE 133* 114* 120*       Significant Imaging: U/S: I have reviewed all pertinent results/findings within the past 24 hours and my personal findings are:  BLE:  No DVT    Assessment/Plan:      * Acute on chronic diastolic heart failure    Diuresing well.   Will change back to oral lasix today.  Pt will need home health to assist with medications at home.          Non-sustained ventricular tachycardia    Pt with 16 beat run but was asymptomatic.  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  Now on nasal canula at 3L.  No O2 at home.  Continue to wean        Essential hypertension              Type 2 diabetes mellitus with renal complication    Sugars controlled.  Restart metformin            VTE Risk Mitigation         Ordered     Place sequential compression device  Until discontinued      04/17/18 0838     Place DEYA hose  Until discontinued      04/17/18 0838     Place sequential compression device  Until discontinued      04/17/18 0838     IP VTE HIGH RISK PATIENT  Once      04/17/18 0838              Dayna Campbell MD  Department of Hospital Medicine   [facility]

## 2018-04-18 NOTE — PROGRESS NOTES
04/18/18 0825   Vital Signs   Temp 98.6 °F (37 °C)   Temp src Oral   Pulse (!) 54   Heart Rate Source Monitor   Resp 18   SpO2 (!) 91 %   O2 Device (Oxygen Therapy) nasal cannula   BP (!) 180/74   MAP (mmHg) 107   BP Location Right arm   Patient Position Lying   Tele monitor exhibited 2 beat run of VT. Pt. Complaints of nausea only. Zofran to be adminstered. Dr. Black notified, verbally acknowledged. No new orders at this time. Dr. Black also notified of 16 beat run of VT earlier. Verbally acknowledged.

## 2018-04-19 LAB
ALBUMIN SERPL BCP-MCNC: 2.9 G/DL
ALBUMIN SERPL BCP-MCNC: 3.3 G/DL
ALP SERPL-CCNC: 142 U/L
ALP SERPL-CCNC: 94 U/L
ALT SERPL W/O P-5'-P-CCNC: 13 U/L
ALT SERPL W/O P-5'-P-CCNC: 64 U/L
ANION GAP SERPL CALC-SCNC: 12 MMOL/L
ANION GAP SERPL CALC-SCNC: 8 MMOL/L
AST SERPL-CCNC: 14 U/L
AST SERPL-CCNC: 80 U/L
BASOPHILS # BLD AUTO: 0.02 K/UL
BASOPHILS # BLD AUTO: 0.04 K/UL
BASOPHILS NFR BLD: 0.2 %
BASOPHILS NFR BLD: 0.2 %
BILIRUB SERPL-MCNC: 0.2 MG/DL
BILIRUB SERPL-MCNC: 0.4 MG/DL
BUN SERPL-MCNC: 25 MG/DL
BUN SERPL-MCNC: 26 MG/DL
CALCIUM SERPL-MCNC: 9 MG/DL
CALCIUM SERPL-MCNC: 9.4 MG/DL
CHLORIDE SERPL-SCNC: 107 MMOL/L
CHLORIDE SERPL-SCNC: 108 MMOL/L
CO2 SERPL-SCNC: 21 MMOL/L
CO2 SERPL-SCNC: 25 MMOL/L
CREAT SERPL-MCNC: 1.6 MG/DL
CREAT SERPL-MCNC: 1.8 MG/DL
DIASTOLIC DYSFUNCTION: NO
DIFFERENTIAL METHOD: ABNORMAL
DIFFERENTIAL METHOD: ABNORMAL
EOSINOPHIL # BLD AUTO: 0.4 K/UL
EOSINOPHIL # BLD AUTO: 0.4 K/UL
EOSINOPHIL NFR BLD: 2.1 %
EOSINOPHIL NFR BLD: 3.4 %
ERYTHROCYTE [DISTWIDTH] IN BLOOD BY AUTOMATED COUNT: 14.5 %
ERYTHROCYTE [DISTWIDTH] IN BLOOD BY AUTOMATED COUNT: 14.5 %
EST. GFR  (AFRICAN AMERICAN): 40 ML/MIN/1.73 M^2
EST. GFR  (AFRICAN AMERICAN): 46 ML/MIN/1.73 M^2
EST. GFR  (NON AFRICAN AMERICAN): 34 ML/MIN/1.73 M^2
EST. GFR  (NON AFRICAN AMERICAN): 40 ML/MIN/1.73 M^2
ESTIMATED PA SYSTOLIC PRESSURE: 33.47
GLUCOSE SERPL-MCNC: 110 MG/DL
GLUCOSE SERPL-MCNC: 166 MG/DL
HCT VFR BLD AUTO: 33.8 %
HCT VFR BLD AUTO: 38.7 %
HGB BLD-MCNC: 10.7 G/DL
HGB BLD-MCNC: 12.3 G/DL
LYMPHOCYTES # BLD AUTO: 1.3 K/UL
LYMPHOCYTES # BLD AUTO: 3.5 K/UL
LYMPHOCYTES NFR BLD: 12.7 %
LYMPHOCYTES NFR BLD: 20.9 %
MAGNESIUM SERPL-MCNC: 1.9 MG/DL
MCH RBC QN AUTO: 30.6 PG
MCH RBC QN AUTO: 30.7 PG
MCHC RBC AUTO-ENTMCNC: 31.7 G/DL
MCHC RBC AUTO-ENTMCNC: 31.8 G/DL
MCV RBC AUTO: 96 FL
MCV RBC AUTO: 97 FL
MITRAL VALVE REGURGITATION: NORMAL
MONOCYTES # BLD AUTO: 0.9 K/UL
MONOCYTES # BLD AUTO: 1.1 K/UL
MONOCYTES NFR BLD: 6.7 %
MONOCYTES NFR BLD: 8.7 %
NEUTROPHILS # BLD AUTO: 11.9 K/UL
NEUTROPHILS # BLD AUTO: 7.6 K/UL
NEUTROPHILS NFR BLD: 70.1 %
NEUTROPHILS NFR BLD: 74.8 %
PLATELET # BLD AUTO: 180 K/UL
PLATELET # BLD AUTO: 231 K/UL
PMV BLD AUTO: 11.4 FL
PMV BLD AUTO: 11.7 FL
POCT GLUCOSE: 101 MG/DL (ref 70–110)
POCT GLUCOSE: 132 MG/DL (ref 70–110)
POCT GLUCOSE: 137 MG/DL (ref 70–110)
POTASSIUM SERPL-SCNC: 4.2 MMOL/L
POTASSIUM SERPL-SCNC: 4.2 MMOL/L
PROT SERPL-MCNC: 6 G/DL
PROT SERPL-MCNC: 7.1 G/DL
RBC # BLD AUTO: 3.48 M/UL
RBC # BLD AUTO: 4.02 M/UL
RETIRED EF AND QEF - SEE NOTES: 50 (ref 55–65)
SODIUM SERPL-SCNC: 140 MMOL/L
SODIUM SERPL-SCNC: 141 MMOL/L
TRICUSPID VALVE REGURGITATION: NORMAL
TROPONIN I SERPL DL<=0.01 NG/ML-MCNC: 0.08 NG/ML
WBC # BLD AUTO: 10.17 K/UL
WBC # BLD AUTO: 16.95 K/UL

## 2018-04-19 PROCEDURE — 63600175 PHARM REV CODE 636 W HCPCS

## 2018-04-19 PROCEDURE — 25000003 PHARM REV CODE 250: Performed by: HOSPITALIST

## 2018-04-19 PROCEDURE — 36415 COLL VENOUS BLD VENIPUNCTURE: CPT

## 2018-04-19 PROCEDURE — 93306 TTE W/DOPPLER COMPLETE: CPT | Mod: 26,,, | Performed by: INTERNAL MEDICINE

## 2018-04-19 PROCEDURE — 84484 ASSAY OF TROPONIN QUANT: CPT

## 2018-04-19 PROCEDURE — 63600175 PHARM REV CODE 636 W HCPCS: Performed by: HOSPITALIST

## 2018-04-19 PROCEDURE — 80053 COMPREHEN METABOLIC PANEL: CPT | Mod: 91

## 2018-04-19 PROCEDURE — 25000003 PHARM REV CODE 250

## 2018-04-19 PROCEDURE — 63600175 PHARM REV CODE 636 W HCPCS: Performed by: INTERNAL MEDICINE

## 2018-04-19 PROCEDURE — 27000221 HC OXYGEN, UP TO 24 HOURS

## 2018-04-19 PROCEDURE — 80053 COMPREHEN METABOLIC PANEL: CPT

## 2018-04-19 PROCEDURE — 25000003 PHARM REV CODE 250: Performed by: EMERGENCY MEDICINE

## 2018-04-19 PROCEDURE — 99233 SBSQ HOSP IP/OBS HIGH 50: CPT | Mod: ,,, | Performed by: INTERNAL MEDICINE

## 2018-04-19 PROCEDURE — 85025 COMPLETE CBC W/AUTO DIFF WBC: CPT

## 2018-04-19 PROCEDURE — 93306 TTE W/DOPPLER COMPLETE: CPT

## 2018-04-19 PROCEDURE — 83735 ASSAY OF MAGNESIUM: CPT

## 2018-04-19 PROCEDURE — 25000003 PHARM REV CODE 250: Performed by: INTERNAL MEDICINE

## 2018-04-19 PROCEDURE — 20000000 HC ICU ROOM

## 2018-04-19 PROCEDURE — A4216 STERILE WATER/SALINE, 10 ML: HCPCS | Performed by: EMERGENCY MEDICINE

## 2018-04-19 RX ORDER — MORPHINE SULFATE 10 MG/ML
1 INJECTION INTRAMUSCULAR; INTRAVENOUS; SUBCUTANEOUS ONCE
Status: COMPLETED | OUTPATIENT
Start: 2018-04-19 | End: 2018-04-19

## 2018-04-19 RX ORDER — AMIODARONE HYDROCHLORIDE 200 MG/1
200 TABLET ORAL 2 TIMES DAILY
Status: DISCONTINUED | OUTPATIENT
Start: 2018-04-19 | End: 2018-04-19

## 2018-04-19 RX ORDER — LIDOCAINE HCL/PF 100 MG/5ML
SYRINGE (ML) INTRAVENOUS CODE/TRAUMA/SEDATION MEDICATION
Status: COMPLETED | OUTPATIENT
Start: 2018-04-19 | End: 2018-04-19

## 2018-04-19 RX ORDER — ONDANSETRON 4 MG/1
4 TABLET, ORALLY DISINTEGRATING ORAL EVERY 6 HOURS PRN
Status: DISCONTINUED | OUTPATIENT
Start: 2018-04-19 | End: 2018-04-19

## 2018-04-19 RX ORDER — HYDROCODONE BITARTRATE AND ACETAMINOPHEN 5; 325 MG/1; MG/1
1 TABLET ORAL EVERY 6 HOURS PRN
Status: DISCONTINUED | OUTPATIENT
Start: 2018-04-19 | End: 2018-04-24 | Stop reason: HOSPADM

## 2018-04-19 RX ORDER — LABETALOL HYDROCHLORIDE 5 MG/ML
10 INJECTION, SOLUTION INTRAVENOUS ONCE
Status: COMPLETED | OUTPATIENT
Start: 2018-04-19 | End: 2018-04-19

## 2018-04-19 RX ADMIN — LOSARTAN POTASSIUM 100 MG: 25 TABLET, FILM COATED ORAL at 08:04

## 2018-04-19 RX ADMIN — MORPHINE SULFATE 1 MG: 10 INJECTION INTRAVENOUS at 11:04

## 2018-04-19 RX ADMIN — Medication 3 ML: at 07:04

## 2018-04-19 RX ADMIN — METFORMIN HYDROCHLORIDE 1000 MG: 500 TABLET, FILM COATED ORAL at 08:04

## 2018-04-19 RX ADMIN — LIDOCAINE HYDROCHLORIDE 100 MG: 20 INJECTION, SOLUTION INTRAVENOUS at 03:04

## 2018-04-19 RX ADMIN — AMIODARONE HYDROCHLORIDE 0.5 MG/MIN: 1.8 INJECTION, SOLUTION INTRAVENOUS at 02:04

## 2018-04-19 RX ADMIN — AMIODARONE HYDROCHLORIDE 200 MG: 200 TABLET ORAL at 08:04

## 2018-04-19 RX ADMIN — ATORVASTATIN CALCIUM 40 MG: 40 TABLET, FILM COATED ORAL at 08:04

## 2018-04-19 RX ADMIN — Medication 3 ML: at 09:04

## 2018-04-19 RX ADMIN — FAMOTIDINE 20 MG: 20 TABLET, FILM COATED ORAL at 08:04

## 2018-04-19 RX ADMIN — METOPROLOL SUCCINATE 50 MG: 50 TABLET, EXTENDED RELEASE ORAL at 08:04

## 2018-04-19 RX ADMIN — CLOPIDOGREL BISULFATE 75 MG: 75 TABLET ORAL at 08:04

## 2018-04-19 RX ADMIN — FAMOTIDINE 20 MG: 20 TABLET, FILM COATED ORAL at 09:04

## 2018-04-19 RX ADMIN — Medication 3 ML: at 01:04

## 2018-04-19 RX ADMIN — AMIODARONE HYDROCHLORIDE 1 MG/MIN: 1.8 INJECTION, SOLUTION INTRAVENOUS at 09:04

## 2018-04-19 RX ADMIN — HYDROCODONE BITARTRATE AND ACETAMINOPHEN 1 TABLET: 5; 325 TABLET ORAL at 07:04

## 2018-04-19 RX ADMIN — ACETAMINOPHEN 650 MG: 325 TABLET ORAL at 01:04

## 2018-04-19 RX ADMIN — AMIODARONE HYDROCHLORIDE 1 MG/MIN: 1.8 INJECTION, SOLUTION INTRAVENOUS at 04:04

## 2018-04-19 RX ADMIN — LABETALOL HYDROCHLORIDE 10 MG: 5 INJECTION, SOLUTION INTRAVENOUS at 04:04

## 2018-04-19 RX ADMIN — ONDANSETRON 4 MG: 4 TABLET, ORALLY DISINTEGRATING ORAL at 04:04

## 2018-04-19 RX ADMIN — ASPIRIN 81 MG: 81 TABLET, COATED ORAL at 08:04

## 2018-04-19 RX ADMIN — PROMETHAZINE HYDROCHLORIDE 25 MG: 25 INJECTION INTRAMUSCULAR; INTRAVENOUS at 04:04

## 2018-04-19 NOTE — PROGRESS NOTES
Patient arrived from ICU.  Report received from Kirstin.  Patient accompanied to bed, telemetry monitor continued.

## 2018-04-19 NOTE — PROGRESS NOTES
[facility]  Cardiology  Progress Note    Patient Name: Timbo Gallagher  MRN: [id number]  Admission Date: 4/17/2018  Hospital Length of Stay: 2 days  Code Status: Full Code   Attending Physician: Cirilo Montero MD   Primary Care Physician: Cirilo Montero MD  Expected Discharge Date:   Principal Problem:<principal problem not specified>    Subjective:     Hospital Course:   One 9 beat VT overnight and occasional PVC's/couplets  Less SOB  Denies CP    Echo 2/18/18    1 - Mildly depressed left ventricular systolic function (EF 45-50%).  Inferoposterior hypokinesis.     2 - Concentric hypertrophy.     3 - Impaired LV relaxation, elevated LAP (grade 2 diastolic dysfunction).     4 - S/P transcatheter AVR, UNA = 1.85 cm2, AVAi = 0.94 cm2/m2, mean gradient = 9 mmHg.     5 - Moderate mitral regurgitation.     6 - Mild tricuspid regurgitation.     7 - Pulmonary hypertension. The estimated PA systolic pressure is 61 mmHg.     Interval History:     Review of Systems   Constitution: Negative for decreased appetite.   HENT: Negative for ear discharge.    Eyes: Negative for blurred vision.   Endocrine: Negative for polyphagia.   Skin: Negative for nail changes.   Neurological: Negative for aphonia.   Psychiatric/Behavioral: Negative for hallucinations.     Objective:     Vital Signs (Most Recent):  Temp: 97 °F (36.1 °C) (04/19/18 0715)  Pulse: 68 (04/19/18 0740)  Resp: 20 (04/19/18 0740)  BP: (!) 142/63 (04/19/18 0700)  SpO2: 98 % (04/19/18 0740) Vital Signs (24h Range):  Temp:  [97 °F (36.1 °C)-97.9 °F (36.6 °C)] 97 °F (36.1 °C)  Pulse:  [54-88] 68  Resp:  [16-36] 20  SpO2:  [91 %-99 %] 98 %  BP: ()/(52-94) 142/63     Weight: 83.4 kg (183 lb 13.8 oz)  Body mass index is 27.15 kg/m².     SpO2: 98 %  O2 Device (Oxygen Therapy): (P) nasal cannula      Intake/Output Summary (Last 24 hours) at 04/19/18 0835  Last data filed at 04/19/18 0700   Gross per 24 hour   Intake          1136.19 ml   Output               600 ml   Net           536.19 ml       Lines/Drains/Airways     Peripheral Intravenous Line                 Peripheral IV - Single Lumen 04/17/18 0501 Left Wrist 2 days                Physical Exam   Constitutional: He is oriented to person, place, and time. He appears well-developed and well-nourished.   HENT:   Head: Normocephalic and atraumatic.   Eyes: Conjunctivae are normal. Pupils are equal, round, and reactive to light.   Neck: Normal range of motion. Neck supple.   Cardiovascular: Normal rate, normal heart sounds and intact distal pulses.    Pulmonary/Chest: Effort normal.   Abdominal: Soft. Bowel sounds are normal.   Musculoskeletal: Normal range of motion.   Neurological: He is alert and oriented to person, place, and time.   Skin: Skin is warm and dry.       Significant Labs: All pertinent lab results from the last 24 hours have been reviewed.    Significant Imaging: Echocardiogram:   2D echo with color flow doppler:   Results for orders placed or performed during the hospital encounter of 02/17/18   2D echo with color flow doppler   Result Value Ref Range    EF 45 55 - 65    Mitral Valve Regurgitation MODERATE (A)     Diastolic Dysfunction Yes (A)     Est. PA Systolic Pressure 61 (A)     Pericardial Effusion NONE     Mitral Valve Mobility NORMAL     Tricuspid Valve Regurgitation MILD      Assessment and Plan:     Brief HPI:    NSVT (nonsustained ventricular tachycardia)    Frequent asymptomatic episodes improved after IV amiodarone. Change to po. On metoprolol. Repeat echo. Ok for telemetry. 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 Recent echo with mildly depressed EF - repeat today. Hold lasix with rising Cr        Mixed hyperlipidemia    On statin        CKD (chronic kidney disease), stage III             S/P TAVR (transcatheter aortic valve  replacement)    Stable on last echo        Essential hypertension             Type 2 diabetes mellitus with renal complication                 VTE Risk Mitigation         Ordered     Place DEYA hose  Until discontinued      04/17/18 0838     Place sequential compression device  Until discontinued      04/17/18 0838     IP VTE HIGH RISK PATIENT  Once      04/17/18 0838        4PM - developed pulseless VT on telemetry - responded to DCCV and lidocaine 100 mg IV. Brief CPR. Did not require intubation. Echo from this AM with EF 50%, adequate TAVR function, mild-mod MR. Will transfer back to ICU and place back on IV amiodarone. May require transfer to [facility] if ventricular arrhythmias persist. Would discuss AICD when more stable    Jorge L Black MD  Cardiology  [facility]

## 2018-04-19 NOTE — SUBJECTIVE & OBJECTIVE
Interval History: PT continued to have episodes of NSVT.  Cards moved him to the ICU and started an amiodarone drip.  Rhythm now is normal    Review of Systems   All other systems reviewed and are negative.    Scheduled Meds:   amiodarone  200 mg Oral BID    aspirin  81 mg Oral Daily    atorvastatin  40 mg Oral Daily    clopidogrel  75 mg Oral Daily    famotidine  20 mg Oral BID    losartan  100 mg Oral Daily    metFORMIN  1,000 mg Oral BID WM    metoprolol succinate  50 mg Oral Daily    sodium chloride 0.9%  3 mL Intravenous Q8H     Continuous Infusions:  PRN Meds:.acetaminophen, acetaminophen, dextrose 50%, dextrose 50%, glucagon (human recombinant), glucose, glucose, insulin aspart U-100    Objective:     Vital Signs (Most Recent):  Temp: 97 °F (36.1 °C) (04/19/18 0715)  Pulse: 68 (04/19/18 0740)  Resp: 20 (04/19/18 0740)  BP: (!) 163/76 (04/19/18 0840)  SpO2: 98 % (04/19/18 0740) Vital Signs (24h Range):  Temp:  [97 °F (36.1 °C)-97.9 °F (36.6 °C)] 97 °F (36.1 °C)  Pulse:  [54-88] 68  Resp:  [16-36] 20  SpO2:  [91 %-99 %] 98 %  BP: ()/(52-94) 163/76     Weight: 83.4 kg (183 lb 13.8 oz)  Body mass index is 27.15 kg/m². Down 4 KG    Intake/Output Summary (Last 24 hours) at 04/19/18 0852  Last data filed at 04/19/18 0700   Gross per 24 hour   Intake          1136.19 ml   Output              600 ml   Net           536.19 ml      Physical Exam   Constitutional: He is oriented to person, place, and time. He appears well-developed and well-nourished.   HENT:   Head: Normocephalic and atraumatic.   Eyes: EOM are normal. Pupils are equal, round, and reactive to light.   Neck: Neck supple.   Cardiovascular: Normal rate, regular rhythm and normal heart sounds.    Pulmonary/Chest: No respiratory distress. He has no wheezes.   Abdominal: Soft. Bowel sounds are normal.   Musculoskeletal: Normal range of motion.   Neurological: He is alert and oriented to person, place, and time.   Skin: Skin is warm and dry.    Psychiatric: He has a normal mood and affect.   Vitals reviewed.      Significant Labs:   A1C:     Recent Labs  Lab 02/18/18  0447 04/17/18  1120   HGBA1C 6.5* 6.1*     CBC:     Recent Labs  Lab 04/18/18  0447 04/19/18  0359   WBC 9.50 10.17   HGB 10.9* 10.7*   HCT 32.6* 33.8*    180     CMP:     Recent Labs  Lab 04/18/18  0447 04/19/18  0358    141   K 3.7 4.2    108   CO2 25 25    110   BUN 21 26*   CREATININE 1.2 1.6*   CALCIUM 9.2 9.0   PROT 6.0 6.0   ALBUMIN 2.9* 2.9*   BILITOT 0.5 0.2   ALKPHOS 98 94   AST 16 14   ALT 13 13   ANIONGAP 9 8   EGFRNONAA 56* 40*     POCT Glucose:     Recent Labs  Lab 04/18/18  1148 04/18/18  1640 04/18/18  2119   POCTGLUCOSE 188* 129* 96       Significant Imaging: U/S: I have reviewed all pertinent results/findings within the past 24 hours and my personal findings are:  BLE:  No DVT

## 2018-04-19 NOTE — SIGNIFICANT EVENT
Patient had for few minutes VF ,with PEA,code blue has been called,patient was started on CPR and supplemental oxygen,he has been shocked,time one, with ROSC,patient gain consciousness,he received one time 100 mg IV Lidoacoin per cardiology,patient's rhythm was back to sinus rhythm,he has elevated BP systolic up to 230,recieved one time IV labetalol,his air way was maintained,did not required intubation,he has been  transferred to ICU for continues amiodarone infusion  and close monitoring.will check Troponin,CMP,CBC.Magnesium level,chest x ray,primary has been  notified,cardiology is following.

## 2018-04-19 NOTE — NURSING
"Patient complaining of 8/10 "unbearable rib pain" patient received tylenol earlier this shift. Paged MD through answering service(Marisol).   1857- Spoke with Dr. Montero r/elizabeth huff, new order received.   "

## 2018-04-19 NOTE — PROGRESS NOTES
Patient having v-tach and consistently staying it the rhythm.  Upon arrival to room patient having agonal breathing.  Code blue called and chest compressions started, patient blood pressure 238/108 and then 234/114, ambu bag used for ventilation, patient shocked, cpr continued, and one shocked delivered.  Blood pressure now 231/108.  Patient transferred to ICU.  Code documentation done.

## 2018-04-19 NOTE — NURSING TRANSFER
Nursing Transfer Note      4/19/2018     Transfer To: 310A    Transfer via wheelchair    Transfer with 3L to O2, cardiac monitoring, glasses, ring on ring finger, short, footwear    Transported by transport, RN    Medicines sent: yes    Chart send with patient: Yes    Notified: spouse, son at bedside    Patient reassessed at: 4/19/18 1130    Upon arrival to floor: cardiac monitor applied, patient oriented to room, call bell in reach and bed in lowest position

## 2018-04-19 NOTE — ASSESSMENT & PLAN NOTE
Diuresis and afterload reduction as tolerated. Recent echo with mildly depressed EF - repeat today. Hold lasix with rising Cr

## 2018-04-19 NOTE — PROGRESS NOTES
Patient had 8 beats of vtach.  Patient in bed resting comfortably.  Dr. Black notified new order noted.  Will continue to monitor.

## 2018-04-19 NOTE — PLAN OF CARE
Problem: Patient Care Overview  Goal: Plan of Care Review  Outcome: Ongoing (interventions implemented as appropriate)  Pt remains in ICU on Amio gtt. Sinus bradycardia with multifocal PVCs. One 8 beat run of v tach. BP stable. No issues overnight.

## 2018-04-19 NOTE — PROGRESS NOTES
04/19/18 0836   Patient Assessment/Suction   Level of Consciousness (AVPU) alert   PRE-TX-O2-ETCO2   O2 Device (Oxygen Therapy) nasal cannula   $ Is the patient on Low Flow Oxygen? Yes   Flow (L/min) 4   Pulse Oximetry Type Continuous

## 2018-04-19 NOTE — PROGRESS NOTES
04/18/18 1928   PRE-TX-O2-ETCO2   O2 Device (Oxygen Therapy) nasal cannula   Flow (L/min) 4   Oxygen Concentration (%) 36   SpO2 97 %   Pulse Oximetry Type Continuous   Pulse 60   Resp (!) 28   Ready to Wean/Extubation Screen   FIO2<=50 (chart decimal) 0.36

## 2018-04-19 NOTE — ASSESSMENT & PLAN NOTE
Frequent asymptomatic episodes improved after IV amiodarone. Change to po. On metoprolol. Repeat echo. Ok for telemetry. Electrolytes corrected

## 2018-04-19 NOTE — NURSING TRANSFER
Nursing Transfer Note      4/19/2018     Transfer From: ICU    Transfer via wheelchair    Transfer with  to O2, cardiac monitoring    Transported by ICU nurse and transport    Medicines sent: yes    Chart send with patient: Yes    Notified: spouse    Patient reassessed at: 04/16/2018 15:30    Upon arrival to floor: cardiac monitor applied, patient oriented to room, call bell in reach and bed in lowest position

## 2018-04-19 NOTE — SUBJECTIVE & OBJECTIVE
Interval History:     Review of Systems   Constitution: Negative for decreased appetite.   HENT: Negative for ear discharge.    Eyes: Negative for blurred vision.   Endocrine: Negative for polyphagia.   Skin: Negative for nail changes.   Neurological: Negative for aphonia.   Psychiatric/Behavioral: Negative for hallucinations.     Objective:     Vital Signs (Most Recent):  Temp: 97 °F (36.1 °C) (04/19/18 0715)  Pulse: 68 (04/19/18 0740)  Resp: 20 (04/19/18 0740)  BP: (!) 142/63 (04/19/18 0700)  SpO2: 98 % (04/19/18 0740) Vital Signs (24h Range):  Temp:  [97 °F (36.1 °C)-97.9 °F (36.6 °C)] 97 °F (36.1 °C)  Pulse:  [54-88] 68  Resp:  [16-36] 20  SpO2:  [91 %-99 %] 98 %  BP: ()/(52-94) 142/63     Weight: 83.4 kg (183 lb 13.8 oz)  Body mass index is 27.15 kg/m².     SpO2: 98 %  O2 Device (Oxygen Therapy): (P) nasal cannula      Intake/Output Summary (Last 24 hours) at 04/19/18 0835  Last data filed at 04/19/18 0700   Gross per 24 hour   Intake          1136.19 ml   Output              600 ml   Net           536.19 ml       Lines/Drains/Airways     Peripheral Intravenous Line                 Peripheral IV - Single Lumen 04/17/18 0501 Left Wrist 2 days                Physical Exam   Constitutional: He is oriented to person, place, and time. He appears well-developed and well-nourished.   HENT:   Head: Normocephalic and atraumatic.   Eyes: Conjunctivae are normal. Pupils are equal, round, and reactive to light.   Neck: Normal range of motion. Neck supple.   Cardiovascular: Normal rate, normal heart sounds and intact distal pulses.    Pulmonary/Chest: Effort normal.   Abdominal: Soft. Bowel sounds are normal.   Musculoskeletal: Normal range of motion.   Neurological: He is alert and oriented to person, place, and time.   Skin: Skin is warm and dry.       Significant Labs: All pertinent lab results from the last 24 hours have been reviewed.    Significant Imaging: Echocardiogram:   2D echo with color flow doppler:    Results for orders placed or performed during the hospital encounter of 02/17/18   2D echo with color flow doppler   Result Value Ref Range    EF 45 55 - 65    Mitral Valve Regurgitation MODERATE (A)     Diastolic Dysfunction Yes (A)     Est. PA Systolic Pressure 61 (A)     Pericardial Effusion NONE     Mitral Valve Mobility NORMAL     Tricuspid Valve Regurgitation MILD

## 2018-04-19 NOTE — PROGRESS NOTES
[facility] Hospital Medicine  Progress Note    Patient Name: Timbo Gallagher  MRN: [id number]  Patient Class: IP- Inpatient   Admission Date: 4/17/2018  Length of Stay: 2 days  Attending Physician: Cirilo Montero MD  Primary Care Provider: Cirilo Montero MD        Subjective:     Principal Problem:Acute on chronic diastolic heart failure    HPI:  Pt is an 83yo male with a history of CHF routenly followed by Dr Toribio who presented to the ED with shortness of breath.  Pt was seen by cardiology on 3/28 and the wife reports that at that time he was told to discontinue his Lasix.  THere is no record of this continuation in the chart.  Since stopping his lasix he has built up with fluid.  On presentation to the ED his sats were in 50% range.  Pt was started on Bipap and is going to be admitted to the ED with Acute congestive heart failure    Hospital Course:  No notes on file    Interval History: PT continued to have episodes of NSVT.  Cards moved him to the ICU and started an amiodarone drip.  Rhythm now is normal    Review of Systems   All other systems reviewed and are negative.    Scheduled Meds:   amiodarone  200 mg Oral BID    aspirin  81 mg Oral Daily    atorvastatin  40 mg Oral Daily    clopidogrel  75 mg Oral Daily    famotidine  20 mg Oral BID    losartan  100 mg Oral Daily    metFORMIN  1,000 mg Oral BID WM    metoprolol succinate  50 mg Oral Daily    sodium chloride 0.9%  3 mL Intravenous Q8H     Continuous Infusions:  PRN Meds:.acetaminophen, acetaminophen, dextrose 50%, dextrose 50%, glucagon (human recombinant), glucose, glucose, insulin aspart U-100    Objective:     Vital Signs (Most Recent):  Temp: 97 °F (36.1 °C) (04/19/18 0715)  Pulse: 68 (04/19/18 0740)  Resp: 20 (04/19/18 0740)  BP: (!) 163/76 (04/19/18 0840)  SpO2: 98 % (04/19/18 0740) Vital Signs (24h Range):  Temp:  [97 °F (36.1 °C)-97.9 °F (36.6 °C)] 97 °F (36.1 °C)  Pulse:  [54-88] 68  Resp:  [16-36]  20  SpO2:  [91 %-99 %] 98 %  BP: ()/(52-94) 163/76     Weight: 83.4 kg (183 lb 13.8 oz)  Body mass index is 27.15 kg/m². Down 4 KG    Intake/Output Summary (Last 24 hours) at 04/19/18 0852  Last data filed at 04/19/18 0700   Gross per 24 hour   Intake          1136.19 ml   Output              600 ml   Net           536.19 ml      Physical Exam   Constitutional: He is oriented to person, place, and time. He appears well-developed and well-nourished.   HENT:   Head: Normocephalic and atraumatic.   Eyes: EOM are normal. Pupils are equal, round, and reactive to light.   Neck: Neck supple.   Cardiovascular: Normal rate, regular rhythm and normal heart sounds.    Pulmonary/Chest: No respiratory distress. He has no wheezes.   Abdominal: Soft. Bowel sounds are normal.   Musculoskeletal: Normal range of motion.   Neurological: He is alert and oriented to person, place, and time.   Skin: Skin is warm and dry.   Psychiatric: He has a normal mood and affect.   Vitals reviewed.      Significant Labs:   A1C:     Recent Labs  Lab 02/18/18  0447 04/17/18  1120   HGBA1C 6.5* 6.1*     CBC:     Recent Labs  Lab 04/18/18  0447 04/19/18  0359   WBC 9.50 10.17   HGB 10.9* 10.7*   HCT 32.6* 33.8*    180     CMP:     Recent Labs  Lab 04/18/18  0447 04/19/18  0358    141   K 3.7 4.2    108   CO2 25 25    110   BUN 21 26*   CREATININE 1.2 1.6*   CALCIUM 9.2 9.0   PROT 6.0 6.0   ALBUMIN 2.9* 2.9*   BILITOT 0.5 0.2   ALKPHOS 98 94   AST 16 14   ALT 13 13   ANIONGAP 9 8   EGFRNONAA 56* 40*     POCT Glucose:     Recent Labs  Lab 04/18/18  1148 04/18/18  1640 04/18/18  2119   POCTGLUCOSE 188* 129* 96       Significant Imaging: U/S: I have reviewed all pertinent results/findings within the past 24 hours and my personal findings are:  BLE:  No DVT    Assessment/Plan:      * Acute on chronic diastolic heart failure    Hold lasix today and monitor fluid          NSVT (nonsustained ventricular tachycardia)    Cards  following.  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  Will hold lasix for now        S/P TAVR (transcatheter aortic valve replacement)              Aortic valve stenosis      Continue home meds and cards following        Acute hypoxemic respiratory failure      Continue O2        Essential hypertension              Type 2 diabetes mellitus with renal complication    Sugars controlled.  Restart metformin            VTE Risk Mitigation         Ordered     Place DEYA hose  Until discontinued      04/17/18 0838     Place sequential compression device  Until discontinued      04/17/18 0838     IP VTE HIGH RISK PATIENT  Once      04/17/18 0838          Critical care time spent on the evaluation and treatment of severe organ dysfunction, review of pertinent labs and imaging studies, discussions with consulting providers and discussions with patient/family: >30 minutes.    Dayna Campbell MD  Department of Hospital Medicine   [facility]

## 2018-04-20 LAB
ALBUMIN SERPL BCP-MCNC: 2.9 G/DL
ALP SERPL-CCNC: 111 U/L
ALT SERPL W/O P-5'-P-CCNC: 46 U/L
ANION GAP SERPL CALC-SCNC: 9 MMOL/L
AST SERPL-CCNC: 46 U/L
BASOPHILS # BLD AUTO: 0.01 K/UL
BASOPHILS NFR BLD: 0.1 %
BILIRUB SERPL-MCNC: 0.3 MG/DL
BUN SERPL-MCNC: 24 MG/DL
CALCIUM SERPL-MCNC: 8.9 MG/DL
CHLORIDE SERPL-SCNC: 106 MMOL/L
CO2 SERPL-SCNC: 24 MMOL/L
CORONARY STENOSIS: ABNORMAL
CREAT SERPL-MCNC: 1.5 MG/DL
DIFFERENTIAL METHOD: ABNORMAL
EOSINOPHIL # BLD AUTO: 0.1 K/UL
EOSINOPHIL NFR BLD: 0.5 %
ERYTHROCYTE [DISTWIDTH] IN BLOOD BY AUTOMATED COUNT: 14.3 %
EST. GFR  (AFRICAN AMERICAN): 49 ML/MIN/1.73 M^2
EST. GFR  (NON AFRICAN AMERICAN): 43 ML/MIN/1.73 M^2
GLUCOSE SERPL-MCNC: 152 MG/DL
HCT VFR BLD AUTO: 32.2 %
HGB BLD-MCNC: 10.3 G/DL
LYMPHOCYTES # BLD AUTO: 0.8 K/UL
LYMPHOCYTES NFR BLD: 6.5 %
MCH RBC QN AUTO: 30.9 PG
MCHC RBC AUTO-ENTMCNC: 32 G/DL
MCV RBC AUTO: 97 FL
MONOCYTES # BLD AUTO: 0.9 K/UL
MONOCYTES NFR BLD: 7.6 %
NEUTROPHILS # BLD AUTO: 10.4 K/UL
NEUTROPHILS NFR BLD: 85.1 %
PLATELET # BLD AUTO: 178 K/UL
PMV BLD AUTO: 11.4 FL
POCT GLUCOSE: 131 MG/DL (ref 70–110)
POCT GLUCOSE: 143 MG/DL (ref 70–110)
POCT GLUCOSE: 161 MG/DL (ref 70–110)
POCT GLUCOSE: 163 MG/DL (ref 70–110)
POTASSIUM SERPL-SCNC: 4.2 MMOL/L
PROT SERPL-MCNC: 6.1 G/DL
RBC # BLD AUTO: 3.33 M/UL
SODIUM SERPL-SCNC: 139 MMOL/L
TROPONIN I SERPL DL<=0.01 NG/ML-MCNC: 0.11 NG/ML
TROPONIN I SERPL DL<=0.01 NG/ML-MCNC: 0.14 NG/ML
TROPONIN I SERPL DL<=0.01 NG/ML-MCNC: 0.16 NG/ML
WBC # BLD AUTO: 12.23 K/UL

## 2018-04-20 PROCEDURE — A4216 STERILE WATER/SALINE, 10 ML: HCPCS | Performed by: EMERGENCY MEDICINE

## 2018-04-20 PROCEDURE — 99291 CRITICAL CARE FIRST HOUR: CPT | Mod: ,,, | Performed by: INTERNAL MEDICINE

## 2018-04-20 PROCEDURE — 85025 COMPLETE CBC W/AUTO DIFF WBC: CPT

## 2018-04-20 PROCEDURE — 84484 ASSAY OF TROPONIN QUANT: CPT

## 2018-04-20 PROCEDURE — 63600175 PHARM REV CODE 636 W HCPCS

## 2018-04-20 PROCEDURE — 84484 ASSAY OF TROPONIN QUANT: CPT | Mod: 91

## 2018-04-20 PROCEDURE — 25000003 PHARM REV CODE 250: Performed by: INTERNAL MEDICINE

## 2018-04-20 PROCEDURE — 63600175 PHARM REV CODE 636 W HCPCS: Performed by: INTERNAL MEDICINE

## 2018-04-20 PROCEDURE — 36415 COLL VENOUS BLD VENIPUNCTURE: CPT

## 2018-04-20 PROCEDURE — 25000003 PHARM REV CODE 250

## 2018-04-20 PROCEDURE — 25000003 PHARM REV CODE 250: Performed by: EMERGENCY MEDICINE

## 2018-04-20 PROCEDURE — 20000000 HC ICU ROOM

## 2018-04-20 PROCEDURE — 99152 MOD SED SAME PHYS/QHP 5/>YRS: CPT | Mod: ,,, | Performed by: INTERNAL MEDICINE

## 2018-04-20 PROCEDURE — 94761 N-INVAS EAR/PLS OXIMETRY MLT: CPT

## 2018-04-20 PROCEDURE — 27000221 HC OXYGEN, UP TO 24 HOURS

## 2018-04-20 PROCEDURE — B2111ZZ FLUOROSCOPY OF MULTIPLE CORONARY ARTERIES USING LOW OSMOLAR CONTRAST: ICD-10-PCS | Performed by: INTERNAL MEDICINE

## 2018-04-20 PROCEDURE — 80053 COMPREHEN METABOLIC PANEL: CPT

## 2018-04-20 PROCEDURE — 93454 CORONARY ARTERY ANGIO S&I: CPT | Mod: 26,,, | Performed by: INTERNAL MEDICINE

## 2018-04-20 PROCEDURE — 99152 MOD SED SAME PHYS/QHP 5/>YRS: CPT

## 2018-04-20 PROCEDURE — 63600175 PHARM REV CODE 636 W HCPCS: Performed by: HOSPITALIST

## 2018-04-20 RX ORDER — HYDROCODONE BITARTRATE AND ACETAMINOPHEN 5; 325 MG/1; MG/1
1 TABLET ORAL EVERY 4 HOURS PRN
Status: DISCONTINUED | OUTPATIENT
Start: 2018-04-20 | End: 2018-04-20

## 2018-04-20 RX ORDER — ACETAMINOPHEN 325 MG/1
650 TABLET ORAL EVERY 4 HOURS PRN
Status: DISCONTINUED | OUTPATIENT
Start: 2018-04-20 | End: 2018-04-24 | Stop reason: HOSPADM

## 2018-04-20 RX ORDER — ONDANSETRON 2 MG/ML
4 INJECTION INTRAMUSCULAR; INTRAVENOUS EVERY 12 HOURS PRN
Status: DISCONTINUED | OUTPATIENT
Start: 2018-04-20 | End: 2018-04-23

## 2018-04-20 RX ORDER — ATROPINE SULFATE 0.1 MG/ML
0.5 INJECTION INTRAVENOUS
Status: DISCONTINUED | OUTPATIENT
Start: 2018-04-20 | End: 2018-04-24 | Stop reason: HOSPADM

## 2018-04-20 RX ORDER — MORPHINE SULFATE 10 MG/ML
2 INJECTION INTRAMUSCULAR; INTRAVENOUS; SUBCUTANEOUS EVERY 10 MIN PRN
Status: DISCONTINUED | OUTPATIENT
Start: 2018-04-20 | End: 2018-04-24 | Stop reason: HOSPADM

## 2018-04-20 RX ORDER — SODIUM CHLORIDE 9 MG/ML
INJECTION, SOLUTION INTRAVENOUS CONTINUOUS
Status: ACTIVE | OUTPATIENT
Start: 2018-04-20 | End: 2018-04-20

## 2018-04-20 RX ADMIN — AMIODARONE HYDROCHLORIDE 1 MG/MIN: 1.8 INJECTION, SOLUTION INTRAVENOUS at 09:04

## 2018-04-20 RX ADMIN — HYDROCODONE BITARTRATE AND ACETAMINOPHEN 1 TABLET: 5; 325 TABLET ORAL at 05:04

## 2018-04-20 RX ADMIN — FAMOTIDINE 20 MG: 20 TABLET, FILM COATED ORAL at 08:04

## 2018-04-20 RX ADMIN — Medication 3 ML: at 05:04

## 2018-04-20 RX ADMIN — FAMOTIDINE 20 MG: 20 TABLET, FILM COATED ORAL at 09:04

## 2018-04-20 RX ADMIN — AMIODARONE HYDROCHLORIDE 1 MG/MIN: 1.8 INJECTION, SOLUTION INTRAVENOUS at 03:04

## 2018-04-20 RX ADMIN — Medication 3 ML: at 10:04

## 2018-04-20 RX ADMIN — LOSARTAN POTASSIUM 100 MG: 25 TABLET, FILM COATED ORAL at 09:04

## 2018-04-20 RX ADMIN — ATORVASTATIN CALCIUM 40 MG: 40 TABLET, FILM COATED ORAL at 08:04

## 2018-04-20 RX ADMIN — SODIUM CHLORIDE: 0.9 INJECTION, SOLUTION INTRAVENOUS at 12:04

## 2018-04-20 RX ADMIN — SODIUM CHLORIDE 3 ML/KG/HR: 0.9 INJECTION, SOLUTION INTRAVENOUS at 04:04

## 2018-04-20 RX ADMIN — HYDROCODONE BITARTRATE AND ACETAMINOPHEN 1 TABLET: 5; 325 TABLET ORAL at 08:04

## 2018-04-20 RX ADMIN — ONDANSETRON 4 MG: 2 INJECTION INTRAMUSCULAR; INTRAVENOUS at 04:04

## 2018-04-20 RX ADMIN — ASPIRIN 81 MG: 81 TABLET, COATED ORAL at 08:04

## 2018-04-20 RX ADMIN — CLOPIDOGREL BISULFATE 75 MG: 75 TABLET ORAL at 08:04

## 2018-04-20 NOTE — ASSESSMENT & PLAN NOTE
Pt had prolonged TdP overnight.  Electrolytes OK  EF low normal  Known CAD, nonobst on cath 5/2017  Will plan relook cath this pm (pt had breakfast)  Cont amio gtt  Cont ASA/Plavix    Risks, benefits and alternatives of the catheterization procedure were discussed with the patient which include but are not limited to: bleeding, infection, death, heart attack, arrhythmia, kidney failure, stroke, need for emergency surgery, etc.  Patient understands and and agrees to proceed.  Consent was placed on the chart.

## 2018-04-20 NOTE — PROGRESS NOTES
Call placed to Dr. Campbell answering service to inform of transfer to ICU after code.  Spoke with Dr. Montero, verbalized acknowledgement.

## 2018-04-20 NOTE — PLAN OF CARE
Problem: Patient Care Overview  Goal: Plan of Care Review  Outcome: Ongoing (interventions implemented as appropriate)  Patient awake and alert. Patient oriented to person and place. Full code. Status post cardiac arrest with chest compressions. NSR w/ PVC's on cardiac monitor. Amiodarone drip infusing. Vs WNL. Afebrile. 3L nasal cannula. Voids per urinal. Skin intact. Accu-checks AC/HS. Bedrest. Able to turn self. Plan of care is to monitor heart rhythm. Plan discussed with patient.

## 2018-04-20 NOTE — CONSULTS
patient transferred out of ICU today then returned follow a code. SW met with wife and step son in lobby then with patient, wife and step son in ICU room. Patient able to talk with SW in ICU room. SW explained SW/DEDRA will continue to follow with tx team, update as patient progresses.

## 2018-04-20 NOTE — BRIEF OP NOTE
[facility]  Brief Operative Note     SUMMARY     Surgery Date: 4/20/2018     Surgeon(s) and Role:     * Ned Toribio MD - Primary    Assisting Surgeon: None    Pre-op Diagnosis:  NSVT (nonsustained ventricular tachycardia) [I47.2]  S/P TAVR (transcatheter aortic valve replacement) [Z95.2]    Post-op Diagnosis:  same    Procedure(s) (LRB):  Left heart cath R radial access, not before 4pm (Left)    Anesthesia: RN IV Sedation    Description of the findings of the procedure: see below    Findings/Key Components:  LVEF: 50% by echo  Normal TAVR fxn by echo    Dominance: Right  LM: normal  LAD: MLI   Diag1 prox 60%, mid 50%  Ramus: MLI  LCx: MLI  RCA: dom, prox 30%    Hemostasis:  R Radial band    Impression:  TdP  Nonobst CAD  Normal LV fxn/TAVR fxn by echo  R rad vasband for hemostasis    Plan:  Cont ASA/Plavix  Cont amio gtt  EPS eval    Estimated Blood Loss: <50 mL         Specimens: None

## 2018-04-20 NOTE — PLAN OF CARE
Problem: Patient Care Overview  Goal: Plan of Care Review  Outcome: Ongoing (interventions implemented as appropriate)  Patient transferred out of ICU to room 310A at 1500. At 1551, according to notes from floor, patient had sustained run of vtach, PEA arrest, vfib then was shocked once and given lidocaine. He was received back in ICU at 1620. He is on amiodarone drip at 1mg/min. Remains in sinus with multifocal PVCs. 2x 2 beat runs upon arrival to ICU, but none since. Patient was vomiting when received from [facility], zofran given without relief. Phenergan given with relief of symptoms. Wife and son updated at bedside by nursing and Dr. Black. Dr. Rashid aware of transfer back to ICU. Patient complaining of rib pain. Patient confused as to the events that occurred and time. Reoriented.

## 2018-04-20 NOTE — PROGRESS NOTES
" [facility]  Adult Nutrition  Progress Note    SUMMARY       Recommendations    Recommendation/Intervention:   1. Add boost glucose (chocolate) 1 can/day.   2. RD to monitor progress    Goals: 1) Patient to consume >=85% of EEN and EPN x5 days with tolerance  Nutrition Goal Status: progressing towards goal  Communication of RD Recs: reviewed with RN    D/C planning: Cardiac diet with supplements as needed.     Reason for Assessment    Reason for Assessment: RD follow-up  Diagnosis: other (see comments) (CHF, SOB, pneumonia of right lower lobe, dyspnea)  Relevant Medical History: DM, CKD3    General Information Comments: Pt transferred to ICU s/p Cardiac Arrest. Prior to event po intake was %. Patient states decreased appetite at this time, but usual good appetite. Would like a boost glucose 1 can/daily. Denies n/v. Reports denture issues resulting in difficulty chewing: pt. on [facility] soft diet.     Nutrition Risk Screen    Nutrition Risk Screen: dysphagia or difficulty swallowing ("if ave too much food i have trouble swallowing")    Nutrition/Diet History    Patient Reported Diet/Restrictions/Preferences: general  Do you have any cultural, spiritual, Yazidi conflicts, given your current situation?: No cultural, Yazidi or ethnic food preferences.   Food Allergies: NKFA  Factors Affecting Nutritional Intake: chewing difficulties/inability to chew food    Anthropometrics    Temp: 98.4 °F (36.9 °C)  Height Method: Stated  Height: 5' 9" (175.3 cm)  Height (inches): 69 in  Weight Method: Bed Scale  Weight: 83.4 kg (183 lb 13.8 oz)  Weight (lb): 183.87 lb  Ideal Body Weight (IBW), Male: 160 lb  % Ideal Body Weight, Male (lb): 120.15 lb  BMI (Calculated): 28.4  BMI Grade: 25 - 29.9 - overweight       Lab/Procedures/Meds    Pertinent Labs Reviewed: reviewed  Pertinent Labs Comments: HgbA1c 6.1%  Pertinent Medications Reviewed: reviewed    Physical Findings/Assessment    Overall Physical " Appearance: advanced age, overweight, on oxygen therapy  Oral/Mouth Cavity:  (poor fitting dentures)  Skin: intact (Gilberto Score 16)    Estimated/Assessed Needs    Weight Used For Calorie Calculations: 72.7 kg (160 lb 4.4 oz) (Ideal Body Weight)     Energy Need Method: Kcal/kg (1818 - 2181)  Protein Requirements: 65 - 75 g  Weight Used For Protein Calculations: 72.7 kg (160 lb 4.4 oz) (Ideal Body Weight)     Fluid Need Method: RDA Method, other (see comments) (1400 - 1900 ml/day or as restricted by MD)     CHO Requirement: 200 g      Nutrition Prescription Ordered    Current Diet Order: 2000 calorie Diabetic, Cardiac, Mechanical Soft    Evaluation of Received Nutrient/Fluid Intake    I/O: 1130/860  Energy Calories Required: not meeting needs  Protein Required: not meeting needs  Fluid Required: other (see comments) (per MD)  Comments: LBM: 4/16  Tolerance: tolerating (decreased appetite at this time.)    % Intake of Estimated Energy Needs: 25%  % Meal Intake: 25%      Nutrition Risk    Level of Risk/Frequency of Follow-up:  (f/u 2x weekly)     Assessment and Plan    Nutrition Dx: Inadequate Energy Intake r/t decreased appetite as evidenced by 25% po intake with meals.   Nutrition Dx Status: New       Monitor and Evaluation    Food and Nutrient Intake: energy intake, food and beverage intake  Food and Nutrient Adminstration: diet order  Knowledge/Beliefs/Attitudes: food and nutrition knowledge/skill, beliefs and attitudes  Physical Activity and Function: nutrition-related ADLs and IADLs  Anthropometric Measurements: weight, weight change, body mass index  Biochemical Data, Medical Tests and Procedures: electrolyte and renal panel, gastrointestinal profile, glucose/endocrine profile  Nutrition-Focused Physical Findings: overall appearance     Nutrition Follow-Up    RD Follow-up?: Yes

## 2018-04-20 NOTE — SUBJECTIVE & OBJECTIVE
Interval History: PT Was switched to oral amiodarone and then he transferred him to the floor.  Pt then wentinto V tach again on the floor and required a code.  He was shocked once and got epi and lidocane and woke up.  Transferred back to the ICU.  Pt looks great this morning.  COmplaining of being sore but otherwise doing OK    Review of Systems   All other systems reviewed and are negative.    Scheduled Meds:   aspirin  81 mg Oral Daily    atorvastatin  40 mg Oral Daily    clopidogrel  75 mg Oral Daily    famotidine  20 mg Oral BID    losartan  100 mg Oral Daily    metoprolol succinate  50 mg Oral Daily    sodium chloride 0.9%  3 mL Intravenous Q8H     Continuous Infusions:   amiodarone in dextrose 5% 1 mg/min (04/20/18 0601)     PRN Meds:.acetaminophen, acetaminophen, dextrose 50%, dextrose 50%, glucagon (human recombinant), glucose, glucose, hydrocodone-acetaminophen 5-325mg, insulin aspart U-100, promethazine (PHENERGAN) IVPB    Objective:     Vital Signs (Most Recent):  Temp: 98.5 °F (36.9 °C) (04/20/18 0301)  Pulse: 63 (04/20/18 0752)  Resp: (!) 25 (04/20/18 0752)  BP: 137/62 (04/20/18 0603)  SpO2: 98 % (04/20/18 0752) Vital Signs (24h Range):  Temp:  [96 °F (35.6 °C)-98.5 °F (36.9 °C)] 98.5 °F (36.9 °C)  Pulse:  [54-80] 63  Resp:  [6-33] 25  SpO2:  [92 %-100 %] 98 %  BP: (120-238)/() 137/62     Weight: 83.4 kg (183 lb 13.8 oz)  Body mass index is 27.15 kg/m². Down 4 KG    Intake/Output Summary (Last 24 hours) at 04/20/18 0754  Last data filed at 04/20/18 0601   Gross per 24 hour   Intake           1130.5 ml   Output              860 ml   Net            270.5 ml      Physical Exam   Constitutional: He is oriented to person, place, and time. He appears well-developed and well-nourished.   HENT:   Head: Normocephalic and atraumatic.   Eyes: EOM are normal. Pupils are equal, round, and reactive to light.   Neck: Neck supple.   Cardiovascular: Normal rate, regular rhythm and normal heart sounds.     Pulmonary/Chest: No respiratory distress. He has no wheezes.   Abdominal: Soft. Bowel sounds are normal.   Musculoskeletal: Normal range of motion.   Neurological: He is alert and oriented to person, place, and time.   Skin: Skin is warm and dry.   Psychiatric: He has a normal mood and affect.   Vitals reviewed.      Significant Labs:   A1C:     Recent Labs  Lab 02/18/18  0447 04/17/18  1120   HGBA1C 6.5* 6.1*     CBC:     Recent Labs  Lab 04/19/18  0359 04/19/18  1641 04/20/18  0155   WBC 10.17 16.95* 12.23   HGB 10.7* 12.3* 10.3*   HCT 33.8* 38.7* 32.2*    231 178     CMP:     Recent Labs  Lab 04/19/18  0358 04/19/18  1641 04/20/18  0155    140 139   K 4.2 4.2 4.2    107 106   CO2 25 21* 24    166* 152*   BUN 26* 25* 24*   CREATININE 1.6* 1.8* 1.5*   CALCIUM 9.0 9.4 8.9   PROT 6.0 7.1 6.1   ALBUMIN 2.9* 3.3* 2.9*   BILITOT 0.2 0.4 0.3   ALKPHOS 94 142* 111   AST 14 80* 46*   ALT 13 64* 46*   ANIONGAP 8 12 9   EGFRNONAA 40* 34* 43*     POCT Glucose:     Recent Labs  Lab 04/19/18  1541 04/19/18  2111 04/20/18  0741   POCTGLUCOSE 101 137* 131*       Significant Imaging: U/S: I have reviewed all pertinent results/findings within the past 24 hours and my personal findings are:  BLE:  No DVT

## 2018-04-20 NOTE — PROGRESS NOTES
[facility] Hospital Medicine  Progress Note    Patient Name: Timbo Gallagher  MRN: [id number]  Patient Class: IP- Inpatient   Admission Date: 4/17/2018  Length of Stay: 3 days  Attending Physician: Cirilo Montero MD  Primary Care Provider: Cirilo Montero MD        Subjective:     Principal Problem:Acute on chronic diastolic heart failure    HPI:  Pt is an 81yo male with a history of CHF routenly followed by Dr Toribio who presented to the ED with shortness of breath.  Pt was seen by cardiology on 3/28 and the wife reports that at that time he was told to discontinue his Lasix.  THere is no record of this continuation in the chart.  Since stopping his lasix he has built up with fluid.  On presentation to the ED his sats were in 50% range.  Pt was started on Bipap and is going to be admitted to the ED with Acute congestive heart failure    Hospital Course:  No notes on file    Interval History: PT Was switched to oral amiodarone and then he transferred him to the floor.  Pt then wentinto V tach again on the floor and required a code.  He was shocked once and got epi and lidocane and woke up.  Transferred back to the ICU.  Pt looks great this morning.  COmplaining of being sore but otherwise doing OK    Review of Systems   All other systems reviewed and are negative.    Scheduled Meds:   aspirin  81 mg Oral Daily    atorvastatin  40 mg Oral Daily    clopidogrel  75 mg Oral Daily    famotidine  20 mg Oral BID    losartan  100 mg Oral Daily    metoprolol succinate  50 mg Oral Daily    sodium chloride 0.9%  3 mL Intravenous Q8H     Continuous Infusions:   amiodarone in dextrose 5% 1 mg/min (04/20/18 0601)     PRN Meds:.acetaminophen, acetaminophen, dextrose 50%, dextrose 50%, glucagon (human recombinant), glucose, glucose, hydrocodone-acetaminophen 5-325mg, insulin aspart U-100, promethazine (PHENERGAN) IVPB    Objective:     Vital Signs (Most Recent):  Temp: 98.5 °F (36.9 °C) (04/20/18  0301)  Pulse: 63 (04/20/18 0752)  Resp: (!) 25 (04/20/18 0752)  BP: 137/62 (04/20/18 0603)  SpO2: 98 % (04/20/18 0752) Vital Signs (24h Range):  Temp:  [96 °F (35.6 °C)-98.5 °F (36.9 °C)] 98.5 °F (36.9 °C)  Pulse:  [54-80] 63  Resp:  [6-33] 25  SpO2:  [92 %-100 %] 98 %  BP: (120-238)/() 137/62     Weight: 83.4 kg (183 lb 13.8 oz)  Body mass index is 27.15 kg/m². Down 4 KG    Intake/Output Summary (Last 24 hours) at 04/20/18 0754  Last data filed at 04/20/18 0601   Gross per 24 hour   Intake           1130.5 ml   Output              860 ml   Net            270.5 ml      Physical Exam   Constitutional: He is oriented to person, place, and time. He appears well-developed and well-nourished.   HENT:   Head: Normocephalic and atraumatic.   Eyes: EOM are normal. Pupils are equal, round, and reactive to light.   Neck: Neck supple.   Cardiovascular: Normal rate, regular rhythm and normal heart sounds.    Pulmonary/Chest: No respiratory distress. He has no wheezes.   Abdominal: Soft. Bowel sounds are normal.   Musculoskeletal: Normal range of motion.   Neurological: He is alert and oriented to person, place, and time.   Skin: Skin is warm and dry.   Psychiatric: He has a normal mood and affect.   Vitals reviewed.      Significant Labs:   A1C:     Recent Labs  Lab 02/18/18  0447 04/17/18  1120   HGBA1C 6.5* 6.1*     CBC:     Recent Labs  Lab 04/19/18  0359 04/19/18  1641 04/20/18  0155   WBC 10.17 16.95* 12.23   HGB 10.7* 12.3* 10.3*   HCT 33.8* 38.7* 32.2*    231 178     CMP:     Recent Labs  Lab 04/19/18  0358 04/19/18  1641 04/20/18  0155    140 139   K 4.2 4.2 4.2    107 106   CO2 25 21* 24    166* 152*   BUN 26* 25* 24*   CREATININE 1.6* 1.8* 1.5*   CALCIUM 9.0 9.4 8.9   PROT 6.0 7.1 6.1   ALBUMIN 2.9* 3.3* 2.9*   BILITOT 0.2 0.4 0.3   ALKPHOS 94 142* 111   AST 14 80* 46*   ALT 13 64* 46*   ANIONGAP 8 12 9   EGFRNONAA 40* 34* 43*     POCT Glucose:     Recent Labs  Lab 04/19/18  1541  04/19/18 2111 04/20/18  0741   POCTGLUCOSE 101 137* 131*       Significant Imaging: U/S: I have reviewed all pertinent results/findings within the past 24 hours and my personal findings are:  BLE:  No DVT    Assessment/Plan:      * Acute on chronic diastolic heart failure    Continue to hold diauretics          NSVT (nonsustained ventricular tachycardia)    S/p code.  Back on Amnio drip.  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  DId not require respiratory support after code        Essential hypertension              Type 2 diabetes mellitus with renal complication    Sugars controlled.  Metformin back on hold            VTE Risk Mitigation         Ordered     Place DEYA hose  Until discontinued      04/17/18 0838     Place sequential compression device  Until discontinued      04/17/18 0838     IP VTE HIGH RISK PATIENT  Once      04/17/18 0838          Critical care time spent on the evaluation and treatment of severe organ dysfunction, review of pertinent labs and imaging studies, discussions with consulting providers and discussions with patient/family: >30 minutes.    Dayna Campbell MD  Department of Hospital Medicine   [facility]

## 2018-04-20 NOTE — SUBJECTIVE & OBJECTIVE
Review of Systems   Gastrointestinal: Negative for melena.   Genitourinary: Negative for hematuria.     Objective:     Vital Signs (Most Recent):  Temp: 98.4 °F (36.9 °C) (04/20/18 0715)  Pulse: (!) 53 (04/20/18 1030)  Resp: 16 (04/20/18 1030)  BP: (!) 113/55 (04/20/18 1030)  SpO2: 96 % (04/20/18 1030) Vital Signs (24h Range):  Temp:  [96 °F (35.6 °C)-98.5 °F (36.9 °C)] 98.4 °F (36.9 °C)  Pulse:  [53-80] 53  Resp:  [6-33] 16  SpO2:  [92 %-100 %] 96 %  BP: (111-238)/() 113/55     Weight: 83.4 kg (183 lb 13.8 oz)  Body mass index is 27.15 kg/m².     SpO2: 96 %  O2 Device (Oxygen Therapy): nasal cannula      Intake/Output Summary (Last 24 hours) at 04/20/18 1104  Last data filed at 04/20/18 1001   Gross per 24 hour   Intake          1124.99 ml   Output              650 ml   Net           474.99 ml       Lines/Drains/Airways     Peripheral Intravenous Line                 Peripheral IV - Single Lumen 04/17/18 0501 Left Wrist 3 days         Peripheral IV - Single Lumen 04/19/18 1901 Left Forearm less than 1 day                Physical Exam   Constitutional: He is oriented to person, place, and time. He appears well-developed and well-nourished.   HENT:   Head: Normocephalic and atraumatic.   Eyes: Conjunctivae and EOM are normal. Pupils are equal, round, and reactive to light. No scleral icterus.   Neck: Neck supple. No JVD present. Carotid bruit is not present. No tracheal deviation present. No thyromegaly present.   Cardiovascular: Normal rate, regular rhythm, S1 normal and S2 normal.  Exam reveals no gallop and no friction rub.    Murmur heard.   Systolic murmur is present with a grade of 2/6   Pulmonary/Chest: Effort normal and breath sounds normal. He has no wheezes. He exhibits tenderness.   Abdominal: Soft. He exhibits no distension.   Musculoskeletal: He exhibits no edema.   Neurological: He is alert and oriented to person, place, and time. He has normal strength. No cranial nerve deficit.   Skin: Skin is  warm and dry. No rash noted.   Psychiatric: He has a normal mood and affect. His behavior is normal.       Current Medications:   aspirin  81 mg Oral Daily    atorvastatin  40 mg Oral Daily    clopidogrel  75 mg Oral Daily    famotidine  20 mg Oral BID    losartan  100 mg Oral Daily    metoprolol succinate  50 mg Oral Daily    sodium chloride 0.9%  3 mL Intravenous Q8H      amiodarone in dextrose 5% 1 mg/min (04/20/18 1001)     acetaminophen, acetaminophen, dextrose 50%, dextrose 50%, glucagon (human recombinant), glucose, glucose, hydrocodone-acetaminophen 5-325mg, insulin aspart U-100, promethazine (PHENERGAN) IVPB    Laboratory:  CBC:    Recent Labs  Lab 04/17/18  0610 04/18/18  0447 04/19/18  0359 04/19/18  1641 04/20/18  0155   WHITE BLOOD CELL COUNT 14.83 H 9.50 10.17 16.95 H 12.23   HEMOGLOBIN 11.6 L 10.9 L 10.7 L 12.3 L 10.3 L   HEMATOCRIT 36.2 L 32.6 L 33.8 L 38.7 L 32.2 L   PLATELETS 214 245 180 231 178       CHEMISTRIES:    Recent Labs  Lab 02/19/18  2348 02/20/18  0343  02/20/18  0957  04/17/18  0610 04/18/18  0447 04/18/18  1001 04/19/18  0358 04/19/18  1641 04/20/18  0155   GLUCOSE 195 H 221 H  < > 180 H  < > 208 H 107  --  110 166 H 152 H   SODIUM 138 139  < > 136  < > 142 142  --  141 140 139   POTASSIUM 4.3 4.6  < > 4.4  < > 4.1 3.7  --  4.2 4.2 4.2   BUN BLD 27 H 28 H  < > 34 H  < > 18 21  --  26 H 25 H 24 H   CREATININE 1.5 H 1.8 H  < > 2.2 H  < > 1.2 1.2  --  1.6 H 1.8 H 1.5 H   EGFR IF  49 A 40 A  < > 31 A  < > >60 >60  --  46 A 40 A 49 A   EGFR IF NON- 43 A 34 A  < > 27 A  < > 56 A 56 A  --  40 A 34 A 43 A   CALCIUM 9.0 8.8  < > 9.0  < > 8.8 9.2  --  9.0 9.4 8.9   MAGNESIUM 1.3 L 2.2  --  2.1  --   --   --  1.7  --  1.9  --    < > = values in this interval not displayed.    CARDIAC BIOMARKERS:    Recent Labs  Lab 02/17/18  1723 02/17/18  2345 04/17/18  0610 04/19/18  1641 04/20/18  0005   TROPONIN I 0.029 H 0.023 0.045 H 0.081 H 0.162 H        COAGS:    Recent Labs  Lab 02/19/18  1750 02/19/18  2348 02/20/18  0343 02/20/18  0957 04/17/18  0610   INR 1.1 1.1 1.1 1.1 1.2       LIPIDS/LFTS:    Recent Labs  Lab 02/17/18  0831 04/17/18  0610 04/18/18  0447 04/19/18  0358 04/19/18  1641 04/20/18  0155   CHOLESTEROL 147  --   --   --   --   --    TRIGLYCERIDES 72  --   --   --   --   --    HDL 37 L  --   --   --   --   --    LDL CHOLESTEROL 95.6  --   --   --   --   --    NON-HDL CHOLESTEROL 110  --   --   --   --   --    AST 11 21 16 14 80 H 46 H   ALT <5 L 20 13 13 64 H 46 H       BNP:    Recent Labs  Lab 09/30/16  0916 10/16/16  1050 12/02/16  0513 01/27/18  0736 04/17/18  0610    H 658 H 500 H 356 H 1,335 H       TSH:    Recent Labs  Lab 02/17/18  0831   TSH 1.450       Free T4:        Diagnostic Results:  ECG (personally reviewed tracings):  4/17/18 , PVC  Tele 4/19/18: torsades    Chest X-Ray (personally reviewed image(s)): 4/20/18 RML infil    Echo: 4/19/18    1 - Low normal to mildly depressed left ventricular systolic function (EF 50-55%).     2 - Concentric hypertrophy.     3 - Biatrial enlargement.     4 - S/P transcatheter AVR, UNA = 1.99 cm2, AVAi = 0.98 cm2/m2, peak velocity = 2.39 m/s, mean gradient = 11 mmHg.     5 - Mild to moderate mitral regurgitation.     6 - Mild tricuspid regurgitation.     LE Venous US 4/17/18  No evidence of deep venous thrombosis in either lower extremity.    Carotid US 3/22/18 (s/p L CEA)  There is 20 - 39% right Internal Carotid stenosis.  There is 20 - 39% left Internal Carotid stenosis.    TAVR 10/17/17  B. 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    No perivalvular leak post procedure per 2D echo.    Post deployment AV mean gradient 2.5 mmHg, Vmax 1.09 m/s.     Cath: 5/8/17 (images pers rev)  D. Hemodynamic Results  AO: 122/68 (93)  E. Angiographic Results       Patient has a right dominant coronary artery.      - Left  Main Coronary Artery:             The LM is normal. There is ANASTASIIA 3 flow.     - Left Anterior Descending Artery:             The LAD has luminal irregularities. There is ANASTASIIA 3 flow.     - Left Circumflex Artery:             The LCX is normal. There is ANASTASIIA 3 flow.     - Right Coronary Artery:             The RCA has luminal irregularities. There is ANASTASIIA 3 flow.     - Radial Artery:             The Radial artery was not studied.     - D1:             The D1 has a 50% stenosis. There is ANASTASIIA 3 flow.     - Posterior Descending Artery:             The mid PDA has a 70% stenosis. There is ANASTASIIA 3 flow.

## 2018-04-20 NOTE — PROGRESS NOTES
[facility]  Cardiology  Progress Note    Patient Name: Timbo Gallagher  MRN: [id number]  Admission Date: 4/17/2018  Hospital Length of Stay: 3 days  Code Status: Full Code   Attending Physician: Cirilo Montero MD   Primary Care Physician: Cirilo Montero MD  Expected Discharge Date:   Principal Problem:Acute on chronic diastolic heart failure    Subjective:     Hospital Course:   4/19/18: transferred back to ICU with recurrent ventricular arrhythmia (?VF)    Interval Hx: pt seen in ICU, case d/w RN.  He has CP from chest compressions.  No further ventricular arrhythmia.    Tele: SR, TdP noted 4/19/18 (pers rev)        Review of Systems   Gastrointestinal: Negative for melena.   Genitourinary: Negative for hematuria.     Objective:     Vital Signs (Most Recent):  Temp: 98.4 °F (36.9 °C) (04/20/18 0715)  Pulse: (!) 53 (04/20/18 1030)  Resp: 16 (04/20/18 1030)  BP: (!) 113/55 (04/20/18 1030)  SpO2: 96 % (04/20/18 1030) Vital Signs (24h Range):  Temp:  [96 °F (35.6 °C)-98.5 °F (36.9 °C)] 98.4 °F (36.9 °C)  Pulse:  [53-80] 53  Resp:  [6-33] 16  SpO2:  [92 %-100 %] 96 %  BP: (111-238)/() 113/55     Weight: 83.4 kg (183 lb 13.8 oz)  Body mass index is 27.15 kg/m².     SpO2: 96 %  O2 Device (Oxygen Therapy): nasal cannula      Intake/Output Summary (Last 24 hours) at 04/20/18 1104  Last data filed at 04/20/18 1001   Gross per 24 hour   Intake          1124.99 ml   Output              650 ml   Net           474.99 ml       Lines/Drains/Airways     Peripheral Intravenous Line                 Peripheral IV - Single Lumen 04/17/18 0501 Left Wrist 3 days         Peripheral IV - Single Lumen 04/19/18 1901 Left Forearm less than 1 day                Physical Exam   Constitutional: He is oriented to person, place, and time. He appears well-developed and well-nourished.   HENT:   Head: Normocephalic and atraumatic.   Eyes: Conjunctivae and EOM are normal. Pupils are equal, round, and reactive to light.  No scleral icterus.   Neck: Neck supple. No JVD present. Carotid bruit is not present. No tracheal deviation present. No thyromegaly present.   Cardiovascular: Normal rate, regular rhythm, S1 normal and S2 normal.  Exam reveals no gallop and no friction rub.    Murmur heard.   Systolic murmur is present with a grade of 2/6   Pulmonary/Chest: Effort normal and breath sounds normal. He has no wheezes. He exhibits tenderness.   Abdominal: Soft. He exhibits no distension.   Musculoskeletal: He exhibits no edema.   Neurological: He is alert and oriented to person, place, and time. He has normal strength. No cranial nerve deficit.   Skin: Skin is warm and dry. No rash noted.   Psychiatric: He has a normal mood and affect. His behavior is normal.       Current Medications:   aspirin  81 mg Oral Daily    atorvastatin  40 mg Oral Daily    clopidogrel  75 mg Oral Daily    famotidine  20 mg Oral BID    losartan  100 mg Oral Daily    metoprolol succinate  50 mg Oral Daily    sodium chloride 0.9%  3 mL Intravenous Q8H      amiodarone in dextrose 5% 1 mg/min (04/20/18 1001)     acetaminophen, acetaminophen, dextrose 50%, dextrose 50%, glucagon (human recombinant), glucose, glucose, hydrocodone-acetaminophen 5-325mg, insulin aspart U-100, promethazine (PHENERGAN) IVPB    Laboratory:  CBC:    Recent Labs  Lab 04/17/18  0610 04/18/18  0447 04/19/18  0359 04/19/18  1641 04/20/18  0155   WHITE BLOOD CELL COUNT 14.83 H 9.50 10.17 16.95 H 12.23   HEMOGLOBIN 11.6 L 10.9 L 10.7 L 12.3 L 10.3 L   HEMATOCRIT 36.2 L 32.6 L 33.8 L 38.7 L 32.2 L   PLATELETS 214 245 180 231 178       CHEMISTRIES:    Recent Labs  Lab 02/19/18  2348 02/20/18  0343  02/20/18  0957  04/17/18  0610 04/18/18  0447 04/18/18  1001 04/19/18  0358 04/19/18  1641 04/20/18  0155   GLUCOSE 195 H 221 H  < > 180 H  < > 208 H 107  --  110 166 H 152 H   SODIUM 138 139  < > 136  < > 142 142  --  141 140 139   POTASSIUM 4.3 4.6  < > 4.4  < > 4.1 3.7  --  4.2 4.2 4.2   BUN  BLD 27 H 28 H  < > 34 H  < > 18 21  --  26 H 25 H 24 H   CREATININE 1.5 H 1.8 H  < > 2.2 H  < > 1.2 1.2  --  1.6 H 1.8 H 1.5 H   EGFR IF  49 A 40 A  < > 31 A  < > >60 >60  --  46 A 40 A 49 A   EGFR IF NON- 43 A 34 A  < > 27 A  < > 56 A 56 A  --  40 A 34 A 43 A   CALCIUM 9.0 8.8  < > 9.0  < > 8.8 9.2  --  9.0 9.4 8.9   MAGNESIUM 1.3 L 2.2  --  2.1  --   --   --  1.7  --  1.9  --    < > = values in this interval not displayed.    CARDIAC BIOMARKERS:    Recent Labs  Lab 02/17/18  1723 02/17/18  2345 04/17/18  0610 04/19/18  1641 04/20/18  0005   TROPONIN I 0.029 H 0.023 0.045 H 0.081 H 0.162 H       COAGS:    Recent Labs  Lab 02/19/18  1750 02/19/18  2348 02/20/18  0343 02/20/18  0957 04/17/18  0610   INR 1.1 1.1 1.1 1.1 1.2       LIPIDS/LFTS:    Recent Labs  Lab 02/17/18  0831 04/17/18  0610 04/18/18  0447 04/19/18  0358 04/19/18  1641 04/20/18  0155   CHOLESTEROL 147  --   --   --   --   --    TRIGLYCERIDES 72  --   --   --   --   --    HDL 37 L  --   --   --   --   --    LDL CHOLESTEROL 95.6  --   --   --   --   --    NON-HDL CHOLESTEROL 110  --   --   --   --   --    AST 11 21 16 14 80 H 46 H   ALT <5 L 20 13 13 64 H 46 H       BNP:    Recent Labs  Lab 09/30/16  0916 10/16/16  1050 12/02/16  0513 01/27/18  0736 04/17/18  0610    H 658 H 500 H 356 H 1,335 H       TSH:    Recent Labs  Lab 02/17/18  0831   TSH 1.450       Free T4:        Diagnostic Results:  ECG (personally reviewed tracings):  4/17/18 , PVC  Tele 4/19/18: torsades    Chest X-Ray (personally reviewed image(s)): 4/20/18 RML infil    Echo: 4/19/18    1 - Low normal to mildly depressed left ventricular systolic function (EF 50-55%).     2 - Concentric hypertrophy.     3 - Biatrial enlargement.     4 - S/P transcatheter AVR, UNA = 1.99 cm2, AVAi = 0.98 cm2/m2, peak velocity = 2.39 m/s, mean gradient = 11 mmHg.     5 - Mild to moderate mitral regurgitation.     6 - Mild tricuspid regurgitation.     LE Venous US  4/17/18  No evidence of deep venous thrombosis in either lower extremity.    Carotid US 3/22/18 (s/p L CEA)  There is 20 - 39% right Internal Carotid stenosis.  There is 20 - 39% left Internal Carotid stenosis.    TAVR 10/17/17  B. 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    No perivalvular leak post procedure per 2D echo.    Post deployment AV mean gradient 2.5 mmHg, Vmax 1.09 m/s.     Cath: 5/8/17 (images pers rev)  D. Hemodynamic Results  AO: 122/68 (93)  E. Angiographic Results       Patient has a right dominant coronary artery.      - Left Main Coronary Artery:             The LM is normal. There is ANASTASIIA 3 flow.     - Left Anterior Descending Artery:             The LAD has luminal irregularities. There is ANASTASIIA 3 flow.     - Left Circumflex Artery:             The LCX is normal. There is ANASTASIIA 3 flow.     - Right Coronary Artery:             The RCA has luminal irregularities. There is ANASTASIIA 3 flow.     - Radial Artery:             The Radial artery was not studied.     - D1:             The D1 has a 50% stenosis. There is ANASTASIIA 3 flow.     - Posterior Descending Artery:             The mid PDA has a 70% stenosis. There is ANASTASIIA 3 flow.    Assessment and Plan:     * Acute on chronic diastolic heart failure    Diuresis and afterload reduction as tolerated. Recent echo with mildly depressed EF - repeat today. Hold lasix with rising Cr        NSVT (nonsustained ventricular tachycardia)    Pt had prolonged TdP overnight.  Electrolytes OK  EF low normal  Known CAD, nonobst on cath 5/2017  Will plan relook cath this pm (pt had breakfast)  Cont amio gtt  Cont ASA/Plavix    Risks, benefits and alternatives of the catheterization procedure were discussed with the patient which include but are not limited to: bleeding, infection, death, heart attack, arrhythmia, kidney failure, stroke, need for emergency surgery, etc.  Patient understands and and  agrees to proceed.  Consent was placed on the chart.          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

## 2018-04-21 LAB
ALBUMIN SERPL BCP-MCNC: 2.9 G/DL
ALP SERPL-CCNC: 111 U/L
ALT SERPL W/O P-5'-P-CCNC: 34 U/L
ANION GAP SERPL CALC-SCNC: 10 MMOL/L
AST SERPL-CCNC: 28 U/L
BASOPHILS # BLD AUTO: 0.02 K/UL
BASOPHILS NFR BLD: 0.2 %
BILIRUB SERPL-MCNC: 0.3 MG/DL
BUN SERPL-MCNC: 27 MG/DL
CALCIUM SERPL-MCNC: 9 MG/DL
CHLORIDE SERPL-SCNC: 107 MMOL/L
CO2 SERPL-SCNC: 21 MMOL/L
CREAT SERPL-MCNC: 1.6 MG/DL
DIFFERENTIAL METHOD: ABNORMAL
EOSINOPHIL # BLD AUTO: 0.6 K/UL
EOSINOPHIL NFR BLD: 5.4 %
ERYTHROCYTE [DISTWIDTH] IN BLOOD BY AUTOMATED COUNT: 14.2 %
EST. GFR  (AFRICAN AMERICAN): 46 ML/MIN/1.73 M^2
EST. GFR  (NON AFRICAN AMERICAN): 40 ML/MIN/1.73 M^2
GLUCOSE SERPL-MCNC: 140 MG/DL
HCT VFR BLD AUTO: 33.8 %
HGB BLD-MCNC: 10.9 G/DL
LYMPHOCYTES # BLD AUTO: 1.3 K/UL
LYMPHOCYTES NFR BLD: 12 %
MCH RBC QN AUTO: 31.1 PG
MCHC RBC AUTO-ENTMCNC: 32.2 G/DL
MCV RBC AUTO: 97 FL
MONOCYTES # BLD AUTO: 1.1 K/UL
MONOCYTES NFR BLD: 10.3 %
NEUTROPHILS # BLD AUTO: 7.9 K/UL
NEUTROPHILS NFR BLD: 71.5 %
PLATELET # BLD AUTO: 180 K/UL
PMV BLD AUTO: 11.7 FL
POCT GLUCOSE: 126 MG/DL (ref 70–110)
POCT GLUCOSE: 143 MG/DL (ref 70–110)
POCT GLUCOSE: 150 MG/DL (ref 70–110)
POCT GLUCOSE: 167 MG/DL (ref 70–110)
POTASSIUM SERPL-SCNC: 5.3 MMOL/L
PROT SERPL-MCNC: 6.4 G/DL
RBC # BLD AUTO: 3.5 M/UL
SODIUM SERPL-SCNC: 138 MMOL/L
TROPONIN I SERPL DL<=0.01 NG/ML-MCNC: 0.07 NG/ML
TROPONIN I SERPL DL<=0.01 NG/ML-MCNC: 0.08 NG/ML
WBC # BLD AUTO: 11.04 K/UL

## 2018-04-21 PROCEDURE — 80053 COMPREHEN METABOLIC PANEL: CPT

## 2018-04-21 PROCEDURE — 99291 CRITICAL CARE FIRST HOUR: CPT | Mod: ,,, | Performed by: INTERNAL MEDICINE

## 2018-04-21 PROCEDURE — 36415 COLL VENOUS BLD VENIPUNCTURE: CPT

## 2018-04-21 PROCEDURE — 25000003 PHARM REV CODE 250: Performed by: INTERNAL MEDICINE

## 2018-04-21 PROCEDURE — 20000000 HC ICU ROOM

## 2018-04-21 PROCEDURE — A4216 STERILE WATER/SALINE, 10 ML: HCPCS | Performed by: EMERGENCY MEDICINE

## 2018-04-21 PROCEDURE — 63600175 PHARM REV CODE 636 W HCPCS: Performed by: HOSPITALIST

## 2018-04-21 PROCEDURE — 85025 COMPLETE CBC W/AUTO DIFF WBC: CPT

## 2018-04-21 PROCEDURE — 84484 ASSAY OF TROPONIN QUANT: CPT | Mod: 91

## 2018-04-21 PROCEDURE — 27000221 HC OXYGEN, UP TO 24 HOURS

## 2018-04-21 PROCEDURE — 25000003 PHARM REV CODE 250: Performed by: EMERGENCY MEDICINE

## 2018-04-21 PROCEDURE — 63600175 PHARM REV CODE 636 W HCPCS: Performed by: INTERNAL MEDICINE

## 2018-04-21 PROCEDURE — 94761 N-INVAS EAR/PLS OXIMETRY MLT: CPT

## 2018-04-21 PROCEDURE — 25000003 PHARM REV CODE 250

## 2018-04-21 RX ORDER — MELOXICAM 7.5 MG/1
7.5 TABLET ORAL DAILY
Status: DISCONTINUED | OUTPATIENT
Start: 2018-04-21 | End: 2018-04-22

## 2018-04-21 RX ORDER — AMIODARONE HYDROCHLORIDE 200 MG/1
400 TABLET ORAL 2 TIMES DAILY
Status: DISCONTINUED | OUTPATIENT
Start: 2018-04-21 | End: 2018-04-24 | Stop reason: HOSPADM

## 2018-04-21 RX ORDER — AMIODARONE HYDROCHLORIDE 200 MG/1
200 TABLET ORAL DAILY
Status: DISCONTINUED | OUTPATIENT
Start: 2018-05-20 | End: 2018-04-24 | Stop reason: HOSPADM

## 2018-04-21 RX ORDER — AMIODARONE HYDROCHLORIDE 200 MG/1
200 TABLET ORAL 2 TIMES DAILY
Status: DISCONTINUED | OUTPATIENT
Start: 2018-05-05 | End: 2018-04-24 | Stop reason: HOSPADM

## 2018-04-21 RX ADMIN — FAMOTIDINE 20 MG: 20 TABLET, FILM COATED ORAL at 09:04

## 2018-04-21 RX ADMIN — ATORVASTATIN CALCIUM 40 MG: 40 TABLET, FILM COATED ORAL at 08:04

## 2018-04-21 RX ADMIN — FAMOTIDINE 20 MG: 20 TABLET, FILM COATED ORAL at 08:04

## 2018-04-21 RX ADMIN — MORPHINE SULFATE 2 MG: 10 INJECTION INTRAVENOUS at 08:04

## 2018-04-21 RX ADMIN — Medication 3 ML: at 06:04

## 2018-04-21 RX ADMIN — ACETAMINOPHEN 650 MG: 325 TABLET ORAL at 08:04

## 2018-04-21 RX ADMIN — AMIODARONE HYDROCHLORIDE 400 MG: 200 TABLET ORAL at 09:04

## 2018-04-21 RX ADMIN — MELOXICAM 7.5 MG: 7.5 TABLET ORAL at 10:04

## 2018-04-21 RX ADMIN — MORPHINE SULFATE 2 MG: 10 INJECTION INTRAVENOUS at 07:04

## 2018-04-21 RX ADMIN — MORPHINE SULFATE 2 MG: 10 INJECTION INTRAVENOUS at 11:04

## 2018-04-21 RX ADMIN — ASPIRIN 81 MG: 81 TABLET, COATED ORAL at 08:04

## 2018-04-21 RX ADMIN — METOPROLOL SUCCINATE 50 MG: 50 TABLET, EXTENDED RELEASE ORAL at 08:04

## 2018-04-21 RX ADMIN — AMIODARONE HYDROCHLORIDE 1 MG/MIN: 1.8 INJECTION, SOLUTION INTRAVENOUS at 08:04

## 2018-04-21 RX ADMIN — LOSARTAN POTASSIUM 100 MG: 25 TABLET, FILM COATED ORAL at 08:04

## 2018-04-21 RX ADMIN — Medication 3 ML: at 09:04

## 2018-04-21 RX ADMIN — AMIODARONE HYDROCHLORIDE 1 MG/MIN: 1.8 INJECTION, SOLUTION INTRAVENOUS at 03:04

## 2018-04-21 RX ADMIN — MORPHINE SULFATE 2 MG: 10 INJECTION INTRAVENOUS at 04:04

## 2018-04-21 RX ADMIN — CLOPIDOGREL BISULFATE 75 MG: 75 TABLET ORAL at 08:04

## 2018-04-21 NOTE — SUBJECTIVE & OBJECTIVE
Review of Systems   Gastrointestinal: Negative for melena.   Genitourinary: Negative for hematuria.     Objective:     Vital Signs (Most Recent):  Temp: 98.6 °F (37 °C) (04/21/18 0715)  Pulse: (!) 55 (04/21/18 1000)  Resp: 17 (04/21/18 1000)  BP: (!) 121/57 (04/21/18 1000)  SpO2: 98 % (04/21/18 1000) Vital Signs (24h Range):  Temp:  [97.9 °F (36.6 °C)-98.6 °F (37 °C)] 98.6 °F (37 °C)  Pulse:  [55-75] 55  Resp:  [14-71] 17  SpO2:  [85 %-100 %] 98 %  BP: (118-186)/() 121/57     Weight: 85.9 kg (189 lb 6 oz)  Body mass index is 27.97 kg/m².     SpO2: 98 %  O2 Device (Oxygen Therapy): nasal cannula w/ humidification      Intake/Output Summary (Last 24 hours) at 04/21/18 1251  Last data filed at 04/21/18 0800   Gross per 24 hour   Intake          2215.45 ml   Output              525 ml   Net          1690.45 ml       Lines/Drains/Airways     Peripheral Intravenous Line                 Peripheral IV - Single Lumen 04/17/18 0501 Left Wrist 4 days         Peripheral IV - Single Lumen 04/20/18 1750 Left Antecubital less than 1 day                Physical Exam   Constitutional: He is oriented to person, place, and time. He appears well-developed and well-nourished.   HENT:   Head: Normocephalic and atraumatic.   Eyes: Conjunctivae and EOM are normal. Pupils are equal, round, and reactive to light. No scleral icterus.   Neck: Neck supple. No JVD present. Carotid bruit is not present. No tracheal deviation present. No thyromegaly present.   Cardiovascular: Normal rate, regular rhythm, S1 normal and S2 normal.  Exam reveals no gallop and no friction rub.    Murmur heard.   Systolic murmur is present with a grade of 2/6   R rad access site c/d/i.   Pulmonary/Chest: Effort normal and breath sounds normal. He has no wheezes. He exhibits tenderness.   Abdominal: Soft. He exhibits no distension.   Musculoskeletal: He exhibits no edema.   Neurological: He is alert and oriented to person, place, and time. He has normal  strength. No cranial nerve deficit.   Skin: Skin is warm and dry. No rash noted.   Psychiatric: He has a normal mood and affect. His behavior is normal.       Current Medications:   aspirin  81 mg Oral Daily    atorvastatin  40 mg Oral Daily    clopidogrel  75 mg Oral Daily    famotidine  20 mg Oral BID    losartan  100 mg Oral Daily    meloxicam  7.5 mg Oral Daily    metoprolol succinate  50 mg Oral Daily    sodium chloride 0.9%  3 mL Intravenous Q8H      amiodarone in dextrose 5% 1 mg/min (04/21/18 0847)     acetaminophen, acetaminophen, acetaminophen, atropine, dextrose 50%, dextrose 50%, glucagon (human recombinant), glucose, glucose, hydrocodone-acetaminophen 5-325mg, insulin aspart U-100, morphine, ondansetron, promethazine (PHENERGAN) IVPB, sodium chloride 0.9%    Laboratory:  CBC:    Recent Labs  Lab 04/18/18  0447 04/19/18  0359 04/19/18  1641 04/20/18  0155 04/21/18  0332   WHITE BLOOD CELL COUNT 9.50 10.17 16.95 H 12.23 11.04   HEMOGLOBIN 10.9 L 10.7 L 12.3 L 10.3 L 10.9 L   HEMATOCRIT 32.6 L 33.8 L 38.7 L 32.2 L 33.8 L   PLATELETS 245 180 231 178 180       CHEMISTRIES:    Recent Labs  Lab 02/19/18  2348 02/20/18  0343  02/20/18  0957  04/18/18  0447 04/18/18  1001 04/19/18  0358 04/19/18  1641 04/20/18  0155 04/21/18  0332   GLUCOSE 195 H 221 H  < > 180 H  < > 107  --  110 166 H 152 H 140 H   SODIUM 138 139  < > 136  < > 142  --  141 140 139 138   POTASSIUM 4.3 4.6  < > 4.4  < > 3.7  --  4.2 4.2 4.2 5.3 H   BUN BLD 27 H 28 H  < > 34 H  < > 21  --  26 H 25 H 24 H 27 H   CREATININE 1.5 H 1.8 H  < > 2.2 H  < > 1.2  --  1.6 H 1.8 H 1.5 H 1.6 H   EGFR IF  49 A 40 A  < > 31 A  < > >60  --  46 A 40 A 49 A 46 A   EGFR IF NON- 43 A 34 A  < > 27 A  < > 56 A  --  40 A 34 A 43 A 40 A   CALCIUM 9.0 8.8  < > 9.0  < > 9.2  --  9.0 9.4 8.9 9.0   MAGNESIUM 1.3 L 2.2  --  2.1  --   --  1.7  --  1.9  --   --    < > = values in this interval not displayed.    CARDIAC  BIOMARKERS:    Recent Labs  Lab 04/19/18  1641 04/20/18  0005 04/20/18  1002 04/20/18  1753 04/21/18  0824   TROPONIN I 0.081 H 0.162 H 0.138 H 0.111 H 0.083 H       COAGS:    Recent Labs  Lab 02/19/18  1750 02/19/18  2348 02/20/18  0343 02/20/18  0957 04/17/18  0610   INR 1.1 1.1 1.1 1.1 1.2       LIPIDS/LFTS:    Recent Labs  Lab 02/17/18  0831  04/18/18  0447 04/19/18  0358 04/19/18  1641 04/20/18  0155 04/21/18  0332   CHOLESTEROL 147  --   --   --   --   --   --    TRIGLYCERIDES 72  --   --   --   --   --   --    HDL 37 L  --   --   --   --   --   --    LDL CHOLESTEROL 95.6  --   --   --   --   --   --    NON-HDL CHOLESTEROL 110  --   --   --   --   --   --    AST 11  < > 16 14 80 H 46 H 28   ALT <5 L  < > 13 13 64 H 46 H 34   < > = values in this interval not displayed.    BNP:    Recent Labs  Lab 09/30/16  0916 10/16/16  1050 12/02/16  0513 01/27/18  0736 04/17/18  0610    H 658 H 500 H 356 H 1,335 H       TSH:    Recent Labs  Lab 02/17/18  0831   TSH 1.450       Free T4:        Diagnostic Results:  ECG (personally reviewed tracings):  4/17/18 , PVC  Tele 4/19/18: torsades    Chest X-Ray (personally reviewed image(s)): 4/20/18 RML infil    Echo: 4/19/18    1 - Low normal to mildly depressed left ventricular systolic function (EF 50-55%).     2 - Concentric hypertrophy.     3 - Biatrial enlargement.     4 - S/P transcatheter AVR, UNA = 1.99 cm2, AVAi = 0.98 cm2/m2, peak velocity = 2.39 m/s, mean gradient = 11 mmHg.     5 - Mild to moderate mitral regurgitation.     6 - Mild tricuspid regurgitation.     LE Venous US 4/17/18  No evidence of deep venous thrombosis in either lower extremity.    Carotid US 3/22/18 (s/p L CEA)  There is 20 - 39% right Internal Carotid stenosis.  There is 20 - 39% left Internal Carotid stenosis.    Cath 4/20/18 (images pers rev)  LVEF: 50% by echo  Normal TAVR fxn by echo  Dominance: Right  LM: normal  LAD: MLI              Diag1 prox 60%, mid 50%  Ramus: MLI  LCx:  MLI  RCA: dom, prox 30%  Hemostasis:  R Radial band  Impression:  TdP  Nonobst CAD  Normal LV fxn/TAVR fxn by echo  R rad vasband for hemostasis  Plan:  Cont ASA/Plavix  Cont amio gtt  EPS eval    TAVR 10/17/17  B. 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    No perivalvular leak post procedure per 2D echo.    Post deployment AV mean gradient 2.5 mmHg, Vmax 1.09 m/s.

## 2018-04-21 NOTE — PROGRESS NOTES
[facility]  Cardiology  Progress Note    Patient Name: Timbo Gallagher  MRN: [id number]  Admission Date: 4/17/2018  Hospital Length of Stay: 4 days  Code Status: Full Code   Attending Physician: Cirilo Montero MD   Primary Care Physician: Cirilo Montero MD  Expected Discharge Date:   Principal Problem:Acute on chronic diastolic heart failure    Subjective:     Hospital Course:   4/19/18: transferred back to ICU with recurrent ventricular arrhythmia (?VF)  4/20/18: cath with nonobst CAD    Interval Hx: pt seen in ICU, case d/w mult family at bedside.  His CP from chest compressions is improving.  No further ventricular arrhythmia.  No sob.  Discussed case with Dr. Parish ([facility]- EPS).  Pt has indication for ICD, no EPS needed.    Tele: SR, no further ventricular arrhythmia, TdP noted 4/19/18 (pers rev)        Review of Systems   Gastrointestinal: Negative for melena.   Genitourinary: Negative for hematuria.     Objective:     Vital Signs (Most Recent):  Temp: 98.6 °F (37 °C) (04/21/18 0715)  Pulse: (!) 55 (04/21/18 1000)  Resp: 17 (04/21/18 1000)  BP: (!) 121/57 (04/21/18 1000)  SpO2: 98 % (04/21/18 1000) Vital Signs (24h Range):  Temp:  [97.9 °F (36.6 °C)-98.6 °F (37 °C)] 98.6 °F (37 °C)  Pulse:  [55-75] 55  Resp:  [14-71] 17  SpO2:  [85 %-100 %] 98 %  BP: (118-186)/() 121/57     Weight: 85.9 kg (189 lb 6 oz)  Body mass index is 27.97 kg/m².     SpO2: 98 %  O2 Device (Oxygen Therapy): nasal cannula w/ humidification      Intake/Output Summary (Last 24 hours) at 04/21/18 1251  Last data filed at 04/21/18 0800   Gross per 24 hour   Intake          2215.45 ml   Output              525 ml   Net          1690.45 ml       Lines/Drains/Airways     Peripheral Intravenous Line                 Peripheral IV - Single Lumen 04/17/18 0501 Left Wrist 4 days         Peripheral IV - Single Lumen 04/20/18 1750 Left Antecubital less than 1 day                Physical Exam   Constitutional: He is  oriented to person, place, and time. He appears well-developed and well-nourished.   HENT:   Head: Normocephalic and atraumatic.   Eyes: Conjunctivae and EOM are normal. Pupils are equal, round, and reactive to light. No scleral icterus.   Neck: Neck supple. No JVD present. Carotid bruit is not present. No tracheal deviation present. No thyromegaly present.   Cardiovascular: Normal rate, regular rhythm, S1 normal and S2 normal.  Exam reveals no gallop and no friction rub.    Murmur heard.   Systolic murmur is present with a grade of 2/6   R rad access site c/d/i.   Pulmonary/Chest: Effort normal and breath sounds normal. He has no wheezes. He exhibits tenderness.   Abdominal: Soft. He exhibits no distension.   Musculoskeletal: He exhibits no edema.   Neurological: He is alert and oriented to person, place, and time. He has normal strength. No cranial nerve deficit.   Skin: Skin is warm and dry. No rash noted.   Psychiatric: He has a normal mood and affect. His behavior is normal.       Current Medications:   aspirin  81 mg Oral Daily    atorvastatin  40 mg Oral Daily    clopidogrel  75 mg Oral Daily    famotidine  20 mg Oral BID    losartan  100 mg Oral Daily    meloxicam  7.5 mg Oral Daily    metoprolol succinate  50 mg Oral Daily    sodium chloride 0.9%  3 mL Intravenous Q8H      amiodarone in dextrose 5% 1 mg/min (04/21/18 0847)     acetaminophen, acetaminophen, acetaminophen, atropine, dextrose 50%, dextrose 50%, glucagon (human recombinant), glucose, glucose, hydrocodone-acetaminophen 5-325mg, insulin aspart U-100, morphine, ondansetron, promethazine (PHENERGAN) IVPB, sodium chloride 0.9%    Laboratory:  CBC:    Recent Labs  Lab 04/18/18  0447 04/19/18  0359 04/19/18  1641 04/20/18  0155 04/21/18  0332   WHITE BLOOD CELL COUNT 9.50 10.17 16.95 H 12.23 11.04   HEMOGLOBIN 10.9 L 10.7 L 12.3 L 10.3 L 10.9 L   HEMATOCRIT 32.6 L 33.8 L 38.7 L 32.2 L 33.8 L   PLATELETS 245 180 231 178 180        CHEMISTRIES:    Recent Labs  Lab 02/19/18  2348 02/20/18  0343  02/20/18  0957  04/18/18  0447 04/18/18  1001 04/19/18  0358 04/19/18  1641 04/20/18  0155 04/21/18  0332   GLUCOSE 195 H 221 H  < > 180 H  < > 107  --  110 166 H 152 H 140 H   SODIUM 138 139  < > 136  < > 142  --  141 140 139 138   POTASSIUM 4.3 4.6  < > 4.4  < > 3.7  --  4.2 4.2 4.2 5.3 H   BUN BLD 27 H 28 H  < > 34 H  < > 21  --  26 H 25 H 24 H 27 H   CREATININE 1.5 H 1.8 H  < > 2.2 H  < > 1.2  --  1.6 H 1.8 H 1.5 H 1.6 H   EGFR IF  49 A 40 A  < > 31 A  < > >60  --  46 A 40 A 49 A 46 A   EGFR IF NON- 43 A 34 A  < > 27 A  < > 56 A  --  40 A 34 A 43 A 40 A   CALCIUM 9.0 8.8  < > 9.0  < > 9.2  --  9.0 9.4 8.9 9.0   MAGNESIUM 1.3 L 2.2  --  2.1  --   --  1.7  --  1.9  --   --    < > = values in this interval not displayed.    CARDIAC BIOMARKERS:    Recent Labs  Lab 04/19/18  1641 04/20/18  0005 04/20/18  1002 04/20/18  1753 04/21/18  0824   TROPONIN I 0.081 H 0.162 H 0.138 H 0.111 H 0.083 H       COAGS:    Recent Labs  Lab 02/19/18  1750 02/19/18  2348 02/20/18  0343 02/20/18  0957 04/17/18  0610   INR 1.1 1.1 1.1 1.1 1.2       LIPIDS/LFTS:    Recent Labs  Lab 02/17/18  0831  04/18/18  0447 04/19/18  0358 04/19/18  1641 04/20/18  0155 04/21/18  0332   CHOLESTEROL 147  --   --   --   --   --   --    TRIGLYCERIDES 72  --   --   --   --   --   --    HDL 37 L  --   --   --   --   --   --    LDL CHOLESTEROL 95.6  --   --   --   --   --   --    NON-HDL CHOLESTEROL 110  --   --   --   --   --   --    AST 11  < > 16 14 80 H 46 H 28   ALT <5 L  < > 13 13 64 H 46 H 34   < > = values in this interval not displayed.    BNP:    Recent Labs  Lab 09/30/16  0916 10/16/16  1050 12/02/16  0513 01/27/18  0736 04/17/18  0610    H 658 H 500 H 356 H 1,335 H       TSH:    Recent Labs  Lab 02/17/18  0831   TSH 1.450       Free T4:        Diagnostic Results:  ECG (personally reviewed tracings):  4/17/18 , PVC  Tele 4/19/18:  torsades    Chest X-Ray (personally reviewed image(s)): 4/20/18 RML infil    Echo: 4/19/18    1 - Low normal to mildly depressed left ventricular systolic function (EF 50-55%).     2 - Concentric hypertrophy.     3 - Biatrial enlargement.     4 - S/P transcatheter AVR, UNA = 1.99 cm2, AVAi = 0.98 cm2/m2, peak velocity = 2.39 m/s, mean gradient = 11 mmHg.     5 - Mild to moderate mitral regurgitation.     6 - Mild tricuspid regurgitation.     LE Venous US 4/17/18  No evidence of deep venous thrombosis in either lower extremity.    Carotid US 3/22/18 (s/p L CEA)  There is 20 - 39% right Internal Carotid stenosis.  There is 20 - 39% left Internal Carotid stenosis.    Cath 4/20/18 (images pers rev)  LVEF: 50% by echo  Normal TAVR fxn by echo  Dominance: Right  LM: normal  LAD: MLI              Diag1 prox 60%, mid 50%  Ramus: MLI  LCx: MLI  RCA: dom, prox 30%  Hemostasis:  R Radial band  Impression:  TdP  Nonobst CAD  Normal LV fxn/TAVR fxn by echo  R rad vasband for hemostasis  Plan:  Cont ASA/Plavix  Cont amio gtt  EPS eval    TAVR 10/17/17  B. 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    No perivalvular leak post procedure per 2D echo.    Post deployment AV mean gradient 2.5 mmHg, Vmax 1.09 m/s.    Assessment and Plan:     SVT (nonsustained ventricular tachycardia)    Pt had prolonged TdP overnight 4/19/18.  Electrolytes OK  EF low normal  Cath 4/20/18 with nonobst CAD  Case d/w Dr. Parish ([facility] EPS), pt has indication for ICD (given absence of inciting event), no need for EPS.  Change amio to PO.  Cont ICU monitoring.  I discussed options with pt (no ICD, lifevest with outpt ICD, inpat ICD) and he opts for inpat ICD.  I will make NPO after MN and have Dr. Black see pt on Monday AM.        S/P TAVR (transcatheter aortic valve replacement)    Brent S3, normally functioning by echo.        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

## 2018-04-21 NOTE — PLAN OF CARE
Problem: Patient Care Overview  Goal: Individualization & Mutuality  Outcome: Ongoing (interventions implemented as appropriate)  Pt in ICU on 3L NC. AAOx4. Pt continues with amiodarone drip 1mg/min. Pt had R radial approach for L heart cath during previous shift. TR band removed without signs of complications. Pt sinus daniel to NSR with rare runs of tachycardia. VSS. Pr afebrile. PRN morphine administered for chest pain from turning to bathe secondary to chest compressions yesterday. Pt remains in sinus bradycardia with rare pvcs. No BMs this shift. Voids per urinal. SCDs maintained. Accuchecks ACHS. No new injury or fall noted. Pt shows no signs or symptoms of distress.

## 2018-04-21 NOTE — ASSESSMENT & PLAN NOTE
Awaiting EP eval.  Will give meloxicam for chest soreness after code but have to watch GFR very closely

## 2018-04-21 NOTE — PROGRESS NOTES
[facility] Hospital Medicine  Progress Note    Patient Name: Timbo Gallagher  MRN: [id number]  Patient Class: IP- Inpatient   Admission Date: 4/17/2018  Length of Stay: 4 days  Attending Physician: Cirilo Montero MD  Primary Care Provider: Cirilo Montero MD        Subjective:     Principal Problem:Acute on chronic diastolic heart failure    HPI:  Pt is an 81yo male with a history of CHF routenly followed by Dr Toribio who presented to the ED with shortness of breath.  Pt was seen by cardiology on 3/28 and the wife reports that at that time he was told to discontinue his Lasix.  THere is no record of this continuation in the chart.  Since stopping his lasix he has built up with fluid.  On presentation to the ED his sats were in 50% range.  Pt was started on Bipap and is going to be admitted to the ED with Acute congestive heart failure    Hospital Course:  No notes on file    Interval History: Cath done yesterday shows norlmal EF and no major blockages.  Cards recommending EP eval.  Complaining of soreness in his chest since the code    Review of Systems   All other systems reviewed and are negative.    Scheduled Meds:   aspirin  81 mg Oral Daily    atorvastatin  40 mg Oral Daily    clopidogrel  75 mg Oral Daily    famotidine  20 mg Oral BID    losartan  100 mg Oral Daily    metoprolol succinate  50 mg Oral Daily    sodium chloride 0.9%  3 mL Intravenous Q8H     Continuous Infusions:   amiodarone in dextrose 5% 1 mg/min (04/21/18 0300)     PRN Meds:.acetaminophen, acetaminophen, acetaminophen, atropine, dextrose 50%, dextrose 50%, glucagon (human recombinant), glucose, glucose, hydrocodone-acetaminophen 5-325mg, insulin aspart U-100, morphine, ondansetron, promethazine (PHENERGAN) IVPB, sodium chloride 0.9%    Objective:     Vital Signs (Most Recent):  Temp: 98.6 °F (37 °C) (04/21/18 0715)  Pulse: 75 (04/21/18 0839)  Resp: (!) 36 (04/21/18 0839)  BP: (!) 155/70 (04/21/18 0730)  SpO2:  (!) 90 % (04/21/18 0839) Vital Signs (24h Range):  Temp:  [97.8 °F (36.6 °C)-98.6 °F (37 °C)] 98.6 °F (37 °C)  Pulse:  [53-75] 75  Resp:  [14-40] 36  SpO2:  [85 %-100 %] 90 %  BP: (111-186)/() 155/70     Weight: 85.9 kg (189 lb 6 oz)  Body mass index is 27.97 kg/m². Down 4 KG    Intake/Output Summary (Last 24 hours) at 04/21/18 0843  Last data filed at 04/21/18 0749   Gross per 24 hour   Intake           1996.3 ml   Output              725 ml   Net           1271.3 ml      Physical Exam   Constitutional: He is oriented to person, place, and time. He appears well-developed and well-nourished.   HENT:   Head: Normocephalic and atraumatic.   Eyes: EOM are normal. Pupils are equal, round, and reactive to light.   Neck: Neck supple.   Cardiovascular: Normal rate, regular rhythm and normal heart sounds.    Pulmonary/Chest: No respiratory distress. He has no wheezes.   Abdominal: Soft. Bowel sounds are normal.   Musculoskeletal: Normal range of motion.   Neurological: He is alert and oriented to person, place, and time.   Skin: Skin is warm and dry.   Psychiatric: He has a normal mood and affect.   Vitals reviewed.      Significant Labs:   A1C:     Recent Labs  Lab 02/18/18  0447 04/17/18  1120   HGBA1C 6.5* 6.1*     CBC:     Recent Labs  Lab 04/19/18  1641 04/20/18  0155 04/21/18  0332   WBC 16.95* 12.23 11.04   HGB 12.3* 10.3* 10.9*   HCT 38.7* 32.2* 33.8*    178 180     CMP:     Recent Labs  Lab 04/19/18  1641 04/20/18  0155 04/21/18  0332    139 138   K 4.2 4.2 5.3*    106 107   CO2 21* 24 21*   * 152* 140*   BUN 25* 24* 27*   CREATININE 1.8* 1.5* 1.6*   CALCIUM 9.4 8.9 9.0   PROT 7.1 6.1 6.4   ALBUMIN 3.3* 2.9* 2.9*   BILITOT 0.4 0.3 0.3   ALKPHOS 142* 111 111   AST 80* 46* 28   ALT 64* 46* 34   ANIONGAP 12 9 10   EGFRNONAA 34* 43* 40*     POCT Glucose:     Recent Labs  Lab 04/20/18  1720 04/20/18  2208 04/21/18  0620   POCTGLUCOSE 143* 161* 150*       Significant Imaging: U/S: I have  reviewed all pertinent results/findings within the past 24 hours and my personal findings are:  BLE:  No DVT    Assessment/Plan:      * Acute on chronic diastolic heart failure    Continue to hold diauretics          NSVT (nonsustained ventricular tachycardia)    Awaiting EP eval.  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  Watch GFR since adding Mobic for a couple of days        S/P TAVR (transcatheter aortic valve replacement)              Aortic valve stenosis      Continue home meds and cards following        Acute hypoxemic respiratory failure      Continue O2.  DId not require respiratory support after code        Essential hypertension              Type 2 diabetes mellitus with renal complication    BS managed.  COntinue sliding scale            VTE Risk Mitigation         Ordered     Place DEYA hose  Until discontinued      04/17/18 0838     Place sequential compression device  Until discontinued      04/17/18 0838     IP VTE HIGH RISK PATIENT  Once      04/17/18 0838          Critical care time spent on the evaluation and treatment of severe organ dysfunction, review of pertinent labs and imaging studies, discussions with consulting providers and discussions with patient/family: >30 minutes.    Dayna Campbell MD  Department of Hospital Medicine   [facility]

## 2018-04-21 NOTE — SUBJECTIVE & OBJECTIVE
Interval History: Cath done yesterday shows norlmal EF and no major blockages.  Cards recommending EP eval.  Complaining of soreness in his chest since the code    Review of Systems   All other systems reviewed and are negative.    Scheduled Meds:   aspirin  81 mg Oral Daily    atorvastatin  40 mg Oral Daily    clopidogrel  75 mg Oral Daily    famotidine  20 mg Oral BID    losartan  100 mg Oral Daily    metoprolol succinate  50 mg Oral Daily    sodium chloride 0.9%  3 mL Intravenous Q8H     Continuous Infusions:   amiodarone in dextrose 5% 1 mg/min (04/21/18 0300)     PRN Meds:.acetaminophen, acetaminophen, acetaminophen, atropine, dextrose 50%, dextrose 50%, glucagon (human recombinant), glucose, glucose, hydrocodone-acetaminophen 5-325mg, insulin aspart U-100, morphine, ondansetron, promethazine (PHENERGAN) IVPB, sodium chloride 0.9%    Objective:     Vital Signs (Most Recent):  Temp: 98.6 °F (37 °C) (04/21/18 0715)  Pulse: 75 (04/21/18 0839)  Resp: (!) 36 (04/21/18 0839)  BP: (!) 155/70 (04/21/18 0730)  SpO2: (!) 90 % (04/21/18 0839) Vital Signs (24h Range):  Temp:  [97.8 °F (36.6 °C)-98.6 °F (37 °C)] 98.6 °F (37 °C)  Pulse:  [53-75] 75  Resp:  [14-40] 36  SpO2:  [85 %-100 %] 90 %  BP: (111-186)/() 155/70     Weight: 85.9 kg (189 lb 6 oz)  Body mass index is 27.97 kg/m². Down 4 KG    Intake/Output Summary (Last 24 hours) at 04/21/18 0843  Last data filed at 04/21/18 0749   Gross per 24 hour   Intake           1996.3 ml   Output              725 ml   Net           1271.3 ml      Physical Exam   Constitutional: He is oriented to person, place, and time. He appears well-developed and well-nourished.   HENT:   Head: Normocephalic and atraumatic.   Eyes: EOM are normal. Pupils are equal, round, and reactive to light.   Neck: Neck supple.   Cardiovascular: Normal rate, regular rhythm and normal heart sounds.    Pulmonary/Chest: No respiratory distress. He has no wheezes.   Abdominal: Soft. Bowel sounds  are normal.   Musculoskeletal: Normal range of motion.   Neurological: He is alert and oriented to person, place, and time.   Skin: Skin is warm and dry.   Psychiatric: He has a normal mood and affect.   Vitals reviewed.      Significant Labs:   A1C:     Recent Labs  Lab 02/18/18  0447 04/17/18  1120   HGBA1C 6.5* 6.1*     CBC:     Recent Labs  Lab 04/19/18  1641 04/20/18  0155 04/21/18  0332   WBC 16.95* 12.23 11.04   HGB 12.3* 10.3* 10.9*   HCT 38.7* 32.2* 33.8*    178 180     CMP:     Recent Labs  Lab 04/19/18  1641 04/20/18  0155 04/21/18  0332    139 138   K 4.2 4.2 5.3*    106 107   CO2 21* 24 21*   * 152* 140*   BUN 25* 24* 27*   CREATININE 1.8* 1.5* 1.6*   CALCIUM 9.4 8.9 9.0   PROT 7.1 6.1 6.4   ALBUMIN 3.3* 2.9* 2.9*   BILITOT 0.4 0.3 0.3   ALKPHOS 142* 111 111   AST 80* 46* 28   ALT 64* 46* 34   ANIONGAP 12 9 10   EGFRNONAA 34* 43* 40*     POCT Glucose:     Recent Labs  Lab 04/20/18  1720 04/20/18  2208 04/21/18  0620   POCTGLUCOSE 143* 161* 150*       Significant Imaging: U/S: I have reviewed all pertinent results/findings within the past 24 hours and my personal findings are:  BLE:  No DVT

## 2018-04-21 NOTE — PLAN OF CARE
Problem: Patient Care Overview  Goal: Plan of Care Review  Outcome: Ongoing (interventions implemented as appropriate)  Pt in ICU. VSS. Afebrile. 3LNC. A&Ox4. R radial approach for L heart cath today. TR band removal process started at 1830 without signs of complications. No chest pain except for soreness with coughing or moving, secondary to chest compressions yesterday. Pt remains in sinus bradycardia with rare pvcs. No BMs this shift. Voids per urinal. SCDs for VTE prophylaxis. Accuchecks ACHS. POC reviewed with pt and pt's family.

## 2018-04-21 NOTE — ASSESSMENT & PLAN NOTE
Pt had prolonged TdP overnight 4/19/18.  Electrolytes OK  EF low normal  Cath 4/20/18 with nonobst CAD  Case d/w Dr. Parish ([facility] EPS), pt has indication for ICD (given absence of inciting event), no need for EPS.  Change amio to PO.  Cont ICU monitoring.  I discussed options with pt (no ICD, lifevest with outpt ICD, inpat ICD) and he opts for inpat ICD.  I will make NPO after MN and have Dr. Black see pt on Monday AM.

## 2018-04-22 LAB
ALBUMIN SERPL BCP-MCNC: 3 G/DL
ALP SERPL-CCNC: 131 U/L
ALT SERPL W/O P-5'-P-CCNC: 29 U/L
ANION GAP SERPL CALC-SCNC: 9 MMOL/L
AST SERPL-CCNC: 21 U/L
BACTERIA BLD CULT: NORMAL
BACTERIA BLD CULT: NORMAL
BASOPHILS # BLD AUTO: 0.02 K/UL
BASOPHILS NFR BLD: 0.2 %
BILIRUB SERPL-MCNC: 0.5 MG/DL
BUN SERPL-MCNC: 26 MG/DL
CALCIUM SERPL-MCNC: 9.6 MG/DL
CHLORIDE SERPL-SCNC: 106 MMOL/L
CO2 SERPL-SCNC: 24 MMOL/L
CREAT SERPL-MCNC: 1.7 MG/DL
DIFFERENTIAL METHOD: ABNORMAL
EOSINOPHIL # BLD AUTO: 0.2 K/UL
EOSINOPHIL NFR BLD: 1.7 %
ERYTHROCYTE [DISTWIDTH] IN BLOOD BY AUTOMATED COUNT: 14.2 %
EST. GFR  (AFRICAN AMERICAN): 42 ML/MIN/1.73 M^2
EST. GFR  (NON AFRICAN AMERICAN): 37 ML/MIN/1.73 M^2
GLUCOSE SERPL-MCNC: 142 MG/DL
HCT VFR BLD AUTO: 36.7 %
HGB BLD-MCNC: 11.6 G/DL
LYMPHOCYTES # BLD AUTO: 1.1 K/UL
LYMPHOCYTES NFR BLD: 9.5 %
MCH RBC QN AUTO: 30.5 PG
MCHC RBC AUTO-ENTMCNC: 31.6 G/DL
MCV RBC AUTO: 97 FL
MONOCYTES # BLD AUTO: 1.4 K/UL
MONOCYTES NFR BLD: 11.9 %
NEUTROPHILS # BLD AUTO: 9 K/UL
NEUTROPHILS NFR BLD: 76.4 %
PLATELET # BLD AUTO: 191 K/UL
PMV BLD AUTO: 12 FL
POCT GLUCOSE: 129 MG/DL (ref 70–110)
POCT GLUCOSE: 136 MG/DL (ref 70–110)
POCT GLUCOSE: 141 MG/DL (ref 70–110)
POCT GLUCOSE: 147 MG/DL (ref 70–110)
POTASSIUM SERPL-SCNC: 4.9 MMOL/L
PROT SERPL-MCNC: 6.5 G/DL
RBC # BLD AUTO: 3.8 M/UL
SODIUM SERPL-SCNC: 139 MMOL/L
TROPONIN I SERPL DL<=0.01 NG/ML-MCNC: 0.06 NG/ML
WBC # BLD AUTO: 11.82 K/UL

## 2018-04-22 PROCEDURE — A4216 STERILE WATER/SALINE, 10 ML: HCPCS | Performed by: EMERGENCY MEDICINE

## 2018-04-22 PROCEDURE — 36415 COLL VENOUS BLD VENIPUNCTURE: CPT

## 2018-04-22 PROCEDURE — 25000003 PHARM REV CODE 250: Performed by: INTERNAL MEDICINE

## 2018-04-22 PROCEDURE — 80053 COMPREHEN METABOLIC PANEL: CPT

## 2018-04-22 PROCEDURE — 25000003 PHARM REV CODE 250: Performed by: EMERGENCY MEDICINE

## 2018-04-22 PROCEDURE — 63600175 PHARM REV CODE 636 W HCPCS: Performed by: INTERNAL MEDICINE

## 2018-04-22 PROCEDURE — 94761 N-INVAS EAR/PLS OXIMETRY MLT: CPT

## 2018-04-22 PROCEDURE — 99291 CRITICAL CARE FIRST HOUR: CPT | Mod: ,,, | Performed by: INTERNAL MEDICINE

## 2018-04-22 PROCEDURE — 20000000 HC ICU ROOM

## 2018-04-22 PROCEDURE — 85025 COMPLETE CBC W/AUTO DIFF WBC: CPT

## 2018-04-22 RX ORDER — CEFAZOLIN SODIUM 1 G/3ML
2 INJECTION, POWDER, FOR SOLUTION INTRAMUSCULAR; INTRAVENOUS ONCE
Status: DISCONTINUED | OUTPATIENT
Start: 2018-04-23 | End: 2018-04-22

## 2018-04-22 RX ORDER — CEFAZOLIN SODIUM 1 G/3ML
2 INJECTION, POWDER, FOR SOLUTION INTRAMUSCULAR; INTRAVENOUS ONCE
Status: COMPLETED | OUTPATIENT
Start: 2018-04-23 | End: 2018-04-23

## 2018-04-22 RX ORDER — FUROSEMIDE 20 MG/1
20 TABLET ORAL DAILY
Status: DISCONTINUED | OUTPATIENT
Start: 2018-04-22 | End: 2018-04-24 | Stop reason: HOSPADM

## 2018-04-22 RX ORDER — AMLODIPINE BESYLATE 5 MG/1
5 TABLET ORAL DAILY
Status: DISCONTINUED | OUTPATIENT
Start: 2018-04-22 | End: 2018-04-24

## 2018-04-22 RX ORDER — CEFAZOLIN SODIUM 1 G/3ML
2 INJECTION, POWDER, FOR SOLUTION INTRAMUSCULAR; INTRAVENOUS
Status: DISCONTINUED | OUTPATIENT
Start: 2018-04-22 | End: 2018-04-22

## 2018-04-22 RX ADMIN — Medication 3 ML: at 06:04

## 2018-04-22 RX ADMIN — AMIODARONE HYDROCHLORIDE 400 MG: 200 TABLET ORAL at 09:04

## 2018-04-22 RX ADMIN — ONDANSETRON 4 MG: 2 INJECTION INTRAMUSCULAR; INTRAVENOUS at 03:04

## 2018-04-22 RX ADMIN — CLOPIDOGREL BISULFATE 75 MG: 75 TABLET ORAL at 09:04

## 2018-04-22 RX ADMIN — ASPIRIN 81 MG: 81 TABLET, COATED ORAL at 09:04

## 2018-04-22 RX ADMIN — Medication 3 ML: at 09:04

## 2018-04-22 RX ADMIN — METOPROLOL SUCCINATE 50 MG: 50 TABLET, EXTENDED RELEASE ORAL at 09:04

## 2018-04-22 RX ADMIN — AMLODIPINE BESYLATE 5 MG: 5 TABLET ORAL at 02:04

## 2018-04-22 RX ADMIN — MORPHINE SULFATE 2 MG: 10 INJECTION INTRAVENOUS at 07:04

## 2018-04-22 RX ADMIN — MELOXICAM 7.5 MG: 7.5 TABLET ORAL at 09:04

## 2018-04-22 RX ADMIN — FUROSEMIDE 20 MG: 20 TABLET ORAL at 11:04

## 2018-04-22 RX ADMIN — FAMOTIDINE 20 MG: 20 TABLET, FILM COATED ORAL at 09:04

## 2018-04-22 RX ADMIN — ATORVASTATIN CALCIUM 40 MG: 40 TABLET, FILM COATED ORAL at 09:04

## 2018-04-22 RX ADMIN — LOSARTAN POTASSIUM 100 MG: 25 TABLET, FILM COATED ORAL at 09:04

## 2018-04-22 NOTE — PLAN OF CARE
Problem: Patient Care Overview  Goal: Plan of Care Review  Outcome: Ongoing (interventions implemented as appropriate)  Pt remains in ICU with plans for ICD placement tomorrow- NPO past MN. NSR with PVC's on the monitor, no acute cardiac events today. Xrays of ribs done today. Pt up to chair for several hours. Fair appetite. Voiding without difficulty. 2 liters nasal cannula. Remains free from fall, injury, or breakdown throughout shift.

## 2018-04-22 NOTE — PROGRESS NOTES
[facility] Hospital Medicine  Progress Note    Patient Name: Timbo Gallagher  MRN: [id number]  Patient Class: IP- Inpatient   Admission Date: 4/17/2018  Length of Stay: 5 days  Attending Physician: Cirilo Montero MD  Primary Care Provider: Cirilo Montero MD        Subjective:     Principal Problem:Acute on chronic diastolic heart failure    HPI:  Pt is an 83yo male with a history of CHF routenly followed by Dr Toribio who presented to the ED with shortness of breath.  Pt was seen by cardiology on 3/28 and the wife reports that at that time he was told to discontinue his Lasix.  THere is no record of this continuation in the chart.  Since stopping his lasix he has built up with fluid.  On presentation to the ED his sats were in 50% range.  Pt was started on Bipap and is going to be admitted to the ED with Acute congestive heart failure    Hospital Course:  No notes on file    Interval History: Cards is planning for ICD in the morning.  No V tach overnight!  Now on PO amiodarone.  Pt reporting feeling a broken bone in his ribcage    Review of Systems   All other systems reviewed and are negative.    Scheduled Meds:   amiodarone  400 mg Oral BID    Followed by    [START ON 5/5/2018] amiodarone  200 mg Oral BID    Followed by    [START ON 5/20/2018] amiodarone  200 mg Oral Daily    aspirin  81 mg Oral Daily    atorvastatin  40 mg Oral Daily    clopidogrel  75 mg Oral Daily    famotidine  20 mg Oral BID    losartan  100 mg Oral Daily    meloxicam  7.5 mg Oral Daily    metoprolol succinate  50 mg Oral Daily    sodium chloride 0.9%  3 mL Intravenous Q8H     Continuous Infusions:    PRN Meds:.acetaminophen, acetaminophen, acetaminophen, atropine, dextrose 50%, dextrose 50%, glucagon (human recombinant), glucose, glucose, hydrocodone-acetaminophen 5-325mg, insulin aspart U-100, morphine, ondansetron, promethazine (PHENERGAN) IVPB, sodium chloride 0.9%    Objective:     Vital Signs (Most  Recent):  Temp: 98.2 °F (36.8 °C) (04/22/18 0800)  Pulse: 76 (04/22/18 0910)  Resp: (!) 32 (04/22/18 0910)  BP: (!) 199/82 (04/22/18 0900)  SpO2: 99 % (04/22/18 0910) Vital Signs (24h Range):  Temp:  [98 °F (36.7 °C)-98.7 °F (37.1 °C)] 98.2 °F (36.8 °C)  Pulse:  [52-76] 76  Resp:  [17-43] 32  SpO2:  [84 %-99 %] 99 %  BP: (117-199)/() 199/82     Weight: 87.6 kg (193 lb 2 oz)  Body mass index is 28.52 kg/m². Down 4 KG    Intake/Output Summary (Last 24 hours) at 04/22/18 1014  Last data filed at 04/22/18 0800   Gross per 24 hour   Intake             99.9 ml   Output              260 ml   Net           -160.1 ml      Physical Exam   Constitutional: He is oriented to person, place, and time. He appears well-developed and well-nourished.   HENT:   Head: Normocephalic and atraumatic.   Eyes: EOM are normal. Pupils are equal, round, and reactive to light.   Neck: Neck supple.   Cardiovascular: Normal rate, regular rhythm and normal heart sounds.    Pulmonary/Chest: No respiratory distress. He has no wheezes.   Abdominal: Soft. Bowel sounds are normal.   Musculoskeletal: Normal range of motion.   Step off at bottom of rib cage   Neurological: He is alert and oriented to person, place, and time.   Skin: Skin is warm and dry.   Psychiatric: He has a normal mood and affect.   Vitals reviewed.      Significant Labs:   A1C:     Recent Labs  Lab 02/18/18  0447 04/17/18  1120   HGBA1C 6.5* 6.1*     CBC:     Recent Labs  Lab 04/21/18  0332 04/22/18  0000   WBC 11.04 11.82   HGB 10.9* 11.6*   HCT 33.8* 36.7*    191     CMP:     Recent Labs  Lab 04/21/18  0332 04/22/18  0000    139   K 5.3* 4.9    106   CO2 21* 24   * 142*   BUN 27* 26*   CREATININE 1.6* 1.7*   CALCIUM 9.0 9.6   PROT 6.4 6.5   ALBUMIN 2.9* 3.0*   BILITOT 0.3 0.5   ALKPHOS 111 131   AST 28 21   ALT 34 29   ANIONGAP 10 9   EGFRNONAA 40* 37*     POCT Glucose:     Recent Labs  Lab 04/21/18  1820 04/21/18  2153 04/22/18  0625   POCTGLUCOSE  167* 126* 129*       Significant Imaging: U/S: I have reviewed all pertinent results/findings within the past 24 hours and my personal findings are:  BLE:  No DVT    Assessment/Plan:      * Acute on chronic diastolic heart failure    Continue to hold diauretics          NSVT (nonsustained ventricular tachycardia)    ICD in the morning.  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  Watch GFR since adding Mobic for a couple of days        S/P TAVR (transcatheter aortic valve replacement)              Aortic valve stenosis      Continue home meds and cards following        Acute hypoxemic respiratory failure      Continue O2.  DId not require respiratory support after code        Essential hypertension              Type 2 diabetes mellitus with renal complication    BS managed.  COntinue sliding scale            VTE Risk Mitigation         Ordered     Place DEYA hose  Until discontinued      04/17/18 0838     Place sequential compression device  Until discontinued      04/17/18 0838     IP VTE HIGH RISK PATIENT  Once      04/17/18 0838          Critical care time spent on the evaluation and treatment of severe organ dysfunction, review of pertinent labs and imaging studies, discussions with consulting providers and discussions with patient/family: >30 minutes.    Dayna Campbell MD  Department of Hospital Medicine   [facility]

## 2018-04-22 NOTE — PROGRESS NOTES
[facility]  Cardiology  Progress Note    Patient Name: Timbo Gallagher  MRN: [id number]  Admission Date: 4/17/2018  Hospital Length of Stay: 5 days  Code Status: Full Code   Attending Physician: Cirilo Montero MD   Primary Care Physician: Cirilo Montero MD  Expected Discharge Date:   Principal Problem:Acute on chronic diastolic heart failure    Subjective:     Hospital Course:   4/19/18: transferred back to ICU with recurrent ventricular arrhythmia (?VF)  4/20/18: cath with nonobst CAD    Interval Hx: pt seen in ICU, case d/w RN and Dr. Rashid.  His CP from chest compressions persists.  No further ventricular arrhythmia.  No sob.  Discussed case with Dr. Parish ([facility] EPS) on 4/21/18.  Pt has indication for ICD, no EPS needed.    Tele: SR, no further ventricular arrhythmia, TdP noted 4/19/18 (pers rev)        Review of Systems   Gastrointestinal: Negative for melena.   Genitourinary: Negative for hematuria.     Objective:     Vital Signs (Most Recent):  Temp: 98.2 °F (36.8 °C) (04/22/18 0800)  Pulse: 65 (04/22/18 1000)  Resp: (!) 25 (04/22/18 1000)  BP: (!) 170/77 (04/22/18 1000)  SpO2: 99 % (04/22/18 1000) Vital Signs (24h Range):  Temp:  [98 °F (36.7 °C)-98.7 °F (37.1 °C)] 98.2 °F (36.8 °C)  Pulse:  [52-76] 65  Resp:  [17-43] 25  SpO2:  [84 %-99 %] 99 %  BP: (121-199)/() 170/77     Weight: 87.6 kg (193 lb 2 oz)  Body mass index is 28.52 kg/m².     SpO2: 99 %  O2 Device (Oxygen Therapy): room air      Intake/Output Summary (Last 24 hours) at 04/22/18 1203  Last data filed at 04/22/18 1000   Gross per 24 hour   Intake             33.3 ml   Output              485 ml   Net           -451.7 ml       Lines/Drains/Airways     Peripheral Intravenous Line                 Peripheral IV - Single Lumen 04/17/18 0501 Left Wrist 5 days         Peripheral IV - Single Lumen 04/21/18 1300 Right Antecubital less than 1 day                Physical Exam   Constitutional: He is oriented to person,  place, and time. He appears well-developed and well-nourished.   HENT:   Head: Normocephalic and atraumatic.   Eyes: Conjunctivae and EOM are normal. Pupils are equal, round, and reactive to light. No scleral icterus.   Neck: Neck supple. No JVD present. Carotid bruit is not present. No tracheal deviation present. No thyromegaly present.   Cardiovascular: Normal rate, regular rhythm, S1 normal and S2 normal.  Exam reveals no gallop and no friction rub.    Murmur heard.   Systolic murmur is present with a grade of 2/6   Pulmonary/Chest: Effort normal and breath sounds normal. He has no wheezes. He exhibits tenderness.   Abdominal: Soft. He exhibits no distension.   Musculoskeletal: He exhibits no edema.   Neurological: He is alert and oriented to person, place, and time. He has normal strength. No cranial nerve deficit.   Skin: Skin is warm and dry. No rash noted.   Psychiatric: He has a normal mood and affect. His behavior is normal.       Current Medications:   amiodarone  400 mg Oral BID    Followed by    [START ON 5/5/2018] amiodarone  200 mg Oral BID    Followed by    [START ON 5/20/2018] amiodarone  200 mg Oral Daily    aspirin  81 mg Oral Daily    atorvastatin  40 mg Oral Daily    clopidogrel  75 mg Oral Daily    famotidine  20 mg Oral BID    furosemide  20 mg Oral Daily    losartan  100 mg Oral Daily    meloxicam  7.5 mg Oral Daily    metoprolol succinate  50 mg Oral Daily    sodium chloride 0.9%  3 mL Intravenous Q8H       acetaminophen, acetaminophen, acetaminophen, atropine, dextrose 50%, dextrose 50%, glucagon (human recombinant), glucose, glucose, hydrocodone-acetaminophen 5-325mg, insulin aspart U-100, morphine, ondansetron, promethazine (PHENERGAN) IVPB, sodium chloride 0.9%    Laboratory:  CBC:    Recent Labs  Lab 04/19/18  0359 04/19/18  1641 04/20/18  0155 04/21/18  0332 04/22/18  0000   WHITE BLOOD CELL COUNT 10.17 16.95 H 12.23 11.04 11.82   HEMOGLOBIN 10.7 L 12.3 L 10.3 L 10.9 L 11.6  L   HEMATOCRIT 33.8 L 38.7 L 32.2 L 33.8 L 36.7 L   PLATELETS 180 231 178 180 191       CHEMISTRIES:    Recent Labs  Lab 02/19/18  2348 02/20/18  0343  02/20/18  0957  04/18/18  1001 04/19/18  0358 04/19/18  1641 04/20/18  0155 04/21/18  0332 04/22/18  0000   GLUCOSE 195 H 221 H  < > 180 H  < >  --  110 166 H 152 H 140 H 142 H   SODIUM 138 139  < > 136  < >  --  141 140 139 138 139   POTASSIUM 4.3 4.6  < > 4.4  < >  --  4.2 4.2 4.2 5.3 H 4.9   BUN BLD 27 H 28 H  < > 34 H  < >  --  26 H 25 H 24 H 27 H 26 H   CREATININE 1.5 H 1.8 H  < > 2.2 H  < >  --  1.6 H 1.8 H 1.5 H 1.6 H 1.7 H   EGFR IF  49 A 40 A  < > 31 A  < >  --  46 A 40 A 49 A 46 A 42 A   EGFR IF NON- 43 A 34 A  < > 27 A  < >  --  40 A 34 A 43 A 40 A 37 A   CALCIUM 9.0 8.8  < > 9.0  < >  --  9.0 9.4 8.9 9.0 9.6   MAGNESIUM 1.3 L 2.2  --  2.1  --  1.7  --  1.9  --   --   --    < > = values in this interval not displayed.    CARDIAC BIOMARKERS:    Recent Labs  Lab 04/20/18  1002 04/20/18  1753 04/21/18  0824 04/21/18  1659 04/21/18  2359   TROPONIN I 0.138 H 0.111 H 0.083 H 0.068 H 0.061 H       COAGS:    Recent Labs  Lab 02/19/18  1750 02/19/18  2348 02/20/18  0343 02/20/18  0957 04/17/18  0610   INR 1.1 1.1 1.1 1.1 1.2       LIPIDS/LFTS:    Recent Labs  Lab 02/17/18  0831  04/19/18  0358 04/19/18  1641 04/20/18  0155 04/21/18  0332 04/22/18  0000   CHOLESTEROL 147  --   --   --   --   --   --    TRIGLYCERIDES 72  --   --   --   --   --   --    HDL 37 L  --   --   --   --   --   --    LDL CHOLESTEROL 95.6  --   --   --   --   --   --    NON-HDL CHOLESTEROL 110  --   --   --   --   --   --    AST 11  < > 14 80 H 46 H 28 21   ALT <5 L  < > 13 64 H 46 H 34 29   < > = values in this interval not displayed.    BNP:    Recent Labs  Lab 09/30/16  0916 10/16/16  1050 12/02/16  0513 01/27/18  0736 04/17/18  0610    H 658 H 500 H 356 H 1,335 H       TSH:    Recent Labs  Lab 02/17/18  0831   TSH 1.450       Free T4:         Diagnostic Results:  ECG (personally reviewed tracings):  4/17/18 , PVC  Tele 4/19/18: torsades    Chest X-Ray (personally reviewed image(s)): 4/20/18 RML infil    Echo: 4/19/18    1 - Low normal to mildly depressed left ventricular systolic function (EF 50-55%).     2 - Concentric hypertrophy.     3 - Biatrial enlargement.     4 - S/P transcatheter AVR, UNA = 1.99 cm2, AVAi = 0.98 cm2/m2, peak velocity = 2.39 m/s, mean gradient = 11 mmHg.     5 - Mild to moderate mitral regurgitation.     6 - Mild tricuspid regurgitation.     LE Venous US 4/17/18  No evidence of deep venous thrombosis in either lower extremity.    Carotid US 3/22/18 (s/p L CEA)  There is 20 - 39% right Internal Carotid stenosis.  There is 20 - 39% left Internal Carotid stenosis.    Cath 4/20/18 (images pers rev)  LVEF: 50% by echo  Normal TAVR fxn by echo  Dominance: Right  LM: normal  LAD: MLI              Diag1 prox 60%, mid 50%  Ramus: MLI  LCx: MLI  RCA: dom, prox 30%  Hemostasis:  R Radial band  Impression:  TdP  Nonobst CAD  Normal LV fxn/TAVR fxn by echo  R rad vasband for hemostasis  Plan:  Cont ASA/Plavix  Cont amio gtt  EPS eval    TAVR 10/17/17  B. 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    No perivalvular leak post procedure per 2D echo.    Post deployment AV mean gradient 2.5 mmHg, Vmax 1.09 m/s.    Assessment and Plan:     * Acute on chronic diastolic heart failure    Pt seems euvolemic  Home lasix 20mg qd started for pt complaint of dyspnea.        NSVT (nonsustained ventricular tachycardia)    Pt had prolonged TdP overnight 4/19/18.  Electrolytes OK  EF low normal  Cath 4/20/18 with nonobst CAD  Case d/w Dr. Parish ([facility] EPS), pt has indication for ICD (given absence of inciting event), no need for EPS.  Cont PO amio.  Cont ICU monitoring.  I discussed options with pt (no ICD, lifevest with outpt ICD, inpat ICD) and he opts for inpat ICD.  I  will make NPO after MN and have Dr. Black see pt on Monday AM.  Case d/w Dr. Black.        S/P TAVR (transcatheter aortic valve replacement)    Brent S3, normally functioning by echo.        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

## 2018-04-22 NOTE — SUBJECTIVE & OBJECTIVE
Interval History: Cards is planning for ICD in the morning.  No V tach overnight!  Now on PO amiodarone.  Pt reporting feeling a broken bone in his ribcage    Review of Systems   All other systems reviewed and are negative.    Scheduled Meds:   amiodarone  400 mg Oral BID    Followed by    [START ON 5/5/2018] amiodarone  200 mg Oral BID    Followed by    [START ON 5/20/2018] amiodarone  200 mg Oral Daily    aspirin  81 mg Oral Daily    atorvastatin  40 mg Oral Daily    clopidogrel  75 mg Oral Daily    famotidine  20 mg Oral BID    losartan  100 mg Oral Daily    meloxicam  7.5 mg Oral Daily    metoprolol succinate  50 mg Oral Daily    sodium chloride 0.9%  3 mL Intravenous Q8H     Continuous Infusions:    PRN Meds:.acetaminophen, acetaminophen, acetaminophen, atropine, dextrose 50%, dextrose 50%, glucagon (human recombinant), glucose, glucose, hydrocodone-acetaminophen 5-325mg, insulin aspart U-100, morphine, ondansetron, promethazine (PHENERGAN) IVPB, sodium chloride 0.9%    Objective:     Vital Signs (Most Recent):  Temp: 98.2 °F (36.8 °C) (04/22/18 0800)  Pulse: 76 (04/22/18 0910)  Resp: (!) 32 (04/22/18 0910)  BP: (!) 199/82 (04/22/18 0900)  SpO2: 99 % (04/22/18 0910) Vital Signs (24h Range):  Temp:  [98 °F (36.7 °C)-98.7 °F (37.1 °C)] 98.2 °F (36.8 °C)  Pulse:  [52-76] 76  Resp:  [17-43] 32  SpO2:  [84 %-99 %] 99 %  BP: (117-199)/() 199/82     Weight: 87.6 kg (193 lb 2 oz)  Body mass index is 28.52 kg/m². Down 4 KG    Intake/Output Summary (Last 24 hours) at 04/22/18 1014  Last data filed at 04/22/18 0800   Gross per 24 hour   Intake             99.9 ml   Output              260 ml   Net           -160.1 ml      Physical Exam   Constitutional: He is oriented to person, place, and time. He appears well-developed and well-nourished.   HENT:   Head: Normocephalic and atraumatic.   Eyes: EOM are normal. Pupils are equal, round, and reactive to light.   Neck: Neck supple.   Cardiovascular: Normal  rate, regular rhythm and normal heart sounds.    Pulmonary/Chest: No respiratory distress. He has no wheezes.   Abdominal: Soft. Bowel sounds are normal.   Musculoskeletal: Normal range of motion.   Step off at bottom of rib cage   Neurological: He is alert and oriented to person, place, and time.   Skin: Skin is warm and dry.   Psychiatric: He has a normal mood and affect.   Vitals reviewed.      Significant Labs:   A1C:     Recent Labs  Lab 02/18/18  0447 04/17/18  1120   HGBA1C 6.5* 6.1*     CBC:     Recent Labs  Lab 04/21/18  0332 04/22/18  0000   WBC 11.04 11.82   HGB 10.9* 11.6*   HCT 33.8* 36.7*    191     CMP:     Recent Labs  Lab 04/21/18  0332 04/22/18  0000    139   K 5.3* 4.9    106   CO2 21* 24   * 142*   BUN 27* 26*   CREATININE 1.6* 1.7*   CALCIUM 9.0 9.6   PROT 6.4 6.5   ALBUMIN 2.9* 3.0*   BILITOT 0.3 0.5   ALKPHOS 111 131   AST 28 21   ALT 34 29   ANIONGAP 10 9   EGFRNONAA 40* 37*     POCT Glucose:     Recent Labs  Lab 04/21/18  1820 04/21/18  2153 04/22/18  0625   POCTGLUCOSE 167* 126* 129*       Significant Imaging: U/S: I have reviewed all pertinent results/findings within the past 24 hours and my personal findings are:  BLE:  No DVT

## 2018-04-22 NOTE — PLAN OF CARE
Problem: Patient Care Overview  Goal: Plan of Care Review  Outcome: Ongoing (interventions implemented as appropriate)    Patient VSS, amio gtt off, po amio to start. NPO after midnight for ICD. Voiding per urinal. Intermittent c/o skeletal chest pain r/t chest compressions. PRN medication given. Pt AAOx4 and appropriate at this time. Respirations even and unlabored. No acute distress noted. Pt denies, n/v. Bed is locked and in lowest position with side rails up x2. Pt on continuous cardiac monitoring, pulse ox, and BP cuff. Weight shift assistance provided q2. No new injury or falls. Patient and family updated as to POC, will continue to monitor.

## 2018-04-22 NOTE — ASSESSMENT & PLAN NOTE
Pt had prolonged TdP overnight 4/19/18.  Electrolytes OK  EF low normal  Cath 4/20/18 with nonobst CAD  Case d/w Dr. Parish ([facility] EPS), pt has indication for ICD (given absence of inciting event), no need for EPS.  Cont PO amio.  Cont ICU monitoring.  I discussed options with pt (no ICD, lifevest with outpt ICD, inpat ICD) and he opts for inpat ICD.  I will make NPO after MN and have Dr. Black see pt on Monday AM.  Case d/w Dr. Black.

## 2018-04-22 NOTE — SUBJECTIVE & OBJECTIVE
Review of Systems   Gastrointestinal: Negative for melena.   Genitourinary: Negative for hematuria.     Objective:     Vital Signs (Most Recent):  Temp: 98.2 °F (36.8 °C) (04/22/18 0800)  Pulse: 65 (04/22/18 1000)  Resp: (!) 25 (04/22/18 1000)  BP: (!) 170/77 (04/22/18 1000)  SpO2: 99 % (04/22/18 1000) Vital Signs (24h Range):  Temp:  [98 °F (36.7 °C)-98.7 °F (37.1 °C)] 98.2 °F (36.8 °C)  Pulse:  [52-76] 65  Resp:  [17-43] 25  SpO2:  [84 %-99 %] 99 %  BP: (121-199)/() 170/77     Weight: 87.6 kg (193 lb 2 oz)  Body mass index is 28.52 kg/m².     SpO2: 99 %  O2 Device (Oxygen Therapy): room air      Intake/Output Summary (Last 24 hours) at 04/22/18 1203  Last data filed at 04/22/18 1000   Gross per 24 hour   Intake             33.3 ml   Output              485 ml   Net           -451.7 ml       Lines/Drains/Airways     Peripheral Intravenous Line                 Peripheral IV - Single Lumen 04/17/18 0501 Left Wrist 5 days         Peripheral IV - Single Lumen 04/21/18 1300 Right Antecubital less than 1 day                Physical Exam   Constitutional: He is oriented to person, place, and time. He appears well-developed and well-nourished.   HENT:   Head: Normocephalic and atraumatic.   Eyes: Conjunctivae and EOM are normal. Pupils are equal, round, and reactive to light. No scleral icterus.   Neck: Neck supple. No JVD present. Carotid bruit is not present. No tracheal deviation present. No thyromegaly present.   Cardiovascular: Normal rate, regular rhythm, S1 normal and S2 normal.  Exam reveals no gallop and no friction rub.    Murmur heard.   Systolic murmur is present with a grade of 2/6   Pulmonary/Chest: Effort normal and breath sounds normal. He has no wheezes. He exhibits tenderness.   Abdominal: Soft. He exhibits no distension.   Musculoskeletal: He exhibits no edema.   Neurological: He is alert and oriented to person, place, and time. He has normal strength. No cranial nerve deficit.   Skin: Skin is  warm and dry. No rash noted.   Psychiatric: He has a normal mood and affect. His behavior is normal.       Current Medications:   amiodarone  400 mg Oral BID    Followed by    [START ON 5/5/2018] amiodarone  200 mg Oral BID    Followed by    [START ON 5/20/2018] amiodarone  200 mg Oral Daily    aspirin  81 mg Oral Daily    atorvastatin  40 mg Oral Daily    clopidogrel  75 mg Oral Daily    famotidine  20 mg Oral BID    furosemide  20 mg Oral Daily    losartan  100 mg Oral Daily    meloxicam  7.5 mg Oral Daily    metoprolol succinate  50 mg Oral Daily    sodium chloride 0.9%  3 mL Intravenous Q8H       acetaminophen, acetaminophen, acetaminophen, atropine, dextrose 50%, dextrose 50%, glucagon (human recombinant), glucose, glucose, hydrocodone-acetaminophen 5-325mg, insulin aspart U-100, morphine, ondansetron, promethazine (PHENERGAN) IVPB, sodium chloride 0.9%    Laboratory:  CBC:    Recent Labs  Lab 04/19/18  0359 04/19/18  1641 04/20/18  0155 04/21/18  0332 04/22/18  0000   WHITE BLOOD CELL COUNT 10.17 16.95 H 12.23 11.04 11.82   HEMOGLOBIN 10.7 L 12.3 L 10.3 L 10.9 L 11.6 L   HEMATOCRIT 33.8 L 38.7 L 32.2 L 33.8 L 36.7 L   PLATELETS 180 231 178 180 191       CHEMISTRIES:    Recent Labs  Lab 02/19/18  2348 02/20/18  0343  02/20/18  0957  04/18/18  1001 04/19/18  0358 04/19/18  1641 04/20/18  0155 04/21/18  0332 04/22/18  0000   GLUCOSE 195 H 221 H  < > 180 H  < >  --  110 166 H 152 H 140 H 142 H   SODIUM 138 139  < > 136  < >  --  141 140 139 138 139   POTASSIUM 4.3 4.6  < > 4.4  < >  --  4.2 4.2 4.2 5.3 H 4.9   BUN BLD 27 H 28 H  < > 34 H  < >  --  26 H 25 H 24 H 27 H 26 H   CREATININE 1.5 H 1.8 H  < > 2.2 H  < >  --  1.6 H 1.8 H 1.5 H 1.6 H 1.7 H   EGFR IF  49 A 40 A  < > 31 A  < >  --  46 A 40 A 49 A 46 A 42 A   EGFR IF NON- 43 A 34 A  < > 27 A  < >  --  40 A 34 A 43 A 40 A 37 A   CALCIUM 9.0 8.8  < > 9.0  < >  --  9.0 9.4 8.9 9.0 9.6   MAGNESIUM 1.3 L 2.2  --  2.1  --   1.7  --  1.9  --   --   --    < > = values in this interval not displayed.    CARDIAC BIOMARKERS:    Recent Labs  Lab 04/20/18  1002 04/20/18  1753 04/21/18  0824 04/21/18  1659 04/21/18  2359   TROPONIN I 0.138 H 0.111 H 0.083 H 0.068 H 0.061 H       COAGS:    Recent Labs  Lab 02/19/18  1750 02/19/18  2348 02/20/18  0343 02/20/18  0957 04/17/18  0610   INR 1.1 1.1 1.1 1.1 1.2       LIPIDS/LFTS:    Recent Labs  Lab 02/17/18  0831  04/19/18  0358 04/19/18  1641 04/20/18  0155 04/21/18  0332 04/22/18  0000   CHOLESTEROL 147  --   --   --   --   --   --    TRIGLYCERIDES 72  --   --   --   --   --   --    HDL 37 L  --   --   --   --   --   --    LDL CHOLESTEROL 95.6  --   --   --   --   --   --    NON-HDL CHOLESTEROL 110  --   --   --   --   --   --    AST 11  < > 14 80 H 46 H 28 21   ALT <5 L  < > 13 64 H 46 H 34 29   < > = values in this interval not displayed.    BNP:    Recent Labs  Lab 09/30/16  0916 10/16/16  1050 12/02/16  0513 01/27/18  0736 04/17/18  0610    H 658 H 500 H 356 H 1,335 H       TSH:    Recent Labs  Lab 02/17/18  0831   TSH 1.450       Free T4:        Diagnostic Results:  ECG (personally reviewed tracings):  4/17/18 , PVC  Tele 4/19/18: torsades    Chest X-Ray (personally reviewed image(s)): 4/20/18 RML infil    Echo: 4/19/18    1 - Low normal to mildly depressed left ventricular systolic function (EF 50-55%).     2 - Concentric hypertrophy.     3 - Biatrial enlargement.     4 - S/P transcatheter AVR, UNA = 1.99 cm2, AVAi = 0.98 cm2/m2, peak velocity = 2.39 m/s, mean gradient = 11 mmHg.     5 - Mild to moderate mitral regurgitation.     6 - Mild tricuspid regurgitation.     LE Venous US 4/17/18  No evidence of deep venous thrombosis in either lower extremity.    Carotid US 3/22/18 (s/p L CEA)  There is 20 - 39% right Internal Carotid stenosis.  There is 20 - 39% left Internal Carotid stenosis.    Cath 4/20/18 (images pers rev)  LVEF: 50% by echo  Normal TAVR fxn by echo  Dominance:  Right  LM: normal  LAD: MLI              Diag1 prox 60%, mid 50%  Ramus: MLI  LCx: MLI  RCA: dom, prox 30%  Hemostasis:  R Radial band  Impression:  TdP  Nonobst CAD  Normal LV fxn/TAVR fxn by echo  R rad vasband for hemostasis  Plan:  Cont ASA/Plavix  Cont amio gtt  EPS eval    TAVR 10/17/17  B. 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    No perivalvular leak post procedure per 2D echo.    Post deployment AV mean gradient 2.5 mmHg, Vmax 1.09 m/s.

## 2018-04-22 NOTE — PLAN OF CARE
Problem: Patient Care Overview  Goal: Individualization & Mutuality  Outcome: Ongoing (interventions implemented as appropriate)  Pt continues in ICU on 2L humidified OS via NC. Pt amioderone drip dc'd previous shift. Pt now receiving PO amioderone. Pt to be NPO after midnight on 4/23. Pt received PRN pain medication twice during shift. No new injury or fall noted. Pt shows no signs or symptoms of distress.

## 2018-04-23 LAB
ALBUMIN SERPL BCP-MCNC: 2.7 G/DL
ALP SERPL-CCNC: 112 U/L
ALT SERPL W/O P-5'-P-CCNC: 20 U/L
ANION GAP SERPL CALC-SCNC: 8 MMOL/L
ANION GAP SERPL CALC-SCNC: 9 MMOL/L
APTT BLDCRRT: 29.1 SEC
AST SERPL-CCNC: 16 U/L
BASOPHILS # BLD AUTO: 0.02 K/UL
BASOPHILS NFR BLD: 0.2 %
BILIRUB SERPL-MCNC: 0.7 MG/DL
BUN SERPL-MCNC: 23 MG/DL
BUN SERPL-MCNC: 23 MG/DL
CALCIUM SERPL-MCNC: 9.2 MG/DL
CALCIUM SERPL-MCNC: 9.3 MG/DL
CHLORIDE SERPL-SCNC: 107 MMOL/L
CHLORIDE SERPL-SCNC: 107 MMOL/L
CO2 SERPL-SCNC: 24 MMOL/L
CO2 SERPL-SCNC: 24 MMOL/L
CREAT SERPL-MCNC: 1.4 MG/DL
CREAT SERPL-MCNC: 1.4 MG/DL
DIFFERENTIAL METHOD: ABNORMAL
EOSINOPHIL # BLD AUTO: 0.2 K/UL
EOSINOPHIL NFR BLD: 2.3 %
ERYTHROCYTE [DISTWIDTH] IN BLOOD BY AUTOMATED COUNT: 14 %
EST. GFR  (AFRICAN AMERICAN): 54 ML/MIN/1.73 M^2
EST. GFR  (AFRICAN AMERICAN): 54 ML/MIN/1.73 M^2
EST. GFR  (NON AFRICAN AMERICAN): 46 ML/MIN/1.73 M^2
EST. GFR  (NON AFRICAN AMERICAN): 46 ML/MIN/1.73 M^2
GLUCOSE SERPL-MCNC: 119 MG/DL
GLUCOSE SERPL-MCNC: 121 MG/DL
HCT VFR BLD AUTO: 34.3 %
HGB BLD-MCNC: 11.2 G/DL
INR PPP: 1.2
LYMPHOCYTES # BLD AUTO: 1 K/UL
LYMPHOCYTES NFR BLD: 9.8 %
MCH RBC QN AUTO: 30.7 PG
MCHC RBC AUTO-ENTMCNC: 32.7 G/DL
MCV RBC AUTO: 94 FL
MONOCYTES # BLD AUTO: 1.2 K/UL
MONOCYTES NFR BLD: 12.3 %
NEUTROPHILS # BLD AUTO: 7.5 K/UL
NEUTROPHILS NFR BLD: 75.3 %
PLATELET # BLD AUTO: 179 K/UL
PMV BLD AUTO: 11.1 FL
POCT GLUCOSE: 135 MG/DL (ref 70–110)
POCT GLUCOSE: 145 MG/DL (ref 70–110)
POCT GLUCOSE: 147 MG/DL (ref 70–110)
POCT GLUCOSE: 164 MG/DL (ref 70–110)
POTASSIUM SERPL-SCNC: 3.9 MMOL/L
POTASSIUM SERPL-SCNC: 3.9 MMOL/L
PROT SERPL-MCNC: 5.8 G/DL
PROTHROMBIN TIME: 12.2 SEC
RBC # BLD AUTO: 3.65 M/UL
SODIUM SERPL-SCNC: 139 MMOL/L
SODIUM SERPL-SCNC: 140 MMOL/L
WBC # BLD AUTO: 9.93 K/UL

## 2018-04-23 PROCEDURE — 85610 PROTHROMBIN TIME: CPT

## 2018-04-23 PROCEDURE — 25000003 PHARM REV CODE 250: Performed by: EMERGENCY MEDICINE

## 2018-04-23 PROCEDURE — 85730 THROMBOPLASTIN TIME PARTIAL: CPT

## 2018-04-23 PROCEDURE — 80048 BASIC METABOLIC PNL TOTAL CA: CPT

## 2018-04-23 PROCEDURE — 63600175 PHARM REV CODE 636 W HCPCS

## 2018-04-23 PROCEDURE — 36415 COLL VENOUS BLD VENIPUNCTURE: CPT

## 2018-04-23 PROCEDURE — 99152 MOD SED SAME PHYS/QHP 5/>YRS: CPT | Mod: ,,, | Performed by: INTERNAL MEDICINE

## 2018-04-23 PROCEDURE — 02HK3KZ INSERTION OF DEFIBRILLATOR LEAD INTO RIGHT VENTRICLE, PERCUTANEOUS APPROACH: ICD-10-PCS | Performed by: INTERNAL MEDICINE

## 2018-04-23 PROCEDURE — 25000003 PHARM REV CODE 250: Performed by: INTERNAL MEDICINE

## 2018-04-23 PROCEDURE — 93005 ELECTROCARDIOGRAM TRACING: CPT

## 2018-04-23 PROCEDURE — 25000003 PHARM REV CODE 250

## 2018-04-23 PROCEDURE — 20000000 HC ICU ROOM

## 2018-04-23 PROCEDURE — 63600175 PHARM REV CODE 636 W HCPCS: Performed by: INTERNAL MEDICINE

## 2018-04-23 PROCEDURE — G8978 MOBILITY CURRENT STATUS: HCPCS | Mod: CK

## 2018-04-23 PROCEDURE — 02H63KZ INSERTION OF DEFIBRILLATOR LEAD INTO RIGHT ATRIUM, PERCUTANEOUS APPROACH: ICD-10-PCS | Performed by: INTERNAL MEDICINE

## 2018-04-23 PROCEDURE — G8979 MOBILITY GOAL STATUS: HCPCS | Mod: CH

## 2018-04-23 PROCEDURE — 80053 COMPREHEN METABOLIC PANEL: CPT

## 2018-04-23 PROCEDURE — 0JH608Z INSERTION OF DEFIBRILLATOR GENERATOR INTO CHEST SUBCUTANEOUS TISSUE AND FASCIA, OPEN APPROACH: ICD-10-PCS | Performed by: INTERNAL MEDICINE

## 2018-04-23 PROCEDURE — C1777 LEAD, AICD, ENDO SINGLE COIL: HCPCS

## 2018-04-23 PROCEDURE — 93010 ELECTROCARDIOGRAM REPORT: CPT | Mod: ,,, | Performed by: INTERNAL MEDICINE

## 2018-04-23 PROCEDURE — 85025 COMPLETE CBC W/AUTO DIFF WBC: CPT

## 2018-04-23 PROCEDURE — 93010 ELECTROCARDIOGRAM REPORT: CPT | Mod: 76,,, | Performed by: INTERNAL MEDICINE

## 2018-04-23 PROCEDURE — 33249 INSJ/RPLCMT DEFIB W/LEAD(S): CPT | Mod: ,,, | Performed by: INTERNAL MEDICINE

## 2018-04-23 PROCEDURE — 99152 MOD SED SAME PHYS/QHP 5/>YRS: CPT

## 2018-04-23 PROCEDURE — 97161 PT EVAL LOW COMPLEX 20 MIN: CPT

## 2018-04-23 PROCEDURE — A4216 STERILE WATER/SALINE, 10 ML: HCPCS | Performed by: EMERGENCY MEDICINE

## 2018-04-23 RX ORDER — FUROSEMIDE 10 MG/ML
40 INJECTION INTRAMUSCULAR; INTRAVENOUS ONCE
Status: COMPLETED | OUTPATIENT
Start: 2018-04-23 | End: 2018-04-23

## 2018-04-23 RX ORDER — OXYCODONE AND ACETAMINOPHEN 5; 325 MG/1; MG/1
1 TABLET ORAL EVERY 4 HOURS PRN
Status: DISCONTINUED | OUTPATIENT
Start: 2018-04-23 | End: 2018-04-24 | Stop reason: HOSPADM

## 2018-04-23 RX ORDER — ACETAMINOPHEN 325 MG/1
650 TABLET ORAL EVERY 4 HOURS PRN
Status: DISCONTINUED | OUTPATIENT
Start: 2018-04-23 | End: 2018-04-24 | Stop reason: HOSPADM

## 2018-04-23 RX ORDER — CEPHALEXIN 500 MG/1
500 CAPSULE ORAL EVERY 6 HOURS
Status: DISCONTINUED | OUTPATIENT
Start: 2018-04-23 | End: 2018-04-24 | Stop reason: HOSPADM

## 2018-04-23 RX ORDER — FUROSEMIDE 10 MG/ML
INJECTION INTRAMUSCULAR; INTRAVENOUS
Status: DISPENSED
Start: 2018-04-23 | End: 2018-04-23

## 2018-04-23 RX ADMIN — FUROSEMIDE 20 MG: 20 TABLET ORAL at 09:04

## 2018-04-23 RX ADMIN — OXYCODONE HYDROCHLORIDE AND ACETAMINOPHEN 1 TABLET: 5; 325 TABLET ORAL at 11:04

## 2018-04-23 RX ADMIN — HYDROCODONE BITARTRATE AND ACETAMINOPHEN 1 TABLET: 5; 325 TABLET ORAL at 08:04

## 2018-04-23 RX ADMIN — ATORVASTATIN CALCIUM 40 MG: 40 TABLET, FILM COATED ORAL at 09:04

## 2018-04-23 RX ADMIN — Medication 3 ML: at 05:04

## 2018-04-23 RX ADMIN — Medication 3 ML: at 02:04

## 2018-04-23 RX ADMIN — LOSARTAN POTASSIUM 100 MG: 25 TABLET, FILM COATED ORAL at 09:04

## 2018-04-23 RX ADMIN — Medication 3 ML: at 10:04

## 2018-04-23 RX ADMIN — FAMOTIDINE 20 MG: 20 TABLET, FILM COATED ORAL at 09:04

## 2018-04-23 RX ADMIN — AMIODARONE HYDROCHLORIDE 400 MG: 200 TABLET ORAL at 08:04

## 2018-04-23 RX ADMIN — CEPHALEXIN 500 MG: 500 CAPSULE ORAL at 09:04

## 2018-04-23 RX ADMIN — METOPROLOL SUCCINATE 50 MG: 50 TABLET, EXTENDED RELEASE ORAL at 09:04

## 2018-04-23 RX ADMIN — FUROSEMIDE 40 MG: 10 INJECTION, SOLUTION INTRAMUSCULAR; INTRAVENOUS at 03:04

## 2018-04-23 RX ADMIN — AMLODIPINE BESYLATE 5 MG: 5 TABLET ORAL at 09:04

## 2018-04-23 RX ADMIN — AMIODARONE HYDROCHLORIDE 400 MG: 200 TABLET ORAL at 09:04

## 2018-04-23 RX ADMIN — CEPHALEXIN 500 MG: 500 CAPSULE ORAL at 11:04

## 2018-04-23 RX ADMIN — FAMOTIDINE 20 MG: 20 TABLET, FILM COATED ORAL at 08:04

## 2018-04-23 RX ADMIN — CEFAZOLIN 2 G: 330 INJECTION, POWDER, FOR SOLUTION INTRAMUSCULAR; INTRAVENOUS at 05:04

## 2018-04-23 NOTE — PROGRESS NOTES
[facility] Hospital Medicine  Progress Note    Patient Name: Timbo Gallagher  MRN: [id number]  Patient Class: IP- Inpatient   Admission Date: 4/17/2018  Length of Stay: 6 days  Attending Physician: Cirilo Montero MD  Primary Care Provider: Cirilo Montero MD        Subjective:     Principal Problem:Acute on chronic diastolic heart failure    HPI:  Pt is an 81yo male with a history of CHF routenly followed by Dr Toribio who presented to the ED with shortness of breath.  Pt was seen by cardiology on 3/28 and the wife reports that at that time he was told to discontinue his Lasix.  THere is no record of this continuation in the chart.  Since stopping his lasix he has built up with fluid.  On presentation to the ED his sats were in 50% range.  Pt was started on Bipap and is going to be admitted to the ED with Acute congestive heart failure    Hospital Course:  Pt had been seen by cards and was undergoing diuresis for his CHF.  He had progressed to be able to transfer to [facility] on 4/19.  Later that day, he had v-fib.  Note by Dr. Farr:  Patient had for few minutes VF ,with PEA,code blue has been called,patient was started on CPR and supplemental oxygen,he has been shocked,time one, with ROSC,patient gain consciousness,he received one time 100 mg IV Lidoacoin per cardiology,patient's rhythm was back to sinus rhythm,he has elevated BP systolic up to 230,recieved one time IV labetalol,his air way was maintained,did not required intubation,he has been  transferred to ICU for continues amiodarone infusion  and close monitoring.will check Troponin,CMP,CBC.Magnesium level,chest x ray,primary has been  notified,cardiology is following.  So he was moved back to ICU  4/20:  Left heart cath done by Dr. Toribio:  LVEF: 50% by echo  Normal TAVR fxn by echo  Dominance: Right  LM: normal  LAD: MLI  Diag1 prox 60%, mid 50%  Ramus: MLI  LCx: MLI  RCA: dom, prox 30%  Hemostasis:  R Radial  band  Impression:  TdP  Nonobst CAD  Normal LV fxn/TAVR fxn by echo  R rad vasband for hemostasis  Plan:  Cont ASA/Plavix  Cont amio gtt  EPS eval (Dr. Toribio discussed case with Dr. Parish - [facility] EPS; no EPS needed, pt needs AICD)    Interval History: AICD placed this am    Review of Systems   No new complaints  Asking about going home  Objective:     Vital Signs (Most Recent):  Temp: 97.5 °F (36.4 °C) (04/23/18 0915)  Pulse: 66 (04/23/18 1015)  Resp: 20 (04/23/18 1015)  BP: 139/63 (04/23/18 1000)  SpO2: 100 % (04/23/18 1015) Vital Signs (24h Range):  Temp:  [97.5 °F (36.4 °C)-98.8 °F (37.1 °C)] 97.5 °F (36.4 °C)  Pulse:  [62-83] 66  Resp:  [17-50] 20  SpO2:  [87 %-100 %] 100 %  BP: (115-186)/(56-89) 139/63     Weight: 87.4 kg (192 lb 10.9 oz)  Body mass index is 28.45 kg/m².    Intake/Output Summary (Last 24 hours) at 04/23/18 1307  Last data filed at 04/23/18 0630   Gross per 24 hour   Intake              120 ml   Output             1450 ml   Net            -1330 ml      Physical Exam  Dozing after AICD placement  Wakes easily  Lungs sound ok  Heart rrr  Has new AICD in upper L chest  Arm in sling to prevent removing wires  abd soft, BS+  Legs in SCDs;  Tr edema    Keep here in ICU at least overnight  Maybe to tele tomorrow if ok with cards    Assessment/Plan:      * Acute on chronic diastolic heart failure    On ARB, diuretic, B blocker  Systolic:  EF 50%  No mention of diastolic function on echo            Dyspnea    Multifactorial:  CHF, arrythmia          NSVT (nonsustained ventricular tachycardia)    To have AICD placed this am  No need for EPS        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  Watch GFR since adding Mobic for a couple of days  Metformin has been stopped        S/P TAVR  (transcatheter aortic valve replacement)              Aortic valve stenosis    SP TAVR in 2017  Continue home meds and cards following        Acute hypoxemic respiratory failure    Was hypoxic prior to arrival  Stabilized then went to tele; had episode of VF there  Continue O2.  DId not require respiratory support after code        Essential hypertension    163/72  Norvasc, lasix, losartan, metoprolol            Type 2 diabetes mellitus with renal complication    BS managed.   147 this AM   COntinue sliding scale  Had been on metformin at home; however, gfr is 46 - now on hold            VTE Risk Mitigation         Ordered     Place DEYA hose  Until discontinued      04/17/18 0838     Place sequential compression device  Until discontinued      04/17/18 0838     IP VTE HIGH RISK PATIENT  Once      04/17/18 0838            Cirilo Montero MD  Department of Hospital Medicine   [facility]

## 2018-04-23 NOTE — PLAN OF CARE
Problem: Physical Therapy Goal  Goal: Physical Therapy Goal  Goals to be met by: 2018     Patient will increase functional independence with mobility by performin. Supine to sit with Modified [place]  2. Sit to stand transfer with Modified [place]  3. Gait  x 150 feet with Modified [place] with or without AD  4.  Perform B LE there ex x 10-15 reps Mod indep     Outcome: Ongoing (interventions implemented as appropriate)  Intial pt evaluation performed.  Pt could benefit from daily skilled PT services in order to maximize function prior to D/C.  HHPT recommended, ongoing assessment pending pt progress for DME

## 2018-04-23 NOTE — PT/OT/SLP EVAL
Physical Therapy Evaluation    Patient Name:  Timbo Gallagher   MRN:  [id number]    Recommendations:     Discharge Recommendations:  home health PT   Discharge Equipment Recommendations:  (ongoing assessment pending pt progress)   Barriers to discharge: None    Assessment:     Timbo Gallagher is a 82 y.o. male admitted with a medical diagnosis of Acute on chronic diastolic heart failure.  He presents with the following impairments/functional limitations:  weakness, impaired endurance, gait instability, impaired functional mobilty, impaired self care skills, impaired balance, decreased upper extremity function, decreased safety awareness, impaired skin (pacemaker precautions) .    Rehab Prognosis:  Good; patient would benefit from acute skilled PT services to address these deficits and reach maximum level of function.      Recent Surgery: Procedure(s) (LRB):  Left heart cath R radial access, not before 4pm (Left)      Plan:     During this hospitalization, patient to be seen daily to address the above listed problems via gait training, therapeutic activities, therapeutic exercises  · Plan of Care Expires:  05/07/18   Plan of Care Reviewed with: patient, spouse, son    Subjective     Communicated with nsg prior to session.  Patient found HOB elevated upon PT entry to room, agreeable to evaluation.      Chief Complaint: no c/o  Patient comments/goals: to go home wednesday  Pain/Comfort:  · Pain Rating 1: 0/10    Patients cultural, spiritual, Hinduism conflicts given the current situation: none    Living Environment:  Pt lives with his wife in a [facility] with 2-3 JASON.  Stepson staying with them temporarily.  Prior to admission, patients level of function was Independent.  Patient has the following equipment: none.  DME owned (not currently used): none.  Upon discharge, patient will have assistance from wife and stepson.    Objective:     Patient found with: oxygen, SCD (ICU monitoring)     General Precautions:  Standard, fall, pacemaker   Orthopedic Precautions:N/A   Braces: N/A     Exams:  · Cognitive Exam:  Patient is oriented to Person, Place, Time and Situation and follows 100% of 1-step commands   · Gross Motor Coordination:  WFL  · Postural Exam:  Patient presented with the following abnormalities:    · -       Rounded shoulders  · -       Forward head  · Sensation:    · -       Intact  light/touch B LE's/UE's  · Skin Integrity/Edema:      · -       Skin integrity: Visible skin intact  · RUE ROM: WFL  · RUE Strength: WFL  · LUE ROM: N/T 2/2 sling and swathe  · LUE Strength: N/T 2/2 sling and swathe  · RLE ROM: WFL  · RLE Strength: WFL  · LLE ROM: WFL  · LLE Strength: WFL    Functional Mobility:  · Bed Mobility:     · Rolling Left:  minimum assistance  · Transfers:     · Sit to Stand:  minimum assistance with no AD  · Gait: 5 small steps to BS chair with HHA R UE/Min A  · Balance: Fair+ sit, Fair stand    AM-PAC 6 CLICK MOBILITY  Total Score:16       Therapeutic Activities and Exercises:   Pt performde B TKE's and AP's x 10 reps in reclined position.      Patient left up in chair with all lines intact, call button in reach, nsg notified and family present.    GOALS:    Physical Therapy Goals        Problem: Physical Therapy Goal    Goal Priority Disciplines Outcome Goal Variances Interventions   Physical Therapy Goal     PT/OT, PT Ongoing (interventions implemented as appropriate)     Description:  Goals to be met by: 2018     Patient will increase functional independence with mobility by performin. Supine to sit with Modified [place]  2. Sit to stand transfer with Modified [place]  3. Gait  x 150 feet with Modified [place] with or without AD  4.  Perform B LE there ex x 10-15 reps Mod indep                       History:     Past Medical History:   Diagnosis Date    Arthritis     Blind right eye     BPH (benign prostatic hypertrophy)     Bronchitis, chronic     Carotid artery occlusion      CHF (congestive heart failure)     Colon polyp     Diabetes mellitus     GERD (gastroesophageal reflux disease)     Hearing aid worn     bilateral    Hernia     Hypertension     Irregular heart beat     Snoring     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 Coleman    CATARACT EXTRACTION, BILATERAL      EYE SURGERY      HERNIA REPAIR      RETINAL DETACHMENT SURGERY         Clinical Decision Making:     History  Co-morbidities and personal factors that may impact the plan of care Examination  Body Structures and Functions, activity limitations and participation restrictions that may impact the plan of care Clinical Presentation   Decision Making/ Complexity Score   Co-morbidities:   [] Time since onset of injury / illness / exacerbation  [] Status of current condition  []Patient's cognitive status and safety concerns    [] Multiple Medical Problems (see med hx)  Personal Factors:   [] Patient's age  [] Prior Level of function   [] Patient's home situation (environment and family support)  [] Patient's level of motivation  [] Expected progression of patient      HISTORY:(criteria)    [] 16769 - no personal factors/history    [] 02449 - has 1-2 personal factor/comorbidity     [] 58327 - has >3 personal factor/comorbidity     Body Regions:  [] Objective examination findings  [] Head     []  Neck  [] Trunk   [] Upper Extremity  [] Lower Extremity    Body Systems:  [] For communication ability, affect, cognition, language, and learning style: the assessment of the ability to make needs known, consciousness, orientation (person, place, and time), expected emotional /behavioral responses, and learning preferences (eg, learning barriers, education  needs)  [] For the neuromuscular system: a general  assessment of gross coordinated movement (eg, balance, gait, locomotion, transfers, and transitions) and motor function  (motor control and motor learning)  [] For the musculoskeletal system: the assessment of gross symmetry, gross range of motion, gross strength, height, and weight  [] For the integumentary system: the assessment of pliability(texture), presence of scar formation, skin color, and skin integrity  [] For cardiovascular/pulmonary system: the assessment of heart rate, respiratory rate, blood pressure, and edema     Activity limitations:    [] Patient's cognitive status and saf ety concerns          [] Status of current condition      [] Weight bearing restriction  [] Cardiopulmunary Restriction    Participation Restrictions:   [] Goals and goal agreement with the patient     [] Rehab potential (prognosis) and probable outcome      Examination of Body System: (criteria)    [] 91533 - addressing 1-2 elements    [] 79224 - addressing a total of 3 or more elements     [] 10117 -  Addressing a total of 4 or more elements         Clinical Presentation: (criteria)  Choose one     On examination of body system using standardized tests and measures patient presents with (CHOOSE ONE) elements from any of the following: body structures and functions, activity limitations, and/or participation restrictions.  Leading to a clinical presentation that is considered (CHOOSE ONE)                              Clinical Decision Making  (Eval Complexity):  Choose One     Time Tracking:     PT Received On: 04/23/18  PT Start Time: 1348     PT Stop Time: 1401  PT Total Time (min): 13 min     Billable Minutes: Evaluation 17      Nidia Coates, PT  04/23/2018

## 2018-04-23 NOTE — HOSPITAL COURSE
Pt had been seen by cards and was undergoing diuresis for his CHF.  He had progressed to be able to transfer to tele on 4/19.  Later that day, he had v-fib.  Note by Dr. Farr:  Patient had for few minutes VF ,with PEA,code blue has been called,patient was started on CPR and supplemental oxygen,he has been shocked,time one, with ROSC,patient gain consciousness,he received one time 100 mg IV Lidoacoin per cardiology,patient's rhythm was back to sinus rhythm,he has elevated BP systolic up to 230,recieved one time IV labetalol,his air way was maintained,did not required intubation,he has been  transferred to ICU for continues amiodarone infusion  and close monitoring.will check Troponin,CMP,CBC.Magnesium level,chest x ray,primary has been  notified,cardiology is following.  So he was moved back to ICU  4/20:  Left heart cath done by Dr. Toribio:  LVEF: 50% by echo  Normal TAVR fxn by echo  Dominance: Right  LM: normal  LAD: MLI  Diag1 prox 60%, mid 50%  Ramus: MLI  LCx: MLI  RCA: dom, prox 30%  Hemostasis:  R Radial band  Impression:  TdP  Nonobst CAD  Normal LV fxn/TAVR fxn by echo  R rad vasband for hemostasis  Plan:  Cont ASA/Plavix  Cont amio gtt  EPS eval (Dr. Toribio discussed [place] with Dr. Parish - [facility] [place] no EPS needed, pt needs AICD)  AICD placed on 4/23 without incident.  Pt observed further overnight and cleared for dc home on 4/24.  Wearing sling to prevent dislodging wires.  On Amiodarone wean:  400 bid for 14 days, 200 bid for 14 days, 200 daily afterward  followup with cards 1-2 weeks

## 2018-04-23 NOTE — ASSESSMENT & PLAN NOTE
BS managed.   147 this AM   COntinue sliding scale  Had been on metformin at home; however, gfr is 46 - now on hold

## 2018-04-23 NOTE — ASSESSMENT & PLAN NOTE
Was hypoxic prior to arrival  Stabilized then went to tele; had episode of VF there  Continue O2.  DId not require respiratory support after code

## 2018-04-23 NOTE — PROCEDURES
Procedures     AICD   Dr Black  Pre-op Dx VT  Post-op Dx same  Specimen none  EBL < 50 cc    4/23/18 [employer] dual AICD placed left SC vein    Post-op CXR and Abx  Sling overnight  Resume ASA/plavix tomorrow

## 2018-04-23 NOTE — PROGRESS NOTES
" [facility]  Adult Nutrition  Progress Note    SUMMARY       Recommendations    Recommendation/Intervention:   1. Honor preferences as able   2. Encourage po/supplements   3. RD to monitor    Goals: 1) Patient to consume >=85% of EEN and EPN x5 days with tolerance  Nutrition Goal Status: progressing towards goal  Communication of RD Recs: reviewed with RN    Reason for Assessment    Reason for Assessment: RD follow-up  Diagnosis: other (see comments) (CHF, SOB, pneumonia of right lower lobe, dyspnea)  Relevant Medical History: DM, CKD3    General Information Comments: Patient reports appetite improving slowly. Wants to change boost daily to strawberry flavor- still only wants 1/day. Made patient and wife aware of menu substitutions available.  D/C planning: Cardiac/ADA/mechanical soft diet with supplements as needed.      Nutrition Risk Screen    Nutrition Risk Screen: dysphagia or difficulty swallowing ("if ave too much food i have trouble swallowing")    Nutrition/Diet History    Patient Reported Diet/Restrictions/Preferences: general  Do you have any cultural, spiritual, Buddhist conflicts, given your current situation?: No cultural, Buddhist or ethnic food preferences.   Food Allergies: NKFA  Factors Affecting Nutritional Intake: chewing difficulties/inability to chew food    Anthropometrics    Temp: 98.6 °F (37 °C)  Height Method: Stated  Height: 5' 9" (175.3 cm)  Height (inches): 69 in  Weight Method: Bed Scale  Weight: 87.4 kg (192 lb 10.9 oz)  Weight (lb): 192.68 lb  Ideal Body Weight (IBW), Male: 160 lb  % Ideal Body Weight, Male (lb): 118.36 lb  BMI (Calculated): 28  BMI Grade: 25 - 29.9 - overweight       Lab/Procedures/Meds    Pertinent Labs Reviewed: reviewed  Pertinent Labs Comments: HgbA1c 6.1%  Pertinent Medications Reviewed: reviewed    Physical Findings/Assessment    Overall Physical Appearance: advanced age, overweight, on oxygen therapy  Oral/Mouth Cavity:  (poor fitting " dentures)  Skin: intact (Gilberto Score 16)    Estimated/Assessed Needs    Weight Used For Calorie Calculations: 72.7 kg (160 lb 4.4 oz) (Ideal Body Weight)     Energy Need Method: Kcal/kg (1800 - 2000)  Protein Requirements: 65 - 75 g  Weight Used For Protein Calculations: 72.7 kg (160 lb 4.4 oz) (Ideal Body Weight)     Fluid Need Method: RDA Method, other (see comments)1800 ml/day or as restricted by MD)     CHO Requirement: 200 g    Nutrition Prescription Ordered    Current Diet Order: 2000 calorie Diabetic, Cardiac, Mechanical Soft    Evaluation of Received Nutrient/Fluid Intake    I/O: 120/1785  Energy Calories Required: not meeting needs  Protein Required: not meeting needs  Fluid Required: other (see comments) (per MD)  Comments: LBM: 4/19  Tolerance: tolerating (decreased appetite at this time.)  % Intake of Estimated Energy Needs: 50%  % Meal Intake: 25-50% + 1 supplement daily    Nutrition Risk    Level of Risk/Frequency of Follow-up:  (f/u 2x weekly)     Assessment and Plan    Nutrition Dx: Inadequate Energy Intake r/t decreased appetite as evidenced by 25% po intake with meals.  Nutrition Dx Status: Improving       Monitor and Evaluation    Food and Nutrient Intake: energy intake, food and beverage intake  Food and Nutrient Adminstration: diet order  Knowledge/Beliefs/Attitudes: food and nutrition knowledge/skill, beliefs and attitudes  Physical Activity and Function: nutrition-related ADLs and IADLs  Anthropometric Measurements: weight, weight change, body mass index  Biochemical Data, Medical Tests and Procedures: electrolyte and renal panel, gastrointestinal profile, glucose/endocrine profile  Nutrition-Focused Physical Findings: overall appearance     Nutrition Follow-Up    RD Follow-up?: Yes

## 2018-04-23 NOTE — SUBJECTIVE & OBJECTIVE
Interval History: AICD placed this am    Review of Systems   No new complaints  Asking about going home  Objective:     Vital Signs (Most Recent):  Temp: 97.5 °F (36.4 °C) (04/23/18 0915)  Pulse: 66 (04/23/18 1015)  Resp: 20 (04/23/18 1015)  BP: 139/63 (04/23/18 1000)  SpO2: 100 % (04/23/18 1015) Vital Signs (24h Range):  Temp:  [97.5 °F (36.4 °C)-98.8 °F (37.1 °C)] 97.5 °F (36.4 °C)  Pulse:  [62-83] 66  Resp:  [17-50] 20  SpO2:  [87 %-100 %] 100 %  BP: (115-186)/(56-89) 139/63     Weight: 87.4 kg (192 lb 10.9 oz)  Body mass index is 28.45 kg/m².    Intake/Output Summary (Last 24 hours) at 04/23/18 1307  Last data filed at 04/23/18 0630   Gross per 24 hour   Intake              120 ml   Output             1450 ml   Net            -1330 ml      Physical Exam  Dozing after AICD placement  Wakes easily  Lungs sound ok  Heart rrr  Has new AICD in upper L chest  Arm in sling to prevent removing wires  abd soft, BS+  Legs in SCDs;  Tr edema    Keep here in ICU at least overnight  Maybe to tele tomorrow if ok with cards

## 2018-04-23 NOTE — ASSESSMENT & PLAN NOTE
GFR 46  Cr a little better today.  Watch GFR since adding Mobic for a couple of days  Metformin has been stopped

## 2018-04-23 NOTE — PLAN OF CARE
04/23/18 0943   Discharge Reassessment   Assessment Type Discharge Planning Reassessment   Provided patient/caregiver education on the expected discharge date and the discharge plan No   Do you have any problems affording any of your prescribed medications? No   Discharge Plan A Home with family   Discharge Plan B Home Health   Patient choice form signed by patient/caregiver No   Can the patient answer the patient profile reliably? Yes, cognitively intact   How does the patient rate their overall health at the present time? Fair   Describe the patient's ability to walk at the present time. Minor restrictions or changes   How often would a person be available to care for the patient? Whenever needed   Number of comorbid conditions (as recorded on the chart) Four   During the past month, has the patient often been bothered by feeling down, depressed or hopeless? No   During the past month, has the patient often been bothered by little interest or pleasure in doing things? No

## 2018-04-23 NOTE — NURSING
Patient returned from CATH lab awake and alert accompanied by two CATH lab nurses. V/S stable. Patient denies pain.

## 2018-04-23 NOTE — PLAN OF CARE
Problem: Patient Care Overview  Goal: Plan of Care Review  Patient AAOx4. Remains on 2 liters of O2 per nasal cannula O2 saturation between 90-95%. Left upper chest wall AICD placed in CATH lab patient tolerated well. Left arm in sling to prevent removing wires. Physical therapy visited; patient out of bed in chair today. Denies pain and SOB. Urine output and V/S stable remains in NSR. No falls or injuries occurred on shift. Plan of care reviewed with patient and family.

## 2018-04-23 NOTE — PROCEDURES
"Timbo Gallagher is a 82 y.o. male patient.    Temp: 98.6 °F (37 °C) (04/23/18 0315)  Pulse: 67 (04/23/18 0600)  Resp: (!) 21 (04/23/18 0600)  BP: (!) 163/72 (04/23/18 0600)  SpO2: 99 % (04/23/18 0600)  Weight: 87.4 kg (192 lb 10.9 oz) (04/23/18 0315)  Height: 5' 9" (175.3 cm) (04/21/18 0715)       Procedures     Known to me from earlier this admission. S/P successful resuscitation from VT arrest. Discussed risks/benefits of AICD with patient and wife who agree to proceed    Pre-sedation Assessment:    1. ASA Score: ASA 3 - Patient with moderate systemic disease with functional limitations  2. Mallampati Class: II (hard and soft palate, upper portion of tonsils anduvula visible)  3. Patient or family history of any reaction to anesthesia or sedation: No  4. Plan for Sedation: Moderate  5. H&P within 30 days of the procedure and updated within 24 hrs of admission or registration: Yes    Jorge L Black  4/23/2018  "

## 2018-04-23 NOTE — NURSING
Patient transported to CATH lab by CATH lab nurses on 2 liters of O2 per NC and cardiac monitor; family at bedside.

## 2018-04-23 NOTE — PLAN OF CARE
Problem: Patient Care Overview  Goal: Individualization & Mutuality  Outcome: Ongoing (interventions implemented as appropriate)  Pt continues in ICU on 2L NC. Pt AAOx4. Pt afebrile during night. New 20ga IV started in right Wrist. Pt received PRN morphine x1 for pain. Pt NPO since midnight for ICD dual implantation procedure this am. Pt bathed and pectorals clipped. Pt complained of shortness of breath and crackle breath sounds upon auscultation. MD notified and 40mg IV Lasix ordered and administered. Pt breathing easier and lung sounds improved. Pt able to be transferred to bedside chair with assistance, tolerated well. No new injury or fall noted. Pt shows no signs or symptoms of distress.

## 2018-04-24 VITALS
WEIGHT: 193.13 LBS | TEMPERATURE: 97 F | OXYGEN SATURATION: 95 % | DIASTOLIC BLOOD PRESSURE: 63 MMHG | HEART RATE: 60 BPM | SYSTOLIC BLOOD PRESSURE: 147 MMHG | BODY MASS INDEX: 28.6 KG/M2 | HEIGHT: 69 IN | RESPIRATION RATE: 46 BRPM

## 2018-04-24 PROBLEM — Z95.810 AICD (AUTOMATIC CARDIOVERTER/DEFIBRILLATOR) PRESENT: Status: ACTIVE | Noted: 2018-04-24

## 2018-04-24 LAB
ALBUMIN SERPL BCP-MCNC: 2.8 G/DL
ALP SERPL-CCNC: 106 U/L
ALT SERPL W/O P-5'-P-CCNC: 14 U/L
ANION GAP SERPL CALC-SCNC: 10 MMOL/L
AST SERPL-CCNC: 19 U/L
BASOPHILS # BLD AUTO: 0.02 K/UL
BASOPHILS NFR BLD: 0.2 %
BILIRUB SERPL-MCNC: 0.7 MG/DL
BUN SERPL-MCNC: 24 MG/DL
CALCIUM SERPL-MCNC: 9.3 MG/DL
CHLORIDE SERPL-SCNC: 106 MMOL/L
CO2 SERPL-SCNC: 24 MMOL/L
CREAT SERPL-MCNC: 1.5 MG/DL
DIFFERENTIAL METHOD: ABNORMAL
EOSINOPHIL # BLD AUTO: 0.2 K/UL
EOSINOPHIL NFR BLD: 1.3 %
ERYTHROCYTE [DISTWIDTH] IN BLOOD BY AUTOMATED COUNT: 13.7 %
EST. GFR  (AFRICAN AMERICAN): 49 ML/MIN/1.73 M^2
EST. GFR  (NON AFRICAN AMERICAN): 43 ML/MIN/1.73 M^2
GLUCOSE SERPL-MCNC: 148 MG/DL
HCT VFR BLD AUTO: 32.1 %
HGB BLD-MCNC: 10.6 G/DL
LYMPHOCYTES # BLD AUTO: 1.2 K/UL
LYMPHOCYTES NFR BLD: 9.8 %
MCH RBC QN AUTO: 31.3 PG
MCHC RBC AUTO-ENTMCNC: 33 G/DL
MCV RBC AUTO: 95 FL
MONOCYTES # BLD AUTO: 1.4 K/UL
MONOCYTES NFR BLD: 11.4 %
NEUTROPHILS # BLD AUTO: 9.2 K/UL
NEUTROPHILS NFR BLD: 77.1 %
PLATELET # BLD AUTO: 182 K/UL
PMV BLD AUTO: 11.4 FL
POCT GLUCOSE: 142 MG/DL (ref 70–110)
POCT GLUCOSE: 145 MG/DL (ref 70–110)
POCT GLUCOSE: 192 MG/DL (ref 70–110)
POTASSIUM SERPL-SCNC: 3.9 MMOL/L
PROT SERPL-MCNC: 6.1 G/DL
RBC # BLD AUTO: 3.39 M/UL
SODIUM SERPL-SCNC: 140 MMOL/L
WBC # BLD AUTO: 11.98 K/UL

## 2018-04-24 PROCEDURE — G8980 MOBILITY D/C STATUS: HCPCS | Mod: CJ

## 2018-04-24 PROCEDURE — 85025 COMPLETE CBC W/AUTO DIFF WBC: CPT

## 2018-04-24 PROCEDURE — 25000003 PHARM REV CODE 250: Performed by: INTERNAL MEDICINE

## 2018-04-24 PROCEDURE — A4216 STERILE WATER/SALINE, 10 ML: HCPCS | Performed by: EMERGENCY MEDICINE

## 2018-04-24 PROCEDURE — 36415 COLL VENOUS BLD VENIPUNCTURE: CPT

## 2018-04-24 PROCEDURE — 94761 N-INVAS EAR/PLS OXIMETRY MLT: CPT

## 2018-04-24 PROCEDURE — G8979 MOBILITY GOAL STATUS: HCPCS | Mod: CH

## 2018-04-24 PROCEDURE — 97116 GAIT TRAINING THERAPY: CPT

## 2018-04-24 PROCEDURE — 27000221 HC OXYGEN, UP TO 24 HOURS

## 2018-04-24 PROCEDURE — G8978 MOBILITY CURRENT STATUS: HCPCS | Mod: CJ

## 2018-04-24 PROCEDURE — 25000003 PHARM REV CODE 250: Performed by: EMERGENCY MEDICINE

## 2018-04-24 PROCEDURE — 80053 COMPREHEN METABOLIC PANEL: CPT

## 2018-04-24 PROCEDURE — 99233 SBSQ HOSP IP/OBS HIGH 50: CPT | Mod: ,,, | Performed by: INTERNAL MEDICINE

## 2018-04-24 RX ORDER — AMIODARONE HYDROCHLORIDE 200 MG/1
200 TABLET ORAL DAILY
Qty: 30 TABLET | Refills: 11 | Status: ON HOLD | OUTPATIENT
Start: 2018-05-20 | End: 2019-04-29 | Stop reason: HOSPADM

## 2018-04-24 RX ORDER — ACETAMINOPHEN 325 MG/1
650 TABLET ORAL EVERY 8 HOURS PRN
Refills: 0
Start: 2018-04-24 | End: 2018-07-30 | Stop reason: ALTCHOICE

## 2018-04-24 RX ORDER — AMLODIPINE BESYLATE 5 MG/1
10 TABLET ORAL DAILY
Status: DISCONTINUED | OUTPATIENT
Start: 2018-04-24 | End: 2018-04-24 | Stop reason: HOSPADM

## 2018-04-24 RX ORDER — AMIODARONE HYDROCHLORIDE 400 MG/1
400 TABLET ORAL 2 TIMES DAILY
Qty: 60 TABLET | Refills: 11 | Status: SHIPPED | OUTPATIENT
Start: 2018-04-24 | End: 2018-07-30 | Stop reason: ALTCHOICE

## 2018-04-24 RX ORDER — AMLODIPINE BESYLATE 10 MG/1
10 TABLET ORAL DAILY
Qty: 30 TABLET | Refills: 11 | Status: ON HOLD | OUTPATIENT
Start: 2018-04-25 | End: 2018-11-30 | Stop reason: CLARIF

## 2018-04-24 RX ORDER — CEPHALEXIN 500 MG/1
500 CAPSULE ORAL EVERY 6 HOURS
Qty: 16 CAPSULE | Refills: 0 | Status: SHIPPED | OUTPATIENT
Start: 2018-04-24 | End: 2018-08-28 | Stop reason: ALTCHOICE

## 2018-04-24 RX ORDER — AMIODARONE HYDROCHLORIDE 200 MG/1
200 TABLET ORAL 2 TIMES DAILY
Qty: 60 TABLET | Refills: 11 | Status: SHIPPED | OUTPATIENT
Start: 2018-05-05 | End: 2018-07-30 | Stop reason: ALTCHOICE

## 2018-04-24 RX ORDER — ACETAMINOPHEN 325 MG/1
650 TABLET ORAL EVERY 8 HOURS PRN
Refills: 0
Start: 2018-04-24 | End: 2018-08-28 | Stop reason: CLARIF

## 2018-04-24 RX ADMIN — ATORVASTATIN CALCIUM 40 MG: 40 TABLET, FILM COATED ORAL at 09:04

## 2018-04-24 RX ADMIN — FAMOTIDINE 20 MG: 20 TABLET, FILM COATED ORAL at 09:04

## 2018-04-24 RX ADMIN — CEPHALEXIN 500 MG: 500 CAPSULE ORAL at 12:04

## 2018-04-24 RX ADMIN — Medication 3 ML: at 04:04

## 2018-04-24 RX ADMIN — FUROSEMIDE 20 MG: 20 TABLET ORAL at 09:04

## 2018-04-24 RX ADMIN — METOPROLOL SUCCINATE 50 MG: 50 TABLET, EXTENDED RELEASE ORAL at 09:04

## 2018-04-24 RX ADMIN — LOSARTAN POTASSIUM 100 MG: 25 TABLET, FILM COATED ORAL at 09:04

## 2018-04-24 RX ADMIN — CEPHALEXIN 500 MG: 500 CAPSULE ORAL at 05:04

## 2018-04-24 RX ADMIN — AMIODARONE HYDROCHLORIDE 400 MG: 200 TABLET ORAL at 09:04

## 2018-04-24 RX ADMIN — AMLODIPINE BESYLATE 10 MG: 5 TABLET ORAL at 09:04

## 2018-04-24 RX ADMIN — Medication 3 ML: at 05:04

## 2018-04-24 RX ADMIN — AMIODARONE HYDROCHLORIDE 400 MG: 200 TABLET ORAL at 06:04

## 2018-04-24 NOTE — ASSESSMENT & PLAN NOTE
Pt had prolonged TdP overnight 4/19/18.  Electrolytes OK  EF low normal  Cath 4/20/18 with nonobst CAD  S/P MDT ICD 4/23/18  Cont PO amio load.  Follow up with Dr. Black in 2 weeks for stable removal and with Dr. Toribio thereafter.

## 2018-04-24 NOTE — PROGRESS NOTES
04/24/18 0802   Patient Assessment/Suction   Level of Consciousness (AVPU) alert   Respiratory Effort Normal;Unlabored   Rhythm/Pattern, Respiratory pattern regular   PRE-TX-O2-ETCO2   O2 Device (Oxygen Therapy) nasal cannula   $ Is the patient on Low Flow Oxygen? Yes   Flow (L/min) 1   SpO2 96 %

## 2018-04-24 NOTE — SUBJECTIVE & OBJECTIVE
Review of Systems   Gastrointestinal: Negative for melena.   Genitourinary: Negative for hematuria.     Objective:     Vital Signs (Most Recent):  Temp: 97.6 °F (36.4 °C) (04/24/18 0301)  Pulse: 69 (04/24/18 0608)  Resp: (!) 43 (04/24/18 0608)  BP: (!) 167/77 (04/24/18 0603)  SpO2: 97 % (04/24/18 0608) Vital Signs (24h Range):  Temp:  [97.5 °F (36.4 °C)-98.5 °F (36.9 °C)] 97.6 °F (36.4 °C)  Pulse:  [60-76] 69  Resp:  [17-49] 43  SpO2:  [79 %-100 %] 97 %  BP: (116-167)/() 167/77     Weight: 87.6 kg (193 lb 2 oz)  Body mass index is 28.52 kg/m².     SpO2: 97 %  O2 Device (Oxygen Therapy): nasal cannula w/ humidification      Intake/Output Summary (Last 24 hours) at 04/24/18 0753  Last data filed at 04/24/18 0500   Gross per 24 hour   Intake              195 ml   Output              700 ml   Net             -505 ml       Lines/Drains/Airways     Peripheral Intravenous Line                 Peripheral IV - Single Lumen 04/21/18 1300 Right Antecubital 2 days         Peripheral IV - Single Lumen 04/22/18 2130 Right Wrist 1 day                Physical Exam   Constitutional: He is oriented to person, place, and time. He appears well-developed and well-nourished.   HENT:   Head: Normocephalic and atraumatic.   Eyes: Conjunctivae and EOM are normal. Pupils are equal, round, and reactive to light. No scleral icterus.   Neck: Normal range of motion. Neck supple. No JVD present. Carotid bruit is not present. No tracheal deviation present. No thyromegaly present.   Cardiovascular: Normal rate, regular rhythm, S1 normal and S2 normal.  Exam reveals no gallop and no friction rub.    Murmur heard.   Systolic murmur is present with a grade of 2/6   Pulmonary/Chest: Effort normal and breath sounds normal. No respiratory distress. He has no wheezes. He has no rales. He exhibits tenderness.   LUE sling in place.  ICD dressing C/D/I (being changed by Dr. Black).   Abdominal: Soft. He exhibits no distension.   Musculoskeletal: He  exhibits no edema.   Neurological: He is alert and oriented to person, place, and time. He has normal strength. No cranial nerve deficit.   Skin: Skin is warm and dry. No rash noted.   Psychiatric: He has a normal mood and affect. His behavior is normal.       Current Medications:   amiodarone  400 mg Oral BID    Followed by    [START ON 5/5/2018] amiodarone  200 mg Oral BID    Followed by    [START ON 5/20/2018] amiodarone  200 mg Oral Daily    amLODIPine  5 mg Oral Daily    atorvastatin  40 mg Oral Daily    cephALEXin  500 mg Oral Q6H    famotidine  20 mg Oral BID    furosemide  20 mg Oral Daily    losartan  100 mg Oral Daily    metoprolol succinate  50 mg Oral Daily    sodium chloride 0.9%  3 mL Intravenous Q8H       acetaminophen, acetaminophen, acetaminophen, acetaminophen, atropine, dextrose 50%, dextrose 50%, glucagon (human recombinant), glucose, glucose, hydrocodone-acetaminophen 5-325mg, insulin aspart U-100, morphine, oxyCODONE-acetaminophen, promethazine (PHENERGAN) IVPB, sodium chloride 0.9%    Laboratory:  CBC:    Recent Labs  Lab 04/20/18  0155 04/21/18  0332 04/22/18  0000 04/23/18  0226 04/24/18  0443   WHITE BLOOD CELL COUNT 12.23 11.04 11.82 9.93 11.98   HEMOGLOBIN 10.3 L 10.9 L 11.6 L 11.2 L 10.6 L   HEMATOCRIT 32.2 L 33.8 L 36.7 L 34.3 L 32.1 L   PLATELETS 178 180 191 179 182       CHEMISTRIES:    Recent Labs  Lab 02/19/18  2348 02/20/18  0343  02/20/18  0957  04/18/18  1001  04/19/18  1641 04/20/18  0155 04/21/18  0332 04/22/18  0000 04/23/18  0226 04/24/18  0443   GLUCOSE 195 H 221 H  < > 180 H  < >  --   < > 166 H 152 H 140 H 142 H 121 H  119 H 148 H   SODIUM 138 139  < > 136  < >  --   < > 140 139 138 139 140  139 140   POTASSIUM 4.3 4.6  < > 4.4  < >  --   < > 4.2 4.2 5.3 H 4.9 3.9  3.9 3.9   BUN BLD 27 H 28 H  < > 34 H  < >  --   < > 25 H 24 H 27 H 26 H 23  23 24 H   CREATININE 1.5 H 1.8 H  < > 2.2 H  < >  --   < > 1.8 H 1.5 H 1.6 H 1.7 H 1.4  1.4 1.5 H   EGFR IF   AMERICAN 49 A 40 A  < > 31 A  < >  --   < > 40 A 49 A 46 A 42 A 54 A  54 A 49 A   EGFR IF NON- 43 A 34 A  < > 27 A  < >  --   < > 34 A 43 A 40 A 37 A 46 A  46 A 43 A   CALCIUM 9.0 8.8  < > 9.0  < >  --   < > 9.4 8.9 9.0 9.6 9.2  9.3 9.3   MAGNESIUM 1.3 L 2.2  --  2.1  --  1.7  --  1.9  --   --   --   --   --    < > = values in this interval not displayed.    CARDIAC BIOMARKERS:    Recent Labs  Lab 04/20/18  1002 04/20/18  1753 04/21/18  0824 04/21/18  1659 04/21/18  2359   TROPONIN I 0.138 H 0.111 H 0.083 H 0.068 H 0.061 H       COAGS:    Recent Labs  Lab 02/19/18  2348 02/20/18  0343 02/20/18  0957 04/17/18  0610 04/23/18  0226   INR 1.1 1.1 1.1 1.2 1.2       LIPIDS/LFTS:    Recent Labs  Lab 02/17/18  0831  04/20/18  0155 04/21/18  0332 04/22/18  0000 04/23/18  0226 04/24/18  0443   CHOLESTEROL 147  --   --   --   --   --   --    TRIGLYCERIDES 72  --   --   --   --   --   --    HDL 37 L  --   --   --   --   --   --    LDL CHOLESTEROL 95.6  --   --   --   --   --   --    NON-HDL CHOLESTEROL 110  --   --   --   --   --   --    AST 11  < > 46 H 28 21 16 19   ALT <5 L  < > 46 H 34 29 20 14   < > = values in this interval not displayed.    BNP:    Recent Labs  Lab 09/30/16  0916 10/16/16  1050 12/02/16  0513 01/27/18  0736 04/17/18  0610    H 658 H 500 H 356 H 1,335 H       TSH:    Recent Labs  Lab 02/17/18  0831   TSH 1.450       Free T4:        Diagnostic Results:  ECG (personally reviewed tracings):  4/17/18 , PVC  Tele 4/19/18: torsades    Chest X-Ray (personally reviewed image(s)): 4/20/18 RML infil    Echo: 4/19/18    1 - Low normal to mildly depressed left ventricular systolic function (EF 50-55%).     2 - Concentric hypertrophy.     3 - Biatrial enlargement.     4 - S/P transcatheter AVR, UNA = 1.99 cm2, AVAi = 0.98 cm2/m2, peak velocity = 2.39 m/s, mean gradient = 11 mmHg.     5 - Mild to moderate mitral regurgitation.     6 - Mild tricuspid regurgitation.     LE Venous US  4/17/18  No evidence of deep venous thrombosis in either lower extremity.    Carotid US 3/22/18 (s/p L CEA)  There is 20 - 39% right Internal Carotid stenosis.  There is 20 - 39% left Internal Carotid stenosis.    Cath 4/20/18 (images pers rev)  LVEF: 50% by echo  Normal TAVR fxn by echo  Dominance: Right  LM: normal  LAD: MLI              Diag1 prox 60%, mid 50%  Ramus: MLI  LCx: MLI  RCA: dom, prox 30%  Hemostasis:  R Radial band  Impression:  TdP  Nonobst CAD  Normal LV fxn/TAVR fxn by echo  R rad vasband for hemostasis  Plan:  Cont ASA/Plavix  Cont amio gtt  EPS eval    TAVR 10/17/17  B. 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    No perivalvular leak post procedure per 2D echo.    Post deployment AV mean gradient 2.5 mmHg, Vmax 1.09 m/s.

## 2018-04-24 NOTE — PROGRESS NOTES
Dr. Montero returned call, RN informed him that the patient and his wife informed her after he had written the discharge orders that when Mr. Gallagher takes pain medicine he has hallucinations and that he is currently not having hallucinations but he has had them during this hospital stay. Dr. Montero stated that he would address this concern with the patient and his wife on the follow-up visit.

## 2018-04-24 NOTE — NURSING
Patient c/o he is not comfortable. Patient demands he needs to get out of bed. Disconnected patient from cardiac monitor. Assist patient with ambulating around nursing unit. Patient required minimal assist. Patient able to walk around entire nursing unit and back to room. No acute distress after walking. Patient sitting up in chair. VS remain WNL. Will continue to monitor.

## 2018-04-24 NOTE — PROGRESS NOTES
[place] contacted Dr. Black' office at [phone] to schedule cardiology f/u appt for staple removal in 2 weeks; spoke with Jessica; appt scheduled on 05/08/18 at 2:30 PM.    1035 Order received for HH. Patient choice form completed, original to blue folder, copy to patient.  Pt requested PHN choose provider.  Facesheet, Orders, H&P, Consults and PT eval sent via [facility] to [facility]. Awaiting to confirm provider.    [facility]  Liaison met with pt to discuss services.

## 2018-04-24 NOTE — NURSING
12.00 Patient was told by Cardiologist this morning that it was ok with them  To be discharged home if ok with Dr. Montero. Since that time patient has been persistent in stating he is going home no matter what Dr. Montero says.  Patient is half dressed and insisting that he is going home. Have spoke with patient and he has agreed to wait for Dr. Montero but is getting more and more agitated as time goes on.

## 2018-04-24 NOTE — PT/OT/SLP PROGRESS
Physical Therapy Treatment    Patient Name:  Timbo Gallagher   MRN:  [id number]    Recommendations:     Discharge Recommendations:  home health PT   Discharge Equipment Recommendations: none (Pt reported that he has 2 walkers and a w/c at home.)   Barriers to discharge: None    Assessment:     Timbo Gallagher is a 82 y.o. male admitted with a medical diagnosis of Acute on chronic diastolic heart failure.  He presents with the following impairments/functional limitations:  weakness, impaired endurance, impaired self care skills, impaired functional mobilty, gait instability, impaired balance, visual deficits, decreased safety awareness, impaired cardiopulmonary response to activity.    Rehab Prognosis: fair+; patient would benefit from acute skilled PT services to address these deficits and reach maximum level of function.      Recent Surgery: Procedure(s) (LRB):  Left heart cath R radial access, not before 4pm (Left)      Plan:     During this hospitalization, patient to be seen 3 x/week (M-F) to address the above listed problems via gait training, therapeutic activities, therapeutic exercises  · Plan of Care Expires:  05/07/18   Plan of Care Reviewed with: patient, spouse, daughter    Subjective     Communicated with nurse Marti prior to session.  Patient found in chair with spouse present upon PT entry to room, agreeable to treatment.      Chief Complaint: Pt reported mild SOB after ambulation, no CP.  Patient comments/goals: Pt would like to go home today.   Pain/Comfort:  · Pain Rating 1: 0/10    Patients cultural, spiritual, Sikhism conflicts given the current situation: none    Objective:     Patient found with: peripheral IV, telemetry     General Precautions: Standard, fall, diabetic, pacemaker, vision impaired, hearing impaired   Orthopedic Precautions:N/A (Pt s/p pacemaker, no overhead reaching with CHARLENE Marti.)   Braces: N/A     Functional Mobility:  · Transfers:     · Sit to Stand:   stand by assistance with no AD  · Gait: Pt ambulated ~200 ft x 2 trials with CGA-SBA/RW.  Pt with decreased step length and mike.    · Balance: Pt with fair+ balance.      AM-PAC 6 CLICK MOBILITY  Turning over in bed (including adjusting bedclothes, sheets and blankets)?: 4  Sitting down on and standing up from a chair with arms (e.g., wheelchair, bedside commode, etc.): 4  Moving from lying on back to sitting on the side of the bed?: 3  Moving to and from a bed to a chair (including a wheelchair)?: 3  Need to walk in hospital room?: 3  Climbing 3-5 steps with a railing?: 3  Total Score: 20       Patient left up in chair with all lines intact, nurse Kaia notified and spouse and family present.    GOALS:    Physical Therapy Goals     Not on file          Multidisciplinary Problems (Resolved)        Problem: Physical Therapy Goal    Goal Priority Disciplines Outcome Goal Variances Interventions   Physical Therapy Goal   (Resolved)     PT/OT, PT Outcome(s) achieved     Description:  Goals to be met by: 2018     Patient will increase functional independence with mobility by performin. Supine to sit with Modified [place]  2. Sit to stand transfer with Modified [place]  3. Gait  x 150 feet with Modified [place] with or without AD  4.  Perform B LE there ex x 10-15 reps Mod indep                       Time Tracking:     PT Received On: 18  PT Start Time: 1424     PT Stop Time: 1440  PT Total Time (min): 16 min     Billable Minutes: Gait Training 16 min    Treatment Type: Treatment  PT/PTA: PT     PTA Visit Number: 0     Olga José, PT  2018

## 2018-04-24 NOTE — PROGRESS NOTES
Follow-up Information     Jorge L Black MD On 5/8/2018.    Specialty:  Cardiology  Why:  Outpatient Services Cardiology Follow-Up Tuesday at 2:30 PM for staple removal.  Contact information:  [address]   [address]  [place] [id number] [phone]             [facility] [place].    Specialty:  Home Health Services  Why:  Home Health Agency will contact patient to initiate services.  Contact information:  [address]  [address]  [place] [id number] [phone]                 PLEASE BRING TO ALL FOLLOW UP APPOINTMENTS:   A COPY YOUR DISCHARGE INSTRUCTIONS, Any new MEDICINES YOU ARE CURRENTLY TAKING IN THEIR ORIGINAL BOTTLES  And IDENTIFICATION AND INSURANCE CARD     **PLEASE ARRIVE 15 MINUTES AHEAD OF SCHEDULED APPOINTMENT TIME   ++PLEASE CALL 24 HOURS IN ADVANCE IF YOU MUST RESCHEDULE YOUR APPOINTMENT DAY AND/OR TIME     [facility]    WRITTEN HEALTHCARE AND DISCHARGE INFORMATION                        Help at Home           [phone]  After discharge for assistance [facility] On Call Nurse Care Line 24/7  Assistance    Things You are responsible For To Manage Your Care At Home:  1.    Getting your prescriptions filled   2.    Taking your medications as directed, DO NOT MISS ANY DOSES!  3.    Going to your follow-up doctor appointment. This is important because it  allow the doctor to monitor your progress and determine if  any changes need to made to your treatment plan.     Thank you for choosing [facility] for your care.  Please answer any calls you may receive from [facility] we want to continue to support you as you manage your healthcare needs. [facility] is happy to have the opportunity to serve you.     Sincerely,  Your [facility] Team,  [place], [place];  [phone]

## 2018-04-24 NOTE — PROGRESS NOTES
"TN reviewed follow up appointment information as well as  "Post op discharge instructions" handout with patient using teach back while informing patient to concentrate on signs and symptoms to look for after discharge that would flag him that he needs to contact the doctor. Patient is in agreement and verbalized an understanding. Placed discharge information in blue discharge folder.  TN also reviewed patient responsibility checklist with her using teach back. Patient was able to verbalize his responsibilities after discharge to manager his care at home being   1. Going to follow up appointments   2.  rx from the pharmacy when discharged  3. Taking his medication as prescribed     Home health services have been arranged the TN provided instructions and [facility] liason met with pt.    Patient's nurse, Patricia, informed that patient can discharge from  standpoint. Nurse can now complete discharge and review signs and symptoms teaching.     EDUCATION:  TN provided with educational information on CHF.  Information reviewed and placed in :My Healthcare Packet" to be brought home for pt to use as resource after discharge.  Information included:  signs and symptoms to look for and call the doctor if experiencing, and symptoms that may indicate a medical emergency: CALL 911.      All questions answered.  Teach back method used. Pt verbalized understanding of all information by stating, "I have my wife and step-son at home to help me with what I need".           "

## 2018-04-24 NOTE — PLAN OF CARE
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]  Yes    Mixed hyperlipidemia [E78.2]  Yes    Blindness of one eye [H54.40]  Yes    CKD (chronic kidney disease), stage III [N18.3]  Yes    S/P TAVR (transcatheter aortic valve replacement) [Z95.2]  Not Applicable    Aortic valve stenosis [I35.0]  Yes    Acute hypoxemic respiratory failure [J96.01]  Yes    Essential hypertension [I10]  Yes    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    Specialty:  Cardiology  Why:  Outpatient Services Cardiology Follow-Up Tuesday at 2:30 PM for staple removal.  Contact information:  [address] [place] [id number] [phone]                     I have seen and examined this patient face to face today. My clinical findings that support the need for the home health skilled services and home bound status are the following:  Medical restrictions requiring assistance of another human to leave home due  to  Decreased range of motions in extremities.    Allergies:  Review of patient's allergies indicates:   Allergen Reactions    Vancomycin analogues Itching    Ciprofloxacin (mixture) Itching     Extreme itching and redness     Antibiotic hc        Diet: Cardiac Diet (Mechanical Soft), Boost Glucose Control, Strawberry    Activities: Keep arm immobilized; no heavy lifting, pushing, pulling with the implant side     Nursing:   SN to complete comprehensive assessment including routine vital signs. Instruct on disease process and s/s of complications to report to MD. Review/verify medication list sent home with the patient at time of discharge  and instruct patient/caregiver as needed. Frequency may be adjusted depending on start of care date.    Notify MD if SBP > 160 or < 90; DBP > 90 or < 50; HR > 120 or < 50; Temp > 101.    CONSULTS:    Physical Therapy to evaluate and treat. Evaluate for home safety and equipment needs; Establish/upgrade home exercise program. Perform / instruct on therapeutic exercises, gait training, transfer training, and Range of Motion.     MISCELLANEOUS   Hibiclens shower    Medications: Review discharge medications with patient and family and provide education.      Current Discharge Medication List      CONTINUE these medications which have NOT CHANGED    Details   aspirin (ECOTRIN) 81 MG EC tablet Take 1 tablet (81 mg total) by mouth once daily. Take 4 tab tonight then 1 tab daily  Refills: 0    Associated Diagnoses: Aortic valve stenosis, etiology of cardiac valve disease unspecified      atorvastatin (LIPITOR) 40 MG tablet Take 1 tablet (40 mg total) by mouth once daily.  Qty: 90 tablet, Refills: 11      clopidogrel (PLAVIX) 75 mg tablet Take 1 tablet (75 mg total) by mouth once daily. Take 4 tab tonight then 1 tab daily  Qty: 30 tablet, Refills: 11    Associated Diagnoses: Aortic valve stenosis, etiology of cardiac valve disease unspecified      famotidine (PEPCID) 20 MG tablet Take  20 mg by mouth 2 (two) times daily.      furosemide (LASIX) 20 MG tablet Take 1 tablet (20 mg total) by mouth once daily.  Qty: 10 tablet, Refills: 0      losartan (COZAAR) 100 MG tablet Take 1 tablet (100 mg total) by mouth once daily.  Qty: 90 tablet, Refills: 3      metformin (GLUCOPHAGE) 500 MG tablet Take 1,000 mg by mouth 2 (two) times daily with meals.       metoprolol succinate (TOPROL-XL) 50 MG 24 hr tablet Take 50 mg by mouth once daily.             I certify that this patient is confined to his home and needs intermittent skilled nursing care and physical therapy.    ____________________________  Cirilo Montero MD

## 2018-04-24 NOTE — PROGRESS NOTES
[facility]  Cardiology  Progress Note    Patient Name: Timbo Gallagher  MRN: [id number]  Admission Date: 4/17/2018  Hospital Length of Stay: 7 days  Code Status: Full Code   Attending Physician: Cirilo Montero MD   Primary Care Physician: Cirilo Montero MD  Expected Discharge Date:   Principal Problem:Acute on chronic diastolic heart failure    Subjective:     Hospital Course:   4/19/18: transferred back to ICU with recurrent ventricular arrhythmia (?VF)  4/20/18: cath with nonobst CAD  4/23/18: MDT ICD placed by Dr. Black    Interval Hx: pt seen in ICU, case d/w Dr. Black.  No cp/sob.  Wants to go home.    Tele: SR, no further ventricular arrhythmia, TdP noted 4/19/18 (pers rev)        Review of Systems   Gastrointestinal: Negative for melena.   Genitourinary: Negative for hematuria.     Objective:     Vital Signs (Most Recent):  Temp: 97.6 °F (36.4 °C) (04/24/18 0301)  Pulse: 69 (04/24/18 0608)  Resp: (!) 43 (04/24/18 0608)  BP: (!) 167/77 (04/24/18 0603)  SpO2: 97 % (04/24/18 0608) Vital Signs (24h Range):  Temp:  [97.5 °F (36.4 °C)-98.5 °F (36.9 °C)] 97.6 °F (36.4 °C)  Pulse:  [60-76] 69  Resp:  [17-49] 43  SpO2:  [79 %-100 %] 97 %  BP: (116-167)/() 167/77     Weight: 87.6 kg (193 lb 2 oz)  Body mass index is 28.52 kg/m².     SpO2: 97 %  O2 Device (Oxygen Therapy): nasal cannula w/ humidification      Intake/Output Summary (Last 24 hours) at 04/24/18 0753  Last data filed at 04/24/18 0500   Gross per 24 hour   Intake              195 ml   Output              700 ml   Net             -505 ml       Lines/Drains/Airways     Peripheral Intravenous Line                 Peripheral IV - Single Lumen 04/21/18 1300 Right Antecubital 2 days         Peripheral IV - Single Lumen 04/22/18 2130 Right Wrist 1 day                Physical Exam   Constitutional: He is oriented to person, place, and time. He appears well-developed and well-nourished.   HENT:   Head: Normocephalic and atraumatic.    Eyes: Conjunctivae and EOM are normal. Pupils are equal, round, and reactive to light. No scleral icterus.   Neck: Normal range of motion. Neck supple. No JVD present. Carotid bruit is not present. No tracheal deviation present. No thyromegaly present.   Cardiovascular: Normal rate, regular rhythm, S1 normal and S2 normal.  Exam reveals no gallop and no friction rub.    Murmur heard.   Systolic murmur is present with a grade of 2/6   Pulmonary/Chest: Effort normal and breath sounds normal. No respiratory distress. He has no wheezes. He has no rales. He exhibits tenderness.   LUE sling in place.  ICD dressing C/D/I (being changed by Dr. Black).   Abdominal: Soft. He exhibits no distension.   Musculoskeletal: He exhibits no edema.   Neurological: He is alert and oriented to person, place, and time. He has normal strength. No cranial nerve deficit.   Skin: Skin is warm and dry. No rash noted.   Psychiatric: He has a normal mood and affect. His behavior is normal.       Current Medications:   amiodarone  400 mg Oral BID    Followed by    [START ON 5/5/2018] amiodarone  200 mg Oral BID    Followed by    [START ON 5/20/2018] amiodarone  200 mg Oral Daily    amLODIPine  5 mg Oral Daily    atorvastatin  40 mg Oral Daily    cephALEXin  500 mg Oral Q6H    famotidine  20 mg Oral BID    furosemide  20 mg Oral Daily    losartan  100 mg Oral Daily    metoprolol succinate  50 mg Oral Daily    sodium chloride 0.9%  3 mL Intravenous Q8H       acetaminophen, acetaminophen, acetaminophen, acetaminophen, atropine, dextrose 50%, dextrose 50%, glucagon (human recombinant), glucose, glucose, hydrocodone-acetaminophen 5-325mg, insulin aspart U-100, morphine, oxyCODONE-acetaminophen, promethazine (PHENERGAN) IVPB, sodium chloride 0.9%    Laboratory:  CBC:    Recent Labs  Lab 04/20/18  0155 04/21/18  0332 04/22/18  0000 04/23/18  0226 04/24/18  0443   WHITE BLOOD CELL COUNT 12.23 11.04 11.82 9.93 11.98   HEMOGLOBIN 10.3 L 10.9  L 11.6 L 11.2 L 10.6 L   HEMATOCRIT 32.2 L 33.8 L 36.7 L 34.3 L 32.1 L   PLATELETS 178 180 191 179 182       CHEMISTRIES:    Recent Labs  Lab 02/19/18  2348 02/20/18  0343  02/20/18  0957  04/18/18  1001  04/19/18  1641 04/20/18  0155 04/21/18  0332 04/22/18  0000 04/23/18  0226 04/24/18  0443   GLUCOSE 195 H 221 H  < > 180 H  < >  --   < > 166 H 152 H 140 H 142 H 121 H  119 H 148 H   SODIUM 138 139  < > 136  < >  --   < > 140 139 138 139 140  139 140   POTASSIUM 4.3 4.6  < > 4.4  < >  --   < > 4.2 4.2 5.3 H 4.9 3.9  3.9 3.9   BUN BLD 27 H 28 H  < > 34 H  < >  --   < > 25 H 24 H 27 H 26 H 23  23 24 H   CREATININE 1.5 H 1.8 H  < > 2.2 H  < >  --   < > 1.8 H 1.5 H 1.6 H 1.7 H 1.4  1.4 1.5 H   EGFR IF  49 A 40 A  < > 31 A  < >  --   < > 40 A 49 A 46 A 42 A 54 A  54 A 49 A   EGFR IF NON- 43 A 34 A  < > 27 A  < >  --   < > 34 A 43 A 40 A 37 A 46 A  46 A 43 A   CALCIUM 9.0 8.8  < > 9.0  < >  --   < > 9.4 8.9 9.0 9.6 9.2  9.3 9.3   MAGNESIUM 1.3 L 2.2  --  2.1  --  1.7  --  1.9  --   --   --   --   --    < > = values in this interval not displayed.    CARDIAC BIOMARKERS:    Recent Labs  Lab 04/20/18  1002 04/20/18  1753 04/21/18  0824 04/21/18  1659 04/21/18  2359   TROPONIN I 0.138 H 0.111 H 0.083 H 0.068 H 0.061 H       COAGS:    Recent Labs  Lab 02/19/18  2348 02/20/18  0343 02/20/18  0957 04/17/18  0610 04/23/18  0226   INR 1.1 1.1 1.1 1.2 1.2       LIPIDS/LFTS:    Recent Labs  Lab 02/17/18  0831  04/20/18  0155 04/21/18  0332 04/22/18  0000 04/23/18  0226 04/24/18  0443   CHOLESTEROL 147  --   --   --   --   --   --    TRIGLYCERIDES 72  --   --   --   --   --   --    HDL 37 L  --   --   --   --   --   --    LDL CHOLESTEROL 95.6  --   --   --   --   --   --    NON-HDL CHOLESTEROL 110  --   --   --   --   --   --    AST 11  < > 46 H 28 21 16 19   ALT <5 L  < > 46 H 34 29 20 14   < > = values in this interval not displayed.    BNP:    Recent Labs  Lab 09/30/16  0916 10/16/16  1050  12/02/16  0513 01/27/18  0736 04/17/18  0610    H 658 H 500 H 356 H 1,335 H       TSH:    Recent Labs  Lab 02/17/18  0831   TSH 1.450       Free T4:        Diagnostic Results:  ECG (personally reviewed tracings):  4/17/18 , PVC  Tele 4/19/18: torsades    Chest X-Ray (personally reviewed image(s)): 4/20/18 RML infil    Echo: 4/19/18    1 - Low normal to mildly depressed left ventricular systolic function (EF 50-55%).     2 - Concentric hypertrophy.     3 - Biatrial enlargement.     4 - S/P transcatheter AVR, UNA = 1.99 cm2, AVAi = 0.98 cm2/m2, peak velocity = 2.39 m/s, mean gradient = 11 mmHg.     5 - Mild to moderate mitral regurgitation.     6 - Mild tricuspid regurgitation.     LE Venous US 4/17/18  No evidence of deep venous thrombosis in either lower extremity.    Carotid US 3/22/18 (s/p L CEA)  There is 20 - 39% right Internal Carotid stenosis.  There is 20 - 39% left Internal Carotid stenosis.    Cath 4/20/18 (images pers rev)  LVEF: 50% by echo  Normal TAVR fxn by echo  Dominance: Right  LM: normal  LAD: MLI              Diag1 prox 60%, mid 50%  Ramus: MLI  LCx: MLI  RCA: dom, prox 30%  Hemostasis:  R Radial band  Impression:  TdP  Nonobst CAD  Normal LV fxn/TAVR fxn by echo  R rad vasband for hemostasis  Plan:  Cont ASA/Plavix  Cont amio gtt  EPS eval    TAVR 10/17/17  B. 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    No perivalvular leak post procedure per 2D echo.    Post deployment AV mean gradient 2.5 mmHg, Vmax 1.09 m/s.    Assessment and Plan:     * Acute on chronic diastolic heart failure    Pt seems euvolemic        NSVT (nonsustained ventricular tachycardia)    Pt had prolonged TdP overnight 4/19/18.  Electrolytes OK  EF low normal  Cath 4/20/18 with nonobst CAD  S/P MDT ICD 4/23/18  Cont PO amio load.  Follow up with Dr. Black in 2 weeks for stable removal and with Dr. Toribio thereafter.        S/P TAVR  (transcatheter aortic valve replacement)    Brent S3, normally functioning by echo.        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  Pt to see Dr. Black in 2 weeks for staple removal and then follow up with Dr. Toribio thereafter.    Ned Toribio MD  Cardiology  [facility]

## 2018-04-24 NOTE — PLAN OF CARE
04/24/18 1111   Final Note   Assessment Type Final Discharge Note   Discharge Disposition Home   What phone number can be called within the next 1-3 days to see how you are doing after discharge? (Listed in chart.)   Hospital Follow Up  Appt(s) scheduled? Yes   Discharge plans and expectations educations in teach back method with documentation complete? Yes   Right Care Referral Info   Post Acute Recommendation Home-care

## 2018-04-24 NOTE — DISCHARGE SUMMARY
[facility] Hospital Medicine  Discharge Summary      Patient Name: Timbo Gallagher  MRN: [id number]  Admission Date: 4/17/2018  Hospital Length of Stay: 7 days  Discharge Date and Time:  4/24/18  Attending Physician: Cirilo Montero MD   Discharging Provider: Cirilo Montero MD  Primary Care Provider: Cirilo Montero MD      HPI:   Pt is an 81yo male with a history of CHF routenly followed by Dr Toribio who presented to the ED with shortness of breath.  Pt was seen by cardiology on 3/28 and the wife reports that at that time he was told to discontinue his Lasix.  THere is no record of this continuation in the chart.  Since stopping his lasix he has built up with fluid.  On presentation to the ED his sats were in 50% range.  Pt was started on Bipap and is going to be admitted to the ED with Acute congestive heart failure    Procedure(s) (LRB):  Left heart cath R radial access, not before 4pm (Left)      Hospital Course:   Pt had been seen by cards and was undergoing diuresis for his CHF.  He had progressed to be able to transfer to tele on 4/19.  Later that day, he had v-fib.  Note by Dr. Farr:  Patient had for few minutes VF ,with PEA,code blue has been called,patient was started on CPR and supplemental oxygen,he has been shocked,time one, with ROSC,patient gain consciousness,he received one time 100 mg IV Lidoacoin per cardiology,patient's rhythm was back to sinus rhythm,he has elevated BP systolic up to 230,recieved one time IV labetalol,his air way was maintained,did not required intubation,he has been  transferred to ICU for continues amiodarone infusion  and close monitoring.will check Troponin,CMP,CBC.Magnesium level,chest x ray,primary has been  notified,cardiology is following.  So he was moved back to ICU  4/20:  Left heart cath done by Dr. Toribio:  LVEF: 50% by echo  Normal TAVR fxn by echo  Dominance: Right  LM: normal  LAD: MLI  Diag1 prox 60%, mid 50%  Ramus: MLI  LCx:  MLI  RCA: dom, prox 30%  Hemostasis:  R Radial band  Impression:  TdP  Nonobst CAD  Normal LV fxn/TAVR fxn by echo  R rad vasband for hemostasis  Plan:  Cont ASA/Plavix  Cont amio gtt  EPS eval (Dr. Toribio discussed case with Dr. Parish - [facility] EPS; no EPS needed, pt needs AICD)  AICD placed on 4/23 without incident.  Pt observed further overnight and cleared for dc home on 4/24.  Wearing sling to prevent dislodging wires.  On Amiodarone wean:  400 bid for 14 days, 200 bid for 14 days, 200 daily afterward  followup with cards 1-2 weeks     Consults:   Consults         Status Ordering Provider     Inpatient consult to Cardiology  Once     Provider:  Ned Toribio MD    Acknowledged YONIS ASHLEY     Inpatient consult to Registered Dietitian/Nutritionist  Once     Provider:  (Not yet assigned)    Completed YONIS ASHLEY     IP consult to case management  Once     Provider:  (Not yet assigned)    Completed LIANNA KESSLER          * Acute on chronic diastolic heart failure    On ARB, diuretic, B blocker  Systolic:  EF 50%  No mention of diastolic function on echo            AICD (automatic cardioverter/defibrillator) present    Sling to prevent line dislodging          NSVT (nonsustained ventricular tachycardia)    No need for EPS  AICD placed 4/23; on amiodarone loading sequence        CKD (chronic kidney disease), stage III    GFR 43  Has been on Mobic for a few days  Metformin has been stopped        Acute hypoxemic respiratory failure    Was hypoxic prior to arrival  Stabilized then went to tele; had episode of VF there  Continue O2.  DId not require respiratory support after code        Essential hypertension    137/62  Norvasc, lasix, losartan, metoprolol  norvasc was bumped to 10mg daily for dc        Type 2 diabetes mellitus with renal complication    BS managed.   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 [I13.0] 04/17/2018 Yes    Mixed hyperlipidemia [E78.2] 03/28/2018 Yes    Blindness of one eye [H54.40] 02/17/2018 Yes    CKD (chronic kidney disease), stage III [N18.3] 11/22/2017 Yes    S/P TAVR (transcatheter aortic valve replacement) [Z95.2] 10/18/2017 Not Applicable    Aortic valve stenosis [I35.0] 05/08/2017 Yes    Acute hypoxemic respiratory failure [J96.01] 12/05/2016 Yes    Essential hypertension [I10] 12/02/2016 Yes    Type 2 diabetes mellitus with renal complication [E11.29] 02/15/2016 Yes      Problems Resolved During this Admission:    Diagnosis Date Noted Date Resolved POA    TIA involving carotid artery [G45.1] 02/18/2018 04/18/2018 Yes       Discharged Condition: good; eager to return home    Disposition: Home or Self Care    Follow Up:  Follow-up Information     Jorge L Black MD On 5/8/2018.    Specialty:  Cardiology  Why:  Outpatient Services Cardiology Follow-Up Tuesday at 2:30 PM for staple removal.  Contact information:  [address]  [address]  [place] [address] [phone]             [facility].    Specialty:  Home Health Services  Why:  Home Health Agency will contact patient to initiate services.  Contact information:  [address]  [address]  [place] [address] [phone]             Cirilo Montero MD.    Specialty:  Internal Medicine  Contact information:  [address]  [place] [address] [phone]                 Patient Instructions:   Diet:  Diabetic [address]  Activity:  No major movements  of L arm - avoid dislodging wires    Significant Diagnostic Studies:   Left heart cath:  In hospital course note    US legs 4/17:  Right thigh veins: The common femoral, femoral, popliteal, upper greater saphenous, and deep femoral veins are patent and free of thrombus. The veins are normally compressible and have normal phasic flow and augmentation response.    Right calf veins: The visualized calf veins are patent.    Left thigh veins: The common femoral, femoral, popliteal, upper greater saphenous, and deep femoral veins are patent and free of thrombus. The veins are normally compressible and have normal phasic flow and augmentation response.    Left calf veins: The visualized calf veins are patent.    Xray chest 4/23:  Postoperative changes from pacemaker placement are now seen on the left with electrodes to right atrium and ventricle.  No pneumothorax post procedure.  Heart remains enlarged with aortic valve stent again seen.  Mediastinum demonstrates aortic atherosclerosis.  The lower lung zones right more than left demonstrate increasing lung base consolidation and with a small right pleural effusion.    Echo 4/17:  Aorta: The aortic root is normal in size.     Left Atrium: The left atrial volume index is moderately enlarged, measuring 46.28 cc/m2.     Left Ventricle: The left ventricle is normal in size, with an end-diastolic diameter of 5.5 cm, and an end-systolic diameter of 4.2 cm. Wall thickness is mildly increased, with the septum measuring 1.2 cm and the posterior wall measuring 1.3 cm across.   Relative wall thickness was increased at 0.47, and the LV mass index was increased at 170.4 g/m2 consistent with concentric left ventricular hypertrophy. There are no regional wall motion abnormalities. Left ventricular systolic function appears low   normal to mildly depressed. Visually estimated ejection fraction is 50-55%. The LV Doppler derived stroke volume equals 115.0 ccs.     Diastolic indices: E wave  velocity 1.4 m/s, E/A ratio 1.4,  msec., Diastolic function is normal.     Right Atrium: The right atrium is enlarged, measuring 5.4 cm in length and 4.1 cm in width in the apical view.     Right Ventricle: The right ventricle is normal in size measuring 4.2 cm at the base in the apical right ventricle-focused view. Global right ventricular systolic function appears normal. Tricuspid annular plane systolic excursion (TAPSE) is 1.9 cm.   Tissue Doppler-derived tricuspid annular peak systolic velocity (S prime) is 11.3 cm/s. The estimated PA systolic pressure is 33 mmHg.     Aortic Valve:  There is a percutaneously replaced bioprosthesis in the aortic position. The peak velocity obtained across the aortic valve is 2.39 m/s, which translates to a peak gradient of 23 mmHg. The mean gradient is 11 mmHg. Using a left ventricular   outflow tract diameter of 2.2 cm, a left ventricular outflow tract velocity time integral of 30 cm, and a peak instantaneous transvalvular velocity time integral of 58 cm, the effective prosthetic valve area is 1.99 cm2(p AVAi is 0.98 cm2/m2).     Mitral Valve:  There is mild to moderate mitral regurgitation.     Tricuspid Valve:  There is mild tricuspid regurgitation.     IVC: IVC is normal in size and collapses > 50% with a sniff, suggesting normal right atrial pressure of 3 mmHg.     Intracavitary: There is no evidence of pericardial effusion, intracavity mass, thrombi, or vegetation.         CONCLUSIONS     1 - Low normal to mildly depressed left ventricular systolic function (EF 50-55%).     2 - Concentric hypertrophy.     3 - Biatrial enlargement.     4 - S/P transcatheter AVR, UNA = 1.99 cm2, AVAi = 0.98 cm2/m2, peak velocity = 2.39 m/s, mean gradient = 11 mmHg.     5 - Mild to moderate mitral regurgitation.     6 - Mild tricuspid regurgitation.     Pending Diagnostic Studies:     Procedure Component Value Units Date/Time    EKG 12-lead [[id number]]     Order Status:  Sent Lab  Status:  No result          Medications:     Medication List      START taking these medications    * acetaminophen 325 MG tablet  Commonly known as:  TYLENOL  Take 2 tablets (650 mg total) by mouth every 8 (eight) hours as needed for Temperature greater than (or equal to 101 degree F).     * acetaminophen 325 MG tablet  Commonly known as:  TYLENOL  Take 2 tablets (650 mg total) by mouth every 8 (eight) hours as needed.     * amiodarone 400 MG tablet  Commonly known as:  PACERONE  Take 1 tablet (400 mg total) by mouth 2 (two) times daily.     * amiodarone 200 MG Tab  Commonly known as:  PACERONE  Take 1 tablet (200 mg total) by mouth 2 (two) times daily.  Start taking on:  5/5/2018     * amiodarone 200 MG Tab  Commonly known as:  PACERONE  Take 1 tablet (200 mg total) by mouth once daily.  Start taking on:  5/20/2018     amLODIPine 10 MG tablet  Commonly known as:  NORVASC  Take 1 tablet (10 mg total) by mouth once daily.  Start taking on:  4/25/2018     cephALEXin 500 MG capsule  Commonly known as:  KEFLEX  Take 1 capsule (500 mg total) by mouth every 6 (six) hours.        * This list has 5 medication(s) that are the same as other medications prescribed for you. Read the directions carefully, and ask your doctor or other care provider to review them with you.            CONTINUE taking these medications    aspirin 81 MG EC tablet  Commonly known as:  ECOTRIN  Take 1 tablet (81 mg total) by mouth once daily. Take 4 tab tonight then 1 tab daily     atorvastatin 40 MG tablet  Commonly known as:  LIPITOR  Take 1 tablet (40 mg total) by mouth once daily.     clopidogrel 75 mg tablet  Commonly known as:  PLAVIX  Take 1 tablet (75 mg total) by mouth once daily. Take 4 tab tonight then 1 tab daily     famotidine 20 MG tablet  Commonly known as:  PEPCID     furosemide 20 MG tablet  Commonly known as:  LASIX  Take 1 tablet (20 mg total) by mouth once daily.     losartan 100 MG tablet  Commonly known as:  COZAAR  Take 1  tablet (100 mg total) by mouth once daily.     metoprolol succinate 50 MG 24 hr tablet  Commonly known as:  TOPROL-XL        STOP taking these medications    metFORMIN 500 MG tablet  Commonly known as:  GLUCOPHAGE           Where to Get Your Medications      These medications were sent to [facility] [place] [facility] [place] - [address]  [address] [address]    Phone:  [phone]   · amiodarone 200 MG Tab  · amiodarone 200 MG Tab  · amiodarone 400 MG tablet  · amLODIPine 10 MG tablet  · cephALEXin 500 MG capsule     Information about where to get these medications is not yet available    Ask your nurse or doctor about these medications  · acetaminophen 325 MG tablet  · acetaminophen 325 MG tablet         Indwelling Lines/Drains at time of discharge:   Lines/Drains/Airways          No matching active lines, drains, or airways               Cirilo Montero MD  Department of Hospital Medicine  [facility]

## 2018-04-24 NOTE — PLAN OF CARE
Problem: Patient Care Overview  Goal: Plan of Care Review  Outcome: Ongoing (interventions implemented as appropriate)  Patient AAOx4. Full code. No acute distress. VS WNL. Afebrile. NSR on cardiac monitor. Saline lock. Left arm in sling post status AICD placement. Accu-checks AC/HS per sliding scale. 2L nasal cannula. Voids per urinal. Soft cardiac diet. Patient able to ambulate with minimal assist. Plan for transfer to telemetry unit this AM.

## 2018-04-25 NOTE — NURSING
Patient transported via wheelchair accompanied by ICU nurse and family. Discharged home with medications and care instruction on home health set-up.

## 2018-04-26 ENCOUNTER — PATIENT OUTREACH (OUTPATIENT)
Dept: ADMINISTRATIVE | Facility: CLINIC | Age: 83
End: 2018-04-26

## 2018-04-26 NOTE — PROGRESS NOTES
C3 nurse attempted to contact patient. No answer. The following message was left for the patient to return the call:  Good morning I am a nurse calling on behalf of [facility] from the [facility].  This is a Transitional Care Call for Timbo Gallagher . When you have a moment please contact us at [phone] or [phone] Monday through Friday, between the hours of 8 am to 4 pm. We look forward to speaking with you. On behalf of [facility] have a nice day.    The patient does not have a scheduled HOSFU appointment within 7-14 days post hospital discharge date 4/24. Message sent to Physician staff to assist with HOSFU appointment scheduling.[facility]

## 2018-04-26 NOTE — PATIENT INSTRUCTIONS
Fall Prevention   Falls often occur due to slipping, tripping or losing your balance. Here are ways to reduce your risk of falling again.   Was there anything that caused your fall that can be fixed, removed or replaced?   Make your home safe by keeping walkways clear of objects you may trip over.   Use non-slip pads under rugs.   Do not walk in poorly lit areas.   Do not stand on chairs or wobbly ladders.   Use caution when reaching overhead or looking upward. This position can cause a loss of balance.   Be sure your shoes fit properly, have non-slip bottoms and are in good condition.   Be cautious when going up and down stairs, curbs, and when walking on uneven sidewalks.   If your balance is poor, consider using a cane or walker.   If your fall was related to alcohol use, stop or limit alcohol intake.   If your fall was related to use of sleeping medicines, talk to your doctor about this. You may need to reduce your dosage at bedtime if you awaken during the night to go to the bathroom.   To reduce the need for nighttime bathroom trips:   Avoid drinking fluids for several hours before going to bed   Empty your bladder before going to bed   Men can keep a urinal at the bedside  Stay as active as you can. Balance, flexibility, strength, and endurance all come from exercise. They all play a role in preventing falls. Ask your heathcare provider which types of activity are right for you.  © 1537-8917 [employer]. [address] [place] [address], [place] [address]. All rights reserved. This information is not intended as a substitute for professional medical care. Always follow your healthcare professional's instructions.

## 2018-05-08 ENCOUNTER — OFFICE VISIT (OUTPATIENT)
Dept: CARDIOLOGY | Facility: CLINIC | Age: 83
End: 2018-05-08
Payer: MEDICARE

## 2018-05-08 VITALS
OXYGEN SATURATION: 95 % | DIASTOLIC BLOOD PRESSURE: 77 MMHG | HEIGHT: 69 IN | HEART RATE: 47 BPM | BODY MASS INDEX: 29.03 KG/M2 | SYSTOLIC BLOOD PRESSURE: 144 MMHG | WEIGHT: 196 LBS

## 2018-05-08 DIAGNOSIS — Z95.2 S/P TAVR (TRANSCATHETER AORTIC VALVE REPLACEMENT): ICD-10-CM

## 2018-05-08 DIAGNOSIS — I50.32 CHRONIC DIASTOLIC HEART FAILURE: Primary | ICD-10-CM

## 2018-05-08 DIAGNOSIS — I47.29 NSVT (NONSUSTAINED VENTRICULAR TACHYCARDIA): ICD-10-CM

## 2018-05-08 DIAGNOSIS — I10 ESSENTIAL HYPERTENSION: ICD-10-CM

## 2018-05-08 DIAGNOSIS — Z95.810 AICD (AUTOMATIC CARDIOVERTER/DEFIBRILLATOR) PRESENT: ICD-10-CM

## 2018-05-08 PROCEDURE — 99999 PR PBB SHADOW E&M-EST. PATIENT-LVL III: CPT | Mod: PBBFAC,,, | Performed by: INTERNAL MEDICINE

## 2018-05-08 PROCEDURE — 99213 OFFICE O/P EST LOW 20 MIN: CPT | Mod: PBBFAC | Performed by: INTERNAL MEDICINE

## 2018-05-08 PROCEDURE — 99024 POSTOP FOLLOW-UP VISIT: CPT | Mod: S$PBB,,, | Performed by: INTERNAL MEDICINE

## 2018-05-08 PROCEDURE — 93010 ELECTROCARDIOGRAM REPORT: CPT | Mod: ,,, | Performed by: INTERNAL MEDICINE

## 2018-05-08 NOTE — PROGRESS NOTES
Subjective:    Patient ID:  Timbo Gallagher is a 83 y.o. male who presents for follow-up of Post-op Evaluation      HPI     Admitted 4/17/18  Pt is an 83yo male with a history of CHF routenly followed by Dr Toribio who presented to the ED with shortness of breath.  Pt was seen by cardiology on 3/28 and the wife reports that at that time he was told to discontinue his Lasix.  THere is no record of this continuation in the chart.  Since stopping his lasix he has built up with fluid.  On presentation to the ED his sats were in 50% range.  Pt was started on Bipap and is going to be admitted to the ED with Acute congestive heart failure     Pt had been seen by cards and was undergoing diuresis for his CHF.  He had progressed to be able to transfer to tele on 4/19.  Later that day, he had v-fib.  Note by Dr. Farr:  Patient had for few minutes VF ,with PEA,code blue has been called,patient was started on CPR and supplemental oxygen,he has been shocked,time one, with ROSC,patient gain consciousness,he received one time 100 mg IV Lidoacoin per cardiology,patient's rhythm was back to sinus rhythm,he has elevated BP systolic up to 230,recieved one time IV labetalol,his air way was maintained,did not required intubation,he has been  transferred to ICU for continues amiodarone infusion  and close monitoring.will check Troponin,CMP,CBC.Magnesium level,chest x ray,primary has been  notified,cardiology is following.  So he was moved back to ICU  4/20:  Left heart cath done by Dr. Toribio:  LVEF: 50% by echo  Normal TAVR fxn by echo  Dominance: Right  LM: normal  LAD: MLI  Diag1 prox 60%, mid 50%  Ramus: MLI  LCx: MLI  RCA: dom, prox 30%  Hemostasis:  R Radial band  Impression:  TdP  Nonobst CAD  Normal LV fxn/TAVR fxn by echo  R rad vasband for hemostasis  Plan:  Cont ASA/Plavix  Cont amio gtt  EPS eval (Dr. Toribio discussed case with Dr. Parish - [facility] EPS; no EPS needed, pt needs AICD)  AICD placed on 4/23 without  incident.  Pt observed further overnight and cleared for dc home on 4/24.  Wearing sling to prevent dislodging wires.  On Amiodarone wean:  400 bid for 14 days, 200 bid for 14 days, 200 daily afterward  followup with cards 1-2 weeks        4/23/18 [employer] dual AICD placed left SC vein    Some SOB and LE edema since discharge  EKG AV paced  Denies dizziness or shocks from AICD  Tomorrow will switch to amiodarone 200 qd       Review of Systems   Constitution: Negative for decreased appetite.   HENT: Negative for ear discharge.    Eyes: Negative for blurred vision.   Endocrine: Negative for polyphagia.   Skin: Negative for nail changes.   Neurological: Negative for aphonia.   Psychiatric/Behavioral: Negative for hallucinations.        Objective:    Physical Exam   Constitutional: He is oriented to person, place, and time. He appears well-developed and well-nourished.   HENT:   Head: Normocephalic and atraumatic.   Eyes: EOM are normal. Pupils are equal, round, and reactive to light.   Neck: Neck supple. No JVD present. No tracheal deviation present. No thyromegaly present.   Cardiovascular: Normal rate, regular rhythm, S1 normal, S2 normal, intact distal pulses and normal pulses.  PMI is not displaced.  Exam reveals no gallop and no friction rub.    Murmur heard.  Pulmonary/Chest: Effort normal and breath sounds normal. No respiratory distress. He has no wheezes. He has no rales. He exhibits no tenderness.   Abdominal: Soft. Bowel sounds are normal. He exhibits no distension and no mass. There is no tenderness.   Musculoskeletal: Normal range of motion. He exhibits no edema or tenderness.   Neurological: He is alert and oriented to person, place, and time.   Skin: Skin is warm and dry. No rash noted.   Psychiatric: He has a normal mood and affect. His behavior is normal.   AICD site C/D/I      Assessment:       1. Chronic diastolic heart failure    2. Essential hypertension    3. S/P TAVR (transcatheter aortic valve  replacement)    4. NSVT (nonsustained ventricular tachycardia)    5. AICD (automatic cardioverter/defibrillator) present         Plan:       Staples removed  Will schedule [employer] AICD check and OV with Dr Toribio

## 2018-06-20 ENCOUNTER — LAB VISIT (OUTPATIENT)
Dept: LAB | Facility: HOSPITAL | Age: 83
End: 2018-06-20
Attending: INTERNAL MEDICINE
Payer: MEDICARE

## 2018-06-20 DIAGNOSIS — N18.30 TYPE 2 DIABETES MELLITUS WITH STAGE 3 CHRONIC KIDNEY DISEASE, WITHOUT LONG-TERM CURRENT USE OF INSULIN: ICD-10-CM

## 2018-06-20 DIAGNOSIS — E78.2 MIXED HYPERLIPIDEMIA: ICD-10-CM

## 2018-06-20 DIAGNOSIS — G45.1 TIA INVOLVING CAROTID ARTERY: ICD-10-CM

## 2018-06-20 DIAGNOSIS — E11.22 TYPE 2 DIABETES MELLITUS WITH STAGE 3 CHRONIC KIDNEY DISEASE, WITHOUT LONG-TERM CURRENT USE OF INSULIN: ICD-10-CM

## 2018-06-20 LAB
ALBUMIN SERPL BCP-MCNC: 3.4 G/DL
ALP SERPL-CCNC: 138 U/L
ALT SERPL W/O P-5'-P-CCNC: 18 U/L
AST SERPL-CCNC: 17 U/L
BILIRUB DIRECT SERPL-MCNC: 0.2 MG/DL
BILIRUB SERPL-MCNC: 0.4 MG/DL
CHOLEST SERPL-MCNC: 97 MG/DL
CHOLEST/HDLC SERPL: 2.6 {RATIO}
HDLC SERPL-MCNC: 37 MG/DL
HDLC SERPL: 38.1 %
LDLC SERPL CALC-MCNC: 47.4 MG/DL
NONHDLC SERPL-MCNC: 60 MG/DL
PROT SERPL-MCNC: 7.2 G/DL
TRIGL SERPL-MCNC: 63 MG/DL

## 2018-06-20 PROCEDURE — 80076 HEPATIC FUNCTION PANEL: CPT

## 2018-06-20 PROCEDURE — 80061 LIPID PANEL: CPT

## 2018-06-20 PROCEDURE — 36415 COLL VENOUS BLD VENIPUNCTURE: CPT

## 2018-07-30 ENCOUNTER — OFFICE VISIT (OUTPATIENT)
Dept: CARDIOLOGY | Facility: CLINIC | Age: 83
End: 2018-07-30
Payer: MEDICARE

## 2018-07-30 VITALS
OXYGEN SATURATION: 97 % | HEIGHT: 69 IN | HEART RATE: 60 BPM | RESPIRATION RATE: 15 BRPM | SYSTOLIC BLOOD PRESSURE: 122 MMHG | BODY MASS INDEX: 28.5 KG/M2 | WEIGHT: 192.44 LBS | DIASTOLIC BLOOD PRESSURE: 62 MMHG

## 2018-07-30 DIAGNOSIS — N18.30 TYPE 2 DIABETES MELLITUS WITH STAGE 3 CHRONIC KIDNEY DISEASE, WITHOUT LONG-TERM CURRENT USE OF INSULIN: ICD-10-CM

## 2018-07-30 DIAGNOSIS — E78.2 MIXED HYPERLIPIDEMIA: ICD-10-CM

## 2018-07-30 DIAGNOSIS — I10 ESSENTIAL HYPERTENSION: ICD-10-CM

## 2018-07-30 DIAGNOSIS — Z95.2 S/P TAVR (TRANSCATHETER AORTIC VALVE REPLACEMENT): ICD-10-CM

## 2018-07-30 DIAGNOSIS — Z95.810 AICD (AUTOMATIC CARDIOVERTER/DEFIBRILLATOR) PRESENT: Primary | ICD-10-CM

## 2018-07-30 DIAGNOSIS — I47.29 NSVT (NONSUSTAINED VENTRICULAR TACHYCARDIA): ICD-10-CM

## 2018-07-30 DIAGNOSIS — N18.30 CKD (CHRONIC KIDNEY DISEASE), STAGE III: ICD-10-CM

## 2018-07-30 DIAGNOSIS — E11.22 TYPE 2 DIABETES MELLITUS WITH STAGE 3 CHRONIC KIDNEY DISEASE, WITHOUT LONG-TERM CURRENT USE OF INSULIN: ICD-10-CM

## 2018-07-30 PROCEDURE — 99213 OFFICE O/P EST LOW 20 MIN: CPT | Mod: PBBFAC | Performed by: INTERNAL MEDICINE

## 2018-07-30 PROCEDURE — 93289 INTERROG DEVICE EVAL HEART: CPT | Mod: 26,S$PBB,, | Performed by: INTERNAL MEDICINE

## 2018-07-30 PROCEDURE — 99214 OFFICE O/P EST MOD 30 MIN: CPT | Mod: S$PBB,,, | Performed by: INTERNAL MEDICINE

## 2018-07-30 PROCEDURE — 99999 PR PBB SHADOW E&M-EST. PATIENT-LVL III: CPT | Mod: PBBFAC,,, | Performed by: INTERNAL MEDICINE

## 2018-07-30 PROCEDURE — 93289 INTERROG DEVICE EVAL HEART: CPT | Mod: PBBFAC | Performed by: INTERNAL MEDICINE

## 2018-07-30 NOTE — PROGRESS NOTES
CARDIOVASCULAR PROGRESS NOTE    REASON FOR CONSULT:   Timbo Gallagher is a 83 y.o. male who presents for follow up of TIA/CEA, TAVR.    PCP: Winston Negron: [place]  HISTORY OF PRESENT ILLNESS:   The patient comes in today for follow-up.  He is accompanied by his wife.  He reports generally asymptomatic status without angina or dyspnea.  He has had no palpitations, lightheadedness, dizziness, syncope, or defibrillator discharges.  There has been no PND, orthopnea, or lower extremity edema. He denies melena, hematuria, or claudicant symptoms.    The MDT ICD was interrogated today.  No events noted.  Current rhythm is AP-VS with underlying SB at 50BPM.  Sensing, pacing and impedance were WNL.  No programming changes made aside from activating rate responsiveness made.    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 Coleman    CATARACT EXTRACTION, BILATERAL      EYE SURGERY      HERNIA REPAIR      RETINAL DETACHMENT SURGERY         ALLERGIES AND MEDICATION:     Review of patient's allergies indicates:   Allergen Reactions    Vancomycin analogues Itching    Ciprofloxacin (mixture) Itching     Extreme itching and redness     Antibiotic hc      Previous  Medications    ACETAMINOPHEN (TYLENOL) 325 MG TABLET    Take 2 tablets (650 mg total) by mouth every 8 (eight) hours as needed for Temperature greater than (or equal to 101 degree F).    AMIODARONE (PACERONE) 200 MG TAB    Take 1 tablet (200 mg total) by mouth once daily.    AMLODIPINE (NORVASC) 10 MG TABLET    Take 1 tablet (10 mg total) by mouth once daily.    ASPIRIN (ECOTRIN) 81 MG EC TABLET    Take 1 tablet (81 mg total) by mouth once daily. Take 4 tab tonight then 1 tab daily    ATORVASTATIN (LIPITOR) 40 MG TABLET    Take 1 tablet (40 mg total) by mouth once daily.    CEPHALEXIN (KEFLEX) 500 MG CAPSULE    Take 1 capsule (500 mg total) by mouth every 6 (six) hours.    CLOPIDOGREL (PLAVIX) 75 MG TABLET    Take 1 tablet (75 mg total) by mouth once daily. Take 4 tab tonight then 1 tab daily    FAMOTIDINE (PEPCID) 20 MG TABLET    Take 20 mg by mouth 2 (two) times daily.    FUROSEMIDE (LASIX) 20 MG TABLET    Take 1 tablet (20 mg total) by mouth once daily.    LOSARTAN (COZAAR) 100 MG TABLET    Take 1 tablet (100 mg total) by mouth once daily.    METOPROLOL SUCCINATE (TOPROL-XL) 50 MG 24 HR TABLET    Take 50 mg by mouth once daily.       SOCIAL HISTORY:     Social History     Social History    Marital status:      Spouse name: N/A    Number of children: N/A    Years of education: N/A     Occupational History    Not on file.     Social History Main Topics    Smoking status: Former Smoker     Packs/day: 3.00     Years: 40.00     Quit date: 8/2/1990    Smokeless tobacco: Never Used    Alcohol use No    Drug use: No    Sexual activity: Not on file     Other Topics Concern    Not on file     Social History Narrative    ** Merged History Encounter **            FAMILY HISTORY:     Family History   Problem Relation Age of Onset    Arthritis Mother     Heart disease Father     Heart failure Father     Diabetes Sister     Heart attack Brother     No Known Problems Maternal Aunt     No Known Problems  "Maternal Uncle     No Known Problems Paternal Aunt     No Known Problems Paternal Uncle     No Known Problems Maternal Grandmother     No Known Problems Maternal Grandfather     No Known Problems Paternal Grandmother     No Known Problems Paternal Grandfather     Anemia Neg Hx     Arrhythmia Neg Hx     Asthma Neg Hx     Clotting disorder Neg Hx     Fainting Neg Hx     Hyperlipidemia Neg Hx     Hypertension Neg Hx     Stroke Neg Hx     Atrial Septal Defect Neg Hx        REVIEW OF SYSTEMS:   Review of Systems   Constitutional: Negative for chills, diaphoresis and fever.   HENT: Negative for nosebleeds.    Eyes: Negative for blurred vision, double vision and photophobia.   Respiratory: Negative for hemoptysis, shortness of breath and wheezing.    Cardiovascular: Negative for chest pain, palpitations, orthopnea, claudication, leg swelling and PND.   Gastrointestinal: Negative for abdominal pain, blood in stool, heartburn, melena, nausea and vomiting.   Genitourinary: Negative for flank pain and hematuria.   Musculoskeletal: Negative for falls, myalgias and neck pain.   Skin: Negative for rash.   Neurological: Positive for sensory change (R eye chr blind (retinal detachment)). Negative for dizziness, seizures, loss of consciousness, weakness and headaches.   Endo/Heme/Allergies: Negative for polydipsia. Does not bruise/bleed easily.   Psychiatric/Behavioral: Negative for depression and memory loss. The patient is not nervous/anxious.        PHYSICAL EXAM:     Vitals:    07/30/18 1309   BP: 122/62   Pulse: 60   Resp: 15    Body mass index is 28.42 kg/m².  Weight: 87.3 kg (192 lb 7.4 oz)   Height: 5' 9" (175.3 cm)     Physical Exam   Constitutional: He is oriented to person, place, and time. He appears well-developed and well-nourished. He is cooperative.  Non-toxic appearance. No distress.   HENT:   Head: Normocephalic and atraumatic.   Eyes: Conjunctivae and EOM are normal. Pupils are equal, round, and " reactive to light. No scleral icterus.   Neck: Trachea normal and normal range of motion. Neck supple. Normal carotid pulses and no JVD present. Carotid bruit is not present. No neck rigidity. No tracheal deviation and no edema present. No thyromegaly present.   L CEA scar well healed   Cardiovascular: Normal rate, regular rhythm, S1 normal and S2 normal.  PMI is not displaced.  Exam reveals no gallop and no friction rub.    Murmur heard.   Systolic murmur is present with a grade of 1/6   Pulses:       Carotid pulses are 2+ on the right side, and 2+ on the left side.  Pulmonary/Chest: Effort normal and breath sounds normal. No respiratory distress. He has no wheezes. He has no rales. He exhibits no tenderness.   Abdominal: Soft. He exhibits no distension. There is no hepatosplenomegaly.   Musculoskeletal: He exhibits no edema or tenderness.   Feet:   Right Foot:   Skin Integrity: Negative for ulcer.   Left Foot:   Skin Integrity: Negative for ulcer.   Neurological: He is alert and oriented to person, place, and time. No cranial nerve deficit.   Skin: Skin is warm and dry. No rash noted. No erythema.   Psychiatric: He has a normal mood and affect. His speech is normal and behavior is normal.   Vitals reviewed.      DATA:   EKG: (personally reviewed tracing)  2/17/18 SR 87, multifocal PVCs    Laboratory:  CBC:    Recent Labs  Lab 04/22/18  0000 04/23/18  0226 04/24/18  0443   WHITE BLOOD CELL COUNT 11.82 9.93 11.98   HEMOGLOBIN 11.6 L 11.2 L 10.6 L   HEMATOCRIT 36.7 L 34.3 L 32.1 L   PLATELETS 191 179 182       CHEMISTRIES:    Recent Labs  Lab 02/20/18  0957  04/18/18  1001  04/19/18  1641  04/22/18  0000 04/23/18  0226 04/24/18  0443   GLUCOSE 180 H  < >  --   < > 166 H  < > 142 H 121 H  119 H 148 H   SODIUM 136  < >  --   < > 140  < > 139 140  139 140   POTASSIUM 4.4  < >  --   < > 4.2  < > 4.9 3.9  3.9 3.9   BUN BLD 34 H  < >  --   < > 25 H  < > 26 H 23  23 24 H   CREATININE 2.2 H  < >  --   < > 1.8 H  < > 1.7  H 1.4  1.4 1.5 H   EGFR IF  31 A  < >  --   < > 40 A  < > 42 A 54 A  54 A 49 A   EGFR IF NON- 27 A  < >  --   < > 34 A  < > 37 A 46 A  46 A 43 A   CALCIUM 9.0  < >  --   < > 9.4  < > 9.6 9.2  9.3 9.3   MAGNESIUM 2.1  --  1.7  --  1.9  --   --   --   --    < > = values in this interval not displayed.    CARDIAC BIOMARKERS:    Recent Labs  Lab 04/21/18  0824 04/21/18  1659 04/21/18  2359   TROPONIN I 0.083 H 0.068 H 0.061 H       COAGS:    Recent Labs  Lab 02/20/18  0957 04/17/18  0610 04/23/18  0226   INR 1.1 1.2 1.2       LIPIDS/LFTS:    Recent Labs  Lab 02/17/18  0831  04/23/18  0226 04/24/18  0443 06/20/18  0954   CHOLESTEROL 147  --   --   --  97 L   TRIGLYCERIDES 72  --   --   --  63   HDL 37 L  --   --   --  37 L   LDL CHOLESTEROL 95.6  --   --   --  47.4 L   NON-HDL CHOLESTEROL 110  --   --   --  60   AST 11  < > 16 19 17   ALT <5 L  < > 20 14 18   < > = values in this interval not displayed.    Cardiovascular Testing:  Cath 4/20/18  LVEF: 50% by echo  Normal TAVR fxn by echo  Dominance: Right  LM: normal  LAD: MLI              Diag1 prox 60%, mid 50%  Ramus: MLI  LCx: MLI  RCA: dom, prox 30%  Hemostasis:  R Radial band  Impression:  TdP  Nonobst CAD  Normal LV fxn/TAVR fxn by echo  R rad vasband for hemostasis  Plan:  Cont ASA/Plavix  Cont amio gtt    Echo: 4/19/18    1 - Low normal to mildly depressed left ventricular systolic function (EF 50-55%).     2 - Concentric hypertrophy.     3 - Biatrial enlargement.     4 - S/P transcatheter AVR, UNA = 1.99 cm2, AVAi = 0.98 cm2/m2, peak velocity = 2.39 m/s, mean gradient = 11 mmHg.     5 - Mild to moderate mitral regurgitation.     6 - Mild tricuspid regurgitation.      LE Venous US 4/17/18  No evidence of deep venous thrombosis in either lower extremity.     Carotid US 3/22/18 (s/p L CEA)  There is 20 - 39% right Internal Carotid stenosis.  There is 20 - 39% left Internal Carotid stenosis.     TAVR 10/17/17  B.  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    No perivalvular leak post procedure per 2D echo.    Post deployment AV mean gradient 2.5 mmHg, Vmax 1.09 m/s.      ASSESSMENT:   # TdP s/p MDT ICD implant 4/2018  # TIA s/p L CEA 2/2018, followed by Dr. Coleman  # AS s/p TAVR 10/2017, normally functioning by echo 2/2018  # HTN, controlled  # HLP, on atorva 40mg qd  # CRI  # DM  # hx of R retinal detachment (chr R eye blindness)    PLAN:   Cont med rx  TAVR follow up as per [facility] [place], [place]-  RTC 3 months with MDT ICD check      Ned Toribio MD, FACC

## 2018-08-27 ENCOUNTER — HOSPITAL ENCOUNTER (INPATIENT)
Facility: HOSPITAL | Age: 83
LOS: 6 days | Discharge: HOME | DRG: 085 | End: 2018-09-03
Attending: EMERGENCY MEDICINE | Admitting: PSYCHIATRY & NEUROLOGY
Payer: MEDICARE

## 2018-08-27 DIAGNOSIS — I50.9 CHF (CONGESTIVE HEART FAILURE): ICD-10-CM

## 2018-08-27 DIAGNOSIS — R13.12 OROPHARYNGEAL DYSPHAGIA: ICD-10-CM

## 2018-08-27 DIAGNOSIS — S06.6X1A TRAUMATIC SUBARACHNOID BLEED WITH LOC OF 30 MINUTES OR LESS, INITIAL ENCOUNTER: ICD-10-CM

## 2018-08-27 DIAGNOSIS — S06.6X1A SUBARACHNOID HEMORRHAGE FOLLOWING INJURY WITH BRIEF LOSS OF CONSCIOUSNESS BUT WITHOUT OPEN INTRACRANIAL WOUND: Primary | ICD-10-CM

## 2018-08-27 DIAGNOSIS — I50.43 ACUTE ON CHRONIC COMBINED SYSTOLIC AND DIASTOLIC HEART FAILURE: ICD-10-CM

## 2018-08-27 DIAGNOSIS — S09.90XA CLOSED HEAD INJURY: ICD-10-CM

## 2018-08-27 DIAGNOSIS — S01.81XA LACERATION OF SKIN OF FACE, INITIAL ENCOUNTER: ICD-10-CM

## 2018-08-27 DIAGNOSIS — R55 SYNCOPE: ICD-10-CM

## 2018-08-27 DIAGNOSIS — R13.19 ESOPHAGEAL DYSPHAGIA: ICD-10-CM

## 2018-08-27 DIAGNOSIS — I10 ESSENTIAL HYPERTENSION: ICD-10-CM

## 2018-08-27 DIAGNOSIS — K21.9 GASTROESOPHAGEAL REFLUX DISEASE WITHOUT ESOPHAGITIS: ICD-10-CM

## 2018-08-27 DIAGNOSIS — I50.32 CHRONIC DIASTOLIC HEART FAILURE: ICD-10-CM

## 2018-08-27 DIAGNOSIS — R07.9 RIGHT-SIDED CHEST PAIN: ICD-10-CM

## 2018-08-27 LAB
ABO + RH BLD: NORMAL
ALBUMIN SERPL BCP-MCNC: 3.6 G/DL
ALP SERPL-CCNC: 144 U/L
ALT SERPL W/O P-5'-P-CCNC: 11 U/L
ANION GAP SERPL CALC-SCNC: 10 MMOL/L
APTT BLDCRRT: 27.1 SEC
AST SERPL-CCNC: 17 U/L
BASOPHILS # BLD AUTO: 0.02 K/UL
BASOPHILS NFR BLD: 0.1 %
BILIRUB SERPL-MCNC: 0.4 MG/DL
BLD GP AB SCN CELLS X3 SERPL QL: NORMAL
BUN SERPL-MCNC: 24 MG/DL
CALCIUM SERPL-MCNC: 9.4 MG/DL
CHLORIDE SERPL-SCNC: 106 MMOL/L
CO2 SERPL-SCNC: 23 MMOL/L
CREAT SERPL-MCNC: 1.8 MG/DL
DIFFERENTIAL METHOD: ABNORMAL
EOSINOPHIL # BLD AUTO: 0.2 K/UL
EOSINOPHIL NFR BLD: 1.1 %
ERYTHROCYTE [DISTWIDTH] IN BLOOD BY AUTOMATED COUNT: 14.7 %
EST. GFR  (AFRICAN AMERICAN): 39 ML/MIN/1.73 M^2
EST. GFR  (NON AFRICAN AMERICAN): 34 ML/MIN/1.73 M^2
GLUCOSE SERPL-MCNC: 175 MG/DL
HCT VFR BLD AUTO: 36.8 %
HGB BLD-MCNC: 12 G/DL
INR PPP: 1.1
LYMPHOCYTES # BLD AUTO: 0.8 K/UL
LYMPHOCYTES NFR BLD: 6.2 %
MCH RBC QN AUTO: 30.8 PG
MCHC RBC AUTO-ENTMCNC: 32.6 G/DL
MCV RBC AUTO: 94 FL
MONOCYTES # BLD AUTO: 1.1 K/UL
MONOCYTES NFR BLD: 8.2 %
NEUTROPHILS # BLD AUTO: 11.4 K/UL
NEUTROPHILS NFR BLD: 84.4 %
PLATELET # BLD AUTO: 233 K/UL
PMV BLD AUTO: 11.1 FL
POTASSIUM SERPL-SCNC: 4.1 MMOL/L
PROT SERPL-MCNC: 7.7 G/DL
PROTHROMBIN TIME: 11.1 SEC
RBC # BLD AUTO: 3.9 M/UL
SODIUM SERPL-SCNC: 139 MMOL/L
TROPONIN I SERPL DL<=0.01 NG/ML-MCNC: 0.03 NG/ML
WBC # BLD AUTO: 13.49 K/UL

## 2018-08-27 PROCEDURE — 12011 RPR F/E/E/N/L/M 2.5 CM/<: CPT

## 2018-08-27 PROCEDURE — 96366 THER/PROPH/DIAG IV INF ADDON: CPT | Mod: 59

## 2018-08-27 PROCEDURE — 63600175 PHARM REV CODE 636 W HCPCS: Performed by: STUDENT IN AN ORGANIZED HEALTH CARE EDUCATION/TRAINING PROGRAM

## 2018-08-27 PROCEDURE — 86901 BLOOD TYPING SEROLOGIC RH(D): CPT

## 2018-08-27 PROCEDURE — 93010 ELECTROCARDIOGRAM REPORT: CPT | Mod: ,,, | Performed by: INTERNAL MEDICINE

## 2018-08-27 PROCEDURE — 85610 PROTHROMBIN TIME: CPT

## 2018-08-27 PROCEDURE — 82962 GLUCOSE BLOOD TEST: CPT

## 2018-08-27 PROCEDURE — 93010 ELECTROCARDIOGRAM REPORT: CPT | Mod: 77,,, | Performed by: INTERNAL MEDICINE

## 2018-08-27 PROCEDURE — 90471 IMMUNIZATION ADMIN: CPT | Performed by: EMERGENCY MEDICINE

## 2018-08-27 PROCEDURE — 85730 THROMBOPLASTIN TIME PARTIAL: CPT

## 2018-08-27 PROCEDURE — 63600175 PHARM REV CODE 636 W HCPCS: Performed by: EMERGENCY MEDICINE

## 2018-08-27 PROCEDURE — 25000003 PHARM REV CODE 250

## 2018-08-27 PROCEDURE — 85025 COMPLETE CBC W/AUTO DIFF WBC: CPT

## 2018-08-27 PROCEDURE — 93005 ELECTROCARDIOGRAM TRACING: CPT

## 2018-08-27 PROCEDURE — 90715 TDAP VACCINE 7 YRS/> IM: CPT | Performed by: EMERGENCY MEDICINE

## 2018-08-27 PROCEDURE — 80053 COMPREHEN METABOLIC PANEL: CPT

## 2018-08-27 PROCEDURE — 99285 EMERGENCY DEPT VISIT HI MDM: CPT | Mod: 25

## 2018-08-27 PROCEDURE — 96365 THER/PROPH/DIAG IV INF INIT: CPT

## 2018-08-27 PROCEDURE — 25000003 PHARM REV CODE 250: Performed by: EMERGENCY MEDICINE

## 2018-08-27 PROCEDURE — 96375 TX/PRO/DX INJ NEW DRUG ADDON: CPT

## 2018-08-27 PROCEDURE — 84484 ASSAY OF TROPONIN QUANT: CPT

## 2018-08-27 RX ORDER — CLOPIDOGREL BISULFATE 75 MG/1
75 TABLET ORAL DAILY
Status: ON HOLD | COMMUNITY
End: 2018-09-03 | Stop reason: HOSPADM

## 2018-08-27 RX ORDER — MORPHINE SULFATE 4 MG/ML
4 INJECTION, SOLUTION INTRAMUSCULAR; INTRAVENOUS
Status: COMPLETED | OUTPATIENT
Start: 2018-08-27 | End: 2018-08-27

## 2018-08-27 RX ORDER — NICARDIPINE HYDROCHLORIDE 0.2 MG/ML
5 INJECTION INTRAVENOUS CONTINUOUS
Status: DISCONTINUED | OUTPATIENT
Start: 2018-08-27 | End: 2018-08-27

## 2018-08-27 RX ORDER — HYDROCODONE BITARTRATE AND ACETAMINOPHEN 5; 325 MG/1; MG/1
1 TABLET ORAL
Status: COMPLETED | OUTPATIENT
Start: 2018-08-27 | End: 2018-08-27

## 2018-08-27 RX ORDER — NICARDIPINE HYDROCHLORIDE 0.2 MG/ML
INJECTION INTRAVENOUS
Status: COMPLETED
Start: 2018-08-27 | End: 2018-08-27

## 2018-08-27 RX ORDER — NICARDIPINE HYDROCHLORIDE 0.2 MG/ML
5 INJECTION INTRAVENOUS CONTINUOUS
Status: DISCONTINUED | OUTPATIENT
Start: 2018-08-27 | End: 2018-08-28

## 2018-08-27 RX ORDER — ACARBOSE 25 MG/1
25 TABLET ORAL
COMMUNITY

## 2018-08-27 RX ADMIN — NICARDIPINE HYDROCHLORIDE 40 MG: 0.2 INJECTION, SOLUTION INTRAVENOUS at 08:08

## 2018-08-27 RX ADMIN — NICARDIPINE HYDROCHLORIDE 40 MG: 0.2 INJECTION INTRAVENOUS at 08:08

## 2018-08-27 RX ADMIN — HYDROCODONE BITARTRATE AND ACETAMINOPHEN 1 TABLET: 5; 325 TABLET ORAL at 05:08

## 2018-08-27 RX ADMIN — NICARDIPINE HYDROCHLORIDE 5 MG/HR: 0.2 INJECTION, SOLUTION INTRAVENOUS at 08:08

## 2018-08-27 RX ADMIN — MORPHINE SULFATE 4 MG: 4 INJECTION, SOLUTION INTRAMUSCULAR; INTRAVENOUS at 10:08

## 2018-08-27 RX ADMIN — CLOSTRIDIUM TETANI TOXOID ANTIGEN (FORMALDEHYDE INACTIVATED), CORYNEBACTERIUM DIPHTHERIAE TOXOID ANTIGEN (FORMALDEHYDE INACTIVATED), BORDETELLA PERTUSSIS TOXOID ANTIGEN (GLUTARALDEHYDE INACTIVATED), BORDETELLA PERTUSSIS FILAMENTOUS HEMAGGLUTININ ANTIGEN (FORMALDEHYDE INACTIVATED), BORDETELLA PERTUSSIS PERTACTIN ANTIGEN, AND BORDETELLA PERTUSSIS FIMBRIAE 2/3 ANTIGEN 0.5 ML: 5; 2; 2.5; 5; 3; 5 INJECTION, SUSPENSION INTRAMUSCULAR at 05:08
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
## 2018-08-28 PROBLEM — I50.33 ACUTE ON CHRONIC DIASTOLIC HEART FAILURE: Status: RESOLVED | Noted: 2018-04-17 | Resolved: 2018-08-28

## 2018-08-28 PROBLEM — S06.6X1A SUBARACHNOID HEMORRHAGE FOLLOWING INJURY WITH BRIEF LOSS OF CONSCIOUSNESS BUT WITHOUT OPEN INTRACRANIAL WOUND: Status: ACTIVE | Noted: 2018-08-28

## 2018-08-28 PROBLEM — J90 PLEURAL EFFUSION: Status: RESOLVED | Noted: 2018-02-20 | Resolved: 2018-08-28

## 2018-08-28 PROBLEM — S06.6X1A: Status: ACTIVE | Noted: 2018-08-28

## 2018-08-28 LAB
ESTIMATED AVG GLUCOSE: 157 MG/DL
HBA1C MFR BLD HPLC: 7.1 %
POCT GLUCOSE: 142 MG/DL (ref 70–110)
POCT GLUCOSE: 151 MG/DL (ref 70–110)
POCT GLUCOSE: 159 MG/DL (ref 70–110)
POCT GLUCOSE: 160 MG/DL (ref 70–110)

## 2018-08-28 PROCEDURE — 96365 THER/PROPH/DIAG IV INF INIT: CPT

## 2018-08-28 PROCEDURE — G8996 SWALLOW CURRENT STATUS: HCPCS | Mod: CJ

## 2018-08-28 PROCEDURE — 94761 N-INVAS EAR/PLS OXIMETRY MLT: CPT

## 2018-08-28 PROCEDURE — 25000003 PHARM REV CODE 250: Performed by: STUDENT IN AN ORGANIZED HEALTH CARE EDUCATION/TRAINING PROGRAM

## 2018-08-28 PROCEDURE — 83036 HEMOGLOBIN GLYCOSYLATED A1C: CPT

## 2018-08-28 PROCEDURE — 97535 SELF CARE MNGMENT TRAINING: CPT

## 2018-08-28 PROCEDURE — G8987 SELF CARE CURRENT STATUS: HCPCS | Mod: CJ

## 2018-08-28 PROCEDURE — 25000003 PHARM REV CODE 250: Performed by: PSYCHIATRY & NEUROLOGY

## 2018-08-28 PROCEDURE — 82962 GLUCOSE BLOOD TEST: CPT

## 2018-08-28 PROCEDURE — A4216 STERILE WATER/SALINE, 10 ML: HCPCS | Performed by: PSYCHIATRY & NEUROLOGY

## 2018-08-28 PROCEDURE — 27000221 HC OXYGEN, UP TO 24 HOURS

## 2018-08-28 PROCEDURE — 96375 TX/PRO/DX INJ NEW DRUG ADDON: CPT

## 2018-08-28 PROCEDURE — 96361 HYDRATE IV INFUSION ADD-ON: CPT

## 2018-08-28 PROCEDURE — 92523 SPEECH SOUND LANG COMPREHEN: CPT

## 2018-08-28 PROCEDURE — G8997 SWALLOW GOAL STATUS: HCPCS | Mod: CI

## 2018-08-28 PROCEDURE — 20600001 HC STEP DOWN PRIVATE ROOM

## 2018-08-28 PROCEDURE — G8988 SELF CARE GOAL STATUS: HCPCS | Mod: CI

## 2018-08-28 PROCEDURE — 92610 EVALUATE SWALLOWING FUNCTION: CPT

## 2018-08-28 PROCEDURE — 63600175 PHARM REV CODE 636 W HCPCS: Performed by: PSYCHIATRY & NEUROLOGY

## 2018-08-28 PROCEDURE — 97161 PT EVAL LOW COMPLEX 20 MIN: CPT

## 2018-08-28 PROCEDURE — 63600175 PHARM REV CODE 636 W HCPCS: Performed by: HOSPITALIST

## 2018-08-28 PROCEDURE — 25000003 PHARM REV CODE 250: Performed by: HOSPITALIST

## 2018-08-28 PROCEDURE — 97165 OT EVAL LOW COMPLEX 30 MIN: CPT

## 2018-08-28 RX ORDER — POTASSIUM CHLORIDE 20 MEQ/15ML
40 SOLUTION ORAL
Status: DISCONTINUED | OUTPATIENT
Start: 2018-08-28 | End: 2018-08-28

## 2018-08-28 RX ORDER — SODIUM,POTASSIUM PHOSPHATES 280-250MG
2 POWDER IN PACKET (EA) ORAL
Status: DISCONTINUED | OUTPATIENT
Start: 2018-08-28 | End: 2018-08-28

## 2018-08-28 RX ORDER — LEVETIRACETAM 5 MG/ML
500 INJECTION INTRAVASCULAR EVERY 12 HOURS
Status: DISCONTINUED | OUTPATIENT
Start: 2018-08-28 | End: 2018-08-30

## 2018-08-28 RX ORDER — LANOLIN ALCOHOL/MO/W.PET/CERES
800 CREAM (GRAM) TOPICAL
Status: DISCONTINUED | OUTPATIENT
Start: 2018-08-28 | End: 2018-08-28

## 2018-08-28 RX ORDER — ATORVASTATIN CALCIUM 20 MG/1
40 TABLET, FILM COATED ORAL DAILY
Status: DISCONTINUED | OUTPATIENT
Start: 2018-08-29 | End: 2018-09-03 | Stop reason: HOSPADM

## 2018-08-28 RX ORDER — LOSARTAN POTASSIUM 50 MG/1
100 TABLET ORAL DAILY
Status: DISCONTINUED | OUTPATIENT
Start: 2018-08-29 | End: 2018-09-03 | Stop reason: HOSPADM

## 2018-08-28 RX ORDER — AMLODIPINE BESYLATE 10 MG/1
10 TABLET ORAL DAILY
Status: DISCONTINUED | OUTPATIENT
Start: 2018-08-28 | End: 2018-09-03 | Stop reason: HOSPADM

## 2018-08-28 RX ORDER — SODIUM CHLORIDE 9 MG/ML
INJECTION, SOLUTION INTRAVENOUS CONTINUOUS
Status: DISCONTINUED | OUTPATIENT
Start: 2018-08-28 | End: 2018-08-29

## 2018-08-28 RX ORDER — GLUCAGON 1 MG
1 KIT INJECTION
Status: DISCONTINUED | OUTPATIENT
Start: 2018-08-28 | End: 2018-09-03 | Stop reason: HOSPADM

## 2018-08-28 RX ORDER — AMIODARONE HYDROCHLORIDE 200 MG/1
200 TABLET ORAL DAILY
Status: DISCONTINUED | OUTPATIENT
Start: 2018-08-29 | End: 2018-09-03 | Stop reason: HOSPADM

## 2018-08-28 RX ORDER — METOPROLOL SUCCINATE 25 MG/1
50 TABLET, EXTENDED RELEASE ORAL DAILY
Status: DISCONTINUED | OUTPATIENT
Start: 2018-08-29 | End: 2018-09-03 | Stop reason: HOSPADM

## 2018-08-28 RX ORDER — SODIUM CHLORIDE 9 MG/ML
INJECTION, SOLUTION INTRAVENOUS CONTINUOUS
Status: DISCONTINUED | OUTPATIENT
Start: 2018-08-28 | End: 2018-08-28

## 2018-08-28 RX ORDER — FUROSEMIDE 20 MG/1
20 TABLET ORAL DAILY
Status: DISCONTINUED | OUTPATIENT
Start: 2018-08-29 | End: 2018-08-29

## 2018-08-28 RX ORDER — LABETALOL HYDROCHLORIDE 5 MG/ML
10 INJECTION, SOLUTION INTRAVENOUS EVERY 4 HOURS PRN
Status: DISCONTINUED | OUTPATIENT
Start: 2018-08-28 | End: 2018-09-03 | Stop reason: HOSPADM

## 2018-08-28 RX ORDER — LEVETIRACETAM 500 MG/1
500 TABLET ORAL 2 TIMES DAILY
Status: DISCONTINUED | OUTPATIENT
Start: 2018-08-28 | End: 2018-08-28

## 2018-08-28 RX ORDER — ONDANSETRON 2 MG/ML
8 INJECTION INTRAMUSCULAR; INTRAVENOUS
Status: COMPLETED | OUTPATIENT
Start: 2018-08-28 | End: 2018-08-28

## 2018-08-28 RX ORDER — INSULIN ASPART 100 [IU]/ML
1-10 INJECTION, SOLUTION INTRAVENOUS; SUBCUTANEOUS EVERY 6 HOURS PRN
Status: DISCONTINUED | OUTPATIENT
Start: 2018-08-28 | End: 2018-09-03 | Stop reason: HOSPADM

## 2018-08-28 RX ORDER — SODIUM CHLORIDE 0.9 % (FLUSH) 0.9 %
3 SYRINGE (ML) INJECTION EVERY 8 HOURS
Status: DISCONTINUED | OUTPATIENT
Start: 2018-08-28 | End: 2018-09-03 | Stop reason: HOSPADM

## 2018-08-28 RX ORDER — MORPHINE SULFATE 2 MG/ML
2 INJECTION, SOLUTION INTRAMUSCULAR; INTRAVENOUS EVERY 30 MIN PRN
Status: COMPLETED | OUTPATIENT
Start: 2018-08-28 | End: 2018-08-29

## 2018-08-28 RX ADMIN — AMLODIPINE BESYLATE 10 MG: 10 TABLET ORAL at 06:08

## 2018-08-28 RX ADMIN — SODIUM CHLORIDE: 0.9 INJECTION, SOLUTION INTRAVENOUS at 03:08

## 2018-08-28 RX ADMIN — LABETALOL HYDROCHLORIDE 10 MG: 5 INJECTION, SOLUTION INTRAVENOUS at 10:08

## 2018-08-28 RX ADMIN — LEVETIRACETAM 500 MG: 500 TABLET ORAL at 06:08

## 2018-08-28 RX ADMIN — ONDANSETRON 8 MG: 2 INJECTION INTRAMUSCULAR; INTRAVENOUS at 04:08

## 2018-08-28 RX ADMIN — LEVETIRACETAM 500 MG: 5 INJECTION INTRAVENOUS at 10:08

## 2018-08-28 RX ADMIN — Medication 2 MG: at 11:08

## 2018-08-28 RX ADMIN — Medication 3 ML: at 06:08

## 2018-08-28 RX ADMIN — Medication 3 ML: at 10:08

## 2018-08-28 RX ADMIN — SODIUM CHLORIDE: 0.9 INJECTION, SOLUTION INTRAVENOUS at 10:08

## 2018-08-28 NOTE — ED NOTES
Pt's step son Sean called asking about pt, Sean made aware no information can be given over phone. Pt provided with phone to use to call Sean. [phone].

## 2018-08-28 NOTE — PLAN OF CARE
Problem: Occupational Therapy Goal  Goal: Occupational Therapy Goal  Goals to be met by: 7 days      Patient will increase functional independence with ADLs by performing:    UE Dressing with Supervision.  LE Dressing with Supervision.  Grooming while standing at sink with Supervision.  Toileting from toilet with Supervision for hygiene and clothing management.   Toilet transfer to toilet with Supervision.    Outcome: Ongoing (interventions implemented as appropriate)  OT eval complete. Pt tolerated session well. Pt's functional outcomes established today based his presenting functional level.     Comments: Cont OT POC
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
## 2018-08-28 NOTE — SUBJECTIVE & OBJECTIVE
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 Coleman    CATARACT EXTRACTION, BILATERAL      EYE SURGERY      HERNIA REPAIR      RETINAL DETACHMENT SURGERY        No current facility-administered medications on file prior to encounter.      Current Outpatient Medications on File Prior to Encounter   Medication Sig Dispense Refill    acarbose (PRECOSE) 25 MG Tab Take 25 mg by mouth 3 (three) times daily with meals.      amiodarone (PACERONE) 200 MG Tab Take 1 tablet (200 mg total) by mouth once daily. 30 tablet 11    amLODIPine (NORVASC) 10 MG tablet Take 1 tablet (10 mg total) by mouth once daily. 30 tablet 11    atorvastatin (LIPITOR) 40 MG tablet Take 1 tablet (40 mg total) by mouth once daily. 90 tablet 11    clopidogrel (PLAVIX) 75 mg tablet Take 75 mg by mouth once daily.      furosemide (LASIX) 20 MG tablet Take 1 tablet (20 mg total) by mouth once daily. 10 tablet 0    losartan (COZAAR) 100 MG tablet Take 1 tablet (100 mg total) by mouth once daily. 90 tablet 3    metoprolol succinate (TOPROL-XL) 50 MG 24 hr tablet Take 50 mg by mouth once daily.      acetaminophen (TYLENOL) 325 MG tablet Take 2 tablets (650 mg total) by mouth every 8 (eight) hours as needed for Temperature greater than (or equal to 101 degree F).  0    aspirin (ECOTRIN) 81 MG EC tablet Take 1 tablet (81 mg total) by mouth once daily. Take  4 tab tonight then 1 tab daily  0    cephALEXin (KEFLEX) 500 MG capsule Take 1 capsule (500 mg total) by mouth every 6 (six) hours. 16 capsule 0    famotidine (PEPCID) 20 MG tablet Take 20 mg by mouth 2 (two) times daily.        Allergies: Vancomycin analogues; Ciprofloxacin (mixture); and Antibiotic hc    Family History   Problem Relation Age of Onset    Arthritis Mother     Heart disease Father     Heart failure Father     Diabetes Sister     Heart attack Brother     No Known Problems Maternal Aunt     No Known Problems Maternal Uncle     No Known Problems Paternal Aunt     No Known Problems Paternal Uncle     No Known Problems Maternal Grandmother     No Known Problems Maternal Grandfather     No Known Problems Paternal Grandmother     No Known Problems Paternal Grandfather     Anemia Neg Hx     Arrhythmia Neg Hx     Asthma Neg Hx     Clotting disorder Neg Hx     Fainting Neg Hx     Hyperlipidemia Neg Hx     Hypertension Neg Hx     Stroke Neg Hx     Atrial Septal Defect Neg Hx      Social History     Tobacco Use    Smoking status: Former Smoker     Packs/day: 3.00     Years: 40.00     Pack years: 120.00     Last attempt to quit: 1990     Years since quittin.0    Smokeless tobacco: Never Used   Substance Use Topics    Alcohol use: No    Drug use: No     Review of Systems   Constitutional: Positive for activity change. Negative for chills and diaphoresis.   HENT: Negative for congestion, drooling, facial swelling and nosebleeds.    Eyes: Positive for visual disturbance. Negative for photophobia and discharge.   Respiratory: Negative for cough, choking and shortness of breath.    Cardiovascular: Negative for chest pain.   Gastrointestinal: Negative for abdominal distention and abdominal pain.   Genitourinary: Negative for difficulty urinating and dysuria.   Skin: Positive for color change and wound. Negative for pallor.   Neurological: Negative for dizziness, seizures, facial  asymmetry, speech difficulty, light-headedness, numbness and headaches.     Objective:     Vitals:    Temp: 97.9 °F (36.6 °C)  Pulse: 63  Rhythm: normal sinus rhythm  BP: 136/63  MAP (mmHg): 91  Resp: 12  SpO2: 95 %  O2 Device (Oxygen Therapy): nasal cannula    Temp  Min: 97.6 °F (36.4 °C)  Max: 97.9 °F (36.6 °C)  Pulse  Min: 60  Max: 95  BP  Min: 136/63  Max: 189/88  MAP (mmHg)  Min: 90  Max: 111  Resp  Min: 12  Max: 26  SpO2  Min: 91 %  Max: 100 %    No intake/output data recorded.           Physical Exam   Constitutional: He appears well-developed and well-nourished. No distress.   HENT:   Head: Normocephalic and atraumatic.   Eyes: EOM are normal.   No nuchal rigidity  Alert and oriented x3  R pupil is dilated (blind to the L eye, endorses h/o retinal detachment); L 2mm reactive to light  Face symmetric   No motor deficits, 5/5 throughout  No gross sensory deficits  FTN intact        Today I personally reviewed pertinent medications, lines/drains/airways, imaging, cardiology, lab results, microbiology results, notably:

## 2018-08-28 NOTE — SUBJECTIVE & OBJECTIVE
(Not in a hospital admission)    Review of patient's allergies indicates:   Allergen Reactions    Vancomycin analogues Itching    Ciprofloxacin (mixture) Itching     Extreme itching and redness     Antibiotic hc        Past Medical History:   Diagnosis Date    Arthritis     Blind right eye     BPH (benign prostatic hypertrophy)     Bronchitis, chronic     Carotid artery occlusion     CHF (congestive heart failure)     Colon polyp     Diabetes mellitus     GERD (gastroesophageal reflux disease)     Hearing aid worn     bilateral    Hernia     Hypertension     Irregular heart beat     Snoring     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 Coleman    CATARACT EXTRACTION, BILATERAL      EYE SURGERY      HERNIA REPAIR      RETINAL DETACHMENT SURGERY       Family History     Problem Relation (Age of Onset)    Arthritis Mother    Diabetes Sister    Heart attack Brother    Heart disease Father    Heart failure Father    No Known Problems Maternal Aunt, Maternal Uncle, Paternal Aunt, Paternal Uncle, Maternal Grandmother, Maternal Grandfather, Paternal Grandmother, Paternal Grandfather        Tobacco Use    Smoking status: Former Smoker     Packs/day: 3.00     Years: 40.00     Pack years: 120.00     Last attempt to quit: 1990     Years since quittin.0    Smokeless tobacco: Never Used   Substance and Sexual Activity    Alcohol use: No    Drug use: No    Sexual activity: Not on file     Review of Systems   Constitutional: Negative for chills and diaphoresis.   HENT: Negative for congestion, ear discharge, ear pain, facial swelling, hearing loss, nosebleeds, rhinorrhea, sinus pressure, sinus pain, tinnitus and trouble swallowing.    Eyes: Negative for photophobia, pain, discharge and  visual disturbance.   Gastrointestinal: Negative for abdominal pain.   Neurological: Positive for dizziness, light-headedness and headaches. Negative for seizures, facial asymmetry, speech difficulty, weakness and numbness.     Objective:     Weight: 85.3 kg (188 lb)  Body mass index is 27.76 kg/m².  Vital Signs (Most Recent):  Temp: 97.9 °F (36.6 °C) (08/27/18 1856)  Pulse: 63 (08/27/18 2351)  Resp: 12 (08/27/18 2351)  BP: 136/63 (08/27/18 2352)  SpO2: 95 % (08/27/18 2351) Vital Signs (24h Range):  Temp:  [97.6 °F (36.4 °C)-97.9 °F (36.6 °C)] 97.9 °F (36.6 °C)  Pulse:  [60-95] 63  Resp:  [12-26] 12  SpO2:  [91 %-100 %] 95 %  BP: (136-189)/(63-88) 136/63                           Physical Exam:    Eyes: EOM are normal.   R pupil blown, although blind in that eye at baseline     Musculoskeletal:        Right Upper Extremities: Muscle strength is 5/5.        Left Upper Extremities: Muscle strength is 5/5.       Right Lower Extremities: Muscle strength is 5/5.        Left Lower Extremities: Muscle strength is 5/5.     Neurological:        Coordination: He has normal finger to nose coordination.        Sensory: There is no sensory deficit in the trunk. There is no sensory deficit in the extremities.        DTRs: Tricep reflexes are 2+ on the right side and 2+ on the left side. Bicep reflexes are 2+ on the right side and 2+ on the left side. Brachioradialis reflexes are 2+ on the right side and 2+ on the left side. Patellar reflexes are 2+ on the right side and 2+ on the left side. Achilles reflexes are 2+ on the right side and 2+ on the left side. He displays no Babinski's sign on the right side. He displays no Babinski's sign on the left side.        Cranial nerves: Cranial nerve(s) II, III, IV, V, VI, VII, VIII, IX, X, XI and XII are intact.       Significant Labs:  Recent Labs   Lab  08/27/18   1621   GLU  175*   NA  139   K  4.1   CL  106   CO2  23   BUN  24*   CREATININE  1.8*   CALCIUM  9.4     Recent Labs   Lab   08/27/18   1621   WBC  13.49*   HGB  12.0*   HCT  36.8*   PLT  233     Recent Labs   Lab  08/27/18   1621   INR  1.1   APTT  27.1     Microbiology Results (last 7 days)     ** No results found for the last 168 hours. **        CMP:   Recent Labs   Lab  08/27/18   1621   GLU  175*   CALCIUM  9.4   ALBUMIN  3.6   PROT  7.7   NA  139   K  4.1   CO2  23   CL  106   BUN  24*   CREATININE  1.8*   ALKPHOS  144*   ALT  11   AST  17   BILITOT  0.4       Significant Diagnostics:  CT: Ct Head Without Contrast    Result Date: 8/27/2018  Subarachnoid hemorrhage overlying the high right frontal and posterior parietal lobes.  No mass effect.  No calvarial fracture. COMMUNICATION This critical result was discovered/received at 16:35 hours.  The critical information above was relayed directly by me by telephone to Dr. Brandt Funez on 8/27/18 at 16:40 hours. Electronically signed by: Sylwia Blanco Date:    08/27/2018 Time:    16:42  MRI: No results found in the last 24 hours.
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
## 2018-08-28 NOTE — PLAN OF CARE
Problem: SLP Goal  Goal: SLP Goal   Swallow eval completed. Pt safe for dental soft diet and thin liquids. Aspiration precautions.     FAITH Soria, CCC-SLP  8/28/2018
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
## 2018-08-28 NOTE — PT/OT/SLP EVAL
Physical Therapy Evaluation    Patient Name:  Timbo Gallagher   MRN:  [id number]    Recommendations:     Discharge Recommendations:  home with home health   Discharge Equipment Recommendations: (patient has rolling walker but does not use)   Barriers to discharge: None    Assessment:     Timbo Gallagher is a 83 y.o. male admitted with a medical diagnosis of traumatic subarachnoid bleed.  He presents with the following impairments/functional limitations:  impaired functional mobilty, gait instability, impaired balance, impaired skin limiting overall functional mobility. Safest to ambulate with 1 person assist at this time. Mild posterior lean noted with sit-stand transfer on repeated attempts. Mild decreased weight-shifting ability noted with ambulation; however, no loss of balance with level ground ambulation or turning. To benefit from continued skilled PT intervention to address deficits.    Rehab Prognosis:  Good; patient would benefit from acute skilled PT services to address these deficits and reach maximum level of function.      Recent Surgery: * No surgery found *      Plan:     During this hospitalization, patient to be seen 3 x/week to address the above listed problems via gait training, therapeutic activities  · Plan of Care Expires:  09/28/18   Plan of Care Reviewed with: patient    Subjective     Communicated with RN prior to session.  Patient found supine upon PT entry to room, agreeable to evaluation.      Chief Complaint: none stated  Pain/Comfort:  · Pain Rating 1: 0/10  · Pain Rating Post-Intervention 1: 0/10    Patients cultural, spiritual, Caodaism conflicts given the current situation: none stated    Living Environment:  Patient lives with wife in 1-story home no JASON.   Prior to admission, patients level of function was Indep without equipment for all mobility including driving.  Patient has the following equipment: none.  DME owned (not currently used): rolling walker.  Upon  discharge, patient will have assistance from wife; however, she is currently admitted to another hospital.    Objective:     Patient found with: oxygen, peripheral IV, telemetry, pulse ox (continuous)     General Precautions: Standard, aspiration, fall   Orthopedic Precautions:N/A   Braces: N/A     Exams:  · Cognitive Exam:  Patient is oriented to Person, Place, Time and Situation  · Gross Motor Coordination:  WFL  · Postural Exam:  Patient presented with the following abnormalities: -       Forward head  · Sensation: -       Intact  light/touch BLE  · RLE ROM: WFL  · RLE Strength: WFL  · LLE ROM: WFL  · LLE Strength: WFL    Functional Mobility:  · Bed Mobility:  Supine to Sit: stand by assistance  · Sit to Supine: stand by assistance  · Transfers:  Sit to Stand:  contact guard assistance with no AD  · Bed to Chair: contact guard assistance with  no AD  using  Stand Pivot  · Gait: [id number] with CGA without device. Decreased mike, step length, and weight shifting ability noted  · Balance: impaired dynamic balance balance requiring assist of 1 for safety at this time    AM-PAC 6 CLICK MOBILITY  Total Score:18       Therapeutic Activities and Exercises:  Co-evaluation with OT.     Patient educated on:   - role of PT/POC    - safety with all functional mobility   - bed mobility training   - transfer training   - gait training without device   - deep breathing techniques   - importance of participation with therapy services   - safest to ambulate with 1 person assist at this time.    Verbalized understanding of all education provided.    Patient taken off O2 for hallway ambulation. O2 sats at 88%. Patient placed back on supplemental O2 and assisted to supine position. O2 sat at 94%.      Patient left supine with all lines intact, call button in reach, RN notified and Ot present.    GOALS:   Multidisciplinary Problems     Physical Therapy Goals        Problem: Physical Therapy Goal    Goal Priority Disciplines Outcome  Goal Variances Interventions   Physical Therapy Goal     PT, PT/OT Ongoing (interventions implemented as appropriate)     Description:  Goals to be met by: 18     Patient will increase functional independence with mobility by performin. Sit to stand transfer with Supervision  2. Bed to chair transfer with Supervision using Rolling Walker if necessary.  3. Gait  x 140 feet with Supervision using rolling walker if necessary.                      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 Coleman    CATARACT EXTRACTION, BILATERAL      EYE SURGERY      HERNIA REPAIR      RETINAL DETACHMENT SURGERY         Clinical Decision Making:     History  Co-morbidities and personal factors that may impact the plan of care Examination  Body Structures and Functions, activity limitations and participation restrictions that may impact the plan of care Clinical Presentation   Decision Making/ Complexity Score   Co-morbidities:   [x] Time since onset of injury / illness / exacerbation  [x] Status of current condition  []Patient's cognitive status and safety concerns    [] Multiple Medical Problems (see med hx)  Personal Factors:   [] Patient's age  [] Prior Level of function   [] Patient's home situation (environment and family support)  [] Patient's level of motivation  [] Expected progression of  patient      HISTORY:(criteria)    [] 75659 - no personal factors/history    [x] 03160 - has 1-2 personal factor/comorbidity     [] 31557 - has >3 personal factor/comorbidity     Body Regions:  [] Objective examination findings  [] Head     []  Neck  [] Trunk   [] Upper Extremity  [] Lower Extremity    Body Systems:  [] For communication ability, affect, cognition, language, and learning style: the assessment of the ability to make needs known, consciousness, orientation (person, place, and time), expected emotional /behavioral responses, and learning preferences (eg, learning barriers, education  needs)  [x] For the neuromuscular system: a general assessment of gross coordinated movement (eg, balance, gait, locomotion, transfers, and transitions) and motor function  (motor control and motor learning)  [] For the musculoskeletal system: the assessment of gross symmetry, gross range of motion, gross strength, height, and weight  [x] For the integumentary system: the assessment of pliability(texture), presence of scar formation, skin color, and skin integrity  [] For cardiovascular/pulmonary system: the assessment of heart rate, respiratory rate, blood pressure, and edema     Activity limitations:    [] Patient's cognitive status and saf ety concerns          [] Status of current condition      [] Weight bearing restriction  [] Cardiopulmunary Restriction    Participation Restrictions:   [] Goals and goal agreement with the patient     [] Rehab potential (prognosis) and probable outcome      Examination of Body System: (criteria)    [x] 54002 - addressing 1-2 elements    [] 32782 - addressing a total of 3 or more elements     [] 72120 -  Addressing a total of 4 or more elements         Clinical Presentation: (criteria)  Stable - 52595     On examination of body system using standardized tests and measures patient presents with 1-2 elements from any of the following: body structures and functions, activity limitations,  and/or participation restrictions.  Leading to a clinical presentation that is considered stable and/or uncomplicated                              Clinical Decision Making  (Eval Complexity):  Low- 33348     Time Tracking:     PT Received On: 08/28/18  PT Start Time: 1112     PT Stop Time: 1134  PT Total Time (min): 22 min     Billable Minutes: Evaluation 22      Tye Quiroga III, PT  08/28/2018
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
## 2018-08-28 NOTE — ED NOTES
Pt reports abd cramping 9/10 with nausea. Pt asking for medication for relief. Neuro critical care informed.
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
## 2018-08-28 NOTE — ED NOTES
Dr. Currie made aware that patient feels like he cannot swallow his food and threw up small amount of intake of food and water, patient states he feels like he feels like his food is stuck. Patient also asking for pain medication.
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
## 2018-08-28 NOTE — SUBJECTIVE & OBJECTIVE
(Not in a hospital admission)    Review of patient's allergies indicates:   Allergen Reactions    Vancomycin analogues Itching    Ciprofloxacin (mixture) Itching     Extreme itching and redness     Antibiotic hc        Past Medical History:   Diagnosis Date    Arthritis     Blind right eye     BPH (benign prostatic hypertrophy)     Bronchitis, chronic     Carotid artery occlusion     CHF (congestive heart failure)     Colon polyp     Diabetes mellitus     GERD (gastroesophageal reflux disease)     Hearing aid worn     bilateral    Hernia     Hypertension     Irregular heart beat     Snoring     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 Coleman    CATARACT EXTRACTION, BILATERAL      EYE SURGERY      HERNIA REPAIR      RETINAL DETACHMENT SURGERY       Family History     Problem Relation (Age of Onset)    Arthritis Mother    Diabetes Sister    Heart attack Brother    Heart disease Father    Heart failure Father    No Known Problems Maternal Aunt, Maternal Uncle, Paternal Aunt, Paternal Uncle, Maternal Grandmother, Maternal Grandfather, Paternal Grandmother, Paternal Grandfather        Tobacco Use    Smoking status: Former Smoker     Packs/day: 3.00     Years: 40.00     Pack years: 120.00     Last attempt to quit: 1990     Years since quittin.0    Smokeless tobacco: Never Used   Substance and Sexual Activity    Alcohol use: No    Drug use: No    Sexual activity: Not on file     Review of Systems   HENT: Negative for congestion, ear discharge, ear pain, facial swelling, hearing loss, nosebleeds, rhinorrhea, sinus pressure, sinus pain and tinnitus.    Eyes: Negative for photophobia, discharge and visual disturbance.   Respiratory: Negative for shortness of breath.     Cardiovascular: Negative for chest pain.   Gastrointestinal: Negative for abdominal pain.   Musculoskeletal: Negative for neck pain and neck stiffness.   Neurological: Positive for dizziness, light-headedness and headaches. Negative for seizures, syncope, facial asymmetry, speech difficulty and numbness.     Objective:     Weight: 85.3 kg (188 lb)  Body mass index is 27.76 kg/m².  Vital Signs (Most Recent):  Temp: 97.9 °F (36.6 °C) (08/27/18 1856)  Pulse: 63 (08/27/18 2351)  Resp: 12 (08/27/18 2351)  BP: 136/63 (08/27/18 2352)  SpO2: 95 % (08/27/18 2351) Vital Signs (24h Range):  Temp:  [97.6 °F (36.4 °C)-97.9 °F (36.6 °C)] 97.9 °F (36.6 °C)  Pulse:  [60-95] 63  Resp:  [12-26] 12  SpO2:  [91 %-100 %] 95 %  BP: (136-189)/(63-88) 136/63                           Physical Exam:    Eyes: EOM are normal.   R pupil blown, unreactive, L pupil small, reacts to light sluggishly     Cardiovascular: Normal rate.     Abdominal: Soft.     Musculoskeletal:        Right Upper Extremities: Muscle strength is 5/5.        Left Upper Extremities: Muscle strength is 5/5.       Right Lower Extremities: Muscle strength is 5/5.        Left Lower Extremities: Muscle strength is 5/5.     Neurological:        Coordination: He has normal finger to nose coordination.        Sensory: There is no sensory deficit in the trunk. There is no sensory deficit in the extremities.        DTRs: Tricep reflexes are 2+ on the right side and 2+ on the left side. Bicep reflexes are 2+ on the right side and 2+ on the left side. Brachioradialis reflexes are 2+ on the right side and 2+ on the left side. Patellar reflexes are 2+ on the right side and 2+ on the left side. Achilles reflexes are 2+ on the right side and 2+ on the left side. He displays no Babinski's sign on the right side. He displays no Babinski's sign on the left side.        Cranial nerves: Cranial nerve(s) II, III, IV, V, VI, VII, VIII, IX, X, XI and XII are intact.       Significant  Labs:  Recent Labs   Lab  08/27/18   1621   GLU  175*   NA  139   K  4.1   CL  106   CO2  23   BUN  24*   CREATININE  1.8*   CALCIUM  9.4     Recent Labs   Lab  08/27/18   1621   WBC  13.49*   HGB  12.0*   HCT  36.8*   PLT  233     Recent Labs   Lab  08/27/18   1621   INR  1.1   APTT  27.1     Microbiology Results (last 7 days)     ** No results found for the last 168 hours. **        CMP:   Recent Labs   Lab  08/27/18   1621   GLU  175*   CALCIUM  9.4   ALBUMIN  3.6   PROT  7.7   NA  139   K  4.1   CO2  23   CL  106   BUN  24*   CREATININE  1.8*   ALKPHOS  144*   ALT  11   AST  17   BILITOT  0.4       Significant Diagnostics:  CT: Ct Head Without Contrast    Result Date: 8/27/2018  Subarachnoid hemorrhage overlying the high right frontal and posterior parietal lobes.  No mass effect.  No calvarial fracture. COMMUNICATION This critical result was discovered/received at 16:35 hours.  The critical information above was relayed directly by me by telephone to Dr. Brandt Funez on 8/27/18 at 16:40 hours. Electronically signed by: Sylwia Blanco Date:    08/27/2018 Time:    16:42  MRI: No results found in the last 24 hours.

## 2018-08-28 NOTE — PT/OT/SLP EVAL
Occupational Therapy   Evaluation    Name: Timbo Gallagher  MRN: [id number]  Admitting Diagnosis:  <principal problem not specified>      Recommendations:     Discharge Recommendations: home with home health  Discharge Equipment Recommendations:  none  Barriers to discharge:  None    History:     Occupational Profile:  Living Environment: Pt lives with his wife in [facility] w/ 4 JASON, B HR accessible simultaneously  Previous level of function: PTA, pt reports being independent w/ all ADLs and functional mobility   Roles and Routines: drives  Equipment Used at Home:  walker, rolling(owns, does not use)  Assistance upon Discharge: Pt reports he will have assistance from his wife at home, however, she is currently admitted to another hospital.    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 Coleman    CATARACT EXTRACTION, BILATERAL      EYE SURGERY      HERNIA REPAIR      RETINAL DETACHMENT SURGERY         Subjective     Chief Complaint: rib discomfort  Patient/Family Comments/goals: to return home safely     Pain/Comfort:  · Pain Rating 1: 0/10  · Pain Rating Post-Intervention 1: 0/10    Patients cultural, spiritual, Alevism conflicts given the current situation: none stated     Objective:     Communicated with: RN prior to session.  Patient found all lines intact, call button in reach and RN notified and oxygen, peripheral  IV, pulse ox (continuous), telemetry upon OT entry to room.    General Precautions: Standard, fall, aspiration   Orthopedic Precautions:N/A   Braces: N/A     Occupational Performance:    Bed Mobility:    · Patient completed Scooting/Bridging with stand by assistance  · Patient completed Supine to Sit with stand by assistance  · Patient completed Sit to Supine with stand by assistance    Functional Mobility/Transfers:  · Patient completed Sit <> Stand Transfer with contact guard assistance  with  no assistive device   · Patient completed Bed <> Chair Transfer using Step Transfer technique with contact guard assistance with no assistive device  · Functional Mobility: Pt ambulated 130 ft with CGA and no AD required. He displayed no signs of LOB or SOB during mobility     Activities of Daily Living:  · Upper Body Dressing: minimum assistance to don back gown 2/2 lines  · Lower Body Dressing: stand by assistance doffing/donning B non-skid socks sitting EOB      Physical Exam:  Upper Extremity Range of Motion:     -       Right Upper Extremity: WFL  -       Left Upper Extremity: WFL  Upper Extremity Strength:    -       Right Upper Extremity: WFL  -       Left Upper Extremity: WFL    Strength:    -       Right Upper Extremity: WFL   -       Left Upper Extremity: WFL   Fine Motor Coordination:    -       Intact    AMPAC 6 Click ADL: Self-care  AMPAC Total Score: 20    Treatment & Education:  - OT role/POC  - Importance of OOB activity to maximize recovery and prevent deconditioning  - Safety w/ functional mobility   - Figure four sitting to facilitate LB dressing  Education:    Patient left HOB elevated with all lines intact, call button in reach and RN notified    Assessment:     Timbo Gallagher is a 83 y.o. male with a medical diagnosis of <principal problem not specified>.  He presents with the following performance deficits affecting function: impaired self care skills, impaired functional mobilty, gait  "instability, impaired balance, impaired skin.  OT eval complete. Pt tolerated session well. He presents w/ good overall BUE function. Pt presents w/ mild posterior lean w/ sit<>stand transfers and demonstrates minimal balance deficits but is able to complete all mobility and seated self-care tasks w/out LOB. He will continue to benefit from skilled OT to address the above listed impairments.     Rehab Prognosis: Good; patient would benefit from acute skilled OT services to address these deficits and reach maximum level of function.         Clinical Decision Makin.  OT Low:  "Pt evaluation falls under low complexity for evaluation coding due to performance deficits noted in 1-3 areas as stated above and 0 co-morbities affecting current functional status. Data obtained from problem focused assessments. No modifications or assistance was required for completion of evaluation. Only brief occupational profile and history review completed."     Plan:     Patient to be seen 3 x/week to address the above listed problems via self-care/home management, therapeutic activities, therapeutic exercises  · Plan of Care Reviewed with: patient    This Plan of care has been discussed with the patient who was involved in its development and understands and is in agreement with the identified goals and treatment plan    GOALS:   Multidisciplinary Problems     Occupational Therapy Goals        Problem: Occupational Therapy Goal    Goal Priority Disciplines Outcome Interventions   Occupational Therapy Goal     OT, PT/OT Ongoing (interventions implemented as appropriate)    Description:  Goals to be met by: 7 days      Patient will increase functional independence with ADLs by performing:    UE Dressing with Supervision.  LE Dressing with Supervision.  Grooming while standing at sink with Supervision.  Toileting from toilet with Supervision for hygiene and clothing management.   Toilet transfer to toilet with Supervision.          "             Time Tracking:     OT Date of Treatment: 08/28/18  OT Start Time: 1112  OT Stop Time: 1135  OT Total Time (min): 23 min    Billable Minutes:Evaluation 8  Self Care/Home Management 15    Krissy Eckert OT  8/28/2018

## 2018-08-28 NOTE — PT/OT/SLP EVAL
"Speech Language Pathology Evaluation  Cognitive/Bedside Swallow    Patient Name:  Timbo Gallagher   MRN:  [id number]  Admitting Diagnosis: closed head injury    Recommendations:                  General Recommendations:  Dysphagia therapy and Cognitive-linguistic therapy  Diet recommendations:  Dental Soft, Thin   Aspiration Precautions: HOB to 90 degrees and Standard aspiration precautions   General Precautions: Standard, aspiration, fall  Communication strategies:  none    History:     Past Medical History:   Diagnosis Date    Arthritis     Blind right eye     BPH (benign prostatic hypertrophy)     Bronchitis, chronic     Carotid artery occlusion     CHF (congestive heart failure)     Colon polyp     Diabetes mellitus     GERD (gastroesophageal reflux disease)     Hearing aid worn     bilateral    Hernia     Hypertension     Irregular heart beat     Snoring     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 He was awake/alert. Pt oriented to month, year, vero and place. He recalled up to 4 digits/words immediately. After a delay, he recalled 1/3 objects and with cues 2/3. Pt reported some mild deficits with memory and word finding that have been " "going on for a while and pt attributed it to " getting older". He reported a recent " mini stroke" but denied needing any speech tx. Pt was able to compare/contrast items and complete categorizational task with 100% acc. He sequenced up to 4 items given repetition and increased time. He named 19 items during word fluency which is wfl.                                Receptive Language:   Comprehension:   wfl for complex commands and questions    Pragmatics:    wfl    Expressive Language:  Verbal:    wfl; named 6/6 pictured objects; occasional pauses needed during conversational speech to find a word pt reported this was baseline for him         Motor Speech:  WFL    Voice:   hoarse    Visual-Spatial:  pt reports he can see only out of his left eye; visual spatial testing to be completed    Reading:   to be tested     Written Expression:   to be tested    Oral Musculature Evaluation  Oral Musculature: WFL  Dentition: upper and lower dentures  Mucosal Quality: good  Oral Labial Strength and Mobility: WFL  Lingual Strength and Mobility: WFL  Volitional Cough: fair  Voice Prior to PO Intake: hoarse    Bedside Swallow Eval:   Consistencies Assessed:  · Thin liquids cup sips for 1/2 cup    · Solids - 1/2 cracker     Oral Phase:   · Prolonged mastication    Pharyngeal Phase:   · wfl    Compensatory Strategies  · none    Treatment:  Pt accepted limited trials reporting he was not hungry. Pt with mild oral stasis following cracker which did reduce with liquid wash. Pt reported difficulty with the fit of his dentures and agreed to trying dental soft diet. White board updated. No family present. Pt appears to be close to be baseline. Will follow up to complete eval and to ensure diet tolerance.     Assessment:     Timbo Gallagher is a 83 y.o. male with an SLP diagnosis of Dysphagia and Cognitive-Linguistic Impairment.  He presents with mild oral dysphagia.     Goals:   Multidisciplinary Problems     SLP Goals        " Problem: SLP Goal    Goal Priority Disciplines Outcome   SLP Goal     SLP    Description:  Goals to be met 9/4  1 pt will participate in further eval of reading/writing and vs skills  2 pt will tolerate soft diet and thin liquids  3 pt will recall word finding strategies with supervision                     Plan:     · Patient to be seen:  4 x/week   · Plan of Care expires:     · Plan of Care reviewed with:  patient   · SLP Follow-Up:  Yes       Discharge recommendations:  Discharge Facility/Level Of Care Needs: other (see comments)(tbd)       Time Tracking:     SLP Treatment Date:   08/28/18  Speech Start Time:  0858  Speech Stop Time:  0920     Speech Total Time (min):  22 min    Billable Minutes: Eval 14  and Eval Swallow and Oral Function 8    FAITH Soria, CCC-SLP  08/28/2018

## 2018-08-28 NOTE — CONSULTS
Reviewed patient history and current admission.  Rehab team following.  Full consult to follow.    KELSEA Hickman, FNP-C  Physical Medicine & Rehabilitation   08/28/2018  Spectralink: [id number]

## 2018-08-28 NOTE — PLAN OF CARE
Problem: Physical Therapy Goal  Goal: Physical Therapy Goal  Goals to be met by: 18     Patient will increase functional independence with mobility by performin. Sit to stand transfer with Supervision  2. Bed to chair transfer with Supervision using Rolling Walker if necessary.  3. Gait  x 140 feet with Supervision using rolling walker if necessary.    Outcome: Ongoing (interventions implemented as appropriate)  Evaluation complete.  Goals established to improve functional mobility.    DC rec's for PT    Tye Quiroga III, JOSET, PT  2018
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
## 2018-08-29 DIAGNOSIS — R13.19 ESOPHAGEAL DYSPHAGIA: Primary | ICD-10-CM

## 2018-08-29 PROBLEM — N18.30 ACUTE RENAL FAILURE SUPERIMPOSED ON STAGE 3 CHRONIC KIDNEY DISEASE: Status: ACTIVE | Noted: 2018-08-29

## 2018-08-29 PROBLEM — N17.9 ACUTE RENAL FAILURE SUPERIMPOSED ON STAGE 3 CHRONIC KIDNEY DISEASE: Status: ACTIVE | Noted: 2018-08-29

## 2018-08-29 PROBLEM — I50.43 ACUTE ON CHRONIC COMBINED SYSTOLIC AND DIASTOLIC HEART FAILURE: Status: ACTIVE | Noted: 2018-04-17

## 2018-08-29 PROBLEM — R13.10 DYSPHAGIA: Status: ACTIVE | Noted: 2018-08-29

## 2018-08-29 PROBLEM — D63.1 ANEMIA OF CHRONIC RENAL FAILURE, STAGE 3 (MODERATE): Status: ACTIVE | Noted: 2018-08-29

## 2018-08-29 PROBLEM — R13.12 OROPHARYNGEAL DYSPHAGIA: Status: ACTIVE | Noted: 2018-08-29

## 2018-08-29 PROBLEM — N18.30 ANEMIA OF CHRONIC RENAL FAILURE, STAGE 3 (MODERATE): Status: ACTIVE | Noted: 2018-08-29

## 2018-08-29 LAB
ALBUMIN SERPL BCP-MCNC: 2.7 G/DL
ALLENS TEST: ABNORMAL
ALP SERPL-CCNC: 112 U/L
ALT SERPL W/O P-5'-P-CCNC: 8 U/L
ANION GAP SERPL CALC-SCNC: 8 MMOL/L
AST SERPL-CCNC: 13 U/L
BASOPHILS # BLD AUTO: 0.04 K/UL
BASOPHILS NFR BLD: 0.4 %
BILIRUB SERPL-MCNC: 0.6 MG/DL
BUN SERPL-MCNC: 16 MG/DL
CALCIUM SERPL-MCNC: 8.7 MG/DL
CHLORIDE SERPL-SCNC: 110 MMOL/L
CO2 SERPL-SCNC: 22 MMOL/L
CREAT SERPL-MCNC: 1.2 MG/DL
DELSYS: ABNORMAL
DIFFERENTIAL METHOD: ABNORMAL
EOSINOPHIL # BLD AUTO: 0.3 K/UL
EOSINOPHIL NFR BLD: 3 %
ERYTHROCYTE [DISTWIDTH] IN BLOOD BY AUTOMATED COUNT: 14.2 %
EST. GFR  (AFRICAN AMERICAN): >60 ML/MIN/1.73 M^2
EST. GFR  (NON AFRICAN AMERICAN): 55.6 ML/MIN/1.73 M^2
GLUCOSE SERPL-MCNC: 135 MG/DL
HCO3 UR-SCNC: 24.2 MMOL/L (ref 24–28)
HCT VFR BLD AUTO: 32.9 %
HGB BLD-MCNC: 10.3 G/DL
IMM GRANULOCYTES # BLD AUTO: 0.04 K/UL
IMM GRANULOCYTES NFR BLD AUTO: 0.4 %
LYMPHOCYTES # BLD AUTO: 1.1 K/UL
LYMPHOCYTES NFR BLD: 10.2 %
MAGNESIUM SERPL-MCNC: 2 MG/DL
MCH RBC QN AUTO: 30.5 PG
MCHC RBC AUTO-ENTMCNC: 31.3 G/DL
MCV RBC AUTO: 97 FL
MODE: ABNORMAL
MONOCYTES # BLD AUTO: 1.2 K/UL
MONOCYTES NFR BLD: 11 %
NEUTROPHILS # BLD AUTO: 8.4 K/UL
NEUTROPHILS NFR BLD: 75 %
NRBC BLD-RTO: 0 /100 WBC
PCO2 BLDA: 44 MMHG (ref 35–45)
PH SMN: 7.35 [PH] (ref 7.35–7.45)
PHOSPHATE SERPL-MCNC: 3.8 MG/DL
PLATELET # BLD AUTO: 174 K/UL
PMV BLD AUTO: 11.5 FL
PO2 BLDA: 50 MMHG (ref 80–100)
POC BE: -1 MMOL/L
POC SATURATED O2: 82 % (ref 95–100)
POC TCO2: 25 MMOL/L (ref 23–27)
POCT GLUCOSE: 146 MG/DL (ref 70–110)
POCT GLUCOSE: 153 MG/DL (ref 70–110)
POCT GLUCOSE: 164 MG/DL (ref 70–110)
POCT GLUCOSE: 168 MG/DL (ref 70–110)
POTASSIUM SERPL-SCNC: 4.4 MMOL/L
PROT SERPL-MCNC: 6.1 G/DL
RBC # BLD AUTO: 3.38 M/UL
SAMPLE: ABNORMAL
SITE: ABNORMAL
SODIUM SERPL-SCNC: 140 MMOL/L
SP02: 94
WBC # BLD AUTO: 11.11 K/UL

## 2018-08-29 PROCEDURE — 84100 ASSAY OF PHOSPHORUS: CPT

## 2018-08-29 PROCEDURE — 25000003 PHARM REV CODE 250: Performed by: HOSPITALIST

## 2018-08-29 PROCEDURE — 99223 1ST HOSP IP/OBS HIGH 75: CPT | Mod: ,,, | Performed by: INTERNAL MEDICINE

## 2018-08-29 PROCEDURE — 27000221 HC OXYGEN, UP TO 24 HOURS

## 2018-08-29 PROCEDURE — 36415 COLL VENOUS BLD VENIPUNCTURE: CPT

## 2018-08-29 PROCEDURE — G8997 SWALLOW GOAL STATUS: HCPCS | Mod: CJ

## 2018-08-29 PROCEDURE — 99222 1ST HOSP IP/OBS MODERATE 55: CPT | Mod: ,,, | Performed by: NURSE PRACTITIONER

## 2018-08-29 PROCEDURE — 25000003 PHARM REV CODE 250: Performed by: STUDENT IN AN ORGANIZED HEALTH CARE EDUCATION/TRAINING PROGRAM

## 2018-08-29 PROCEDURE — 82803 BLOOD GASES ANY COMBINATION: CPT

## 2018-08-29 PROCEDURE — 94761 N-INVAS EAR/PLS OXIMETRY MLT: CPT

## 2018-08-29 PROCEDURE — 63600175 PHARM REV CODE 636 W HCPCS: Performed by: HOSPITALIST

## 2018-08-29 PROCEDURE — 25000242 PHARM REV CODE 250 ALT 637 W/ HCPCS: Performed by: HOSPITALIST

## 2018-08-29 PROCEDURE — 92610 EVALUATE SWALLOWING FUNCTION: CPT

## 2018-08-29 PROCEDURE — 20600001 HC STEP DOWN PRIVATE ROOM

## 2018-08-29 PROCEDURE — 83735 ASSAY OF MAGNESIUM: CPT

## 2018-08-29 PROCEDURE — 99233 SBSQ HOSP IP/OBS HIGH 50: CPT | Mod: ,,, | Performed by: HOSPITALIST

## 2018-08-29 PROCEDURE — 99900035 HC TECH TIME PER 15 MIN (STAT)

## 2018-08-29 PROCEDURE — 80053 COMPREHEN METABOLIC PANEL: CPT

## 2018-08-29 PROCEDURE — 97535 SELF CARE MNGMENT TRAINING: CPT

## 2018-08-29 PROCEDURE — 94640 AIRWAY INHALATION TREATMENT: CPT

## 2018-08-29 PROCEDURE — 25000003 PHARM REV CODE 250: Performed by: PSYCHIATRY & NEUROLOGY

## 2018-08-29 PROCEDURE — A4216 STERILE WATER/SALINE, 10 ML: HCPCS | Performed by: PSYCHIATRY & NEUROLOGY

## 2018-08-29 PROCEDURE — 85025 COMPLETE CBC W/AUTO DIFF WBC: CPT

## 2018-08-29 PROCEDURE — G8996 SWALLOW CURRENT STATUS: HCPCS | Mod: CK

## 2018-08-29 PROCEDURE — 36600 WITHDRAWAL OF ARTERIAL BLOOD: CPT

## 2018-08-29 RX ORDER — FUROSEMIDE 10 MG/ML
40 INJECTION INTRAMUSCULAR; INTRAVENOUS 3 TIMES DAILY
Status: DISCONTINUED | OUTPATIENT
Start: 2018-08-29 | End: 2018-08-30

## 2018-08-29 RX ORDER — OXYCODONE HYDROCHLORIDE 5 MG/1
5 TABLET ORAL EVERY 6 HOURS PRN
Status: DISCONTINUED | OUTPATIENT
Start: 2018-08-29 | End: 2018-09-03 | Stop reason: HOSPADM

## 2018-08-29 RX ORDER — IPRATROPIUM BROMIDE AND ALBUTEROL SULFATE 2.5; .5 MG/3ML; MG/3ML
3 SOLUTION RESPIRATORY (INHALATION)
Status: DISCONTINUED | OUTPATIENT
Start: 2018-08-29 | End: 2018-09-03 | Stop reason: HOSPADM

## 2018-08-29 RX ORDER — ACETAMINOPHEN 325 MG/1
650 TABLET ORAL EVERY 6 HOURS PRN
Status: DISCONTINUED | OUTPATIENT
Start: 2018-08-29 | End: 2018-09-03 | Stop reason: HOSPADM

## 2018-08-29 RX ORDER — DIAZEPAM 5 MG/ML
2.5 INJECTION, SOLUTION INTRAMUSCULAR; INTRAVENOUS ONCE
Status: COMPLETED | OUTPATIENT
Start: 2018-08-29 | End: 2018-08-29

## 2018-08-29 RX ORDER — KETOROLAC TROMETHAMINE 15 MG/ML
15 INJECTION, SOLUTION INTRAMUSCULAR; INTRAVENOUS EVERY 6 HOURS PRN
Status: DISCONTINUED | OUTPATIENT
Start: 2018-08-29 | End: 2018-08-29

## 2018-08-29 RX ADMIN — LOSARTAN POTASSIUM 100 MG: 50 TABLET ORAL at 10:08

## 2018-08-29 RX ADMIN — Medication 3 ML: at 02:08

## 2018-08-29 RX ADMIN — ATORVASTATIN CALCIUM 40 MG: 20 TABLET, FILM COATED ORAL at 10:08

## 2018-08-29 RX ADMIN — FUROSEMIDE 20 MG: 20 TABLET ORAL at 10:08

## 2018-08-29 RX ADMIN — AMLODIPINE BESYLATE 10 MG: 10 TABLET ORAL at 10:08

## 2018-08-29 RX ADMIN — OXYCODONE HYDROCHLORIDE 5 MG: 5 TABLET ORAL at 06:08

## 2018-08-29 RX ADMIN — Medication 3 ML: at 11:08

## 2018-08-29 RX ADMIN — AMIODARONE HYDROCHLORIDE 200 MG: 200 TABLET ORAL at 10:08

## 2018-08-29 RX ADMIN — FUROSEMIDE 40 MG: 10 INJECTION, SOLUTION INTRAMUSCULAR; INTRAVENOUS at 10:08

## 2018-08-29 RX ADMIN — IPRATROPIUM BROMIDE AND ALBUTEROL SULFATE 3 ML: .5; 3 SOLUTION RESPIRATORY (INHALATION) at 07:08

## 2018-08-29 RX ADMIN — DIAZEPAM 2.5 MG: 5 INJECTION, SOLUTION INTRAMUSCULAR; INTRAVENOUS at 10:08

## 2018-08-29 RX ADMIN — IPRATROPIUM BROMIDE AND ALBUTEROL SULFATE 3 ML: .5; 3 SOLUTION RESPIRATORY (INHALATION) at 03:08

## 2018-08-29 RX ADMIN — Medication 2 MG: at 12:08

## 2018-08-29 RX ADMIN — LEVETIRACETAM 500 MG: 5 INJECTION INTRAVENOUS at 10:08

## 2018-08-29 RX ADMIN — LEVETIRACETAM 500 MG: 5 INJECTION INTRAVENOUS at 11:08

## 2018-08-29 RX ADMIN — KETOROLAC TROMETHAMINE 15 MG: 15 INJECTION, SOLUTION INTRAMUSCULAR; INTRAVENOUS at 10:08

## 2018-08-29 RX ADMIN — METOPROLOL SUCCINATE 50 MG: 25 TABLET, EXTENDED RELEASE ORAL at 10:08

## 2018-08-29 NOTE — PLAN OF CARE
NCC stepdown    Future Appointments   Date Time Provider Department Center   9/11/2018 10:30 AM VASCULAR ULTRASOUND, [facility] WB EKG [facility]       [facility] [place], [place] - [address] [address]  Phone: [phone] Fax: [phone]      Payor: MEDICARE / Plan: MEDICARE PART A & B / Product Type: Government /        08/29/18 1400   Discharge Assessment   Assessment Type Discharge Planning Assessment   Assessment information obtained from? Patient   Prior to hospitilization cognitive status: Alert/Oriented   Prior to hospitalization functional status: Independent   Current cognitive status: Alert/Oriented   Current Functional Status: (pending therapy evaluation )   Lives With spouse   Able to Return to Prior Arrangements unable to determine at this time (comments)   Is patient able to care for self after discharge? Unable to determine at this time (comments)   Who are your caregiver(s) and their phone number(s)? (Corrine Gallagher  spouse [phone])   Patient's perception of discharge disposition home or selfcare   Patient currently receives any other outside agency services? No   Equipment Currently Used at Home walker, rolling   Does the patient have transportation home? Yes   Transportation Available family or friend will provide   Discharge Plan A Home with family;Home Health   Discharge Plan B Skilled Nursing Facility   Patient/Family In Agreement With Plan yes

## 2018-08-29 NOTE — ASSESSMENT & PLAN NOTE
Pt is complaining of long standing dysphagia over the past 3 years that is getting progressively worse. Pt reports frequent regurgitation of formed food with little to no abdominal pain. Pt feels sensation of food getting stuck in lower esophagus. He is reporting no vomiting of blood or green liquid. Pt reports that he has never had an EGD. He reports having normal bowl movements. CT showed fluid filled esophagus + large esophageal diverticulum.    Plan  -EGD tomorrow  -currently on clear liquid diet  -NPO at midnight
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
## 2018-08-29 NOTE — ED NOTES
Patients wife info, [place] [address] [place] [facility], direct number on  [phone]. Call with any updates. Corrine Gallagher [birth year]

## 2018-08-29 NOTE — PLAN OF CARE
Problem: Patient Care Overview  Goal: Plan of Care Review  Outcome: Ongoing (interventions implemented as appropriate)  Neuro intact. Continuing to hold anticoags. Swallow re-evaluated today. Placed on a clear liquid diet. NPO @ [facility] for EGD tomorrow morning. CXR  Reflects pleural edema. 3L nc and CT of head completed - no new bleed.  VSS.

## 2018-08-29 NOTE — CARE UPDATE
9:30 PM  Paged by nursing regarding NPO orders and medications due. Patient unable to tolerate oral medications due to concern for aspiration/dysphagia.     Changed Keppra to IV. Started on maintenance fluids (100cc/h x12h) pending speech eval.     Defer further changes to AM medications to 1° team.
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
## 2018-08-29 NOTE — SUBJECTIVE & OBJECTIVE
Past Medical History:   Diagnosis Date    Arthritis     Blind right eye     BPH (benign prostatic hypertrophy)     Bronchitis, chronic     Carotid artery occlusion     CHF (congestive heart failure)     Colon polyp     Diabetes mellitus     GERD (gastroesophageal reflux disease)     Hearing aid worn     bilateral    Hernia     Hypertension     Influenza A     Irregular heart beat     NSVT (nonsustained ventricular tachycardia) 2018    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    Snoring     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    AS s/p TAVR    Stroke 2018    TIA s/p L CEA       Past Surgical History:   Procedure Laterality Date    CARDIAC VALVE SURGERY  10/2017    AS s/p TAVR    CAROTID ENDARTERECTOMY Left 2018    Dr. Coleman    CATARACT EXTRACTION, BILATERAL      EYE SURGERY      HERNIA REPAIR      RETINAL DETACHMENT SURGERY         Review of patient's allergies indicates:   Allergen Reactions    Ciprofloxacin (mixture) Itching     Extreme itching and redness     Antibiotic hc     Vancomycin analogues Itching     Family History     Problem Relation (Age of Onset)    Arthritis Mother    Diabetes Sister    Heart attack Brother    Heart disease Father    Heart failure Father    No Known Problems Maternal Aunt, Maternal Uncle, Paternal Aunt, Paternal Uncle, Maternal Grandmother, Maternal Grandfather, Paternal Grandmother, Paternal Grandfather        Tobacco Use    Smoking status: Former Smoker     Packs/day: 3.00     Years: 40.00     Pack years: 120.00     Types: Cigarettes     Last attempt to quit: 1990     Years since quittin.0    Smokeless tobacco: Never Used   Substance and Sexual Activity    Alcohol use: No    Drug use: No    Sexual activity: Not Currently     Partners: Female     Review of Systems   Constitutional: Positive for activity change. Negative for chills, fatigue and fever.   HENT: Positive for congestion and trouble  swallowing. Negative for drooling, hearing loss and voice change.         Pt reports difficulty swallowing with frequent regurgitation of formed foods   Eyes: Positive for visual disturbance. Negative for pain.   Respiratory: Positive for cough. Negative for shortness of breath and wheezing.    Cardiovascular: Negative for chest pain and palpitations.   Gastrointestinal: Negative for abdominal pain, nausea and vomiting.   Genitourinary: Negative for difficulty urinating and flank pain.   Musculoskeletal: Positive for arthralgias. Negative for back pain, myalgias and neck pain.   Skin: Negative for rash and wound.   Neurological: Positive for speech difficulty. Negative for dizziness, weakness and numbness.   Psychiatric/Behavioral: Negative for agitation and hallucinations. The patient is not nervous/anxious.      Objective:     Vital Signs (Most Recent):  Temp: 98.4 °F (36.9 °C) (08/29/18 0816)  Pulse: 69 (08/29/18 1133)  Resp: 18 (08/29/18 0816)  BP: (!) 147/65 (08/29/18 0816)  SpO2: (!) 92 % (08/29/18 0816) Vital Signs (24h Range):  Temp:  [98.1 °F (36.7 °C)-98.5 °F (36.9 °C)] 98.4 °F (36.9 °C)  Pulse:  [59-69] 69  Resp:  [16-18] 18  SpO2:  [92 %-97 %] 92 %  BP: (125-171)/(59-77) 147/65     Weight: 85.7 kg (188 lb 15 oz) (08/28/18 2150)  Body mass index is 27.9 kg/m².      Intake/Output Summary (Last 24 hours) at 8/29/2018 1153  Last data filed at 8/29/2018 1036  Gross per 24 hour   Intake 583.33 ml   Output 1000 ml   Net -416.67 ml       Lines/Drains/Airways     Peripheral Intravenous Line                 Peripheral IV - Single Lumen 08/27/18 1626 Right Antecubital 1 day         Peripheral IV - Single Lumen 08/27/18 1727 Left Forearm 1 day                Physical Exam   Constitutional: He is oriented to person, place, and time. He appears well-developed and well-nourished. No distress.   HENT:   Head: Normocephalic.   Right Ear: External ear normal.   Left Ear: External ear normal.   Nose: Nose normal.    Forehead laceration.    Eyes: Right eye exhibits no discharge. Left eye exhibits no discharge. No scleral icterus.   Neck: Normal range of motion.   Cardiovascular: Normal rate, regular rhythm and intact distal pulses.   Pulmonary/Chest: Effort normal. No respiratory distress. He has no wheezes.   Abdominal: Soft. Bowel sounds are normal. He exhibits no distension and no mass. There is no tenderness. There is no rebound and no guarding. No hernia.   Musculoskeletal: Normal range of motion. He exhibits no edema or tenderness.   Neurological: He is alert and oriented to person, place, and time. No sensory deficit. He exhibits normal muscle tone.   -  Mental Status:  AAOx3.  Follows commands.  Answers correct age and .  Recent and remote memory intact. + aphasia, mild dysarthria.       Skin: Skin is warm and dry. Bruising and ecchymosis noted. No rash noted.   Psychiatric: He has a normal mood and affect. His behavior is normal. Thought content normal.   Vitals reviewed.      Significant Labs:  CBC:   Recent Labs   Lab  18   1621  18   0400   WBC  13.49*  11.11   HGB  12.0*  10.3*   HCT  36.8*  32.9*   PLT  233  174     BMP:   Recent Labs   Lab  18   0400   GLU  135*   NA  140   K  4.4   CL  110   CO2  22*   BUN  16   CREATININE  1.2   CALCIUM  8.7   MG  2.0

## 2018-08-29 NOTE — PLAN OF CARE
Problem: SLP Goal  Goal: SLP Goal  Goals to be met 9/4  1 pt will participate in further eval of reading/writing and vs skills  2 pt will tolerate soft diet and thin liquids  3 pt will recall word finding strategies with supervision    Outcome: Ongoing (interventions implemented as appropriate)  New swallow evaluation ordered after pt displayed difficulty managing PO.  Pt exhibited delayed regurgitation of all PO presentations this morning.  MD and nurse notified. GI consultation appears to be warranted.    FAITH De, CCC-SLP  Speech Language Pathologist  [phone]  8/29/2018
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
## 2018-08-29 NOTE — NURSING
Pt alert and oriented x4. C/O 10/10 pain bilaterally to neck. Right lung fields diminished. Pt reports no distress breathing. SPO2 93% on 2L nc. Neuro intact. Pt able to sit at EOB with min assist. Pt reports pain with activity. Dentures and hearing aids at bedside. VSS. Telemetry in place. NPO for swallow eval.

## 2018-08-29 NOTE — PT/OT/SLP EVAL
Speech Language Pathology Evaluation  Bedside Swallow    Patient Name:  Timbo Gallagher   MRN:  [id number]  Admitting Diagnosis: Traumatic subarachnoid bleed with LOC of 30 minutes or less, initial encounter    Recommendations:                 General Recommendations:  GI evaluation, Speech/language therapy and Cognitive-linguistic therapy  Diet recommendations:   Clears, Thin (when MD deems pt appropriate for PO diet)  Aspiration Precautions: 1 bite/sip at a time, HOB to 90 degrees, Meds whole 1 at a time, Monitor for s/s of aspiration, Remain upright 30 minutes post meal, Small bites/sips and Strict aspiration precautions   General Precautions: Standard, aspiration, fall  Communication strategies:  none    History:     Past Medical History:   Diagnosis Date    Arthritis     Blind right eye     BPH (benign prostatic hypertrophy)     Bronchitis, chronic     Carotid artery occlusion     CHF (congestive heart failure)     Colon polyp     Diabetes mellitus     GERD (gastroesophageal reflux disease)     Hearing aid worn     bilateral    Hernia     Hypertension     Influenza A     Irregular heart beat     NSVT (nonsustained ventricular tachycardia) 4/18/2018    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    Snoring     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 Coleman    CATARACT EXTRACTION, BILATERAL      EYE SURGERY      HERNIA REPAIR      RETINAL DETACHMENT SURGERY         Prior Intubation HX:  None during this admission    Modified Barium Swallow: MBSS 2/16/2016. Radiologist report reported penetration, but not aspiration of thin liquids.    Chest X-Rays: 8/27/18:   CLINICAL HISTORY:  Chest pain, unspecified    TECHNIQUE:  Single frontal view of the chest was  "performed.    COMPARISON:  Multiple priors, most recent 04/23/2018    FINDINGS:  Left chest wall pacemaker/AICD with leads in unchanged position.  Postsurgical changes of KIKO.  Unchanged mild cardiomegaly.  Right middle and lower lobe streaky air space opacities.  Probable left basilar atelectasis.  No pneumothorax.  No acute osseous abnormality.      Impression       Patchy bilateral lower lobe and right middle lobe probable atelectasis and/or aspiration.     Prior diet: mech soft/thins per SLP recs 8/28/18.     Subjective     "I have acid reflux." pt reports h/o acid reflux with symptoms of globus sensation and regurgitation of PO in the evenings. Pt states for "15-20 years" he has experienced these symptoms and sleeps with a receptacle at the bedside for expected regurgitation.      Pain/Comfort:  · Pain Rating 1: (pt did not rate, but c/o neck pain and pain in chest and shoulder upon cough)  · Pain Addressed 1: Nurse notified    Objective:     Oral Musculature Evaluation  · Oral Musculature: WFL  · Dentition: (dentures not in place; pt reports decreased fit of dentures since recent weight loss)  · Secretion Management: adequate  · Mucosal Quality: adequate  · Mandibular Strength and Mobility: WFL  · Oral Labial Strength and Mobility: WFL  · Lingual Strength and Mobility: WFL  · Velar Elevation: WFL  · Buccal Strength and Mobility: WFL  · Volitional Cough: adequate - somewhat guarded due to reports of pain in chest and shoulder upon cough  · Volitional Swallow: elicited with mild delay for dry swallow  · Voice Prior to PO Intake: mild hoarsensss    Bedside Swallow Eval:   Consistencies Assessed:  · Thin liquids cup sips water x 2, straw sips water x 2 (cyclical & sinlge); cup sips milkd x 4 (cyclical x 1, single x 3)  · Puree 1/2 tsp x 1, full tsp x 1  · Soft solids scrambled eggs x 1  · Solids pt getting ready to accept bite of sausage michael, but deferred due to consistent regurgitation of previous PO trials "     Oral Phase:   · WFL    Pharyngeal Phase:   · throat clear and cough folliowing cyclical straw sips of thin water; wet/gurgly vocal quality following cyclical cup sips of thin milk  · globus sensation with eggs  · Delayed regurgitation x 3; appeared to be all contents of PO presentations.    Compensatory Strategies  · None    Treatment: Education provided to pt regarding role of SLP, reason for re-assessment of swallowing function, concerns for aspiration, overt s/s of aspiration, symptoms of acid reflux, h/o acid reflux possibly impacting esophageal phase of the swallow, and recommendations for GI consultation. SLP stated plan to recommend clear liquid diet at this time if MD wishes to continue pt on a PO diet at this time.  Nurse and MD notified of results of swallowing evaluation and recommendations for GI consultation.    Assessment:     Timbo Gallagher is a 83 y.o. male with an SLP diagnosis of Dysphagia (appearing to be esophageal in nature) and Cognitive-Linguistic Impairment.      Goals:   Multidisciplinary Problems     SLP Goals        Problem: SLP Goal    Goal Priority Disciplines Outcome   SLP Goal     SLP Ongoing (interventions implemented as appropriate)   Description:  Goals to be met 9/4  1 pt will participate in further eval of reading/writing and vs skills  2 pt will tolerate soft diet and thin liquids  3 pt will recall word finding strategies with supervision                     Plan:     · Patient to be seen:  4 x/week   · Plan of Care expires:     · Plan of Care reviewed with:  patient   · SLP Follow-Up:  Yes       Discharge recommendations:  (tbd)     Time Tracking:     SLP Treatment Date:   08/29/18  Speech Start Time:  0942  Speech Stop Time:  1007     Speech Total Time (min):  25 min    Billable Minutes: Eval Swallow and Oral Function 10 and Seld Care/Home Management Training 15    FAITH De, CCC-SLP  08/29/2018     FAITH De, CCC-SLP  Speech Language  Pathologist  [phone]  8/29/2018

## 2018-08-29 NOTE — PHYSICIAN QUERY
PT Name: Timbo Gallagher  MR #: [id number]  Physician Query Form - CKD Clarification     CDS/: Ade SWIFT, RN, CCDS              Contact information: [email or web address]  This form is a permanent document in the medical record.     Query Date: August 29, 2018    By submitting this query, we are merely seeking further clarification of documentation. Please utilize your independent clinical judgment when addressing the question(s) below.    The Medical record contains the following:     Indicators   Supporting Clinical Findings   Location in Medical Record    x CKD or Chronic Kidney (Renal) Failure / Disease  CKD (chronic kidney disease), stage III  Hospital Problem List    x BUN/Creatinine                          GFR  Creatinine 1.8--> 1.2   GFR (non -American) 34--> 55.6  Lab 8/27/18--> 8/29/18    Dehydration      Nausea / Vomiting      Dialysis / CRRT      Medication      Treatment      x Other Chronic Conditions  CHF, Hypertension, Diabetes mellitus  H&P 8/28/18    Other       Provider, please further specify the stage of CKD.  [  ] Chronic Kidney Disease (CKD) (please specify stage* below)       [employer] Definitions  Stage Description  eGFR (mL/min)     [ x ]   III   Moderately reduced kidney function       30-59     [  ] Other (please specify): ________________________  [  ] Clinically Undetermined    Please document in your progress notes daily for the duration of treatment until resolved and include in your discharge summary.

## 2018-08-29 NOTE — HOSPITAL COURSE
"8/28/18:  Evaluated by PT, OT, and SLP.  Found to have dysphagia and cognitive-linguistic impairment.  SLP recommendation: dental soft diet and thin liquids.  Bed mobility SBA.  Sit to stand CGA and transfers CGA.  Ambulated 130 ft CGA.  UBD Sandra and LBD SBA.  8/29/18:  Participated with SLP.  Continues to have symptoms of dysphagia.  SLP recommendation: clears diet and thin liquids and "GI evaluation for dysphagia, which appears to be esophageal in nature."  No PT or OT.    8/30/18:  No PT, OT, SLP 2/2 off floor for EGD.    "
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
## 2018-08-29 NOTE — ED NOTES
10th floor updated on plan for Swallow Study in the AM and change in PO meds. Floor ready for patient.
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
## 2018-08-29 NOTE — SUBJECTIVE & OBJECTIVE
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 Coleman    CATARACT EXTRACTION, BILATERAL      EYE SURGERY      HERNIA REPAIR      RETINAL DETACHMENT SURGERY       Review of patient's allergies indicates:   Allergen Reactions    Ciprofloxacin (mixture) Itching     Extreme itching and redness     Antibiotic hc     Vancomycin analogues Itching       Scheduled Medications:    amiodarone  200 mg Oral Daily    amLODIPine  10 mg Oral Daily    atorvastatin  40 mg Oral Daily    furosemide  20 mg Oral Daily    levetiracetam IVPB  500 mg Intravenous Q12H    losartan  100 mg Oral Daily    metoprolol succinate  50 mg Oral Daily    sodium chloride 0.9%  3 mL Intravenous Q8H       PRN Medications: dextrose 50%, glucagon (human recombinant), insulin aspart U-100, ketorolac, labetalol    Family History     Problem Relation (Age of Onset)    Arthritis Mother    Diabetes Sister    Heart attack Brother    Heart disease Father    Heart failure Father    No Known Problems Maternal Aunt, Maternal Uncle, Paternal Aunt, Paternal Uncle, Maternal Grandmother, Maternal Grandfather, Paternal Grandmother, Paternal Grandfather        Tobacco Use    Smoking status: Former Smoker     Packs/day: 3.00      Years: 40.00     Pack years: 120.00     Types: Cigarettes     Last attempt to quit: 1990     Years since quittin.0    Smokeless tobacco: Never Used   Substance and Sexual Activity    Alcohol use: No    Drug use: No    Sexual activity: Not Currently     Partners: Female     Review of Systems   Constitutional: Positive for activity change. Negative for chills, fatigue and fever.   HENT: Positive for congestion and trouble swallowing. Negative for drooling, hearing loss and voice change.    Eyes: Positive for visual disturbance. Negative for pain.   Respiratory: Positive for cough. Negative for shortness of breath and wheezing.    Cardiovascular: Negative for chest pain and palpitations.   Gastrointestinal: Negative for abdominal pain, nausea and vomiting.   Genitourinary: Negative for difficulty urinating and flank pain.   Musculoskeletal: Positive for arthralgias. Negative for back pain, myalgias and neck pain.   Skin: Negative for rash and wound.   Neurological: Positive for speech difficulty and headaches. Negative for dizziness, weakness and numbness.   Psychiatric/Behavioral: Negative for agitation and hallucinations. The patient is not nervous/anxious.      Objective:     Vital Signs (Most Recent):  Temp: 98.4 °F (36.9 °C) (18)  Pulse: 60 (18)  Resp: 18 (18)  BP: (!) 147/65 (18)  SpO2: (!) 92 % (18)    Vital Signs (24h Range):  Temp:  [98.1 °F (36.7 °C)-98.5 °F (36.9 °C)] 98.4 °F (36.9 °C)  Pulse:  [59-84] 60  Resp:  [16-18] 18  SpO2:  [92 %-97 %] 92 %  BP: (125-171)/(56-77) 147/65     Body mass index is 27.9 kg/m².    Physical Exam   Constitutional: He is oriented to person, place, and time. He appears well-developed and well-nourished. No distress.   HENT:   Head: Normocephalic.   Right Ear: External ear normal.   Left Ear: External ear normal.   Nose: Nose normal.   Forehead laceration.    Eyes: Right eye exhibits no discharge. Left eye  exhibits no discharge. No scleral icterus.   Neck: Normal range of motion.   Cardiovascular: Normal rate, regular rhythm and intact distal pulses.   Pulmonary/Chest: Effort normal. No respiratory distress. He has no wheezes.   Abdominal: Soft. He exhibits no distension. There is no tenderness.   Musculoskeletal: Normal range of motion. He exhibits no edema or tenderness.   Neurological: He is alert and oriented to person, place, and time. No sensory deficit. He exhibits normal muscle tone.   -  Mental Status:  AAOx3.  Follows commands.  Answers correct age and .  Recent and remote memory intact.   -  Speech and language:  + aphasia, mild dysarthria.    -  Facial movement (CN VII): symmetrical.   -  Motor:  No pronator drift. No focal weakness.   -  Tone:  Normal.   -  Sensory:  Intact to light touch and pin prick.   Skin: Skin is warm and dry. Bruising and ecchymosis noted. No rash noted.   Psychiatric: He has a normal mood and affect. His behavior is normal. Thought content normal.   Vitals reviewed.    NEUROLOGICAL EXAMINATION:     MENTAL STATUS   Oriented to person, place, and time.       Diagnostic Results:   Labs: Reviewed  X-Ray: Reviewed  CT: Reviewed

## 2018-08-29 NOTE — NURSING TRANSFER
Nursing Transfer Note      8/29/2018     Transfer to room 1048 from ED    Transfer via stretcher    Transfer with oxygen, telemetry    Transported by pt transport    Medicines sent: none     Chart send with patient: yes    Notified: MD and wife    Patient reassessed at: 8/28/18 @ 2150    Upon arrival to floor: assisted pt from stretcher to bed with minimal assist x1, steady gait; oriented pt to room and discussed POC tp include IV fluids, IV medications, NPO until after swallow study in am; admitted/assessed pt and provided pt with fresh linen/new gown, corby care provided.
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
## 2018-08-30 ENCOUNTER — ANESTHESIA (OUTPATIENT)
Dept: ENDOSCOPY | Facility: HOSPITAL | Age: 83
DRG: 085 | End: 2018-08-30
Payer: MEDICARE

## 2018-08-30 ENCOUNTER — ANESTHESIA EVENT (OUTPATIENT)
Dept: ENDOSCOPY | Facility: HOSPITAL | Age: 83
DRG: 085 | End: 2018-08-30
Payer: MEDICARE

## 2018-08-30 LAB
ALBUMIN SERPL BCP-MCNC: 2.6 G/DL
ALP SERPL-CCNC: 119 U/L
ALT SERPL W/O P-5'-P-CCNC: 6 U/L
ANION GAP SERPL CALC-SCNC: 11 MMOL/L
AST SERPL-CCNC: 13 U/L
BASOPHILS # BLD AUTO: 0.05 K/UL
BASOPHILS NFR BLD: 0.3 %
BILIRUB SERPL-MCNC: 0.6 MG/DL
BILIRUB UR QL STRIP: NEGATIVE
BUN SERPL-MCNC: 21 MG/DL
CALCIUM SERPL-MCNC: 8.9 MG/DL
CHLORIDE SERPL-SCNC: 109 MMOL/L
CLARITY UR REFRACT.AUTO: CLEAR
CO2 SERPL-SCNC: 23 MMOL/L
COLOR UR AUTO: ABNORMAL
CREAT SERPL-MCNC: 1.4 MG/DL
DIASTOLIC DYSFUNCTION: YES
DIFFERENTIAL METHOD: ABNORMAL
EOSINOPHIL # BLD AUTO: 0.2 K/UL
EOSINOPHIL NFR BLD: 0.9 %
ERYTHROCYTE [DISTWIDTH] IN BLOOD BY AUTOMATED COUNT: 14.2 %
EST. GFR  (AFRICAN AMERICAN): 53.3 ML/MIN/1.73 M^2
EST. GFR  (NON AFRICAN AMERICAN): 46.1 ML/MIN/1.73 M^2
ESTIMATED PA SYSTOLIC PRESSURE: 58.98
GLUCOSE SERPL-MCNC: 152 MG/DL
GLUCOSE UR QL STRIP: NEGATIVE
HCT VFR BLD AUTO: 34.8 %
HGB BLD-MCNC: 11 G/DL
HGB UR QL STRIP: NEGATIVE
IMM GRANULOCYTES # BLD AUTO: 0.07 K/UL
IMM GRANULOCYTES NFR BLD AUTO: 0.4 %
KETONES UR QL STRIP: NEGATIVE
LEUKOCYTE ESTERASE UR QL STRIP: ABNORMAL
LYMPHOCYTES # BLD AUTO: 0.6 K/UL
LYMPHOCYTES NFR BLD: 3.7 %
MAGNESIUM SERPL-MCNC: 1.9 MG/DL
MCH RBC QN AUTO: 31.3 PG
MCHC RBC AUTO-ENTMCNC: 31.6 G/DL
MCV RBC AUTO: 99 FL
MICROSCOPIC COMMENT: NORMAL
MITRAL VALVE MOBILITY: NORMAL
MITRAL VALVE REGURGITATION: ABNORMAL
MONOCYTES # BLD AUTO: 1.1 K/UL
MONOCYTES NFR BLD: 6.5 %
NEUTROPHILS # BLD AUTO: 14.3 K/UL
NEUTROPHILS NFR BLD: 88.2 %
NITRITE UR QL STRIP: NEGATIVE
NRBC BLD-RTO: 0 /100 WBC
PH UR STRIP: 5 [PH] (ref 5–8)
PHOSPHATE SERPL-MCNC: 3.9 MG/DL
PLATELET # BLD AUTO: 191 K/UL
PMV BLD AUTO: 11.6 FL
POCT GLUCOSE: 133 MG/DL (ref 70–110)
POCT GLUCOSE: 149 MG/DL (ref 70–110)
POCT GLUCOSE: 150 MG/DL (ref 70–110)
POCT GLUCOSE: 151 MG/DL (ref 70–110)
POCT GLUCOSE: 155 MG/DL (ref 70–110)
POTASSIUM SERPL-SCNC: 4 MMOL/L
PREALB SERPL-MCNC: 11 MG/DL
PROT SERPL-MCNC: 6.3 G/DL
PROT UR QL STRIP: NEGATIVE
RBC # BLD AUTO: 3.52 M/UL
RBC #/AREA URNS AUTO: 1 /HPF (ref 0–4)
RETIRED EF AND QEF - SEE NOTES: 55 (ref 55–65)
SODIUM SERPL-SCNC: 143 MMOL/L
SP GR UR STRIP: 1.01 (ref 1–1.03)
TRICUSPID VALVE REGURGITATION: ABNORMAL
URN SPEC COLLECT METH UR: ABNORMAL
UROBILINOGEN UR STRIP-ACNC: NEGATIVE EU/DL
WBC # BLD AUTO: 16.22 K/UL
WBC #/AREA URNS AUTO: 1 /HPF (ref 0–5)

## 2018-08-30 PROCEDURE — 82962 GLUCOSE BLOOD TEST: CPT | Performed by: INTERNAL MEDICINE

## 2018-08-30 PROCEDURE — 25000242 PHARM REV CODE 250 ALT 637 W/ HCPCS: Performed by: HOSPITALIST

## 2018-08-30 PROCEDURE — 27000221 HC OXYGEN, UP TO 24 HOURS

## 2018-08-30 PROCEDURE — 25000003 PHARM REV CODE 250: Performed by: PSYCHIATRY & NEUROLOGY

## 2018-08-30 PROCEDURE — 37000009 HC ANESTHESIA EA ADD 15 MINS: Performed by: INTERNAL MEDICINE

## 2018-08-30 PROCEDURE — 99233 SBSQ HOSP IP/OBS HIGH 50: CPT | Mod: ,,, | Performed by: HOSPITALIST

## 2018-08-30 PROCEDURE — 25000003 PHARM REV CODE 250: Performed by: HOSPITALIST

## 2018-08-30 PROCEDURE — 63600175 PHARM REV CODE 636 W HCPCS: Performed by: HOSPITALIST

## 2018-08-30 PROCEDURE — 0DJ08ZZ INSPECTION OF UPPER INTESTINAL TRACT, VIA NATURAL OR ARTIFICIAL OPENING ENDOSCOPIC: ICD-10-PCS | Performed by: INTERNAL MEDICINE

## 2018-08-30 PROCEDURE — 83735 ASSAY OF MAGNESIUM: CPT

## 2018-08-30 PROCEDURE — 80053 COMPREHEN METABOLIC PANEL: CPT

## 2018-08-30 PROCEDURE — 25000003 PHARM REV CODE 250: Performed by: NURSE ANESTHETIST, CERTIFIED REGISTERED

## 2018-08-30 PROCEDURE — 63600175 PHARM REV CODE 636 W HCPCS: Mod: JG | Performed by: INTERNAL MEDICINE

## 2018-08-30 PROCEDURE — 43236 UPPR GI SCOPE W/SUBMUC INJ: CPT | Performed by: INTERNAL MEDICINE

## 2018-08-30 PROCEDURE — 93306 TTE W/DOPPLER COMPLETE: CPT

## 2018-08-30 PROCEDURE — 43247 EGD REMOVE FOREIGN BODY: CPT | Mod: GC,,, | Performed by: INTERNAL MEDICINE

## 2018-08-30 PROCEDURE — 93306 TTE W/DOPPLER COMPLETE: CPT | Mod: 26,,, | Performed by: INTERNAL MEDICINE

## 2018-08-30 PROCEDURE — 81001 URINALYSIS AUTO W/SCOPE: CPT

## 2018-08-30 PROCEDURE — 37000008 HC ANESTHESIA 1ST 15 MINUTES: Performed by: INTERNAL MEDICINE

## 2018-08-30 PROCEDURE — D9220A PRA ANESTHESIA: Mod: CRNA,,, | Performed by: NURSE ANESTHETIST, CERTIFIED REGISTERED

## 2018-08-30 PROCEDURE — 84134 ASSAY OF PREALBUMIN: CPT

## 2018-08-30 PROCEDURE — 3E0G8GC INTRODUCTION OF OTHER THERAPEUTIC SUBSTANCE INTO UPPER GI, VIA NATURAL OR ARTIFICIAL OPENING ENDOSCOPIC: ICD-10-PCS | Performed by: INTERNAL MEDICINE

## 2018-08-30 PROCEDURE — 43236 UPPR GI SCOPE W/SUBMUC INJ: CPT | Mod: 59,,, | Performed by: INTERNAL MEDICINE

## 2018-08-30 PROCEDURE — 94761 N-INVAS EAR/PLS OXIMETRY MLT: CPT

## 2018-08-30 PROCEDURE — 20600001 HC STEP DOWN PRIVATE ROOM

## 2018-08-30 PROCEDURE — A4216 STERILE WATER/SALINE, 10 ML: HCPCS | Performed by: PSYCHIATRY & NEUROLOGY

## 2018-08-30 PROCEDURE — 94640 AIRWAY INHALATION TREATMENT: CPT

## 2018-08-30 PROCEDURE — 25000003 PHARM REV CODE 250: Performed by: STUDENT IN AN ORGANIZED HEALTH CARE EDUCATION/TRAINING PROGRAM

## 2018-08-30 PROCEDURE — 36415 COLL VENOUS BLD VENIPUNCTURE: CPT

## 2018-08-30 PROCEDURE — 84100 ASSAY OF PHOSPHORUS: CPT

## 2018-08-30 PROCEDURE — 63600175 PHARM REV CODE 636 W HCPCS: Performed by: NURSE ANESTHETIST, CERTIFIED REGISTERED

## 2018-08-30 PROCEDURE — D9220A PRA ANESTHESIA: Mod: ANES,,, | Performed by: ANESTHESIOLOGY

## 2018-08-30 PROCEDURE — 99232 SBSQ HOSP IP/OBS MODERATE 35: CPT | Mod: ,,, | Performed by: NURSE PRACTITIONER

## 2018-08-30 PROCEDURE — 43247 EGD REMOVE FOREIGN BODY: CPT | Performed by: INTERNAL MEDICINE

## 2018-08-30 PROCEDURE — 85025 COMPLETE CBC W/AUTO DIFF WBC: CPT

## 2018-08-30 RX ORDER — SODIUM CHLORIDE 9 MG/ML
INJECTION, SOLUTION INTRAVENOUS CONTINUOUS PRN
Status: DISCONTINUED | OUTPATIENT
Start: 2018-08-30 | End: 2018-08-30

## 2018-08-30 RX ORDER — LEVETIRACETAM 500 MG/1
500 TABLET ORAL 2 TIMES DAILY
Status: DISCONTINUED | OUTPATIENT
Start: 2018-08-30 | End: 2018-08-31

## 2018-08-30 RX ORDER — HYDROMORPHONE HYDROCHLORIDE 1 MG/ML
0.2 INJECTION, SOLUTION INTRAMUSCULAR; INTRAVENOUS; SUBCUTANEOUS EVERY 5 MIN PRN
Status: DISCONTINUED | OUTPATIENT
Start: 2018-08-30 | End: 2018-08-30 | Stop reason: HOSPADM

## 2018-08-30 RX ORDER — LIDOCAINE HCL/PF 100 MG/5ML
SYRINGE (ML) INTRAVENOUS
Status: DISCONTINUED | OUTPATIENT
Start: 2018-08-30 | End: 2018-08-30

## 2018-08-30 RX ORDER — ONDANSETRON 2 MG/ML
4 INJECTION INTRAMUSCULAR; INTRAVENOUS ONCE AS NEEDED
Status: DISCONTINUED | OUTPATIENT
Start: 2018-08-30 | End: 2018-08-30 | Stop reason: HOSPADM

## 2018-08-30 RX ORDER — SUCCINYLCHOLINE CHLORIDE 20 MG/ML
INJECTION INTRAMUSCULAR; INTRAVENOUS
Status: DISCONTINUED | OUTPATIENT
Start: 2018-08-30 | End: 2018-08-30

## 2018-08-30 RX ORDER — MEPERIDINE HYDROCHLORIDE 50 MG/ML
12.5 INJECTION INTRAMUSCULAR; INTRAVENOUS; SUBCUTANEOUS ONCE AS NEEDED
Status: DISCONTINUED | OUTPATIENT
Start: 2018-08-30 | End: 2018-08-30 | Stop reason: HOSPADM

## 2018-08-30 RX ORDER — DIPHENHYDRAMINE HYDROCHLORIDE 50 MG/ML
25 INJECTION INTRAMUSCULAR; INTRAVENOUS EVERY 6 HOURS PRN
Status: DISCONTINUED | OUTPATIENT
Start: 2018-08-30 | End: 2018-08-30 | Stop reason: HOSPADM

## 2018-08-30 RX ORDER — EPHEDRINE SULFATE 50 MG/ML
INJECTION, SOLUTION INTRAVENOUS
Status: DISCONTINUED | OUTPATIENT
Start: 2018-08-30 | End: 2018-08-30

## 2018-08-30 RX ORDER — FUROSEMIDE 40 MG/1
40 TABLET ORAL DAILY
Status: DISCONTINUED | OUTPATIENT
Start: 2018-08-31 | End: 2018-09-01

## 2018-08-30 RX ORDER — PROPOFOL 10 MG/ML
VIAL (ML) INTRAVENOUS
Status: DISCONTINUED | OUTPATIENT
Start: 2018-08-30 | End: 2018-08-30

## 2018-08-30 RX ORDER — FENTANYL CITRATE 50 UG/ML
25 INJECTION, SOLUTION INTRAMUSCULAR; INTRAVENOUS EVERY 5 MIN PRN
Status: DISCONTINUED | OUTPATIENT
Start: 2018-08-30 | End: 2018-08-30 | Stop reason: HOSPADM

## 2018-08-30 RX ADMIN — LEVETIRACETAM 500 MG: 5 INJECTION INTRAVENOUS at 09:08

## 2018-08-30 RX ADMIN — OXYCODONE HYDROCHLORIDE 5 MG: 5 TABLET ORAL at 01:08

## 2018-08-30 RX ADMIN — AMLODIPINE BESYLATE 10 MG: 10 TABLET ORAL at 12:08

## 2018-08-30 RX ADMIN — IPRATROPIUM BROMIDE AND ALBUTEROL SULFATE 3 ML: .5; 3 SOLUTION RESPIRATORY (INHALATION) at 08:08

## 2018-08-30 RX ADMIN — PROPOFOL 100 MG: 10 INJECTION, EMULSION INTRAVENOUS at 10:08

## 2018-08-30 RX ADMIN — IPRATROPIUM BROMIDE AND ALBUTEROL SULFATE 3 ML: .5; 3 SOLUTION RESPIRATORY (INHALATION) at 04:08

## 2018-08-30 RX ADMIN — EPHEDRINE SULFATE 20 MG: 50 INJECTION, SOLUTION INTRAMUSCULAR; INTRAVENOUS; SUBCUTANEOUS at 11:08

## 2018-08-30 RX ADMIN — Medication 3 ML: at 09:08

## 2018-08-30 RX ADMIN — LEVETIRACETAM 500 MG: 500 TABLET ORAL at 09:08

## 2018-08-30 RX ADMIN — IPRATROPIUM BROMIDE AND ALBUTEROL SULFATE 3 ML: .5; 3 SOLUTION RESPIRATORY (INHALATION) at 07:08

## 2018-08-30 RX ADMIN — Medication 3 ML: at 02:08

## 2018-08-30 RX ADMIN — Medication 3 ML: at 05:08

## 2018-08-30 RX ADMIN — SUCCINYLCHOLINE CHLORIDE 100 MG: 20 INJECTION, SOLUTION INTRAMUSCULAR; INTRAVENOUS at 10:08

## 2018-08-30 RX ADMIN — METOPROLOL SUCCINATE 50 MG: 25 TABLET, EXTENDED RELEASE ORAL at 12:08

## 2018-08-30 RX ADMIN — LOSARTAN POTASSIUM 100 MG: 50 TABLET ORAL at 01:08

## 2018-08-30 RX ADMIN — SODIUM CHLORIDE: 0.9 INJECTION, SOLUTION INTRAVENOUS at 10:08

## 2018-08-30 RX ADMIN — LIDOCAINE HYDROCHLORIDE 50 MG: 20 INJECTION, SOLUTION INTRAVENOUS at 10:08

## 2018-08-30 RX ADMIN — PROPOFOL 50 MG: 10 INJECTION, EMULSION INTRAVENOUS at 11:08

## 2018-08-30 RX ADMIN — FUROSEMIDE 40 MG: 10 INJECTION, SOLUTION INTRAMUSCULAR; INTRAVENOUS at 03:08

## 2018-08-30 RX ADMIN — ONABOTULINUMTOXINA 100 UNITS: 100 INJECTION, POWDER, LYOPHILIZED, FOR SOLUTION INTRADERMAL; INTRAMUSCULAR at 11:08

## 2018-08-30 RX ADMIN — AMIODARONE HYDROCHLORIDE 200 MG: 200 TABLET ORAL at 01:08

## 2018-08-30 RX ADMIN — ATORVASTATIN CALCIUM 40 MG: 20 TABLET, FILM COATED ORAL at 12:08

## 2018-08-30 NOTE — PT/OT/SLP PROGRESS
Physical Therapy  Pt Not Seen    Patient Name:  Timbo Gallagher   MRN:  [id number]    11:31 am and then 4:05 pm  Patient not seen today secondary to pt  Unavailable (Comment)(Pt CONG in am 2* CT scan; pt refused in pm 2* having just returned from EGD and echo [place].  RN (Paola) aware). Will follow-up on next scheduled visit.    Melody Xiong, PTA  8/30/2018

## 2018-08-30 NOTE — NURSING
Pt  @ 0600 requiring insulin per order. Pt NPO. Team notified. SCOTTIE Cotton stated to hold PRN dose.

## 2018-08-30 NOTE — NURSING TRANSFER
Nursing Transfer Note      8/30/2018     Transfer to 1048    Transfer via stretcher    Transfer with telemetry and stretcher    Transported by transport    Medicines sent: none    Chart send with patient: yes    Notified: HOLA Guevara on tenth floor    Patient reassessed at: 8/30/2018 at 1210

## 2018-08-30 NOTE — [employer] PATIENT INSTRUCTIONS
Discharge Summary/Instructions after an Endoscopic Procedure  Patient Name: Timbo Gallagher  Patient MRN: [id number]  Patient YOB: 1935 Thursday, August 30, 2018  Tye Navarrete MD  RESTRICTIONS:  During your procedure today, you received medications for sedation.  These   medications may affect your judgment, balance and coordination.  Therefore,   for 24 hours, you have the following restrictions:   - DO NOT drive a car, operate machinery, make legal/financial decisions,   sign important papers or drink alcohol.    ACTIVITY:  Today: no heavy lifting, straining or running due to procedural   sedation/anesthesia.  The following day: return to full activity including work.  DIET:  Eat and drink normally unless instructed otherwise.     TREATMENT FOR COMMON SIDE EFFECTS:  - Mild abdominal pain, nausea, belching, bloating or excessive gas:  rest,   eat lightly and use a heating pad.  - Sore Throat: treat with throat lozenges and/or gargle with warm salt   water.  - Because air was used during the procedure, expelling large amounts of air   from your rectum or belching is normal.  - If a bowel prep was taken, you may not have a bowel movement for 1-3 days.    This is normal.  SYMPTOMS TO WATCH FOR AND REPORT TO YOUR PHYSICIAN:  1. Abdominal pain or bloating, other than gas cramps.  2. Chest pain.  3. Back pain.  4. Signs of infection such as: chills or fever occurring within 24 hours   after the procedure.  5. Rectal bleeding, which would show as bright red, maroon, or black stools.   (A tablespoon of blood from the rectum is not serious, especially if   hemorrhoids are present.)  6. Vomiting.  7. Weakness or dizziness.  GO DIRECTLY TO THE NEAREST EMERGENCY ROOM IF YOU HAVE ANY OF THE FOLLOWING:      Difficulty breathing              Chills and/or fever over 101 F   Persistent vomiting and/or vomiting blood   Severe abdominal pain   Severe chest pain   Black, tarry stools   Bleeding- more than one tablespoon   Any  other symptom or condition that you feel may need urgent attention  Your doctor recommends these additional instructions:  If any biopsies were taken, your doctors clinic will contact you in 1 to 2   weeks with any results.  - [facility].   - Clear liquid diet.   - Continue present medications.   - Repeat upper endoscopy PRN for retreatment.  For questions, problems or results please call your physician - Tye Navarrete MD at Work:  [phone].  [facility], EMERGENCY ROOM PHONE NUMBER: [phone]  IF A COMPLICATION OR EMERGENCY SITUATION ARISES AND YOU ARE UNABLE TO REACH   YOUR PHYSICIAN - GO DIRECTLY TO THE EMERGENCY ROOM.  Tye Navarrete MD  8/30/2018 11:21:54 AM  This report has been verified and signed electronically.  [employer]

## 2018-08-30 NOTE — ANESTHESIA PREPROCEDURE EVALUATION
08/30/2018  Timbo Gallagher is a 83 y.o., male.    Anesthesia Evaluation         Review of Systems  Anesthesia Hx:  No problems with previous Anesthesia   Social:  Non-Smoker    Cardiovascular:   Exercise tolerance: poor Pacemaker Hypertension Valvular problems/Murmurs Denies CAD.     Denies Angina. CHF RIOS  Functional Capacity Can you climb two flights of stairs? ==> Yes    Pulmonary:   Denies Asthma. Shortness of breath  Denies Recent URI.  Denies Sleep Apnea.    Renal/:   Chronic Renal Disease, CRI    Hepatic/GI:   Denies PUD. Denies Hiatal Hernia.  Denies GERD. Denies Liver Disease.  Denies Hepatitis.    Neurological:   Denies CVA. Denies Seizures.    Endocrine:   Diabetes Denies Hypothyroidism.        Physical Exam  General:  Well nourished    Airway/Jaw/Neck:  Airway Findings: Mouth Opening: Normal Tongue: Normal  General Airway Assessment: Adult  Mallampati: III  Improves to II with phonation.  TM Distance: Normal, at least 6 cm  Jaw/Neck Findings:  Neck ROM: Normal ROM  Neck Findings:     Eyes/Ears/Nose:  EYES/EARS/NOSE FINDINGS: Normal   Dental:  Dental Findings: In tact   Chest/Lungs:  Chest/Lungs Findings: Clear to auscultation     Heart/Vascular:  Heart Findings: Rate: Normal  Rhythm: Regular Rhythm  Sounds: Normal        Mental Status:  Mental Status Findings:  Alert and Oriented         Anesthesia Plan  Type of Anesthesia, risks & benefits discussed:  Anesthesia Type:  general  Patient's Preference: Proceed with anesthesia understanding that the risks are very small but could be serious or life threatening.  Intra-op Monitoring Plan: standard ASA monitors  Intra-op Monitoring Plan Comments:   Post Op Pain Control Plan:   Post Op Pain Control Plan Comments:   Induction:   IV  Beta Blocker:  Patient is not currently on a Beta-Blocker (No further documentation required).       Informed  Consent: Patient understands risks and agrees with Anesthesia plan.  Questions answered. Anesthesia consent signed with patient.  ASA Score: 3     Day of Surgery Review of History & Physical: I have interviewed and examined the patient. I have reviewed the patient's H&P dated:            Ready For Surgery From Anesthesia Perspective.

## 2018-08-30 NOTE — ANESTHESIA POSTPROCEDURE EVALUATION
"Anesthesia Post Evaluation    Patient: Timbo Gallagher    Procedure(s) Performed: Procedure(s) (LRB):  EGD (ESOPHAGOGASTRODUODENOSCOPY) (N/A)    Final Anesthesia Type: general  Patient location during evaluation: PACU  Patient participation: Yes- Able to Participate  Level of consciousness: awake and alert  Post-procedure vital signs: reviewed and stable  Pain management: adequate  Airway patency: patent  PONV status at discharge: No PONV  Anesthetic complications: no      Cardiovascular status: blood pressure returned to baseline  Respiratory status: unassisted  Hydration status: euvolemic  Follow-up not needed.        Visit Vitals  /63 (BP Location: Left arm, Patient Position: Lying)   Pulse 66   Temp 36.7 °C (98 °F) (Temporal)   Resp 18   Ht 5' 9" (1.753 m)   Wt 85.7 kg (188 lb 15 oz)   SpO2 95%   BMI 27.90 kg/m²       Pain/Asael Score: Pain Assessment Performed: Yes (8/30/2018 12:04 PM)  Presence of Pain: denies (8/30/2018 12:04 PM)  Pain Rating Prior to Med Admin: 7 (8/30/2018  1:44 PM)  Pain Rating Post Med Admin: 0 (8/30/2018  2:23 AM)  Asael Score: 9 (8/30/2018 12:04 PM)        "

## 2018-08-30 NOTE — PT/OT/SLP PROGRESS
Speech Language Pathology      Timbo Gallagher   [facility] Periop [facility]*    MRN: [id number]    Patient not seen today secondary to Unavailable (Comment)(Pt CONG for EGD upon SLP attempt to treat at 1116.). If unable to follow up later this service date, will follow up according to SLP POC if pt medically stable and available.     FAITH Fernandes, CCC-SLP  [phone]  8/30/2018

## 2018-08-30 NOTE — PLAN OF CARE
Problem: Patient Care Overview  Goal: Plan of Care Review  Outcome: Ongoing (interventions implemented as appropriate)  No acute changes overnight. Pain controlled with pain med. Pt NPO since 0000. No needs at this time. Call bell in reach. Will continue to monitor.
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
## 2018-08-30 NOTE — PT/OT/SLP PROGRESS
Speech Language Pathology      Timbo Greyson Gallagher   1048/1048 A    MRN: [id number]    Patient not seen today secondary to Unavailable (Comment)(SLP re-attempted to see pt at 1520. Pt CONG for echo. ). Will follow-up according to SLP POC if pt medically stable and available.     FAITH Fernandes, CCC-SLP  [phone]  8/30/2018

## 2018-08-30 NOTE — TRANSFER OF CARE
"Anesthesia Transfer of Care Note    Patient: Timbo Gallagher    Procedure(s) Performed: Procedure(s) (LRB):  EGD (ESOPHAGOGASTRODUODENOSCOPY) (N/A)    Patient location: [facility]    Anesthesia Type: general    Transport from OR: Transported from OR on 6-10 L/min O2 by face mask with adequate spontaneous ventilation    Post pain: adequate analgesia    Post assessment: no apparent anesthetic complications    Post vital signs: stable    Level of consciousness: awake    Complications: none    Transfer of care protocol was followed      Last vitals:   Visit Vitals  BP (!) 153/60 (BP Location: Left arm, Patient Position: Lying)   Pulse 63   Temp 36.8 °C (98.2 °F) (Oral)   Resp 18   Ht 5' 9" (1.753 m)   Wt 85.7 kg (188 lb 15 oz)   SpO2 96%   BMI 27.90 kg/m²     "
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
## 2018-08-30 NOTE — ASSESSMENT & PLAN NOTE
-  SLP following--> recommended clear diet, thin liquids, and GI consult  -  Incidental findings on CT abdomen--> fluid-filled esophagus and large esophageal diverticulum  -  GI consulted--> long standing dysphagia and GERD--> EGD pending

## 2018-08-30 NOTE — PLAN OF CARE
Problem: Patient Care Overview  Goal: Plan of Care Review  Outcome: Ongoing (interventions implemented as appropriate)  EGD-Echocardio done. Oxygen at 5L at SAT-91%. Remain on clear liquid diet. Pain controlled with pain medicine. Call light in reach.

## 2018-08-30 NOTE — SUBJECTIVE & OBJECTIVE
Interval History 8/30/2018:  Patient is seen for follow-up rehab evaluation and recommendations: No acute events over night.  EGD performed today.  Rib pain controlled.  No PT or OT yesterday.    HPI, Past Medical, Family, and Social History remains the same as documented in the initial encounter.    Scheduled Medications:    albuterol-ipratropium  3 mL Nebulization Q4H WAKE    amiodarone  200 mg Oral Daily    amLODIPine  10 mg Oral Daily    atorvastatin  40 mg Oral Daily    furosemide  40 mg Intravenous TID    levETIRAcetam  500 mg Oral BID    losartan  100 mg Oral Daily    metoprolol succinate  50 mg Oral Daily    sodium chloride 0.9%  3 mL Intravenous Q8H     PRN Medications: acetaminophen, dextrose 50%, glucagon (human recombinant), insulin aspart U-100, labetalol, oxyCODONE    Review of Systems   Constitutional: Positive for activity change. Negative for chills, fatigue and fever.   HENT: Positive for congestion and trouble swallowing. Negative for drooling, hearing loss and voice change.    Eyes: Positive for visual disturbance. Negative for pain.   Respiratory: Positive for cough. Negative for shortness of breath and wheezing.    Cardiovascular: Negative for chest pain and palpitations.   Gastrointestinal: Negative for abdominal pain, nausea and vomiting.   Genitourinary: Negative for difficulty urinating and flank pain.   Musculoskeletal: Positive for arthralgias and myalgias. Negative for back pain and neck pain.   Skin: Negative for rash and wound.   Neurological: Positive for speech difficulty and headaches. Negative for dizziness, weakness and numbness.   Psychiatric/Behavioral: Negative for agitation and hallucinations. The patient is not nervous/anxious.      Objective:     Vital Signs (Most Recent):  Temp: 98 °F (36.7 °C) (08/30/18 1130)  Pulse: 66 (08/30/18 1200)  Resp: 18 (08/30/18 1200)  BP: 137/63 (08/30/18 1200)  SpO2: 95 % (08/30/18 1200)    Vital Signs (24h Range):  Temp:  [97.8 °F  (36.6 °C)-98.6 °F (37 °C)] 98 °F (36.7 °C)  Pulse:  [59-75] 66  Resp:  [18-20] 18  SpO2:  [90 %-96 %] 95 %  BP: (128-157)/(59-87) 137/63     Physical Exam   Constitutional: He is oriented to person, place, and time. He appears well-developed and well-nourished. No distress.   HENT:   Head: Normocephalic.   Right Ear: External ear normal.   Left Ear: External ear normal.   Nose: Nose normal.   Forehead laceration.    Eyes: Right eye exhibits no discharge. Left eye exhibits no discharge. No scleral icterus.   Neck: Normal range of motion.   Cardiovascular: Normal rate, regular rhythm and intact distal pulses.   Pulmonary/Chest: Effort normal. No respiratory distress. He has no wheezes.   Abdominal: Soft. He exhibits no distension. There is no tenderness.   Musculoskeletal: Normal range of motion. He exhibits no edema or tenderness.   Neurological: He is alert and oriented to person, place, and time. No sensory deficit. He exhibits normal muscle tone.   -  Mental Status:  AAOx3.  Follows commands.  Answers correct age and .  Recent and remote memory intact.   -  Speech and language:  + aphasia, mild dysarthria.    -  Facial movement (CN VII): symmetrical.   -  Motor:  No pronator drift. No focal weakness.   -  Tone:  Normal.   -  Sensory:  Intact to light touch and pin prick.   Skin: Skin is warm and dry. Bruising and ecchymosis noted. No rash noted.   Psychiatric: He has a normal mood and affect. His behavior is normal. Thought content normal.   Vitals reviewed.    Diagnostic Results:   Labs: Reviewed  X-Ray: Reviewed  CT: Reviewed        NEUROLOGICAL EXAMINATION:     MENTAL STATUS   Oriented to person, place, and time.

## 2018-08-31 PROBLEM — J69.0 ASPIRATION PNEUMONIA: Status: ACTIVE | Noted: 2018-08-31

## 2018-08-31 LAB
ALLENS TEST: ABNORMAL
ANION GAP SERPL CALC-SCNC: 5 MMOL/L
BASOPHILS # BLD AUTO: 0.03 K/UL
BASOPHILS NFR BLD: 0.2 %
BUN SERPL-MCNC: 18 MG/DL
CALCIUM SERPL-MCNC: 9 MG/DL
CHLORIDE SERPL-SCNC: 106 MMOL/L
CO2 SERPL-SCNC: 27 MMOL/L
CREAT SERPL-MCNC: 1.4 MG/DL
DELSYS: ABNORMAL
DIFFERENTIAL METHOD: ABNORMAL
EOSINOPHIL # BLD AUTO: 0.4 K/UL
EOSINOPHIL NFR BLD: 3.1 %
ERYTHROCYTE [DISTWIDTH] IN BLOOD BY AUTOMATED COUNT: 14.2 %
EST. GFR  (AFRICAN AMERICAN): 53.3 ML/MIN/1.73 M^2
EST. GFR  (NON AFRICAN AMERICAN): 46.1 ML/MIN/1.73 M^2
GLUCOSE SERPL-MCNC: 168 MG/DL
HCO3 UR-SCNC: 26.2 MMOL/L (ref 24–28)
HCT VFR BLD AUTO: 32.8 %
HGB BLD-MCNC: 10.5 G/DL
IMM GRANULOCYTES # BLD AUTO: 0.05 K/UL
IMM GRANULOCYTES NFR BLD AUTO: 0.4 %
LYMPHOCYTES # BLD AUTO: 1 K/UL
LYMPHOCYTES NFR BLD: 8 %
MAGNESIUM SERPL-MCNC: 1.8 MG/DL
MCH RBC QN AUTO: 31 PG
MCHC RBC AUTO-ENTMCNC: 32 G/DL
MCV RBC AUTO: 97 FL
MONOCYTES # BLD AUTO: 0.9 K/UL
MONOCYTES NFR BLD: 7.4 %
NEUTROPHILS # BLD AUTO: 9.8 K/UL
NEUTROPHILS NFR BLD: 80.9 %
NRBC BLD-RTO: 0 /100 WBC
PCO2 BLDA: 41.3 MMHG (ref 35–45)
PH SMN: 7.41 [PH] (ref 7.35–7.45)
PHOSPHATE SERPL-MCNC: 3.4 MG/DL
PLATELET # BLD AUTO: 176 K/UL
PMV BLD AUTO: 11.3 FL
PO2 BLDA: 54 MMHG (ref 80–100)
POC BE: 2 MMOL/L
POC SATURATED O2: 88 % (ref 95–100)
POC TCO2: 27 MMOL/L (ref 23–27)
POCT GLUCOSE: 152 MG/DL (ref 70–110)
POCT GLUCOSE: 154 MG/DL (ref 70–110)
POCT GLUCOSE: 175 MG/DL (ref 70–110)
POTASSIUM SERPL-SCNC: 3.8 MMOL/L
RBC # BLD AUTO: 3.39 M/UL
SAMPLE: ABNORMAL
SITE: ABNORMAL
SODIUM SERPL-SCNC: 138 MMOL/L
WBC # BLD AUTO: 12.1 K/UL

## 2018-08-31 PROCEDURE — 85025 COMPLETE CBC W/AUTO DIFF WBC: CPT

## 2018-08-31 PROCEDURE — 3E0234Z INTRODUCTION OF SERUM, TOXOID AND VACCINE INTO MUSCLE, PERCUTANEOUS APPROACH: ICD-10-PCS | Performed by: EMERGENCY MEDICINE

## 2018-08-31 PROCEDURE — 84100 ASSAY OF PHOSPHORUS: CPT

## 2018-08-31 PROCEDURE — 25000242 PHARM REV CODE 250 ALT 637 W/ HCPCS: Performed by: HOSPITALIST

## 2018-08-31 PROCEDURE — 94761 N-INVAS EAR/PLS OXIMETRY MLT: CPT

## 2018-08-31 PROCEDURE — 83735 ASSAY OF MAGNESIUM: CPT

## 2018-08-31 PROCEDURE — 99233 SBSQ HOSP IP/OBS HIGH 50: CPT | Mod: ,,, | Performed by: PHYSICIAN ASSISTANT

## 2018-08-31 PROCEDURE — 20600001 HC STEP DOWN PRIVATE ROOM

## 2018-08-31 PROCEDURE — 92526 ORAL FUNCTION THERAPY: CPT

## 2018-08-31 PROCEDURE — 94640 AIRWAY INHALATION TREATMENT: CPT

## 2018-08-31 PROCEDURE — 27000221 HC OXYGEN, UP TO 24 HOURS

## 2018-08-31 PROCEDURE — 36415 COLL VENOUS BLD VENIPUNCTURE: CPT

## 2018-08-31 PROCEDURE — 97116 GAIT TRAINING THERAPY: CPT

## 2018-08-31 PROCEDURE — 97535 SELF CARE MNGMENT TRAINING: CPT

## 2018-08-31 PROCEDURE — 25000003 PHARM REV CODE 250: Performed by: HOSPITALIST

## 2018-08-31 PROCEDURE — 97530 THERAPEUTIC ACTIVITIES: CPT

## 2018-08-31 PROCEDURE — 63600175 PHARM REV CODE 636 W HCPCS: Performed by: PSYCHIATRY & NEUROLOGY

## 2018-08-31 PROCEDURE — 80048 BASIC METABOLIC PNL TOTAL CA: CPT

## 2018-08-31 PROCEDURE — 99233 SBSQ HOSP IP/OBS HIGH 50: CPT | Mod: ,,, | Performed by: HOSPITALIST

## 2018-08-31 PROCEDURE — A4216 STERILE WATER/SALINE, 10 ML: HCPCS | Performed by: PSYCHIATRY & NEUROLOGY

## 2018-08-31 PROCEDURE — 25000003 PHARM REV CODE 250: Performed by: PSYCHIATRY & NEUROLOGY

## 2018-08-31 PROCEDURE — 25000003 PHARM REV CODE 250: Performed by: STUDENT IN AN ORGANIZED HEALTH CARE EDUCATION/TRAINING PROGRAM

## 2018-08-31 PROCEDURE — 99232 SBSQ HOSP IP/OBS MODERATE 35: CPT | Mod: ,,, | Performed by: NURSE PRACTITIONER

## 2018-08-31 RX ORDER — AMOXICILLIN AND CLAVULANATE POTASSIUM 875; 125 MG/1; MG/1
1 TABLET, FILM COATED ORAL EVERY 12 HOURS
Status: DISCONTINUED | OUTPATIENT
Start: 2018-08-31 | End: 2018-09-03 | Stop reason: HOSPADM

## 2018-08-31 RX ORDER — LEVETIRACETAM 500 MG/1
500 TABLET ORAL 2 TIMES DAILY
Status: DISCONTINUED | OUTPATIENT
Start: 2018-08-31 | End: 2018-09-03 | Stop reason: HOSPADM

## 2018-08-31 RX ORDER — ONDANSETRON 8 MG/1
8 TABLET, ORALLY DISINTEGRATING ORAL EVERY 6 HOURS PRN
Status: DISCONTINUED | OUTPATIENT
Start: 2018-08-31 | End: 2018-09-03 | Stop reason: HOSPADM

## 2018-08-31 RX ADMIN — ONDANSETRON 8 MG: 8 TABLET, ORALLY DISINTEGRATING ORAL at 01:08

## 2018-08-31 RX ADMIN — Medication 10 ML: at 09:08

## 2018-08-31 RX ADMIN — IPRATROPIUM BROMIDE AND ALBUTEROL SULFATE 3 ML: .5; 3 SOLUTION RESPIRATORY (INHALATION) at 08:08

## 2018-08-31 RX ADMIN — IPRATROPIUM BROMIDE AND ALBUTEROL SULFATE 3 ML: .5; 3 SOLUTION RESPIRATORY (INHALATION) at 01:08

## 2018-08-31 RX ADMIN — Medication 10 ML: at 05:08

## 2018-08-31 RX ADMIN — IPRATROPIUM BROMIDE AND ALBUTEROL SULFATE 3 ML: .5; 3 SOLUTION RESPIRATORY (INHALATION) at 04:08

## 2018-08-31 RX ADMIN — LEVETIRACETAM 500 MG: 500 TABLET ORAL at 09:08

## 2018-08-31 RX ADMIN — Medication 3 ML: at 01:08

## 2018-08-31 RX ADMIN — INSULIN ASPART 2 UNITS: 100 INJECTION, SOLUTION INTRAVENOUS; SUBCUTANEOUS at 05:08

## 2018-08-31 RX ADMIN — OXYCODONE HYDROCHLORIDE 5 MG: 5 TABLET ORAL at 08:08

## 2018-08-31 RX ADMIN — FUROSEMIDE 40 MG: 40 TABLET ORAL at 09:08

## 2018-08-31 RX ADMIN — OXYCODONE HYDROCHLORIDE 5 MG: 5 TABLET ORAL at 12:08

## 2018-08-31 RX ADMIN — AMIODARONE HYDROCHLORIDE 200 MG: 200 TABLET ORAL at 09:08

## 2018-08-31 RX ADMIN — Medication 10 ML: at 02:08

## 2018-08-31 RX ADMIN — INSULIN ASPART 1 UNITS: 100 INJECTION, SOLUTION INTRAVENOUS; SUBCUTANEOUS at 12:08

## 2018-08-31 RX ADMIN — Medication 3 ML: at 09:08

## 2018-08-31 RX ADMIN — AMOXICILLIN AND CLAVULANATE POTASSIUM 1 TABLET: 875; 125 TABLET, FILM COATED ORAL at 11:08

## 2018-08-31 RX ADMIN — ATORVASTATIN CALCIUM 40 MG: 20 TABLET, FILM COATED ORAL at 09:08

## 2018-08-31 RX ADMIN — METOPROLOL SUCCINATE 50 MG: 25 TABLET, EXTENDED RELEASE ORAL at 09:08

## 2018-08-31 RX ADMIN — Medication 3 ML: at 05:08

## 2018-08-31 RX ADMIN — LOSARTAN POTASSIUM 100 MG: 50 TABLET ORAL at 09:08

## 2018-08-31 RX ADMIN — AMLODIPINE BESYLATE 10 MG: 10 TABLET ORAL at 09:08
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
## 2018-08-31 NOTE — PT/OT/SLP PROGRESS
"Occupational Therapy   Treatment    Name: Timbo Gallagher  MRN: [id number]  Admitting Diagnosis:  Traumatic subarachnoid bleed with LOC of 30 minutes or less, initial encounter  1 Day Post-Op    Recommendations:     Discharge Recommendations: home with home health  Discharge Equipment Recommendations:     Barriers to discharge:       Subjective     "I'm hurting and don't think I can do anything."    Communicated with: RN prior to session.  Pain/Comfort:  · Pain Rating 1: (Did not rate - but c/o neck,shld and chest pain.)  · Location 1: chest    Patients cultural, spiritual, [employer] conflicts given the current situation: none stated     Objective:     Patient found with: oxygen    General Precautions: Standard, fall   Orthopedic Precautions:N/A   Braces:       Occupational Performance:    Bed Mobility:    · Sitting up in chair.    Functional Mobility/Transfers:  · Pt declined due to pain.    Activities of Daily Living:  · Grooming: supervision to wipe face from sitting.    Patient left up in chair with all lines intact and call button in reach    [facility] 6 Click:  [facility] Total Score: 20    Treatment & Education:  Encouraged to stay out of bed and to increase participation with therapy when feeling better.  Education:    Assessment:     Timbo Gallagher is a 83 y.o. male with a medical diagnosis of Traumatic subarachnoid bleed with LOC of 30 minutes or less, initial encounter.  Performance deficits affecting function are pain, impaired self care skills, impaired functional mobilty, impaired balance, gait instability, weakness.  Participation with OT limited today by neck,shld and chest pain.    Rehab Prognosis:  fair; patient would benefit from acute skilled OT services to address these deficits and reach maximum level of function.       Plan:     Patient to be seen 3 x/week to address the above listed problems via self-care/home management, therapeutic activities, therapeutic exercises  · Plan of Care Expires:  "   · Plan of Care Reviewed with: patient    This Plan of care has been discussed with the patient who was involved in its development and understands and is in agreement with the identified goals and treatment plan    GOALS:   Multidisciplinary Problems     Occupational Therapy Goals        Problem: Occupational Therapy Goal    Goal Priority Disciplines Outcome Interventions   Occupational Therapy Goal     OT, PT/OT Ongoing (interventions implemented as appropriate)    Description:  Goals to be met by: 7 days      Patient will increase functional independence with ADLs by performing:    UE Dressing with Supervision.  LE Dressing with Supervision.  Grooming while standing at sink with Supervision.  Toileting from toilet with Supervision for hygiene and clothing management.   Toilet transfer to toilet with Supervision.                      Time Tracking:     OT Date of Treatment: 08/31/18  OT Start Time: 1038  OT Stop Time: 1055  OT Total Time (min): 17 min    Billable Minutes:Self Care/Home Management     TARA Galvez  8/31/2018

## 2018-08-31 NOTE — PT/OT/SLP PROGRESS
"Speech Language Pathology Treatment    Patient Name:  Timbo Gallagher   MRN:  [id number]   1048/1048 A    Admitting Diagnosis: Traumatic subarachnoid bleed with LOC of 30 minutes or less, initial encounter    Recommendations:                 General Recommendations:  Dysphagia therapy  Diet recommendations:  Puree, Liquid Diet Level: Thin   Aspiration Precautions:   · NO straws  · Fully awake and alert for PO intake  · Fully upright position for PO intake  · Small bites/ sips  · Slow rate of eating/ drinking  · 1 bite/ sip @ a time  · Refrain from talking prior to swallow completion   · Remain upright for 20-30 min post PO intake  · Discontinue PO upon: coughing, throat clearing, choking, wet vocal quality, wet breath sounds, watery eyes, reddened facial features  General Precautions: Standard, aspiration, fall, pureed diet, respiratory  Communication strategies:  go to room if call light pushed    Subjective     "There's no one that wants to go home from the hospital more than me."    Pain/Comfort:  · Pain Rating 1: other (see comments)(Indication of severity unappreciated)  · Location 1: other (see comments)(Per pt, "Neck, shoulders, ribs")  · Pain Addressed 1: Distraction  · Pain Rating Post-Intervention 1: other (see comments)(Indication of pain severity unappreciated)    Objective:     Has the patient been evaluated by SLP for swallowing?   Yes  Keep patient NPO? No   Current Respiratory Status: nasal cannula      Prior to initiation of session, NSG reported medical team having advanced pt from clear liquid diet to pureed solids/ thin liquids. NSG verbalized pt appropriate for PO trials of such consistencies.     Pt awake and alert upon entry. HOB raised. Per review of medical chart, education provided re: diet order per medical team for pureed solids and thin liquids 2/2 botox injections received 2/2 achalasia per results of EGD completed yesterday, 8/30/18. Pt observed with multiple cup sips thin water in " isolation and as pureed liquid wash and 1/2 tsp pureed vanilla pudding x3. Oral phase appeared WFL and no overt clinical signs of aspiration observed. Straw sips deferred this session 2/2 pt report re: dislike for straws. Education provided re: consistent implementation of all aspiration precautions listed above and ongoing SLP POC. No further questions.     Assessment:     Timbo Gallagher is a 83 y.o. male with an SLP diagnosis of risk for aspiration.     Goals:   Multidisciplinary Problems     SLP Goals        Problem: SLP Goal    Goal Priority Disciplines Outcome   SLP Goal     SLP Ongoing (interventions implemented as appropriate)   Description:  Goals to be met 9/4  1 pt will participate in further eval of reading/writing and vs skills  2 pt will tolerate soft diet and thin liquids  3 pt will recall word finding strategies with supervision                     Plan:     · Patient to be seen:  3 x/week   · Plan of Care expires:  09/26/18  · Plan of Care reviewed with:  patient   · SLP Follow-Up:  Yes       Discharge recommendations:  home with home health     Time Tracking:     SLP Treatment Date:   08/31/18  Speech Start Time:  1414  Speech Stop Time:  1427     Speech Total Time (min):  13 min    Billable Minutes: Treatment Swallowing Dysfunction 13    FAITH Fernandes, CCC-SLP  [phone]  8/31/2018
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
## 2018-08-31 NOTE — PLAN OF CARE
Problem: Patient Care Overview  Goal: Plan of Care Review  Outcome: Ongoing (interventions implemented as appropriate)  No acute changes overnight. Pt complained of generalized pain with moderate relief from PRN oxycodone. Pt titrated down to 3L nasal canula with sats 92%. Head CT to be done today. Call light in reach. [facility]

## 2018-08-31 NOTE — SUBJECTIVE & OBJECTIVE
Interval History 8/31/2018:  Patient is seen for follow-up rehab evaluation and recommendations: No acute events over night.  Breathing improving.  Still on oxygen.  No therapy yesterday 2/2 EGD.    HPI, Past Medical, Family, and Social History remains the same as documented in the initial encounter.    Scheduled Medications:    albuterol-ipratropium  3 mL Nebulization Q4H WAKE    amiodarone  200 mg Oral Daily    amLODIPine  10 mg Oral Daily    atorvastatin  40 mg Oral Daily    furosemide  40 mg Oral Daily    levETIRAcetam  500 mg Oral BID    losartan  100 mg Oral Daily    metoprolol succinate  50 mg Oral Daily    sodium chloride 0.9%  3 mL Intravenous Q8H     PRN Medications: acetaminophen, dextrose 50%, glucagon (human recombinant), insulin aspart U-100, labetalol, ondansetron, oxyCODONE    Review of Systems   Constitutional: Positive for activity change and fatigue. Negative for chills and fever.   HENT: Positive for congestion and trouble swallowing. Negative for drooling, hearing loss and voice change.    Eyes: Positive for visual disturbance. Negative for pain.   Respiratory: Positive for cough. Negative for shortness of breath and wheezing.    Cardiovascular: Negative for chest pain and palpitations.   Gastrointestinal: Negative for abdominal pain, nausea and vomiting.   Genitourinary: Negative for difficulty urinating and flank pain.   Musculoskeletal: Positive for arthralgias and myalgias. Negative for back pain and neck pain.   Skin: Negative for rash and wound.   Neurological: Positive for speech difficulty. Negative for dizziness, weakness, numbness and headaches.   Psychiatric/Behavioral: Negative for agitation and hallucinations. The patient is not nervous/anxious.      Objective:     Vital Signs (Most Recent):  Temp: 97.9 °F (36.6 °C) (08/31/18 0811)  Pulse: 61 (08/31/18 1151)  Resp: 18 (08/31/18 0824)  BP: (!) 142/64 (08/31/18 0811)  SpO2: 95 % (08/31/18 0824)    Vital Signs (24h  Range):  Temp:  [97.6 °F (36.4 °C)-98.5 °F (36.9 °C)] 97.9 °F (36.6 °C)  Pulse:  [61-73] 61  Resp:  [16-18] 18  SpO2:  [89 %-96 %] 95 %  BP: (121-147)/(56-67) 142/64     Physical Exam   Constitutional: He is oriented to person, place, and time. He appears well-developed and well-nourished. No distress.   HENT:   Head: Normocephalic.   Right Ear: External ear normal.   Left Ear: External ear normal.   Nose: Nose normal.   Forehead laceration.    Eyes: Right eye exhibits no discharge. Left eye exhibits no discharge. No scleral icterus.   Neck: Normal range of motion.   Cardiovascular: Normal rate, regular rhythm and intact distal pulses.   Pulmonary/Chest: Effort normal. No respiratory distress. He has no wheezes.   Tolerating O2 via NC   Abdominal: Soft. He exhibits no distension. There is no tenderness.   Musculoskeletal: Normal range of motion. He exhibits no edema or tenderness.   Neurological: He is alert and oriented to person, place, and time. No sensory deficit. He exhibits normal muscle tone.   Skin: Skin is warm and dry. Bruising and ecchymosis noted. No rash noted.   Psychiatric: He has a normal mood and affect. His behavior is normal. Thought content normal.   Vitals reviewed.    Diagnostic Results:   Labs: Reviewed  X-Ray: Reviewed  CT: Reviewed        NEUROLOGICAL EXAMINATION:     MENTAL STATUS   Oriented to person, place, and time.
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
## 2018-08-31 NOTE — ASSESSMENT & PLAN NOTE
-  SLP following--> recommended clear diet, thin liquids, and GI consult  -  Incidental findings on CT abdomen--> fluid-filled esophagus and large esophageal diverticulum  -  GI consulted--> long standing dysphagia and GERD--> EGD 8/30 showed achalasia s/p botox- started on CLD--> liquids x 1-2 weeks  -  On Protonix

## 2018-08-31 NOTE — PLAN OF CARE
Problem: Physical Therapy Goal  Goal: Physical Therapy Goal  Goals to be met by: 18     Patient will increase functional independence with mobility by performin. Sit to stand transfer with Supervision  2. Bed to chair transfer with Supervision using Rolling Walker if necessary.  3. Gait  x 140 feet with Supervision using rolling walker if necessary.       Discharge Recommendations: HH PT    Pt safe to transfer OOB/BTB with RN/PCT via step transfer: Use RW with min A.    Goals remain appropriate.     Melody Xiong, PTA.   [phone]   2018
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
## 2018-08-31 NOTE — SUBJECTIVE & OBJECTIVE
Interval History: NAEON. Patient went for EGD today and found to have achalasia and got botox for treatment; started on CLD and will need to remain on liquids for a week or two. Primary team plans to dc with HH likely today since therapy recommending HH. Patient laying comfortably in bed. He has headaches intermittently. Denies any changes in vision, focal deficits, or sz activity.         Medications:  Continuous Infusions:  Scheduled Meds:   albuterol-ipratropium  3 mL Nebulization Q4H WAKE    amiodarone  200 mg Oral Daily    amLODIPine  10 mg Oral Daily    atorvastatin  40 mg Oral Daily    duke's soln (benadryl 30 mL, mylanta 30 mL, lidocane 30 mL, nystatin 30 mL) 120 mL  10 mL Oral QID    furosemide  40 mg Oral Daily    levETIRAcetam  500 mg Oral BID    losartan  100 mg Oral Daily    metoprolol succinate  50 mg Oral Daily    sodium chloride 0.9%  3 mL Intravenous Q8H     PRN Meds:acetaminophen, dextrose 50%, glucagon (human recombinant), insulin aspart U-100, labetalol, ondansetron, oxyCODONE     Review of Systems  Objective:     Weight: 85.7 kg (188 lb 15 oz)  Body mass index is 27.9 kg/m².  Vital Signs (Most Recent):  Temp: 97.5 °F (36.4 °C) (08/31/18 1212)  Pulse: 62 (08/31/18 1323)  Resp: 18 (08/31/18 1323)  BP: (!) 140/63 (08/31/18 1212)  SpO2: (!) 92 % (08/31/18 1323) Vital Signs (24h Range):  Temp:  [97.5 °F (36.4 °C)-98.5 °F (36.9 °C)] 97.5 °F (36.4 °C)  Pulse:  [61-73] 62  Resp:  [16-18] 18  SpO2:  [88 %-96 %] 92 %  BP: (121-147)/(56-67) 140/63                           Neurosurgery Physical Exam   Vital signs: reviewed above.   Constitutional: well-developed, no acute distress. Generalized deconditioning.   Cardiovascular: regular rate and rhythm  Resp: normal respirations, not labored, no accessory muscles used  Abdomen: soft, non-distended, not tender to palpation  Pulses: palpable distal pulses   Skin: warm, dry and intact, no rashes. Bruising/abrasion on forehead.   Neurological  GCS:  Motor: 6/Verbal: 5/Eyes: 4 GCS Total: 15  Mental Status: Awake, Alert, Oriented x 4  Follows commands   Head: normocephalic, atraumatic  PERRL, EOMI,   Facial expression symmetric, tongue midline, shoulder shrug symmetric  Moves all extremities with good strength 5/5  No drift or dysmetria.         Significant Labs:  Recent Labs   Lab  08/30/18   0539  08/31/18   0448  08/31/18   1013   GLU  152*   --   168*   NA  143   --   138   K  4.0   --   3.8   CL  109   --   106   CO2  23   --   27   BUN  21   --   18   CREATININE  1.4   --   1.4   CALCIUM  8.9   --   9.0   MG  1.9  1.8   --      Recent Labs   Lab  08/30/18   0539  08/31/18   1013   WBC  16.22*  12.10   HGB  11.0*  10.5*   HCT  34.8*  32.8*   PLT  191  176     No results for input(s): LABPT, INR, APTT in the last 48 hours.  Microbiology Results (last 7 days)     ** No results found for the last 168 hours. **            Significant Diagnostics: I have reviewed all pertinent imaging results/findings within the past 24 hours with Dr. Navarrete.      CT: Ct Head Without Contrast    Result Date: 8/31/2018  Evolving scattered areas of subarachnoid hemorrhage overlying the right frontal and posterior parietal lobes.  No new hemorrhage or mass effect identified.  No new abnormalities. Electronically signed by resident: Maddie De La Garza Date:08/31/2018 Time:8:21 Electronically signed by:Tye Schwartz MD Date:08/31/2018 Time:10:00
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
## 2018-08-31 NOTE — PLAN OF CARE
Problem: Occupational Therapy Goal  Goal: Occupational Therapy Goal  Goals to be met by: 7 days      Patient will increase functional independence with ADLs by performing:    UE Dressing with Supervision.  LE Dressing with Supervision.  Grooming while standing at sink with Supervision.  Toileting from toilet with Supervision for hygiene and clothing management.   Toilet transfer to toilet with Supervision.     Outcome: Ongoing (interventions implemented as appropriate)  Con't POC.    TARA Galvez

## 2018-08-31 NOTE — PLAN OF CARE
Problem: SLP Goal  Goal: SLP Goal  Goals to be met 9/4  1 pt will participate in further eval of reading/writing and vs skills  2 pt will tolerate soft diet and thin liquids  3 pt will recall word finding strategies with supervision    Outcome: Ongoing (interventions implemented as appropriate)  Pt appears safe for ongoing pureed solids and thin liquids ordered by medical team per results of EGD completed 8/30/18. Recommend strict aspiration precautions. NO straws.     FAITH Fernandes, CCC-SLP  [phone]  8/31/2018

## 2018-09-01 LAB
ANION GAP SERPL CALC-SCNC: 9 MMOL/L
BASOPHILS # BLD AUTO: 0.02 K/UL
BASOPHILS NFR BLD: 0.2 %
BUN SERPL-MCNC: 23 MG/DL
CALCIUM SERPL-MCNC: 8.6 MG/DL
CHLORIDE SERPL-SCNC: 106 MMOL/L
CO2 SERPL-SCNC: 24 MMOL/L
CREAT SERPL-MCNC: 1.5 MG/DL
DIFFERENTIAL METHOD: ABNORMAL
EOSINOPHIL # BLD AUTO: 0.3 K/UL
EOSINOPHIL NFR BLD: 3.9 %
ERYTHROCYTE [DISTWIDTH] IN BLOOD BY AUTOMATED COUNT: 14.1 %
EST. GFR  (AFRICAN AMERICAN): 49.1 ML/MIN/1.73 M^2
EST. GFR  (NON AFRICAN AMERICAN): 42.4 ML/MIN/1.73 M^2
GLUCOSE SERPL-MCNC: 136 MG/DL
HCT VFR BLD AUTO: 31.8 %
HGB BLD-MCNC: 10 G/DL
IMM GRANULOCYTES # BLD AUTO: 0.02 K/UL
IMM GRANULOCYTES NFR BLD AUTO: 0.2 %
LYMPHOCYTES # BLD AUTO: 0.8 K/UL
LYMPHOCYTES NFR BLD: 9.7 %
MAGNESIUM SERPL-MCNC: 2 MG/DL
MCH RBC QN AUTO: 30.7 PG
MCHC RBC AUTO-ENTMCNC: 31.4 G/DL
MCV RBC AUTO: 98 FL
MONOCYTES # BLD AUTO: 1 K/UL
MONOCYTES NFR BLD: 11.8 %
NEUTROPHILS # BLD AUTO: 6.3 K/UL
NEUTROPHILS NFR BLD: 74.2 %
NRBC BLD-RTO: 0 /100 WBC
PHOSPHATE SERPL-MCNC: 4 MG/DL
PLATELET # BLD AUTO: 187 K/UL
PMV BLD AUTO: 11.6 FL
POCT GLUCOSE: 138 MG/DL (ref 70–110)
POCT GLUCOSE: 147 MG/DL (ref 70–110)
POCT GLUCOSE: 158 MG/DL (ref 70–110)
POCT GLUCOSE: 185 MG/DL (ref 70–110)
POCT GLUCOSE: 191 MG/DL (ref 70–110)
POTASSIUM SERPL-SCNC: 3.6 MMOL/L
RBC # BLD AUTO: 3.26 M/UL
SODIUM SERPL-SCNC: 139 MMOL/L
WBC # BLD AUTO: 8.47 K/UL

## 2018-09-01 PROCEDURE — 84100 ASSAY OF PHOSPHORUS: CPT

## 2018-09-01 PROCEDURE — 83735 ASSAY OF MAGNESIUM: CPT

## 2018-09-01 PROCEDURE — 36415 COLL VENOUS BLD VENIPUNCTURE: CPT

## 2018-09-01 PROCEDURE — A4216 STERILE WATER/SALINE, 10 ML: HCPCS | Performed by: PSYCHIATRY & NEUROLOGY

## 2018-09-01 PROCEDURE — 25000003 PHARM REV CODE 250: Performed by: STUDENT IN AN ORGANIZED HEALTH CARE EDUCATION/TRAINING PROGRAM

## 2018-09-01 PROCEDURE — 94761 N-INVAS EAR/PLS OXIMETRY MLT: CPT

## 2018-09-01 PROCEDURE — 87077 CULTURE AEROBIC IDENTIFY: CPT

## 2018-09-01 PROCEDURE — 80048 BASIC METABOLIC PNL TOTAL CA: CPT

## 2018-09-01 PROCEDURE — 87205 SMEAR GRAM STAIN: CPT

## 2018-09-01 PROCEDURE — 99233 SBSQ HOSP IP/OBS HIGH 50: CPT | Mod: ,,, | Performed by: HOSPITALIST

## 2018-09-01 PROCEDURE — 85025 COMPLETE CBC W/AUTO DIFF WBC: CPT

## 2018-09-01 PROCEDURE — 94150 VITAL CAPACITY TEST: CPT

## 2018-09-01 PROCEDURE — 25000003 PHARM REV CODE 250: Performed by: PSYCHIATRY & NEUROLOGY

## 2018-09-01 PROCEDURE — 94640 AIRWAY INHALATION TREATMENT: CPT

## 2018-09-01 PROCEDURE — 25000242 PHARM REV CODE 250 ALT 637 W/ HCPCS: Performed by: HOSPITALIST

## 2018-09-01 PROCEDURE — 25000003 PHARM REV CODE 250: Performed by: HOSPITALIST

## 2018-09-01 PROCEDURE — 20600001 HC STEP DOWN PRIVATE ROOM

## 2018-09-01 PROCEDURE — 27000221 HC OXYGEN, UP TO 24 HOURS

## 2018-09-01 PROCEDURE — 63600175 PHARM REV CODE 636 W HCPCS: Performed by: PSYCHIATRY & NEUROLOGY

## 2018-09-01 PROCEDURE — 87070 CULTURE OTHR SPECIMN AEROBIC: CPT

## 2018-09-01 PROCEDURE — 94010 BREATHING CAPACITY TEST: CPT

## 2018-09-01 PROCEDURE — 87186 SC STD MICRODIL/AGAR DIL: CPT

## 2018-09-01 RX ORDER — LIDOCAINE 50 MG/G
2 PATCH TOPICAL
Status: DISCONTINUED | OUTPATIENT
Start: 2018-09-01 | End: 2018-09-03 | Stop reason: HOSPADM

## 2018-09-01 RX ORDER — GUAIFENESIN 600 MG/1
600 TABLET, EXTENDED RELEASE ORAL 2 TIMES DAILY
Status: DISCONTINUED | OUTPATIENT
Start: 2018-09-01 | End: 2018-09-03 | Stop reason: HOSPADM

## 2018-09-01 RX ADMIN — Medication 10 ML: at 10:09

## 2018-09-01 RX ADMIN — Medication 3 ML: at 10:09

## 2018-09-01 RX ADMIN — ACETAMINOPHEN 650 MG: 325 TABLET, FILM COATED ORAL at 12:09

## 2018-09-01 RX ADMIN — OXYCODONE HYDROCHLORIDE 5 MG: 5 TABLET ORAL at 08:09

## 2018-09-01 RX ADMIN — IPRATROPIUM BROMIDE AND ALBUTEROL SULFATE 3 ML: .5; 3 SOLUTION RESPIRATORY (INHALATION) at 04:09

## 2018-09-01 RX ADMIN — LIDOCAINE 2 PATCH: 50 PATCH TOPICAL at 01:09

## 2018-09-01 RX ADMIN — AMIODARONE HYDROCHLORIDE 200 MG: 200 TABLET ORAL at 08:09

## 2018-09-01 RX ADMIN — Medication 10 ML: at 12:09

## 2018-09-01 RX ADMIN — METOPROLOL SUCCINATE 50 MG: 25 TABLET, EXTENDED RELEASE ORAL at 08:09

## 2018-09-01 RX ADMIN — LEVETIRACETAM 500 MG: 500 TABLET ORAL at 08:09

## 2018-09-01 RX ADMIN — AMOXICILLIN AND CLAVULANATE POTASSIUM 1 TABLET: 875; 125 TABLET, FILM COATED ORAL at 09:09

## 2018-09-01 RX ADMIN — IPRATROPIUM BROMIDE AND ALBUTEROL SULFATE 3 ML: .5; 3 SOLUTION RESPIRATORY (INHALATION) at 08:09

## 2018-09-01 RX ADMIN — OXYCODONE HYDROCHLORIDE 5 MG: 5 TABLET ORAL at 11:09

## 2018-09-01 RX ADMIN — Medication 3 ML: at 06:09

## 2018-09-01 RX ADMIN — AMOXICILLIN AND CLAVULANATE POTASSIUM 1 TABLET: 875; 125 TABLET, FILM COATED ORAL at 08:09

## 2018-09-01 RX ADMIN — INSULIN ASPART 2 UNITS: 100 INJECTION, SOLUTION INTRAVENOUS; SUBCUTANEOUS at 05:09

## 2018-09-01 RX ADMIN — GUAIFENESIN 600 MG: 600 TABLET, EXTENDED RELEASE ORAL at 01:09

## 2018-09-01 RX ADMIN — AMLODIPINE BESYLATE 10 MG: 10 TABLET ORAL at 08:09

## 2018-09-01 RX ADMIN — GUAIFENESIN 600 MG: 600 TABLET, EXTENDED RELEASE ORAL at 10:09

## 2018-09-01 RX ADMIN — INSULIN ASPART 1 UNITS: 100 INJECTION, SOLUTION INTRAVENOUS; SUBCUTANEOUS at 12:09

## 2018-09-01 RX ADMIN — ATORVASTATIN CALCIUM 40 MG: 20 TABLET, FILM COATED ORAL at 08:09

## 2018-09-01 RX ADMIN — LEVETIRACETAM 500 MG: 500 TABLET ORAL at 10:09

## 2018-09-01 RX ADMIN — Medication 10 ML: at 05:09

## 2018-09-01 RX ADMIN — LOSARTAN POTASSIUM 100 MG: 50 TABLET ORAL at 08:09

## 2018-09-01 RX ADMIN — IPRATROPIUM BROMIDE AND ALBUTEROL SULFATE 3 ML: .5; 3 SOLUTION RESPIRATORY (INHALATION) at 12:09

## 2018-09-01 RX ADMIN — FUROSEMIDE 40 MG: 40 TABLET ORAL at 08:09

## 2018-09-01 RX ADMIN — Medication 10 ML: at 09:09
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
## 2018-09-01 NOTE — PLAN OF CARE
Problem: Fall Risk (Adult)  Goal: Absence of Falls  Patient will demonstrate the desired outcomes by discharge/transition of care.  Outcome: Ongoing (interventions implemented as appropriate)   09/01/18 0623   Fall Risk (Adult)   Absence of Falls making progress toward outcome       Problem: Patient Care Overview  Goal: Plan of Care Review  Outcome: Ongoing (interventions implemented as appropriate)  Patient AAO X 4, slightly confused at midnight easy to orient. Pt stable overnight. Augmentin administered as ordered. Pt placed on full liquid diet. No falls and injuries overnight. BG check at midnight 158, Insulin aspart administered. VSS. No complaints of pain. Will continue to monitor patient

## 2018-09-02 PROBLEM — S20.211A CONTUSION OF RIB ON RIGHT SIDE: Status: ACTIVE | Noted: 2018-09-02

## 2018-09-02 PROBLEM — J98.11 ATELECTASIS: Status: ACTIVE | Noted: 2018-09-02

## 2018-09-02 LAB
ANION GAP SERPL CALC-SCNC: 5 MMOL/L
BASOPHILS # BLD AUTO: 0.03 K/UL
BASOPHILS NFR BLD: 0.4 %
BUN SERPL-MCNC: 25 MG/DL
CALCIUM SERPL-MCNC: 8.6 MG/DL
CHLORIDE SERPL-SCNC: 105 MMOL/L
CO2 SERPL-SCNC: 27 MMOL/L
CREAT SERPL-MCNC: 1.6 MG/DL
DIFFERENTIAL METHOD: ABNORMAL
EOSINOPHIL # BLD AUTO: 0.4 K/UL
EOSINOPHIL NFR BLD: 4.1 %
ERYTHROCYTE [DISTWIDTH] IN BLOOD BY AUTOMATED COUNT: 14 %
EST. GFR  (AFRICAN AMERICAN): 45.4 ML/MIN/1.73 M^2
EST. GFR  (NON AFRICAN AMERICAN): 39.3 ML/MIN/1.73 M^2
GLUCOSE SERPL-MCNC: 135 MG/DL
HCT VFR BLD AUTO: 30.3 %
HGB BLD-MCNC: 9.4 G/DL
IMM GRANULOCYTES # BLD AUTO: 0.02 K/UL
IMM GRANULOCYTES NFR BLD AUTO: 0.2 %
LYMPHOCYTES # BLD AUTO: 1 K/UL
LYMPHOCYTES NFR BLD: 11.8 %
MAGNESIUM SERPL-MCNC: 2 MG/DL
MCH RBC QN AUTO: 30 PG
MCHC RBC AUTO-ENTMCNC: 31 G/DL
MCV RBC AUTO: 97 FL
MONOCYTES # BLD AUTO: 1.1 K/UL
MONOCYTES NFR BLD: 12.4 %
NEUTROPHILS # BLD AUTO: 6 K/UL
NEUTROPHILS NFR BLD: 71.1 %
NRBC BLD-RTO: 0 /100 WBC
PHOSPHATE SERPL-MCNC: 3.6 MG/DL
PLATELET # BLD AUTO: 176 K/UL
PMV BLD AUTO: 10.6 FL
POCT GLUCOSE: 138 MG/DL (ref 70–110)
POCT GLUCOSE: 140 MG/DL (ref 70–110)
POCT GLUCOSE: 186 MG/DL (ref 70–110)
POCT GLUCOSE: 191 MG/DL (ref 70–110)
POTASSIUM SERPL-SCNC: 4.1 MMOL/L
RBC # BLD AUTO: 3.13 M/UL
SODIUM SERPL-SCNC: 137 MMOL/L
WBC # BLD AUTO: 8.44 K/UL

## 2018-09-02 PROCEDURE — 97110 THERAPEUTIC EXERCISES: CPT

## 2018-09-02 PROCEDURE — 99233 SBSQ HOSP IP/OBS HIGH 50: CPT | Mod: ,,, | Performed by: HOSPITALIST

## 2018-09-02 PROCEDURE — 20600001 HC STEP DOWN PRIVATE ROOM

## 2018-09-02 PROCEDURE — 83735 ASSAY OF MAGNESIUM: CPT

## 2018-09-02 PROCEDURE — 97530 THERAPEUTIC ACTIVITIES: CPT

## 2018-09-02 PROCEDURE — 85025 COMPLETE CBC W/AUTO DIFF WBC: CPT

## 2018-09-02 PROCEDURE — 80048 BASIC METABOLIC PNL TOTAL CA: CPT

## 2018-09-02 PROCEDURE — 36415 COLL VENOUS BLD VENIPUNCTURE: CPT

## 2018-09-02 PROCEDURE — 25000242 PHARM REV CODE 250 ALT 637 W/ HCPCS: Performed by: HOSPITALIST

## 2018-09-02 PROCEDURE — 25000003 PHARM REV CODE 250: Performed by: STUDENT IN AN ORGANIZED HEALTH CARE EDUCATION/TRAINING PROGRAM

## 2018-09-02 PROCEDURE — 84100 ASSAY OF PHOSPHORUS: CPT

## 2018-09-02 PROCEDURE — 25000003 PHARM REV CODE 250: Performed by: HOSPITALIST

## 2018-09-02 PROCEDURE — 27000221 HC OXYGEN, UP TO 24 HOURS

## 2018-09-02 PROCEDURE — 94640 AIRWAY INHALATION TREATMENT: CPT

## 2018-09-02 PROCEDURE — A4216 STERILE WATER/SALINE, 10 ML: HCPCS | Performed by: PSYCHIATRY & NEUROLOGY

## 2018-09-02 PROCEDURE — 99223 1ST HOSP IP/OBS HIGH 75: CPT | Mod: ,,, | Performed by: INTERNAL MEDICINE

## 2018-09-02 PROCEDURE — 94761 N-INVAS EAR/PLS OXIMETRY MLT: CPT

## 2018-09-02 PROCEDURE — 97116 GAIT TRAINING THERAPY: CPT

## 2018-09-02 PROCEDURE — 25000003 PHARM REV CODE 250: Performed by: PSYCHIATRY & NEUROLOGY

## 2018-09-02 RX ADMIN — AMOXICILLIN AND CLAVULANATE POTASSIUM 1 TABLET: 875; 125 TABLET, FILM COATED ORAL at 10:09

## 2018-09-02 RX ADMIN — Medication 3 ML: at 06:09

## 2018-09-02 RX ADMIN — AMLODIPINE BESYLATE 10 MG: 10 TABLET ORAL at 08:09

## 2018-09-02 RX ADMIN — METOPROLOL SUCCINATE 50 MG: 25 TABLET, EXTENDED RELEASE ORAL at 08:09

## 2018-09-02 RX ADMIN — LIDOCAINE 2 PATCH: 50 PATCH TOPICAL at 01:09

## 2018-09-02 RX ADMIN — Medication 10 ML: at 01:09

## 2018-09-02 RX ADMIN — ATORVASTATIN CALCIUM 40 MG: 20 TABLET, FILM COATED ORAL at 08:09

## 2018-09-02 RX ADMIN — Medication 10 ML: at 06:09

## 2018-09-02 RX ADMIN — LEVETIRACETAM 500 MG: 500 TABLET ORAL at 08:09

## 2018-09-02 RX ADMIN — OXYCODONE HYDROCHLORIDE 5 MG: 5 TABLET ORAL at 08:09

## 2018-09-02 RX ADMIN — AMOXICILLIN AND CLAVULANATE POTASSIUM 1 TABLET: 875; 125 TABLET, FILM COATED ORAL at 08:09

## 2018-09-02 RX ADMIN — Medication 3 ML: at 02:09

## 2018-09-02 RX ADMIN — LEVETIRACETAM 500 MG: 500 TABLET ORAL at 10:09

## 2018-09-02 RX ADMIN — IPRATROPIUM BROMIDE AND ALBUTEROL SULFATE 3 ML: .5; 3 SOLUTION RESPIRATORY (INHALATION) at 10:09

## 2018-09-02 RX ADMIN — Medication 3 ML: at 10:09

## 2018-09-02 RX ADMIN — INSULIN ASPART 1 UNITS: 100 INJECTION, SOLUTION INTRAVENOUS; SUBCUTANEOUS at 12:09

## 2018-09-02 RX ADMIN — GUAIFENESIN 600 MG: 600 TABLET, EXTENDED RELEASE ORAL at 08:09

## 2018-09-02 RX ADMIN — IPRATROPIUM BROMIDE AND ALBUTEROL SULFATE 3 ML: .5; 3 SOLUTION RESPIRATORY (INHALATION) at 08:09

## 2018-09-02 RX ADMIN — AMIODARONE HYDROCHLORIDE 200 MG: 200 TABLET ORAL at 08:09

## 2018-09-02 RX ADMIN — IPRATROPIUM BROMIDE AND ALBUTEROL SULFATE 3 ML: .5; 3 SOLUTION RESPIRATORY (INHALATION) at 04:09

## 2018-09-02 RX ADMIN — LOSARTAN POTASSIUM 100 MG: 50 TABLET ORAL at 08:09

## 2018-09-02 RX ADMIN — Medication 10 ML: at 08:09

## 2018-09-02 RX ADMIN — GUAIFENESIN 600 MG: 600 TABLET, EXTENDED RELEASE ORAL at 10:09

## 2018-09-02 RX ADMIN — Medication 10 ML: at 10:09

## 2018-09-02 NOTE — PLAN OF CARE
Problem: Diabetes, Type 2 (Adult)  Intervention: Optimize Glycemic Control   09/02/18 0416   Nutrition Interventions   Glycemic Management blood glucose monitoring         Problem: Patient Care Overview  Goal: Plan of Care Review  Outcome: Ongoing (interventions implemented as appropriate)  Patient AAO x4, calm and cooperative. No acute events overnight. Patient currently on 5 L on oxygen sats 95-96%. PRN pain meds for pain management. Mild relieve obtained.BG check at midnight, insulin aspart administered.  No fall and injuries overnight. PT on continuous tele, paced rhythm hr in 60's. Pt stable, will continue to monitor.

## 2018-09-02 NOTE — PLAN OF CARE
Problem: Physical Therapy Goal  Goal: Physical Therapy Goal  Goals to be met by: 18     Patient will increase functional independence with mobility by performin. Sit to stand transfer with Supervision  2. Bed to chair transfer with Supervision using Rolling Walker if necessary.  3. Gait  x 140 feet with Supervision using rolling walker if necessary.     Outcome: Ongoing (interventions implemented as appropriate)  Patient overall endurance, safety and strength continues to progress as evidence of walking range and postural control with dynamic standing balance.
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
## 2018-09-02 NOTE — PT/OT/SLP PROGRESS
"Physical Therapy Treatment    Patient Name:  Timbo Gallagher   MRN:  [id number]    Recommendations:     Discharge Recommendations:  home with home health   Discharge Equipment Recommendations: none   Barriers to discharge: None    Assessment:     Timbo Gallagher is a 83 y.o. male admitted with a medical diagnosis of Traumatic subarachnoid bleed with LOC of 30 minutes or less, initial encounter.  He presents with the following impairments/functional limitations:  weakness, impaired endurance, impaired self care skills, impaired functional mobilty, gait instability, decreased safety awareness, impaired cardiopulmonary response to activity . Patient showed improved ability to manage the RW, which resulted in improved postural control and safety during gait training. Decreased endurance still noted, but no shortness of breath noted despite the use of oxygen.    Rehab Prognosis:  good; patient would benefit from acute skilled PT services to address these deficits and reach maximum level of function.      Recent Surgery: Procedure(s) (LRB):  EGD (ESOPHAGOGASTRODUODENOSCOPY) (N/A) 3 Days Post-Op    Plan:     During this hospitalization, patient to be seen 3 x/week to address the above listed problems via gait training, therapeutic activities, therapeutic exercises, neuromuscular re-education  · Plan of Care Expires:  09/28/18   Plan of Care Reviewed with: patient    Subjective     Communicated with nsg prior to session.  Patient found with HOB elevated upon PT entry to room, agreeable to treatment.    Patient states " I feel better, but I still have a little soreness on my ribs"    Chief Complaint: ribs soreness  Patient comments/goals: to go home soon.  Pain/Comfort:  · Pain Rating 1: 2/10  · Location - Side 1: Bilateral  · Location - Orientation 1: generalized  · Location 1: rib(s)  · Pain Addressed 1: Reposition, Distraction  · Pain Rating Post-Intervention 1: 2/10    Patients cultural, spiritual, Nondenominational " conflicts given the current situation: None stated    Objective:     Patient found with: oxygen     General Precautions: Standard, aspiration, fall, pureed diet, respiratory   Orthopedic Precautions:N/A   Braces: N/A     Functional Mobility:  · Transfers:     · Sit to Stand:  contact guard assistance and minimum assistance with rolling walker  · Bed to Chair: contact guard assistance with  rolling walker  using  Stand Pivot  · Gait: 68 ft x 2 trials with RW and CGA to min assistance, with 3L O2 and chair follow.      AM-PAC 6 CLICK MOBILITY  Turning over in bed (including adjusting bedclothes, sheets and blankets)?: 3  Sitting down on and standing up from a chair with arms (e.g., wheelchair, bedside commode, etc.): 3  Moving from lying on back to sitting on the side of the bed?: 3  Moving to and from a bed to a chair (including a wheelchair)?: 3  Need to walk in hospital room?: 3  Climbing 3-5 steps with a railing?: 2  Basic Mobility Total Score: 17       Therapeutic Activities and Exercises:   Donned a gown. Treatment interrupted by MD to consult patient and family. There ex in sitting: LAQ, HIP FLEX AND HEEL/TOE RAISES 2X10 REPS B LE with cues due to decreased coordination.     Patient left up in chair with all lines intact, call button in reach and family present..    GOALS:   Multidisciplinary Problems     Physical Therapy Goals        Problem: Physical Therapy Goal    Goal Priority Disciplines Outcome Goal Variances Interventions   Physical Therapy Goal     PT, PT/OT Ongoing (interventions implemented as appropriate)     Description:  Goals to be met by: 18     Patient will increase functional independence with mobility by performin. Sit to stand transfer with Supervision  2. Bed to chair transfer with Supervision using Rolling Walker if necessary.  3. Gait  x 140 feet with Supervision using rolling walker if necessary.                      Time Tracking:     PT Received On: 18  PT Start Time:  1410     PT Stop Time: 1450  PT Total Time (min): 40 min     Billable Minutes: Gait Training 20, Therapeutic Activity 10 and Therapeutic Exercise 10    Treatment Type: Treatment  PT/PTA: PTA     PTA Visit Number: 2     Tj Starkey PTA  09/02/2018

## 2018-09-02 NOTE — PLAN OF CARE
Problem: Patient Care Overview  Goal: Plan of Care Review  Outcome: Ongoing (interventions implemented as appropriate)  Patient alert and oriented, intermittently forgetful. No neurological changes. Paced rhythm on telemetry. Vitals WNL. Increased activity, up to chair TID. Productive cough, patient educated on IS and flutter valve. Unable to wean patient per parameters. C/o of pain to right side ribcage area, improved pain control with lidocaine patches. Fall precautions in place.

## 2018-09-03 VITALS
RESPIRATION RATE: 20 BRPM | TEMPERATURE: 98 F | DIASTOLIC BLOOD PRESSURE: 64 MMHG | BODY MASS INDEX: 27.98 KG/M2 | SYSTOLIC BLOOD PRESSURE: 140 MMHG | WEIGHT: 188.94 LBS | HEIGHT: 69 IN | HEART RATE: 64 BPM | OXYGEN SATURATION: 96 %

## 2018-09-03 LAB
ANION GAP SERPL CALC-SCNC: 6 MMOL/L
BASOPHILS # BLD AUTO: 0.04 K/UL
BASOPHILS NFR BLD: 0.5 %
BUN SERPL-MCNC: 22 MG/DL
CALCIUM SERPL-MCNC: 9 MG/DL
CHLORIDE SERPL-SCNC: 107 MMOL/L
CO2 SERPL-SCNC: 25 MMOL/L
CREAT SERPL-MCNC: 1.4 MG/DL
DIFFERENTIAL METHOD: ABNORMAL
EOSINOPHIL # BLD AUTO: 0.3 K/UL
EOSINOPHIL NFR BLD: 4.3 %
ERYTHROCYTE [DISTWIDTH] IN BLOOD BY AUTOMATED COUNT: 13.7 %
EST. GFR  (AFRICAN AMERICAN): 53.3 ML/MIN/1.73 M^2
EST. GFR  (NON AFRICAN AMERICAN): 46.1 ML/MIN/1.73 M^2
GLUCOSE SERPL-MCNC: 138 MG/DL
HCT VFR BLD AUTO: 31.3 %
HGB BLD-MCNC: 9.9 G/DL
IMM GRANULOCYTES # BLD AUTO: 0.02 K/UL
IMM GRANULOCYTES NFR BLD AUTO: 0.3 %
LYMPHOCYTES # BLD AUTO: 0.9 K/UL
LYMPHOCYTES NFR BLD: 11.5 %
MAGNESIUM SERPL-MCNC: 2 MG/DL
MCH RBC QN AUTO: 30.5 PG
MCHC RBC AUTO-ENTMCNC: 31.6 G/DL
MCV RBC AUTO: 96 FL
MONOCYTES # BLD AUTO: 0.9 K/UL
MONOCYTES NFR BLD: 11.7 %
NEUTROPHILS # BLD AUTO: 5.5 K/UL
NEUTROPHILS NFR BLD: 71.7 %
NRBC BLD-RTO: 0 /100 WBC
PHOSPHATE SERPL-MCNC: 3.1 MG/DL
PLATELET # BLD AUTO: 180 K/UL
PMV BLD AUTO: 11.1 FL
POCT GLUCOSE: 131 MG/DL (ref 70–110)
POCT GLUCOSE: 157 MG/DL (ref 70–110)
POCT GLUCOSE: 212 MG/DL (ref 70–110)
POTASSIUM SERPL-SCNC: 4.2 MMOL/L
RBC # BLD AUTO: 3.25 M/UL
SODIUM SERPL-SCNC: 138 MMOL/L
WBC # BLD AUTO: 7.67 K/UL

## 2018-09-03 PROCEDURE — 94761 N-INVAS EAR/PLS OXIMETRY MLT: CPT

## 2018-09-03 PROCEDURE — 25000003 PHARM REV CODE 250: Performed by: HOSPITALIST

## 2018-09-03 PROCEDURE — 25000242 PHARM REV CODE 250 ALT 637 W/ HCPCS: Performed by: HOSPITALIST

## 2018-09-03 PROCEDURE — 94640 AIRWAY INHALATION TREATMENT: CPT

## 2018-09-03 PROCEDURE — 83735 ASSAY OF MAGNESIUM: CPT

## 2018-09-03 PROCEDURE — 99900035 HC TECH TIME PER 15 MIN (STAT)

## 2018-09-03 PROCEDURE — 80048 BASIC METABOLIC PNL TOTAL CA: CPT

## 2018-09-03 PROCEDURE — 25000003 PHARM REV CODE 250: Performed by: PSYCHIATRY & NEUROLOGY

## 2018-09-03 PROCEDURE — 99233 SBSQ HOSP IP/OBS HIGH 50: CPT | Mod: ,,, | Performed by: HOSPITALIST

## 2018-09-03 PROCEDURE — 25000003 PHARM REV CODE 250: Performed by: STUDENT IN AN ORGANIZED HEALTH CARE EDUCATION/TRAINING PROGRAM

## 2018-09-03 PROCEDURE — 84100 ASSAY OF PHOSPHORUS: CPT

## 2018-09-03 PROCEDURE — 85025 COMPLETE CBC W/AUTO DIFF WBC: CPT

## 2018-09-03 PROCEDURE — 63600175 PHARM REV CODE 636 W HCPCS: Performed by: PSYCHIATRY & NEUROLOGY

## 2018-09-03 PROCEDURE — 36415 COLL VENOUS BLD VENIPUNCTURE: CPT

## 2018-09-03 PROCEDURE — A4216 STERILE WATER/SALINE, 10 ML: HCPCS | Performed by: PSYCHIATRY & NEUROLOGY

## 2018-09-03 RX ORDER — LEVETIRACETAM 500 MG/1
500 TABLET ORAL 2 TIMES DAILY
Qty: 8 TABLET | Refills: 0 | Status: ON HOLD | OUTPATIENT
Start: 2018-09-03 | End: 2018-12-03 | Stop reason: HOSPADM

## 2018-09-03 RX ORDER — GUAIFENESIN 600 MG/1
600 TABLET, EXTENDED RELEASE ORAL 2 TIMES DAILY
Status: ON HOLD | COMMUNITY
Start: 2018-09-03 | End: 2018-11-30 | Stop reason: CLARIF

## 2018-09-03 RX ORDER — AMOXICILLIN AND CLAVULANATE POTASSIUM 875; 125 MG/1; MG/1
1 TABLET, FILM COATED ORAL EVERY 12 HOURS
Qty: 20 TABLET | Refills: 0 | Status: SHIPPED | OUTPATIENT
Start: 2018-09-03 | End: 2018-09-13

## 2018-09-03 RX ADMIN — OXYCODONE HYDROCHLORIDE 5 MG: 5 TABLET ORAL at 01:09

## 2018-09-03 RX ADMIN — LOSARTAN POTASSIUM 100 MG: 50 TABLET ORAL at 08:09

## 2018-09-03 RX ADMIN — ACETAMINOPHEN 650 MG: 325 TABLET, FILM COATED ORAL at 08:09

## 2018-09-03 RX ADMIN — METOPROLOL SUCCINATE 50 MG: 25 TABLET, EXTENDED RELEASE ORAL at 08:09

## 2018-09-03 RX ADMIN — Medication 10 ML: at 12:09

## 2018-09-03 RX ADMIN — LEVETIRACETAM 500 MG: 500 TABLET ORAL at 08:09

## 2018-09-03 RX ADMIN — ATORVASTATIN CALCIUM 40 MG: 20 TABLET, FILM COATED ORAL at 08:09

## 2018-09-03 RX ADMIN — AMLODIPINE BESYLATE 10 MG: 10 TABLET ORAL at 08:09

## 2018-09-03 RX ADMIN — IPRATROPIUM BROMIDE AND ALBUTEROL SULFATE 3 ML: .5; 3 SOLUTION RESPIRATORY (INHALATION) at 08:09

## 2018-09-03 RX ADMIN — IPRATROPIUM BROMIDE AND ALBUTEROL SULFATE 3 ML: .5; 3 SOLUTION RESPIRATORY (INHALATION) at 04:09

## 2018-09-03 RX ADMIN — LIDOCAINE 2 PATCH: 50 PATCH TOPICAL at 12:09

## 2018-09-03 RX ADMIN — AMOXICILLIN AND CLAVULANATE POTASSIUM 1 TABLET: 875; 125 TABLET, FILM COATED ORAL at 08:09

## 2018-09-03 RX ADMIN — Medication 10 ML: at 05:09

## 2018-09-03 RX ADMIN — INSULIN ASPART 4 UNITS: 100 INJECTION, SOLUTION INTRAVENOUS; SUBCUTANEOUS at 12:09

## 2018-09-03 RX ADMIN — Medication 3 ML: at 01:09

## 2018-09-03 RX ADMIN — AMIODARONE HYDROCHLORIDE 200 MG: 200 TABLET ORAL at 08:09

## 2018-09-03 RX ADMIN — INSULIN ASPART 1 UNITS: 100 INJECTION, SOLUTION INTRAVENOUS; SUBCUTANEOUS at 06:09

## 2018-09-03 RX ADMIN — Medication 10 ML: at 08:09

## 2018-09-03 RX ADMIN — IPRATROPIUM BROMIDE AND ALBUTEROL SULFATE 3 ML: .5; 3 SOLUTION RESPIRATORY (INHALATION) at 12:09

## 2018-09-03 RX ADMIN — GUAIFENESIN 600 MG: 600 TABLET, EXTENDED RELEASE ORAL at 08:09
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
## 2018-09-03 NOTE — PLAN OF CARE
"Problem: Patient Care Overview  Goal: Plan of Care Review  Outcome: Ongoing (interventions implemented as appropriate)  Patient AAO x4, calm and cooperative. No acute events overnight.  Pt weaned down to 3.5 L of oxygen. o2 sats 94%. BG check during shift, covered with aspart. Vitals stable overnight. Oxycodone given once during shift for pain. Pt refused second dose,  States "is this the stuff people get hooked on  On tv" . No fall and injuries during shift. Pt stable will continue to monitor.       "
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
## 2018-09-03 NOTE — NURSING
Home Oxygen Evaluation    Date Performed: 9/3/2018    1) Patient's Home O2 Sat on room air, while at rest: 87%        If O2 sats on room air at rest are 88% or below, patient qualifies. No additional testing needed. Document N/A in steps 2 and 3. If 89% or above, complete steps 2.      2) Patient's O2 Sat on room air while exercising: N/A        If O2 sats on room air while exercising remain 89% or above patient does not qualify, no further testing needed Document N/A in step 3. If O2 sats on room air while exercising are 88% or below, continue to step 3.      3) Patient's O2 Sat while exercising on O2: N/A at N/A LPM         (Must show improvement from #2 for patients to qualify)    If O2 sats improve on oxygen, patient qualifies for portable oxygen. If not, the patient does not qualify.

## 2018-09-03 NOTE — ASSESSMENT & PLAN NOTE
Likely due to aspiration pneumonia. Pt has h/o achalasia as well as esophageal diverticulum and has frequent food regurgitation episodes. This likely precipitated an aspiration pneumonia.  - recommend continuing augmentin bid for 7 day total course  - diet per speech therapy recommendations  - continue to wean oxygen as able (pt states that he wasn't on oxygen pta)  - continue IS to prevent atelectasis

## 2018-09-03 NOTE — PLAN OF CARE
Problem: Patient Care Overview  Goal: Plan of Care Review  Outcome: Ongoing (interventions implemented as appropriate)  Patient alert and oriented. C/o of some improvement in rib and neck pain. Paced rhythm on telemetry monitor. Vitals WNL. Unable to wean to room air but down to 2.5L from 5L. Fall precautions in place, patient increasing activity by ambulating in mills and up to chair TID.

## 2018-09-03 NOTE — PLAN OF CARE
[facility]    HOME HEALTH ORDERS  FACE TO FACE ENCOUNTER    Patient Name: Timbo Gallagher  YOB: 1935    PCP: Cirilo Montero MD   PCP Address: [address] [place] [address]  PCP Phone Number: [phone]  PCP Fax: [phone]    Encounter Date: 09/03/2018    Admit to Home Health    Diagnoses:  Active Hospital Problems    Diagnosis  POA    *Traumatic subarachnoid bleed with LOC of 30 minutes or less, initial encounter [S06.6X1A]  Yes    Atelectasis [J98.11]  Yes    Contusion of rib on right side [S20.211A]  Yes    Aspiration pneumonia [J69.0]  Yes    Acute renal failure superimposed on stage 3 chronic kidney disease [N17.9, N18.3]  Yes    Oropharyngeal dysphagia [R13.12]  Yes    Anemia of chronic renal failure, stage 3 (moderate) [N18.3, D63.1]  Yes    Subarachnoid hemorrhage following injury with brief loss of consciousness but without open intracranial wound [S06.6X9A]  Yes    AICD (automatic cardioverter/defibrillator) present [Z95.810]  Yes     [employer] dual AICD 4/23/18      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       Future Appointments   Date Time Provider Department Center   9/11/2018 10:30 AM VASCULAR ULTRASOUND, [facility] [place] [facility] [place]     Follow-up Information     Dillan Kumar MD.    Specialties:  Pulmonary Disease, Sleep Medicine  Why:  follow up at [facility]  Contact  information:  [address]  [facility] [address]  [phone]                     I have seen and examined this patient face to face today. My clinical findings that support the need for the home health skilled services and home bound status are the following:  Weakness/numbness causing balance and gait disturbance due to Weakness/Debility making it taxing to leave home.    Allergies:  Review of patient's allergies indicates:   Allergen Reactions    Ciprofloxacin (mixture) Itching     Extreme itching and redness     Antibiotic hc     Vancomycin analogues Itching       Diet: Full liquid    Activities: activity as tolerated    Nursing:   SN to complete comprehensive assessment including routine vital signs. Instruct on disease process and s/s of complications to report to MD. Review/verify medication list sent home with the patient at time of discharge  and instruct patient/caregiver as needed. Frequency may be adjusted depending on start of care date.    Notify MD if SBP > 160 or < 90; DBP > 90 or < 50; HR > 120 or < 50; Temp > 101 Other:         CONSULTS:    Physical Therapy to evaluate and treat. Evaluate for home safety and equipment needs; Establish/upgrade home exercise program. Perform / instruct on therapeutic exercises, gait training, transfer training, and Range of Motion.  Occupational Therapy to evaluate and treat. Evaluate home environment for safety and equipment needs. Perform/Instruct on transfers, ADL training, ROM, and therapeutic exercises.  Speech Therapy  to evaluate and treat for  Swallowing.          Medications: Review discharge medications with patient and family and provide education.      Current Discharge Medication List      START taking these medications    Details   amoxicillin-clavulanate 875-125mg (AUGMENTIN) 875-125 mg per tablet Take 1 tablet by mouth every 12 (twelve) hours. for 10 days  Qty: 20 tablet, Refills: 0      guaiFENesin (MUCINEX) 600 mg 12 hr tablet Take 1  tablet (600 mg total) by mouth 2 (two) times daily.      levETIRAcetam (KEPPRA) 500 MG Tab Take 1 tablet (500 mg total) by mouth 2 (two) times daily. for 4 days  Qty: 8 tablet, Refills: 0         CONTINUE these medications which have NOT CHANGED    Details   acarbose (PRECOSE) 25 MG Tab Take 25 mg by mouth 3 (three) times daily with meals.      amiodarone (PACERONE) 200 MG Tab Take 1 tablet (200 mg total) by mouth once daily.  Qty: 30 tablet, Refills: 11      amLODIPine (NORVASC) 10 MG tablet Take 1 tablet (10 mg total) by mouth once daily.  Qty: 30 tablet, Refills: 11      atorvastatin (LIPITOR) 40 MG tablet Take 1 tablet (40 mg total) by mouth once daily.  Qty: 90 tablet, Refills: 11      furosemide (LASIX) 20 MG tablet Take 1 tablet (20 mg total) by mouth once daily.  Qty: 10 tablet, Refills: 0      losartan (COZAAR) 100 MG tablet Take 1 tablet (100 mg total) by mouth once daily.  Qty: 90 tablet, Refills: 3      metoprolol succinate (TOPROL-XL) 50 MG 24 hr tablet Take 50 mg by mouth once daily.      aspirin (ECOTRIN) 81 MG EC tablet Take 1 tablet (81 mg total) by mouth once daily. Take 4 tab tonight then 1 tab daily  Refills: 0    Associated Diagnoses: Aortic valve stenosis, etiology of cardiac valve disease unspecified         STOP taking these medications       clopidogrel (PLAVIX) 75 mg tablet Comments:   Reason for Stopping:               I certify that this patient is confined to his home and needs intermittent skilled nursing care, physical therapy, speech therapy and occupational therapy.

## 2018-09-03 NOTE — PROGRESS NOTES
[facility]  Pulmonology  Progress Note    Patient Name: Timbo Gallagher  MRN: [id number]  Admission Date: 8/27/2018  Hospital Length of Stay: 6 days  Code Status: Full Code  Attending Provider: Jarrod Cali MD  Primary Care Provider: Cirilo Montero MD   Principal Problem: Traumatic subarachnoid bleed with LOC of 30 minutes or less, initial encounter    Subjective:     Interval History: pt had no acute events overnight. He is sitting up in bed and in no acute distress. Pt does have aspiration events and regurgitates food particles.    Objective:     Vital Signs (Most Recent):  Temp: 98.1 °F (36.7 °C) (09/03/18 0757)  Pulse: 71 (09/03/18 1116)  Resp: 18 (09/03/18 0839)  BP: (!) 153/69 (09/03/18 0757)  SpO2: 95 % (09/03/18 0839) Vital Signs (24h Range):  Temp:  [97.9 °F (36.6 °C)-98.8 °F (37.1 °C)] 98.1 °F (36.7 °C)  Pulse:  [59-79] 71  Resp:  [18-20] 18  SpO2:  [87 %-98 %] 95 %  BP: (131-176)/(60-75) 153/69     Weight: 85.7 kg (188 lb 15 oz)  Body mass index is 27.9 kg/m².      Intake/Output Summary (Last 24 hours) at 9/3/2018 1128  Last data filed at 9/3/2018 0700  Gross per 24 hour   Intake 1190 ml   Output 1250 ml   Net -60 ml       Physical Exam   Constitutional: He is oriented to person, place, and time. He appears well-developed and well-nourished. No distress.   HENT:   Head: Normocephalic and atraumatic.   Right Ear: External ear normal.   Left Ear: External ear normal.   Eyes: Conjunctivae and EOM are normal. Pupils are equal, round, and reactive to light. Right eye exhibits no discharge. Left eye exhibits no discharge.   Cardiovascular: Normal rate, regular rhythm and normal heart sounds. Exam reveals no gallop and no friction rub.   No murmur heard.  Pulmonary/Chest: Effort normal and breath sounds normal. No stridor. No respiratory distress. He has no wheezes.   Abdominal: Soft. Bowel sounds are normal.   Musculoskeletal: He exhibits no edema or deformity.   Neurological: He is  alert and oriented to person, place, and time.   Skin: He is not diaphoretic.       Vents:  Oxygen Concentration (%): 2.5 (09/02/18 1641)  Negative Inspiratory Force (cm H2O): -30 (09/01/18 1653)    Lines/Drains/Airways     Peripheral Intravenous Line                 Peripheral IV - Single Lumen 09/02/18 Anterior;Left Wrist 1 day                Significant Labs:    CBC/Anemia Profile:  Recent Labs   Lab  09/02/18   0532  09/03/18   0559   WBC  8.44  7.67   HGB  9.4*  9.9*   HCT  30.3*  31.3*   PLT  176  180   MCV  97  96   RDW  14.0  13.7        Chemistries:  Recent Labs   Lab  09/02/18   0532  09/03/18   0559   NA  137  138   K  4.1  4.2   CL  105  107   CO2  27  25   BUN  25*  22   CREATININE  1.6*  1.4   CALCIUM  8.6*  9.0   MG  2.0  2.0   PHOS  3.6  3.1       BMP:   Recent Labs   Lab  09/03/18   0559   GLU  138*   NA  138   K  4.2   CL  107   CO2  25   BUN  22   CREATININE  1.4   CALCIUM  9.0   MG  2.0       Significant Imaging:  I have reviewed all pertinent imaging results/findings within the past 24 hours.    Assessment/Plan:     Acute hypoxemic respiratory failure    Likely due to aspiration pneumonia. Pt has h/o achalasia as well as esophageal diverticulum and has frequent food regurgitation episodes. This likely precipitated an aspiration pneumonia.  - recommend continuing augmentin bid for 7 day total course  - diet per speech therapy recommendations  - continue to wean oxygen as able (pt states that he wasn't on oxygen pta)  - continue IS to prevent atelectasis          Pt seen and examined with my attending, who agrees with the following plan.     Zach García MD  Pulmonology  [facility]

## 2018-09-03 NOTE — PLAN OF CARE
ON call STACY sent HH orders to [facility]. STACY spoke to Grant at [facility] to notify of the referral. Grant stated that they can see the patient on Wednesday.     STACY faxed Home O2 order to [facility].     STACY spoke to Ant, on call for [facility], who stated that a rep from [facility] will deliver the tank to the hospital room. STACY left packet at nurse's station on 10 for [facility] rep to .    UPDATE 4:31 PM  SW spoke to nurse and notified her of the above.       Patient will be ready for DC once O2 is delivered.   STACY arranged transport with [facility]. STACY arranged transport for 7PM.   STACY notified nurse, Krissy, to call [facility] at [phone] if O2 is not delivered by 7PM to change the transport time.   STACY also informed nurse, Krissy, that the patient's wife would like to be called when the patient leaves.     Maddi Ceja, KEL  [facility]  [phone]

## 2018-09-03 NOTE — SUBJECTIVE & OBJECTIVE
Interval History: pt had no acute events overnight. He is sitting up in bed and in no acute distress. Pt does have aspiration events and regurgitates food particles.    Objective:     Vital Signs (Most Recent):  Temp: 98.1 °F (36.7 °C) (09/03/18 0757)  Pulse: 71 (09/03/18 1116)  Resp: 18 (09/03/18 0839)  BP: (!) 153/69 (09/03/18 0757)  SpO2: 95 % (09/03/18 0839) Vital Signs (24h Range):  Temp:  [97.9 °F (36.6 °C)-98.8 °F (37.1 °C)] 98.1 °F (36.7 °C)  Pulse:  [59-79] 71  Resp:  [18-20] 18  SpO2:  [87 %-98 %] 95 %  BP: (131-176)/(60-75) 153/69     Weight: 85.7 kg (188 lb 15 oz)  Body mass index is 27.9 kg/m².      Intake/Output Summary (Last 24 hours) at 9/3/2018 1128  Last data filed at 9/3/2018 0700  Gross per 24 hour   Intake 1190 ml   Output 1250 ml   Net -60 ml       Physical Exam   Constitutional: He is oriented to person, place, and time. He appears well-developed and well-nourished. No distress.   HENT:   Head: Normocephalic and atraumatic.   Right Ear: External ear normal.   Left Ear: External ear normal.   Eyes: Conjunctivae and EOM are normal. Pupils are equal, round, and reactive to light. Right eye exhibits no discharge. Left eye exhibits no discharge.   Cardiovascular: Normal rate, regular rhythm and normal heart sounds. Exam reveals no gallop and no friction rub.   No murmur heard.  Pulmonary/Chest: Effort normal and breath sounds normal. No stridor. No respiratory distress. He has no wheezes.   Abdominal: Soft. Bowel sounds are normal.   Musculoskeletal: He exhibits no edema or deformity.   Neurological: He is alert and oriented to person, place, and time.   Skin: He is not diaphoretic.       Vents:  Oxygen Concentration (%): 2.5 (09/02/18 1641)  Negative Inspiratory Force (cm H2O): -30 (09/01/18 1653)    Lines/Drains/Airways     Peripheral Intravenous Line                 Peripheral IV - Single Lumen 09/02/18 Anterior;Left Wrist 1 day                Significant Labs:    CBC/Anemia Profile:  Recent  Labs   Lab  09/02/18   0532  09/03/18   0559   WBC  8.44  7.67   HGB  9.4*  9.9*   HCT  30.3*  31.3*   PLT  176  180   MCV  97  96   RDW  14.0  13.7        Chemistries:  Recent Labs   Lab  09/02/18   0532  09/03/18   0559   NA  137  138   K  4.1  4.2   CL  105  107   CO2  27  25   BUN  25*  22   CREATININE  1.6*  1.4   CALCIUM  8.6*  9.0   MG  2.0  2.0   PHOS  3.6  3.1       BMP:   Recent Labs   Lab  09/03/18   0559   GLU  138*   NA  138   K  4.2   CL  107   CO2  25   BUN  22   CREATININE  1.4   CALCIUM  9.0   MG  2.0       Significant Imaging:  I have reviewed all pertinent imaging results/findings within the past 24 hours.

## 2018-09-03 NOTE — PLAN OF CARE
Problem: Patient Care Overview  Goal: Plan of Care Review  Outcome: Ongoing (interventions implemented as appropriate)  Pt alert and oriented x4 and verbally responsive to care. VSS. No c/o pain or discomfort. Oxygen delivered to bedside. Awaiting transportation at 1900 to D/C. Cont to monitor.

## 2018-09-04 ENCOUNTER — TELEPHONE (OUTPATIENT)
Dept: GASTROENTEROLOGY | Facility: CLINIC | Age: 83
End: 2018-09-04

## 2018-09-04 NOTE — TELEPHONE ENCOUNTER
----- Message from Vernon Velez MD sent at 9/2/2018  5:48 PM CDT -----  Daria Hoff,    Can we schedule Mr Gallagher for a clinic follow-up with me (no urgency). I imagine he will be discharged in upcomming days.     Thanks,  Javy

## 2018-09-04 NOTE — NURSING
Patient left the unit via ambulance services w/O2 for transport. Home health agency was notified that the patient was leaving and will meet patient at their home to set up home O2. Patient left the unit w/all personal belongings.

## 2018-09-05 ENCOUNTER — PATIENT OUTREACH (OUTPATIENT)
Dept: ADMINISTRATIVE | Facility: CLINIC | Age: 83
End: 2018-09-05

## 2018-09-05 LAB
BACTERIA SPEC AEROBE CULT: NORMAL
GRAM STN SPEC: NORMAL

## 2018-09-05 NOTE — PATIENT INSTRUCTIONS
Discharge Instructions: Eating a Full Liquid Diet  Your health care provider prescribed a full liquid diet temporarily for you. You may have trouble swallowing solid foods. Or, you may have had surgery and not be ready for solid food or need to advance to solid foods gradually. Here's what you need to know about this type of diet.  Home care  · Remember, this diet is temporary. You should not follow this diet longer than directed because it doesnt provide you with enough energy or protein.  · Contact your health care provider if you are on this diet for more than 5 days. You may need nutritional or vitamin supplements.  · Keep track of the amount of liquid that you drink and anything you eat while on this diet. Keep a log for your health care provider.  Choose these foods  · Choose fruit juices without pulp, such as apple juice, grape juice, cranberry juice, and nectars.  · Choose drinks such as coffee, tea (hot or cold), fruit flavored drinks, soda, water, milk (whole, skim, 1%, and 2%), cream, instant breakfast drinks, and liquid meal replacements.  · Choose desserts and snacks such as fruit ices (without chunks of fruit), plain or vanilla yogurt, plain gelatin, hard candy, Popsicles made from juices, custards, frozen yogurt, smoothies without chunks, ice cream (without nuts or candy), and pudding.  · Choose broth, bouillon, fat-free consommé, or strained cream soups.  · Choose thin, refined hot cereals, such as porridge, and grits.  Don't eat these foods  · Dont eat canned, fresh, or frozen fruit.  · Dont eat soup with vegetables, noodles, rice, meat, or other chunks of food in it.  · Dont eat vegetables, bread, whole cereal and grain products, meat, chicken, fish, meat substitutes (nuts, tofu, etc.), oils, butter, or margarine.  Follow-up  Make a follow-up appointment as directed by our staff.     When to call your health care provider  Call your health care provider right away if you have any of the  following:  · Fever of 100.4°F (38.0°C) or higher  · Diarrhea that lasts for more than 1 day  · Vomiting that does not stop  · Trouble urinating  · Trouble passing gas  · Abdominal pain with bloating and cramping   Date Last Reviewed: 6/21/2015  © 6639-9927 [employer]. [address] [place] [address]. All rights reserved. This information is not intended as a substitute for professional medical care. Always follow your healthcare professional's instructions.        Full Liquid Diet  A full liquid diet is a middle step between a clear liquid diet and eating solid foods. A clear liquid diet allows only liquids you can see through. A full liquid diet allows thicker, liquid foods as listed below. It can be anything that is liquid at room temperature.  The full liquid diet may be used before or after surgery. It is also used before some medical tests. It is easy to digest and leaves little food in the stomach and intestines.  A full liquid diet meets calorie and protein needs for your body with liquids only. It should not be used for more than 5 days. Your healthcare provider or dietitian may also order high-protein, high-calorie liquid supplements. These will give you extra vitamins and minerals. You may include the items below on a full liquid diet.    Adults  Adults should drink a total of 2 to 3 quarts of liquid per day. It may be easier to drink small, frequent servings rather than a few large ones. People with severe kidney or heart disease can drink only limited amounts of fluids. Check with your provider.  · Cereals and soups. Creamy, hot breakfast cereals (wheat or rice), pureed soups (including pureed meats, bland vegetables, and white potatoes), tomato puree.  · Desserts. Gelatin, whipped topping, custard-style yogurt, pudding, custard, plain ice cream, sherbet, sorbet, popsicles, fruit juice bars.  · Drinks. Coffee, tea, cream, milk, milkshakes, fruit and vegetable juices, sodas,  mineral water (plain or flavored), liquid gelatin, electrolyte replacement sport drinks.  · Other items. Salt, mild-flavored seasonings, chocolate flavoring, gravy, margarine, sugar, syrup, jelly, honey, hard candy (to suck on).  Children  Follow the healthcare providers instructions. Young children who are eating solid foods may be able to have the items listed above without problems. Be sure to follow these safety measures:  · Don't give hard candies to young children. The candies may cause choking.  · Children under 1 year old. Do not give honey. It may cause illness.  · Children under 2 years old. Ask your childs provider if you should supplement your childs diet with oral rehydration solutions, which have electrolytes. You can buy these drinks at pharmacies and grocery stores. You dont need a prescription.  · Children over 1 year old. Limit milk to 3 or 4 cups per day. Too much milk can make your child less hungry for other foods.  Date Last Reviewed: 8/1/2016 © 2000-2017 [employer]. [address] [place] [address]. All rights reserved. This information is not intended as a substitute for professional medical care. Always follow your healthcare professional's instructions.        Understanding Aspiration from Dysphagia  Aspiration is when something enters your airway or lungs by accident. It may be food, liquid, or some other material. This can cause serious health problems, such as pneumonia. Aspiration can happen when you have trouble swallowing normally. This is known as dysphagia.  What happens when you swallow?  When you swallow food, it passes from your mouth down into your throat (pharynx). From there, the food moves down through a long tube (esophagus) and into your stomach. This journey is made possible by a series of actions from the muscles in these areas.  The pharynx is also part of the system that brings air into your lungs. When you breathe, air enters your mouth and  moves into the pharynx. The air then goes down into your main airway (trachea) and into your lungs. A flap of tissue called the epiglottis sits over the top of the trachea. This flap blocks food and drink from going down into the trachea when you swallow.  What causes aspiration?  Aspiration from dysphagia is caused when the muscles in your throat dont work normally. This lets food or drink enter the trachea when you swallow. This can happen as food goes down when you swallow. Or it can happen if food comes back up from your stomach.  When a person has dysphagia, aspiration is always a risk. You may be at risk for aspiration from dysphagia if you have any of these medical conditions:  · Stroke  · Severe dental problems  · Conditions that lead to less saliva, such as Sjogrens syndrome  · Mouth sores  · Parkinson disease or other neurologic conditions  · Muscular dystrophies  · Blockage in the esophagus, such as a growth from cancer  · History of radiation or surgery to treat throat cancer  Symptoms of aspiration from dysphagia  Some people who aspirate do not have any signs or symptoms. This is called silent aspiration. But aspiration can cause symptoms such as:  · Sense of food sticking in your throat or coming back into your mouth  · Pain when swallowing  · Trouble starting a swallow  · Coughing or wheezing after eating  · Chest discomfort or heartburn  · Fever 30 minutes to an hour after eating  · Too much saliva  · Feeling congested after eating or drinking  · Having a wet-sounding voice during or after eating or drinking  · Shortness of breath or tiredness while eating  · Vomiting blood  · Pneumonia that comes back again and again  Symptoms can happen right after eating. Or they may happen over time. You may not have all of these symptoms. They may depend on how often and how much food or drink you aspirate.  Diagnosing aspiration from dysphagia  You will need to be checked for aspiration from dysphagia if you  have symptoms. You may also need to be checked if you have had a stroke or other health problem that can cause trouble swallowing. If your healthcare provider thinks you may be aspirating, you may be told to not eat or drink until you are tested.  Your healthcare provider will ask about your medical history and symptoms. This may be done by a speech-language pathologist (SLP). The SLP will try to find out if you have problems with the lower or upper part of your swallowing muscles. The SLP may ask about what foods or drink cause problems, and when your symptoms occur.  You may have a physical exam. This may include an exam of your teeth, lips, jaws, tongue, and cheeks. You may be asked to move these areas in certain ways and make certain sounds. Your SLP may also test how you swallow different types of liquids and solids.  You may also need 1 or more tests. These can help to find the cause of your dysphagia. Tests are often very helpful in showing cases of silent aspiration. The test may include:  · Modified barium swallow test (MBS). This is done to show if material is going into your lungs.  · Fiberoptic endoscopic evaluation of swallowing (FEES). This test can also show if material is going into your lungs  · Pharyngeal manometry. This test checks the pressure inside your esophagus  Date Last Reviewed: 3/1/2017  © 5628-1603 [employer]. [address] [place] [address]. All rights reserved. This information is not intended as a substitute for professional medical care. Always follow your healthcare professional's instructions.        Understanding Traumatic Brain Injury (TBI)  Breathing, blood flow, and movement are all controlled by the brain. The brain also allows you to think, handle emotions, and make judgments. TBI can be due to a closed head injury or a penetrating injury. The most common causes are falls, blunt accidental trauma, motor vehicle accidents and assault. After an injury,  certain parts of the brain (or the links between these parts) may not be working optimally. Some mental or physical skills may be altered. The altered function may be short- or long-term. The full effects of a brain injury may not appear for months or even years and typically depends on the severity and the number of brain injuries as well as the time in between these injuries. Long-term effects, initially not present, include post-traumatic stress disorder and cognitive decline (early dementia). TBI can range from mild concussions to fatal injuries.  How injury happens     The brain rebounds  from the impact. As a result, the brain may hit the opposite side of the skull or twist on the brain stem.        The brain strikes the skull. This may happen if the head hits a hard surface or if a person is severely shaken or jerked.   The skull does not have to be harmed for the brain to be injured (this is called a closed head injury). Injury can occur when the brain strikes the skull. In many cases, the brain rebounds from the first impact and hits the opposite side of the skull. Sometimes the brain twists on the brain stem.  Types of damage  When the brain strikes the skull or twists on the brain stem, brain tissue tears. This injury may then cause a second type of damage, such as bleeding or swelling in the brain. Health care providers try to control the second type of damage to help limit long-term problems.       Tearing  If nerve fibers in the brain tissue tear, signals cant pass between the brain and body. Lost signals mean altered skills or body functions. Bleeding  A torn blood vessel may leak into nearby tissue. This kills brain cells and can lead to a buildup of blood (hematoma). If this blood presses on the brain, it can cut off blood to other cells. These cells also die. Swelling  The brain has almost no room to expand inside the skull. If the brain swells, it may press against the skull. As the pressure  increases, the brain functions may be altered.   Date Last Reviewed: 10/19/2015  © 1072-2588 [employer]. [address] [place] [address], [place] [address]. All rights reserved. This information is not intended as a substitute for professional medical care. Always follow your healthcare professional's instructions.        Levetiracetam tablets  What is this medicine?  LEVETIRACETAM (wanda paulina mccormick CRISTINE se bedolla) is an antiepileptic drug. It is used with other medicines to treat certain types of seizures.  How should I use this medicine?  Take this medicine by mouth with a glass of water. Follow the directions on the prescription label. Swallow the tablets whole. Do not crush or chew this medicine. You may take this medicine with or without food. Take your doses at regular intervals. Do not take your medicine more often than directed. Do not stop taking this medicine or any of your seizure medicines unless instructed by your doctor or health care professional. Stopping your medicine suddenly can increase your seizures or their severity.  A special MedGuide will be given to you by the pharmacist with each prescription and refill. Be sure to read this information carefully each time.  Contact your pediatrician or health care professional regarding the use of this medication in children. While this drug may be prescribed for children as young as 4 years of age for selected conditions, precautions do apply.  What side effects may I notice from receiving this medicine?  Side effects you should report to your doctor or health care professional as soon as possible:  · allergic reactions like skin rash, itching or hives, swelling of the face, lips, or tongue  · breathing problems  · dark urine  · general ill feeling or flu-like symptoms  · problems with balance, talking, walking  · unusually weak or tired  · worsening of mood, thoughts or actions of suicide or dying  · yellowing of the eyes or skin  Side effects that usually  do not require medical attention (report to your doctor or health care professional if they continue or are bothersome):  · diarrhea  · dizzy, drowsy  · headache  · loss of appetite  What may interact with this medicine?  This medicine may interact with the following medications:  · carbamazepine  · colesevelam  · probenecid  · sevelamer  What if I miss a dose?  If you miss a dose, take it as soon as you can. If it is almost time for your next dose, take only that dose. Do not take double or extra doses.  Where should I keep my medicine?  Keep out of reach of children.  Store at room temperature between 15 and 30 degrees C (59 and 86 degrees F). Throw away any unused medicine after the expiration date.  What should I tell my health care provider before I take this medicine?  They need to know if you have any of these conditions:  · kidney disease  · suicidal thoughts, plans, or attempt; a previous suicide attempt by you or a family member  · an unusual or allergic reaction to levetiracetam, other medicines, foods, dyes, or preservatives  · pregnant or trying to get pregnant  · breast-feeding  What should I watch for while using this medicine?  Visit your doctor or health care professional for a regular check on your progress. Wear a medical identification bracelet or chain to say you have epilepsy, and carry a card that lists all your medications.  It is important to take this medicine exactly as instructed by your health care professional. When first starting treatment, your dose may need to be adjusted. It may take weeks or months before your dose is stable. You should contact your doctor or health care professional if your seizures get worse or if you have any new types of seizures.  You may get drowsy or dizzy. Do not drive, use machinery, or do anything that needs mental alertness until you know how this medicine affects you. Do not stand or sit up quickly, especially if you are an older patient. This reduces  the risk of dizzy or fainting spells. Alcohol may interfere with the effect of this medicine. Avoid alcoholic drinks.  The use of this medicine may increase the chance of suicidal thoughts or actions. Pay special attention to how you are responding while on this medicine. Any worsening of mood, or thoughts of suicide or dying should be reported to your health care professional right away.  Women who become pregnant while using this medicine may enroll in the [place] Antiepileptic Drug Pregnancy Registry by calling 1-589.155.2412. This registry collects information about the safety of antiepileptic drug use during pregnancy.  NOTE:This sheet is a summary. It may not cover all possible information. If you have questions about this medicine, talk to your doctor, pharmacist, or health care provider. Copyright© 2017 [employer]        Amoxicillin; Clavulanic Acid tablets  What is this medicine?  AMOXICILLIN; CLAVULANIC ACID (a mox i ADOLFO in; AGUEDA stallworth ic AS id) is a penicillin antibiotic. It is used to treat certain kinds of bacterial infections. It will not work for colds, flu, or other viral infections.  How should I use this medicine?  Take this medicine by mouth with a full glass of water. Follow the directions on the prescription label. Take at the start of a meal. Do not crush or chew. If the tablet has a score line, you may cut it in half at the score line for easier swallowing. Take your medicine at regular intervals. Do not take your medicine more often than directed. Take all of your medicine as directed even if you think you are better. Do not skip doses or stop your medicine early.  Talk to your pediatrician regarding the use of this medicine in children. Special care may be needed.  What side effects may I notice from receiving this medicine?  Side effects that you should report to your doctor or health care professional as soon as possible:  · allergic reactions like skin rash, itching or hives,  swelling of the face, lips, or tongue  · breathing problems  · dark urine  · fever or chills, sore throat  · redness, blistering, peeling or loosening of the skin, including inside the mouth  · seizures  · trouble passing urine or change in the amount of urine  · unusual bleeding, bruising  · unusually weak or tired  · white patches or sores in the mouth or throat  Side effects that usually do not require medical attention (report to your doctor or health care professional if they continue or are bothersome):  · diarrhea  · dizziness  · headache  · nausea, vomiting  · stomach upset  · vaginal or anal irritation  What may interact with this medicine?  · allopurinol  · anticoagulants  · birth control pills  · methotrexate  · probenecid  What if I miss a dose?  If you miss a dose, take it as soon as you can. If it is almost time for your next dose, take only that dose. Do not take double or extra doses.  Where should I keep my medicine?  Keep out of the reach of children.  Store at room temperature below 25 degrees C (77 degrees F). Keep container tightly closed. Throw away any unused medicine after the expiration date.  What should I tell my health care provider before I take this medicine?  They need to know if you have any of these conditions:  · bowel disease, like colitis  · kidney disease  · liver disease  · mononucleosis  · an unusual or allergic reaction to amoxicillin, penicillin, cephalosporin, other antibiotics, clavulanic acid, other medicines, foods, dyes, or preservatives  · pregnant or trying to get pregnant  · breast-feeding  What should I watch for while using this medicine?  Tell your doctor or health care professional if your symptoms do not improve.  Do not treat diarrhea with over the counter products. Contact your doctor if you have diarrhea that lasts more than 2 days or if it is severe and watery.  If you have diabetes, you may get a false-positive result for sugar in your urine. Check with your  doctor or health care professional.  Birth control pills may not work properly while you are taking this medicine. Talk to your doctor about using an extra method of birth control.  NOTE:This sheet is a summary. It may not cover all possible information. If you have questions about this medicine, talk to your doctor, pharmacist, or health care provider. Copyright© 2017 [employer]

## 2018-09-09 ENCOUNTER — NURSE TRIAGE (OUTPATIENT)
Dept: ADMINISTRATIVE | Facility: CLINIC | Age: 83
End: 2018-09-09

## 2018-09-09 NOTE — TELEPHONE ENCOUNTER
Reason for Disposition   [1] Caller requesting NON-URGENT health information AND [2] PCP's office is the best resource    Protocols used: ST INFORMATION ONLY CALL-A-JAMIN Izquierdo states he would like to stop doing a full-liquid diet as he has been on it for several days. He states the home health nurse told him about 4 days ago that he probably could progress his diet in about 4 days but he hasn't heard anything since. Advised there is no notation about when to progress the diet in the chart, and recommended he reach out to either pcp or gi doctor in the morning for clarification. He states he may progress his diet today anyway.
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
## 2018-09-18 ENCOUNTER — TELEPHONE (OUTPATIENT)
Dept: GASTROENTEROLOGY | Facility: CLINIC | Age: 83
End: 2018-09-18
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
## 2018-09-21 ENCOUNTER — HOSPITAL ENCOUNTER (OUTPATIENT)
Dept: CARDIOLOGY | Facility: HOSPITAL | Age: 83
Discharge: HOME | End: 2018-09-21
Attending: SURGERY
Payer: MEDICARE

## 2018-09-21 DIAGNOSIS — Z98.890 S/P CAROTID ENDARTERECTOMY: ICD-10-CM

## 2018-09-21 DIAGNOSIS — I65.21 CAROTID STENOSIS, ASYMPTOMATIC, RIGHT: ICD-10-CM

## 2018-09-21 LAB — INTERNAL CAROTID STENOSIS: NORMAL

## 2018-09-21 PROCEDURE — 93880 EXTRACRANIAL BILAT STUDY: CPT | Mod: 26,,, | Performed by: SURGERY

## 2018-09-21 PROCEDURE — 93880 EXTRACRANIAL BILAT STUDY: CPT

## 2018-10-02 ENCOUNTER — TELEPHONE (OUTPATIENT)
Dept: ADMINISTRATIVE | Facility: CLINIC | Age: 83
End: 2018-10-02

## 2018-10-02 NOTE — TELEPHONE ENCOUNTER
Home Health SOC with [facility]-Dr. Jarrod Cali. Pt received SN, PT, OT and ST services.

## 2018-10-06 ENCOUNTER — HOSPITAL ENCOUNTER (EMERGENCY)
Facility: HOSPITAL | Age: 83
Discharge: HOME | End: 2018-10-06
Attending: EMERGENCY MEDICINE
Payer: MEDICARE

## 2018-10-06 VITALS
DIASTOLIC BLOOD PRESSURE: 60 MMHG | RESPIRATION RATE: 20 BRPM | SYSTOLIC BLOOD PRESSURE: 134 MMHG | HEIGHT: 69 IN | WEIGHT: 189 LBS | OXYGEN SATURATION: 95 % | BODY MASS INDEX: 27.99 KG/M2 | TEMPERATURE: 99 F | HEART RATE: 64 BPM

## 2018-10-06 DIAGNOSIS — S41.111A LACERATION OF RIGHT UPPER EXTREMITY, INITIAL ENCOUNTER: ICD-10-CM
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
## 2018-10-06 PROCEDURE — 90715 TDAP VACCINE 7 YRS/> IM: CPT | Performed by: NURSE PRACTITIONER

## 2018-10-06 PROCEDURE — 12002 RPR S/N/AX/GEN/TRNK2.6-7.5CM: CPT

## 2018-10-06 PROCEDURE — 90471 IMMUNIZATION ADMIN: CPT | Performed by: NURSE PRACTITIONER

## 2018-10-06 PROCEDURE — 99284 EMERGENCY DEPT VISIT MOD MDM: CPT | Mod: 25

## 2018-10-06 PROCEDURE — 63600175 PHARM REV CODE 636 W HCPCS: Performed by: NURSE PRACTITIONER

## 2018-10-06 PROCEDURE — 25000003 PHARM REV CODE 250: Performed by: NURSE PRACTITIONER

## 2018-10-06 RX ORDER — LIDOCAINE HYDROCHLORIDE 10 MG/ML
10 INJECTION INFILTRATION; PERINEURAL
Status: COMPLETED | OUTPATIENT
Start: 2018-10-06 | End: 2018-10-06

## 2018-10-06 RX ADMIN — LIDOCAINE HYDROCHLORIDE 10 ML: 10 INJECTION, SOLUTION INFILTRATION; PERINEURAL at 01:10

## 2018-10-06 RX ADMIN — CLOSTRIDIUM TETANI TOXOID ANTIGEN (FORMALDEHYDE INACTIVATED), CORYNEBACTERIUM DIPHTHERIAE TOXOID ANTIGEN (FORMALDEHYDE INACTIVATED), BORDETELLA PERTUSSIS TOXOID ANTIGEN (GLUTARALDEHYDE INACTIVATED), BORDETELLA PERTUSSIS FILAMENTOUS HEMAGGLUTININ ANTIGEN (FORMALDEHYDE INACTIVATED), BORDETELLA PERTUSSIS PERTACTIN ANTIGEN, AND BORDETELLA PERTUSSIS FIMBRIAE 2/3 ANTIGEN 0.5 ML: 5; 2; 2.5; 5; 3; 5 INJECTION, SUSPENSION INTRAMUSCULAR at 12:10

## 2018-10-06 RX ADMIN — BACITRACIN ZINC, NEOMYCIN SULFATE, AND POLYMYXIN B SULFATE 1 EACH: 400; 3.5; 5 OINTMENT TOPICAL at 01:10
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
## 2018-10-06 NOTE — DISCHARGE INSTRUCTIONS
Please keep your wound clean and dry.  Wash gently with soap and water and apply antibiotic ointment (bacitracin, neosporin, etc.) over the wound after washing. Please watch for signs of infection including: increased\spreading redness, swelling, pus-like discharge, or a fever greater than 100.4F. If you experience any of these, please contact your Primary Care Doctor or Return to the Emergency Department for a wound check.     Please follow up with your Primary Care Doctor in 7 days for wound recheck and suture removal.  You may return to the Emergency Department if you are unable to see your Primary Care Doctor.  Please return to the ER for any new or worsening symptoms.
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
## 2018-10-06 NOTE — ED NOTES
Call placed to pharmacy for lidocaine- spoke with rep and he stated he has sent medication. Looked in both tubing system and no medication noted. Staff asked if they collected medication and no one collect of saw medication. Rep states he will resent medication. Provider made aware.

## 2018-11-05 ENCOUNTER — HOSPITAL ENCOUNTER (OUTPATIENT)
Dept: RADIOLOGY | Facility: HOSPITAL | Age: 83
Discharge: HOME | End: 2018-11-05
Payer: MEDICARE

## 2018-11-05 DIAGNOSIS — M79.89 LEG SWELLING: ICD-10-CM

## 2018-11-05 DIAGNOSIS — R60.9 EDEMA: ICD-10-CM

## 2018-11-05 DIAGNOSIS — M79.89 LEG SWELLING: Primary | ICD-10-CM

## 2018-11-06 ENCOUNTER — HOSPITAL ENCOUNTER (OUTPATIENT)
Dept: RADIOLOGY | Facility: HOSPITAL | Age: 83
Discharge: HOME | End: 2018-11-06
Payer: MEDICARE

## 2018-11-06 PROCEDURE — 93970 EXTREMITY STUDY: CPT | Mod: TC

## 2018-11-06 PROCEDURE — 93970 EXTREMITY STUDY: CPT | Mod: 26,,, | Performed by: RADIOLOGY

## 2018-11-07 ENCOUNTER — HOSPITAL ENCOUNTER (OUTPATIENT)
Dept: RADIOLOGY | Facility: HOSPITAL | Age: 83
Discharge: HOME | End: 2018-11-07
Payer: MEDICARE

## 2018-11-07 PROCEDURE — 93925 LOWER EXTREMITY STUDY: CPT | Mod: TC

## 2018-11-07 PROCEDURE — 93925 LOWER EXTREMITY STUDY: CPT | Mod: 26,,, | Performed by: RADIOLOGY

## 2018-11-15 DIAGNOSIS — I50.32 CHRONIC DIASTOLIC HEART FAILURE: Primary | ICD-10-CM

## 2018-11-29 ENCOUNTER — HOSPITAL ENCOUNTER (INPATIENT)
Facility: HOSPITAL | Age: 83
LOS: 4 days | Discharge: HOME HEALTH | DRG: 868 | End: 2018-12-03
Attending: EMERGENCY MEDICINE | Admitting: HOSPITALIST
Payer: MEDICARE

## 2018-11-29 DIAGNOSIS — A28.0: ICD-10-CM

## 2018-11-29 DIAGNOSIS — L03.114 CELLULITIS OF LEFT ARM: Primary | ICD-10-CM

## 2018-11-29 PROBLEM — J98.11 ATELECTASIS: Status: RESOLVED | Noted: 2018-09-02 | Resolved: 2018-11-29

## 2018-11-29 PROBLEM — J69.0 ASPIRATION PNEUMONIA: Status: RESOLVED | Noted: 2018-08-31 | Resolved: 2018-11-29

## 2018-11-29 LAB
ALBUMIN SERPL BCP-MCNC: 3.2 G/DL
ALP SERPL-CCNC: 115 U/L
ALT SERPL W/O P-5'-P-CCNC: 14 U/L
ANION GAP SERPL CALC-SCNC: 8 MMOL/L
AST SERPL-CCNC: 17 U/L
BASOPHILS # BLD AUTO: 0.02 K/UL
BASOPHILS NFR BLD: 0.1 %
BILIRUB SERPL-MCNC: 0.7 MG/DL
BILIRUB UR QL STRIP: NEGATIVE
BUN SERPL-MCNC: 35 MG/DL
CALCIUM SERPL-MCNC: 9 MG/DL
CHLORIDE SERPL-SCNC: 108 MMOL/L
CLARITY UR: CLEAR
CO2 SERPL-SCNC: 23 MMOL/L
COLOR UR: YELLOW
CREAT SERPL-MCNC: 2 MG/DL
DIFFERENTIAL METHOD: ABNORMAL
EOSINOPHIL # BLD AUTO: 0 K/UL
EOSINOPHIL NFR BLD: 0 %
ERYTHROCYTE [DISTWIDTH] IN BLOOD BY AUTOMATED COUNT: 14.5 %
EST. GFR  (AFRICAN AMERICAN): 35 ML/MIN/1.73 M^2
EST. GFR  (NON AFRICAN AMERICAN): 30 ML/MIN/1.73 M^2
GLUCOSE SERPL-MCNC: 160 MG/DL
GLUCOSE UR QL STRIP: NEGATIVE
HCT VFR BLD AUTO: 33.7 %
HGB BLD-MCNC: 11 G/DL
HGB UR QL STRIP: NEGATIVE
KETONES UR QL STRIP: NEGATIVE
LEUKOCYTE ESTERASE UR QL STRIP: NEGATIVE
LYMPHOCYTES # BLD AUTO: 1.1 K/UL
LYMPHOCYTES NFR BLD: 5.1 %
MCH RBC QN AUTO: 31.3 PG
MCHC RBC AUTO-ENTMCNC: 32.6 G/DL
MCV RBC AUTO: 96 FL
MONOCYTES # BLD AUTO: 1.7 K/UL
MONOCYTES NFR BLD: 7.9 %
NEUTROPHILS # BLD AUTO: 18.1 K/UL
NEUTROPHILS NFR BLD: 86.9 %
NITRITE UR QL STRIP: NEGATIVE
PH UR STRIP: 5 [PH] (ref 5–8)
PLATELET # BLD AUTO: 160 K/UL
PMV BLD AUTO: 11.3 FL
POCT GLUCOSE: 119 MG/DL (ref 70–110)
POCT GLUCOSE: 143 MG/DL (ref 70–110)
POTASSIUM SERPL-SCNC: 3.8 MMOL/L
PROT SERPL-MCNC: 6.8 G/DL
PROT UR QL STRIP: NEGATIVE
RBC # BLD AUTO: 3.51 M/UL
SODIUM SERPL-SCNC: 139 MMOL/L
SP GR UR STRIP: 1.01 (ref 1–1.03)
URN SPEC COLLECT METH UR: NORMAL
UROBILINOGEN UR STRIP-ACNC: NEGATIVE EU/DL
WBC # BLD AUTO: 20.79 K/UL

## 2018-11-29 PROCEDURE — 96365 THER/PROPH/DIAG IV INF INIT: CPT

## 2018-11-29 PROCEDURE — 11000001 HC ACUTE MED/SURG PRIVATE ROOM

## 2018-11-29 PROCEDURE — 87186 SC STD MICRODIL/AGAR DIL: CPT

## 2018-11-29 PROCEDURE — 99285 EMERGENCY DEPT VISIT HI MDM: CPT | Mod: 25

## 2018-11-29 PROCEDURE — 83036 HEMOGLOBIN GLYCOSYLATED A1C: CPT

## 2018-11-29 PROCEDURE — 87077 CULTURE AEROBIC IDENTIFY: CPT | Mod: 59

## 2018-11-29 PROCEDURE — 63600175 PHARM REV CODE 636 W HCPCS: Performed by: EMERGENCY MEDICINE

## 2018-11-29 PROCEDURE — 85025 COMPLETE CBC W/AUTO DIFF WBC: CPT

## 2018-11-29 PROCEDURE — 25000003 PHARM REV CODE 250: Performed by: EMERGENCY MEDICINE

## 2018-11-29 PROCEDURE — 87040 BLOOD CULTURE FOR BACTERIA: CPT

## 2018-11-29 PROCEDURE — 87184 SC STD DISK METHOD PER PLATE: CPT

## 2018-11-29 PROCEDURE — 81003 URINALYSIS AUTO W/O SCOPE: CPT

## 2018-11-29 PROCEDURE — 80053 COMPREHEN METABOLIC PANEL: CPT

## 2018-11-29 PROCEDURE — 96367 TX/PROPH/DG ADDL SEQ IV INF: CPT

## 2018-11-29 RX ORDER — LINEZOLID 2 MG/ML
600 INJECTION, SOLUTION INTRAVENOUS
Status: DISCONTINUED | OUTPATIENT
Start: 2018-11-29 | End: 2018-12-01

## 2018-11-29 RX ORDER — LEVETIRACETAM 500 MG/1
500 TABLET ORAL 2 TIMES DAILY
Status: DISCONTINUED | OUTPATIENT
Start: 2018-11-29 | End: 2018-11-30

## 2018-11-29 RX ORDER — SODIUM CHLORIDE 9 MG/ML
INJECTION, SOLUTION INTRAVENOUS CONTINUOUS
Status: DISCONTINUED | OUTPATIENT
Start: 2018-11-29 | End: 2018-11-30

## 2018-11-29 RX ORDER — AMIODARONE HYDROCHLORIDE 200 MG/1
200 TABLET ORAL DAILY
Status: DISCONTINUED | OUTPATIENT
Start: 2018-11-29 | End: 2018-12-03 | Stop reason: HOSPADM

## 2018-11-29 RX ORDER — INSULIN ASPART 100 [IU]/ML
0-5 INJECTION, SOLUTION INTRAVENOUS; SUBCUTANEOUS
Status: DISCONTINUED | OUTPATIENT
Start: 2018-11-29 | End: 2018-12-03 | Stop reason: HOSPADM

## 2018-11-29 RX ORDER — AMLODIPINE BESYLATE 5 MG/1
10 TABLET ORAL DAILY
Status: DISCONTINUED | OUTPATIENT
Start: 2018-11-30 | End: 2018-11-29

## 2018-11-29 RX ORDER — ENOXAPARIN SODIUM 100 MG/ML
40 INJECTION SUBCUTANEOUS EVERY 24 HOURS
Status: DISCONTINUED | OUTPATIENT
Start: 2018-11-29 | End: 2018-12-03 | Stop reason: HOSPADM

## 2018-11-29 RX ORDER — IBUPROFEN 200 MG
16 TABLET ORAL
Status: DISCONTINUED | OUTPATIENT
Start: 2018-11-29 | End: 2018-12-03 | Stop reason: HOSPADM

## 2018-11-29 RX ORDER — ACETAMINOPHEN 325 MG/1
650 TABLET ORAL EVERY 6 HOURS PRN
Status: DISCONTINUED | OUTPATIENT
Start: 2018-11-29 | End: 2018-12-03 | Stop reason: HOSPADM

## 2018-11-29 RX ORDER — IBUPROFEN 200 MG
24 TABLET ORAL
Status: DISCONTINUED | OUTPATIENT
Start: 2018-11-29 | End: 2018-12-03 | Stop reason: HOSPADM

## 2018-11-29 RX ORDER — HYDROCODONE BITARTRATE AND ACETAMINOPHEN 5; 325 MG/1; MG/1
1 TABLET ORAL EVERY 6 HOURS PRN
Status: DISCONTINUED | OUTPATIENT
Start: 2018-11-29 | End: 2018-12-03 | Stop reason: HOSPADM

## 2018-11-29 RX ORDER — FUROSEMIDE 20 MG/1
20 TABLET ORAL DAILY
Status: DISCONTINUED | OUTPATIENT
Start: 2018-11-30 | End: 2018-11-29

## 2018-11-29 RX ORDER — POLYETHYLENE GLYCOL 3350 17 G/17G
17 POWDER, FOR SOLUTION ORAL DAILY
Status: DISCONTINUED | OUTPATIENT
Start: 2018-11-29 | End: 2018-12-03 | Stop reason: HOSPADM

## 2018-11-29 RX ORDER — ATORVASTATIN CALCIUM 40 MG/1
40 TABLET, FILM COATED ORAL DAILY
Status: DISCONTINUED | OUTPATIENT
Start: 2018-11-30 | End: 2018-12-03 | Stop reason: HOSPADM

## 2018-11-29 RX ORDER — ASPIRIN 81 MG/1
81 TABLET ORAL DAILY
Status: ON HOLD | COMMUNITY
End: 2019-01-26 | Stop reason: SDUPTHER

## 2018-11-29 RX ORDER — AMOXICILLIN 250 MG
1 CAPSULE ORAL DAILY PRN
Status: DISCONTINUED | OUTPATIENT
Start: 2018-11-29 | End: 2018-12-03 | Stop reason: HOSPADM

## 2018-11-29 RX ORDER — LOSARTAN POTASSIUM 25 MG/1
100 TABLET ORAL DAILY
Status: DISCONTINUED | OUTPATIENT
Start: 2018-11-29 | End: 2018-11-29

## 2018-11-29 RX ORDER — HYDROCODONE BITARTRATE AND ACETAMINOPHEN 5; 325 MG/1; MG/1
1 TABLET ORAL EVERY 4 HOURS PRN
Status: DISCONTINUED | OUTPATIENT
Start: 2018-11-29 | End: 2018-11-29

## 2018-11-29 RX ORDER — GLUCAGON 1 MG
1 KIT INJECTION
Status: DISCONTINUED | OUTPATIENT
Start: 2018-11-29 | End: 2018-12-03 | Stop reason: HOSPADM

## 2018-11-29 RX ORDER — FUROSEMIDE 20 MG/1
20 TABLET ORAL 2 TIMES DAILY
Status: ON HOLD | COMMUNITY
End: 2018-12-03 | Stop reason: HOSPADM

## 2018-11-29 RX ORDER — RAMELTEON 8 MG/1
8 TABLET ORAL NIGHTLY PRN
Status: DISCONTINUED | OUTPATIENT
Start: 2018-11-29 | End: 2018-12-03 | Stop reason: HOSPADM

## 2018-11-29 RX ORDER — ASPIRIN 81 MG/1
81 TABLET ORAL DAILY
Status: DISCONTINUED | OUTPATIENT
Start: 2018-11-29 | End: 2018-12-03 | Stop reason: HOSPADM

## 2018-11-29 RX ORDER — METOPROLOL SUCCINATE 50 MG/1
50 TABLET, EXTENDED RELEASE ORAL DAILY
Status: DISCONTINUED | OUTPATIENT
Start: 2018-11-29 | End: 2018-11-30

## 2018-11-29 RX ADMIN — SODIUM CHLORIDE: 0.9 INJECTION, SOLUTION INTRAVENOUS at 02:11

## 2018-11-29 RX ADMIN — LEVETIRACETAM 500 MG: 500 TABLET, FILM COATED ORAL at 09:11

## 2018-11-29 RX ADMIN — PIPERACILLIN AND TAZOBACTAM 4.5 G: 4; .5 INJECTION, POWDER, LYOPHILIZED, FOR SOLUTION INTRAVENOUS; PARENTERAL at 12:11

## 2018-11-29 RX ADMIN — ENOXAPARIN SODIUM 40 MG: 100 INJECTION SUBCUTANEOUS at 04:11

## 2018-11-29 RX ADMIN — LINEZOLID 600 MG: 600 INJECTION, SOLUTION INTRAVENOUS at 01:11

## 2018-11-29 NOTE — SUBJECTIVE & OBJECTIVE
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 Coleman    CATARACT EXTRACTION, BILATERAL      EGD (ESOPHAGOGASTRODUODENOSCOPY) N/A 8/30/2018    Performed by Tye Navarrete MD at [facility] ([address] [place] [address])    ENDARTERECTOMY-CAROTID Left 2/19/2018    [facility] by [facility]io Coleman [facility] at [facility] OR    ESOPHAGOGASTRODUODENOSCOPY N/A 8/30/2018    Procedure: EGD (ESOPHAGOGASTRODUODENOSCOPY);  Surgeon: Tye Navarrete MD;  Location: [facility] ([address] [place] [address]);  Service: Endoscopy;  Laterality: N/A;    ESOPHAGOGASTRODUODENOSCOPY  08/30/2018    EYE SURGERY      HERNIA REPAIR Left 09/2013    REPAIR, HERNIA, INGUINAL, WITHOUT HISTORY OF PRIOR REPAIR, AGE 5 YEARS OR OLDER Left 9/11/2013    Performed by Han Brown MD at [facility] OR    REPLACEMENT-VALVE-AORTIC N/A 10/17/2017    Performed by Mart[facility]nez Keene MD at [facility] CATH LAB    RETINAL DETACHMENT SURGERY         Review of patient's allergies indicates:   Allergen Reactions    Ciprofloxacin (mixture) Itching     Extreme  itching and redness     Antibiotic hc     Vancomycin analogues Itching       No current facility-administered medications on file prior to encounter.      Current Outpatient Medications on File Prior to Encounter   Medication Sig    acarbose (PRECOSE) 25 MG Tab Take 25 mg by mouth 3 (three) times daily with meals.    amiodarone (PACERONE) 200 MG Tab Take 1 tablet (200 mg total) by mouth once daily.    aspirin (ECOTRIN) 81 MG EC tablet Take 81 mg by mouth once daily.    furosemide (LASIX) 20 MG tablet Take 20 mg by mouth 2 (two) times daily.    losartan (COZAAR) 100 MG tablet Take 1 tablet (100 mg total) by mouth once daily.    metoprolol succinate (TOPROL-XL) 50 MG 24 hr tablet Take 50 mg by mouth once daily.    SITagliptin (JANUVIA) 25 MG Tab Take 100 mg by mouth once daily.    amLODIPine (NORVASC) 10 MG tablet Take 1 tablet (10 mg total) by mouth once daily.    atorvastatin (LIPITOR) 40 MG tablet Take 1 tablet (40 mg total) by mouth once daily.    guaiFENesin (MUCINEX) 600 mg 12 hr tablet Take 1 tablet (600 mg total) by mouth 2 (two) times daily.    levETIRAcetam (KEPPRA) 500 MG Tab Take 1 tablet (500 mg total) by mouth 2 (two) times daily. for 4 days     Family History     Problem Relation (Age of Onset)    Arthritis Mother    Diabetes Sister    Heart attack Brother    Heart disease Father    Heart failure Father    No Known Problems Maternal Aunt, Maternal Uncle, Paternal Aunt, Paternal Uncle, Maternal Grandmother, Maternal Grandfather, Paternal Grandmother, Paternal Grandfather        Tobacco Use    Smoking status: Former Smoker     Packs/day: 3.00     Years: 40.00     Pack years: 120.00     Types: Cigarettes     Last attempt to quit: 1990     Years since quittin.3    Smokeless tobacco: Never Used   Substance and Sexual Activity    Alcohol use: No    Drug use: No    Sexual activity: Not on file     Review of Systems   Constitutional: Negative for activity change, appetite change,  chills, diaphoresis, fatigue and fever.   HENT: Negative for congestion, sinus pressure, sinus pain, sore throat and trouble swallowing.    Eyes: Negative for visual disturbance.   Respiratory: Negative for cough, shortness of breath and wheezing.    Cardiovascular: Positive for leg swelling (unchanged). Negative for chest pain and palpitations.   Gastrointestinal: Negative for abdominal distention, abdominal pain, constipation, diarrhea, nausea and vomiting.   Genitourinary: Positive for decreased urine volume. Negative for difficulty urinating, frequency and urgency.   Musculoskeletal: Negative for arthralgias and myalgias.   Skin: Positive for color change and rash. Negative for pallor and wound.   Neurological: Negative for dizziness, weakness, light-headedness, numbness and headaches.   Hematological: Negative for adenopathy.     Objective:     Vital Signs (Most Recent):  Temp: 98.6 °F (37 °C) (11/29/18 1450)  Pulse: 72 (11/29/18 1450)  Resp: 17 (11/29/18 1450)  BP: (!) 146/67 (11/29/18 1450)  SpO2: 95 % (11/29/18 1450) Vital Signs (24h Range):  Temp:  [97.9 °F (36.6 °C)-98.6 °F (37 °C)] 98.6 °F (37 °C)  Pulse:  [60-84] 72  Resp:  [9-23] 17  SpO2:  [95 %-98 %] 95 %  BP: (123-148)/(56-67) 146/67     Weight: 83.9 kg (185 lb)  Body mass index is 26.54 kg/m².    Physical Exam   Constitutional: He is oriented to person, place, and time. He appears well-developed and well-nourished. No distress.   HENT:   Head: Normocephalic and atraumatic.   Nose: Nose normal.   Mouth/Throat: Oropharynx is clear and moist. No oropharyngeal exudate.   Eyes: Conjunctivae and EOM are normal. No scleral icterus.   Neck: Neck supple. No tracheal deviation present. No thyromegaly present.   Cardiovascular: Normal rate, regular rhythm, normal heart sounds and intact distal pulses. Exam reveals no gallop and no friction rub.   No murmur heard.  Pulmonary/Chest: Effort normal and breath sounds normal. No stridor. No respiratory distress. He  has no wheezes. He has no rales.   Abdominal: Soft. Bowel sounds are normal. He exhibits no distension and no mass. There is no tenderness. There is no guarding.   Musculoskeletal: He exhibits edema (1+, R leg > L leg). He exhibits no tenderness or deformity.   Lymphadenopathy:     He has no cervical adenopathy.   Neurological: He is alert and oriented to person, place, and time. No cranial nerve deficit or sensory deficit.   Skin: Skin is warm and dry. Rash noted. He is not diaphoretic. There is erythema.   L arm erythema extending from wrist, both surfaces of forearm, to ventral arm. No axillary or epitrochlear lymphadenopathy. No purulence. No open wounds   Nursing note and vitals reviewed.        CRANIAL NERVES     CN III, IV, VI   Extraocular motions are normal.        Significant Labs: All pertinent labs within the past 24 hours have been reviewed.    Significant Imaging: I have reviewed and interpreted all pertinent imaging results/findings within the past 24 hours.

## 2018-11-29 NOTE — ASSESSMENT & PLAN NOTE
Presents with <1 day of nonpurulent erythema to left arm consistent with cellulitis vs erysipelas. Given acuity and lack of purulence, more likely a Strep infection. Given linezolid (vanc allergy) and zosyn in ED. Can continue linezolid for now which will cover both Strep and Staph. Borders outlined on 11/29 for comparison. WBC 20 but no signs of sepsis. Blood cultures pending.

## 2018-11-29 NOTE — H&P
[facility] Medicine  History & Physical    Patient Name: Timbo Gallagher  MRN: [id number]  Admission Date: 11/29/2018  Attending Physician: Aniya Dong MD   Primary Care Provider: Cirilo Montero MD         Patient information was obtained from patient, spouse/SO, past medical records and ER records.     Subjective:     Principal Problem:Cellulitis of left arm    Chief Complaint:   Chief Complaint   Patient presents with    Arm Swelling     left arm swelling noticed last night; hx of CHF        HPI: Mr Timbo Gallagher is a 83 y.o. man with HFpEF, DM, CKD3, achalasia, HTN who presents with left arm swelling. Normal state of health yesterday. Woke up this AM with erythema to the dorsal and ventral surface of his left forearm that spread up his arm. No injury. No animal/bug bites. No contact with dirty water. No fevers, chills, lumps/bumps, pruritis. No other complaints.     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    Snoring     Status post implantation of automatic cardioverter/defibrillator (AICD) 04/23/2018    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    AS s/p TAVR    Stroke 02/2018    TIA s/p L CEA       Past Surgical History:   Procedure Laterality Date    CARDIAC CATHETERIZATION Left 05/08/17, 04/20/18    CARDIAC DEFIBRILLATOR PLACEMENT  04/23/2018    CARDIAC VALVE SURGERY  10/17/2017    AS s/p TAVR    CAR[place]Y [place] 02/2018      [place]    CATARACT EXTRACTION, BILATERAL      EGD (ESOPHAGOGASTRODUODENOSCOPY) N/A 8/30/2018    Performed by Tye Navarrete MD at [facility] ENDO (2ND FLR)    ENDARTERECTOMY-CAROTID Left 2/19/2018    Performed by Silvio Coleman MD at [facility] OR    ESOPHAGOGASTRODUODENOSCOPY N/A 8/30/2018    Procedure: EGD (ESOPHAGOGASTRODUODENOSCOPY);  Surgeon: Tye Navarrete MD;  Location: [facility] ENDO (2ND FLR);  Service: Endoscopy;  Laterality: N/A;    ESOPHAGOGASTRODUODENOSCOPY  08/30/2018    EYE SURGERY      HERNIA REPAIR Left 09/2013    REPAIR, HERNIA, INGUINAL, WITHOUT HISTORY OF PRIOR REPAIR, AGE 5 YEARS OR OLDER Left 9/11/2013    Performed by Han Brown MD at [facility] OR    REPLACEMENT-VALVE-AORTIC N/A 10/17/2017    Performed by Martínez Keene MD at [facility] CATH LAB    RETINAL DETACHMENT SURGERY         Review of patient's allergies indicates:   Allergen Reactions    Ciprofloxacin (mixture) Itching     Extreme itching and redness     Antibiotic hc     Vancomycin analogues Itching       No current facility-administered medications on file prior to encounter.      Current Outpatient Medications on File Prior to Encounter   Medication Sig    acarbose (PRECOSE) 25 MG Tab Take 25 mg by mouth 3 (three) times daily with meals.    amiodarone (PACERONE) 200 MG Tab Take 1 tablet (200 mg total) by mouth once daily.    aspirin (ECOTRIN) 81 MG EC tablet Take 81 mg by mouth once daily.    furosemide (LASIX) 20 MG tablet Take 20 mg by mouth 2 (two) times daily.    losartan (COZAAR) 100 MG tablet Take 1 tablet (100 mg total) by mouth once daily.    metoprolol succinate (TOPROL-XL) 50 MG 24 hr tablet Take 50 mg by mouth once daily.    SITagliptin (JANUVIA) 25 MG Tab Take 100 mg by mouth once daily.    amLODIPine (NORVASC) 10 MG tablet Take 1 tablet (10 mg total) by mouth once daily.    atorvastatin (LIPITOR) 40 MG tablet Take 1 tablet (40 mg total) by mouth once daily.    guaiFENesin (MUCINEX) 600 mg 12 hr tablet Take  1 tablet (600 mg total) by mouth 2 (two) times daily.    levETIRAcetam (KEPPRA) 500 MG Tab Take 1 tablet (500 mg total) by mouth 2 (two) times daily. for 4 days     NOTE: Patient does NOT know his home medications. His wife has a list; she left the hospital and is not currently answering the phone. I am unable to verify home med list at this time. He will need a medication reconciliation when wife is available.     Family History     Problem Relation (Age of Onset)    Arthritis Mother    Diabetes Sister    Heart attack Brother    Heart disease Father    Heart failure Father    No Known Problems Maternal Aunt, Maternal Uncle, Paternal Aunt, Paternal Uncle, Maternal Grandmother, Maternal Grandfather, Paternal Grandmother, Paternal Grandfather        Tobacco Use    Smoking status: Former Smoker     Packs/day: 3.00     Years: 40.00     Pack years: 120.00     Types: Cigarettes     Last attempt to quit: 1990     Years since quittin.3    Smokeless tobacco: Never Used   Substance and Sexual Activity    Alcohol use: No    Drug use: No    Sexual activity: Not on file     Review of Systems   Constitutional: Negative for activity change, appetite change, chills, diaphoresis, fatigue and fever.   HENT: Negative for congestion, sinus pressure, sinus pain, sore throat and trouble swallowing.    Eyes: Negative for visual disturbance.   Respiratory: Negative for cough, shortness of breath and wheezing.    Cardiovascular: Positive for leg swelling (unchanged). Negative for chest pain and palpitations.   Gastrointestinal: Negative for abdominal distention, abdominal pain, constipation, diarrhea, nausea and vomiting.   Genitourinary: Positive for decreased urine volume. Negative for difficulty urinating, frequency and urgency.   Musculoskeletal: Negative for arthralgias and myalgias.   Skin: Positive for color change and rash. Negative for pallor and wound.   Neurological: Negative for dizziness, weakness,  light-headedness, numbness and headaches.   Hematological: Negative for adenopathy.     Objective:     Vital Signs (Most Recent):  Temp: 98.6 °F (37 °C) (11/29/18 1450)  Pulse: 72 (11/29/18 1450)  Resp: 17 (11/29/18 1450)  BP: (!) 146/67 (11/29/18 1450)  SpO2: 95 % (11/29/18 1450) Vital Signs (24h Range):  Temp:  [97.9 °F (36.6 °C)-98.6 °F (37 °C)] 98.6 °F (37 °C)  Pulse:  [60-84] 72  Resp:  [9-23] 17  SpO2:  [95 %-98 %] 95 %  BP: (123-148)/(56-67) 146/67     Weight: 83.9 kg (185 lb)  Body mass index is 26.54 kg/m².    Physical Exam   Constitutional: He is oriented to person, place, and time. He appears well-developed and well-nourished. No distress.   HENT:   Head: Normocephalic and atraumatic.   Nose: Nose normal.   Mouth/Throat: Oropharynx is clear and moist. No oropharyngeal exudate.   Eyes: Conjunctivae and EOM are normal. No scleral icterus.   Neck: Neck supple. No tracheal deviation present. No thyromegaly present.   Cardiovascular: Normal rate, regular rhythm, normal heart sounds and intact distal pulses. Exam reveals no gallop and no friction rub.   No murmur heard.  Pulmonary/Chest: Effort normal and breath sounds normal. No stridor. No respiratory distress. He has no wheezes. He has no rales.   Abdominal: Soft. Bowel sounds are normal. He exhibits no distension and no mass. There is no tenderness. There is no guarding.   Musculoskeletal: He exhibits edema (1+, R leg > L leg). He exhibits no tenderness or deformity.   Lymphadenopathy:     He has no cervical adenopathy.   Neurological: He is alert and oriented to person, place, and time. No cranial nerve deficit or sensory deficit.   Skin: Skin is warm and dry. Rash noted. He is not diaphoretic. There is erythema.   L arm erythema extending from wrist, both surfaces of forearm, to ventral arm. No axillary or epitrochlear lymphadenopathy. No purulence. No open wounds   Nursing note and vitals reviewed.        CRANIAL NERVES     CN III, IV, VI   Extraocular  motions are normal.        Significant Labs: All pertinent labs within the past 24 hours have been reviewed.    Significant Imaging: I have reviewed and interpreted all pertinent imaging results/findings within the past 24 hours.    Assessment/Plan:     * Cellulitis of left arm    Presents with <1 day of nonpurulent erythema to left arm consistent with cellulitis vs erysipelas. Given acuity and lack of purulence, more likely a Strep infection. Given linezolid (vanc allergy) and zosyn in ED. Can continue linezolid for now which will cover both Strep and Staph. Borders outlined on 11/29 for comparison. WBC 20 but no signs of sepsis. Blood cultures pending.        Acute renal failure superimposed on stage 3 chronic kidney disease    Presents with Cr 2 from baseline 1.4  UA pending  Suspect prerenal due to infection  IVF overnight  Monitor in AM       AICD (automatic cardioverter/defibrillator) present    No issues       Mixed hyperlipidemia    Continue statin       S/P TAVR (transcatheter aortic valve replacement)    For AS       Aortic valve stenosis    S/p TAVR  No acute issues       Essential hypertension    Continue BB  Hold losartan given BERNY  Patient no longer on amlodipine per wife due to lower extremity edema       Chronic diastolic heart failure    No signs of acute exacerbation  Does have bilateral lower extremity edema- attributed to amlodipine  Monitor  Continue BP Rx. Hold lasix given BERNY       Mild protein malnutrition    Encourage diet       Anemia of chronic disease    Hgb at baseline       Benign prostatic hyperplasia without lower urinary tract symptoms    Not on meds at home. Monitor UOP. Bladder scan if low urine output.        Gastroesophageal reflux disease without esophagitis    With prior diagnosis of achalasia. Not on PPI at home. Monitor        Type 2 diabetes mellitus with stage 3 chronic kidney disease    A1c: 7.1%  Meds: januvia at home. Hold.   ADA diet, accuchecks ACHS, hypoglycemic  protocol           VTE Risk Mitigation (From admission, onward)        Ordered     enoxaparin injection 40 mg  Daily      11/29/18 1408     IP VTE HIGH RISK PATIENT  Once      11/29/18 1408             Aniya Dong MD  Department of Hospital Medicine   [facility]

## 2018-11-29 NOTE — ASSESSMENT & PLAN NOTE
Continue BB  Hold losartan given BERNY  Patient no longer on amlodipine per wife due to lower extremity edema

## 2018-11-29 NOTE — ASSESSMENT & PLAN NOTE
No signs of acute exacerbation  Does have bilateral lower extremity edema- attributed to amlodipine  Monitor  Continue BP Rx. Hold lasix given BERNY

## 2018-11-29 NOTE — ASSESSMENT & PLAN NOTE
Presents with Cr 2 from baseline 1.4  UA pending  Suspect prerenal due to infection  IVF overnight  Monitor in AM

## 2018-11-29 NOTE — HPI
Mr Timbo Gallagher is a 83 y.o. man with HFpEF, DM, CKD3, achalasia, HTN who presents with left arm swelling. Normal state of health yesterday. Woke up this AM with erythema to the dorsal and ventral surface of his left forearm that spread up his arm. No injury. No animal/bug bites. No contact with dirty water. No fevers, chills, lumps/bumps, pruritis. No other complaints.

## 2018-11-29 NOTE — ED PROVIDER NOTES
"Encounter Date: 11/29/2018     This is a 83 y.o. male complaining of acute left arm swelling and associated pain that began last night. Denies trauma. History of CHF.    I have evaluated and conducted a medical screening exam with initial orders entered, if indicated, to expedite care. The patient will be placed in a treatment area when one is available. Care will be transferred to an alternate provider for a full assessment including but not limited to: history, physical exam, additional orders, and final disposition.    Brennon Villalobos NP      SCRIBE #1 NOTE: I, Monae Lewis, am scribing for, and in the presence of,  Brandt Funez MD. I have scribed the following portions of the note - Other sections scribed: HPI, ROS.       History     Chief Complaint   Patient presents with    Arm Swelling     left arm swelling noticed last night; hx of CHF     CC: Arm Swelling    HPI: This is a 82 y/o male with PMHx of BPH, Carotid artery occlusion, CHF, DM, GERD, HTN, NSVT, S/P TAVR, Stenosis of aortic and mitral valves, and Stroke who presents to the ED for emergent evaluation of acute onset left arm swelling that radiates from wrist to elbow with associated itching and pain that was noticed last night. Pt rates pain as mild (3/10). Pt went to PCP, Dr. Montero, today and was sent to this facility for further evaluation. Pt notes that he wears a watch on his left arm daily but removed it due to the swelling. Pt switched from a leather watch band to a metal watch band last week. Pt used lotion and Benadryl yesterday with no relief of symptoms. Denies any new lotions, detergents, or soaps. Pt denies eating any new foods yesterday. Pt notes similar symptoms "years ago," and he states that he was seen by provider, given an unknown shot, and told that they were "unsure what it was." Pt denies chest pain, SOB, nausea, vomiting, diarrhea, vision changes, palpitations, fever, diaphoresis, or chills. Pt's wife reports that pt " "has chronic bilateral leg edema. Wife states that she noticed "some spots on the back of his right leg that look like bites." No further symptoms reported.    Of note, pt is compliant with his Lasix 20 mg daily.      The history is provided by the patient and the spouse. No  was used.     Review of patient's allergies indicates:   Allergen Reactions    Ciprofloxacin (mixture) Itching     Extreme itching and redness     Antibiotic hc     Vancomycin analogues Itching     Past Medical History:   Diagnosis Date    Arthritis     Blind right eye     BPH (benign prostatic hypertrophy)     Bronchitis, chronic     Carotid artery occlusion     CHF (congestive heart failure)     Colon polyp     Diabetes mellitus     GERD (gastroesophageal reflux disease)     Hearing aid worn     bilateral    Hernia     Hypertension     Influenza A     Irregular heart beat     NSVT (nonsustained ventricular tachycardia) 4/18/2018    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 Coleman    CATARACT EXTRACTION, BILATERAL      EGD (ESOPHAGOGASTRODUODENOSCOPY) N/A 8/30/2018    Performed by Tye Navarrete MD at [facility] ([address] [place] [address])    ENDARTERECTOMY-CAROTID Left 2/19/2018    Performed by [facility]io Coleman [facility] at [facility] OR    ESOPHAGOGASTRODUODENOSCOPY N/A 8/30/2018    Procedure: EGD (ESOPHAGOGASTRODUODENOSCOPY);  Surgeon: Tye Navarrete MD;  Location: [facility] ([address] [place] [address]);  Service: Endoscopy;  Laterality: N/A;    EYE SURGERY      HERNIA REPAIR      REPAIR, HERNIA, INGUINAL, WITHOUT HISTORY OF PRIOR REPAIR, AGE 5 YEARS OR OLDER Left 9/11/2013    Performed by Han Brown MD at [facility] OR    REPLACEMENT-VALVE-AORTIC N/A " "10/17/2017    Performed by Martínez Keene MD at [facility] CATH LAB    RETINAL DETACHMENT SURGERY       Family History   Problem Relation Age of Onset    Arthritis Mother     Heart disease Father     Heart failure Father     Diabetes Sister     Heart attack Brother     No Known Problems Maternal Aunt     No Known Problems Maternal Uncle     No Known Problems Paternal Aunt     No Known Problems Paternal Uncle     No Known Problems Maternal Grandmother     No Known Problems Maternal Grandfather     No Known Problems Paternal Grandmother     No Known Problems Paternal Grandfather     Anemia Neg Hx     Arrhythmia Neg Hx     Asthma Neg Hx     Clotting disorder Neg Hx     Fainting Neg Hx     Hyperlipidemia Neg Hx     Hypertension Neg Hx     Stroke Neg Hx     Atrial Septal Defect Neg Hx      Social History     Tobacco Use    Smoking status: Former Smoker     Packs/day: 3.00     Years: 40.00     Pack years: 120.00     Types: Cigarettes     Last attempt to quit: 1990     Years since quittin.3    Smokeless tobacco: Never Used   Substance Use Topics    Alcohol use: No    Drug use: No     Review of Systems   Constitutional: Negative for chills and fever.   HENT: Negative for congestion, ear pain, rhinorrhea and sore throat.    Eyes: Negative for pain and visual disturbance.   Respiratory: Negative for cough and shortness of breath.    Cardiovascular: Positive for leg swelling (chronic b/l edema). Negative for chest pain.   Gastrointestinal: Negative for abdominal pain, diarrhea, nausea and vomiting.   Genitourinary: Negative for dysuria.   Musculoskeletal: Positive for joint swelling (to left arm with associated pain and itching). Negative for back pain and neck pain.   Skin: Positive for color change (redness to left arm). Negative for rash.        (+) "spots on back of right leg" per wife   Neurological: Negative for headaches.   All other systems reviewed and are negative.      Physical Exam "     Initial Vitals [11/29/18 1013]   BP Pulse Resp Temp SpO2   (!) 123/56 84 20 97.9 °F (36.6 °C) 98 %      MAP       --         Physical Exam  The patient was examined specifically for the following:   General:No significant distress, Good color, Warm and dry. Head and neck:Scalp atraumatic, Neck supple. Neurological:Appropriate conversation, Gross motor deficits. Eyes:Conjugate gaze, Clear corneas. ENT: No epistaxis. Cardiac: Regular rate and rhythm, Grossly normal heart tones. Pulmonary: Wheezing, Rales. Gastrointestinal: Abdominal tenderness, Abdominal distention. Musculoskeletal: Extremity deformity, Apparent pain with range of motion of the joints. Skin: Rash.   The findings on examination were normal except for the following:  Patient has erythema extending from the wrist all the way to the proximal medial humeral arm on the left side.  There is swelling erythema and tenderness. The patient has a small laceration in the middle dorsal forearm.  The area is warm and tender  ED Course   Procedures  Labs Reviewed   COMPREHENSIVE METABOLIC PANEL - Abnormal; Notable for the following components:       Result Value    Glucose 160 (*)     BUN, Bld 35 (*)     Creatinine 2.0 (*)     Albumin 3.2 (*)     eGFR if  35 (*)     eGFR if non  30 (*)     All other components within normal limits   CBC W/ AUTO DIFFERENTIAL - Abnormal; Notable for the following components:    WBC 20.79 (*)     RBC 3.51 (*)     Hemoglobin 11.0 (*)     Hematocrit 33.7 (*)     MCH 31.3 (*)     Gran # (ANC) 18.1 (*)     Mono # 1.7 (*)     Gran% 86.9 (*)     Lymph% 5.1 (*)     All other components within normal limits   CULTURE, BLOOD   CULTURE, BLOOD          Imaging Results    None       Medical decision making:  Given the above this patient has cellulitis of his left upper extremity.  [place] [address].  Blood cultures were done.  The patient does not appear ill toxic or septic.  I will admit him and  treat him with Zyvox and Zosyn.  Case was presented to Dr. Dong who will assume care the patient on the floor.                Scribe Attestation:   Scribe #1: I performed the above scribed service and the documentation accurately describes the services I performed. I attest to the accuracy of the note.    Attending Attestation:           Physician Attestation for Scribe:  Physician Attestation Statement for Scribe #1: I, Brandt Funez MD, reviewed documentation, as scribed by Monae Lewis in my presence, and it is both accurate and complete.                    Clinical Impression:   The encounter diagnosis was Cellulitis of left arm.                             Brandt Funez MD  11/29/18 8033

## 2018-11-29 NOTE — ED TRIAGE NOTES
"Pt arrived to ED with c/o L arm swelling x "last night." PMH CHF. No acute distress noted. Pt connected to continuous cardiac monitor, bp cuff, and pulse ox. Call light within reach.   "

## 2018-11-29 NOTE — PLAN OF CARE
Problem: Patient Care Overview  Goal: Plan of Care Review  Outcome: Ongoing (interventions implemented as appropriate)  Received today from ED. Left arm red and swollen. Monitor labs. IVF and IVAB in progress. Alert and oriented. Blood sugars ac/hs. On diabetic diet. Ambulates with minimal assistance.

## 2018-11-29 NOTE — HOSPITAL COURSE
Admitted with L arm cellulitis that appeared overnight. Started on linezolid due to vanc allergy and received zosyn in ED. Blood cultures 11/29 grew GNRs-- speciated as Pasturella multocida. Discussed with patient- he does have a hernandez retriever at home. No cat exposure. He was not bitten but he cannot remember if his arm was licked by his dog or not. He says that he was picking at an area of dry skin on his left forearm; that could have been a portal for infection. Called micro lab- they will run antibiotic sensitivities on Pasturella. Cannot use levofloxacin/moxifloxacin due to prolonged QTc on amiodarone (550 on last check) and possible ciprofloxacin allergy (though chart review indicates erythema and itching happened after vancomycin, not cipro). Repeat cultures drawn 11/30 are no growth to date. Added cefepime on 11/30 after blood cultures came back with GNRs; added flagyl after speciated as Pasturella; linezolid discontinued. Cellulitis is slowly improving. Awaiting antibiotic sensitivities to determine outpatient treatment. ID consulted. Recommend continued outpatient IV antibiotics- either cefepime or zosyn. PICC placed 12/3. Discharged to home with PICC and home health for outpatient IV cefepime to complete 2 week course for Pasteurella bacteremia and cellulitis.

## 2018-11-30 ENCOUNTER — TELEPHONE (OUTPATIENT)
Dept: UROLOGY | Facility: CLINIC | Age: 83
End: 2018-11-30

## 2018-11-30 PROBLEM — R78.81 BACTEREMIA: Status: ACTIVE | Noted: 2018-11-30

## 2018-11-30 PROBLEM — E87.6 HYPOKALEMIA: Status: ACTIVE | Noted: 2018-11-30

## 2018-11-30 LAB
ANION GAP SERPL CALC-SCNC: 6 MMOL/L
BASOPHILS # BLD AUTO: 0.01 K/UL
BASOPHILS NFR BLD: 0.1 %
BUN SERPL-MCNC: 23 MG/DL
CALCIUM SERPL-MCNC: 7.6 MG/DL
CHLORIDE SERPL-SCNC: 113 MMOL/L
CO2 SERPL-SCNC: 20 MMOL/L
CREAT SERPL-MCNC: 1.2 MG/DL
DIFFERENTIAL METHOD: ABNORMAL
EOSINOPHIL # BLD AUTO: 0.1 K/UL
EOSINOPHIL NFR BLD: 0.8 %
ERYTHROCYTE [DISTWIDTH] IN BLOOD BY AUTOMATED COUNT: 14.3 %
EST. GFR  (AFRICAN AMERICAN): >60 ML/MIN/1.73 M^2
EST. GFR  (NON AFRICAN AMERICAN): 56 ML/MIN/1.73 M^2
ESTIMATED AVG GLUCOSE: 126 MG/DL
GLUCOSE SERPL-MCNC: 110 MG/DL
HBA1C MFR BLD HPLC: 6 %
HCT VFR BLD AUTO: 26.7 %
HGB BLD-MCNC: 8.7 G/DL
LYMPHOCYTES # BLD AUTO: 1 K/UL
LYMPHOCYTES NFR BLD: 8.5 %
MCH RBC QN AUTO: 31.8 PG
MCHC RBC AUTO-ENTMCNC: 32.6 G/DL
MCV RBC AUTO: 97 FL
MONOCYTES # BLD AUTO: 0.9 K/UL
MONOCYTES NFR BLD: 7.7 %
NEUTROPHILS # BLD AUTO: 10 K/UL
NEUTROPHILS NFR BLD: 82.9 %
PLATELET # BLD AUTO: 138 K/UL
PMV BLD AUTO: 11 FL
POCT GLUCOSE: 114 MG/DL (ref 70–110)
POCT GLUCOSE: 140 MG/DL (ref 70–110)
POCT GLUCOSE: 143 MG/DL (ref 70–110)
POCT GLUCOSE: 165 MG/DL (ref 70–110)
POTASSIUM SERPL-SCNC: 3.1 MMOL/L
RBC # BLD AUTO: 2.74 M/UL
SODIUM SERPL-SCNC: 139 MMOL/L
WBC # BLD AUTO: 12.11 K/UL

## 2018-11-30 PROCEDURE — 63600175 PHARM REV CODE 636 W HCPCS: Performed by: HOSPITALIST

## 2018-11-30 PROCEDURE — 85025 COMPLETE CBC W/AUTO DIFF WBC: CPT

## 2018-11-30 PROCEDURE — 11000001 HC ACUTE MED/SURG PRIVATE ROOM

## 2018-11-30 PROCEDURE — 25000003 PHARM REV CODE 250: Performed by: EMERGENCY MEDICINE

## 2018-11-30 PROCEDURE — 25000003 PHARM REV CODE 250: Performed by: HOSPITALIST

## 2018-11-30 PROCEDURE — 80048 BASIC METABOLIC PNL TOTAL CA: CPT

## 2018-11-30 PROCEDURE — 87040 BLOOD CULTURE FOR BACTERIA: CPT | Mod: 59

## 2018-11-30 PROCEDURE — 36415 COLL VENOUS BLD VENIPUNCTURE: CPT

## 2018-11-30 PROCEDURE — 63600175 PHARM REV CODE 636 W HCPCS: Performed by: EMERGENCY MEDICINE

## 2018-11-30 RX ORDER — METOPROLOL SUCCINATE 50 MG/1
50 TABLET, EXTENDED RELEASE ORAL 2 TIMES DAILY
Status: DISCONTINUED | OUTPATIENT
Start: 2018-11-30 | End: 2018-12-03 | Stop reason: HOSPADM

## 2018-11-30 RX ORDER — FUROSEMIDE 20 MG/1
20 TABLET ORAL DAILY
Status: DISCONTINUED | OUTPATIENT
Start: 2018-12-01 | End: 2018-12-03 | Stop reason: HOSPADM

## 2018-11-30 RX ORDER — POTASSIUM CHLORIDE 20 MEQ/1
40 TABLET, EXTENDED RELEASE ORAL EVERY 4 HOURS
Status: COMPLETED | OUTPATIENT
Start: 2018-11-30 | End: 2018-11-30

## 2018-11-30 RX ORDER — CEFEPIME HYDROCHLORIDE 2 G/50ML
2 INJECTION, SOLUTION INTRAVENOUS
Status: DISCONTINUED | OUTPATIENT
Start: 2018-11-30 | End: 2018-12-03 | Stop reason: HOSPADM

## 2018-11-30 RX ORDER — LOSARTAN POTASSIUM 25 MG/1
100 TABLET ORAL DAILY
Status: DISCONTINUED | OUTPATIENT
Start: 2018-11-30 | End: 2018-12-03 | Stop reason: HOSPADM

## 2018-11-30 RX ADMIN — CEFEPIME HYDROCHLORIDE 2 G: 2 INJECTION, SOLUTION INTRAVENOUS at 11:11

## 2018-11-30 RX ADMIN — LEVETIRACETAM 500 MG: 500 TABLET, FILM COATED ORAL at 08:11

## 2018-11-30 RX ADMIN — LINEZOLID 600 MG: 600 INJECTION, SOLUTION INTRAVENOUS at 12:11

## 2018-11-30 RX ADMIN — ASPIRIN 81 MG: 81 TABLET, COATED ORAL at 08:11

## 2018-11-30 RX ADMIN — AMIODARONE HYDROCHLORIDE 200 MG: 200 TABLET ORAL at 08:11

## 2018-11-30 RX ADMIN — LOSARTAN POTASSIUM 100 MG: 25 TABLET, FILM COATED ORAL at 02:11

## 2018-11-30 RX ADMIN — POLYETHYLENE GLYCOL 3350 17 G: 17 POWDER, FOR SOLUTION ORAL at 08:11

## 2018-11-30 RX ADMIN — POTASSIUM CHLORIDE 40 MEQ: 1500 TABLET, EXTENDED RELEASE ORAL at 02:11

## 2018-11-30 RX ADMIN — ATORVASTATIN CALCIUM 40 MG: 40 TABLET, FILM COATED ORAL at 08:11

## 2018-11-30 RX ADMIN — METOPROLOL SUCCINATE 50 MG: 50 TABLET, EXTENDED RELEASE ORAL at 09:11

## 2018-11-30 RX ADMIN — POTASSIUM CHLORIDE 40 MEQ: 1500 TABLET, EXTENDED RELEASE ORAL at 10:11

## 2018-11-30 RX ADMIN — ENOXAPARIN SODIUM 40 MG: 100 INJECTION SUBCUTANEOUS at 04:11

## 2018-11-30 RX ADMIN — CEFEPIME HYDROCHLORIDE 2 G: 2 INJECTION, SOLUTION INTRAVENOUS at 06:11

## 2018-11-30 RX ADMIN — METOPROLOL SUCCINATE 50 MG: 50 TABLET, EXTENDED RELEASE ORAL at 08:11

## 2018-11-30 RX ADMIN — ACETAMINOPHEN 650 MG: 325 TABLET ORAL at 02:11

## 2018-11-30 NOTE — TELEPHONE ENCOUNTER
----- Message from Yarelis Gurber sent at 11/30/2018 10:21 AM CST -----  Contact: [facility] case Management  Please call pt to schedule 1 week hospital follow up. Thanks

## 2018-11-30 NOTE — ASSESSMENT & PLAN NOTE
Presents with Cr 2 from baseline 1.4  UA not consistent with infection   Suspect prerenal due to cellulitis  IVF overnight  Cr now at baseline.

## 2018-11-30 NOTE — ASSESSMENT & PLAN NOTE
No signs of acute exacerbation  Does have bilateral lower extremity edema- attributed to amlodipine  Monitor  Continue metoprolol and losartan. Resume lasix tomorrow

## 2018-11-30 NOTE — ASSESSMENT & PLAN NOTE
- Presents with <1 day of nonpurulent erythema to left arm consistent with cellulitis vs erysipelas. Given acuity and lack of purulence, more likely a Strep infection. Given linezolid (vanc allergy) and zosyn in ED. Can continue linezolid for now which will cover both Strep and Staph. Borders outlined on 11/29 for comparison. WBC 20 but no signs of sepsis.   - blood cultures 11/29 with GNR in 2 bottles. Repeat blood cultures ordered  - WBC trending down. Cellulitis appears unchanged. Added cefepime to regimen for GNR bacteremia

## 2018-11-30 NOTE — ASSESSMENT & PLAN NOTE
Continue metoprolol  Resume losartan   Patient no longer on amlodipine per wife due to lower extremity edema

## 2018-11-30 NOTE — PLAN OF CARE
Problem: Patient Care Overview  Goal: Plan of Care Review  Outcome: Ongoing (interventions implemented as appropriate)   11/30/18 1654   Coping/Psychosocial   Plan Of Care Reviewed With patient   Pt AAOx4; NAD; VSS; IV abx continued; pain controlled by PRN tylenol; resp equal bilat and unlabored; pt remains free of falls and injury; side rails up x2; bed in lowest position; call bell in reach; will continue to monitor and assess.

## 2018-11-30 NOTE — SUBJECTIVE & OBJECTIVE
Interval History: Some nausea. Erythema on arm is about the same. No new complaints.     Review of Systems   Constitutional: Negative for chills, fatigue and fever.   Respiratory: Negative for cough and shortness of breath.    Cardiovascular: Negative for chest pain.   Gastrointestinal: Positive for nausea. Negative for abdominal pain, constipation, diarrhea and vomiting.   Genitourinary: Negative for difficulty urinating.   Skin: Positive for color change and rash.     Objective:     Vital Signs (Most Recent):  Temp: 98.5 °F (36.9 °C) (11/30/18 1126)  Pulse: 63 (11/30/18 1126)  Resp: 17 (11/30/18 1126)  BP: (!) 144/67 (11/30/18 1126)  SpO2: (!) 94 % (11/30/18 1126) Vital Signs (24h Range):  Temp:  [98.4 °F (36.9 °C)-99.3 °F (37.4 °C)] 98.5 °F (36.9 °C)  Pulse:  [61-77] 63  Resp:  [16-20] 17  SpO2:  [92 %-96 %] 94 %  BP: (126-147)/(57-69) 144/67     Weight: 88.4 kg (194 lb 12.5 oz)  Body mass index is 27.95 kg/m².    Intake/Output Summary (Last 24 hours) at 11/30/2018 1445  Last data filed at 11/30/2018 1232  Gross per 24 hour   Intake 2070 ml   Output 960 ml   Net 1110 ml      Physical Exam   Constitutional: He is oriented to person, place, and time. He appears well-developed and well-nourished. No distress.   HENT:   Head: Normocephalic and atraumatic.   Mouth/Throat: Oropharynx is clear and moist.   Cardiovascular: Normal rate, regular rhythm, normal heart sounds and intact distal pulses. Exam reveals no gallop and no friction rub.   No murmur heard.  Pulmonary/Chest: Effort normal and breath sounds normal. No stridor. No respiratory distress. He has no wheezes. He has no rales.   Abdominal: Soft. Bowel sounds are normal. He exhibits no distension and no mass. There is no tenderness. There is no guarding.   Neurological: He is alert and oriented to person, place, and time.   Skin: Skin is warm and dry. Rash (left arm to elbow, unchanged from yesterday) noted. He is not diaphoretic. There is erythema.   Vitals  reviewed.      Significant Labs: All pertinent labs within the past 24 hours have been reviewed.    Significant Imaging: I have reviewed and interpreted all pertinent imaging results/findings within the past 24 hours.

## 2018-11-30 NOTE — PLAN OF CARE
Problem: Patient Care Overview  Goal: Plan of Care Review  Outcome: Ongoing (interventions implemented as appropriate)  No falls, trauma or injury this shift. Skin remains intact. Blood glucose monitored before each meal and at hour of sleep. Continue with care plan and continue to monitor patient.

## 2018-11-30 NOTE — ASSESSMENT & PLAN NOTE
A1c: 7.1%  Meds: januvia and acarbose at home. Hold. SSI PRN  ADA diet, accuchecks ACHS, hypoglycemic protocol

## 2018-11-30 NOTE — PLAN OF CARE
11/30/18 1318   Discharge Assessment   Assessment Type Discharge Planning Assessment   Confirmed/corrected address and phone number on facesheet? Yes   Assessment information obtained from? Patient;Caregiver   Prior to hospitilization cognitive status: Alert/Oriented   Prior to hospitalization functional status: Independent   Current cognitive status: Alert/Oriented   Current Functional Status: Independent   Lives With spouse   Patient's perception of discharge disposition home or selfcare   Readmission Within The Last 30 Days no previous admission in last 30 days   Equipment Currently Used at Home walker, rolling  (Does not use)   Do you have any problems affording any of your prescribed medications? No   Is the patient taking medications as prescribed? yes   Does the patient have transportation home? Yes   Transportation Available family or friend will provide   Discharge Plan A Home with family   Discharge Plan B Home with family   Patient/Family In Agreement With Plan yes       [facility] - [address] [address]  Phone: [phone] Fax: [phone]

## 2018-12-01 LAB
ANION GAP SERPL CALC-SCNC: 6 MMOL/L
BASOPHILS # BLD AUTO: 0.01 K/UL
BASOPHILS NFR BLD: 0.1 %
BUN SERPL-MCNC: 19 MG/DL
CALCIUM SERPL-MCNC: 9.2 MG/DL
CHLORIDE SERPL-SCNC: 110 MMOL/L
CO2 SERPL-SCNC: 23 MMOL/L
CREAT SERPL-MCNC: 1.3 MG/DL
DIFFERENTIAL METHOD: ABNORMAL
EOSINOPHIL # BLD AUTO: 0.2 K/UL
EOSINOPHIL NFR BLD: 1.8 %
ERYTHROCYTE [DISTWIDTH] IN BLOOD BY AUTOMATED COUNT: 14.3 %
EST. GFR  (AFRICAN AMERICAN): 58 ML/MIN/1.73 M^2
EST. GFR  (NON AFRICAN AMERICAN): 50 ML/MIN/1.73 M^2
GLUCOSE SERPL-MCNC: 133 MG/DL
HCT VFR BLD AUTO: 33.4 %
HGB BLD-MCNC: 10.6 G/DL
LYMPHOCYTES # BLD AUTO: 0.6 K/UL
LYMPHOCYTES NFR BLD: 5.3 %
MCH RBC QN AUTO: 30.6 PG
MCHC RBC AUTO-ENTMCNC: 31.7 G/DL
MCV RBC AUTO: 97 FL
MONOCYTES # BLD AUTO: 0.9 K/UL
MONOCYTES NFR BLD: 8.1 %
NEUTROPHILS # BLD AUTO: 9.5 K/UL
NEUTROPHILS NFR BLD: 84.7 %
PLATELET # BLD AUTO: 165 K/UL
PMV BLD AUTO: 11.6 FL
POCT GLUCOSE: 108 MG/DL (ref 70–110)
POCT GLUCOSE: 129 MG/DL (ref 70–110)
POTASSIUM SERPL-SCNC: 4.2 MMOL/L
RBC # BLD AUTO: 3.46 M/UL
SODIUM SERPL-SCNC: 139 MMOL/L
WBC # BLD AUTO: 11.24 K/UL

## 2018-12-01 PROCEDURE — 25000003 PHARM REV CODE 250: Performed by: EMERGENCY MEDICINE

## 2018-12-01 PROCEDURE — 36415 COLL VENOUS BLD VENIPUNCTURE: CPT

## 2018-12-01 PROCEDURE — 80048 BASIC METABOLIC PNL TOTAL CA: CPT

## 2018-12-01 PROCEDURE — 63600175 PHARM REV CODE 636 W HCPCS: Performed by: HOSPITALIST

## 2018-12-01 PROCEDURE — 85025 COMPLETE CBC W/AUTO DIFF WBC: CPT

## 2018-12-01 PROCEDURE — 11000001 HC ACUTE MED/SURG PRIVATE ROOM

## 2018-12-01 PROCEDURE — 63600175 PHARM REV CODE 636 W HCPCS: Performed by: EMERGENCY MEDICINE

## 2018-12-01 PROCEDURE — 25000003 PHARM REV CODE 250: Performed by: HOSPITALIST

## 2018-12-01 RX ORDER — LINEZOLID 2 MG/ML
600 INJECTION, SOLUTION INTRAVENOUS
Status: DISCONTINUED | OUTPATIENT
Start: 2018-12-01 | End: 2018-12-02

## 2018-12-01 RX ADMIN — FUROSEMIDE 20 MG: 20 TABLET ORAL at 08:12

## 2018-12-01 RX ADMIN — AMIODARONE HYDROCHLORIDE 200 MG: 200 TABLET ORAL at 08:12

## 2018-12-01 RX ADMIN — CEFEPIME HYDROCHLORIDE 2 G: 2 INJECTION, SOLUTION INTRAVENOUS at 03:12

## 2018-12-01 RX ADMIN — ASPIRIN 81 MG: 81 TABLET, COATED ORAL at 08:12

## 2018-12-01 RX ADMIN — CEFEPIME HYDROCHLORIDE 2 G: 2 INJECTION, SOLUTION INTRAVENOUS at 11:12

## 2018-12-01 RX ADMIN — LINEZOLID 600 MG: 600 INJECTION, SOLUTION INTRAVENOUS at 12:12

## 2018-12-01 RX ADMIN — ENOXAPARIN SODIUM 40 MG: 100 INJECTION SUBCUTANEOUS at 05:12

## 2018-12-01 RX ADMIN — PROMETHAZINE HYDROCHLORIDE 6.25 MG: 25 INJECTION INTRAMUSCULAR; INTRAVENOUS at 04:12

## 2018-12-01 RX ADMIN — LINEZOLID 600 MG: 600 INJECTION, SOLUTION INTRAVENOUS at 09:12

## 2018-12-01 RX ADMIN — ACETAMINOPHEN 650 MG: 325 TABLET ORAL at 08:12

## 2018-12-01 RX ADMIN — LOSARTAN POTASSIUM 100 MG: 25 TABLET, FILM COATED ORAL at 08:12

## 2018-12-01 RX ADMIN — METOPROLOL SUCCINATE 50 MG: 50 TABLET, EXTENDED RELEASE ORAL at 08:12

## 2018-12-01 RX ADMIN — METOPROLOL SUCCINATE 50 MG: 50 TABLET, EXTENDED RELEASE ORAL at 09:12

## 2018-12-01 RX ADMIN — ATORVASTATIN CALCIUM 40 MG: 40 TABLET, FILM COATED ORAL at 08:12

## 2018-12-01 RX ADMIN — CEFEPIME HYDROCHLORIDE 2 G: 2 INJECTION, SOLUTION INTRAVENOUS at 09:12

## 2018-12-01 NOTE — ASSESSMENT & PLAN NOTE
- Presents with <1 day of nonpurulent erythema to left arm consistent with cellulitis vs erysipelas. Given acuity and lack of purulence, more likely a Strep infection. Given linezolid (vanc allergy) and zosyn in ED. Can continue linezolid for now which will cover both Strep and Staph. Borders outlined on 11/29 for comparison. WBC 20 but no signs of sepsis.   - blood cultures 11/29 with GNR in 2 bottles. Repeat blood cultures 11/30  - WBC normalized. Cellulitis appears unchanged. Added cefepime to regimen for GNR bacteremia on 11/30. Still no areas of induration/fluctuance that would necessitate surgical intervention

## 2018-12-01 NOTE — ASSESSMENT & PLAN NOTE
GNR in 2 bottles on 11/29-- speciation and sensitivity pending   Repeat cultures 11/30  Added cefepime 11/30

## 2018-12-01 NOTE — SUBJECTIVE & OBJECTIVE
Interval History: Abdominal discomfort and nausea resolved. Complains of poor sleep overnight    Review of Systems   Constitutional: Negative for chills, fatigue and fever.   Respiratory: Negative for cough and shortness of breath.    Cardiovascular: Negative for chest pain.   Gastrointestinal: Negative for abdominal pain, constipation, diarrhea, nausea and vomiting.   Genitourinary: Negative for difficulty urinating.   Skin: Positive for color change and rash.     Objective:     Vital Signs (Most Recent):  Temp: 98.5 °F (36.9 °C) (12/01/18 0413)  Pulse: 73 (12/01/18 0413)  Resp: 18 (12/01/18 0413)  BP: (!) 140/65 (12/01/18 0413)  SpO2: (!) 91 % (12/01/18 0413) Vital Signs (24h Range):  Temp:  [98.1 °F (36.7 °C)-99 °F (37.2 °C)] 98.5 °F (36.9 °C)  Pulse:  [63-73] 73  Resp:  [17-18] 18  SpO2:  [91 %-95 %] 91 %  BP: (126-146)/(60-67) 140/65     Weight: 88.4 kg (194 lb 15 oz)  Body mass index is 27.97 kg/m².    Intake/Output Summary (Last 24 hours) at 12/1/2018 0747  Last data filed at 12/1/2018 0600  Gross per 24 hour   Intake 1380 ml   Output 520 ml   Net 860 ml      Physical Exam   Constitutional: He is oriented to person, place, and time. He appears well-developed and well-nourished. No distress.   HENT:   Head: Normocephalic and atraumatic.   Mouth/Throat: Oropharynx is clear and moist.   Cardiovascular: Normal rate, regular rhythm, normal heart sounds and intact distal pulses. Exam reveals no gallop and no friction rub.   No murmur heard.  Pulmonary/Chest: Effort normal and breath sounds normal. No stridor. No respiratory distress. He has no wheezes. He has no rales.   Abdominal: Soft. Bowel sounds are normal. He exhibits no distension and no mass. There is no tenderness. There is no guarding.   Neurological: He is alert and oriented to person, place, and time.   Skin: Skin is warm and dry. Rash (left arm to elbow, unchanged from yesterday) noted. He is not diaphoretic. There is erythema.   Vitals  reviewed.      Significant Labs: All pertinent labs within the past 24 hours have been reviewed.    Significant Imaging: I have reviewed and interpreted all pertinent imaging results/findings within the past 24 hours.

## 2018-12-01 NOTE — PROGRESS NOTES
[facility] Hospital Medicine  Progress Note    Patient Name: Timbo Gallagher  MRN: [id number]  Patient Class: IP- Inpatient   Admission Date: 11/29/2018  Length of Stay: 2 days  Attending Physician: Aniya Dong MD  Primary Care Provider: Cirilo Montero MD        Subjective:     Principal Problem:Cellulitis of left arm    HPI:  Mr Timbo Gallagher is a 83 y.o. man with HFpEF, DM, CKD3, achalasia, HTN who presents with left arm swelling. Normal state of health yesterday. Woke up this AM with erythema to the dorsal and ventral surface of his left forearm that spread up his arm. No injury. No animal/bug bites. No contact with dirty water. No fevers, chills, lumps/bumps, pruritis. No other complaints.     Hospital Course:  Admitted with L arm cellulitis vs erysipelas. Started on linezolid due to vanc allergy and received zosyn in ED. Blood cultures 11/29 grew GNRs. Repeat cultures drawn 11/30. Added cefepime on 11/30. Cellulitis appears unchanged from admit.      Interval History: Abdominal discomfort and nausea resolved. Complains of poor sleep overnight    Review of Systems   Constitutional: Negative for chills, fatigue and fever.   Respiratory: Negative for cough and shortness of breath.    Cardiovascular: Negative for chest pain.   Gastrointestinal: Negative for abdominal pain, constipation, diarrhea, nausea and vomiting.   Genitourinary: Negative for difficulty urinating.   Skin: Positive for color change and rash.     Objective:     Vital Signs (Most Recent):  Temp: 98.5 °F (36.9 °C) (12/01/18 0413)  Pulse: 73 (12/01/18 0413)  Resp: 18 (12/01/18 0413)  BP: (!) 140/65 (12/01/18 0413)  SpO2: (!) 91 % (12/01/18 0413) Vital Signs (24h Range):  Temp:  [98.1 °F (36.7 °C)-99 °F (37.2 °C)] 98.5 °F (36.9 °C)  Pulse:  [63-73] 73  Resp:  [17-18] 18  SpO2:  [91 %-95 %] 91 %  BP: (126-146)/(60-67) 140/65     Weight: 88.4 kg (194 lb 15 oz)  Body mass index is 27.97 kg/m².    Intake/Output Summary  (Last 24 hours) at 12/1/2018 0747  Last data filed at 12/1/2018 0600  Gross per 24 hour   Intake 1380 ml   Output 520 ml   Net 860 ml      Physical Exam   Constitutional: He is oriented to person, place, and time. He appears well-developed and well-nourished. No distress.   HENT:   Head: Normocephalic and atraumatic.   Mouth/Throat: Oropharynx is clear and moist.   Cardiovascular: Normal rate, regular rhythm, normal heart sounds and intact distal pulses. Exam reveals no gallop and no friction rub.   No murmur heard.  Pulmonary/Chest: Effort normal and breath sounds normal. No stridor. No respiratory distress. He has no wheezes. He has no rales.   Abdominal: Soft. Bowel sounds are normal. He exhibits no distension and no mass. There is no tenderness. There is no guarding.   Neurological: He is alert and oriented to person, place, and time.   Skin: Skin is warm and dry. Rash (left arm to elbow, unchanged from yesterday) noted. He is not diaphoretic. There is erythema.   Vitals reviewed.      Significant Labs: All pertinent labs within the past 24 hours have been reviewed.    Significant Imaging: I have reviewed and interpreted all pertinent imaging results/findings within the past 24 hours.    Assessment/Plan:      * Cellulitis of left arm    - Presents with <1 day of nonpurulent erythema to left arm consistent with cellulitis vs erysipelas. Given acuity and lack of purulence, more likely a Strep infection. Given linezolid (vanc allergy) and zosyn in ED. Can continue linezolid for now which will cover both Strep and Staph. Borders outlined on 11/29 for comparison. WBC 20 but no signs of sepsis.   - blood cultures 11/29 with GNR in 2 bottles. Repeat blood cultures 11/30  - WBC normalized. Cellulitis appears unchanged. Added cefepime to regimen for GNR bacteremia on 11/30. Still no areas of induration/fluctuance that would necessitate surgical intervention        Bacteremia    GNR in 2 bottles on 11/29-- speciation and  sensitivity pending   Repeat cultures 11/30  Added cefepime 11/30       Hypokalemia    Replete and monitor       Acute renal failure superimposed on stage 3 chronic kidney disease    Presents with Cr 2 from baseline 1.4  UA not consistent with infection   Suspect prerenal due to cellulitis  IVF overnight  Cr now at baseline.        AICD (automatic cardioverter/defibrillator) present    No issues       Mixed hyperlipidemia    Continue statin       S/P TAVR (transcatheter aortic valve replacement)    For AS       Aortic valve stenosis    S/p TAVR  No acute issues       Essential hypertension    Continue metoprolol and losartan   Patient no longer on amlodipine per wife due to lower extremity edema       Chronic diastolic heart failure    No signs of acute exacerbation  Does have bilateral lower extremity edema- attributed to amlodipine which was recently discontinued  Monitor  Continue metoprolol and losartan. Resume lasix     Mild protein malnutrition    Encourage diet       Anemia of chronic disease    Hgb at baseline       Benign prostatic hyperplasia without lower urinary tract symptoms    Not on meds at home. Monitor UOP. Bladder scan if low urine output.        Gastroesophageal reflux disease without esophagitis    With prior diagnosis of achalasia. Not on PPI at home. Monitor        Type 2 diabetes mellitus with stage 3 chronic kidney disease    A1c: 7.1%  Meds: januvia and acarbose at home. Hold. SSI PRN  ADA diet, accuchecks ACHS, hypoglycemic protocol           VTE Risk Mitigation (From admission, onward)        Ordered     enoxaparin injection 40 mg  Daily      11/29/18 1408     IP VTE HIGH RISK PATIENT  Once      11/29/18 1408              Aniya Dong MD  Department of Hospital Medicine   [facility]

## 2018-12-01 NOTE — ASSESSMENT & PLAN NOTE
Continue metoprolol and losartan   Patient no longer on amlodipine per wife due to lower extremity edema

## 2018-12-01 NOTE — PLAN OF CARE
Problem: Patient Care Overview  Goal: Plan of Care Review  Outcome: Ongoing (interventions implemented as appropriate)   12/01/18 0421   Coping/Psychosocial   Plan Of Care Reviewed With patient   No falls, trauma or injury this shift. Blood glucose monitored before meals and at hour of sleep. Skin remains intact. Left arm continues to have redness, warmth and swelling however it has significantly reduced over the last 2 days. Continue with plan of care and continue to monitor patient.   12/01/18 0421   Coping/Psychosocial   Plan Of Care Reviewed With patient

## 2018-12-02 PROBLEM — A28.0: Status: ACTIVE | Noted: 2018-11-29

## 2018-12-02 LAB
ANION GAP SERPL CALC-SCNC: 7 MMOL/L
BASOPHILS # BLD AUTO: 0.01 K/UL
BASOPHILS NFR BLD: 0.1 %
BUN SERPL-MCNC: 18 MG/DL
CALCIUM SERPL-MCNC: 9.2 MG/DL
CHLORIDE SERPL-SCNC: 110 MMOL/L
CO2 SERPL-SCNC: 26 MMOL/L
CREAT SERPL-MCNC: 1.5 MG/DL
DIFFERENTIAL METHOD: ABNORMAL
EOSINOPHIL # BLD AUTO: 0.5 K/UL
EOSINOPHIL NFR BLD: 5.8 %
ERYTHROCYTE [DISTWIDTH] IN BLOOD BY AUTOMATED COUNT: 14 %
EST. GFR  (AFRICAN AMERICAN): 49 ML/MIN/1.73 M^2
EST. GFR  (NON AFRICAN AMERICAN): 42 ML/MIN/1.73 M^2
GLUCOSE SERPL-MCNC: 119 MG/DL
HCT VFR BLD AUTO: 34.3 %
HGB BLD-MCNC: 11.2 G/DL
LYMPHOCYTES # BLD AUTO: 1 K/UL
LYMPHOCYTES NFR BLD: 11.5 %
MCH RBC QN AUTO: 31.5 PG
MCHC RBC AUTO-ENTMCNC: 32.7 G/DL
MCV RBC AUTO: 97 FL
MONOCYTES # BLD AUTO: 0.9 K/UL
MONOCYTES NFR BLD: 10.1 %
NEUTROPHILS # BLD AUTO: 6.4 K/UL
NEUTROPHILS NFR BLD: 72.5 %
PLATELET # BLD AUTO: 179 K/UL
PMV BLD AUTO: 11.5 FL
POCT GLUCOSE: 120 MG/DL (ref 70–110)
POCT GLUCOSE: 165 MG/DL (ref 70–110)
POCT GLUCOSE: 169 MG/DL (ref 70–110)
POCT GLUCOSE: 227 MG/DL (ref 70–110)
POTASSIUM SERPL-SCNC: 4.4 MMOL/L
RBC # BLD AUTO: 3.55 M/UL
SODIUM SERPL-SCNC: 143 MMOL/L
WBC # BLD AUTO: 8.8 K/UL

## 2018-12-02 PROCEDURE — 25000003 PHARM REV CODE 250: Performed by: EMERGENCY MEDICINE

## 2018-12-02 PROCEDURE — 25000003 PHARM REV CODE 250: Performed by: HOSPITALIST

## 2018-12-02 PROCEDURE — 85025 COMPLETE CBC W/AUTO DIFF WBC: CPT

## 2018-12-02 PROCEDURE — 36415 COLL VENOUS BLD VENIPUNCTURE: CPT

## 2018-12-02 PROCEDURE — 63600175 PHARM REV CODE 636 W HCPCS: Performed by: EMERGENCY MEDICINE

## 2018-12-02 PROCEDURE — 63600175 PHARM REV CODE 636 W HCPCS: Performed by: HOSPITALIST

## 2018-12-02 PROCEDURE — 80048 BASIC METABOLIC PNL TOTAL CA: CPT

## 2018-12-02 PROCEDURE — 11000001 HC ACUTE MED/SURG PRIVATE ROOM

## 2018-12-02 RX ORDER — LEVOFLOXACIN 750 MG/1
750 TABLET ORAL EVERY OTHER DAY
Status: DISCONTINUED | OUTPATIENT
Start: 2018-12-02 | End: 2018-12-02

## 2018-12-02 RX ORDER — METRONIDAZOLE 500 MG/1
500 TABLET ORAL EVERY 8 HOURS
Status: DISCONTINUED | OUTPATIENT
Start: 2018-12-02 | End: 2018-12-03

## 2018-12-02 RX ADMIN — METOPROLOL SUCCINATE 50 MG: 50 TABLET, EXTENDED RELEASE ORAL at 09:12

## 2018-12-02 RX ADMIN — LOSARTAN POTASSIUM 100 MG: 25 TABLET, FILM COATED ORAL at 09:12

## 2018-12-02 RX ADMIN — METRONIDAZOLE 500 MG: 500 TABLET ORAL at 01:12

## 2018-12-02 RX ADMIN — CEFEPIME HYDROCHLORIDE 2 G: 2 INJECTION, SOLUTION INTRAVENOUS at 06:12

## 2018-12-02 RX ADMIN — ENOXAPARIN SODIUM 40 MG: 100 INJECTION SUBCUTANEOUS at 05:12

## 2018-12-02 RX ADMIN — CEFEPIME HYDROCHLORIDE 2 G: 2 INJECTION, SOLUTION INTRAVENOUS at 04:12

## 2018-12-02 RX ADMIN — ASPIRIN 81 MG: 81 TABLET, COATED ORAL at 09:12

## 2018-12-02 RX ADMIN — CEFEPIME HYDROCHLORIDE 2 G: 2 INJECTION, SOLUTION INTRAVENOUS at 11:12

## 2018-12-02 RX ADMIN — METRONIDAZOLE 500 MG: 500 TABLET ORAL at 09:12

## 2018-12-02 RX ADMIN — AMIODARONE HYDROCHLORIDE 200 MG: 200 TABLET ORAL at 09:12

## 2018-12-02 RX ADMIN — ATORVASTATIN CALCIUM 40 MG: 40 TABLET, FILM COATED ORAL at 09:12

## 2018-12-02 RX ADMIN — INSULIN ASPART 2 UNITS: 100 INJECTION, SOLUTION INTRAVENOUS; SUBCUTANEOUS at 05:12

## 2018-12-02 RX ADMIN — FUROSEMIDE 20 MG: 20 TABLET ORAL at 09:12

## 2018-12-02 NOTE — PLAN OF CARE
Problem: Patient Care Overview  Goal: Plan of Care Review  Outcome: Ongoing (interventions implemented as appropriate)  Pt. AAOx4; calm and cooperative. No complaints of pain/ discomfort. Room free from hazardous items. Patient instructed to call if assistance is needed. Scheduled meds given without any difficulty. Call light in reach bed in lowest position; side rails x 2. Will continue to monitor.      12/02/18 0431   Coping/Psychosocial   Plan Of Care Reviewed With patient     Problem: Skin Integrity Impairment, Risk/Actual (Adult)  Goal: Skin Integrity/Wound Healing  Patient will demonstrate the desired outcomes by discharge/transition of care.  Outcome: Ongoing (interventions implemented as appropriate)   12/02/18 0431   Skin Integrity Impairment, Risk/Actual (Adult)   Skin Integrity/Wound Healing making progress toward outcome   Patient repositions independently.     Problem: Fall Risk (Adult)  Goal: Absence of Falls  Patient will demonstrate the desired outcomes by discharge/transition of care.  Outcome: Ongoing (interventions implemented as appropriate)   12/02/18 0431   Fall Risk (Adult)   Absence of Falls making progress toward outcome   Patient remains free form falls; bed alarm activated.

## 2018-12-02 NOTE — PLAN OF CARE
Problem: Skin Integrity Impairment, Risk/Actual (Adult)  Intervention: Promote/Optimize Nutrition   12/02/18 1435   Nutrition Interventions   Oral Nutrition Promotion calorie dense liquids provided;rest periods promoted;physical activity promoted;social interaction promoted     Intervention: Prevent/Manage Excess Moisture   12/02/18 1435   Hygiene Care   Bathing/Skin Care bath, complete   Skin Interventions   Skin Protection Adhesive use limited;Transparent dressing;Tubing/devices free from skin contact     Intervention: Prevent/Minimize Sheer/Friction Injuries   11/30/18 2038 12/02/18 0800   Skin Interventions   Pressure Reduction Techniques frequent weight shift encouraged;heels elevated off bed --    Positioning   Positioning/Transfer Devices --  in use;pillows       Goal: Identify Related Risk Factors and Signs and Symptoms  Related risk factors and signs and symptoms are identified upon initiation of Human Response Clinical Practice Guideline (CPG)  Outcome: Ongoing (interventions implemented as appropriate)   12/02/18 1435   Skin Integrity Impairment, Risk/Actual   Related Risk Factors (Skin Integrity Impairment, Risk/Actual) age extremes;edema;environmental exposure;immobility;infection/disease process;medication effects   Signs and Symptoms (Skin Integrity Impairment) edema;inflammation;induration;rash     Goal: Skin Integrity/Wound Healing  Patient will demonstrate the desired outcomes by discharge/transition of care.  Outcome: Ongoing (interventions implemented as appropriate)   12/02/18 1435   Skin Integrity Impairment, Risk/Actual (Adult)   Skin Integrity/Wound Healing making progress toward outcome       Comments: Patient remains free from falls and injury. No distress noted. VSS. Questions encouraged and answered. Patient verbalized understanding. Will continue to monitor, will continue with plan of care.

## 2018-12-02 NOTE — ASSESSMENT & PLAN NOTE
No issues  On amio 200mg daily, possibly for prior NSVT? Reason not mentioned in Malu's last note. QTc prolonged on last EKG at 550. Prevents use of fluoroquinolone

## 2018-12-02 NOTE — PLAN OF CARE
Problem: Skin Integrity Impairment, Risk/Actual (Adult)  Intervention: Prevent/Minimize Sheer/Friction Injuries   11/30/18 2038 12/02/18 0800   Skin Interventions   Pressure Reduction Techniques frequent weight shift encouraged;heels elevated off bed --    Positioning   Positioning/Transfer Devices --  in use;pillows       Goal: Identify Related Risk Factors and Signs and Symptoms  Related risk factors and signs and symptoms are identified upon initiation of Human Response Clinical Practice Guideline (CPG)  Outcome: Ongoing (interventions implemented as appropriate)   12/02/18 1435   Skin Integrity Impairment, Risk/Actual   Related Risk Factors (Skin Integrity Impairment, Risk/Actual) age extremes;edema;environmental exposure;immobility;infection/disease process;medication effects   Signs and Symptoms (Skin Integrity Impairment) edema;inflammation;induration;rash     Goal: Skin Integrity/Wound Healing  Patient will demonstrate the desired outcomes by discharge/transition of care.  Outcome: Ongoing (interventions implemented as appropriate)   12/02/18 1539   Skin Integrity Impairment, Risk/Actual (Adult)   Skin Integrity/Wound Healing making progress toward outcome       Comments: Patient remains free from falls and injury. No distress noted. VSS. No complaint of pain, n/v, diarrhea, or SOB. Will continue to monitor, will continue with plan of care.

## 2018-12-02 NOTE — ASSESSMENT & PLAN NOTE
A1c: 7.1%  Meds: januvia and acarbose at home. Hold. SSI PRN. Well controlled.   ADA diet, accuchecks ACHS, hypoglycemic protocol

## 2018-12-02 NOTE — PROGRESS NOTES
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 man with HFpEF, DM, CKD3, achalasia, HTN who presents with left arm swelling. Normal state of health yesterday. Woke up this AM with erythema to the dorsal and ventral surface of his left forearm that spread up his arm. No injury. No animal/bug bites. No contact with dirty water. No fevers, chills, lumps/bumps, pruritis. No other complaints.     Hospital Course:  Admitted with L arm cellulitis that appeared overnight. Started on linezolid due to vanc allergy and received zosyn in ED. Blood cultures 11/29 grew GNRs-- speciated as Pasturella multocida. Discussed with patient- he does have a hernandez retriever at home. No cat exposure. He was not bitten but he cannot remember if his arm was licked by his dog or not. He says that he was picking at an area of dry skin on his left forearm; that could have been a portal for infection. Called micro lab- they will run antibiotic sensitivities on Pasturella. Cannot use levofloxacin/moxifloxacin due to prolonged QTc on amiodarone (550 on last check) and possible ciprofloxacin allergy (though chart review indicates erythema and itching happened after vancomycin, not cipro). Repeat cultures drawn 11/30 are no growth to date. Added cefepime on 11/30 after blood cultures came back with GNRs; added flagyl after speciated as Pasturella; linezolid discontinued. Cellulitis is slowly improving. Awaiting antibiotic sensitivities to determine outpatient treatment. ID consulted.     Interval History: Rash is improving slowly. Patient is eager to be discharged to home    Review of Systems    Constitutional: Negative for chills, fatigue and fever.   Respiratory: Negative for cough and shortness of breath.    Cardiovascular: Negative for chest pain.   Gastrointestinal: Negative for abdominal pain, constipation, diarrhea, nausea and vomiting.   Genitourinary: Negative for difficulty urinating.   Skin: Positive for color change and rash.     Objective:     Vital Signs (Most Recent):  Temp: 98.4 °F (36.9 °C) (12/02/18 1217)  Pulse: 75 (12/02/18 1217)  Resp: 17 (12/02/18 1217)  BP: (!) 148/70 (12/02/18 1217)  SpO2: (!) 93 % (12/02/18 1217) Vital Signs (24h Range):  Temp:  [98 °F (36.7 °C)-99.2 °F (37.3 °C)] 98.4 °F (36.9 °C)  Pulse:  [60-75] 75  Resp:  [17-20] 17  SpO2:  [93 %-95 %] 93 %  BP: (133-163)/(65-72) 148/70     Weight: 88.4 kg (194 lb 15 oz)  Body mass index is 27.97 kg/m².    Intake/Output Summary (Last 24 hours) at 12/2/2018 1306  Last data filed at 12/2/2018 1100  Gross per 24 hour   Intake 120 ml   Output 2000 ml   Net -1880 ml      Physical Exam   Constitutional: He is oriented to person, place, and time. He appears well-developed and well-nourished. No distress.   HENT:   Head: Normocephalic and atraumatic.   Mouth/Throat: Oropharynx is clear and moist.   Cardiovascular: Normal rate, regular rhythm, normal heart sounds and intact distal pulses. Exam reveals no gallop and no friction rub.   No murmur heard.  Pulmonary/Chest: Effort normal and breath sounds normal. No stridor. No respiratory distress. He has no wheezes. He has no rales.   Abdominal: Soft. Bowel sounds are normal. He exhibits no distension and no mass. There is no tenderness. There is no guarding.   Neurological: He is alert and oriented to person, place, and time.   Skin: Skin is warm and dry. Rash (left arm to elbow. Less indurated. no purulence) noted. He is not diaphoretic. There is erythema (unchanged).   Vitals reviewed.      Significant Labs: All pertinent labs within the past 24 hours have been reviewed.    Significant  Imaging: I have reviewed and interpreted all pertinent imaging results/findings within the past 24 hours.    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 Given acuity and lack of purulence, more likely a Strep infection. Given linezolid (vanc allergy) and zosyn in ED. Can continue linezolid for now which will cover both Strep and Staph. Borders outlined on 11/29 for comparison. WBC 20 but no signs of sepsis.   - blood cultures 11/29 with Pasteurella multocida. Cleared on repeat cultures   - Discussed with patient- it is possible that his hernandez retriever licked an open sore on his arm. That may have been the portal for entry  - WBC normalized. Area of erythema is unchanged but induration is improving. Added cefepime to regimen for GNR bacteremia on 11/30. Discontinued linezolid and added flagyl on 12/2.   - Unable to use fluoroquinolone due to prolonged QTc and amiodarone use (as well as possible reaction). Sensitivities on Pasteurella are pending.   - ID consulted  - Still no areas of induration/fluctuance that would necessitate surgical intervention        Bacteremia    Due to Pasteurella  Repeat cultures cleared  From cellulitis       Hypokalemia    Replete and monitor  Resolved       Acute renal failure superimposed on stage 3 chronic kidney disease    Presents with Cr 2 from baseline 1.4  UA not consistent with infection   Suspect prerenal due to cellulitis  IVF overnight  Cr now at baseline.        AICD (automatic cardioverter/defibrillator) present    No issues  On amio 200mg daily, possibly for prior NSVT? Reason not mentioned in Malu's last note. QTc prolonged on last EKG at 550. Prevents use of fluoroquinolone       Mixed hyperlipidemia    Continue statin       S/P TAVR (transcatheter aortic valve replacement)    For AS       Aortic valve stenosis    S/p TAVR  No acute issues        Essential hypertension    Continue metoprolol and losartan   Patient no longer on amlodipine per wife due to lower extremity edema       Chronic diastolic heart failure    No signs of acute exacerbation  Does have bilateral lower extremity edema- attributed to amlodipine which was recently discontinued  Monitor  Continue metoprolol and losartan. Resume lasix     Mild protein malnutrition    Encourage diet       Anemia of chronic disease    Hgb at baseline       Benign prostatic hyperplasia without lower urinary tract symptoms    Not on meds at home. Monitor UOP. Bladder scan if low urine output.        Gastroesophageal reflux disease without esophagitis    With prior diagnosis of achalasia. Not on PPI at home. Monitor        Type 2 diabetes mellitus with stage 3 chronic kidney disease    A1c: 7.1%  Meds: januvia and acarbose at home. Hold. SSI PRN. Well controlled.   ADA diet, accuchecks ACHS, hypoglycemic protocol           VTE Risk Mitigation (From admission, onward)        Ordered     enoxaparin injection 40 mg  Daily      11/29/18 1408     IP VTE HIGH RISK PATIENT  Once      11/29/18 1408              Aniya Dong MD  Department of Hospital Medicine   [facility]

## 2018-12-02 NOTE — ASSESSMENT & PLAN NOTE
No signs of acute exacerbation  Does have bilateral lower extremity edema- attributed to amlodipine which was recently discontinued  Monitor  Continue metoprolol and losartan. Resume lasix

## 2018-12-02 NOTE — PLAN OF CARE
Problem: Skin Integrity Impairment, Risk/Actual (Adult)  Intervention: Promote/Optimize Nutrition   12/02/18 1435   Nutrition Interventions   Oral Nutrition Promotion calorie dense liquids provided;rest periods promoted;physical activity promoted;social interaction promoted     Intervention: Prevent/Manage Excess Moisture   11/30/18 2038 12/02/18 1435   Hygiene Care   Perineal Care absorbent pad changed --    Bathing/Skin Care --  bath, complete   Skin Interventions   Skin Protection --  Adhesive use limited;Transparent dressing;Tubing/devices free from skin contact     Intervention: Prevent/Minimize Sheer/Friction Injuries   11/30/18 2038 12/02/18 0800   Skin Interventions   Pressure Reduction Techniques frequent weight shift encouraged;heels elevated off bed --    Positioning   Positioning/Transfer Devices --  in use;pillows       Goal: Identify Related Risk Factors and Signs and Symptoms  Related risk factors and signs and symptoms are identified upon initiation of Human Response Clinical Practice Guideline (CPG)  Outcome: Ongoing (interventions implemented as appropriate)   12/02/18 1435   Skin Integrity Impairment, Risk/Actual   Related Risk Factors (Skin Integrity Impairment, Risk/Actual) age extremes;edema;environmental exposure;immobility;infection/disease process;medication effects   Signs and Symptoms (Skin Integrity Impairment) edema;inflammation;induration;rash     Goal: Skin Integrity/Wound Healing  Patient will demonstrate the desired outcomes by discharge/transition of care.  Outcome: Ongoing (interventions implemented as appropriate)   12/02/18 1435   Skin Integrity Impairment, Risk/Actual (Adult)   Skin Integrity/Wound Healing making progress toward outcome       Comments: Patient remains free from falls and injury. No distress noted. VSS. Questions encouraged and answered. Patient verbalized understanding. Will continue to monitor, will continue with plan of care.

## 2018-12-02 NOTE — SUBJECTIVE & OBJECTIVE
Interval History: Rash is improving slowly. Patient is eager to be discharged to home    Review of Systems   Constitutional: Negative for chills, fatigue and fever.   Respiratory: Negative for cough and shortness of breath.    Cardiovascular: Negative for chest pain.   Gastrointestinal: Negative for abdominal pain, constipation, diarrhea, nausea and vomiting.   Genitourinary: Negative for difficulty urinating.   Skin: Positive for color change and rash.     Objective:     Vital Signs (Most Recent):  Temp: 98.4 °F (36.9 °C) (12/02/18 1217)  Pulse: 75 (12/02/18 1217)  Resp: 17 (12/02/18 1217)  BP: (!) 148/70 (12/02/18 1217)  SpO2: (!) 93 % (12/02/18 1217) Vital Signs (24h Range):  Temp:  [98 °F (36.7 °C)-99.2 °F (37.3 °C)] 98.4 °F (36.9 °C)  Pulse:  [60-75] 75  Resp:  [17-20] 17  SpO2:  [93 %-95 %] 93 %  BP: (133-163)/(65-72) 148/70     Weight: 88.4 kg (194 lb 15 oz)  Body mass index is 27.97 kg/m².    Intake/Output Summary (Last 24 hours) at 12/2/2018 1306  Last data filed at 12/2/2018 1100  Gross per 24 hour   Intake 120 ml   Output 2000 ml   Net -1880 ml      Physical Exam   Constitutional: He is oriented to person, place, and time. He appears well-developed and well-nourished. No distress.   HENT:   Head: Normocephalic and atraumatic.   Mouth/Throat: Oropharynx is clear and moist.   Cardiovascular: Normal rate, regular rhythm, normal heart sounds and intact distal pulses. Exam reveals no gallop and no friction rub.   No murmur heard.  Pulmonary/Chest: Effort normal and breath sounds normal. No stridor. No respiratory distress. He has no wheezes. He has no rales.   Abdominal: Soft. Bowel sounds are normal. He exhibits no distension and no mass. There is no tenderness. There is no guarding.   Neurological: He is alert and oriented to person, place, and time.   Skin: Skin is warm and dry. Rash (left arm to elbow. Less indurated. no purulence) noted. He is not diaphoretic. There is erythema (unchanged).   Vitals  reviewed.      Significant Labs: All pertinent labs within the past 24 hours have been reviewed.    Significant Imaging: I have reviewed and interpreted all pertinent imaging results/findings within the past 24 hours.

## 2018-12-02 NOTE — ASSESSMENT & PLAN NOTE
- Presents with <1 day of nonpurulent erythema to left arm consistent with cellulitis vs erysipelas. Given acuity and lack of purulence, more likely a Strep infection. Given linezolid (vanc allergy) and zosyn in ED. Can continue linezolid for now which will cover both Strep and Staph. Borders outlined on 11/29 for comparison. WBC 20 but no signs of sepsis.   - blood cultures 11/29 with Pasteurella multocida. Cleared on repeat cultures   - Discussed with patient- it is possible that his hernandez retriever licked an open sore on his arm. That may have been the portal for entry  - WBC normalized. Area of erythema is unchanged but induration is improving. Added cefepime to regimen for GNR bacteremia on 11/30. Discontinued linezolid and added flagyl on 12/2.   - Unable to use fluoroquinolone due to prolonged QTc and amiodarone use (as well as possible reaction). Sensitivities on Pasteurella are pending.   - ID consulted  - Still no areas of induration/fluctuance that would necessitate surgical intervention

## 2018-12-03 VITALS
WEIGHT: 194.94 LBS | BODY MASS INDEX: 27.91 KG/M2 | RESPIRATION RATE: 19 BRPM | DIASTOLIC BLOOD PRESSURE: 65 MMHG | OXYGEN SATURATION: 95 % | HEART RATE: 60 BPM | TEMPERATURE: 98 F | SYSTOLIC BLOOD PRESSURE: 141 MMHG | HEIGHT: 70 IN

## 2018-12-03 LAB
ANION GAP SERPL CALC-SCNC: 9 MMOL/L
BACTERIA BLD CULT: NORMAL
BASOPHILS # BLD AUTO: 0.01 K/UL
BASOPHILS NFR BLD: 0.1 %
BUN SERPL-MCNC: 20 MG/DL
CALCIUM SERPL-MCNC: 9.2 MG/DL
CHLORIDE SERPL-SCNC: 108 MMOL/L
CO2 SERPL-SCNC: 23 MMOL/L
CREAT SERPL-MCNC: 1.6 MG/DL
DIFFERENTIAL METHOD: ABNORMAL
EOSINOPHIL # BLD AUTO: 0.5 K/UL
EOSINOPHIL NFR BLD: 5.7 %
ERYTHROCYTE [DISTWIDTH] IN BLOOD BY AUTOMATED COUNT: 13.9 %
EST. GFR  (AFRICAN AMERICAN): 45 ML/MIN/1.73 M^2
EST. GFR  (NON AFRICAN AMERICAN): 39 ML/MIN/1.73 M^2
GLUCOSE SERPL-MCNC: 148 MG/DL
HCT VFR BLD AUTO: 36.2 %
HGB BLD-MCNC: 11.9 G/DL
LYMPHOCYTES # BLD AUTO: 1.5 K/UL
LYMPHOCYTES NFR BLD: 18 %
MCH RBC QN AUTO: 31.4 PG
MCHC RBC AUTO-ENTMCNC: 32.9 G/DL
MCV RBC AUTO: 96 FL
MONOCYTES # BLD AUTO: 1 K/UL
MONOCYTES NFR BLD: 11.7 %
NEUTROPHILS # BLD AUTO: 5.5 K/UL
NEUTROPHILS NFR BLD: 64.5 %
PLATELET # BLD AUTO: 178 K/UL
PMV BLD AUTO: 10.7 FL
POCT GLUCOSE: 138 MG/DL (ref 70–110)
POCT GLUCOSE: 157 MG/DL (ref 70–110)
POTASSIUM SERPL-SCNC: 4.1 MMOL/L
RBC # BLD AUTO: 3.79 M/UL
SODIUM SERPL-SCNC: 140 MMOL/L
WBC # BLD AUTO: 8.56 K/UL

## 2018-12-03 PROCEDURE — 02HV33Z INSERTION OF INFUSION DEVICE INTO SUPERIOR VENA CAVA, PERCUTANEOUS APPROACH: ICD-10-PCS | Performed by: HOSPITALIST

## 2018-12-03 PROCEDURE — 99223 1ST HOSP IP/OBS HIGH 75: CPT | Mod: ,,, | Performed by: INTERNAL MEDICINE

## 2018-12-03 PROCEDURE — 36415 COLL VENOUS BLD VENIPUNCTURE: CPT

## 2018-12-03 PROCEDURE — 36569 INSJ PICC 5 YR+ W/O IMAGING: CPT

## 2018-12-03 PROCEDURE — 85025 COMPLETE CBC W/AUTO DIFF WBC: CPT

## 2018-12-03 PROCEDURE — 25000003 PHARM REV CODE 250: Performed by: EMERGENCY MEDICINE

## 2018-12-03 PROCEDURE — 80048 BASIC METABOLIC PNL TOTAL CA: CPT

## 2018-12-03 PROCEDURE — 63600175 PHARM REV CODE 636 W HCPCS: Performed by: HOSPITALIST

## 2018-12-03 PROCEDURE — 25000003 PHARM REV CODE 250: Performed by: HOSPITALIST

## 2018-12-03 RX ORDER — CEFEPIME HYDROCHLORIDE 2 G/50ML
2 INJECTION, SOLUTION INTRAVENOUS ONCE
Status: DISCONTINUED | OUTPATIENT
Start: 2018-12-03 | End: 2018-12-03 | Stop reason: HOSPADM

## 2018-12-03 RX ADMIN — METRONIDAZOLE 500 MG: 500 TABLET ORAL at 05:12

## 2018-12-03 RX ADMIN — ASPIRIN 81 MG: 81 TABLET, COATED ORAL at 08:12

## 2018-12-03 RX ADMIN — CEFEPIME HYDROCHLORIDE 2 G: 2 INJECTION, SOLUTION INTRAVENOUS at 06:12

## 2018-12-03 RX ADMIN — ATORVASTATIN CALCIUM 40 MG: 40 TABLET, FILM COATED ORAL at 08:12

## 2018-12-03 RX ADMIN — METRONIDAZOLE 500 MG: 500 TABLET ORAL at 02:12

## 2018-12-03 RX ADMIN — AMIODARONE HYDROCHLORIDE 200 MG: 200 TABLET ORAL at 08:12

## 2018-12-03 RX ADMIN — METOPROLOL SUCCINATE 50 MG: 50 TABLET, EXTENDED RELEASE ORAL at 08:12

## 2018-12-03 RX ADMIN — CEFEPIME HYDROCHLORIDE 2 G: 2 INJECTION, SOLUTION INTRAVENOUS at 03:12

## 2018-12-03 RX ADMIN — CEFEPIME HYDROCHLORIDE 2 G: 2 INJECTION, SOLUTION INTRAVENOUS at 11:12

## 2018-12-03 RX ADMIN — LOSARTAN POTASSIUM 100 MG: 25 TABLET, FILM COATED ORAL at 08:12

## 2018-12-03 RX ADMIN — FUROSEMIDE 20 MG: 20 TABLET ORAL at 08:12

## 2018-12-03 NOTE — ASSESSMENT & PLAN NOTE
Pt with pasturella bacteremia 2/2 cellulitis of L forearm from animal exposure (dog licking wounds). Clinically improved with cefepime (and one dose zosyn). Agree with either continuing cefepime or zosyn (but not both) for 10-14 days total. Needs picc line placement and iv abx. Can't use quinolones with prolonged qtc. Concern that doxy or augmentin won't get high enough levels in blood to treat bacteremia.

## 2018-12-03 NOTE — DISCHARGE SUMMARY
[facility] Hospital Medicine  Discharge Summary      Patient Name: Timbo Gallagher  MRN: [id number]  Admission Date: 11/29/2018  Hospital Length of Stay: 4 days  Discharge Date and Time:  12/03/2018 3:45 PM  Attending Physician: Aniya Dong MD   Discharging Provider: Aniya Dong MD  Primary Care Provider: Cirilo Montero MD      HPI:   Mr Timbo Gallagher is a 83 y.o. man with HFpEF, DM, CKD3, achalasia, HTN who presents with left arm swelling. Normal state of health yesterday. Woke up this AM with erythema to the dorsal and ventral surface of his left forearm that spread up his arm. No injury. No animal/bug bites. No contact with dirty water. No fevers, chills, lumps/bumps, pruritis. No other complaints.     * No surgery found *      Hospital Course:   Admitted with L arm cellulitis that appeared overnight. Started on linezolid due to vanc allergy and received zosyn in ED. Blood cultures 11/29 grew GNRs-- speciated as Pasturella multocida. Discussed with patient- he does have a hernandez retriever at home. No cat exposure. He was not bitten but he cannot remember if his arm was licked by his dog or not. He says that he was picking at an area of dry skin on his left forearm; that could have been a portal for infection. Called micro lab- they will run antibiotic sensitivities on Pasturella. Cannot use levofloxacin/moxifloxacin due to prolonged QTc on amiodarone (550 on last check) and possible ciprofloxacin allergy (though chart review indicates erythema and itching happened after vancomycin, not cipro). Repeat cultures drawn 11/30 are no growth to date. Added cefepime on 11/30 after blood cultures came back with GNRs; added flagyl after speciated as Pasturella; linezolid discontinued. Cellulitis is slowly improving. Awaiting antibiotic sensitivities to determine outpatient treatment. ID consulted. Recommend continued outpatient IV antibiotics- either cefepime or zosyn. PICC placed 12/3.  Discharged to home with PICC and home health for outpatient IV cefepime to complete 2 week course for Pasteurella bacteremia and cellulitis.      Consults:   Consults (From admission, onward)        Status Ordering Provider     Inpatient consult to Infectious Diseases  Once     Provider:  Mayte Pham MD    Acknowledged DARRYL FUCHS     Inpatient consult to PICC team ([facility])  Once     Provider:  (Not yet assigned)    Acknowledged DARRYL FUCHS          No new Assessment & Plan notes have been filed under this hospital service since the last note was generated.  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 [A28.0] 11/29/2018 Yes    Hypokalemia [E87.6] 11/30/2018 No    Bacteremia [R78.81] 11/30/2018 Yes    Acute renal failure superimposed on stage 3 chronic kidney disease [N17.9, N18.3] 08/29/2018 Yes    AICD (automatic cardioverter/defibrillator) present [Z95.810] 04/24/2018 Yes    Mixed hyperlipidemia [E78.2] 03/28/2018 Yes    S/P TAVR (transcatheter aortic valve replacement) [Z95.2] 10/18/2017 Not Applicable    Aortic valve stenosis [I35.0] 05/08/2017 Yes    Essential hypertension [I10] 12/02/2016 Yes    Chronic diastolic heart failure [I50.32] 09/30/2016 Yes    Gastroesophageal reflux disease without esophagitis [K21.9] 02/17/2016 Yes    Benign prostatic hyperplasia without lower urinary tract symptoms [N40.0] 02/17/2016 Yes    Anemia of chronic disease [D63.8] 02/17/2016 Yes    Mild protein malnutrition [E44.1] 02/17/2016 Yes    Type 2 diabetes mellitus with stage 3 chronic kidney disease [E11.22, N18.3] 02/15/2016 Yes      Problems Resolved During this Admission:       Discharged Condition: good    Disposition: Home-Health Care Svc    Follow Up:  Follow-up Information     Cirilo Montero MD On 12/10/2018.    Specialty:  Internal Medicine  Why:  Monday at 10:30am. Hospital Follow Up  appointment.  Contact information:  [address]  [facility] [address]  [phone]             [facility] [place] In 1 day.    Specialty:  Home Health Services  Why:  Home Health  Contact information:  [address]  [address]  [place] [address]  [phone]             [facility] In 1 day.    Specialties:  Pharmacist, DME Provider, IV Infusion  Why:  Infusion  Contact information:  [address] [place] [address]  [phone]                 Patient Instructions:      Diet diabetic     Diet Cardiac     Notify your health care provider if you experience any of the following:  temperature >100.4     Notify your health care provider if you experience any of the following:  persistent nausea and vomiting or diarrhea     Notify your health care provider if you experience any of the following:  severe uncontrolled pain     Notify your health care provider if you experience any of the following:  redness, tenderness, or signs of infection (pain, swelling, redness, odor or green/yellow discharge around incision site)     Notify your health care provider if you experience any of the following:  difficulty breathing or increased cough     Notify your health care provider if you experience any of the following:  severe persistent headache     Notify your health care provider if you experience any of the following:  worsening rash     Notify your health care provider if you experience any of the following:  increased confusion or weakness     Notify your health care provider if you experience any of the following:  persistent dizziness, light-headedness, or visual disturbances     Activity as tolerated       Significant Diagnostic Studies: Labs: All labs within the past 24 hours have been reviewed    Pending Diagnostic Studies:     None         Medications:  Reconciled Home Medications:      Medication List      CHANGE how you take these medications    aspirin 81 MG  EC tablet  Commonly known as:  ECOTRIN  Take 81 mg by mouth once daily.  What changed:  Another medication with the same name was removed. Continue taking this medication, and follow the directions you see here.        CONTINUE taking these medications    acarbose 25 MG Tab  Commonly known as:  PRECOSE  Take 25 mg by mouth 3 (three) times daily with meals.     amiodarone 200 MG Tab  Commonly known as:  PACERONE  Take 1 tablet (200 mg total) by mouth once daily.     atorvastatin 40 MG tablet  Commonly known as:  LIPITOR  Take 1 tablet (40 mg total) by mouth once daily.     losartan 100 MG tablet  Commonly known as:  COZAAR  Take 1 tablet (100 mg total) by mouth once daily.     metoprolol succinate 50 MG 24 hr tablet  Commonly known as:  TOPROL-XL  Take 50 mg by mouth once daily.     SITagliptin 25 MG Tab  Commonly known as:  JANUVIA  Take 100 mg by mouth once daily.        STOP taking these medications    furosemide 20 MG tablet  Commonly known as:  LASIX     levETIRAcetam 500 MG Tab  Commonly known as:  KEPPRA        ASK your doctor about these medications    furosemide 20 MG tablet  Commonly known as:  LASIX  Take 1 tablet (20 mg total) by mouth once daily.  Ask about: Should I take this medication?            Indwelling Lines/Drains at time of discharge:   Lines/Drains/Airways          None          Time spent on the discharge of patient: 60 minutes  Patient was seen and examined on the date of discharge and determined to be suitable for discharge.         Aniya Dong MD  Department of Hospital Medicine  [facility]

## 2018-12-03 NOTE — CONSULTS
[facility]  Infectious Disease  Consult Note    Patient Name: Timbo Gallagher  MRN: [id number]  Admission Date: 11/29/2018  Hospital Length of Stay: 4 days  Attending Physician: Aniya Dong MD  Primary Care Provider: Cirilo Montero MD     Isolation Status: No active isolations    Patient information was obtained from patient and ER records.      Inpatient consult to Infectious Diseases  Consult performed by: Mayte Pham MD  Consult ordered by: Aniya Dong MD  Reason for consult: antibiotic recs        Assessment/Plan:     Bacteremia    Pt with pasturella bacteremia 2/2 cellulitis of L forearm from animal exposure (dog licking wounds). Clinically improved with cefepime (and one dose zosyn). Agree with either continuing cefepime or zosyn (but not both) for 10-14 days total. Needs picc line placement and iv abx. Can't use quinolones with prolonged qtc. Concern that doxy or augmentin won't get high enough levels in blood to treat bacteremia.      stop metronidazole.     No clinical signs of abscess or phlebitis on exam.     Thank you for your consult. I will sign off. Please contact us if you have any additional questions.    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 Recalls having a scab on L forearm, which he reports picking off. Thinks his hernandez retriver dog licked this wound. The, L arm became swollen and red and tender. Denies other complaints.     bcx growing pasturella. Repeat clear. Got a doze of zosyn, then 4 days of cefepime. Clinically improved. Still has some swelling of L arm. Erythema improved.  Pt insisting that he will go home today.         Interval History: Rash is improving slowly. Patient is eager to be discharged to home    Review of Systems   Constitutional: Negative  for chills, fatigue and fever.   Respiratory: Negative for cough and shortness of breath.    Cardiovascular: Negative for chest pain.   Gastrointestinal: Negative for abdominal pain, constipation, diarrhea, nausea and vomiting.   Genitourinary: Negative for difficulty urinating.   Skin: Positive for color change and rash.     Objective:     Vital Signs (Most Recent):  Temp: 97.7 °F (36.5 °C) (12/03/18 1102)  Pulse: 60 (12/03/18 1102)  Resp: 19 (12/03/18 1102)  BP: (!) 141/65 (12/03/18 1102)  SpO2: 95 % (12/03/18 1102) Vital Signs (24h Range):  Temp:  [97.7 °F (36.5 °C)-98.7 °F (37.1 °C)] 97.7 °F (36.5 °C)  Pulse:  [60-70] 60  Resp:  [17-20] 19  SpO2:  [94 %-96 %] 95 %  BP: (134-168)/(63-78) 141/65     Weight: 88.4 kg (194 lb 15 oz)  Body mass index is 27.97 kg/m².    Intake/Output Summary (Last 24 hours) at 12/3/2018 1555  Last data filed at 12/3/2018 1222  Gross per 24 hour   Intake 290 ml   Output 200 ml   Net 90 ml      Physical Exam   Constitutional: He is oriented to person, place, and time. He appears well-developed and well-nourished. No distress.   HENT:   Head: Normocephalic and atraumatic.   Mouth/Throat: Oropharynx is clear and moist.   Cardiovascular: Normal rate, regular rhythm, normal heart sounds and intact distal pulses. Exam reveals no gallop and no friction rub.   No murmur heard.  Pulmonary/Chest: Effort normal and breath sounds normal. No stridor. No respiratory distress. He has no wheezes. He has no rales.   Abdominal: Soft. Bowel sounds are normal. He exhibits no distension and no mass. There is no tenderness. There is no guarding.   Neurological: He is alert and oriented to person, place, and time.   Skin: Skin is warm and dry. Rash (left arm to elbow. Less indurated. no purulence) noted. He is not diaphoretic. There is erythema (unchanged).   Vitals reviewed.      Significant Labs: All pertinent labs within the past 24 hours have been reviewed.    Significant Imaging: I have reviewed and  interpreted all pertinent imaging results/findings within the past 24 hours.

## 2018-12-03 NOTE — SUBJECTIVE & OBJECTIVE
Interval History: Rash is improving slowly. Patient is eager to be discharged to home    Review of Systems   Constitutional: Negative for chills, fatigue and fever.   Respiratory: Negative for cough and shortness of breath.    Cardiovascular: Negative for chest pain.   Gastrointestinal: Negative for abdominal pain, constipation, diarrhea, nausea and vomiting.   Genitourinary: Negative for difficulty urinating.   Skin: Positive for color change and rash.     Objective:     Vital Signs (Most Recent):  Temp: 97.7 °F (36.5 °C) (12/03/18 1102)  Pulse: 60 (12/03/18 1102)  Resp: 19 (12/03/18 1102)  BP: (!) 141/65 (12/03/18 1102)  SpO2: 95 % (12/03/18 1102) Vital Signs (24h Range):  Temp:  [97.7 °F (36.5 °C)-98.7 °F (37.1 °C)] 97.7 °F (36.5 °C)  Pulse:  [60-70] 60  Resp:  [17-20] 19  SpO2:  [94 %-96 %] 95 %  BP: (134-168)/(63-78) 141/65     Weight: 88.4 kg (194 lb 15 oz)  Body mass index is 27.97 kg/m².    Intake/Output Summary (Last 24 hours) at 12/3/2018 1555  Last data filed at 12/3/2018 1222  Gross per 24 hour   Intake 290 ml   Output 200 ml   Net 90 ml      Physical Exam   Constitutional: He is oriented to person, place, and time. He appears well-developed and well-nourished. No distress.   HENT:   Head: Normocephalic and atraumatic.   Mouth/Throat: Oropharynx is clear and moist.   Cardiovascular: Normal rate, regular rhythm, normal heart sounds and intact distal pulses. Exam reveals no gallop and no friction rub.   No murmur heard.  Pulmonary/Chest: Effort normal and breath sounds normal. No stridor. No respiratory distress. He has no wheezes. He has no rales.   Abdominal: Soft. Bowel sounds are normal. He exhibits no distension and no mass. There is no tenderness. There is no guarding.   Neurological: He is alert and oriented to person, place, and time.   Skin: Skin is warm and dry. Rash (left arm to elbow. Less indurated. no purulence) noted. He is not diaphoretic. There is erythema (unchanged).   Vitals  reviewed.      Significant Labs: All pertinent labs within the past 24 hours have been reviewed.    Significant Imaging: I have reviewed and interpreted all pertinent imaging results/findings within the past 24 hours.

## 2018-12-03 NOTE — PLAN OF CARE
Problem: Patient Care Overview  Goal: Plan of Care Review  Outcome: Ongoing (interventions implemented as appropriate)   12/03/18 7073   Coping/Psychosocial   Plan Of Care Reviewed With patient;spouse   Pt AAOx4; NAD; VSS; awaiting PICC line placement for discharge; no c/o pain; resp equal bilat and unlabored; pt remains free of falls and injury; call bell in reach; bed in lowest position; side rails up x 2; will continue to monitor and assess.

## 2018-12-03 NOTE — HPI
83M with HFpEF, DM, CKD3, achalasia, HTN who presents with acute left arm swelling. Recalls having a scab on L forearm, which he reports picking off. Thinks his hernandez retriver dog licked this wound. The, L arm became swollen and red and tender. Denies other complaints.     bcx growing pasturella. Repeat clear. Got a doze of zosyn, then 4 days of cefepime. Clinically improved. Still has some swelling of L arm. Erythema improved.  Pt insisting that he will go home today.

## 2018-12-03 NOTE — PROCEDURES
"Timbo Gallagher is a 83 y.o. male patient.    Temp: 97.7 °F (36.5 °C) (12/03/18 1102)  Pulse: 60 (12/03/18 1102)  Resp: 19 (12/03/18 1102)  BP: (!) 141/65 (12/03/18 1102)  SpO2: 95 % (12/03/18 1102)  Weight: 88.4 kg (194 lb 15 oz) (11/30/18 2038)  Height: 5' 10" (177.8 cm) (11/30/18 2038)    PICC  Date/Time: 12/3/2018 5:45 PM  Performed by: Alf Vargas RN  Consent Done: Yes  Time out: Immediately prior to procedure a time out was called to verify the correct patient, procedure, equipment, support staff and site/side marked as required  Indications: med administration and vascular access  Anesthesia: local infiltration  Local anesthetic: lidocaine 1% without epinephrine  Anesthetic Total (mL): 5  Description of findings: picc  Preparation: skin prepped with chlorhexidine (without alcohol)  Skin prep agent dried: skin prep agent completely dried prior to procedure  Sterile barriers: all five maximum sterile barriers used - cap, mask, sterile gown, sterile gloves, and large sterile sheet  Hand hygiene: hand hygiene performed prior to central venous catheter insertion  Location details: right basilic  Catheter type: double lumen  Catheter size: 5 Fr  Catheter Length: 36cm    Ultrasound guidance: yes  Vessel Caliber: medium and patent, compressibility normal  Vascular Doppler: not done  Needle advanced into vessel with real time Ultrasound guidance.  Guidewire confirmed in vessel.  Sterile sheath used.  no esophageal manometryNumber of attempts: 1  Post-procedure: blood return through all ports, chlorhexidine patch and sterile dressing applied  Estimated blood loss (mL): 0  Specimens: No  Implants: No  Assessment: tip termination and successful placement  Complications: none          Alf Vargas  12/3/2018  "

## 2018-12-03 NOTE — PLAN OF CARE
Problem: Fall Risk (Adult)  Goal: Identify Related Risk Factors and Signs and Symptoms  Related risk factors and signs and symptoms are identified upon initiation of Human Response Clinical Practice Guideline (CPG)  Outcome: Ongoing (interventions implemented as appropriate)   12/03/18 0400   Fall Risk   Related Risk Factors (Fall Risk) age-related changes;impaired vision;environment unfamiliar   Signs and Symptoms (Fall Risk) presence of risk factors       Problem: Patient Care Overview  Goal: Plan of Care Review  Outcome: Ongoing (interventions implemented as appropriate)  Patient AAOx4, calm and cooperative.. No falls no injuries. Antibiotics given without no difficulty. No complaints of pain/ discomfort. Assessment documented. Patient instructed to call if assistance is needed. Call light in reach. Bed in lowest position. Will continue to monitor.      12/03/18 0400   Coping/Psychosocial   Plan Of Care Reviewed With patient       Problem: Skin Integrity Impairment, Risk/Actual (Adult)  Goal: Identify Related Risk Factors and Signs and Symptoms  Related risk factors and signs and symptoms are identified upon initiation of Human Response Clinical Practice Guideline (CPG)  Outcome: Ongoing (interventions implemented as appropriate)   12/03/18 0400   Skin Integrity Impairment, Risk/Actual   Related Risk Factors (Skin Integrity Impairment, Risk/Actual) age extremes;infection/disease process   Signs and Symptoms (Skin Integrity Impairment) edema;inflammation

## 2018-12-03 NOTE — PROGRESS NOTES
WRITTEN HEALTHCARE DISCHARGE INFORMATION     Things that YOU are RESPONSIBLE for to Manage Your Care At Home:    1. Getting your prescriptions filled.  2. Taking you medications as directed. DO NOT MISS ANY DOSES!  3. Going to your follow-up doctor appointments. This is important because it allows the doctor to monitor your progress and to determine if any changes need to be made to your treatment plan.    If you are unable to make your follow up appointments, please call the number listed and reschedule this appointment.     ____________HELP AT HOME____________________    Experiencing any SIGNS or SYMPTOMS: YOU CAN    Schedule a same day appopintment with your Primary Care Doctor or  you can call [facility] On Call Nurse Care Line for 24/7 assistance at [phone]    If you are experience any signs or symptoms that have become severe, Call 911 and come to your nearest Emergency Room.    Thank you for choosing [facility] and allowing us to care for you.   From your care management team: KIMBERLEY Mukherjee, [facility] [phone]     You should receive a call from [facility] Discharge [facility] within 48-72 hours to help manage your care after discharge. Please try to make sure that you answer your phone for this important phone call.     Follow-up Information     Cirilo Montero MD On 12/10/2018.    Specialty:  Internal Medicine  Why:  Monday at 10:30am. Hospital Follow Up appointment.  Contact information:  [address] [id number] [phone]             [facility] In 1 day.    Specialties:  Pharmacist, DME Provider, IV Infusion  Why:  Infusion  Contact information:  [address] [place] [address]  [phone]             [facility] In 1 day.    Specialties:  DME Provider, Home Health Services  Why:  Home Health  Contact information:  [address] [place] [id number] [phone]

## 2018-12-04 LAB
BACTERIA BLD CULT: NORMAL

## 2018-12-05 ENCOUNTER — PATIENT OUTREACH (OUTPATIENT)
Dept: ADMINISTRATIVE | Facility: CLINIC | Age: 83
End: 2018-12-05

## 2018-12-05 LAB
BACTERIA BLD CULT: NORMAL
BACTERIA BLD CULT: NORMAL

## 2018-12-05 NOTE — PATIENT INSTRUCTIONS
Discharge Instructions for Cellulitis  You have been diagnosed with cellulitis. This is an infection in the deepest layer of the skin. In some cases, the infection also affects the muscle. Cellulitis is caused by bacteria. The bacteria can enter the body through broken skin. This can happen with a cut, scratch, animal bite, or an insect bite that has been scratched. You may have been treated in the hospital with antibiotics and fluids. You will likely be given a prescription for antibiotics to take at home. This sheet will help you take care of yourself at home.  Home care  When you are home:  · Take the prescribed antibiotic medicine you are given as directed until it is gone. Take it even if you feel better. It treats the infection and stops it from returning. Not taking all the medicine can make future infections hard to treat.  · Keep the infected area clean.  · When possible, raise the infected area above the level of your heart. This helps keep swelling down.  · Talk with your healthcare provider if you are in pain. Ask what kind of over-the-counter medicine you can take for pain.  · Apply clean bandages as advised.  · Take your temperature once a day for a week.  · Wash your hands often to prevent spreading the infection.  In the future, wash your hands before and after you touch cuts, scratches, or bandages. This will help prevent infection.   When to call your healthcare provider  Call your healthcare provider immediately if you have any of the following:  · Difficulty or pain when moving the joints above or below the infected area  · Discharge or pus draining from the area  · Fever of 100.4°F (38°C) or higher, or as directed by your healthcare provider  · Pain that gets worse in or around the infected   · Redness that gets worse in or around the infected area, particularly if the area of redness expands to a wider area  · Shaking chills  · Swelling of the infected area  · Vomiting   Date Last Reviewed:  8/1/2016  © 5790-3460 [employer]. [address] [place] [address], [place] [address]. All rights reserved. This information is not intended as a substitute for professional medical care. Always follow your healthcare professional's instructions.

## 2018-12-12 ENCOUNTER — TELEPHONE (OUTPATIENT)
Dept: INFECTIOUS DISEASES | Facility: CLINIC | Age: 83
End: 2018-12-12

## 2018-12-12 NOTE — TELEPHONE ENCOUNTER
Natalia ([facility] [phone]) called [facility] for patient is 12/13/18, and patient spouse saying he needs more antibiotics in order to have the complete 2 week course.    Please advise

## 2018-12-13 NOTE — TELEPHONE ENCOUNTER
He's been on IV antibiotics since 11/29. He responded to antibiotics well and his blood cultures quickly cleared, so I don't think that he needs more. If he wants to see me for follow-up, I'd be glad to extended antibiotics until that appointment, but otherwise I think he has been adequately treated and recommend stopping the antibiotics today and pulling the picc line. thanks

## 2018-12-18 ENCOUNTER — LAB VISIT (OUTPATIENT)
Dept: LAB | Facility: HOSPITAL | Age: 83
End: 2018-12-18
Payer: MEDICARE

## 2018-12-18 DIAGNOSIS — L03.114 CELLULITIS OF LEFT HAND EXCLUDING FINGERS AND THUMB: ICD-10-CM

## 2018-12-18 DIAGNOSIS — Z79.2 ENCOUNTER FOR LONG-TERM (CURRENT) USE OF ANTIBIOTICS: ICD-10-CM

## 2018-12-18 DIAGNOSIS — L03.90 CELLULITIS OF SKIN WITH LYMPHANGITIS: Primary | ICD-10-CM

## 2018-12-18 LAB
ALBUMIN SERPL BCP-MCNC: 2.7 G/DL
ALP SERPL-CCNC: 115 U/L
ALT SERPL W/O P-5'-P-CCNC: 8 U/L
ANION GAP SERPL CALC-SCNC: 11 MMOL/L
AST SERPL-CCNC: 13 U/L
BASOPHILS # BLD AUTO: 0.02 K/UL
BASOPHILS NFR BLD: 0.2 %
BILIRUB SERPL-MCNC: 0.3 MG/DL
BUN SERPL-MCNC: 51 MG/DL
CALCIUM SERPL-MCNC: 8.4 MG/DL
CHLORIDE SERPL-SCNC: 108 MMOL/L
CO2 SERPL-SCNC: 20 MMOL/L
CREAT SERPL-MCNC: 3.9 MG/DL
CRP SERPL-MCNC: 6.4 MG/L
DIFFERENTIAL METHOD: ABNORMAL
EOSINOPHIL # BLD AUTO: 0.2 K/UL
EOSINOPHIL NFR BLD: 1.7 %
ERYTHROCYTE [DISTWIDTH] IN BLOOD BY AUTOMATED COUNT: 14.2 %
ERYTHROCYTE [SEDIMENTATION RATE] IN BLOOD BY WESTERGREN METHOD: 20 MM/HR
EST. GFR  (AFRICAN AMERICAN): 15 ML/MIN/1.73 M^2
EST. GFR  (NON AFRICAN AMERICAN): 13 ML/MIN/1.73 M^2
GLUCOSE SERPL-MCNC: 243 MG/DL
HCT VFR BLD AUTO: 34.2 %
HGB BLD-MCNC: 11.1 G/DL
LYMPHOCYTES # BLD AUTO: 0.8 K/UL
LYMPHOCYTES NFR BLD: 8.2 %
MCH RBC QN AUTO: 31 PG
MCHC RBC AUTO-ENTMCNC: 32.5 G/DL
MCV RBC AUTO: 96 FL
MONOCYTES # BLD AUTO: 1.1 K/UL
MONOCYTES NFR BLD: 11.1 %
NEUTROPHILS # BLD AUTO: 7.6 K/UL
NEUTROPHILS NFR BLD: 78.8 %
PLATELET # BLD AUTO: 148 K/UL
PMV BLD AUTO: 12.2 FL
POTASSIUM SERPL-SCNC: 3.9 MMOL/L
PROT SERPL-MCNC: 6.4 G/DL
RBC # BLD AUTO: 3.58 M/UL
SODIUM SERPL-SCNC: 139 MMOL/L
WBC # BLD AUTO: 9.6 K/UL

## 2018-12-18 PROCEDURE — 36415 COLL VENOUS BLD VENIPUNCTURE: CPT

## 2018-12-18 PROCEDURE — 80053 COMPREHEN METABOLIC PANEL: CPT

## 2018-12-18 PROCEDURE — 85025 COMPLETE CBC W/AUTO DIFF WBC: CPT

## 2018-12-18 PROCEDURE — 86140 C-REACTIVE PROTEIN: CPT

## 2018-12-18 PROCEDURE — 85652 RBC SED RATE AUTOMATED: CPT

## 2019-01-13 ENCOUNTER — HOSPITAL ENCOUNTER (EMERGENCY)
Facility: HOSPITAL | Age: 84
Discharge: HOME | End: 2019-01-13
Attending: EMERGENCY MEDICINE
Payer: MEDICARE

## 2019-01-13 VITALS
SYSTOLIC BLOOD PRESSURE: 125 MMHG | TEMPERATURE: 99 F | BODY MASS INDEX: 29.2 KG/M2 | OXYGEN SATURATION: 93 % | DIASTOLIC BLOOD PRESSURE: 74 MMHG | WEIGHT: 204 LBS | HEIGHT: 70 IN | HEART RATE: 74 BPM | RESPIRATION RATE: 18 BRPM

## 2019-01-13 DIAGNOSIS — J90 RECURRENT RIGHT PLEURAL EFFUSION: ICD-10-CM

## 2019-01-13 DIAGNOSIS — D64.9 ANEMIA, UNSPECIFIED TYPE: Primary | ICD-10-CM

## 2019-01-13 DIAGNOSIS — R06.02 SHORTNESS OF BREATH: ICD-10-CM

## 2019-01-13 LAB
ALBUMIN SERPL BCP-MCNC: 2.8 G/DL
ALP SERPL-CCNC: 110 U/L
ALT SERPL W/O P-5'-P-CCNC: 15 U/L
ANION GAP SERPL CALC-SCNC: 8 MMOL/L
AST SERPL-CCNC: 12 U/L
BASOPHILS # BLD AUTO: 0.01 K/UL
BASOPHILS NFR BLD: 0.2 %
BILIRUB SERPL-MCNC: 0.4 MG/DL
BILIRUB UR QL STRIP: NEGATIVE
BNP SERPL-MCNC: 846 PG/ML
BNP SERPL-MCNC: 846 PG/ML
BUN SERPL-MCNC: 24 MG/DL
CALCIUM SERPL-MCNC: 8.7 MG/DL
CHLORIDE SERPL-SCNC: 109 MMOL/L
CLARITY UR: CLEAR
CO2 SERPL-SCNC: 24 MMOL/L
COLOR UR: YELLOW
CREAT SERPL-MCNC: 2 MG/DL
CRP SERPL-MCNC: 1.9 MG/L
DIFFERENTIAL METHOD: ABNORMAL
EOSINOPHIL # BLD AUTO: 0.1 K/UL
EOSINOPHIL NFR BLD: 1.7 %
ERYTHROCYTE [DISTWIDTH] IN BLOOD BY AUTOMATED COUNT: 14.9 %
ERYTHROCYTE [SEDIMENTATION RATE] IN BLOOD BY WESTERGREN METHOD: 15 MM/HR
EST. GFR  (AFRICAN AMERICAN): 35 ML/MIN/1.73 M^2
EST. GFR  (NON AFRICAN AMERICAN): 30 ML/MIN/1.73 M^2
GLUCOSE SERPL-MCNC: 167 MG/DL
GLUCOSE UR QL STRIP: NEGATIVE
HCT VFR BLD AUTO: 28.7 %
HGB BLD-MCNC: 9.5 G/DL
HGB UR QL STRIP: NEGATIVE
IRON SERPL-MCNC: 43 UG/DL
KETONES UR QL STRIP: NEGATIVE
LACTATE SERPL-SCNC: 1.1 MMOL/L
LEUKOCYTE ESTERASE UR QL STRIP: NEGATIVE
LYMPHOCYTES # BLD AUTO: 0.8 K/UL
LYMPHOCYTES NFR BLD: 11.4 %
MCH RBC QN AUTO: 32 PG
MCHC RBC AUTO-ENTMCNC: 33.1 G/DL
MCV RBC AUTO: 97 FL
MONOCYTES # BLD AUTO: 0.6 K/UL
MONOCYTES NFR BLD: 9.7 %
NEUTROPHILS # BLD AUTO: 5.1 K/UL
NEUTROPHILS NFR BLD: 77 %
NITRITE UR QL STRIP: NEGATIVE
PH UR STRIP: 5 [PH] (ref 5–8)
PLATELET # BLD AUTO: 169 K/UL
PMV BLD AUTO: 11.2 FL
POTASSIUM SERPL-SCNC: 4.2 MMOL/L
PROT SERPL-MCNC: 6.2 G/DL
PROT UR QL STRIP: NEGATIVE
RBC # BLD AUTO: 2.97 M/UL
SATURATED IRON: 18 %
SODIUM SERPL-SCNC: 141 MMOL/L
SP GR UR STRIP: 1.01 (ref 1–1.03)
TOTAL IRON BINDING CAPACITY: 240 UG/DL
TRANSFERRIN SERPL-MCNC: 162 MG/DL
TROPONIN I SERPL DL<=0.01 NG/ML-MCNC: 0.02 NG/ML
URN SPEC COLLECT METH UR: NORMAL
UROBILINOGEN UR STRIP-ACNC: NEGATIVE EU/DL
WBC # BLD AUTO: 6.57 K/UL

## 2019-01-13 PROCEDURE — 85652 RBC SED RATE AUTOMATED: CPT

## 2019-01-13 PROCEDURE — 63600175 PHARM REV CODE 636 W HCPCS: Performed by: EMERGENCY MEDICINE

## 2019-01-13 PROCEDURE — 81003 URINALYSIS AUTO W/O SCOPE: CPT

## 2019-01-13 PROCEDURE — 96374 THER/PROPH/DIAG INJ IV PUSH: CPT

## 2019-01-13 PROCEDURE — 99284 EMERGENCY DEPT VISIT MOD MDM: CPT | Mod: 25

## 2019-01-13 PROCEDURE — 87040 BLOOD CULTURE FOR BACTERIA: CPT

## 2019-01-13 PROCEDURE — 85025 COMPLETE CBC W/AUTO DIFF WBC: CPT

## 2019-01-13 PROCEDURE — 83880 ASSAY OF NATRIURETIC PEPTIDE: CPT

## 2019-01-13 PROCEDURE — 83540 ASSAY OF IRON: CPT

## 2019-01-13 PROCEDURE — 82607 VITAMIN B-12: CPT

## 2019-01-13 PROCEDURE — 84484 ASSAY OF TROPONIN QUANT: CPT

## 2019-01-13 PROCEDURE — 83605 ASSAY OF LACTIC ACID: CPT

## 2019-01-13 PROCEDURE — 80053 COMPREHEN METABOLIC PANEL: CPT

## 2019-01-13 PROCEDURE — 82746 ASSAY OF FOLIC ACID SERUM: CPT

## 2019-01-13 PROCEDURE — 86140 C-REACTIVE PROTEIN: CPT

## 2019-01-13 RX ORDER — DOXAZOSIN 2 MG/1
2 TABLET ORAL NIGHTLY
COMMUNITY
End: 2019-01-24

## 2019-01-13 RX ORDER — FERROUS GLUCONATE 324(38)MG
324 TABLET ORAL
Qty: 30 TABLET | Refills: 2 | Status: ON HOLD | OUTPATIENT
Start: 2019-01-13 | End: 2019-04-29 | Stop reason: HOSPADM

## 2019-01-13 RX ORDER — DONEPEZIL HYDROCHLORIDE 5 MG/1
5 TABLET, FILM COATED ORAL NIGHTLY
COMMUNITY
End: 2019-01-24

## 2019-01-13 RX ORDER — AMLODIPINE BESYLATE 10 MG/1
10 TABLET ORAL DAILY
COMMUNITY
End: 2019-02-26

## 2019-01-13 RX ORDER — FUROSEMIDE 10 MG/ML
40 INJECTION INTRAMUSCULAR; INTRAVENOUS
Status: COMPLETED | OUTPATIENT
Start: 2019-01-13 | End: 2019-01-13

## 2019-01-13 RX ORDER — FERROUS GLUCONATE 324(38)MG
324 TABLET ORAL
COMMUNITY
Start: 2019-01-13 | End: 2019-01-13 | Stop reason: SDUPTHER

## 2019-01-13 RX ADMIN — FUROSEMIDE 40 MG: 10 INJECTION, SOLUTION INTRAVENOUS at 05:01

## 2019-01-13 NOTE — ED TRIAGE NOTES
Pt to the ED via personal vehicle with c/o SOB. Pt reports seen at Urgent care and x-ray completed. Pt sent here for possible pneumonia. No acute distress noted. Pt placed on cardiac monitor, continuous pulse ox and BP cuff. Call light with in reach. Wife at bedside.

## 2019-01-13 NOTE — ED PROVIDER NOTES
"Encounter Date: 1/13/2019    SCRIBE #1 NOTE: I, Grayson Easley, am scribing for, and in the presence of,  Brandt Funez MD. I have scribed the following portions of the note - Other sections scribed: HPI, ROS .       History     Chief Complaint   Patient presents with    Shortness of Breath     c/o sob; arrived from urgent care with wife; told pt to come here; denies n/v/d     CC: Shortness of Breath    HPI  The patient is an 82 y/o male that presents from Urgent Care for an emergent evaluation of SOB that began this morning. He reports that onset only occurs during exertion. Pt received a chest x-ray at Urgent Care which showed potential pneumonia. Pt was advised to present to the ED. Pt was hospitalized from 12/21 to 12/31 at [facility] for a staph infection to the L arm. During this time he was treated with Cefepime and his wife notes that he is improving. Pt is aslo c/o a "slight" cough and bilateral leg swelling. His leg swelling is also improving since getting medication from his hospitalization at . Pt otherwise denies fever, chills, HA, eye problems, ear pain, sore throat, chest pain, abdominal pain, NVD, weakness, or numbness.    PMHx: CHF, carotid artery occlusion, arthritis, blind R eye, BPH, chronic bronchitis, colon polyp, DM, GERD, hernia, HTN, NSVT, S/p trascatheter aortic valve replacement, s/p AICD placement, and Stroke        The history is provided by the patient and the spouse. No  was used.     Review of patient's allergies indicates:   Allergen Reactions    Ciprofloxacin (mixture) Itching     Extreme itching    Antibiotic hc     Hydrochlorothiazide      Leg swelling      Iodine and iodide containing products      Wife and pt unsure    Vancomycin analogues Itching     Past Medical History:   Diagnosis Date    Acute renal failure     Arthritis     Blind right eye     BPH (benign prostatic hypertrophy)     Bronchitis, chronic     Carotid artery occlusion     " Lt carotid endarerectomy done 02/19/2018     CHF (congestive heart failure)     Colon polyp     Diabetes mellitus     GERD (gastroesophageal reflux disease)     Hearing aid worn     bilateral    Hernia     Hypertension     Influenza A     Irregular heart beat     NSVT (nonsustained ventricular tachycardia) 4/18/2018    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 Coleman    CATARACT EXTRACTION, BILATERAL      EGD (ESOPHAGOGASTRODUODENOSCOPY) N/A 8/30/2018    Performed by Tye Navarrete MD at [facility] (2ND FLR)    ENDARTERECTOMY-CAROTID Left 2/19/2018    Performed by Silvio Coleman [facility] at [facility] OR    ESOPHAGOGASTRODUODENOSCOPY  08/30/2018    EYE SURGERY      HERNIA REPAIR Left 09/2013    REPAIR, HERNIA, INGUINAL, WITHOUT HISTORY OF PRIOR REPAIR, AGE 5 YEARS OR OLDER Left 9/11/2013    Performed by Han Brown MD at [facility] OR    REPLACEMENT-VALVE-AORTIC N/A 10/17/2017    Performed by Martínez Keene MD at [facility] CATH LAB    RETINAL DETACHMENT SURGERY       Family History   Problem Relation Age of Onset    Arthritis Mother     Heart disease Father     Heart failure Father     Diabetes Sister     Heart attack Brother     No Known Problems Maternal Aunt     No Known Problems Maternal Uncle     No Known Problems Paternal Aunt     No Known Problems Paternal Uncle     No Known Problems Maternal Grandmother     No Known Problems Maternal Grandfather     No Known Problems Paternal Grandmother     No Known Problems  Paternal Grandfather     Anemia Neg Hx     Arrhythmia Neg Hx     Asthma Neg Hx     Clotting disorder Neg Hx     Fainting Neg Hx     Hyperlipidemia Neg Hx     Hypertension Neg Hx     Stroke Neg Hx     Atrial Septal Defect Neg Hx      Social History     Tobacco Use    Smoking status: Former Smoker     Packs/day: 3.00     Years: 40.00     Pack years: 120.00     Types: Cigarettes     Last attempt to quit: 1990     Years since quittin.4    Smokeless tobacco: Never Used   Substance Use Topics    Alcohol use: No    Drug use: No     Review of Systems   Constitutional: Negative for chills and fever.   HENT: Negative for congestion, ear pain, rhinorrhea and sore throat.    Eyes: Negative for pain and redness.   Respiratory: Positive for cough and shortness of breath.    Cardiovascular: Positive for leg swelling (improving). Negative for chest pain.   Gastrointestinal: Negative for abdominal pain, diarrhea, nausea and vomiting.   Genitourinary: Negative for dysuria, flank pain, frequency, hematuria and urgency.   Musculoskeletal: Negative for back pain and neck pain.   Skin: Negative for rash.   Neurological: Negative for weakness, light-headedness, numbness and headaches.   All other systems reviewed and are negative.      Physical Exam     Initial Vitals [19 1458]   BP Pulse Resp Temp SpO2   134/64 87 18 97.4 °F (36.3 °C) (!) 94 %      MAP       --         Physical Exam  The patient was examined specifically for the following:   General:No significant distress, Good color, Warm and dry. Head and neck:Scalp atraumatic, Neck supple. Neurological:Appropriate conversation, Gross motor deficits. Eyes:Conjugate gaze, Clear corneas. ENT: No epistaxis. Cardiac: Regular rate and rhythm, Grossly normal heart tones. Pulmonary: Wheezing, Rales. Gastrointestinal: Abdominal tenderness, Abdominal distention. Musculoskeletal: Extremity deformity, Apparent pain with range of motion of the joints. Skin: Rash.   The  findings on examination were normal except for the following:  Patient has moderate pedal edema.  The lungs are clear.  There is no significant swelling tenderness or erythema of the left upper extremity.  Oxygen saturations are 94%.  There is no clinical evidence of respiratory distress.  The heart rate is 87 respiratory rate is 18.  The patient did not cough during the physical examination. The abdomen is nontender the neck is supple.  The patient is alert and appropriate conversation.  ED Course   Procedures  Labs Reviewed   COMPREHENSIVE METABOLIC PANEL - Abnormal; Notable for the following components:       Result Value    Glucose 167 (*)     BUN, Bld 24 (*)     Creatinine 2.0 (*)     Albumin 2.8 (*)     eGFR if  35 (*)     eGFR if non  30 (*)     All other components within normal limits   CBC W/ AUTO DIFFERENTIAL - Abnormal; Notable for the following components:    RBC 2.97 (*)     Hemoglobin 9.5 (*)     Hematocrit 28.7 (*)     MCH 32.0 (*)     RDW 14.9 (*)     Lymph # 0.8 (*)     Gran% 77.0 (*)     Lymph% 11.4 (*)     All other components within normal limits   B-TYPE NATRIURETIC PEPTIDE - Abnormal; Notable for the following components:     (*)     All other components within normal limits   SEDIMENTATION RATE - Abnormal; Notable for the following components:    Sed Rate 15 (*)     All other components within normal limits   B-TYPE NATRIURETIC PEPTIDE - Abnormal; Notable for the following components:     (*)     All other components within normal limits   IRON AND TIBC - Abnormal; Notable for the following components:    Iron 43 (*)     Transferrin 162 (*)     TIBC 240 (*)     Saturated Iron 18 (*)     All other components within normal limits   CULTURE, BLOOD   CULTURE, BLOOD   LACTIC ACID, PLASMA   URINALYSIS, REFLEX TO URINE CULTURE    Narrative:     Preferred Collection Type->Urine, Clean Catch   C-REACTIVE PROTEIN   TROPONIN I   FOLATE   VITAMIN B12           Imaging Results          X-Ray Chest PA And Lateral (Final result)  Result time 01/13/19 16:37:57    Final result by Ramesh Conklin MD (01/13/19 16:37:57)                 Impression:      Possible slight interval worsening as above.      Electronically signed by: Ramesh Conklin MD  Date:    01/13/2019  Time:    16:37             Narrative:    EXAMINATION:  XR CHEST PA AND LATERAL    CLINICAL HISTORY:  Shortness of breath    TECHNIQUE:  PA and lateral views of the chest were performed.    COMPARISON:  12/03/2018    FINDINGS:  The cardiac and mediastinal silhouettes appear within normal limits. Cardiac pacing device again noted.  Prior aortic valve procedure again noted.  Small to moderate right-sided pleural effusion persists with associated parenchymal opacity in the right lung base likely representing atelectasis.  Findings may be slightly worsened from prior.  Suspect subsegmental atelectasis and trace effusion on the left lung base which may also be slightly worsened from prior.  Multilevel degenerative findings noted throughout the visualized spine.  Moderate-sized hiatal hernia again noted.  No pneumothorax visualized.                              Medical decision making:  Given the above, this patient presents to the emergency room with exertional dyspnea.  He has no dyspnea at rest.  Oxygen saturations are 90 private percent on room air.  The patient is not on home oxygen.  The patient was sent to the emergency room for pneumonia but I do not think this is an infiltrate.  I think this is a right pleural effusion.  The patient has a normal CRP a normal white count no fever no cough.  He has a developing anemia.  Rectal examination failed to reveal any blood in the rectum.  I will have this patient return in the morning for a thoracentesis.  I offered the patient observation in the hospital.  The patient wished instead to go home and return tomorrow for the procedure.  I checked his oxygen saturation  several times during his ER visit.  His oxygen gets as low as 91-92%.  The patient is laying down and he is not short of breath at rest.  I will accept his wishes to return in the morning for the procedure.  I believe he needs follow-up for his anemia.  I will have him follow up with primary care.  I sent iron studies.                Scribe Attestation:   Scribe #1: I performed the above scribed service and the documentation accurately describes the services I performed. I attest to the accuracy of the note.    Attending Attestation:           Physician Attestation for Scribe:  Physician Attestation Statement for Scribe #1: I, Brandt Funez MD, reviewed documentation, as scribed by Grayson Easley in my presence, and it is both accurate and complete.                    Clinical Impression:   The primary encounter diagnosis was Anemia, unspecified type. Diagnoses of Shortness of breath and Recurrent right pleural effusion were also pertinent to this visit.                             Brandt Funez MD  01/13/19 1946

## 2019-01-14 LAB
FOLATE SERPL-MCNC: 9.9 NG/ML
VIT B12 SERPL-MCNC: 883 PG/ML

## 2019-01-14 NOTE — DISCHARGE INSTRUCTIONS
Please return tomorrow for thoracentesis as instructed.  He will report to the Radiology department.  Please have him continue his usual medicines.  Please take the iron and a multivitamin as directed.  Please see your primary care doctor this week.  Rest.  Low-sodium diet.  Please try to limit your fluid intake.  Elevate your legs.

## 2019-01-14 NOTE — ED NOTES
Patient lying in bed resting. NKD. VSS. Patient wife at bedside. They are aware they are waiting on results. Patient has urinal at bedside.

## 2019-01-15 ENCOUNTER — HOSPITAL ENCOUNTER (OUTPATIENT)
Dept: PREADMISSION TESTING | Facility: HOSPITAL | Age: 84
Discharge: HOME | End: 2019-01-15
Attending: RADIOLOGY
Payer: MEDICARE

## 2019-01-15 VITALS
WEIGHT: 203.69 LBS | RESPIRATION RATE: 18 BRPM | SYSTOLIC BLOOD PRESSURE: 122 MMHG | BODY MASS INDEX: 29.16 KG/M2 | HEIGHT: 70 IN | DIASTOLIC BLOOD PRESSURE: 57 MMHG | OXYGEN SATURATION: 95 % | HEART RATE: 62 BPM | TEMPERATURE: 98 F

## 2019-01-15 DIAGNOSIS — J90 PLEURISY WITH EFFUSION: ICD-10-CM

## 2019-01-15 DIAGNOSIS — K90.9 INTESTINAL MALABSORPTION, UNSPECIFIED TYPE: Primary | ICD-10-CM

## 2019-01-15 DIAGNOSIS — Z79.82 ASPIRIN LONG-TERM USE: ICD-10-CM

## 2019-01-15 DIAGNOSIS — Z01.818 PRE-OP TESTING: ICD-10-CM

## 2019-01-15 LAB
BASOPHILS # BLD AUTO: 0.01 K/UL
BASOPHILS NFR BLD: 0.2 %
DIFFERENTIAL METHOD: ABNORMAL
EOSINOPHIL # BLD AUTO: 0.1 K/UL
EOSINOPHIL NFR BLD: 1.7 %
ERYTHROCYTE [DISTWIDTH] IN BLOOD BY AUTOMATED COUNT: 15.1 %
HCT VFR BLD AUTO: 28.9 %
HGB BLD-MCNC: 9.4 G/DL
INR PPP: 1.1
LYMPHOCYTES # BLD AUTO: 1 K/UL
LYMPHOCYTES NFR BLD: 15 %
MCH RBC QN AUTO: 31.5 PG
MCHC RBC AUTO-ENTMCNC: 32.5 G/DL
MCV RBC AUTO: 97 FL
MONOCYTES # BLD AUTO: 0.7 K/UL
MONOCYTES NFR BLD: 10.6 %
NEUTROPHILS # BLD AUTO: 4.8 K/UL
NEUTROPHILS NFR BLD: 72.5 %
PLATELET # BLD AUTO: 166 K/UL
PMV BLD AUTO: 11.1 FL
PROTHROMBIN TIME: 11.3 SEC
RBC # BLD AUTO: 2.98 M/UL
WBC # BLD AUTO: 6.6 K/UL

## 2019-01-15 PROCEDURE — 85025 COMPLETE CBC W/AUTO DIFF WBC: CPT

## 2019-01-15 PROCEDURE — 85610 PROTHROMBIN TIME: CPT

## 2019-01-15 PROCEDURE — 36415 COLL VENOUS BLD VENIPUNCTURE: CPT

## 2019-01-15 RX ORDER — SODIUM BICARBONATE 650 MG/1
650 TABLET ORAL DAILY
COMMUNITY
End: 2019-01-24

## 2019-01-15 RX ORDER — FAMOTIDINE 20 MG/1
20 TABLET, FILM COATED ORAL 2 TIMES DAILY
Status: ON HOLD | COMMUNITY
End: 2019-04-29 | Stop reason: HOSPADM

## 2019-01-15 RX ORDER — FUROSEMIDE 20 MG/1
20 TABLET ORAL DAILY
Status: ON HOLD | COMMUNITY
End: 2019-01-26 | Stop reason: SDUPTHER

## 2019-01-15 NOTE — DISCHARGE INSTRUCTIONS
"  Your surgery is scheduled for __Thursday Jan. 17, 2019__at 10:30 AM________________.      Please report to SAME DAY SURGERY UNIT on the 2nd FLOOR at _10:30_ a.m.  Use front door entrance. The doors open at 0530 am.      If you need WHEELCHAIR assistance please call  [phone] from your cell phone or "0"  from the  hospital courtesy phone located to the right after you enter the hospital lobby.      INSTRUCTIONS IMPORTANT!!!  ¨ Do not eat or drink after 12 midnight-including water. OK to brush teeth, no   gum, candy or mints!    ¨ Take only these medicines with a small swallow of water-morning of surgery.  Take Metoprolol, Amiodarone, Amlodipine, and Doxazosin with swallow of water.          _x___  Prep instructions:    SHOWER      _x___  No shaving of procedural area at least 4-5 days before surgery due to  increased risk of skin irritation and/or possible infection.  ____  Do not wear makeup, including mascara. WEARING EYE MAKEUP MAY LEAD TO SERIOUS EYE INJURY during surgery.  _x___  No powder, lotions or creams to your chest.  _x___  You may wear only deodorant on the day of surgery.  _x___  Please remove all jewelry, including piercings and leave at home.  _x___  No money or valuables needed. Please leave at home.  You may bring your  cell phone.  ____  Please bring any documents given by your doctor.  _x___  If going home the same day, arrange for a ride home. You will not be able to   drive if Anesthesia was used.    _x___  Wear loose fitting clothing. Allow for dressings, bandages.  _x___  Stop Aspirin, Ibuprofen, Motrin and Aleve at least 3-5 days before surgery, unless otherwise instructed by your doctor, or the nurse.              You MAY use Tylenol/acetaminophen until day of surgery.  ____  If you take diabetic medication, do not take am of surgery unless instructed by   Doctor.  _x___  Call MD for temperature above 101 degrees.        _x___ Stop taking any Fish Oil supplement or any Vitamins that " contain Vitamin  E at least 5 days prior to surgery.              I have read or had read and explained to me, and understand the above information.  Additional comments or instructions:Please call   [phone] if you have any questions regarding the instructions above.

## 2019-01-17 ENCOUNTER — HOSPITAL ENCOUNTER (OUTPATIENT)
Facility: HOSPITAL | Age: 84
Discharge: HOME | End: 2019-01-17
Attending: RADIOLOGY | Admitting: RADIOLOGY
Payer: MEDICARE

## 2019-01-17 VITALS
HEART RATE: 64 BPM | RESPIRATION RATE: 20 BRPM | TEMPERATURE: 99 F | SYSTOLIC BLOOD PRESSURE: 149 MMHG | OXYGEN SATURATION: 93 % | HEIGHT: 70 IN | BODY MASS INDEX: 29.16 KG/M2 | DIASTOLIC BLOOD PRESSURE: 68 MMHG | WEIGHT: 203.69 LBS

## 2019-01-17 DIAGNOSIS — J90 PLEURAL EFFUSION: ICD-10-CM

## 2019-01-17 DIAGNOSIS — J90 PLEURISY WITH EFFUSION: ICD-10-CM

## 2019-01-17 PROCEDURE — 99000 SPECIMEN HANDLING OFFICE-LAB: CPT

## 2019-01-17 RX ORDER — LANOLIN ALCOHOL/MO/W.PET/CERES
100 CREAM (GRAM) TOPICAL WEEKLY
COMMUNITY

## 2019-01-17 RX ORDER — MUPIROCIN 20 MG/G
OINTMENT TOPICAL 3 TIMES DAILY
Status: ON HOLD | COMMUNITY
End: 2019-04-29 | Stop reason: HOSPADM

## 2019-01-17 NOTE — PLAN OF CARE
Call made to Dr Montero office, patient in no noted distress but o2 sats ranging from  routinely . Followup appointment made for patient for early next week . Patient states readiness to go home and verbalizes understanding of discharge instructions.

## 2019-01-17 NOTE — DISCHARGE INSTRUCTIONS
BATHING:  ? You may shower tomorrow.  DRESSING:  ? Remove dressing tomorrow.        ACTIVITY LEVEL: If you have received sedation or an anesthetic, you may feel sleepy for several hours. Rest until you are more awake. Gradually resume your normal activities    Do not drive, drink alcohol, or sign legal documents for 24 hours, or if taking narcotic pain medication.      DIET: You may resume your home diet. If nausea is present, increase your diet gradually with fluids and bland foods.    Medications: Pain medication should be taken only if needed and as directed. If antibiotics are prescribed, the medication should be taken until completed. You will be given an updated list of you medications.  ? No driving, alcoholic beverages or signing legal documents for next 24 hours if you have had sedation, or while taking pain medication    CALL THE DOCTOR:   For any obvious bleeding (some dried blood over the incision is normal).     Redness, swelling, foul smell around incision or fever over 101.  Shortness of breath.  Persistent pain or nausea not relieved by medication.  Call  [phone]     to speak with an Interventional Radiologist    If any unusual problems or difficulties occur contact your doctor. If you cannot contact your doctor but feel your signs and symptoms warrant a physicians attention return to the emergency room.

## 2019-01-18 LAB
BACTERIA BLD CULT: NORMAL
BACTERIA BLD CULT: NORMAL

## 2019-01-21 ENCOUNTER — OFFICE VISIT (OUTPATIENT)
Dept: VASCULAR SURGERY | Facility: CLINIC | Age: 84
DRG: 291 | End: 2019-01-21
Payer: MEDICARE

## 2019-01-21 VITALS
DIASTOLIC BLOOD PRESSURE: 60 MMHG | WEIGHT: 201.75 LBS | HEIGHT: 70 IN | SYSTOLIC BLOOD PRESSURE: 120 MMHG | BODY MASS INDEX: 28.88 KG/M2

## 2019-01-21 DIAGNOSIS — I65.21 CAROTID STENOSIS, ASYMPTOMATIC, RIGHT: Primary | ICD-10-CM

## 2019-01-21 DIAGNOSIS — Z98.890 HISTORY OF LEFT-SIDED CAROTID ENDARTERECTOMY: ICD-10-CM

## 2019-01-21 PROCEDURE — 99213 OFFICE O/P EST LOW 20 MIN: CPT | Mod: PBBFAC | Performed by: SURGERY

## 2019-01-21 PROCEDURE — 99999 PR PBB SHADOW E&M-EST. PATIENT-LVL III: CPT | Mod: PBBFAC,,, | Performed by: SURGERY

## 2019-01-21 PROCEDURE — 99999 PR PBB SHADOW E&M-EST. PATIENT-LVL III: ICD-10-PCS | Mod: PBBFAC,,, | Performed by: SURGERY

## 2019-01-21 PROCEDURE — 99214 PR OFFICE/OUTPT VISIT, EST, LEVL IV, 30-39 MIN: ICD-10-PCS | Mod: S$PBB,,, | Performed by: SURGERY

## 2019-01-21 PROCEDURE — 99214 OFFICE O/P EST MOD 30 MIN: CPT | Mod: S$PBB,,, | Performed by: SURGERY

## 2019-01-21 RX ORDER — CLOPIDOGREL BISULFATE 75 MG/1
75 TABLET ORAL DAILY
Qty: 30 TABLET | Refills: 11 | Status: ON HOLD | OUTPATIENT
Start: 2019-01-21 | End: 2019-01-26 | Stop reason: SDUPTHER

## 2019-01-21 NOTE — DISCHARGE SUMMARY
Radiology Discharge Summary      Hospital Course: No complications    Admit Date: 1/17/2019  Discharge Date: 1/17/2019     Instructions Given to Patient: Yes  Diet: Resume prior diet  Activity: activity as tolerated    Description of Condition on Discharge: Stable  Vital Signs (Most Recent): Temp: 98.6 °F (37 °C) (01/17/19 1500)  Pulse: 64 (01/17/19 1500)  Resp: 20 (01/17/19 1230)  BP: (!) 149/68 (01/17/19 1500)  SpO2: (!) 93 % (01/17/19 1500)    Discharge Disposition: Home    Discharge Diagnosis: Right-sided pleural effusion s/p uneventful U/S-guided therapeutic thoracentesis.    Wiliam Bond MD  Interventional Radiology

## 2019-01-21 NOTE — PROCEDURES
Radiology Post-Procedure Note    Pre Op Diagnosis: Right pleural effusion with sob/wise.  Post Op Diagnosis: Same    Procedure: U/S-guided therapeutic right thoracentesis    Procedure performed by: Wiliam Bond MD    Written Informed Consent Obtained: Yes  Specimen Removed: YES. 1.4L of serous ascitic fluid  Estimated Blood Loss: none    Findings:   1. Successful U/S-guided right therapeutic thoracentesis.    No immediate complications. Patient tolerated procedure well.    Wiliam Bond MD  Interventional Radiology

## 2019-01-21 NOTE — LETTER
January 21, 2019        Cirilo Montero MD  [address]  [place] [facility] [facility] [address]             [facility] Surgery  [address]  [facility] [address]  Phone: [phone]  Fax: [phone]   Patient: Timbo Gallagher   MR Number: [id number]   YOB: 1935   Date of Visit: 1/21/2019       Dear Dr. Montero:    Thank you for referring Timbo Gallagher to me for evaluation. Below are the relevant portions of my assessment and plan of care.            If you have questions, please do not hesitate to call me. I look forward to following Timbo along with you.    Sincerely,      Silvio Colemna MD           CC  No Recipients

## 2019-01-21 NOTE — PROGRESS NOTES
Silvio Coleman MD [facility]                         01/21/2019    HPI:  Timbo Gallagher is a 83 y.o. male with   Patient Active Problem List   Diagnosis    Type 2 diabetes mellitus with stage 3 chronic kidney disease    Diverticular disease of esophagus    Gastroesophageal reflux disease without esophagitis    Benign prostatic hyperplasia without lower urinary tract symptoms    Anemia of chronic disease    Mild protein malnutrition    Incarcerated hiatal hernia    Chronic diastolic heart failure    Essential hypertension    Hiatal hernia with GERD and esophagitis    Aortic valve stenosis    Aortic valve stenosis, unspecified etiology    S/P TAVR (transcatheter aortic valve replacement)    CKD (chronic kidney disease), stage III    Blindness of one eye    Mixed hyperlipidemia    Acute on chronic combined systolic and diastolic heart failure    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    NSVT (nonsustained ventricular tachycardia)    AICD (automatic cardioverter/defibrillator) present    Traumatic subarachnoid bleed with LOC of 30 minutes or less, initial encounter    Subarachnoid hemorrhage following injury with brief loss of consciousness but without open intracranial wound    Oropharyngeal dysphagia    Acute renal failure superimposed on stage 3 chronic kidney disease    Anemia of chronic renal failure, stage 3 (moderate)    Contusion of rib on right side    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  Pt doing well, no complaints today.  BP well controlled.  No Ha.  No TIA or CVA.  L neck inc healing well.  Not taking narcotics, no neck pain near incision.    Interval history: No new TIA or CVA.  Missed his 6 mo f/u appt although  received his carotid US without HD significant stenosis.  Hospitalized several times since last eval for LUE cellulitis requiring IV Abx, shortness of breath requiring thoracentesis.  Had BERNY at OSH leading to a 10 day hospitalization.    Past Medical History:   Diagnosis Date    Acute renal failure     Arthritis     Blind right eye     BPH (benign prostatic hypertrophy)     Bronchitis, chronic     Carotid artery occlusion     Lt carotid endarerectomy done 02/19/2018     CHF (congestive heart failure)     Colon polyp     Diabetes mellitus     GERD (gastroesophageal reflux disease)     Hearing aid worn     bilateral    Hernia     Hypertension     Influenza A     Irregular heart beat     NSVT (nonsustained ventricular tachycardia) 4/18/2018    Renal failure as complication of care     sepsis, renal function returned    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 Coleman    CATARACT EXTRACTION, BILATERAL      EGD (ESOPHAGOGASTRODUODENOSCOPY) N/A 8/30/2018    Performed by Tye [facility]rete MD at [facility] ENDO (2ND FLR)    ENDARTERECTOMY-CAROTID Left 2/19/2018    Performed by [facility]io [facility] at [facility] OR    ESOPHAGOGASTRODUODENOSCOPY  08/30/2018    EYE SURGERY      HERNIA REPAIR Left 09/2013    REPAIR, HERNIA, INGUINAL, WITHOUT HISTORY OF PRIOR REPAIR, AGE 5 YEARS OR OLDER Left 9/11/2013    Performed by Han Brown MD at [facility] OR    REPLACEMENT-VALVE-AORTIC N/A 10/17/2017    Performed by Martínez Keene MD at [facility] CATH LAB     RETINAL DETACHMENT SURGERY       Family History   Problem Relation Age of Onset    Arthritis Mother     Heart disease Father     Heart failure Father     Diabetes Sister     Heart attack Brother     No Known Problems Maternal Aunt     No Known Problems Maternal Uncle     No Known Problems Paternal Aunt     No Known Problems Paternal Uncle     No Known Problems Maternal Grandmother     No Known Problems Maternal Grandfather     No Known Problems Paternal Grandmother     No Known Problems Paternal Grandfather     Anemia Neg Hx     Arrhythmia Neg Hx     Asthma Neg Hx     Clotting disorder Neg Hx     Fainting Neg Hx     Hyperlipidemia Neg Hx     Hypertension Neg Hx     Stroke Neg Hx     Atrial Septal Defect Neg Hx      Social History     Socioeconomic History    Marital status:      Spouse name: Not on file    Number of children: Not on file    Years of education: Not on file    Highest education level: Not on file   Social Needs    Financial resource strain: Not on file    Food insecurity - worry: Not on file    Food insecurity - inability: Not on file    Transportation needs - medical: Not on file    Transportation needs - non-medical: Not on file   Occupational History    Not on file   Tobacco Use    Smoking status: Former Smoker     Packs/day: 3.00     Years: 40.00     Pack years: 120.00     Types: Cigarettes     Last attempt to quit: 1990     Years since quittin.4    Smokeless tobacco: Never Used   Substance and Sexual Activity    Alcohol use: No    Drug use: No    Sexual activity: Not Currently     Partners: Female   Other Topics Concern    Not on file   Social History Narrative    ** Merged History Encounter **            Current Outpatient Medications:     acarbose (PRECOSE) 25 MG Tab, Take 25 mg by mouth 3 (three) times daily with meals., Disp: , Rfl:     amiodarone (PACERONE) 200 MG Tab, Take 1 tablet (200 mg total) by mouth once daily., Disp: 30  tablet, Rfl: 11    amLODIPine (NORVASC) 10 MG tablet, Take 10 mg by mouth once daily., Disp: , Rfl:     aspirin (ECOTRIN) 81 MG EC tablet, Take 81 mg by mouth once daily., Disp: , Rfl:     atorvastatin (LIPITOR) 40 MG tablet, Take 1 tablet (40 mg total) by mouth once daily., Disp: 90 tablet, Rfl: 11    cyanocobalamin (VITAMIN B-12) 1000 MCG tablet, Take 100 mcg by mouth once a week., Disp: , Rfl:     donepezil (ARICEPT) 5 MG tablet, Take 5 mg by mouth every evening., Disp: , Rfl:     doxazosin (CARDURA) 2 MG tablet, Take 2 mg by mouth every evening., Disp: , Rfl:     famotidine (PEPCID) 20 MG tablet, Take 20 mg by mouth once daily., Disp: , Rfl:     ferrous gluconate (FERGON) 324 MG tablet, Take 1 tablet (324 mg total) by mouth daily with breakfast., Disp: 30 tablet, Rfl: 2    furosemide (LASIX) 20 MG tablet, Take 20 mg by mouth once daily., Disp: , Rfl:     metoprolol succinate (TOPROL-XL) 50 MG 24 hr tablet, Take 50 mg by mouth 2 (two) times daily. , Disp: , Rfl:     multivit-min-FA-lycopen-lutein (CENTRUM SILVER MEN) 300-600-300 mcg Tab, Take 1 tablet by mouth once daily., Disp: 30 tablet, Rfl: 2    mupirocin (BACTROBAN) 2 % ointment, Apply topically 3 (three) times daily., Disp: , Rfl:     sodium bicarbonate 650 MG tablet, Take 650 mg by mouth once daily., Disp: , Rfl:     vortioxetine (TRINTELLIX) 5 mg Tab, Take 5 mg by mouth once daily. , Disp: , Rfl:     losartan (COZAAR) 100 MG tablet, Take 1 tablet (100 mg total) by mouth once daily., Disp: 90 tablet, Rfl: 3    REVIEW OF SYSTEMS:  General: No fevers or chills; ENT: No sore throat; Allergy and Immunology: no persistent infections; Hematological and Lymphatic: No history of bleeding or easy bruising; Endocrine: negative; Respiratory: no cough, shortness of breath, or wheezing; Cardiovascular: no chest pain or dyspnea on exertion; Gastrointestinal: no abdominal pain/back, change in bowel habits, or bloody stools; Genito-Urinary: no dysuria,  trouble voiding, or hematuria; Musculoskeletal: negative; Neurological: no TIA or stroke symptoms; Psychiatric: no nervousness, anxiety or depression.    PHYSICAL EXAM:                General appearance:  Alert, well-appearing, and in no distress.  Oriented to person, place, and time                    Neurological: Normal speech, no focal findings noted; CN II - XII grossly intact. All extremities with sensation to light touch.            Musculoskeletal: Digits/nail without cyanosis/clubbing.  Strength 5/5 all ext.                    Neck: Supple, no significant adenopathy, no carotid bruit can be auscultated.  L neck inc well healed.                  Chest:  Clear to auscultation, no wheezes, rales or rhonchi, symmetric air entry. No use of accessory muscles               Cardiac: Normal rate and regular rhythm, S1 and S2 normal            Abdomen: Soft, nontender, nondistended, no masses or organomegaly, no hernia     No rebound tenderness noted; bowel sounds normal     No groin adenopathy      Extremities:  2+ radial pulse bilat     No extremity edema    Skin: L neck inc healing well, no infection    LAB RESULTS:  No results found for: CBC  Lab Results   Component Value Date    LABPROT 11.3 01/15/2019    INR 1.1 01/15/2019     Lab Results   Component Value Date     01/13/2019    K 4.2 01/13/2019     01/13/2019    CO2 24 01/13/2019     (H) 01/13/2019    BUN 24 (H) 01/13/2019    CREATININE 2.0 (H) 01/13/2019    CALCIUM 8.7 01/13/2019    ANIONGAP 8 01/13/2019    EGFRNONAA 30 (A) 01/13/2019     Lab Results   Component Value Date    WBC 6.60 01/15/2019    RBC 2.98 (L) 01/15/2019    HGB 9.4 (L) 01/15/2019    HCT 28.9 (L) 01/15/2019    MCV 97 01/15/2019    MCH 31.5 (H) 01/15/2019    MCHC 32.5 01/15/2019    RDW 15.1 (H) 01/15/2019     01/15/2019    MPV 11.1 01/15/2019    GRAN 4.8 01/15/2019    GRAN 72.5 01/15/2019    LYMPH 1.0 01/15/2019    LYMPH 15.0 (L) 01/15/2019    MONO 0.7 01/15/2019     MONO 10.6 01/15/2019    EOS 0.1 01/15/2019    BASO 0.01 01/15/2019    EOSINOPHIL 1.7 01/15/2019    BASOPHIL 0.2 01/15/2019    DIFFMETHOD Automated 01/15/2019     .  Lab Results   Component Value Date    HGBA1C 6.0 (H) 11/29/2018       IMAGING:  All pertinent imaging has been reviewed and interpreted independently.    Carotid US reviewed 3/22/18: No evidence of recurrent L ICA stenosis.  R ICA without hemodynamically significant arterial occlusive disease.    Carotid US 9/2018:   There is 20 - 39% right Internal Carotid stenosis.  There is 20 - 39% left Internal Carotid stenosis.    IMP/PLAN:  82 y.o. male with   Patient Active Problem List   Diagnosis    HTN (hypertension), malignant    Type 2 diabetes mellitus with renal complication    Diverticular disease of esophagus    Gastroesophageal reflux disease without esophagitis    Benign prostatic hyperplasia without lower urinary tract symptoms    Anemia of chronic disease    Mild protein malnutrition    Incarcerated hiatal hernia    Chronic diastolic heart failure    Essential hypertension    Hiatal hernia with GERD and esophagitis    Aortic valve stenosis    Aortic valve stenosis, unspecified etiology    S/P TAVR (transcatheter aortic valve replacement)    CKD (chronic kidney disease), stage III    Influenza A    Blindness of one eye    TIA involving carotid artery    Pleural effusion    being managed by PCP and specialists who is here today for evaluation of carotid occlusive disease s/p L CEA 2/19/18.    -Recovering well s/p L carotid shortening and endarterectomy 2/2018- no evidence of recurrent carotid stenosis left side  -Right asymptomatic R carotid stenosis - cont routine surveillance  -Cont ASA, statin, Plavix and BP control - will discuss resuming Plavix with Dr. Montero due to SAH 8/2018  -RTC 2 mo with carotid US    I spent 30 minutes evaluating this patient and greater than 50% of the time was spent counseling, coordinator care and  discussing the plan of care.  All questions were answered and patient stated understanding with agreement with the above treatment plan.    Silvio Coleman MD RPVI  Vascular and Endovascular Surgery

## 2019-01-21 NOTE — PATIENT INSTRUCTIONS
Carotid Artery Problems: Blockage     A healthy carotid artery lets blood flow easily to the brain.   The blood carries oxygen and nutrients throughout the body. The carotid arteries are large blood vessels that carry blood to the brain. Certain health problems can make the inside of the carotid arteries narrow and rough. Over time, this damage increases the chances of having a stroke. A stroke is a sudden loss of brain function.   Open carotid arteries  In a healthy carotid artery, the inside of the artery is open. The lining of the artery wall is also smooth. This lets blood flow freely from the heart to the brain. The brain gets all the oxygen and nutrients it needs to function well.  Narrowed carotid arteries  Health factors, such as high blood pressure, high cholesterol, smoking, and diabetes, can damage artery walls and make them rough. This allows cholesterol and other particles in the blood to stick to the artery walls and form plaque or fatty deposits. As the plaque builds up, it can narrow the artery. Blood may also collect on the plaque and form blood clots.  How a stroke happens     Emboli can enter the bloodstream and travel to the brain. Brain tissue is damaged when emboli block arteries in the brain.   A stroke can happen when plaque in the carotid artery ruptures. This can allow small pieces of plaque to break off into the bloodstream. At the same time, rupture can make more blood clots. The pieces of plaque and tiny blood clots or emboli can flow in the blood until they get stuck in a small blood vessel in the brain. This blocks blood flow to part of the brain and causes a stroke.  Date Last Reviewed: 6/8/2015  © 4631-4433 [employer]. [address] [place] [address], [place] [address]. All rights reserved. This information is not intended as a substitute for professional medical care. Always follow your healthcare professional's instructions.

## 2019-01-21 NOTE — H&P
Radiology History & Physical      SUBJECTIVE:     Chief Complaint: SOB. RIOS.    History of Present Illness:  Timbo Gallagher is a 83 y.o. male with PMHx of CHG, SADA, BPH, chronic bronchitis, DM, HTN, NSVT and TAVR and AICD placement who presents from Urgent Care for an emergent evaluation of SOB that began this morning of 1/13/18. He reports that onset only occurs during exertion. Pt received a chest x-ray at Urgent Care which showed moderate-sized right-sided pleural effusion likely contributing to pts presenting symptoms. A new outpatient IR consult placed for U/S-guided right therapeutic thoracentesis.       Past Medical History:   Diagnosis Date    Acute renal failure     Arthritis     Blind right eye     BPH (benign prostatic hypertrophy)     Bronchitis, chronic     Carotid artery occlusion     Lt carotid endarerectomy done 02/19/2018     CHF (congestive heart failure)     Colon polyp     Diabetes mellitus     GERD (gastroesophageal reflux disease)     Hearing aid worn     bilateral    Hernia     Hypertension     Influenza A     Irregular heart beat     NSVT (nonsustained ventricular tachycardia) 4/18/2018    Renal failure as complication of care     sepsis, renal function returned    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 Coleman    CATARACT EXTRACTION, BILATERAL      EGD (ESOPHAGOGASTRODUODENOSCOPY) N/A 8/30/2018    Performed by Tye [facility]arrete MD at [facility] ENDO (2ND FLR)    ENDARTERECTOMY-CAROTID Left 2/19/2018     Performed by Silvio Coleman MD at [facility] OR    ESOPHAGOGASTRODUODENOSCOPY  08/30/2018    EYE SURGERY      HERNIA REPAIR Left 09/2013    REPAIR, HERNIA, INGUINAL, WITHOUT HISTORY OF PRIOR REPAIR, AGE 5 YEARS OR OLDER Left 9/11/2013    Performed by Han Brown MD at [facility] OR    REPLACEMENT-VALVE-AORTIC N/A 10/17/2017    Performed by Martínez Keene MD at [facility] CATH LAB    RETINAL DETACHMENT SURGERY         Home Meds:   Prior to Admission medications    Medication Sig Start Date End Date Taking? Authorizing Provider   acarbose (PRECOSE) 25 MG Tab Take 25 mg by mouth 3 (three) times daily with meals.   Yes Historical Provider, MD   amiodarone (PACERONE) 200 MG Tab Take 1 tablet (200 mg total) by mouth once daily. 5/20/18 5/20/19 Yes Cirilo Montero MD   amLODIPine (NORVASC) 10 MG tablet Take 10 mg by mouth once daily.   Yes Historical Provider, MD   atorvastatin (LIPITOR) 40 MG tablet Take 1 tablet (40 mg total) by mouth once daily. 3/22/18 3/22/19 Yes Silvio Coleman MD   cyanocobalamin (VITAMIN B-12) 1000 MCG tablet Take 100 mcg by mouth once a week.   Yes Historical Provider, MD   furosemide (LASIX) 20 MG tablet Take 20 mg by mouth once daily.   Yes Historical Provider, MD   metoprolol succinate (TOPROL-XL) 50 MG 24 hr tablet Take 50 mg by mouth 2 (two) times daily.    Yes Historical Provider, MD   mupirocin (BACTROBAN) 2 % ointment Apply topically 3 (three) times daily.   Yes Historical Provider, MD   aspirin (ECOTRIN) 81 MG EC tablet Take 81 mg by mouth once daily.    Historical Provider, MD   donepezil (ARICEPT) 5 MG tablet Take 5 mg by mouth every evening.    Historical Provider, MD   doxazosin (CARDURA) 2 MG tablet Take 2 mg by mouth every evening.    Historical Provider, MD   famotidine (PEPCID) 20 MG tablet Take 20 mg by mouth once daily.    Historical Provider, MD   ferrous gluconate (FERGON) 324 MG tablet Take 1 tablet (324 mg total) by mouth daily with breakfast. 1/13/19   Brandt NAVAS  MD Armin   losartan (COZAAR) 100 MG tablet Take 1 tablet (100 mg total) by mouth once daily. 3/28/18   Ned Toribio MD   multivit-min-FA-lycopen-lutein (CENTRUM SILVER MEN) 300-600-300 mcg Tab Take 1 tablet by mouth once daily. 1/13/19 2/12/19  Brandt Funez MD   sodium bicarbonate 650 MG tablet Take 650 mg by mouth once daily.    Historical Provider, MD   vortioxetine (TRINTELLIX) 5 mg Tab Take 5 mg by mouth once daily.     Historical Provider, MD     Anticoagulants/Antiplatelets: no anticoagulation    Allergies:   Review of patient's allergies indicates:   Allergen Reactions    Ciprofloxacin (mixture) Itching     Extreme itching    Antibiotic hc     Hydrochlorothiazide      Leg swelling      Iodine and iodide containing products      Wife and pt unsure    Vancomycin analogues Itching     Sedation History:  no adverse reactions    Review of Systems:   Hematological: no known coagulopathies  Respiratory: positive for - cough and shortness of breath  Cardiovascular: positive for - dyspnea on exertion  Gastrointestinal: no abdominal pain  Genito-Urinary: no dysuria  Musculoskeletal: negative  Neurological: h/o CVA with some deficits         OBJECTIVE:     Vital Signs (Most Recent)  Temp: 98.6 °F (37 °C) (01/17/19 1500)  Pulse: 64 (01/17/19 1500)  Resp: 20 (01/17/19 1230)  BP: (!) 149/68 (01/17/19 1500)  SpO2: (!) 93 % (01/17/19 1500)    Physical Exam:  ASA: II  Mallampati: II    General: no acute distress  Mental Status: alert and oriented to person, place and time  HEENT: normocephalic, atraumatic  Chest: sob. Richards.  Heart: regular heart rate  Abdomen: nondistended  Extremity: moves all extremities    Laboratory  Lab Results   Component Value Date    INR 1.1 01/15/2019       Lab Results   Component Value Date    WBC 6.60 01/15/2019    HGB 9.4 (L) 01/15/2019    HCT 28.9 (L) 01/15/2019    MCV 97 01/15/2019     01/15/2019      Lab Results   Component Value Date     (H) 01/13/2019      01/13/2019    K 4.2 01/13/2019     01/13/2019    CO2 24 01/13/2019    BUN 24 (H) 01/13/2019    CREATININE 2.0 (H) 01/13/2019    CALCIUM 8.7 01/13/2019    MG 2.0 09/03/2018    ALT 15 01/13/2019    AST 12 01/13/2019    ALBUMIN 2.8 (L) 01/13/2019    BILITOT 0.4 01/13/2019    BILIDIR 0.2 06/20/2018       ASSESSMENT/PLAN:     1. Right-sided pleural effusion with sob/wise - Will proceed with U/S-guided therapeutic right thoracentesis with local anesthetic only.     Wiliam Bond MD  Interventional Radiology

## 2019-01-22 ENCOUNTER — TELEPHONE (OUTPATIENT)
Dept: ADMINISTRATIVE | Facility: CLINIC | Age: 84
End: 2019-01-22

## 2019-01-22 ENCOUNTER — OFFICE VISIT (OUTPATIENT)
Dept: CARDIOLOGY | Facility: CLINIC | Age: 84
DRG: 291 | End: 2019-01-22
Payer: MEDICARE

## 2019-01-22 VITALS
BODY MASS INDEX: 28.81 KG/M2 | WEIGHT: 201.25 LBS | SYSTOLIC BLOOD PRESSURE: 114 MMHG | DIASTOLIC BLOOD PRESSURE: 62 MMHG | HEART RATE: 85 BPM | OXYGEN SATURATION: 88 % | RESPIRATION RATE: 15 BRPM | HEIGHT: 70 IN

## 2019-01-22 DIAGNOSIS — I10 ESSENTIAL HYPERTENSION: ICD-10-CM

## 2019-01-22 DIAGNOSIS — N18.30 CKD (CHRONIC KIDNEY DISEASE), STAGE III: ICD-10-CM

## 2019-01-22 DIAGNOSIS — Z95.810 AICD (AUTOMATIC CARDIOVERTER/DEFIBRILLATOR) PRESENT: ICD-10-CM

## 2019-01-22 DIAGNOSIS — Z95.2 S/P TAVR (TRANSCATHETER AORTIC VALVE REPLACEMENT): ICD-10-CM

## 2019-01-22 DIAGNOSIS — E78.2 MIXED HYPERLIPIDEMIA: ICD-10-CM

## 2019-01-22 DIAGNOSIS — I13.0 BENIGN HYPERTENSIVE HEART AND RENAL DISEASE WITH HEART FAILURE: ICD-10-CM

## 2019-01-22 DIAGNOSIS — I60.9 SAH (SUBARACHNOID HEMORRHAGE): ICD-10-CM

## 2019-01-22 DIAGNOSIS — I47.29 NSVT (NONSUSTAINED VENTRICULAR TACHYCARDIA): Primary | ICD-10-CM

## 2019-01-22 PROCEDURE — 99213 OFFICE O/P EST LOW 20 MIN: CPT | Mod: PBBFAC,25 | Performed by: INTERNAL MEDICINE

## 2019-01-22 PROCEDURE — 93289 PR INTERROG EVAL, IN PERSON,CARDVERT/DEFIB: ICD-10-PCS | Mod: 26,S$PBB,, | Performed by: INTERNAL MEDICINE

## 2019-01-22 PROCEDURE — 99214 OFFICE O/P EST MOD 30 MIN: CPT | Mod: S$PBB,,, | Performed by: INTERNAL MEDICINE

## 2019-01-22 PROCEDURE — 99999 PR PBB SHADOW E&M-EST. PATIENT-LVL III: ICD-10-PCS | Mod: PBBFAC,,, | Performed by: INTERNAL MEDICINE

## 2019-01-22 PROCEDURE — 93289 INTERROG DEVICE EVAL HEART: CPT | Mod: PBBFAC | Performed by: INTERNAL MEDICINE

## 2019-01-22 PROCEDURE — 99214 PR OFFICE/OUTPT VISIT, EST, LEVL IV, 30-39 MIN: ICD-10-PCS | Mod: S$PBB,,, | Performed by: INTERNAL MEDICINE

## 2019-01-22 PROCEDURE — 99999 PR PBB SHADOW E&M-EST. PATIENT-LVL III: CPT | Mod: PBBFAC,,, | Performed by: INTERNAL MEDICINE

## 2019-01-22 PROCEDURE — 93289 INTERROG DEVICE EVAL HEART: CPT | Mod: 26,S$PBB,, | Performed by: INTERNAL MEDICINE

## 2019-01-22 NOTE — PROGRESS NOTES
CARDIOVASCULAR PROGRESS NOTE    REASON FOR CONSULT:   Timbo Gallagher is a 83 y.o. male who presents for follow up of TIA/CEA, TAVR.    PCP: Winston Negron: [place]  HISTORY OF PRESENT ILLNESS:   The patient comes in today for follow-up.  He is accompanied by his wife.  In the interim since his last visit in July, the patient was hospitalized several times.  Most recently this was for thoracentesis.  He also reports having his defibrillator go off while he was an inpatient at [facility] in late December.  Review of care everywhere notes no hospitalizations at [facility], however.  At present, he does describe continued dyspnea and some lower extremity edema.  He has had no palpitations, lightheadedness, dizziness, syncope, or further defibrillator discharges.  There has been no PND, orthopnea, melena, hematuria, or claudicant symptoms.    The [employer] defibrillator was interrogated in the office today.  Current rhythm is a paced V paced at 80 beats per minute.  The patient is pacing 96.3% of the time the atria 52.3% time in the ventricle.  Estimated longevity is 8.4 years.  Sensing and pacing thresholds are normal.  Impedance is normal.  Note is made of 5 ventricular tachycardia events for which he was shocked 3 times over the course of December 20 6-27 2018.  He has otherwise had 23 other nonsustained ventricular tachycardia events.  The device is otherwise functioning normally and no programming changes were made.    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    PAST MEDICAL HISTORY:     Past Medical History:   Diagnosis Date    Acute renal failure     Arthritis     Blind right eye     BPH (benign prostatic hypertrophy)     Bronchitis, chronic     Carotid artery occlusion     Lt carotid endarerectomy done 02/19/2018     CHF (congestive heart failure)     Colon polyp     Diabetes mellitus     GERD  (gastroesophageal reflux disease)     Hearing aid worn     bilateral    Hernia     Hypertension     Influenza A     Irregular heart beat     NSVT (nonsustained ventricular tachycardia) 4/18/2018    Renal failure as complication of care     sepsis, renal function returned    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 Coleman    CATARACT EXTRACTION, BILATERAL      EGD (ESOPHAGOGASTRODUODENOSCOPY) N/A 8/30/2018    Performed by Tye Navarrete [facility] at [facility]O (2ND FLR)    ENDARTERECTOMY-CAROTID Left 2/19/2018    Per[facility] by [facility]io [facility] at [facility] OR    ESOPHAGOGASTRODUODENOSCOPY  08/30/2018    EYE SURGERY      HERNIA REPAIR Left 09/2013    REPAIR, HERNIA, INGUINAL, WITHOUT HISTORY OF PRIOR REPAIR, AGE 5 YEARS OR OLDER Left 9/11/2013    Performed by Han Brown [facility] at [facility] OR    REPLACEMENT-VALVE-AORTIC N/A 10/17/2017    Performed by Martínez Keene MD at [facility] CATH LAB    RETINAL DETACHMENT SURGERY         ALLERGIES AND MEDICATION:     Review of patient's allergies indicates:   Allergen Reactions    Vancomycin analogues Itching    Ciprofloxacin (mixture) Itching     Extreme itching and redness     Antibiotic hc         Medication List           Accurate as of 1/22/19  1:43 PM. If you have any questions, ask your nurse or doctor.               CONTINUE taking these medications    acarbose 25 MG Tab  Commonly known as:  PRECOSE     amiodarone 200 MG Tab  Commonly known as:  PACERONE  Take 1  tablet (200 mg total) by mouth once daily.     amLODIPine 10 MG tablet  Commonly known as:  NORVASC     aspirin 81 MG EC tablet  Commonly known as:  ECOTRIN     atorvastatin 40 MG tablet  Commonly known as:  LIPITOR  Take 1 tablet (40 mg total) by mouth once daily.     clopidogrel 75 mg tablet  Commonly known as:  PLAVIX  Take 1 tablet (75 mg total) by mouth once daily.     cyanocobalamin 1000 MCG tablet  Commonly known as:  VITAMIN B-12     donepezil 5 MG tablet  Commonly known as:  ARICEPT     doxazosin 2 MG tablet  Commonly known as:  CARDURA     famotidine 20 MG tablet  Commonly known as:  PEPCID     ferrous gluconate 324 MG tablet  Commonly known as:  FERGON  Take 1 tablet (324 mg total) by mouth daily with breakfast.     furosemide 20 MG tablet  Commonly known as:  LASIX     metoprolol succinate 50 MG 24 hr tablet  Commonly known as:  TOPROL-XL     multivit-min-FA-lycopen-lutein 300-600-300 mcg Tab  Commonly known as:  CENTRUM SILVER MEN  Take 1 tablet by mouth once daily.     mupirocin 2 % ointment  Commonly known as:  BACTROBAN     sodium bicarbonate 650 MG tablet     TRINTELLIX 5 mg Tab  Generic drug:  vortioxetine        STOP taking these medications    losartan 100 MG tablet  Commonly known as:  COZAAR  Stopped by:  Ned Toribio MD              SOCIAL HISTORY:     Social History     Socioeconomic History    Marital status:      Spouse name: Not on file    Number of children: Not on file    Years of education: Not on file    Highest education level: Not on file   Social Needs    Financial resource strain: Not on file    Food insecurity - worry: Not on file    Food insecurity - inability: Not on file    Transportation needs - medical: Not on file    Transportation needs - non-medical: Not on file   Occupational History    Not on file   Tobacco Use    Smoking status: Former Smoker     Packs/day: 3.00     Years: 40.00     Pack years: 120.00     Types: Cigarettes     Last attempt to  quit: 1990     Years since quittin.4    Smokeless tobacco: Never Used   Substance and Sexual Activity    Alcohol use: No    Drug use: No    Sexual activity: Not Currently     Partners: Female   Other Topics Concern    Not on file   Social History Narrative    ** Merged History Encounter **            FAMILY HISTORY:     Family History   Problem Relation Age of Onset    Arthritis Mother     Heart disease Father     Heart failure Father     Diabetes Sister     Heart attack Brother     No Known Problems Maternal Aunt     No Known Problems Maternal Uncle     No Known Problems Paternal Aunt     No Known Problems Paternal Uncle     No Known Problems Maternal Grandmother     No Known Problems Maternal Grandfather     No Known Problems Paternal Grandmother     No Known Problems Paternal Grandfather     Anemia Neg Hx     Arrhythmia Neg Hx     Asthma Neg Hx     Clotting disorder Neg Hx     Fainting Neg Hx     Hyperlipidemia Neg Hx     Hypertension Neg Hx     Stroke Neg Hx     Atrial Septal Defect Neg Hx        REVIEW OF SYSTEMS:   Review of Systems   Constitutional: Negative for chills, diaphoresis and fever.   HENT: Negative for nosebleeds.    Eyes: Negative for blurred vision, double vision and photophobia.   Respiratory: Negative for hemoptysis, shortness of breath and wheezing.    Cardiovascular: Negative for chest pain, palpitations, orthopnea, claudication, leg swelling and PND.   Gastrointestinal: Negative for abdominal pain, blood in stool, heartburn, melena, nausea and vomiting.   Genitourinary: Negative for flank pain and hematuria.   Musculoskeletal: Negative for falls, myalgias and neck pain.   Skin: Negative for rash.   Neurological: Positive for sensory change (R eye chr blind (retinal detachment)). Negative for dizziness, seizures, loss of consciousness, weakness and headaches.   Endo/Heme/Allergies: Negative for polydipsia. Does not bruise/bleed easily.  "  Psychiatric/Behavioral: Negative for depression and memory loss. The patient is not nervous/anxious.        PHYSICAL EXAM:     Vitals:    01/22/19 1302   BP: 114/62   Pulse: 85   Resp: 15    Body mass index is 29.3 kg/m².  Weight: 91.3 kg (201 lb 4.5 oz)   Height: 5' 9.5" (176.5 cm)     Physical Exam   Constitutional: He is oriented to person, place, and time. He appears well-developed and well-nourished. He is cooperative.  Non-toxic appearance. No distress.   HENT:   Head: Normocephalic and atraumatic.   Eyes: Conjunctivae and EOM are normal. Pupils are equal, round, and reactive to light. No scleral icterus.   Neck: Trachea normal and normal range of motion. Neck supple. Normal carotid pulses and no JVD present. Carotid bruit is not present. No neck rigidity. No tracheal deviation and no edema present. No thyromegaly present.   L CEA scar well healed   Cardiovascular: Normal rate, regular rhythm, S1 normal and S2 normal. PMI is not displaced. Exam reveals no gallop and no friction rub.   Murmur heard.   Systolic murmur is present with a grade of 1/6.  Pulses:       Carotid pulses are 2+ on the right side, and 2+ on the left side.  Pulmonary/Chest: Effort normal and breath sounds normal. No respiratory distress. He has no wheezes. He has no rales. He exhibits no tenderness.   Abdominal: Soft. He exhibits no distension. There is no hepatosplenomegaly.   Musculoskeletal: He exhibits no edema or tenderness.   Feet:   Right Foot:   Skin Integrity: Negative for ulcer.   Left Foot:   Skin Integrity: Negative for ulcer.   Neurological: He is alert and oriented to person, place, and time. No cranial nerve deficit.   Skin: Skin is warm and dry. No rash noted. No erythema.   Psychiatric: He has a normal mood and affect. His speech is normal and behavior is normal.   Vitals reviewed.      DATA:   EKG: (personally reviewed tracing)  2/17/18 SR 87, multifocal PVCs    Laboratory:  CBC:  Recent Labs   Lab 12/18/18  1530 " 01/13/19  1555 01/15/19  1600   WHITE BLOOD CELL COUNT 9.60 6.57 6.60   HEMOGLOBIN 11.1 L 9.5 L 9.4 L   HEMATOCRIT 34.2 L 28.7 L 28.9 L   PLATELETS 148 L 169 166       CHEMISTRIES:  Recent Labs   Lab 09/01/18  0452 09/02/18  0532 09/03/18  0559  12/03/18  0643 12/18/18  1530 01/13/19  1555   GLUCOSE 136 H 135 H 138 H   < > 148 H 243 H 167 H   SODIUM 139 137 138   < > 140 139 141   POTASSIUM 3.6 4.1 4.2   < > 4.1 3.9 4.2   BUN BLD 23 25 H 22   < > 20 51 H 24 H   CREATININE 1.5 H 1.6 H 1.4   < > 1.6 H 3.9 H 2.0 H   EGFR IF  49.1 A 45.4 A 53.3 A   < > 45 A 15 A 35 A   EGFR IF NON- 42.4 A 39.3 A 46.1 A   < > 39 A 13 A 30 A   CALCIUM 8.6 L 8.6 L 9.0   < > 9.2 8.4 L 8.7   MAGNESIUM 2.0 2.0 2.0  --   --   --   --     < > = values in this interval not displayed.       CARDIAC BIOMARKERS:  Recent Labs   Lab 04/21/18  2359 08/27/18  2122 01/13/19  1555   TROPONIN I 0.061 H 0.026 0.019       COAGS:  Recent Labs   Lab 04/23/18  0226 08/27/18  1621 01/15/19  1600   INR 1.2 1.1 1.1       LIPIDS/LFTS:  Recent Labs   Lab 02/17/18  0831  06/20/18  0954  11/29/18  1117 12/18/18  1530 01/13/19  1555   CHOLESTEROL 147  --  97 L  --   --   --   --    TRIGLYCERIDES 72  --  63  --   --   --   --    HDL 37 L  --  37 L  --   --   --   --    LDL CHOLESTEROL 95.6  --  47.4 L  --   --   --   --    NON-HDL CHOLESTEROL 110  --  60  --   --   --   --    AST 11   < > 17   < > 17 13 12   ALT <5 L   < > 18   < > 14 8 L 15    < > = values in this interval not displayed.       Cardiovascular Testing:  Echo 8/30/18 (similar aortic velocity and gradient c/w report 4/19/18)    1 - Normal left ventricular systolic function (EF 55-60%).     2 - Impaired LV relaxation, elevated LAP (grade 2 diastolic dysfunction).     3 - S/P transcatheter AVR, UNA = 1.53 cm2, AVAi = 0.76 cm2/m2, peak velocity = 2.21 m/s, mean gradient = 10 mmHg.     4 - Pulmonary hypertension. The estimated PA systolic pressure is 59 mmHg.     5 - Moderate  mitral regurgitation.     Cath 4/20/18  LVEF: 50% by echo  Normal TAVR fxn by echo  Dominance: Right  LM: normal  LAD: MLI              Diag1 prox 60%, mid 50%  Ramus: MLI  LCx: MLI  RCA: dom, prox 30%  Hemostasis:  R Radial band  Impression:  TdP  Nonobst CAD  Normal LV fxn/TAVR fxn by echo  R rad vasband for hemostasis  Plan:  Cont ASA/Plavix  Cont amio gtt     LE Venous US 4/17/18  No evidence of deep venous thrombosis in either lower extremity.     Carotid US 3/22/18 (s/p L CEA)  There is 20 - 39% right Internal Carotid stenosis.  There is 20 - 39% left Internal Carotid stenosis.     TAVR 10/17/17  B. 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    No perivalvular leak post procedure per 2D echo.    Post deployment AV mean gradient 2.5 mmHg, Vmax 1.09 m/s.      ASSESSMENT:   # progressive SOB, recently had thoracentesis  # TdP s/p MDT ICD implant 4/2018, recent ICD shocks noted 12/26-27/18.  Pt reports hospitalization at the time.  # TIA s/p L CEA 2/2018, followed by Dr. Coleman  # AS s/p TAVR 10/2017, normally functioning by echo 2/2018  # HTN, controlled  # HLP, on atorva 40mg qd  # CRI  # DM  # hx of R retinal detachment (chr R eye blindness)  # Hx SAH 8/2018    PLAN:   Cont med rx  Neuro eval, ?OK to resume plavix given hx of SAH  Check echo  If echo without changes, will consider EPS eval given recurrent VT episodes  T[place] follow up as per [facility] [place]  RTC 1 month  RTC 3 months with MDT ICD check (mid Apr 2019)      Ned Toribio MD, [facility]

## 2019-01-22 NOTE — TELEPHONE ENCOUNTER
Home Health SOC 12/04/2018 - 02/01/2019 with [facility] ([place]) - Dr. Aniya Dong. Patient received SN services.

## 2019-01-23 ENCOUNTER — TELEPHONE (OUTPATIENT)
Dept: NEUROLOGY | Facility: CLINIC | Age: 84
End: 2019-01-23

## 2019-01-23 NOTE — TELEPHONE ENCOUNTER
----- Message from Jayashree Aponte RN sent at 1/23/2019  1:15 PM CST -----  Regarding: resume plavix  Dr. Coleman was going to put in a referral for this patient as he had a HX of subarachnoid hemorrhage, but he has a appointment for memory loss. Dr. Coleman would like to put him on Plavix, but wanted to be sure it isn't contraindicated. Thanks.   Jayashree      Message sent to Dr [place]

## 2019-01-24 ENCOUNTER — HOSPITAL ENCOUNTER (INPATIENT)
Facility: HOSPITAL | Age: 84
LOS: 2 days | Discharge: HOME HEALTH | DRG: 291 | End: 2019-01-26
Attending: EMERGENCY MEDICINE | Admitting: EMERGENCY MEDICINE
Payer: MEDICARE

## 2019-01-24 DIAGNOSIS — J96.91 RESPIRATORY FAILURE WITH HYPOXIA AND HYPERCAPNIA, UNSPECIFIED CHRONICITY: ICD-10-CM

## 2019-01-24 DIAGNOSIS — I50.9 CHF (CONGESTIVE HEART FAILURE): ICD-10-CM

## 2019-01-24 DIAGNOSIS — J96.92 RESPIRATORY FAILURE WITH HYPOXIA AND HYPERCAPNIA, UNSPECIFIED CHRONICITY: ICD-10-CM

## 2019-01-24 DIAGNOSIS — R06.02 SHORTNESS OF BREATH: ICD-10-CM

## 2019-01-24 DIAGNOSIS — J90 PLEURAL EFFUSION: ICD-10-CM

## 2019-01-24 DIAGNOSIS — I50.31 ACUTE DIASTOLIC HEART FAILURE: Primary | ICD-10-CM

## 2019-01-24 PROBLEM — J96.21 ACUTE ON CHRONIC RESPIRATORY FAILURE WITH HYPOXIA: Status: ACTIVE | Noted: 2019-01-24

## 2019-01-24 LAB
ALBUMIN SERPL BCP-MCNC: 3.4 G/DL
ALLENS TEST: ABNORMAL
ALP SERPL-CCNC: 161 U/L
ALT SERPL W/O P-5'-P-CCNC: 19 U/L
ANION GAP SERPL CALC-SCNC: 13 MMOL/L
AORTIC ROOT ANNULUS: 2.89 CM
AORTIC VALVE CUSP SEPERATION: 1.68 CM
AST SERPL-CCNC: 35 U/L
AV INDEX (PROSTH): 0.29
AV MEAN GRADIENT: 11.83 MMHG
AV PEAK GRADIENT: 21.53 MMHG
AV VALVE AREA: 1.03 CM2
AV VELOCITY RATIO: 0.29
BACTERIA #/AREA URNS HPF: ABNORMAL /HPF
BASOPHILS # BLD AUTO: 0.04 K/UL
BASOPHILS NFR BLD: 0.3 %
BILIRUB SERPL-MCNC: 0.5 MG/DL
BILIRUB UR QL STRIP: NEGATIVE
BNP SERPL-MCNC: 742 PG/ML
BSA FOR ECHO PROCEDURE: 2.11 M2
BUN SERPL-MCNC: 20 MG/DL
CALCIUM SERPL-MCNC: 8.9 MG/DL
CHLORIDE SERPL-SCNC: 107 MMOL/L
CLARITY UR: CLEAR
CO2 SERPL-SCNC: 21 MMOL/L
COLOR UR: YELLOW
CREAT SERPL-MCNC: 1.8 MG/DL
CV ECHO LV RWT: 0.4 CM
DELSYS: ABNORMAL
DIFFERENTIAL METHOD: ABNORMAL
DOP CALC AO PEAK VEL: 2.32 M/S
DOP CALC AO VTI: 60.91 CM
DOP CALC LVOT AREA: 3.59 CM2
DOP CALC LVOT DIAMETER: 2.14 CM
DOP CALC LVOT PEAK VEL: 0.67 M/S
DOP CALC LVOT STROKE VOLUME: 62.95 CM3
DOP CALCLVOT PEAK VEL VTI: 17.51 CM
E WAVE DECELERATION TIME: 222.43 MSEC
E/A RATIO: 1.82
ECHO LV POSTERIOR WALL: 1.06 CM (ref 0.6–1.1)
EOSINOPHIL # BLD AUTO: 0.3 K/UL
EOSINOPHIL NFR BLD: 2.2 %
EP: 5
ERYTHROCYTE [DISTWIDTH] IN BLOOD BY AUTOMATED COUNT: 15.7 %
EST. GFR  (AFRICAN AMERICAN): 39 ML/MIN/1.73 M^2
EST. GFR  (NON AFRICAN AMERICAN): 34 ML/MIN/1.73 M^2
FIO2: 50
FRACTIONAL SHORTENING: 21 % (ref 28–44)
GLUCOSE SERPL-MCNC: 198 MG/DL
GLUCOSE UR QL STRIP: NEGATIVE
HCO3 UR-SCNC: 24.1 MMOL/L (ref 24–28)
HCT VFR BLD AUTO: 34.7 %
HGB BLD-MCNC: 11.1 G/DL
HGB UR QL STRIP: ABNORMAL
HYALINE CASTS #/AREA URNS LPF: 0 /LPF
INTERVENTRICULAR SEPTUM: 1.11 CM (ref 0.6–1.1)
IP: 15
IVRT: 0.05 MSEC
KETONES UR QL STRIP: NEGATIVE
LA MAJOR: 5.97 CM
LA MINOR: 5.85 CM
LA WIDTH: 5.48 CM
LACTATE SERPL-SCNC: 1 MMOL/L
LEFT ATRIUM SIZE: 4.58 CM
LEFT ATRIUM VOLUME INDEX: 60.7 ML/M2
LEFT ATRIUM VOLUME: 126.07 CM3
LEFT INTERNAL DIMENSION IN SYSTOLE: 4.2 CM (ref 2.1–4)
LEFT VENTRICLE DIASTOLIC VOLUME INDEX: 64.79 ML/M2
LEFT VENTRICLE DIASTOLIC VOLUME: 134.67 ML
LEFT VENTRICLE MASS INDEX: 107.2 G/M2
LEFT VENTRICLE SYSTOLIC VOLUME INDEX: 37.7 ML/M2
LEFT VENTRICLE SYSTOLIC VOLUME: 78.46 ML
LEFT VENTRICULAR INTERNAL DIMENSION IN DIASTOLE: 5.29 CM (ref 3.5–6)
LEFT VENTRICULAR MASS: 222.85 G
LEUKOCYTE ESTERASE UR QL STRIP: NEGATIVE
LYMPHOCYTES # BLD AUTO: 3.3 K/UL
LYMPHOCYTES NFR BLD: 25.1 %
MAGNESIUM SERPL-MCNC: 2.4 MG/DL
MCH RBC QN AUTO: 31.2 PG
MCHC RBC AUTO-ENTMCNC: 32 G/DL
MCV RBC AUTO: 98 FL
MICROSCOPIC COMMENT: ABNORMAL
MIN VOL: 11
MODE: ABNORMAL
MONOCYTES # BLD AUTO: 1.5 K/UL
MONOCYTES NFR BLD: 11.6 %
MV PEAK A VEL: 0.74 M/S
MV PEAK E VEL: 1.35 M/S
NEUTROPHILS # BLD AUTO: 8 K/UL
NEUTROPHILS NFR BLD: 60.8 %
NITRITE UR QL STRIP: NEGATIVE
PCO2 BLDA: 47.1 MMHG (ref 35–45)
PH SMN: 7.32 [PH] (ref 7.35–7.45)
PH UR STRIP: 5 [PH] (ref 5–8)
PISA TR MAX VEL: 3.67 M/S
PLATELET # BLD AUTO: 226 K/UL
PMV BLD AUTO: 11.2 FL
PO2 BLDA: 102 MMHG (ref 80–100)
POC BE: -2 MMOL/L
POC SATURATED O2: 97 % (ref 95–100)
POC TCO2: 26 MMOL/L (ref 23–27)
POCT GLUCOSE: 168 MG/DL (ref 70–110)
POTASSIUM SERPL-SCNC: 4.3 MMOL/L
PROT SERPL-MCNC: 7.6 G/DL
PROT UR QL STRIP: ABNORMAL
PV PEAK VELOCITY: 0.73 CM/S
RA MAJOR: 5.32 CM
RA PRESSURE: 15 MMHG
RA WIDTH: 4.32 CM
RBC # BLD AUTO: 3.56 M/UL
RBC #/AREA URNS HPF: 8 /HPF (ref 0–4)
RIGHT VENTRICULAR END-DIASTOLIC DIMENSION: 3.94 CM
RV TISSUE DOPPLER FREE WALL SYSTOLIC VELOCITY 1 (APICAL 4 CHAMBER VIEW): 8.69 M/S
SAMPLE: ABNORMAL
SINUS: 3.16 CM
SITE: ABNORMAL
SODIUM SERPL-SCNC: 141 MMOL/L
SP GR UR STRIP: 1.01 (ref 1–1.03)
SP02: 99
SPONT RATE: 20
STJ: 2.44 CM
TR MAX PG: 53.88 MMHG
TRICUSPID ANNULAR PLANE SYSTOLIC EXCURSION: 2.95 CM
TROPONIN I SERPL DL<=0.01 NG/ML-MCNC: 0.02 NG/ML
TROPONIN I SERPL DL<=0.01 NG/ML-MCNC: 0.02 NG/ML
TV REST PULMONARY ARTERY PRESSURE: 69 MMHG
URN SPEC COLLECT METH UR: ABNORMAL
UROBILINOGEN UR STRIP-ACNC: NEGATIVE EU/DL
WBC # BLD AUTO: 13.14 K/UL
WBC #/AREA URNS HPF: 1 /HPF (ref 0–5)

## 2019-01-24 PROCEDURE — 94640 AIRWAY INHALATION TREATMENT: CPT

## 2019-01-24 PROCEDURE — 81000 URINALYSIS NONAUTO W/SCOPE: CPT

## 2019-01-24 PROCEDURE — 63600175 PHARM REV CODE 636 W HCPCS: Performed by: EMERGENCY MEDICINE

## 2019-01-24 PROCEDURE — 96375 TX/PRO/DX INJ NEW DRUG ADDON: CPT

## 2019-01-24 PROCEDURE — 99223 1ST HOSP IP/OBS HIGH 75: CPT | Mod: 25,,, | Performed by: INTERNAL MEDICINE

## 2019-01-24 PROCEDURE — 25000242 PHARM REV CODE 250 ALT 637 W/ HCPCS: Performed by: EMERGENCY MEDICINE

## 2019-01-24 PROCEDURE — 63600175 PHARM REV CODE 636 W HCPCS: Performed by: INTERNAL MEDICINE

## 2019-01-24 PROCEDURE — 84484 ASSAY OF TROPONIN QUANT: CPT

## 2019-01-24 PROCEDURE — 36600 WITHDRAWAL OF ARTERIAL BLOOD: CPT

## 2019-01-24 PROCEDURE — 83735 ASSAY OF MAGNESIUM: CPT

## 2019-01-24 PROCEDURE — 25000003 PHARM REV CODE 250: Performed by: INTERNAL MEDICINE

## 2019-01-24 PROCEDURE — 94761 N-INVAS EAR/PLS OXIMETRY MLT: CPT

## 2019-01-24 PROCEDURE — 83880 ASSAY OF NATRIURETIC PEPTIDE: CPT

## 2019-01-24 PROCEDURE — 87040 BLOOD CULTURE FOR BACTERIA: CPT

## 2019-01-24 PROCEDURE — 93010 ELECTROCARDIOGRAM REPORT: CPT | Mod: ,,, | Performed by: INTERNAL MEDICINE

## 2019-01-24 PROCEDURE — 80053 COMPREHEN METABOLIC PANEL: CPT

## 2019-01-24 PROCEDURE — 96365 THER/PROPH/DIAG IV INF INIT: CPT

## 2019-01-24 PROCEDURE — 27000221 HC OXYGEN, UP TO 24 HOURS

## 2019-01-24 PROCEDURE — 36415 COLL VENOUS BLD VENIPUNCTURE: CPT

## 2019-01-24 PROCEDURE — 93010 EKG 12-LEAD: ICD-10-PCS | Mod: ,,, | Performed by: INTERNAL MEDICINE

## 2019-01-24 PROCEDURE — 27000190 HC CPAP FULL FACE MASK W/VALVE

## 2019-01-24 PROCEDURE — 25000003 PHARM REV CODE 250: Performed by: EMERGENCY MEDICINE

## 2019-01-24 PROCEDURE — 99223 PR INITIAL HOSPITAL CARE,LEVL III: ICD-10-PCS | Mod: 25,,, | Performed by: INTERNAL MEDICINE

## 2019-01-24 PROCEDURE — S0028 INJECTION, FAMOTIDINE, 20 MG: HCPCS | Performed by: EMERGENCY MEDICINE

## 2019-01-24 PROCEDURE — 99900035 HC TECH TIME PER 15 MIN (STAT)

## 2019-01-24 PROCEDURE — 82803 BLOOD GASES ANY COMBINATION: CPT

## 2019-01-24 PROCEDURE — 93005 ELECTROCARDIOGRAM TRACING: CPT

## 2019-01-24 PROCEDURE — 99285 EMERGENCY DEPT VISIT HI MDM: CPT | Mod: 25

## 2019-01-24 PROCEDURE — 96368 THER/DIAG CONCURRENT INF: CPT

## 2019-01-24 PROCEDURE — 85025 COMPLETE CBC W/AUTO DIFF WBC: CPT

## 2019-01-24 PROCEDURE — 12000002 HC ACUTE/MED SURGE SEMI-PRIVATE ROOM

## 2019-01-24 PROCEDURE — 83605 ASSAY OF LACTIC ACID: CPT

## 2019-01-24 PROCEDURE — 94660 CPAP INITIATION&MGMT: CPT

## 2019-01-24 RX ORDER — ACETAMINOPHEN 500 MG
500 TABLET ORAL 2 TIMES DAILY
Status: ON HOLD | COMMUNITY
End: 2019-04-29 | Stop reason: HOSPADM

## 2019-01-24 RX ORDER — CEFEPIME HYDROCHLORIDE 2 G/50ML
2 INJECTION, SOLUTION INTRAVENOUS
Status: DISCONTINUED | OUTPATIENT
Start: 2019-01-24 | End: 2019-01-24

## 2019-01-24 RX ORDER — ASPIRIN 325 MG
325 TABLET ORAL
Status: COMPLETED | OUTPATIENT
Start: 2019-01-24 | End: 2019-01-24

## 2019-01-24 RX ORDER — IPRATROPIUM BROMIDE AND ALBUTEROL SULFATE 2.5; .5 MG/3ML; MG/3ML
3 SOLUTION RESPIRATORY (INHALATION) EVERY 4 HOURS
Status: DISCONTINUED | OUTPATIENT
Start: 2019-01-24 | End: 2019-01-26 | Stop reason: HOSPADM

## 2019-01-24 RX ORDER — FUROSEMIDE 10 MG/ML
40 INJECTION INTRAMUSCULAR; INTRAVENOUS
Status: COMPLETED | OUTPATIENT
Start: 2019-01-24 | End: 2019-01-24

## 2019-01-24 RX ORDER — FERROUS GLUCONATE 324(38)MG
324 TABLET ORAL
Status: DISCONTINUED | OUTPATIENT
Start: 2019-01-25 | End: 2019-01-26 | Stop reason: HOSPADM

## 2019-01-24 RX ORDER — PNV NO.95/FERROUS FUM/FOLIC AC 28MG-0.8MG
100 TABLET ORAL WEEKLY
Status: DISCONTINUED | OUTPATIENT
Start: 2019-01-30 | End: 2019-01-26 | Stop reason: HOSPADM

## 2019-01-24 RX ORDER — FAMOTIDINE 20 MG/1
20 TABLET, FILM COATED ORAL 2 TIMES DAILY
Status: DISCONTINUED | OUTPATIENT
Start: 2019-01-24 | End: 2019-01-26 | Stop reason: HOSPADM

## 2019-01-24 RX ORDER — HEPARIN SODIUM 5000 [USP'U]/ML
5000 INJECTION, SOLUTION INTRAVENOUS; SUBCUTANEOUS EVERY 12 HOURS
Status: DISCONTINUED | OUTPATIENT
Start: 2019-01-24 | End: 2019-01-26 | Stop reason: HOSPADM

## 2019-01-24 RX ORDER — SODIUM CHLORIDE 0.9 % (FLUSH) 0.9 %
5 SYRINGE (ML) INJECTION
Status: DISCONTINUED | OUTPATIENT
Start: 2019-01-24 | End: 2019-01-26 | Stop reason: HOSPADM

## 2019-01-24 RX ORDER — FAMOTIDINE 10 MG/ML
20 INJECTION INTRAVENOUS EVERY 12 HOURS
Status: DISCONTINUED | OUTPATIENT
Start: 2019-01-24 | End: 2019-01-24 | Stop reason: DRUGHIGH

## 2019-01-24 RX ORDER — ATORVASTATIN CALCIUM 40 MG/1
40 TABLET, FILM COATED ORAL DAILY
Status: DISCONTINUED | OUTPATIENT
Start: 2019-01-24 | End: 2019-01-26 | Stop reason: HOSPADM

## 2019-01-24 RX ORDER — IPRATROPIUM BROMIDE AND ALBUTEROL SULFATE 2.5; .5 MG/3ML; MG/3ML
3 SOLUTION RESPIRATORY (INHALATION)
Status: COMPLETED | OUTPATIENT
Start: 2019-01-24 | End: 2019-01-24

## 2019-01-24 RX ORDER — FUROSEMIDE 10 MG/ML
40 INJECTION INTRAMUSCULAR; INTRAVENOUS 2 TIMES DAILY
Status: DISCONTINUED | OUTPATIENT
Start: 2019-01-24 | End: 2019-01-26 | Stop reason: HOSPADM

## 2019-01-24 RX ORDER — ACETAMINOPHEN 325 MG/1
650 TABLET ORAL EVERY 8 HOURS PRN
Status: DISCONTINUED | OUTPATIENT
Start: 2019-01-24 | End: 2019-01-26 | Stop reason: HOSPADM

## 2019-01-24 RX ORDER — FAMOTIDINE 10 MG/ML
20 INJECTION INTRAVENOUS DAILY
Status: DISCONTINUED | OUTPATIENT
Start: 2019-01-24 | End: 2019-01-24

## 2019-01-24 RX ORDER — DIPHENHYDRAMINE HCL 50 MG
50 CAPSULE ORAL NIGHTLY
Status: ON HOLD | COMMUNITY
End: 2019-04-29 | Stop reason: HOSPADM

## 2019-01-24 RX ORDER — AMIODARONE HYDROCHLORIDE 200 MG/1
200 TABLET ORAL DAILY
Status: DISCONTINUED | OUTPATIENT
Start: 2019-01-24 | End: 2019-01-26 | Stop reason: HOSPADM

## 2019-01-24 RX ORDER — ONDANSETRON 2 MG/ML
4 INJECTION INTRAMUSCULAR; INTRAVENOUS EVERY 8 HOURS PRN
Status: DISCONTINUED | OUTPATIENT
Start: 2019-01-24 | End: 2019-01-26 | Stop reason: HOSPADM

## 2019-01-24 RX ORDER — METOPROLOL SUCCINATE 50 MG/1
50 TABLET, EXTENDED RELEASE ORAL 2 TIMES DAILY
Status: DISCONTINUED | OUTPATIENT
Start: 2019-01-24 | End: 2019-01-24

## 2019-01-24 RX ADMIN — ATORVASTATIN CALCIUM 40 MG: 40 TABLET, FILM COATED ORAL at 03:01

## 2019-01-24 RX ADMIN — IPRATROPIUM BROMIDE AND ALBUTEROL SULFATE 3 ML: .5; 3 SOLUTION RESPIRATORY (INHALATION) at 03:01

## 2019-01-24 RX ADMIN — ACETAMINOPHEN 650 MG: 325 TABLET ORAL at 08:01

## 2019-01-24 RX ADMIN — FAMOTIDINE 20 MG: 10 INJECTION INTRAVENOUS at 08:01

## 2019-01-24 RX ADMIN — AMIODARONE HYDROCHLORIDE 200 MG: 200 TABLET ORAL at 03:01

## 2019-01-24 RX ADMIN — CEFEPIME HYDROCHLORIDE 2 G: 2 INJECTION, SOLUTION INTRAVENOUS at 03:01

## 2019-01-24 RX ADMIN — DOXYCYCLINE 100 MG: 100 INJECTION, POWDER, LYOPHILIZED, FOR SOLUTION INTRAVENOUS at 03:01

## 2019-01-24 RX ADMIN — IPRATROPIUM BROMIDE AND ALBUTEROL SULFATE 3 ML: .5; 3 SOLUTION RESPIRATORY (INHALATION) at 11:01

## 2019-01-24 RX ADMIN — FUROSEMIDE 40 MG: 10 INJECTION, SOLUTION INTRAVENOUS at 01:01

## 2019-01-24 RX ADMIN — FUROSEMIDE 40 MG: 10 INJECTION, SOLUTION INTRAVENOUS at 12:01

## 2019-01-24 RX ADMIN — HEPARIN SODIUM 5000 UNITS: 5000 INJECTION, SOLUTION INTRAVENOUS; SUBCUTANEOUS at 07:01

## 2019-01-24 RX ADMIN — IPRATROPIUM BROMIDE AND ALBUTEROL SULFATE 3 ML: .5; 3 SOLUTION RESPIRATORY (INHALATION) at 07:01

## 2019-01-24 RX ADMIN — IPRATROPIUM BROMIDE AND ALBUTEROL SULFATE 3 ML: .5; 3 SOLUTION RESPIRATORY (INHALATION) at 08:01

## 2019-01-24 RX ADMIN — IPRATROPIUM BROMIDE AND ALBUTEROL SULFATE 3 ML: .5; 3 SOLUTION RESPIRATORY (INHALATION) at 01:01

## 2019-01-24 RX ADMIN — ASPIRIN 325 MG ORAL TABLET 325 MG: 325 PILL ORAL at 01:01

## 2019-01-24 RX ADMIN — FAMOTIDINE 20 MG: 20 TABLET ORAL at 08:01

## 2019-01-24 NOTE — H&P
[facility] Hospital Medicine  History & Physical    Patient Name: Timbo Gallagher  MRN: [id number]  Admission Date: 1/24/2019  Attending Physician: Yarelis Avila MD   Primary Care Provider: Cirilo Montero MD         Patient information was obtained from patient, past medical records and ER records.     Subjective:     Principal Problem:Acute on chronic respiratory failure with hypoxia    Chief Complaint:   Chief Complaint   Patient presents with    Shortness of Breath     pt was 90% RA, resp rate in the 30s, hx of heart failure, O2 saturation in the mid 90s with 6L. MD asked to eval pt on wall. Pt had fluid removed from lungs one week ago.         HPI: 83 year old male with non obstructive CAD, s/p TAVR, moderate mitral regurgitation, grade 2 diastolic heart failure, Hx of VT now s/p ICD, pulmonary hypertension and hypertension who presented with worsening shortness of breath. Patient had an ultrasound guided therapeutic thoracentesis done on 1/17 (~ one week PTA) where 1.4L of serous fluid was removed. He felt better after procedure but symptoms then again worsened. He takes furosemide 20 mg daily and is compliant with this. Denied fever, chills, lightheadedness, vomiting, abdominal pain, trouble swallowing, dysuria, diarrhea. Reported some nausea.     In the ED, patient was afebrile and normotensive but in respiratory distress. Per EMS, sats around 90% at room air. CXR with evidence of right pleural effusion and pulmonary edema. Given two doses of furosemide 40 mg IV and placed on BiPAP. Admitted to ICU for close monitoring.      Past Medical History:   Diagnosis Date    Acute renal failure     Arthritis     Blind right eye     BPH (benign prostatic hypertrophy)     Bronchitis, chronic     Carotid artery occlusion     Lt carotid endarerectomy done 02/19/2018     CHF (congestive heart failure)     Colon polyp     Coronary artery disease     Diabetes mellitus     GERD  (gastroesophageal reflux disease)     Hearing aid worn     bilateral    Hernia     Hypertension     Influenza A     Irregular heart beat     NSVT (nonsustained ventricular tachycardia) 4/18/2018    Renal failure as complication of care     sepsis, renal function returned    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 Coleman    CATARACT EXTRACTION, BILATERAL      EGD (ESOPHAGOGASTRODUODENOSCOPY) N/A 8/30/2018    Per[facility] ENDO (2ND FLR)    ENDARTERECTOMY-CAROTID Left 2/19/2018    Performed by [facility]vio [facility] at [facility] OR    ESOPHAGOGASTRODUODENOSCOPY  08/30/2018    EYE SURGERY      HERNIA REPAIR Left 09/2013    REPAIR, HERNIA, INGUINAL, WITHOUT HISTORY OF PRIOR REPAIR, AGE 5 YEARS OR OLDER Left 9/11/2013    Performed by Han Brown MD at [facility] OR    REPLACEMENT-VALVE-AORTIC N/A 10/17/2017    Performed by Martínez Keene MD at [facility] CATH LAB    RETINAL DETACHMENT SURGERY         Review of patient's allergies indicates:   Allergen Reactions    Ciprofloxacin (mixture) Itching     Extreme itching    Antibiotic hc     Hydrochlorothiazide      Leg swelling      Iodine and iodide containing products      Wife and pt unsure    Vancomycin analogues Itching       No current facility-administered medications on file prior to encounter.      Current Outpatient Medications on File Prior to Encounter   Medication Sig    acarbose (PRECOSE) 25 MG Tab Take 25 mg by mouth 3 (three) times daily with  meals.    acetaminophen (TYLENOL) 500 MG tablet Take 500 mg by mouth 2 (two) times daily.    amiodarone (PACERONE) 200 MG Tab Take 1 tablet (200 mg total) by mouth once daily.    amLODIPine (NORVASC) 10 MG tablet Take 10 mg by mouth once daily.    aspirin (ECOTRIN) 81 MG EC tablet Take 81 mg by mouth once daily.    atorvastatin (LIPITOR) 40 MG tablet Take 1 tablet (40 mg total) by mouth once daily.    cyanocobalamin (VITAMIN B-12) 1000 MCG tablet Take 100 mcg by mouth once a week.    diphenhydrAMINE (BENADRYL) 50 MG capsule Take 50 mg by mouth nightly.    famotidine (PEPCID) 20 MG tablet Take 20 mg by mouth 2 (two) times daily.     ferrous gluconate (FERGON) 324 MG tablet Take 1 tablet (324 mg total) by mouth daily with breakfast.    furosemide (LASIX) 20 MG tablet Take 20 mg by mouth once daily.    metoprolol succinate (TOPROL-XL) 50 MG 24 hr tablet Take 50 mg by mouth 2 (two) times daily.     multivit-min-FA-lycopen-lutein (CENTRUM SILVER MEN) 300-600-300 mcg Tab Take 1 tablet by mouth once daily.    mupirocin (BACTROBAN) 2 % ointment Apply topically 3 (three) times daily.    vortioxetine (TRINTELLIX) 5 mg Tab Take 5 mg by mouth once daily.     [DISCONTINUED] doxazosin (CARDURA) 2 MG tablet Take 2 mg by mouth every evening.    clopidogrel (PLAVIX) 75 mg tablet Take 1 tablet (75 mg total) by mouth once daily.    [DISCONTINUED] donepezil (ARICEPT) 5 MG tablet Take 5 mg by mouth every evening.    [DISCONTINUED] sodium bicarbonate 650 MG tablet Take 650 mg by mouth once daily.     Family History     Problem Relation (Age of Onset)    Arthritis Mother    Diabetes Sister    Heart attack Brother    Heart disease Father    Heart failure Father    No Known Problems Maternal Aunt, Maternal Uncle, Paternal Aunt, Paternal Uncle, Maternal Grandmother, Maternal Grandfather, Paternal Grandmother, Paternal Grandfather        Tobacco Use    Smoking status: Former Smoker     Packs/day: 3.00     Years: 40.00      Pack years: 120.00     Types: Cigarettes     Last attempt to quit: 1990     Years since quittin.4    Smokeless tobacco: Never Used   Substance and Sexual Activity    Alcohol use: No    Drug use: No    Sexual activity: Not Currently     Partners: Female     Review of Systems   Constitutional: Negative.    HENT: Negative.    Eyes: Negative.    Respiratory: Positive for shortness of breath.    Cardiovascular: Negative for chest pain and palpitations.   Gastrointestinal: Positive for nausea. Negative for vomiting.   Endocrine: Negative.    Genitourinary: Negative.    Musculoskeletal: Negative.    Skin: Negative.    Neurological: Negative.    Hematological: Negative.    Psychiatric/Behavioral: Negative.      Objective:     Vital Signs (Most Recent):  Temp: 98.8 °F (37.1 °C) (19 0715)  Pulse: 73 (19 1111)  Resp: (!) 21 (19 1111)  BP: (!) 127/59 (19 1111)  SpO2: (!) 90 % (19 1111) Vital Signs (24h Range):  Temp:  [97.9 °F (36.6 °C)-98.8 °F (37.1 °C)] 98.8 °F (37.1 °C)  Pulse:  [60-73] 73  Resp:  [17-36] 21  SpO2:  [90 %-100 %] 90 %  BP: (127-187)/(59-92) 127/59     Weight: 89.8 kg (198 lb)  Body mass index is 28.41 kg/m².    Physical Exam   Constitutional: He is oriented to person, place, and time. He appears well-developed. No distress.   Non toxic appearing   HENT:   Head: Normocephalic and atraumatic.   Mouth/Throat: Oropharynx is clear and moist.   Eyes: Conjunctivae and EOM are normal.   Neck: Normal range of motion.   Cardiovascular: Normal rate, regular rhythm and intact distal pulses.   Pulmonary/Chest: Effort normal. He has no wheezes. He has no rales.   Breathing comfortably on 3L NC laying almost completely flat on his right side.   Diminished breath sound right base   Abdominal: Soft. Bowel sounds are normal.   Musculoskeletal: Normal range of motion. He exhibits no edema.   Neurological: He is alert and oriented to person, place, and time.   Skin: Skin is warm and  dry. He is not diaphoretic.   Psychiatric: He has a normal mood and affect. His behavior is normal. Judgment and thought content normal.   Nursing note and vitals reviewed.        CRANIAL NERVES     CN III, IV, VI   Extraocular motions are normal.        Significant Labs: All pertinent labs within the past 24 hours have been reviewed.    Significant Imaging: I have reviewed all pertinent imaging results/findings within the past 24 hours.  I have reviewed and interpreted all pertinent imaging results/findings within the past 24 hours.    Assessment/Plan:     * Acute on chronic respiratory failure with hypoxia    2/2 to pulmonary edema and a chronic right pleural effusion, which appears to be worse post thoracentesis (1/17)  Responded to IV furosemide. Now de-escalated to low flow NC and stable  Although leukocytosis of 13, has no neutrophilia, is afebrile, is non toxic appearing and degree of leukocytosis is very mild, therefore doubt infection. Stop antibiotics  Will consult Pulmonology to eval for repeat therapeutic thoracentesis if does not continue to improve IV   Will need fluid analysis as this was not done on prior thoracentesis  Wean supplemental O2 as tolerated       Acute diastolic heart failure    Grade 2 diastolic dysfunction on prior Echo  Pending result repeat Echo during this admission  Diuresis as above       NSVT (nonsustained ventricular tachycardia)    Stable  ICD in place. Interrogated and working adequately, per Cardiology  Watch potassium level while on IV lasix       Subarachnoid hemorrhage following injury with brief loss of consciousness but without open intracranial wound    Diagnosed August last year  Neuro eval planned prior to resumption of antiplt rx         Mixed hyperlipidemia    Resume statin       CKD (chronic kidney disease), stage III    Stable       S/P TAVR (transcatheter aortic valve replacement)    No acute issues       Essential hypertension    Stable without antihypertensive  therapy  Restart when appropriate     Type 2 diabetes mellitus with stage 3 chronic kidney disease    Stage 3  Currently stable  Monitoring closely while on IV furosemide  I/Os         VTE Risk Mitigation (From admission, onward)        Ordered     heparin (porcine) injection 5,000 Units  Every 12 hours      01/24/19 0558     IP VTE HIGH RISK PATIENT  Once      01/24/19 0523     Place DEYA hose  Until discontinued      01/24/19 0523        Stable for step down to floor today.     Critical care time spent on the evaluation and treatment of severe organ dysfunction, review of pertinent labs and imaging studies, discussions with consulting providers and discussions with patient/family: > 45 minutes.     Yarelis Franklin MD  Department of Hospital Medicine   [facility]

## 2019-01-24 NOTE — EICU
83 yr old male with hx of DM, HTN, Aortic stenosis, s/p TAVR, TIA, chf, s/p thoracentesis 5 days ago by IR, normal EF. In December had VT- got defibrillation.    Now admitted for CHF exacerbation on BiPAP.    Data:  Hg 11, wbc 13.1K, Creat 1.8, bnp 742, trop 0.022  ABG 7.31/47/102.  LA normal.  EKG: paced rhythm. qtc prolonged.   CxR: increased interstitial markings, basal worsening opacities atelectasis vs effusion.    Camera Eval:  14/5/rate 20, 45%.  Tolerating well. Resting.  HR 60, 142/73, sats 97%. Discussed with bed side.     A/P:  1. AHHRF from CHF-diast exacerbation  2. ? Rt lower HAP.  No fever.  Mild leukocytosis with normal LA. On abx pending cultures.  - on BiPAP  - received lasix, stable BP  - Cardiology conult  - continue care.   - trend troponin level  - VTE with sq heparin 500 BID ordered.

## 2019-01-24 NOTE — NURSING TRANSFER
Nursing Transfer Note      1/24/2019     Transfer : From  to 419    Transfer via Bed    Transfer with pt's belonging,Telemetry monitor and oxygen     Transported by nurse and transport personal     Medicines sent: none     Chart send with patient: YES     Notified: Notified      Patient reassessed at: 1/24/19 at 1515    Upon arrival to floor: Floor nurse at bedside,

## 2019-01-24 NOTE — SUBJECTIVE & OBJECTIVE
Past Medical History:   Diagnosis Date    Acute renal failure     Arthritis     Blind right eye     BPH (benign prostatic hypertrophy)     Bronchitis, chronic     Carotid artery occlusion     Lt carotid endarerectomy done 02/19/2018     CHF (congestive heart failure)     Colon polyp     Coronary artery disease     Diabetes mellitus     GERD (gastroesophageal reflux disease)     Hearing aid worn     bilateral    Hernia     Hypertension     Influenza A     Irregular heart beat     NSVT (nonsustained ventricular tachycardia) 4/18/2018    Renal failure as complication of care     sepsis, renal function returned    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 Coleman    CATARACT EXTRACTION, BILATERAL      EGD (ESOPHAGOGASTRODUODENOSCOPY) N/A 8/30/2018    Performed by Tye Navarrete MD at [facility] ENDO (2ND FLR)    ENDARTERECTOMY-CAROTID Left 2/19/2018    Performed by Silvio Coleman MD at [facility] OR    ESOPHAGOGASTRODUODENOSCOPY  08/30/2018    EYE SURGERY      HERNIA REPAIR Left 09/2013    REPAIR, HERNIA, INGUINAL, WITHOUT HISTORY OF PRIOR REPAIR, AGE 5 YEARS OR OLDER Left 9/11/2013    Performed by Han Brown MD at [facility] OR    REPLACEMENT-VALVE-AORTIC N/A 10/17/2017    Performed by Martínez Keene MD at [facility] CATH LAB    RETINAL DETACHMENT SURGERY         Review of patient's allergies indicates:   Allergen Reactions    Ciprofloxacin (mixture) Itching     Extreme itching    Antibiotic hc     Hydrochlorothiazide      Leg  swelling      Iodine and iodide containing products      Wife and pt unsure    Vancomycin analogues Itching       No current facility-administered medications on file prior to encounter.      Current Outpatient Medications on File Prior to Encounter   Medication Sig    acarbose (PRECOSE) 25 MG Tab Take 25 mg by mouth 3 (three) times daily with meals.    acetaminophen (TYLENOL) 500 MG tablet Take 500 mg by mouth 2 (two) times daily.    amiodarone (PACERONE) 200 MG Tab Take 1 tablet (200 mg total) by mouth once daily.    amLODIPine (NORVASC) 10 MG tablet Take 10 mg by mouth once daily.    aspirin (ECOTRIN) 81 MG EC tablet Take 81 mg by mouth once daily.    atorvastatin (LIPITOR) 40 MG tablet Take 1 tablet (40 mg total) by mouth once daily.    cyanocobalamin (VITAMIN B-12) 1000 MCG tablet Take 100 mcg by mouth once a week.    diphenhydrAMINE (BENADRYL) 50 MG capsule Take 50 mg by mouth nightly.    famotidine (PEPCID) 20 MG tablet Take 20 mg by mouth 2 (two) times daily.     ferrous gluconate (FERGON) 324 MG tablet Take 1 tablet (324 mg total) by mouth daily with breakfast.    furosemide (LASIX) 20 MG tablet Take 20 mg by mouth once daily.    metoprolol succinate (TOPROL-XL) 50 MG 24 hr tablet Take 50 mg by mouth 2 (two) times daily.     multivit-min-FA-lycopen-lutein (CENTRUM SILVER MEN) 300-600-300 mcg Tab Take 1 tablet by mouth once daily.    mupirocin (BACTROBAN) 2 % ointment Apply topically 3 (three) times daily.    vortioxetine (TRINTELLIX) 5 mg Tab Take 5 mg by mouth once daily.     [DISCONTINUED] doxazosin (CARDURA) 2 MG tablet Take 2 mg by mouth every evening.    clopidogrel (PLAVIX) 75 mg tablet Take 1 tablet (75 mg total) by mouth once daily.    [DISCONTINUED] donepezil (ARICEPT) 5 MG tablet Take 5 mg by mouth every evening.    [DISCONTINUED] sodium bicarbonate 650 MG tablet Take 650 mg by mouth once daily.     Family History     Problem Relation (Age of Onset)    Arthritis Mother     Diabetes Sister    Heart attack Brother    Heart disease Father    Heart failure Father    No Known Problems Maternal Aunt, Maternal Uncle, Paternal Aunt, Paternal Uncle, Maternal Grandmother, Maternal Grandfather, Paternal Grandmother, Paternal Grandfather        Tobacco Use    Smoking status: Former Smoker     Packs/day: 3.00     Years: 40.00     Pack years: 120.00     Types: Cigarettes     Last attempt to quit: 1990     Years since quittin.4    Smokeless tobacco: Never Used   Substance and Sexual Activity    Alcohol use: No    Drug use: No    Sexual activity: Not Currently     Partners: Female     Review of Systems   Constitution: Positive for chills. Negative for diaphoresis, fever, weakness and malaise/fatigue.   HENT: Negative for nosebleeds.    Eyes: Negative for blurred vision and double vision.   Cardiovascular: Positive for leg swelling. Negative for chest pain, claudication, cyanosis, dyspnea on exertion, orthopnea, palpitations, paroxysmal nocturnal dyspnea and syncope.   Respiratory: Positive for cough, shortness of breath and sputum production. Negative for wheezing.    Skin: Negative for dry skin and poor wound healing.   Musculoskeletal: Negative for back pain, joint swelling and myalgias.   Gastrointestinal: Negative for abdominal pain, nausea and vomiting.   Genitourinary: Negative for hematuria.   Neurological: Negative for dizziness, headaches, numbness and seizures.   Psychiatric/Behavioral: Negative for altered mental status and depression.     Objective:     Vital Signs (Most Recent):  Temp: 97.9 °F (36.6 °C) (19 0531)  Pulse: 61 (19 0813)  Resp: 17 (19 08)  BP: (!) 127/59 (19 0715)  SpO2: 98 % (19 0813) Vital Signs (24h Range):  Temp:  [97.9 °F (36.6 °C)-98.3 °F (36.8 °C)] 97.9 °F (36.6 °C)  Pulse:  [60-65] 61  Resp:  [17-36] 17  SpO2:  [96 %-100 %] 98 %  BP: (127-187)/(59-92) 127/59     Weight: 90.2 kg (198 lb 13.7 oz)  Body mass index is 28.53  kg/m².    SpO2: 98 %  O2 Device (Oxygen Therapy): nasal cannula      Intake/Output Summary (Last 24 hours) at 1/24/2019 0909  Last data filed at 1/24/2019 0600  Gross per 24 hour   Intake --   Output 750 ml   Net -750 ml       Lines/Drains/Airways     Drain                 Urethral Catheter 01/24/19 0545 Non-latex less than 1 day          Peripheral Intravenous Line                 Peripheral IV - Single Lumen 01/24/19 0132 Left Antecubital less than 1 day         Peripheral IV - Single Lumen 01/24/19 0141 Right;Anterior Antecubital less than 1 day                Physical Exam   Constitutional: He is oriented to person, place, and time. He appears well-developed and well-nourished.   HENT:   Head: Normocephalic and atraumatic.   Eyes: Conjunctivae and EOM are normal. Pupils are equal, round, and reactive to light. No scleral icterus.   Neck: Neck supple. No JVD present. Carotid bruit is not present. No tracheal deviation present. No thyromegaly present.   Cardiovascular: Normal rate, regular rhythm, S1 normal and S2 normal. Exam reveals distant heart sounds. Exam reveals no gallop and no friction rub.   Murmur heard.   Systolic murmur is present with a grade of 1/6.  Pulmonary/Chest: Effort normal. No respiratory distress. He has no wheezes. He has no rales. He exhibits no tenderness.   Dec BS at bases   Abdominal: Soft. He exhibits no distension.   Musculoskeletal: He exhibits no edema.   Neurological: He is alert and oriented to person, place, and time. He has normal strength. No cranial nerve deficit.   Skin: Skin is warm and dry. No rash noted.   Psychiatric: He has a normal mood and affect. His behavior is normal.       Current Medications:   albuterol-ipratropium  3 mL Nebulization Q4H    ceFEPime (MAXIPIME) IVPB  2 g Intravenous Q12H    doxycycline (VIBRAMYCIN) IVPB  100 mg Intravenous Q12H    famotidine (PF)  20 mg Intravenous Daily    furosemide  40 mg Intravenous BID    heparin (porcine)  5,000 Units  Subcutaneous Q12H       acetaminophen, ondansetron, sodium chloride 0.9%    Laboratory:  CBC:  Recent Labs   Lab 12/03/18  0643 12/18/18  1530 01/13/19  1555 01/15/19  1600 01/24/19  0132   WHITE BLOOD CELL COUNT 8.56 9.60 6.57 6.60 13.14 H   HEMOGLOBIN 11.9 L 11.1 L 9.5 L 9.4 L 11.1 L   HEMATOCRIT 36.2 L 34.2 L 28.7 L 28.9 L 34.7 L   PLATELETS 178 148 L 169 166 226       CHEMISTRIES:  Recent Labs   Lab 08/31/18  0448  09/01/18  0452 09/02/18  0532 09/03/18  0559  12/02/18  0446 12/03/18  0643 12/18/18  1530 01/13/19  1555 01/24/19  0132   GLUCOSE  --    < > 136 H 135 H 138 H   < > 119 H 148 H 243 H 167 H 198 H   SODIUM  --    < > 139 137 138   < > 143 140 139 141 141   POTASSIUM  --    < > 3.6 4.1 4.2   < > 4.4 4.1 3.9 4.2 4.3   BUN BLD  --    < > 23 25 H 22   < > 18 20 51 H 24 H 20   CREATININE  --    < > 1.5 H 1.6 H 1.4   < > 1.5 H 1.6 H 3.9 H 2.0 H 1.8 H   EGFR IF   --    < > 49.1 A 45.4 A 53.3 A   < > 49 A 45 A 15 A 35 A 39 A   EGFR IF NON-  --    < > 42.4 A 39.3 A 46.1 A   < > 42 A 39 A 13 A 30 A 34 A   CALCIUM  --    < > 8.6 L 8.6 L 9.0   < > 9.2 9.2 8.4 L 8.7 8.9   MAGNESIUM 1.8  --  2.0 2.0 2.0  --   --   --   --   --  2.4    < > = values in this interval not displayed.       CARDIAC BIOMARKERS:  Recent Labs   Lab 04/21/18  2359 08/27/18  2122 01/13/19  1555 01/24/19  0132 01/24/19  0726   TROPONIN I 0.061 H 0.026 0.019 0.022 0.020       COAGS:  Recent Labs   Lab 02/20/18  0957 04/17/18  0610 04/23/18  0226 08/27/18  1621 01/15/19  1600   INR 1.1 1.2 1.2 1.1 1.1       LIPIDS/LFTS:  Recent Labs   Lab 02/17/18  0831  06/20/18  0954  08/30/18  0539 11/29/18  1117 12/18/18  1530 01/13/19  1555 01/24/19  0132   CHOLESTEROL 147  --  97 L  --   --   --   --   --   --    TRIGLYCERIDES 72  --  63  --   --   --   --   --   --    HDL 37 L  --  37 L  --   --   --   --   --   --    LDL CHOLESTEROL 95.6  --  47.4 L  --   --   --   --   --   --    NON-HDL CHOLESTEROL 110  --  60  --   --    --   --   --   --    AST 11   < > 17   < > 13 17 13 12 35   ALT <5 L   < > 18   < > 6 L 14 8 L 15 19    < > = values in this interval not displayed.       BNP:  Recent Labs   Lab 12/02/16  0513 01/27/18  0736 04/17/18  0610 01/13/19  1555 01/24/19  0132    H 356 H 1,335 H 846 H  846 H 742 H       TSH:  Recent Labs   Lab 02/17/18  0831   TSH 1.450       Free T4:        Diagnostic Results:  ECG (personally reviewed tracings):  1/24/19 0126 AV paced    Chest X-Ray (personally reviewed image(s)): 1/24/19 RLL infil, L sided ICD (2 leads)    Echo 8/30/18 (similar aortic velocity and gradient c/w report 4/19/18), repeat ordered    1 - Normal left ventricular systolic function (EF 55-60%).     2 - Impaired LV relaxation, elevated LAP (grade 2 diastolic dysfunction).     3 - S/P transcatheter AVR, UNA = 1.53 cm2, AVAi = 0.76 cm2/m2, peak velocity = 2.21 m/s, mean gradient = 10 mmHg.     4 - Pulmonary hypertension. The estimated PA systolic pressure is 59 mmHg.     5 - Moderate mitral regurgitation.     Cath 4/20/18  LVEF: 50% by echo  Normal TAVR fxn by echo  Dominance: Right  LM: normal  LAD: MLI              Diag1 prox 60%, mid 50%  Ramus: MLI  LCx: MLI  RCA: dom, prox 30%  Hemostasis:  R Radial band  Impression:  TdP  Nonobst CAD  Normal LV fxn/TAVR fxn by echo  R rad vasband for hemostasis  Plan:  Cont ASA/Plavix  Cont amio gtt     LE Venous US 4/17/18  No evidence of deep venous thrombosis in either lower extremity.     Carotid US 3/22/18 (s/p L CEA)  There is 20 - 39% right Internal Carotid stenosis.  There is 20 - 39% left Internal Carotid stenosis.     TAVR 10/17/17  B. Summary/Post-Operative Diagnosis    Successful right transfemoral aortic valve replacement with 26 mm Brent S3 valve.    No perivalvular leak post procedure per 2D echo.    Post deployment AV mean gradient 2.5 mmHg, Vmax 1.09 m/s.

## 2019-01-24 NOTE — HPI
83 year old male with non obstructive CAD, s/p TAVR, moderate mitral regurgitation, grade 2 diastolic heart failure, Hx of VT now s/p ICD, pulmonary hypertension and hypertension who presented with worsening shortness of breath. Patient had an ultrasound guided therapeutic thoracentesis done on 1/17 (~ one week PTA) where 1.4L of serous fluid was removed. He felt better after procedure but symptoms then again worsened. He takes furosemide 20 mg daily and is compliant with this. Denied fever, chills, lightheadedness, vomiting, abdominal pain, trouble swallowing, dysuria, diarrhea. Reported some nausea.     In the ED, patient was afebrile and normotensive but in respiratory distress. Per EMS, sats around 90% at room air. CXR with evidence of right pleural effusion and pulmonary edema. Given two doses of furosemide 40 mg IV and placed on BiPAP. Admitted to ICU for close monitoring.

## 2019-01-24 NOTE — ASSESSMENT & PLAN NOTE
Grade 2 diastolic dysfunction on prior Echo  Pending result repeat Echo during this admission  Diuresis as above

## 2019-01-24 NOTE — PROGRESS NOTES
Results for MITCHELL BLAKE (MRN [id number]) as of 1/24/2019 02:49   Ref. Range 1/24/2019 02:28   POC PH Latest Ref Range: 7.35 - 7.45  7.317 (L)   POC PCO2 Latest Ref Range: 35 - 45 mmHg 47.1 (H)   POC PO2 Latest Ref Range: 80 - 100 mmHg 102 (H)   POC BE Latest Ref Range: -2 to 2 mmol/L -2   POC HCO3 Latest Ref Range: 24 - 28 mmol/L 24.1   POC SATURATED O2 Latest Ref Range: 95 - 100 % 97   POC TCO2 Latest Ref Range: 23 - 27 mmol/L 26   FiO2 Unknown 50   Sample Unknown ARTERIAL   DelSys Unknown CPAP/BiPAP   Allens Test Unknown N/A   Site Unknown RR   Mode Unknown BiPAP     ABG results reported to MD Lambert

## 2019-01-24 NOTE — NURSING
0530 : Pt received from ED on BiPap 45% O2. NSR on the monitor. VSS. Teds and [place] placed. Pt resting comfortably with wife at bedside. Pt oriented to unit and room. Pt and wife updated on plan of care.

## 2019-01-24 NOTE — CONSULTS
[facility]  Cardiology  Consult Note    Patient Name: Timbo Gallagher  MRN: [id number]  Admission Date: 1/24/2019  Hospital Length of Stay: 0 days  Code Status: Full Code   Attending Provider: Yarelis Avila MD   Consulting Provider: Ned Toribio MD  Primary Care Physician: Cirilo Montero MD  Principal Problem:<principal problem not specified>    Patient information was obtained from patient, spouse/SO and ER records.     Inpatient consult to Cardiology  Consult performed by: Ned Toribio MD  Consult ordered by: Mega Lambert MD  Reason for consult: CHF        Subjective:     Chief Complaint:  SOB     HPI:   Patient presents with rapid onset of shortness of breath that began yesterday evening.  Patient has history congestive heart failure.  He was recently treated as an outpatient for right-sided pleural effusion which was drained by thoracentesis by interventional radiology 5 days ago.  He said prior to that procedure he was short of breath. He was feeling somewhat better and to the last day.  Denies chest pain. Denies palpitations.  He admits to edema of the lower extremities. This is chronic and recurring.  He states that it is little bit worse than normal. States he is compliant with his medications.  Patient had normal ejection fraction on his most recent echocardiogram.  He has known grade 2 diastolic dysfunction.  Patient has a aortic valve replacement.  He also has an internal defibrillator.  In December he had a few episodes of ventricular tachycardia that were treated with defibrillation.  He denies any symptoms of his defibrillator firing today.  He denies cough or fever.  He denies back pain. Denies abdominal pain. Patient was also admitted at [facility] within the last month for treatment of cellulitis of the upper extremity.    The patient is a pleasant 83-year-old  man well known to me with a history of nonobstructive CAD and TAVR.  He  also has a history of torsade and is status post Medtronic ICD.  He was recently in the hospital and underwent thoracentesis of the right-sided infiltrate, which appears to be worse on x-ray today.  Back in late December, no was made of several episodes of ventricular tachycardia for which the patient received therapy.  I recently saw the patient in the office, earlier this week common plan to repeat his echocardiogram, which I will get done today.  At this point, given his shortness of breath, cough, chills, elevated white count, I am worried that he has pneumonia and possible empyema.  Differential diagnosis certainly includes asymmetric effusion and heart failure.  I agree with continuing Lasix for diuresis and pulmonary support.    Follows with me in clinic, last seen 1/22/19 (this week):  The patient comes in today for follow-up.  He is accompanied by his wife.  In the interim since his last visit in July, the patient was hospitalized several times.  Most recently this was for thoracentesis.  He also reports having his defibrillator go off while he was an inpatient at [facility] in late December.  Review of care everywhere notes no hospitalizations at [facility], however.  At present, he does describe continued dyspnea and some lower extremity edema.  He has had no palpitations, lightheadedness, dizziness, syncope, or further defibrillator discharges.  There has been no PND, orthopnea, melena, hematuria, or claudicant symptoms.     The Medtronic defibrillator was interrogated in the office today.  Current rhythm is a paced V paced at 80 beats per minute.  The patient is pacing 96.3% of the time the atria 52.3% time in the ventricle.  Estimated longevity is 8.4 years.  Sensing and pacing thresholds are normal.  Impedance is normal.  Note is made of 5 ventricular tachycardia events for which he was shocked 3 times over the course of December 20 6-27 2018.  He has otherwise had 23 other  nonsustained ventricular tachycardia events.  The device is otherwise functioning normally and no programming changes were made.     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    Cardiovascular Testing:  Echo 8/30/18 (similar aortic velocity and gradient c/w report 4/19/18)    1 - Normal left ventricular systolic function (EF 55-60%).     2 - Impaired LV relaxation, elevated LAP (grade 2 diastolic dysfunction).     3 - S/P transcatheter AVR, UNA = 1.53 cm2, AVAi = 0.76 cm2/m2, peak velocity = 2.21 m/s, mean gradient = 10 mmHg.     4 - Pulmonary hypertension. The estimated PA systolic pressure is 59 mmHg.     5 - Moderate mitral regurgitation.      Cath 4/20/18  LVEF: 50% by echo  Normal TAVR fxn by echo  Dominance: Right  LM: normal  LAD: MLI              Diag1 prox 60%, mid 50%  Ramus: MLI  LCx: MLI  RCA: dom, prox 30%  Hemostasis:  R Radial band  Impression:  TdP  Nonobst CAD  Normal LV fxn/TAVR fxn by echo  R rad vasband for hemostasis  Plan:  Cont ASA/Plavix  Cont amio gtt     LE Venous US 4/17/18  No evidence of deep venous thrombosis in either lower extremity.     Carotid US 3/22/18 (s/p L CEA)  There is 20 - 39% right Internal Carotid stenosis.  There is 20 - 39% left Internal Carotid stenosis.     TAVR 10/17/17  B. Summary/Post-Operative Diagnosis    Successful right transfemoral aortic valve replacement with 26 mm Brent S3 valve.    No perivalvular leak post procedure per 2D echo.    Post deployment AV mean gradient 2.5 mmHg, Vmax 1.09 m/s.        ASSESSMENT:   # progressive SOB, recently had thoracentesis  # TdP s/p MDT ICD implant 4/2018, recent ICD shocks noted 12/26-27/18.  Pt reports hospitalization at the time.  # TIA s/p L CEA 2/2018, followed by Dr. Coleman  # AS s/p TAVR 10/2017, normally functioning by echo 2/2018  # HTN, controlled  # HLP, on atorva 40mg qd  # CRI  # DM  # hx of R retinal detachment (chr R eye  blindness)  # Hx SAH 8/2018     PLAN:   Cont med rx  Neuro eval, ?OK to resume plavix given hx of SAH  Check echo  If echo without changes, will consider EPS eval given recurrent VT episodes  TAVR follow up as per [facility]-  RTC 1 month  RTC 3 months with MDT ICD check (mid Apr 2019)      Past Medical History:   Diagnosis Date    Acute renal failure     Arthritis     Blind right eye     BPH (benign prostatic hypertrophy)     Bronchitis, chronic     Carotid artery occlusion     Lt carotid endarerectomy done 02/19/2018     CHF (congestive heart failure)     Colon polyp     Coronary artery disease     Diabetes mellitus     GERD (gastroesophageal reflux disease)     Hearing aid worn     bilateral    Hernia     Hypertension     Influenza A     Irregular heart beat     NSVT (nonsustained ventricular tachycardia) 4/18/2018    Renal failure as complication of care     sepsis, renal function returned    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 Coleman    CATARACT EXTRACTION, BILATERAL      EGD (ESOPHAGOGASTRODUODENOSCOPY) N/A 8/30/2018    Performed by Tye Navarrete MD at [facility] ENDO (2ND FLR)    ENDARTERECTOMY-CAROTID Left 2/19/2018    [facility]io [facility] at [facility] OR    ESOPHAGOGASTRODUODENOSCOPY  08/30/2018    EYE SURGERY      HERNIA REPAIR Left 09/2013    REPAIR, HERNIA, INGUINAL, WITHOUT HISTORY OF PRIOR REPAIR, AGE 5 YEARS OR OLDER Left 9/11/2013    Performed by Han Brown MD at [facility] OR     REPLACEMENT-VALVE-AORTIC N/A 10/17/2017    Performed by Martínez Keene MD at [facility] CATH LAB    RETINAL DETACHMENT SURGERY         Review of patient's allergies indicates:   Allergen Reactions    Ciprofloxacin (mixture) Itching     Extreme itching    Antibiotic hc     Hydrochlorothiazide      Leg swelling      Iodine and iodide containing products      Wife and pt unsure    Vancomycin analogues Itching       No current facility-administered medications on file prior to encounter.      Current Outpatient Medications on File Prior to Encounter   Medication Sig    acarbose (PRECOSE) 25 MG Tab Take 25 mg by mouth 3 (three) times daily with meals.    acetaminophen (TYLENOL) 500 MG tablet Take 500 mg by mouth 2 (two) times daily.    amiodarone (PACERONE) 200 MG Tab Take 1 tablet (200 mg total) by mouth once daily.    amLODIPine (NORVASC) 10 MG tablet Take 10 mg by mouth once daily.    aspirin (ECOTRIN) 81 MG EC tablet Take 81 mg by mouth once daily.    atorvastatin (LIPITOR) 40 MG tablet Take 1 tablet (40 mg total) by mouth once daily.    cyanocobalamin (VITAMIN B-12) 1000 MCG tablet Take 100 mcg by mouth once a week.    diphenhydrAMINE (BENADRYL) 50 MG capsule Take 50 mg by mouth nightly.    famotidine (PEPCID) 20 MG tablet Take 20 mg by mouth 2 (two) times daily.     ferrous gluconate (FERGON) 324 MG tablet Take 1 tablet (324 mg total) by mouth daily with breakfast.    furosemide (LASIX) 20 MG tablet Take 20 mg by mouth once daily.    metoprolol succinate (TOPROL-XL) 50 MG 24 hr tablet Take 50 mg by mouth 2 (two) times daily.     multivit-min-FA-lycopen-lutein (CENTRUM SILVER MEN) 300-600-300 mcg Tab Take 1 tablet by mouth once daily.    mupirocin (BACTROBAN) 2 % ointment Apply topically 3 (three) times daily.    vortioxetine (TRINTELLIX) 5 mg Tab Take 5 mg by mouth once daily.     [DISCONTINUED] doxazosin (CARDURA) 2 MG tablet Take 2 mg by mouth every evening.    clopidogrel (PLAVIX) 75 mg  tablet Take 1 tablet (75 mg total) by mouth once daily.    [DISCONTINUED] donepezil (ARICEPT) 5 MG tablet Take 5 mg by mouth every evening.    [DISCONTINUED] sodium bicarbonate 650 MG tablet Take 650 mg by mouth once daily.     Family History     Problem Relation (Age of Onset)    Arthritis Mother    Diabetes Sister    Heart attack Brother    Heart disease Father    Heart failure Father    No Known Problems Maternal Aunt, Maternal Uncle, Paternal Aunt, Paternal Uncle, Maternal Grandmother, Maternal Grandfather, Paternal Grandmother, Paternal Grandfather        Tobacco Use    Smoking status: Former Smoker     Packs/day: 3.00     Years: 40.00     Pack years: 120.00     Types: Cigarettes     Last attempt to quit: 1990     Years since quittin.4    Smokeless tobacco: Never Used   Substance and Sexual Activity    Alcohol use: No    Drug use: No    Sexual activity: Not Currently     Partners: Female     Review of Systems   Constitution: Positive for chills. Negative for diaphoresis, fever, weakness and malaise/fatigue.   HENT: Negative for nosebleeds.    Eyes: Negative for blurred vision and double vision.   Cardiovascular: Positive for leg swelling. Negative for chest pain, claudication, cyanosis, dyspnea on exertion, orthopnea, palpitations, paroxysmal nocturnal dyspnea and syncope.   Respiratory: Positive for cough, shortness of breath and sputum production. Negative for wheezing.    Skin: Negative for dry skin and poor wound healing.   Musculoskeletal: Negative for back pain, joint swelling and myalgias.   Gastrointestinal: Negative for abdominal pain, nausea and vomiting.   Genitourinary: Negative for hematuria.   Neurological: Negative for dizziness, headaches, numbness and seizures.   Psychiatric/Behavioral: Negative for altered mental status and depression.     Objective:     Vital Signs (Most Recent):  Temp: 97.9 °F (36.6 °C) (19)  Pulse: 61 (19)  Resp: 17 (19)  BP:  (!) 127/59 (01/24/19 0715)  SpO2: 98 % (01/24/19 0813) Vital Signs (24h Range):  Temp:  [97.9 °F (36.6 °C)-98.3 °F (36.8 °C)] 97.9 °F (36.6 °C)  Pulse:  [60-65] 61  Resp:  [17-36] 17  SpO2:  [96 %-100 %] 98 %  BP: (127-187)/(59-92) 127/59     Weight: 90.2 kg (198 lb 13.7 oz)  Body mass index is 28.53 kg/m².    SpO2: 98 %  O2 Device (Oxygen Therapy): nasal cannula      Intake/Output Summary (Last 24 hours) at 1/24/2019 0909  Last data filed at 1/24/2019 0600  Gross per 24 hour   Intake --   Output 750 ml   Net -750 ml       Lines/Drains/Airways     Drain                 Urethral Catheter 01/24/19 0545 Non-latex less than 1 day          Peripheral Intravenous Line                 Peripheral IV - Single Lumen 01/24/19 0132 Left Antecubital less than 1 day         Peripheral IV - Single Lumen 01/24/19 0141 Right;Anterior Antecubital less than 1 day                Physical Exam   Constitutional: He is oriented to person, place, and time. He appears well-developed and well-nourished.   HENT:   Head: Normocephalic and atraumatic.   Eyes: Conjunctivae and EOM are normal. Pupils are equal, round, and reactive to light. No scleral icterus.   Neck: Neck supple. No JVD present. Carotid bruit is not present. No tracheal deviation present. No thyromegaly present.   Cardiovascular: Normal rate, regular rhythm, S1 normal and S2 normal. Exam reveals distant heart sounds. Exam reveals no gallop and no friction rub.   Murmur heard.   Systolic murmur is present with a grade of 1/6.  Pulmonary/Chest: Effort normal. No respiratory distress. He has no wheezes. He has no rales. He exhibits no tenderness.   Dec BS at bases   Abdominal: Soft. He exhibits no distension.   Musculoskeletal: He exhibits no edema.   Neurological: He is alert and oriented to person, place, and time. He has normal strength. No cranial nerve deficit.   Skin: Skin is warm and dry. No rash noted.   Psychiatric: He has a normal mood and affect. His behavior is normal.        Current Medications:   albuterol-ipratropium  3 mL Nebulization Q4H    ceFEPime (MAXIPIME) IVPB  2 g Intravenous Q12H    doxycycline (VIBRAMYCIN) IVPB  100 mg Intravenous Q12H    famotidine (PF)  20 mg Intravenous Daily    furosemide  40 mg Intravenous BID    heparin (porcine)  5,000 Units Subcutaneous Q12H       acetaminophen, ondansetron, sodium chloride 0.9%    Laboratory:  CBC:  Recent Labs   Lab 12/03/18  0643 12/18/18  1530 01/13/19  1555 01/15/19  1600 01/24/19  0132   WHITE BLOOD CELL COUNT 8.56 9.60 6.57 6.60 13.14 H   HEMOGLOBIN 11.9 L 11.1 L 9.5 L 9.4 L 11.1 L   HEMATOCRIT 36.2 L 34.2 L 28.7 L 28.9 L 34.7 L   PLATELETS 178 148 L 169 166 226       CHEMISTRIES:  Recent Labs   Lab 08/31/18  0448  09/01/18  0452 09/02/18  0532 09/03/18  0559  12/02/18  0446 12/03/18  0643 12/18/18  1530 01/13/19  1555 01/24/19  0132   GLUCOSE  --    < > 136 H 135 H 138 H   < > 119 H 148 H 243 H 167 H 198 H   SODIUM  --    < > 139 137 138   < > 143 140 139 141 141   POTASSIUM  --    < > 3.6 4.1 4.2   < > 4.4 4.1 3.9 4.2 4.3   BUN BLD  --    < > 23 25 H 22   < > 18 20 51 H 24 H 20   CREATININE  --    < > 1.5 H 1.6 H 1.4   < > 1.5 H 1.6 H 3.9 H 2.0 H 1.8 H   EGFR IF   --    < > 49.1 A 45.4 A 53.3 A   < > 49 A 45 A 15 A 35 A 39 A   EGFR IF NON-  --    < > 42.4 A 39.3 A 46.1 A   < > 42 A 39 A 13 A 30 A 34 A   CALCIUM  --    < > 8.6 L 8.6 L 9.0   < > 9.2 9.2 8.4 L 8.7 8.9   MAGNESIUM 1.8  --  2.0 2.0 2.0  --   --   --   --   --  2.4    < > = values in this interval not displayed.       CARDIAC BIOMARKERS:  Recent Labs   Lab 04/21/18  2359 08/27/18  2122 01/13/19  1555 01/24/19  0132 01/24/19  0726   TROPONIN I 0.061 H 0.026 0.019 0.022 0.020       COAGS:  Recent Labs   Lab 02/20/18  0957 04/17/18  0610 04/23/18  0226 08/27/18  1621 01/15/19  1600   INR 1.1 1.2 1.2 1.1 1.1       LIPIDS/LFTS:  Recent Labs   Lab 02/17/18  0831  06/20/18  0954  08/30/18  0539 11/29/18  1117 12/18/18  1530  01/13/19  1555 01/24/19  0132   CHOLESTEROL 147  --  97 L  --   --   --   --   --   --    TRIGLYCERIDES 72  --  63  --   --   --   --   --   --    HDL 37 L  --  37 L  --   --   --   --   --   --    LDL CHOLESTEROL 95.6  --  47.4 L  --   --   --   --   --   --    NON-HDL CHOLESTEROL 110  --  60  --   --   --   --   --   --    AST 11   < > 17   < > 13 17 13 12 35   ALT <5 L   < > 18   < > 6 L 14 8 L 15 19    < > = values in this interval not displayed.       BNP:  Recent Labs   Lab 12/02/16  0513 01/27/18  0736 04/17/18  0610 01/13/19  1555 01/24/19  0132    H 356 H 1,335 H 846 H  846 H 742 H       TSH:  Recent Labs   Lab 02/17/18  0831   TSH 1.450       Free T4:        Diagnostic Results:  ECG (personally reviewed tracings):  1/24/19 0126 AV paced    Chest X-Ray (personally reviewed image(s)): 1/24/19 RLL infil, L sided ICD (2 leads)    Echo 8/30/18 (similar aortic velocity and gradient c/w report 4/19/18), repeat ordered    1 - Normal left ventricular systolic function (EF 55-60%).     2 - Impaired LV relaxation, elevated LAP (grade 2 diastolic dysfunction).     3 - S/P transcatheter AVR, UNA = 1.53 cm2, AVAi = 0.76 cm2/m2, peak velocity = 2.21 m/s, mean gradient = 10 mmHg.     4 - Pulmonary hypertension. The estimated PA systolic pressure is 59 mmHg.     5 - Moderate mitral regurgitation.     Cath 4/20/18  LVEF: 50% by echo  Normal TAVR fxn by echo  Dominance: Right  LM: normal  LAD: MLI              Diag1 prox 60%, mid 50%  Ramus: MLI  LCx: MLI  RCA: dom, prox 30%  Hemostasis:  R Radial band  Impression:  TdP  Nonobst CAD  Normal LV fxn/TAVR fxn by echo  R rad vasband for hemostasis  Plan:  Cont ASA/Plavix  Cont amio gtt     LE Venous US 4/17/18  No evidence of deep venous thrombosis in either lower extremity.     Carotid US 3/22/18 (s/p L CEA)  There is 20 - 39% right Internal Carotid stenosis.  There is 20 - 39% left Internal Carotid stenosis.     TAVR 10/17/17  B. Summary/Post-Operative Diagnosis     Successful right transfemoral aortic valve replacement with 26 mm Brent S3 valve.    No perivalvular leak post procedure per 2D echo.    Post deployment AV mean gradient 2.5 mmHg, Vmax 1.09 m/s.    Assessment and Plan:     Acute diastolic heart failure    Note made of assymetric effusion and elev WBC with associated prod cough and chills, ?PNA  Eval/mgmt per IM  ?needs another thoracentesis  Agree with IV lasix for time being  Check echo     Pleurisy with effusion    As above     NSVT (nonsustained ventricular tachycardia)    MDT ICD in place  Recently interrogated with normal fxn  Pt had VT requiring ICD rx in 12/2018  If no new LV dysfxn on echo, may need to see EPS [facility] for ablation (or start amio)     S/P TAVR (transcatheter aortic valve replacement)    Echo planned     Essential hypertension    Cont med rx as warranted by BP     Type 2 diabetes mellitus with stage 3 chronic kidney disease    Per IM     CKD (chronic kidney disease), stage III    Creat stable  Monitor while on IV lasix     Mixed hyperlipidemia    Cont statin     Subarachnoid hemorrhage following injury with brief loss of consciousness but without open intracranial wound    Neuro eval planned prior to resumption of antiplt rx         VTE Risk Mitigation (From admission, onward)        Ordered     heparin (porcine) injection 5,000 Units  Every 12 hours      01/24/19 0558     IP VTE HIGH RISK PATIENT  Once      01/24/19 0523     Place DEYA hose  Until discontinued      01/24/19 0523        Pt seen in ICU, case d/w Dr. Franklin and RN.    Thank you for your consult. I will sign off. Please contact us if you have any additional questions.    Ned Toribio MD  Cardiology   [facility] [place]

## 2019-01-24 NOTE — PROGRESS NOTES
Received patient from ICU per bed. Oxygen in progress at 3 L/M. HOB elevated. Blood sugar checked--result-168. Alert and oriented. TEDS on. Bruising to skin noted. Resp. Called for continuous pulse ox. Patient is due to void. Urinal at bedside. Cardiac monitoring in progress and monitor tech notified. Pacemaker to left CW. 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 Oriented to room and call light. Bed alarm on. Instructed to call nurse for help.

## 2019-01-24 NOTE — PROGRESS NOTES
Pt. Transferred to ICU on 50%VM, SpO2 98%.  Pt. Tolerated transport well, MADAY.  Upon admittance to ICU, pt was placed back on BIPAP 15/+5/45%, NARN.  Report was given to RT Marissa.

## 2019-01-24 NOTE — SUBJECTIVE & OBJECTIVE
Past Medical History:   Diagnosis Date    Acute renal failure     Arthritis     Blind right eye     BPH (benign prostatic hypertrophy)     Bronchitis, chronic     Carotid artery occlusion     Lt carotid endarerectomy done 02/19/2018     CHF (congestive heart failure)     Colon polyp     Coronary artery disease     Diabetes mellitus     GERD (gastroesophageal reflux disease)     Hearing aid worn     bilateral    Hernia     Hypertension     Influenza A     Irregular heart beat     NSVT (nonsustained ventricular tachycardia) 4/18/2018    Renal failure as complication of care     sepsis, renal function returned    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 Coleman    CATARACT EXTRACTION, BILATERAL      EGD (ESOPHAGOGASTRODUODENOSCOPY) N/A 8/30/2018    Performed by Tye Navarrete MD at [facility] ENDO (2ND FLR)    ENDARTERECTOMY-CAROTID Left 2/19/2018    Performed by Silvio Coleman [facility] at [facility] OR    ESOPHAGOGASTRODUODENOSCOPY  08/30/2018    EYE SURGERY      HERNIA REPAIR Left 09/2013    REPAIR, HERNIA, INGUINAL, WITHOUT HISTORY OF PRIOR REPAIR, AGE 5 YEARS OR OLDER Left 9/11/2013    Performed by Han Brown MD at [facility] OR    REPLACEMENT-VALVE-AORTIC N/A 10/17/2017    Performed by Martínez Keene MD at [facility] CATH LAB    RETINAL DETACHMENT SURGERY         Review of patient's allergies indicates:   Allergen Reactions    Ciprofloxacin (mixture) Itching     Extreme itching    Antibiotic hc     Hydrochlorothiazide      Leg  swelling      Iodine and iodide containing products      Wife and pt unsure    Vancomycin analogues Itching       No current facility-administered medications on file prior to encounter.      Current Outpatient Medications on File Prior to Encounter   Medication Sig    acarbose (PRECOSE) 25 MG Tab Take 25 mg by mouth 3 (three) times daily with meals.    acetaminophen (TYLENOL) 500 MG tablet Take 500 mg by mouth 2 (two) times daily.    amiodarone (PACERONE) 200 MG Tab Take 1 tablet (200 mg total) by mouth once daily.    amLODIPine (NORVASC) 10 MG tablet Take 10 mg by mouth once daily.    aspirin (ECOTRIN) 81 MG EC tablet Take 81 mg by mouth once daily.    atorvastatin (LIPITOR) 40 MG tablet Take 1 tablet (40 mg total) by mouth once daily.    cyanocobalamin (VITAMIN B-12) 1000 MCG tablet Take 100 mcg by mouth once a week.    diphenhydrAMINE (BENADRYL) 50 MG capsule Take 50 mg by mouth nightly.    famotidine (PEPCID) 20 MG tablet Take 20 mg by mouth 2 (two) times daily.     ferrous gluconate (FERGON) 324 MG tablet Take 1 tablet (324 mg total) by mouth daily with breakfast.    furosemide (LASIX) 20 MG tablet Take 20 mg by mouth once daily.    metoprolol succinate (TOPROL-XL) 50 MG 24 hr tablet Take 50 mg by mouth 2 (two) times daily.     multivit-min-FA-lycopen-lutein (CENTRUM SILVER MEN) 300-600-300 mcg Tab Take 1 tablet by mouth once daily.    mupirocin (BACTROBAN) 2 % ointment Apply topically 3 (three) times daily.    vortioxetine (TRINTELLIX) 5 mg Tab Take 5 mg by mouth once daily.     [DISCONTINUED] doxazosin (CARDURA) 2 MG tablet Take 2 mg by mouth every evening.    clopidogrel (PLAVIX) 75 mg tablet Take 1 tablet (75 mg total) by mouth once daily.    [DISCONTINUED] donepezil (ARICEPT) 5 MG tablet Take 5 mg by mouth every evening.    [DISCONTINUED] sodium bicarbonate 650 MG tablet Take 650 mg by mouth once daily.     Family History     Problem Relation (Age of Onset)    Arthritis Mother     Diabetes Sister    Heart attack Brother    Heart disease Father    Heart failure Father    No Known Problems Maternal Aunt, Maternal Uncle, Paternal Aunt, Paternal Uncle, Maternal Grandmother, Maternal Grandfather, Paternal Grandmother, Paternal Grandfather        Tobacco Use    Smoking status: Former Smoker     Packs/day: 3.00     Years: 40.00     Pack years: 120.00     Types: Cigarettes     Last attempt to quit: 1990     Years since quittin.4    Smokeless tobacco: Never Used   Substance and Sexual Activity    Alcohol use: No    Drug use: No    Sexual activity: Not Currently     Partners: Female     Review of Systems   Constitutional: Negative.    HENT: Negative.    Eyes: Negative.    Respiratory: Positive for shortness of breath.    Cardiovascular: Negative for chest pain and palpitations.   Gastrointestinal: Positive for nausea. Negative for vomiting.   Endocrine: Negative.    Genitourinary: Negative.    Musculoskeletal: Negative.    Skin: Negative.    Neurological: Negative.    Hematological: Negative.    Psychiatric/Behavioral: Negative.      Objective:     Vital Signs (Most Recent):  Temp: 98.8 °F (37.1 °C) (19 0715)  Pulse: 73 (19 1111)  Resp: (!) 21 (19 1111)  BP: (!) 127/59 (19 1111)  SpO2: (!) 90 % (19 1111) Vital Signs (24h Range):  Temp:  [97.9 °F (36.6 °C)-98.8 °F (37.1 °C)] 98.8 °F (37.1 °C)  Pulse:  [60-73] 73  Resp:  [17-36] 21  SpO2:  [90 %-100 %] 90 %  BP: (127-187)/(59-92) 127/59     Weight: 89.8 kg (198 lb)  Body mass index is 28.41 kg/m².    Physical Exam   Constitutional: He is oriented to person, place, and time. He appears well-developed. No distress.   Non toxic appearing   HENT:   Head: Normocephalic and atraumatic.   Mouth/Throat: Oropharynx is clear and moist.   Eyes: Conjunctivae and EOM are normal.   Neck: Normal range of motion.   Cardiovascular: Normal rate, regular rhythm and intact distal pulses.   Pulmonary/Chest: Effort normal. He has no  wheezes. He has no rales.   Breathing comfortably on 3L NC laying almost completely flat on his right side.   Diminished breath sound right base   Abdominal: Soft. Bowel sounds are normal.   Musculoskeletal: Normal range of motion. He exhibits no edema.   Neurological: He is alert and oriented to person, place, and time.   Skin: Skin is warm and dry. He is not diaphoretic.   Psychiatric: He has a normal mood and affect. His behavior is normal. Judgment and thought content normal.   Nursing note and vitals reviewed.        CRANIAL NERVES     CN III, IV, VI   Extraocular motions are normal.        Significant Labs: All pertinent labs within the past 24 hours have been reviewed.    Significant Imaging: I have reviewed all pertinent imaging results/findings within the past 24 hours.  I have reviewed and interpreted all pertinent imaging results/findings within the past 24 hours.

## 2019-01-24 NOTE — HPI
Patient presents with rapid onset of shortness of breath that began yesterday evening.  Patient has history congestive heart failure.  He was recently treated as an outpatient for right-sided pleural effusion which was drained by thoracentesis by interventional radiology 5 days ago.  He said prior to that procedure he was short of breath. He was feeling somewhat better and to the last day.  Denies chest pain. Denies palpitations.  He admits to edema of the lower extremities. This is chronic and recurring.  He states that it is little bit worse than normal. States he is compliant with his medications.  Patient had normal ejection fraction on his most recent echocardiogram.  He has known grade 2 diastolic dysfunction.  Patient has a aortic valve replacement.  He also has an internal defibrillator.  In December he had a few episodes of ventricular tachycardia that were treated with defibrillation.  He denies any symptoms of his defibrillator firing today.  He denies cough or fever.  He denies back pain. Denies abdominal pain. Patient was also admitted at [facility] within the last month for treatment of cellulitis of the upper extremity.    The patient is a pleasant 83-year-old  man well known to me with a history of nonobstructive CAD and TAVR.  He also has a history of torsade and is status post Medtronic ICD.  He was recently in the hospital and underwent thoracentesis of the right-sided infiltrate, which appears to be worse on x-ray today.  Back in late December, no was made of several episodes of ventricular tachycardia for which the patient received therapy.  I recently saw the patient in the office, earlier this week common plan to repeat his echocardiogram, which I will get done today.  At this point, given his shortness of breath, cough, chills, elevated white count, I am worried that he has pneumonia and possible empyema.  Differential diagnosis certainly includes asymmetric effusion and  heart failure.  I agree with continuing Lasix for diuresis and pulmonary support.    Follows with me in clinic, last seen 1/22/19 (this week):  The patient comes in today for follow-up.  He is accompanied by his wife.  In the interim since his last visit in July, the patient was hospitalized several times.  Most recently this was for thoracentesis.  He also reports having his defibrillator go off while he was an inpatient at [facility] in late December.  Review of care everywhere notes no hospitalizations at [facility], however.  At present, he does describe continued dyspnea and some lower extremity edema.  He has had no palpitations, lightheadedness, dizziness, syncope, or further defibrillator discharges.  There has been no PND, orthopnea, melena, hematuria, or claudicant symptoms.     The Medtronic defibrillator was interrogated in the office today.  Current rhythm is a paced V paced at 80 beats per minute.  The patient is pacing 96.3% of the time the atria 52.3% time in the ventricle.  Estimated longevity is 8.4 years.  Sensing and pacing thresholds are normal.  Impedance is normal.  Note is made of 5 ventricular tachycardia events for which he was shocked 3 times over the course of December 20 6-27 2018.  He has otherwise had 23 other nonsustained ventricular tachycardia events.  The device is otherwise functioning normally and no programming changes were made.     CARDIOVASCULAR HISTORY:   TAVR 10/17/17: 26 mm Brent S3 valve  TIA S/P L CEA 2/19/18 ([place])  TdP s/p MDT ICD 4/2018    Cardiovascular Testing:  Echo 8/30/18 (similar aortic velocity and gradient c/w report 4/19/18)    1 - Normal left ventricular systolic function (EF 55-60%).     2 - Impaired LV relaxation, elevated LAP (grade 2 diastolic dysfunction).     3 - S/P transcatheter AVR, UNA = 1.53 cm2, AVAi = 0.76 cm2/m2, peak velocity = 2.21 m/s, mean gradient = 10 mmHg.     4 - Pulmonary hypertension. The estimated PA systolic  pressure is 59 mmHg.     5 - Moderate mitral regurgitation.      Cath 4/20/18  LVEF: 50% by echo  Normal TAVR fxn by echo  Dominance: Right  LM: normal  LAD: MLI              Diag1 prox 60%, mid 50%  Ramus: MLI  LCx: MLI  RCA: dom, prox 30%  Hemostasis:  R Radial band  Impression:  TdP  Nonobst CAD  Normal LV fxn/TAVR fxn by echo  R rad vasband for hemostasis  Plan:  Cont ASA/Plavix  Cont amio gtt     LE Venous US 4/17/18  No evidence of deep venous thrombosis in either lower extremity.     Carotid US 3/22/18 (s/p L CEA)  There is 20 - 39% right Internal Carotid stenosis.  There is 20 - 39% left Internal Carotid stenosis.     TAVR 10/17/17  B. Summary/Post-Operative Diagnosis    Successful right transfemoral aortic valve replacement with 26 mm Brent S3 valve.    No perivalvular leak post procedure per 2D echo.    Post deployment AV mean gradient 2.5 mmHg, Vmax 1.09 m/s.        ASSESSMENT:   # progressive SOB, recently had thoracentesis  # TdP s/p MDT ICD implant 4/2018, recent ICD shocks noted 12/26-27/18.  Pt reports hospitalization at the time.  # TIA s/p L CEA 2/2018, followed by Dr. Coleman  # AS s/p TAVR 10/2017, normally functioning by echo 2/2018  # HTN, controlled  # HLP, on atorva 40mg qd  # CRI  # DM  # hx of R retinal detachment (chr R eye blindness)  # Hx SAH 8/2018     PLAN:   Cont med rx  Neuro eval, ?OK to resume plavix given hx of SAH  Check echo  If echo without changes, will consider EPS eval given recurrent VT episodes  [place] [facility] [place]  RTC 3 months with MDT ICD check (mid Apr 2019)

## 2019-01-24 NOTE — ASSESSMENT & PLAN NOTE
Note made of assymetric effusion and elev WBC with associated prod cough and chills, ?PNA  Eval/mgmt per IM  ?needs another thoracentesis  Agree with IV lasix for time being  Check echo

## 2019-01-24 NOTE — ED TRIAGE NOTES
Patient arrived to Ed from via Ems with c/o fo SOB.Hx of pleural effusion and recent thoracentesis on 1/17/2019.

## 2019-01-24 NOTE — ASSESSMENT & PLAN NOTE
MDT ICD in place  Recently interrogated with normal fxn  Pt had VT requiring ICD rx in 12/2018  If no new LV dysfxn on echo, may need to see EPS at [facility] for ablation (or start amio)

## 2019-01-24 NOTE — ASSESSMENT & PLAN NOTE
Stable  ICD in place. Interrogated and working adequately, per Cardiology  Watch potassium level while on IV lasix

## 2019-01-24 NOTE — ED PROVIDER NOTES
Encounter Date: 1/24/2019       History     Chief Complaint   Patient presents with    Shortness of Breath     pt was 90% RA, resp rate in the 30s, hx of heart failure, O2 saturation in the mid 90s with 6L. MD asked to eval pt on wall. Pt had fluid removed from lungs one week ago.      Patient presents with rapid onset of shortness of breath that began yesterday evening.  Patient has history congestive heart failure.  He was recently treated as an outpatient for right-sided pleural effusion which was drained by thoracentesis by interventional radiology 5 days ago.  He said prior to that procedure he was short of breath. He was feeling somewhat better and to the last day.  Denies chest pain. Denies palpitations.  He admits to edema of the lower extremities. This is chronic and recurring.  He states that it is little bit worse than normal. States he is compliant with his medications.  Patient had normal ejection fraction on his most recent echocardiogram.  He has known grade 2 diastolic dysfunction.  Patient has a aortic valve replacement.  He also has an internal defibrillator.  In December he had a few episodes of ventricular tachycardia that were treated with defibrillation.  He denies any symptoms of his defibrillator firing today.  He denies cough or fever.  He denies back pain. Denies abdominal pain. Patient was also admitted at [facility] within the last month for treatment of cellulitis of the upper extremity.          Review of patient's allergies indicates:   Allergen Reactions    Ciprofloxacin (mixture) Itching     Extreme itching    Antibiotic hc     Hydrochlorothiazide      Leg swelling      Iodine and iodide containing products      Wife and pt unsure    Vancomycin analogues Itching     Past Medical History:   Diagnosis Date    Acute renal failure     Arthritis     Blind right eye     BPH (benign prostatic hypertrophy)     Bronchitis, chronic     Carotid artery occlusion     Lt  carotid endarerectomy done 02/19/2018     CHF (congestive heart failure)     Colon polyp     Coronary artery disease     Diabetes mellitus     GERD (gastroesophageal reflux disease)     Hearing aid worn     bilateral    Hernia     Hypertension     Influenza A     Irregular heart beat     NSVT (nonsustained ventricular tachycardia) 4/18/2018    Renal failure as complication of care     sepsis, renal function returned    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 Coleman    CATARACT EXTRACTION, BILATERAL      EGD (ESOPHAGOGASTRODUODENOSCOPY) N/A 8/30/2018    Performed [facility] ENDO (2ND FLR)    ENDARTERECTOMY-CAROTID Left 2/19/2018    Performed by Sil[facility]io Coleman MD at [facility] OR    ESOPHAGOGASTRODUODENOSCOPY  08/30/2018    EYE SURGERY      HERNIA REPAIR Left 09/2013    REPAIR, HERNIA, INGUINAL, WITHOUT HISTORY OF PRIOR REPAIR, AGE 5 YEARS OR OLDER Left 9/11/2013    Performed by Han Brown MD at [facility] OR    REPLACEMENT-VALVE-AORTIC N/A 10/17/2017    Performed by Martínez Keene MD at [facility] CATH LAB    RETINAL DETACHMENT SURGERY       Family History   Problem Relation Age of Onset    Arthritis Mother     Heart disease Father     Heart failure Father     Diabetes Sister     Heart attack Brother     No Known Problems Maternal Aunt     No Known Problems Maternal Uncle     No Known Problems Paternal Aunt     No Known Problems Paternal Uncle     No Known Problems Maternal Grandmother     No Known  Problems Maternal Grandfather     No Known Problems Paternal Grandmother     No Known Problems Paternal Grandfather     Anemia Neg Hx     Arrhythmia Neg Hx     Asthma Neg Hx     Clotting disorder Neg Hx     Fainting Neg Hx     Hyperlipidemia Neg Hx     Hypertension Neg Hx     Stroke Neg Hx     Atrial Septal Defect Neg Hx      Social History     Tobacco Use    Smoking status: Former Smoker     Packs/day: 3.00     Years: 40.00     Pack years: 120.00     Types: Cigarettes     Last attempt to quit: 1990     Years since quittin.4    Smokeless tobacco: Never Used   Substance Use Topics    Alcohol use: No    Drug use: No     Review of Systems   Constitutional: Negative for chills, diaphoresis and fever.   HENT: Negative for sore throat.    Eyes: Negative.    Respiratory: Positive for shortness of breath. Negative for cough, chest tightness and wheezing.    Cardiovascular: Positive for leg swelling. Negative for chest pain and palpitations.   Gastrointestinal: Negative for abdominal pain, diarrhea, nausea and vomiting.        NO melena or rectal bleeding   Genitourinary: Negative for dysuria and flank pain.   Musculoskeletal: Negative for back pain.   Skin: Negative for rash.   Neurological: Negative for dizziness, seizures, syncope, facial asymmetry, speech difficulty, weakness, light-headedness, numbness and headaches.        No numbness   Psychiatric/Behavioral: Negative for confusion.   All other systems reviewed and are negative.      Physical Exam     Initial Vitals   BP Pulse Resp Temp SpO2   19 0125 19 0125 19 0125 19 0125 19 0100   (!) 187/92 62 (!) 30 98.3 °F (36.8 °C) 99 %      MAP       --                Physical Exam    Nursing note and vitals reviewed.  Constitutional: He appears well-developed and well-nourished. He is not diaphoretic. He appears distressed.   Patient appears uncomfortable from shortness of breath.   HENT:   Head: Normocephalic and  atraumatic.   Right Ear: External ear normal.   Left Ear: External ear normal.   Nose: Nose normal.   Mouth/Throat: Oropharynx is clear and moist.   Eyes: Conjunctivae and EOM are normal. Pupils are equal, round, and reactive to light. Right eye exhibits no discharge. Left eye exhibits no discharge. No scleral icterus.   Neck: Normal range of motion. Neck supple. No thyromegaly present. No tracheal deviation present.   Cardiovascular: Normal rate, regular rhythm and normal heart sounds. Exam reveals no gallop and no friction rub.    No murmur heard.  Pulmonary/Chest: No stridor. He is in respiratory distress. He has wheezes (Mild, diffuse.). He has rhonchi (Mild, diffuse.). He has rales (Moderate, bibasilar.). He exhibits no tenderness.   Abdominal: Soft. He exhibits no distension and no mass. There is no tenderness. There is no rebound and no guarding.   Musculoskeletal: Normal range of motion. He exhibits edema (3+ pitting edema of bilateral lower extremities to just below the knees.). He exhibits no tenderness.   Neurological: He is alert and oriented to person, place, and time. He has normal strength. No cranial nerve deficit.   Skin: Skin is warm and dry. No rash noted. No erythema.   Psychiatric: His behavior is normal. Judgment and thought content normal.   Patient anxious.         ED Course   Critical Care  Date/Time: 1/24/2019 2:16 AM  Performed by: Mega Lambert MD  Authorized by: Mega Lambert MD   Direct patient critical care time: 10 minutes  Additional history critical care time: 10 minutes  Ordering / reviewing critical care time: 10 minutes  Documentation critical care time: 10 minutes  Consulting other physicians critical care time: 10 minutes  Total critical care time (exclusive of procedural time) : 50 minutes  Critical care was necessary to treat or prevent imminent or life-threatening deterioration of the following conditions: cardiac failure.  Critical care was time spent personally  by me on the following activities: discussions with consultants, ordering and review of laboratory studies, obtaining history from patient or surrogate, evaluation of patient's response to treatment, pulse oximetry, review of old charts, re-evaluation of patient's condition, ordering and review of radiographic studies, ordering and performing treatments and interventions and examination of patient.        Labs Reviewed   CBC W/ AUTO DIFFERENTIAL - Abnormal; Notable for the following components:       Result Value    WBC 13.14 (*)     RBC 3.56 (*)     Hemoglobin 11.1 (*)     Hematocrit 34.7 (*)     MCH 31.2 (*)     RDW 15.7 (*)     Gran # (ANC) 8.0 (*)     Mono # 1.5 (*)     All other components within normal limits   COMPREHENSIVE METABOLIC PANEL - Abnormal; Notable for the following components:    CO2 21 (*)     Glucose 198 (*)     Creatinine 1.8 (*)     Albumin 3.4 (*)     Alkaline Phosphatase 161 (*)     eGFR if  39 (*)     eGFR if non  34 (*)     All other components within normal limits   B-TYPE NATRIURETIC PEPTIDE - Abnormal; Notable for the following components:     (*)     All other components within normal limits   ISTAT PROCEDURE - Abnormal; Notable for the following components:    POC PH 7.317 (*)     POC PCO2 47.1 (*)     POC PO2 102 (*)     All other components within normal limits   CULTURE, BLOOD   CULTURE, BLOOD   TROPONIN I   MAGNESIUM   LACTIC ACID, PLASMA     EKG Readings: (Independently Interpreted)   Previous EKG: Compared with most recent EKG Previous EKG Date: 8/27/18. Heart Rate: 67. ST Segments: Non-Specific ST Segment Depression. Other Findings: Prolonged QT Interval.   EKG shows ventricular paced rhythm. No significant ST segment changes.  QTC is prolonged.  When compared to previous EKG in August 2018, BUN significant changes that in the previous EKG patient was atrially paced with intermittent ventricular pacing.  No atrial pacing today.        Imaging Results          X-Ray Chest AP Portable (Final result)  Result time 01/24/19 01:54:15    Final result by Akosua Hargrove MD (01/24/19 01:54:15)                 Impression:      Increased right basilar opacity, which may suggest worsening atelectasis, infiltrate, and or pleural fluid.  Remainder of the lung parenchyma is unchanged.      Electronically signed by: Akosua Hargrove  Date:    01/24/2019  Time:    01:54             Narrative:    EXAMINATION:  XR CHEST AP PORTABLE    CLINICAL HISTORY:  Chest Pain;    TECHNIQUE:  Single frontal view of the chest was performed.    COMPARISON:  01/17/2019    FINDINGS:  A left subclavian pacer is present.  AP portable chest radiograph demonstrates stable cardiac silhouette.  There are diffuse interstitial lung markings.  There is worsened basilar opacity.  Mild left basilar atelectatic changes are again seen.  No pneumothorax is detected.  The bones are diffusely osteopenic.                                 Medical Decision Making:   Differential Diagnosis:   Pneumothorax, pleural effusion, congestive heart failure exacerbation, acute coronary syndrome.  Clinical Tests:   Lab Tests: Reviewed  The following lab test(s) were unremarkable: CBC, CMP, Troponin and BNP       <> Summary of Lab: With WBC elevated.  Troponin mildly elevated.  Radiological Study: Reviewed  Medical Tests: Reviewed  ED Management:  Presentation most consistent with acute congestive heart failure.  There is recurrence of a right-sided pleural effusion.  Patient is afebrile.  His white count is elevated mildly.  Given the possibility that there is an infiltrate in the right lower lobe, the patient has recently been hospitalized.  Will start antibiotics empirically for possible healthcare acquired pneumonia or empyema.  Will send cultures.  Patient will continue to be treated for congestive heart failure exacerbation.  Patient does not meet SIRS criteria.  No signs of acute sepsis.  No need  for IV fluid resuscitation at this time.  Also given his exacerbation congestive heart failure, large volume IV fluid resuscitation with chin would not be indicated at this time.                      Clinical Impression:   The primary encounter diagnosis was Acute diastolic heart failure. Diagnoses of Shortness of breath, Pleural effusion, and Respiratory failure with hypoxia and hypercapnia, unspecified chronicity were also pertinent to this visit.      Disposition:   Disposition: Admitted  Condition: Serious                        Mega Lambert MD  01/24/19 0218       Mega Lambert MD  01/24/19 0251

## 2019-01-25 PROBLEM — R33.9 URINARY RETENTION: Status: ACTIVE | Noted: 2019-01-25

## 2019-01-25 PROBLEM — R31.0 GROSS HEMATURIA: Status: ACTIVE | Noted: 2019-01-25

## 2019-01-25 LAB
ANION GAP SERPL CALC-SCNC: 8 MMOL/L
BUN SERPL-MCNC: 19 MG/DL
CALCIUM SERPL-MCNC: 8.8 MG/DL
CHLORIDE SERPL-SCNC: 105 MMOL/L
CO2 SERPL-SCNC: 28 MMOL/L
CREAT SERPL-MCNC: 1.5 MG/DL
EST. GFR  (AFRICAN AMERICAN): 49 ML/MIN/1.73 M^2
EST. GFR  (NON AFRICAN AMERICAN): 42 ML/MIN/1.73 M^2
GLUCOSE SERPL-MCNC: 145 MG/DL
POCT GLUCOSE: 134 MG/DL (ref 70–110)
POCT GLUCOSE: 154 MG/DL (ref 70–110)
POCT GLUCOSE: 163 MG/DL (ref 70–110)
POCT GLUCOSE: 181 MG/DL (ref 70–110)
POTASSIUM SERPL-SCNC: 3.6 MMOL/L
SODIUM SERPL-SCNC: 141 MMOL/L

## 2019-01-25 PROCEDURE — 94761 N-INVAS EAR/PLS OXIMETRY MLT: CPT

## 2019-01-25 PROCEDURE — 99223 1ST HOSP IP/OBS HIGH 75: CPT | Mod: ,,, | Performed by: INTERNAL MEDICINE

## 2019-01-25 PROCEDURE — 25000242 PHARM REV CODE 250 ALT 637 W/ HCPCS: Performed by: EMERGENCY MEDICINE

## 2019-01-25 PROCEDURE — 80048 BASIC METABOLIC PNL TOTAL CA: CPT

## 2019-01-25 PROCEDURE — 27000221 HC OXYGEN, UP TO 24 HOURS

## 2019-01-25 PROCEDURE — 99223 PR INITIAL HOSPITAL CARE,LEVL III: ICD-10-PCS | Mod: ,,, | Performed by: UROLOGY

## 2019-01-25 PROCEDURE — 25000003 PHARM REV CODE 250: Performed by: INTERNAL MEDICINE

## 2019-01-25 PROCEDURE — 99232 SBSQ HOSP IP/OBS MODERATE 35: CPT | Mod: ,,, | Performed by: INTERNAL MEDICINE

## 2019-01-25 PROCEDURE — 63600175 PHARM REV CODE 636 W HCPCS: Performed by: EMERGENCY MEDICINE

## 2019-01-25 PROCEDURE — 12000002 HC ACUTE/MED SURGE SEMI-PRIVATE ROOM

## 2019-01-25 PROCEDURE — 36415 COLL VENOUS BLD VENIPUNCTURE: CPT

## 2019-01-25 PROCEDURE — 99223 PR INITIAL HOSPITAL CARE,LEVL III: ICD-10-PCS | Mod: ,,, | Performed by: INTERNAL MEDICINE

## 2019-01-25 PROCEDURE — 63600175 PHARM REV CODE 636 W HCPCS: Performed by: INTERNAL MEDICINE

## 2019-01-25 PROCEDURE — 99232 PR SUBSEQUENT HOSPITAL CARE,LEVL II: ICD-10-PCS | Mod: ,,, | Performed by: INTERNAL MEDICINE

## 2019-01-25 PROCEDURE — 25000003 PHARM REV CODE 250: Performed by: HOSPITALIST

## 2019-01-25 PROCEDURE — 99223 1ST HOSP IP/OBS HIGH 75: CPT | Mod: ,,, | Performed by: UROLOGY

## 2019-01-25 PROCEDURE — 94640 AIRWAY INHALATION TREATMENT: CPT

## 2019-01-25 RX ORDER — IBUPROFEN 200 MG
16 TABLET ORAL
Status: DISCONTINUED | OUTPATIENT
Start: 2019-01-25 | End: 2019-01-26 | Stop reason: HOSPADM

## 2019-01-25 RX ORDER — METOPROLOL SUCCINATE 50 MG/1
50 TABLET, EXTENDED RELEASE ORAL 2 TIMES DAILY
Status: DISCONTINUED | OUTPATIENT
Start: 2019-01-25 | End: 2019-01-26 | Stop reason: HOSPADM

## 2019-01-25 RX ORDER — INSULIN ASPART 100 [IU]/ML
0-5 INJECTION, SOLUTION INTRAVENOUS; SUBCUTANEOUS
Status: DISCONTINUED | OUTPATIENT
Start: 2019-01-25 | End: 2019-01-26 | Stop reason: HOSPADM

## 2019-01-25 RX ORDER — GLUCAGON 1 MG
1 KIT INJECTION
Status: DISCONTINUED | OUTPATIENT
Start: 2019-01-25 | End: 2019-01-26 | Stop reason: HOSPADM

## 2019-01-25 RX ORDER — IBUPROFEN 200 MG
24 TABLET ORAL
Status: DISCONTINUED | OUTPATIENT
Start: 2019-01-25 | End: 2019-01-26 | Stop reason: HOSPADM

## 2019-01-25 RX ADMIN — FAMOTIDINE 20 MG: 20 TABLET ORAL at 08:01

## 2019-01-25 RX ADMIN — IPRATROPIUM BROMIDE AND ALBUTEROL SULFATE 3 ML: .5; 3 SOLUTION RESPIRATORY (INHALATION) at 09:01

## 2019-01-25 RX ADMIN — IPRATROPIUM BROMIDE AND ALBUTEROL SULFATE 3 ML: .5; 3 SOLUTION RESPIRATORY (INHALATION) at 11:01

## 2019-01-25 RX ADMIN — ATORVASTATIN CALCIUM 40 MG: 40 TABLET, FILM COATED ORAL at 08:01

## 2019-01-25 RX ADMIN — FUROSEMIDE 40 MG: 10 INJECTION, SOLUTION INTRAVENOUS at 08:01

## 2019-01-25 RX ADMIN — FERROUS GLUCONATE TAB 324 MG (37.5 MG ELEMENTAL IRON) 324 MG: 324 (37.5 FE) TAB at 08:01

## 2019-01-25 RX ADMIN — HEPARIN SODIUM 5000 UNITS: 5000 INJECTION, SOLUTION INTRAVENOUS; SUBCUTANEOUS at 10:01

## 2019-01-25 RX ADMIN — FAMOTIDINE 20 MG: 20 TABLET ORAL at 10:01

## 2019-01-25 RX ADMIN — METOPROLOL SUCCINATE 50 MG: 50 TABLET, EXTENDED RELEASE ORAL at 10:01

## 2019-01-25 RX ADMIN — HEPARIN SODIUM 5000 UNITS: 5000 INJECTION, SOLUTION INTRAVENOUS; SUBCUTANEOUS at 08:01

## 2019-01-25 RX ADMIN — IPRATROPIUM BROMIDE AND ALBUTEROL SULFATE 3 ML: .5; 3 SOLUTION RESPIRATORY (INHALATION) at 04:01

## 2019-01-25 RX ADMIN — AMIODARONE HYDROCHLORIDE 200 MG: 200 TABLET ORAL at 08:01

## 2019-01-25 RX ADMIN — FUROSEMIDE 40 MG: 10 INJECTION, SOLUTION INTRAVENOUS at 06:01

## 2019-01-25 RX ADMIN — IPRATROPIUM BROMIDE AND ALBUTEROL SULFATE 3 ML: .5; 3 SOLUTION RESPIRATORY (INHALATION) at 08:01

## 2019-01-25 NOTE — CONSULTS
[facility]  Urology  Consult Note    Patient Name: Timbo Gallagher  MRN: [id number]  Admission Date: 1/24/2019  Hospital Length of Stay: 1   Code Status: Full Code   Attending Provider: Severiano Galan MD   Consulting Provider: WERNER Lyon MD  Primary Care Physician: Cirilo Montero MD  Principal Problem:Acute on chronic respiratory failure with hypoxia    Inpatient consult to Urology  Consult performed by: MIGUEL Lyon MD  Consult ordered by: Yarelis Avila MD          Subjective:     HPI:  Urinary Retention  Patient complains of urinary retention. Onset of retention was 1 day ago and was gradual in onset. Patient currently does have a urinary catheter in place.  800 ml of urine were drained when catheter was placed. Prior to this event voiding symptoms consisted of slow stream, intermittency, nocturia. Prior treatments include watchful waiting. Recent medications that may have affected his voiding include none.  He tells me Colunga catheter placement was difficult and required multiple attempts.  He has never had hematuria prior to Colunga placement.        Past Medical History:   Diagnosis Date    Acute renal failure     Arthritis     Blind right eye     BPH (benign prostatic hypertrophy)     Bronchitis, chronic     Carotid artery occlusion     Lt carotid endarerectomy done 02/19/2018     CHF (congestive heart failure)     Colon polyp     Coronary artery disease     Diabetes mellitus     GERD (gastroesophageal reflux disease)     Hearing aid worn     bilateral    Hernia     Hypertension     Influenza A     Irregular heart beat     NSVT (nonsustained ventricular tachycardia) 4/18/2018    Renal failure as complication of care     sepsis, renal function returned    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 Coleman    CATARACT EXTRACTION, BILATERAL      EGD (ESOPHAGOGASTRODUODENOSCOPY) N/A 2018    Performed by [facility]te [facility] at [facility] ENDO (2ND FLR)    ENDARTERECTOMY-CAROTID Left 2018    [facility] by [facility] at [facility] OR    ESOPHAGOGASTRODUODENOSCOPY  2018    EYE SURGERY      HERNIA REPAIR Left 2013    REPAIR, HERNIA, INGUINAL, WITHOUT HISTORY OF PRIOR REPAIR, AGE 5 YEARS OR OLDER Left 2013    Performed by Han Brown MD at [facility] OR    REPLACEMENT-VALVE-AORTIC N/A 10/17/2017    Performed by Martínez Keene MD at [facility] CATH LAB    RETINAL DETACHMENT SURGERY         Review of patient's allergies indicates:   Allergen Reactions    Ciprofloxacin (mixture) Itching     Extreme itching    Antibiotic hc     Hydrochlorothiazide      Leg swelling      Iodine and iodide containing products      Wife and pt unsure    Vancomycin analogues Itching       Family History     Problem Relation (Age of Onset)    Arthritis Mother    Diabetes Sister    Heart attack Brother    Heart disease Father    Heart failure Father    No Known Problems Maternal Aunt, Maternal Uncle, Paternal Aunt, Paternal Uncle, Maternal Grandmother, Maternal Grandfather, Paternal Grandmother, Paternal Grandfather          Tobacco Use    Smoking status: Former Smoker     Packs/day: 3.00     Years: 40.00     Pack years: 120.00     Types: Cigarettes     Last attempt to quit: 1990     Years since quittin.5    Smokeless tobacco: Never Used   Substance and Sexual Activity    Alcohol use: No    Drug use: No    Sexual activity: Not Currently     Partners: Female       Review of Systems    Constitutional: Negative.  Negative for fever.   HENT: Negative.    Eyes: Negative.    Respiratory: Negative for cough, chest tightness and shortness of breath.    Cardiovascular: Negative for chest pain.   Gastrointestinal: Negative.  Negative for constipation, diarrhea and nausea.   Genitourinary: Positive for difficulty urinating and hematuria.   Musculoskeletal: Negative.    Neurological: Negative.    Psychiatric/Behavioral: Negative.        Objective:     Temp:  [97.5 °F (36.4 °C)-98.9 °F (37.2 °C)] 98.6 °F (37 °C)  Pulse:  [60-78] 78  Resp:  [17-29] 20  SpO2:  [90 %-96 %] 93 %  BP: (104-156)/(55-68) 156/68     Body mass index is 28.41 kg/m².       Bladder Scan Volume (mL): 807 mL (01/25/19 0509)    Drains     Drain                 Urethral Catheter 01/25/19 0632 Non-latex less than 1 day                Physical Exam   Nursing note and vitals reviewed.  Constitutional: He is oriented to person, place, and time. He appears well-developed and well-nourished.   HENT:   Head: Normocephalic.   Eyes: Conjunctivae are normal.   Neck: Normal range of motion. Neck supple. No tracheal deviation present. No thyromegaly present.   Cardiovascular: Normal rate and normal heart sounds.    Pulmonary/Chest: Effort normal and breath sounds normal. No respiratory distress. He has no wheezes.   Abdominal: Soft. Bowel sounds are normal. There is no hepatosplenomegaly. There is no tenderness. There is no rebound and no CVA tenderness. No hernia.   Genitourinary: Testes normal and penis normal.   Genitourinary Comments: 16 Fr Colunga in place, red thin urine in tubing   Musculoskeletal: Normal range of motion. He exhibits no edema or tenderness.   Lymphadenopathy:     He has no cervical adenopathy.   Neurological: He is alert and oriented to person, place, and time.   Skin: Skin is warm and dry. No rash noted. No erythema.     Psychiatric: He has a normal mood and affect. His behavior is normal. Judgment and thought content normal.        Significant Labs:    BMP:  Recent Labs   Lab 01/24/19  0132 01/25/19  0404    141   K 4.3 3.6    105   CO2 21* 28   BUN 20 19   CREATININE 1.8* 1.5*   CALCIUM 8.9 8.8       CBC:  Recent Labs   Lab 01/24/19  0132   WBC 13.14*   HGB 11.1*   HCT 34.7*          Blood Culture:   Recent Labs   Lab 01/24/19  0250 01/24/19  0305   LABBLOO No Growth to date  No Growth to date No Growth to date  No Growth to date     Urine Culture: No results for input(s): LABURIN in the last 168 hours.    Significant Imaging:                      Assessment and Plan:     Gross hematuria    Only after Colunga placement, likely catheter trauma  Keep Colunga x 1 week to allow urethra to heal  Irrigate prn  No need for emergent urologic intervention         Urinary retention    Keep Colunga x 1 week  Add Flomax         VTE Risk Mitigation (From admission, onward)        Ordered     heparin (porcine) injection 5,000 Units  Every 12 hours      01/24/19 0558     IP VTE HIGH RISK PATIENT  Once      01/24/19 0523     Place DEYA hose  Until discontinued      01/24/19 0523          Thank you for your consult. I will follow-up with patient. Please contact us if you have any additional questions.    WERNER Lyon MD  Urology  [facility]

## 2019-01-25 NOTE — PT/OT/SLP PROGRESS
Occupational Therapy      Patient Name:  Timbo Gallagher   MRN:  [id number]    Patient not seen today secondary to Patient fatigue, Other (Comment)(found in bed sleeping soundly). Will follow-up tomorrow.    TARA Landers, MS  1/25/2019

## 2019-01-25 NOTE — ASSESSMENT & PLAN NOTE
2/2 to pulmonary edema and a chronic right pleural effusion, which appears to be worse post thoracentesis (1/17)  Responded to IV furosemide. Now de-escalated to low flow NC and stable  Although leukocytosis of 13, has no neutrophilia, is afebrile, is non toxic appearing and degree of leukocytosis is very mild, therefore doubt infection. Stopped antibiotics.  Wean supplemental O2 as tolerated.  Appreciate Pulmonary input.  Possible thoracentesis if patient still requiring oxygen.

## 2019-01-25 NOTE — PLAN OF CARE
Problem: Adult Inpatient Plan of Care  Goal: Plan of Care Review  Outcome: Ongoing (interventions implemented as appropriate)  Pt able to address needs, residual of 999cc and in & out cath performed w/ 2100cc dark red urine noted, medicated x1 Tylenol, safety maintained, bed low locked and in position, will cont plan of care

## 2019-01-25 NOTE — PLAN OF CARE
01/24/19 1850   Discharge Assessment   Assessment Type Discharge Planning Assessment   Confirmed/corrected address and phone number on facesheet? Yes   Assessment information obtained from? Patient   Communicated expected length of stay with patient/caregiver no   Prior to hospitilization cognitive status: Alert/Oriented   Prior to hospitalization functional status: Independent;Assistive Equipment  (Uses walker and cane; spouse provides transportation; has [facility])   Current cognitive status: Alert/Oriented   Current Functional Status: Independent  (Uses walker and cane; spouse provides transportation; has [facility])   Facility Arrived From: Home   Lives With spouse   Able to Return to Prior Arrangements yes   Is patient able to care for self after discharge? Yes   Who are your caregiver(s) and their phone number(s)? Corrine-spouse: [phone]; Michell-daughter: [phone]   Patient's perception of discharge disposition home or selfcare   Readmission Within the Last 30 Days planned readmission   If yes, most recent facility [facility]: [facility]    Patient currently being followed by outpatient case management? No   Patient currently receives any other outside agency services? No  ([facility] )   Equipment Currently Used at Home walker, rolling;cane, straight   Do you have any problems affording any of your prescribed medications? No   Is the patient taking medications as prescribed? yes   Does the patient have transportation home? Yes   Transportation Anticipated family or friend will provide   Does the patient receive services at the [facility]? No   Discharge Plan A Home with family;Home Health  ([facility]-resume)   Discharge Plan B Other  (TBD)   DME Needed Upon Discharge  other (see comments)  (TBD)   Patient/Family in Agreement with Plan yes   Readmission Questionnaire   At the time of your discharge, did someone talk to you about what your health problems were? Yes   At the time of discharge, did someone talk to you about  what to watch out for regarding worsening of your health problem? Yes   At the time of discharge, did someone talk to you about what to do if you experienced worsening of your health problem? Yes   At the time of discharge, did someone talk to you about which medication to take when you left the hospital and which ones to stop taking? Yes   At the time of discharge, did someone talk to you about when and where to follow up with a doctor after you left the hospital? Yes   What do you believe caused you to be sick enough to be re-admitted? Unknown   How often do you need to have someone help you when you read instructions, pamphlets, or other written material from your doctor or pharmacy? Sometimes   Do you have problems taking your medications as prescribed? No   Do you have any problems affording any of  your prescribed medications? No   Do you have problems obtaining/receiving your medications? No   Does the patient have transportation to healthcare appointments? Yes   Living Arrangements house   Does the patient have family/friends to help with healtcare needs after discharge? yes   Does your caregiver provide all the help you need? Yes   If no, what kind of help do you need at home? Some assist needed when not feeling well-spouse assists and provides transport     SW Role explained to patient; two patient identifiers recognized; SW contact information placed on Communication board.    Patient is mostly independent at home with spouse who assists him as needed and provides transportation; current has [facility] and would like to resume with [facility]; use walker and cane; Home: [facility] resume    PCP: Cirilo Montero MD  [id number] [address] / [facility] [id number]     Extended Emergency Contact Information  Primary Emergency Contact: ElliottCorrine S  Address: [address] [place] [id number] [facility]  Home Phone: [phone]  Relation: Spouse  Secondary Emergency Contact:  Michell Frank   [facility]  Home Phone: [phone]  Mobile Phone: [phone]  Relation: Daughter     [facility] - [place], [place] - [address] [address]  Phone: [phone] Fax: [phone]    Payor: MEDICARE / Plan: MEDICARE PART A & B / Product Type: Government /

## 2019-01-25 NOTE — ASSESSMENT & PLAN NOTE
Patient with improvement with diuresis and Abx. Seems unlikely that effusion is infected given recent thoracentesis with serous fluid. I do not see fluid analysis. Reasonable to perform thoracentesis if no improvement.   -Continue ABx  -Diuresis as tolerated.     Will re-evaluate tomorrow if patient is not improved.

## 2019-01-25 NOTE — PROGRESS NOTES
1145 pt stating he feels pressure in bladder and continues to have small amount of blood when trying to urinate, MD made aware and new orders given    In & out cath performed using sterile technique, 2100cc dark red urine noted, orders to bladder scan in a couple of hours to see if pt is still retaining urine

## 2019-01-25 NOTE — PROGRESS NOTES
16F watson catheter placed using sterile technique per MD order, dark red urine noted in urimeter, Urology consult placed

## 2019-01-25 NOTE — SUBJECTIVE & OBJECTIVE
Interval History: Feeling better. Wants to go home.    Review of Systems   HENT: Negative for ear discharge and ear pain.    Eyes: Negative for pain and itching.   Endocrine: Negative for polyphagia and polyuria.   Neurological: Negative for seizures and syncope.     Objective:     Vital Signs (Most Recent):  Temp: 99 °F (37.2 °C) (01/25/19 1213)  Pulse: 67 (01/25/19 1213)  Resp: 20 (01/25/19 1213)  BP: 128/60 (01/25/19 1213)  SpO2: 95 % (01/25/19 1213) Vital Signs (24h Range):  Temp:  [97.5 °F (36.4 °C)-99 °F (37.2 °C)] 99 °F (37.2 °C)  Pulse:  [60-78] 67  Resp:  [17-28] 20  SpO2:  [90 %-96 %] 95 %  BP: (104-156)/(55-68) 128/60     Weight: 89.8 kg (198 lb)  Body mass index is 28.41 kg/m².    Intake/Output Summary (Last 24 hours) at 1/25/2019 1501  Last data filed at 1/25/2019 1200  Gross per 24 hour   Intake 240 ml   Output 5925 ml   Net -5685 ml      Physical Exam   Constitutional: He is oriented to person, place, and time. He appears well-developed. No distress.   Non toxic appearing   HENT:   Head: Normocephalic and atraumatic.   Mouth/Throat: Oropharynx is clear and moist.   Eyes: Conjunctivae and EOM are normal.   Neck: Normal range of motion.   Cardiovascular: Normal rate, regular rhythm and intact distal pulses.   Pulmonary/Chest: Effort normal. He has no wheezes. He has no rales.   Breathing comfortably on NC.  Diminished breath sound right base   Abdominal: Soft. Bowel sounds are normal.   Musculoskeletal: Normal range of motion. He exhibits no edema.   Neurological: He is alert and oriented to person, place, and time.   Skin: Skin is warm and dry. He is not diaphoretic.   Psychiatric: He has a normal mood and affect. His behavior is normal. Judgment and thought content normal.   Nursing note and vitals reviewed.      Significant Labs:   BMP:   Recent Labs   Lab 01/24/19  0132 01/25/19  0404   * 145*    141   K 4.3 3.6    105   CO2 21* 28   BUN 20 19   CREATININE 1.8* 1.5*   CALCIUM 8.9  8.8   MG 2.4  --      CBC:   Recent Labs   Lab 01/24/19  0132   WBC 13.14*   HGB 11.1*   HCT 34.7*          Significant Imaging: I have reviewed all pertinent imaging results/findings within the past 24 hours.

## 2019-01-25 NOTE — SUBJECTIVE & OBJECTIVE
Interval History:  He was laying flat comfortably when I entered the room.  He denies any chest pain or shortness of breath currently.  He feels better but not quite at his baseline according to him.    Telemetry:  Normal sinus rhythm    Review of Systems   All other systems reviewed and are negative.    Objective:     Vital Signs (Most Recent):  Temp: 98.6 °F (37 °C) (01/25/19 0346)  Pulse: 78 (01/25/19 0810)  Resp: 20 (01/25/19 0810)  BP: (!) 156/68 (01/25/19 0346)  SpO2: (!) 93 % (01/25/19 0810) Vital Signs (24h Range):  Temp:  [97.5 °F (36.4 °C)-98.9 °F (37.2 °C)] 98.6 °F (37 °C)  Pulse:  [60-78] 78  Resp:  [17-29] 20  SpO2:  [90 %-96 %] 93 %  BP: (104-156)/(55-68) 156/68     Weight: 89.8 kg (198 lb)  Body mass index is 28.41 kg/m².     SpO2: (!) 93 %  O2 Device (Oxygen Therapy): nasal cannula      Intake/Output Summary (Last 24 hours) at 1/25/2019 1044  Last data filed at 1/25/2019 0630  Gross per 24 hour   Intake 360 ml   Output 4675 ml   Net -4315 ml       Lines/Drains/Airways     Drain                 Urethral Catheter 01/25/19 0632 Non-latex less than 1 day          Peripheral Intravenous Line                 Peripheral IV - Single Lumen 01/24/19 0132 Left Antecubital 1 day         Peripheral IV - Single Lumen 01/24/19 0141 Right;Anterior Antecubital 1 day                Physical Exam   Constitutional: He is oriented to person, place, and time. No distress.   HENT:   Head: Normocephalic and atraumatic.   Eyes: Conjunctivae and EOM are normal. Pupils are equal, round, and reactive to light.   Neck: Normal range of motion. Neck supple.   Cardiovascular: Normal rate and regular rhythm.   Pulmonary/Chest: Breath sounds normal. He has no wheezes. He has no rales.   Poor effort and decreased breath sounds   Musculoskeletal: He exhibits no edema.   Neurological: He is alert and oriented to person, place, and time.   Skin: Skin is warm and dry.       Significant Labs:   BMP:   Recent Labs   Lab 01/24/19  0137  01/25/19  0404   * 145*    141   K 4.3 3.6    105   CO2 21* 28   BUN 20 19   CREATININE 1.8* 1.5*   CALCIUM 8.9 8.8   MG 2.4  --    , CBC   Recent Labs   Lab 01/24/19  0132   WBC 13.14*   HGB 11.1*   HCT 34.7*      , INR No results for input(s): INR, PROTIME in the last 48 hours., Lipid Panel No results for input(s): CHOL, HDL, LDLCALC, TRIG, CHOLHDL in the last 48 hours. and Troponin   Recent Labs   Lab 01/24/19  0132 01/24/19  0726   TROPONINI 0.022 0.020       Significant Imaging: Echocardiogram:   Transthoracic echo (TTE) complete (Cupid Only):   Results for orders placed or performed during the hospital encounter of 01/24/19   Transthoracic echo (TTE) complete (Cupid Only)   Result Value Ref Range    BSA 2.11 m2    LA WIDTH 5.48 cm    AORTIC VALVE CUSP SEPERATION 1.68 cm    PV PEAK VELOCITY 0.73 cm/s    LVIDD 5.29 3.5 - 6.0 cm    IVS 1.11 (A) 0.6 - 1.1 cm    PW 1.06 0.6 - 1.1 cm    Ao root annulus 2.89 cm    LVIDS 4.20 (A) 2.1 - 4.0 cm    FS 21 28 - 44 %    LA volume 126.07 cm3    Sinus 3.16 cm    STJ 2.44 cm    LV mass 222.85 g    LA size 4.58 cm    RVDD 3.94 cm    TAPSE 2.95 cm    RV S' 8.69 m/s    Left Ventricle Relative Wall Thickness 0.40 cm    AV mean gradient 11.83 mmHg    AV valve area 1.03 cm2    AV Velocity Ratio 0.29     AV index (prosthetic) 0.29     E/A ratio 1.82     E wave decelartion time 222.43 msec    IVRT 0.05 msec    LVOT diameter 2.14 cm    LVOT area 3.59 cm2    LVOT peak david [id number] m/s    LVOT peak VTI 17.51 cm    Ao peak david 2.32 m/s    Ao VTI 60.91 cm    LVOT stroke volume 62.95 cm3    AV peak gradient 21.53 mmHg    MV Peak E David 1.35 m/s    TR Max David 3.67 m/s    MV Peak A David 0.74 m/s    LV Systolic Volume 78.46 mL    LV Systolic Volume Index 37.7 mL/m2    LV Diastolic Volume 134.67 mL    LV Diastolic Volume Index 64.79 mL/m2    LA Volume Index 60.7 mL/m2    LV Mass Index 107.2 g/m2    RA Major Axis 5.32 cm    Left Atrium Minor Axis 5.85 cm    Left  Atrium Major Axis 5.97 cm    Triscuspid Valve Regurgitation Peak Gradient 53.88 mmHg    RA Width 4.32 cm    Right Atrial Pressure (from IVC) 15 mmHg    TV rest pulmonary artery pressure 69 mmHg

## 2019-01-25 NOTE — HPI
83 year old male with non obstructive CAD, s/p TAVR, moderate mitral regurgitation, grade 2 diastolic heart failure, Hx of VT now s/p ICD, pulmonary hypertension and hypertension who presented with worsening shortness of breath. Patient had an ultrasound guided therapeutic thoracentesis done on 1/17 (~ one week PTA) where 1.4L of serous fluid was removed. He felt better after procedure but symptoms then again worsened. He takes furosemide 20 mg daily and is compliant with this. Denied fever, chills, lightheadedness, vomiting, abdominal pain, trouble swallowing, dysuria, diarrhea. Reported some nausea.      In the ED, patient was afebrile and normotensive but in respiratory distress. Per EMS, sats around 90% at room air. CXR with evidence of right pleural effusion and pulmonary edema. Given two doses of furosemide 40 mg IV and placed on BiPAP. Admitted to ICU for close monitoring.         Pulmonology consulted for R pleural effusion. Thoracentesis performed 1 week ago with 1.4L removed.

## 2019-01-25 NOTE — SUBJECTIVE & OBJECTIVE
Past Medical History:   Diagnosis Date    Acute renal failure     Arthritis     Blind right eye     BPH (benign prostatic hypertrophy)     Bronchitis, chronic     Carotid artery occlusion     Lt carotid endarerectomy done 02/19/2018     CHF (congestive heart failure)     Colon polyp     Coronary artery disease     Diabetes mellitus     GERD (gastroesophageal reflux disease)     Hearing aid worn     bilateral    Hernia     Hypertension     Influenza A     Irregular heart beat     NSVT (nonsustained ventricular tachycardia) 4/18/2018    Renal failure as complication of care     sepsis, renal function returned    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 Coleman    CATARACT EXTRACTION, BILATERAL      EGD (ESOPHAGOGASTRODUODENOSCOPY) N/A 8/30/2018    Performed by Tye Navarrete MD at [facility] ENDO (2ND FLR)    ENDARTERECTOMY-CAROTID Left 2/19/2018    Per[facility] by [facility]io [facility] at [facility] OR    ESOPHAGOGASTRODUODENOSCOPY  08/30/2018    EYE SURGERY      HERNIA REPAIR Left 09/2013    REPAIR, HERNIA, INGUINAL, WITHOUT HISTORY OF PRIOR REPAIR, AGE 5 YEARS OR OLDER Left 9/11/2013    Performed by Han Brown MD at [facility] OR    REPLACEMENT-VALVE-AORTIC N/A 10/17/2017    Performed by Martínez Keene MD at [facility] CATH LAB    RETINAL DETACHMENT SURGERY         Review of patient's allergies indicates:   Allergen Reactions    Ciprofloxacin (mixture) Itching     Extreme itching    Antibiotic hc     Hydrochlorothiazide      Leg  swelling      Iodine and iodide containing products      Wife and pt unsure    Vancomycin analogues Itching       No current facility-administered medications on file prior to encounter.      Current Outpatient Medications on File Prior to Encounter   Medication Sig    acarbose (PRECOSE) 25 MG Tab Take 25 mg by mouth 3 (three) times daily with meals.    acetaminophen (TYLENOL) 500 MG tablet Take 500 mg by mouth 2 (two) times daily.    amiodarone (PACERONE) 200 MG Tab Take 1 tablet (200 mg total) by mouth once daily.    amLODIPine (NORVASC) 10 MG tablet Take 10 mg by mouth once daily.    aspirin (ECOTRIN) 81 MG EC tablet Take 81 mg by mouth once daily.    atorvastatin (LIPITOR) 40 MG tablet Take 1 tablet (40 mg total) by mouth once daily.    cyanocobalamin (VITAMIN B-12) 1000 MCG tablet Take 100 mcg by mouth once a week.    diphenhydrAMINE (BENADRYL) 50 MG capsule Take 50 mg by mouth nightly.    famotidine (PEPCID) 20 MG tablet Take 20 mg by mouth 2 (two) times daily.     ferrous gluconate (FERGON) 324 MG tablet Take 1 tablet (324 mg total) by mouth daily with breakfast.    furosemide (LASIX) 20 MG tablet Take 20 mg by mouth once daily.    metoprolol succinate (TOPROL-XL) 50 MG 24 hr tablet Take 50 mg by mouth 2 (two) times daily.     multivit-min-FA-lycopen-lutein (CENTRUM SILVER MEN) 300-600-300 mcg Tab Take 1 tablet by mouth once daily.    mupirocin (BACTROBAN) 2 % ointment Apply topically 3 (three) times daily.    vortioxetine (TRINTELLIX) 5 mg Tab Take 5 mg by mouth once daily.     clopidogrel (PLAVIX) 75 mg tablet Take 1 tablet (75 mg total) by mouth once daily.     Family History     Problem Relation (Age of Onset)    Arthritis Mother    Diabetes Sister    Heart attack Brother    Heart disease Father    Heart failure Father    No Known Problems Maternal Aunt, Maternal Uncle, Paternal Aunt, Paternal Uncle, Maternal Grandmother, Maternal Grandfather, Paternal Grandmother, Paternal Grandfather         Tobacco Use    Smoking status: Former Smoker     Packs/day: 3.00     Years: 40.00     Pack years: 120.00     Types: Cigarettes     Last attempt to quit: 1990     Years since quittin.5    Smokeless tobacco: Never Used   Substance and Sexual Activity    Alcohol use: No    Drug use: No    Sexual activity: Not Currently     Partners: Female     Review of Systems   Constitutional: Negative.    HENT: Negative.    Eyes: Negative.    Respiratory: Negative for shortness of breath.    Cardiovascular: Negative for chest pain and palpitations.   Gastrointestinal: Negative for nausea and vomiting.   Endocrine: Negative.    Genitourinary: Negative.    Musculoskeletal: Negative.    Skin: Negative.    Neurological: Negative.    Hematological: Negative.    Psychiatric/Behavioral: Negative.      Objective:     Vital Signs (Most Recent):  Temp: 99 °F (37.2 °C) (19 1213)  Pulse: 67 (19 1213)  Resp: 20 (19 1213)  BP: 128/60 (19 1213)  SpO2: 95 % (19 1213) Vital Signs (24h Range):  Temp:  [97.5 °F (36.4 °C)-99 °F (37.2 °C)] 99 °F (37.2 °C)  Pulse:  [60-78] 67  Resp:  [17-28] 20  SpO2:  [90 %-96 %] 95 %  BP: (104-156)/(55-68) 128/60     Weight: 89.8 kg (198 lb)  Body mass index is 28.41 kg/m².    Physical Exam   Constitutional: He is oriented to person, place, and time. He appears well-developed. No distress.   Non toxic appearing   HENT:   Head: Normocephalic and atraumatic.   Mouth/Throat: Oropharynx is clear and moist.   Eyes: Conjunctivae and EOM are normal.   Neck: Normal range of motion.   Cardiovascular: Normal rate, regular rhythm and intact distal pulses.   Pulmonary/Chest: Effort normal. He has no wheezes. He has no rales.   Breathing comfortably on no oxygen.   Abdominal: Soft. Bowel sounds are normal.   Musculoskeletal: Normal range of motion. He exhibits no edema.   Neurological: He is alert and oriented to person, place, and time.   Skin: Skin is warm and dry. He is not  diaphoretic.   Psychiatric: He has a normal mood and affect. His behavior is normal. Judgment and thought content normal.   Nursing note and vitals reviewed.        CRANIAL NERVES     CN III, IV, VI   Extraocular motions are normal.        Significant Labs: All pertinent labs within the past 24 hours have been reviewed.    Significant Imaging: I have reviewed all pertinent imaging results/findings within the past 24 hours.  I have reviewed and interpreted all pertinent imaging results/findings within the past 24 hours.

## 2019-01-25 NOTE — SUBJECTIVE & OBJECTIVE
Past Medical History:   Diagnosis Date    Acute renal failure     Arthritis     Blind right eye     BPH (benign prostatic hypertrophy)     Bronchitis, chronic     Carotid artery occlusion     Lt carotid endarerectomy done 02/19/2018     CHF (congestive heart failure)     Colon polyp     Coronary artery disease     Diabetes mellitus     GERD (gastroesophageal reflux disease)     Hearing aid worn     bilateral    Hernia     Hypertension     Influenza A     Irregular heart beat     NSVT (nonsustained ventricular tachycardia) 4/18/2018    Renal failure as complication of care     sepsis, renal function returned    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 Coleman    CATARACT EXTRACTION, BILATERAL      EGD (ESOPHAGOGASTRODUODENOSCOPY) N/A 8/30/2018    Performed by Tye Navarrete MD at [facility] ENDO (2ND FLR)    ENDARTERECTOMY-CAROTID Left 2/19/2018    Performed by Silvio Coleman MD at [facility] OR    ESOPHAGOGASTRODUODENOSCOPY  08/30/2018    EYE SURGERY      HERNIA REPAIR Left 09/2013    REPAIR, HERNIA, INGUINAL, WITHOUT HISTORY OF PRIOR REPAIR, AGE 5 YEARS OR OLDER Left 9/11/2013    Performed by Han Brown MD at [facility] OR    REPLACEMENT-VALVE-AORTIC N/A 10/17/2017    Performed by Martínez Keene MD at [facility] CATH LAB    RETINAL DETACHMENT SURGERY         Review of patient's allergies indicates:   Allergen Reactions    Ciprofloxacin (mixture) Itching     Extreme itching    Antibiotic hc     Hydrochlorothiazide      Leg  swelling      Iodine and iodide containing products      Wife and pt unsure    Vancomycin analogues Itching       Family History     Problem Relation (Age of Onset)    Arthritis Mother    Diabetes Sister    Heart attack Brother    Heart disease Father    Heart failure Father    No Known Problems Maternal Aunt, Maternal Uncle, Paternal Aunt, Paternal Uncle, Maternal Grandmother, Maternal Grandfather, Paternal Grandmother, Paternal Grandfather          Tobacco Use    Smoking status: Former Smoker     Packs/day: 3.00     Years: 40.00     Pack years: 120.00     Types: Cigarettes     Last attempt to quit: 1990     Years since quittin.5    Smokeless tobacco: Never Used   Substance and Sexual Activity    Alcohol use: No    Drug use: No    Sexual activity: Not Currently     Partners: Female       Review of Systems   Constitutional: Negative.  Negative for fever.   HENT: Negative.    Eyes: Negative.    Respiratory: Negative for cough, chest tightness and shortness of breath.    Cardiovascular: Negative for chest pain.   Gastrointestinal: Negative.  Negative for constipation, diarrhea and nausea.   Genitourinary: Positive for difficulty urinating and hematuria.   Musculoskeletal: Negative.    Neurological: Negative.    Psychiatric/Behavioral: Negative.        Objective:     Temp:  [97.5 °F (36.4 °C)-98.9 °F (37.2 °C)] 98.6 °F (37 °C)  Pulse:  [60-78] 78  Resp:  [17-29] 20  SpO2:  [90 %-96 %] 93 %  BP: (104-156)/(55-68) 156/68     Body mass index is 28.41 kg/m².       Bladder Scan Volume (mL): 807 mL (19 0509)    Drains     Drain                 Urethral Catheter 19 0632 Non-latex less than 1 day                Physical Exam   Nursing note and vitals reviewed.  Constitutional: He is oriented to person, place, and time. He appears well-developed and well-nourished.   HENT:   Head: Normocephalic.   Eyes: Conjunctivae are normal.   Neck: Normal range of motion. Neck supple. No tracheal deviation  present. No thyromegaly present.   Cardiovascular: Normal rate and normal heart sounds.    Pulmonary/Chest: Effort normal and breath sounds normal. No respiratory distress. He has no wheezes.   Abdominal: Soft. Bowel sounds are normal. There is no hepatosplenomegaly. There is no tenderness. There is no rebound and no CVA tenderness. No hernia.   Genitourinary: Testes normal and penis normal.   Genitourinary Comments: 16 Fr Colunga in place, red thin urine in tubing   Musculoskeletal: Normal range of motion. He exhibits no edema or tenderness.   Lymphadenopathy:     He has no cervical adenopathy.   Neurological: He is alert and oriented to person, place, and time.   Skin: Skin is warm and dry. No rash noted. No erythema.     Psychiatric: He has a normal mood and affect. His behavior is normal. Judgment and thought content normal.       Significant Labs:    BMP:  Recent Labs   Lab 01/24/19  0132 01/25/19  0404    141   K 4.3 3.6    105   CO2 21* 28   BUN 20 19   CREATININE 1.8* 1.5*   CALCIUM 8.9 8.8       CBC:  Recent Labs   Lab 01/24/19  0132   WBC 13.14*   HGB 11.1*   HCT 34.7*          Blood Culture:   Recent Labs   Lab 01/24/19  0250 01/24/19  0305   LABBLOO No Growth to date  No Growth to date No Growth to date  No Growth to date     Urine Culture: No results for input(s): LABURIN in the last 168 hours.    Significant Imaging:

## 2019-01-25 NOTE — HPI
Urinary Retention  Patient complains of urinary retention. Onset of retention was 1 day ago and was gradual in onset. Patient currently does have a urinary catheter in place.  800 ml of urine were drained when catheter was placed. Prior to this event voiding symptoms consisted of slow stream, intermittency, nocturia. Prior treatments include watchful waiting. Recent medications that may have affected his voiding include none.  He tells me Colunga catheter placement was difficult and required multiple attempts.  He has never had hematuria prior to Colunga placement.

## 2019-01-25 NOTE — CONSULTS
[facility]  Pulmonology  Consult Note    Patient Name: Timbo Gallagher  MRN: [id number]  Admission Date: 1/24/2019  Hospital Length of Stay: 1 days  Code Status: Full Code  Attending Physician: Severiano Galan MD  Primary Care Provider: Cirilo Montero MD   Principal Problem: Acute on chronic respiratory failure with hypoxia    Inpatient consult to Pulmonology  Consult performed by: Sherry Boone MD  Consult ordered by: Yarelis Avila MD        Subjective:     HPI:  83 year old male with non obstructive CAD, s/p TAVR, moderate mitral regurgitation, grade 2 diastolic heart failure, Hx of VT now s/p ICD, pulmonary hypertension and hypertension who presented with worsening shortness of breath. Patient had an ultrasound guided therapeutic thoracentesis done on 1/17 (~ one week PTA) where 1.4L of serous fluid was removed. He felt better after procedure but symptoms then again worsened. He takes furosemide 20 mg daily and is compliant with this. Denied fever, chills, lightheadedness, vomiting, abdominal pain, trouble swallowing, dysuria, diarrhea. Reported some nausea.      In the ED, patient was afebrile and normotensive but in respiratory distress. Per EMS, sats around 90% at room air. CXR with evidence of right pleural effusion and pulmonary edema. Given two doses of furosemide 40 mg IV and placed on BiPAP. Admitted to ICU for close monitoring.         Pulmonology consulted for R pleural effusion. Thoracentesis performed 1 week ago with 1.4L removed.     Past Medical History:   Diagnosis Date    Acute renal failure     Arthritis     Blind right eye     BPH (benign prostatic hypertrophy)     Bronchitis, chronic     Carotid artery occlusion     Lt carotid endarerectomy done 02/19/2018     CHF (congestive heart failure)     Colon polyp     Coronary artery disease     Diabetes mellitus     GERD (gastroesophageal reflux disease)     Hearing aid worn     bilateral    Hernia      Hypertension     Influenza A     Irregular heart beat     NSVT (nonsustained ventricular tachycardia) 4/18/2018    Renal failure as complication of care     sepsis, renal function returned    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 Coleman    CATARACT EXTRACTION, BILATERAL      EGD (ESOPHAGOGASTRODUODENOSCOPY) N/A 8/30/2018    Performed by Tye [facility]rete MD at [facility] ENDO (2ND FLR)    ENDARTERECTOMY-CAROTID Left 2/19/2018    Performed by Silvio Coleman MD at [facility] OR    ESOPHAGOGASTRODUODENOSCOPY  08/30/2018    EYE SURGERY      HERNIA REPAIR Left 09/2013    REPAIR, HERNIA, INGUINAL, WITHOUT HISTORY OF PRIOR REPAIR, AGE 5 YEARS OR OLDER Left 9/11/2013    Performed by Han Brown MD at [facility] OR    REPLACEMENT-VALVE-AORTIC N/A 10/17/2017    Performed by Martínez Keene MD at [facility] CATH LAB    RETINAL DETACHMENT SURGERY         Review of patient's allergies indicates:   Allergen Reactions    Ciprofloxacin (mixture) Itching     Extreme itching    Antibiotic hc     Hydrochlorothiazide      Leg swelling      Iodine and iodide containing products      Wife and pt unsure    Vancomycin analogues Itching       No current facility-administered medications on file prior to encounter.      Current Outpatient Medications on File Prior to Encounter   Medication Sig    acarbose (PRECOSE) 25 MG Tab Take 25 mg by mouth 3 (three) times daily with meals.    acetaminophen (TYLENOL) 500 MG tablet Take 500 mg by mouth 2 (two) times daily.     amiodarone (PACERONE) 200 MG Tab Take 1 tablet (200 mg total) by mouth once daily.    amLODIPine (NORVASC) 10 MG tablet Take 10 mg by mouth once daily.    aspirin (ECOTRIN) 81 MG EC tablet Take 81 mg by mouth once daily.    atorvastatin (LIPITOR) 40 MG tablet Take 1 tablet (40 mg total) by mouth once daily.    cyanocobalamin (VITAMIN B-12) 1000 MCG tablet Take 100 mcg by mouth once a week.    diphenhydrAMINE (BENADRYL) 50 MG capsule Take 50 mg by mouth nightly.    famotidine (PEPCID) 20 MG tablet Take 20 mg by mouth 2 (two) times daily.     ferrous gluconate (FERGON) 324 MG tablet Take 1 tablet (324 mg total) by mouth daily with breakfast.    furosemide (LASIX) 20 MG tablet Take 20 mg by mouth once daily.    metoprolol succinate (TOPROL-XL) 50 MG 24 hr tablet Take 50 mg by mouth 2 (two) times daily.     multivit-min-FA-lycopen-lutein (CENTRUM SILVER MEN) 300-600-300 mcg Tab Take 1 tablet by mouth once daily.    mupirocin (BACTROBAN) 2 % ointment Apply topically 3 (three) times daily.    vortioxetine (TRINTELLIX) 5 mg Tab Take 5 mg by mouth once daily.     clopidogrel (PLAVIX) 75 mg tablet Take 1 tablet (75 mg total) by mouth once daily.     Family History     Problem Relation (Age of Onset)    Arthritis Mother    Diabetes Sister    Heart attack Brother    Heart disease Father    Heart failure Father    No Known Problems Maternal Aunt, Maternal Uncle, Paternal Aunt, Paternal Uncle, Maternal Grandmother, Maternal Grandfather, Paternal Grandmother, Paternal Grandfather        Tobacco Use    Smoking status: Former Smoker     Packs/day: 3.00     Years: 40.00     Pack years: 120.00     Types: Cigarettes     Last attempt to quit: 1990     Years since quittin.5    Smokeless tobacco: Never Used   Substance and Sexual Activity    Alcohol use: No    Drug use: No    Sexual activity: Not Currently     Partners: Female     Review of Systems   Constitutional: Negative.    HENT: Negative.    Eyes:  Negative.    Respiratory: Negative for shortness of breath.    Cardiovascular: Negative for chest pain and palpitations.   Gastrointestinal: Negative for nausea and vomiting.   Endocrine: Negative.    Genitourinary: Negative.    Musculoskeletal: Negative.    Skin: Negative.    Neurological: Negative.    Hematological: Negative.    Psychiatric/Behavioral: Negative.      Objective:     Vital Signs (Most Recent):  Temp: 99 °F (37.2 °C) (01/25/19 1213)  Pulse: 67 (01/25/19 1213)  Resp: 20 (01/25/19 1213)  BP: 128/60 (01/25/19 1213)  SpO2: 95 % (01/25/19 1213) Vital Signs (24h Range):  Temp:  [97.5 °F (36.4 °C)-99 °F (37.2 °C)] 99 °F (37.2 °C)  Pulse:  [60-78] 67  Resp:  [17-28] 20  SpO2:  [90 %-96 %] 95 %  BP: (104-156)/(55-68) 128/60     Weight: 89.8 kg (198 lb)  Body mass index is 28.41 kg/m².    Physical Exam   Constitutional: He is oriented to person, place, and time. He appears well-developed. No distress.   Non toxic appearing   HENT:   Head: Normocephalic and atraumatic.   Mouth/Throat: Oropharynx is clear and moist.   Eyes: Conjunctivae and EOM are normal.   Neck: Normal range of motion.   Cardiovascular: Normal rate, regular rhythm and intact distal pulses.   Pulmonary/Chest: Effort normal. He has no wheezes. He has no rales.   Breathing comfortably on no oxygen.   Abdominal: Soft. Bowel sounds are normal.   Musculoskeletal: Normal range of motion. He exhibits no edema.   Neurological: He is alert and oriented to person, place, and time.   Skin: Skin is warm and dry. He is not diaphoretic.   Psychiatric: He has a normal mood and affect. His behavior is normal. Judgment and thought content normal.   Nursing note and vitals reviewed.        CRANIAL NERVES     CN III, IV, VI   Extraocular motions are normal.        Significant Labs: All pertinent labs within the past 24 hours have been reviewed.    Significant Imaging: I have reviewed all pertinent imaging results/findings within the past 24 hours.  I have  reviewed and interpreted all pertinent imaging results/findings within the past 24 hours.    Assessment/Plan:     Pleurisy with effusion    Patient with improvement with diuresis and Abx. Seems unlikely that effusion is infected given recent thoracentesis with serous fluid. I do not see fluid analysis. Reasonable to perform thoracentesis if no improvement.   -Continue ABx  -Diuresis as tolerated.     Will re-evaluate tomorrow if patient is not improved.            Thank you for your consult. I will follow-up with patient. Please contact us if you have any additional questions.     Sherry Boone MD  Pulmonology  [facility]

## 2019-01-25 NOTE — ASSESSMENT & PLAN NOTE
Only after Colunga placement, likely catheter trauma  Keep Colunga x 1 week to allow urethra to heal  Irrigate prn  No need for emergent urologic intervention

## 2019-01-25 NOTE — PROGRESS NOTES
[facility]  Cardiology  Progress Note    Patient Name: Timbo Gallagher  MRN: [id number]  Admission Date: 1/24/2019  Hospital Length of Stay: 1 days  Code Status: Full Code   Attending Physician: Severiano Galan MD   Primary Care Physician: Cirilo Montero MD  Expected Discharge Date:   Principal Problem:Acute on chronic respiratory failure with hypoxia    Subjective:     Hospital Course:   1-24:  Admitted for respiratory failure    Interval History:  He was laying flat comfortably when I entered the room.  He denies any chest pain or shortness of breath currently.  He feels better but not quite at his baseline according to him.    Telemetry:  Normal sinus rhythm    Review of Systems   All other systems reviewed and are negative.    Objective:     Vital Signs (Most Recent):  Temp: 98.6 °F (37 °C) (01/25/19 0346)  Pulse: 78 (01/25/19 0810)  Resp: 20 (01/25/19 0810)  BP: (!) 156/68 (01/25/19 0346)  SpO2: (!) 93 % (01/25/19 0810) Vital Signs (24h Range):  Temp:  [97.5 °F (36.4 °C)-98.9 °F (37.2 °C)] 98.6 °F (37 °C)  Pulse:  [60-78] 78  Resp:  [17-29] 20  SpO2:  [90 %-96 %] 93 %  BP: (104-156)/(55-68) 156/68     Weight: 89.8 kg (198 lb)  Body mass index is 28.41 kg/m².     SpO2: (!) 93 %  O2 Device (Oxygen Therapy): nasal cannula      Intake/Output Summary (Last 24 hours) at 1/25/2019 1044  Last data filed at 1/25/2019 0630  Gross per 24 hour   Intake 360 ml   Output 4675 ml   Net -4315 ml       Lines/Drains/Airways     Drain                 Urethral Catheter 01/25/19 0632 Non-latex less than 1 day          Peripheral Intravenous Line                 Peripheral IV - Single Lumen 01/24/19 0132 Left Antecubital 1 day         Peripheral IV - Single Lumen 01/24/19 0141 Right;Anterior Antecubital 1 day                Physical Exam   Constitutional: He is oriented to person, place, and time. No distress.   HENT:   Head: Normocephalic and atraumatic.   Eyes: Conjunctivae and EOM are normal. Pupils are  equal, round, and reactive to light.   Neck: Normal range of motion. Neck supple.   Cardiovascular: Normal rate and regular rhythm.   Pulmonary/Chest: Breath sounds normal. He has no wheezes. He has no rales.   Poor effort and decreased breath sounds   Musculoskeletal: He exhibits no edema.   Neurological: He is alert and oriented to person, place, and time.   Skin: Skin is warm and dry.       Significant Labs:   BMP:   Recent Labs   Lab 01/24/19  0132 01/25/19  0404   * 145*    141   K 4.3 3.6    105   CO2 21* 28   BUN 20 19   CREATININE 1.8* 1.5*   CALCIUM 8.9 8.8   MG 2.4  --    , CBC   Recent Labs   Lab 01/24/19 0132   WBC 13.14*   HGB 11.1*   HCT 34.7*      , INR No results for input(s): INR, PROTIME in the last 48 hours., Lipid Panel No results for input(s): CHOL, HDL, LDLCALC, TRIG, CHOLHDL in the last 48 hours. and Troponin   Recent Labs   Lab 01/24/19 0132 01/24/19  0726   TROPONINI 0.022 0.020       Significant Imaging: Echocardiogram:   Transthoracic echo (TTE) complete (Cupid Only):   Results for orders placed or performed during the hospital encounter of 01/24/19   Transthoracic echo (TTE) complete (Cupid Only)   Result Value Ref Range    BSA 2.11 m2    LA WIDTH 5.48 cm    AORTIC VALVE CUSP SEPERATION 1.68 cm    PV PEAK VELOCITY 0.73 cm/s    LVIDD 5.29 3.5 - 6.0 cm    IVS 1.11 (A) 0.6 - 1.1 cm    PW 1.06 0.6 - 1.1 cm    Ao root annulus 2.89 cm    LVIDS 4.20 (A) 2.1 - 4.0 cm    FS 21 28 - 44 %    LA volume 126.07 cm3    Sinus 3.16 cm    STJ 2.44 cm    LV mass 222.85 g    LA size 4.58 cm    RVDD 3.94 cm    TAPSE 2.95 cm    RV S' 8.69 m/s    Left Ventricle Relative Wall Thickness 0.40 cm    AV mean gradient 11.83 mmHg    AV valve area 1.03 cm2    AV Velocity Ratio 0.29     AV index (prosthetic) 0.29     E/A ratio 1.82     E wave decelartion time 222.43 msec    IVRT 0.05 msec    LVOT diameter 2.14 cm    LVOT area 3.59 cm2    LVOT peak oz [id number] m/s    LVOT peak VTI 17.51  cm    Ao peak david 2.32 m/s    Ao VTI 60.91 cm    LVOT stroke volume 62.95 cm3    AV peak gradient 21.53 mmHg    MV Peak E David 1.35 m/s    TR Max David 3.67 m/s    MV Peak A David 0.74 m/s    LV Systolic Volume 78.46 mL    LV Systolic Volume Index 37.7 mL/m2    LV Diastolic Volume 134.67 mL    LV Diastolic Volume Index 64.79 mL/m2    LA Volume Index 60.7 mL/m2    LV Mass Index 107.2 g/m2    RA Major Axis 5.32 cm    Left Atrium Minor Axis 5.85 cm    Left Atrium Major Axis 5.97 cm    Triscuspid Valve Regurgitation Peak Gradient 53.88 mmHg    RA Width 4.32 cm    Right Atrial Pressure (from IVC) 15 mmHg    TV rest pulmonary artery pressure 69 mmHg     · Normal left ventricular systolic function. The estimated ejection fraction is 55%  · No wall motion abnormalities.  · Normal right ventricular systolic function.  · There is a 26 mm Brent S3 transcutaneously-placed aortic bioprosthesis present. There is no aortic aortic insufficiency present. Prosthetic aortic valve is normal.  · Moderate-to-severe mitral regurgitation.  · Mild to moderate tricuspid regurgitation.  · Elevated central venous pressure (15 mm Hg).  · The estimated PA systolic pressure is 69 mm Hg  · Pulmonary hypertension present.  · Consider LISSETT for further eval of mitral regurgitation if clinically warranted       Assessment and Plan:     Brief HPI:     Acute diastolic heart failure    Note made of assymetric effusion and elev WBC with associated prod cough and chills, ?PNA  Better post diuresis, okay to change to maintenance after today  Eval/mgmt per IM       Pleurisy with effusion    As above     Subarachnoid hemorrhage following injury with brief loss of consciousness but without open intracranial wound    Neuro eval planned prior to resumption of antiplt rx     NSVT (nonsustained ventricular tachycardia)    MDT ICD in place  Recently interrogated with normal fxn  Pt had VT requiring ICD rx in 12/2018       Mixed hyperlipidemia    Cont statin     CKD  (chronic kidney disease), stage III    Creat stable  Monitor while on IV lasix     S/P TAVR (transcatheter aortic valve replacement)         Essential hypertension    Cont med rx as warranted by BP     Type 2 diabetes mellitus with stage 3 chronic kidney disease    Per IM         VTE Risk Mitigation (From admission, onward)        Ordered     heparin (porcine) injection 5,000 Units  Every 12 hours      01/24/19 0558     IP VTE HIGH RISK PATIENT  Once      01/24/19 0523     Place DEYA hose  Until discontinued      01/24/19 0523        Better post diuresis.  Would ambulate and check oxygen level otherwise likely okay for DC later today or tomorrow from CV standpoint.      John Zhou MD  Cardiology  [facility]

## 2019-01-25 NOTE — HOSPITAL COURSE
82 y/o male presents with acute hypoxic respiratory failure.  Hypoxia secondary to pulmonary edema secondary to acute diastolic heart failure and chronic pleural effusion.  Started on IV diuresis.  Responded to IV diuresis and able to wean to NC.  Cardiology and Pulmonary consulted.  Patient also was noted to have gross hematuria and  consulted.  Hematuria probably secondary to traumatic watson placement.  Urology recommended keeping watson for 1 week.  Patient has been doing much better with IV diuresis.  Currently doing well on RA.  No need for thoracentesis from Pulmonary standpoint.  Patient has remained afebrile and hemodynamically stable.  He will be discharged home with watson catheter to follow up with Urology for watson removal in 5 days.  He will also follow up with PCP, Cardiology and Pulmonary.

## 2019-01-26 VITALS
TEMPERATURE: 99 F | OXYGEN SATURATION: 92 % | WEIGHT: 198 LBS | SYSTOLIC BLOOD PRESSURE: 120 MMHG | DIASTOLIC BLOOD PRESSURE: 57 MMHG | HEART RATE: 79 BPM | BODY MASS INDEX: 28.35 KG/M2 | HEIGHT: 70 IN | RESPIRATION RATE: 20 BRPM

## 2019-01-26 PROBLEM — I50.31 ACUTE DIASTOLIC HEART FAILURE: Status: RESOLVED | Noted: 2019-01-24 | Resolved: 2019-01-26

## 2019-01-26 PROBLEM — J96.21 ACUTE ON CHRONIC RESPIRATORY FAILURE WITH HYPOXIA: Status: RESOLVED | Noted: 2019-01-24 | Resolved: 2019-01-26

## 2019-01-26 LAB
ANION GAP SERPL CALC-SCNC: 6 MMOL/L
BUN SERPL-MCNC: 18 MG/DL
CALCIUM SERPL-MCNC: 8.8 MG/DL
CHLORIDE SERPL-SCNC: 105 MMOL/L
CO2 SERPL-SCNC: 30 MMOL/L
CREAT SERPL-MCNC: 1.5 MG/DL
EST. GFR  (AFRICAN AMERICAN): 49 ML/MIN/1.73 M^2
EST. GFR  (NON AFRICAN AMERICAN): 42 ML/MIN/1.73 M^2
GLUCOSE SERPL-MCNC: 93 MG/DL
POCT GLUCOSE: 110 MG/DL (ref 70–110)
POCT GLUCOSE: 174 MG/DL (ref 70–110)
POTASSIUM SERPL-SCNC: 3.7 MMOL/L
SODIUM SERPL-SCNC: 141 MMOL/L

## 2019-01-26 PROCEDURE — 97162 PT EVAL MOD COMPLEX 30 MIN: CPT | Performed by: PHYSICAL THERAPIST

## 2019-01-26 PROCEDURE — 63600175 PHARM REV CODE 636 W HCPCS: Performed by: INTERNAL MEDICINE

## 2019-01-26 PROCEDURE — 25000003 PHARM REV CODE 250: Performed by: INTERNAL MEDICINE

## 2019-01-26 PROCEDURE — 36415 COLL VENOUS BLD VENIPUNCTURE: CPT

## 2019-01-26 PROCEDURE — 94640 AIRWAY INHALATION TREATMENT: CPT

## 2019-01-26 PROCEDURE — 63600175 PHARM REV CODE 636 W HCPCS: Performed by: EMERGENCY MEDICINE

## 2019-01-26 PROCEDURE — 80048 BASIC METABOLIC PNL TOTAL CA: CPT

## 2019-01-26 PROCEDURE — 25000242 PHARM REV CODE 250 ALT 637 W/ HCPCS: Performed by: EMERGENCY MEDICINE

## 2019-01-26 RX ORDER — CLOPIDOGREL BISULFATE 75 MG/1
75 TABLET ORAL DAILY
Qty: 30 TABLET | Refills: 11
Start: 2019-01-26 | End: 2019-02-01

## 2019-01-26 RX ORDER — FUROSEMIDE 20 MG/1
40 TABLET ORAL DAILY
Qty: 60 TABLET | Refills: 11 | Status: SHIPPED | OUTPATIENT
Start: 2019-01-26 | End: 2019-04-16 | Stop reason: SDUPTHER

## 2019-01-26 RX ORDER — ASPIRIN 81 MG/1
81 TABLET ORAL DAILY
Refills: 0 | COMMUNITY
Start: 2019-01-26

## 2019-01-26 RX ADMIN — ATORVASTATIN CALCIUM 40 MG: 40 TABLET, FILM COATED ORAL at 09:01

## 2019-01-26 RX ADMIN — IPRATROPIUM BROMIDE AND ALBUTEROL SULFATE 3 ML: .5; 3 SOLUTION RESPIRATORY (INHALATION) at 04:01

## 2019-01-26 RX ADMIN — IPRATROPIUM BROMIDE AND ALBUTEROL SULFATE 3 ML: .5; 3 SOLUTION RESPIRATORY (INHALATION) at 12:01

## 2019-01-26 RX ADMIN — FERROUS GLUCONATE TAB 324 MG (37.5 MG ELEMENTAL IRON) 324 MG: 324 (37.5 FE) TAB at 09:01

## 2019-01-26 RX ADMIN — AMIODARONE HYDROCHLORIDE 200 MG: 200 TABLET ORAL at 09:01

## 2019-01-26 RX ADMIN — FAMOTIDINE 20 MG: 20 TABLET ORAL at 09:01

## 2019-01-26 RX ADMIN — IPRATROPIUM BROMIDE AND ALBUTEROL SULFATE 3 ML: .5; 3 SOLUTION RESPIRATORY (INHALATION) at 07:01

## 2019-01-26 RX ADMIN — IPRATROPIUM BROMIDE AND ALBUTEROL SULFATE 3 ML: .5; 3 SOLUTION RESPIRATORY (INHALATION) at 11:01

## 2019-01-26 RX ADMIN — HEPARIN SODIUM 5000 UNITS: 5000 INJECTION, SOLUTION INTRAVENOUS; SUBCUTANEOUS at 09:01

## 2019-01-26 RX ADMIN — FUROSEMIDE 40 MG: 10 INJECTION, SOLUTION INTRAVENOUS at 09:01

## 2019-01-26 NOTE — NURSING
Discharge instructions given to patient and family. No question at time.Leg bag change from  bag. Instructed on leg bag and return demonstration per wife. Reviewed follow up appointments and meds. Assisted with dressing at this time

## 2019-01-26 NOTE — PLAN OF CARE
[facility]    HOME HEALTH ORDERS  FACE TO FACE ENCOUNTER    Patient Name: Timbo Gallagher  YOB: 1935    PCP: Cirilo Montero MD   PCP Address: [address] / [facility] [address]  PCP Phone Number: [phone]  PCP Fax: [phone]       Encounter Date: 01/26/2019    Admit to Home Health    Diagnoses:  Active Hospital Problems    Diagnosis  POA    Urinary retention [R33.9]  Yes    Gross hematuria [R31.0]  No    Pleurisy with effusion [J90]  Yes    Subarachnoid hemorrhage following injury with brief loss of consciousness but without open intracranial wound [S06.6X9A]  Yes    NSVT (nonsustained ventricular tachycardia) [I47.2]  Yes    Mixed hyperlipidemia [E78.2]  Yes    CKD (chronic kidney disease), stage III [N18.3]  Yes    S/P TAVR (transcatheter aortic valve replacement) [Z95.2]  Not Applicable    Essential hypertension [I10]  Yes    Type 2 diabetes mellitus with stage 3 chronic kidney disease [E11.22, N18.3]  Yes      Resolved Hospital Problems    Diagnosis Date Resolved POA    *Acute on chronic respiratory failure with hypoxia [J96.21] 01/26/2019 Yes     Priority: 1 - High    Acute diastolic heart failure [I50.31] 01/26/2019 Yes       Future Appointments   Date Time Provider Department Center   2/18/2019  2:40 PM Koko Moore MD [facility] NEURO [facility]   2/26/2019 10:20 AM Ned Toribio MD [facility] CARDIO [facility]   3/20/2019 10:00 AM VASCULAR ULTRASOUND, [facility] EKG [facility]   3/21/2019  1:00 PM Silvio Coleman MD [facility] VAS NETO [facility]     Follow-up Information     W David Lyon MD In 5 days.    Specialty:  Urology  Contact information:  [address]  [address]  [place] [address]  [phone]             Cirilo Montero MD In 1 week.    Specialty:  Internal Medicine  Contact information:  [address]  [facility] [id number] [phone]             Ned Toribio MD In 3 weeks.     Specialties:  Cardiology, INTERVENTIONAL CARDIOLOGY  Contact information:  [address]  [address]  [place] [address]  [phone]             Sherry Boone MD In 2 weeks.    Specialty:  Pulmonary Disease  Contact information:  [address] [place] [address]  [phone]                     I have seen and examined this patient face to face today. My clinical findings that support the need for the home health skilled services and home bound status are the following:  Requiring assistive device to leave home due to unsteady gait caused by  Urinary Retention and watson catheter.    Allergies:  Review of patient's allergies indicates:   Allergen Reactions    Ciprofloxacin (mixture) Itching     Extreme itching    Antibiotic hc     Hydrochlorothiazide      Leg swelling      Iodine and iodide containing products      Wife and pt unsure    Vancomycin analogues Itching       Diet: cardiac diet and diabetic diet: 2000 calorie    Activities: activity as tolerated    Nursing:   SN to complete comprehensive assessment including routine vital signs. Instruct on disease process and s/s of complications to report to MD. Review/verify medication list sent home with the patient at time of discharge  and instruct patient/caregiver as needed. Frequency may be adjusted depending on start of care date.    Notify MD if SBP > 160 or < 90; DBP > 90 or < 50; HR > 120 or < 50; Temp > 101      MISCELLANEOUS CARE:  Watson Care: Instruct patient/caregiver to empty Watson bag.  Change Watson every month.      Medications: Review discharge medications with patient and family and provide education.      Current Discharge Medication List      CONTINUE these medications which have CHANGED    Details   aspirin (ECOTRIN) 81 MG EC tablet Take 1 tablet (81 mg total) by mouth once daily. Hold until evaluated by Cardiology  Refills: 0      clopidogrel (PLAVIX) 75 mg tablet Take 1 tablet (75 mg total) by mouth once daily. Hold until  evaluated by Cardiology  Qty: 30 tablet, Refills: 11      furosemide (LASIX) 20 MG tablet Take 2 tablets (40 mg total) by mouth once daily.  Qty: 60 tablet, Refills: 11         CONTINUE these medications which have NOT CHANGED    Details   acarbose (PRECOSE) 25 MG Tab Take 25 mg by mouth 3 (three) times daily with meals.      acetaminophen (TYLENOL) 500 MG tablet Take 500 mg by mouth 2 (two) times daily.      amiodarone (PACERONE) 200 MG Tab Take 1 tablet (200 mg total) by mouth once daily.  Qty: 30 tablet, Refills: 11      amLODIPine (NORVASC) 10 MG tablet Take 10 mg by mouth once daily.      atorvastatin (LIPITOR) 40 MG tablet Take 1 tablet (40 mg total) by mouth once daily.  Qty: 90 tablet, Refills: 11      cyanocobalamin (VITAMIN B-12) 1000 MCG tablet Take 100 mcg by mouth once a week.      diphenhydrAMINE (BENADRYL) 50 MG capsule Take 50 mg by mouth nightly.      famotidine (PEPCID) 20 MG tablet Take 20 mg by mouth 2 (two) times daily.       ferrous gluconate (FERGON) 324 MG tablet Take 1 tablet (324 mg total) by mouth daily with breakfast.  Qty: 30 tablet, Refills: 2      metoprolol succinate (TOPROL-XL) 50 MG 24 hr tablet Take 50 mg by mouth 2 (two) times daily.       multivit-min-FA-lycopen-lutein (CENTRUM SILVER MEN) 300-600-300 mcg Tab Take 1 tablet by mouth once daily.  Qty: 30 tablet, Refills: 2      mupirocin (BACTROBAN) 2 % ointment Apply topically 3 (three) times daily.      vortioxetine (TRINTELLIX) 5 mg Tab Take 5 mg by mouth once daily.              I certify that this patient is confined to his home and needs intermittent skilled nursing care.

## 2019-01-26 NOTE — PT/OT/SLP EVAL
Physical Therapy Evaluation    Patient Name:  Timbo Gallagher   MRN:  [id number]    Recommendations:     Discharge Recommendations:  other (see comments)(- Outpatient Physical Therapy at time of discharge. )   Discharge Equipment Recommendations: tub bench   Barriers to discharge: None    Assessment:     Timbo Gallagher is a 83 y.o. male admitted with a medical diagnosis of Acute on chronic respiratory failure with hypoxia.  He presents with the following impairments/functional limitations:  impaired endurance, impaired functional mobilty, gait instability, decreased safety awareness, impaired cardiopulmonary response to activity(- 2.0 Lpm of O2 via N.C. ).    Rehab Prognosis: Good; patient would benefit from acute skilled PT services to address these deficits and reach maximum level of function.    Recent Surgery: * No surgery found *      Plan:     During this hospitalization, patient to be seen 3 x/week to address the identified rehab impairments via gait training, therapeutic activities, therapeutic exercises and progress toward the following goals:    · Plan of Care Expires:  02/08/19    Subjective     Chief Complaint: none stated  Patient/Family Comments/goals: to return to PLOF    Pain/Comfort:  · Pain Rating 1: 0/10    Patients cultural, spiritual, Sikhism conflicts given the current situation:      Living Environment:  Pt lives with spouse in a [facility] with 3 JASON and bilateral hand rails.   Prior to admission, patients level of function was Mod I.  Equipment used at home: walker, rolling, cane, straight, glucometer.  DME owned (not currently used): none.  Upon discharge, patient will have assistance from spouse and self.    Objective:     Communicated with Nurse prior to session.  Patient found supine with all lines intact and call button in reach oxygen, peripheral IV, telemetry(- 2.0 Lpm of O2 via N.C. )  upon PT entry to room.    General Precautions: Standard, fall   Orthopedic Precautions:Full  weight bearing   Braces: N/A     Exams:  · Cognitive Exam:  Patient is oriented to Person, Place and Situation  · Gross Motor Coordination:  WFL  · Postural Exam:  Patient presented with the following abnormalities:    · -       Rounded shoulders  · -       Forward head  · -       Abnormal trunk flexion  · Skin Integrity/Edema:      · -       Skin integrity: Visible skin intact  · -       Edema: None noted .  · RLE ROM: WFL  · RLE Strength: WFL  · LLE ROM: WFL  · LLE Strength: WFL    Functional Mobility:  · Bed Mobility:     · Supine to Sit: stand by assistance  · Sit to Supine: stand by assistance  · Transfers:     · Sit to Stand:  minimum assistance with rolling walker  · Gait: 100' with RW and Min A for directing around obstacles.     AM-PAC 6 CLICK MOBILITY  Total Score:19     Patient left supine with all lines intact and call button in reach.    GOALS:   Multidisciplinary Problems     Physical Therapy Goals        Problem: Physical Therapy Goal    Goal Priority Disciplines Outcome Goal Variances Interventions   Physical Therapy Goal     PT, PT/OT Ongoing (interventions implemented as appropriate)     Description:  Goals to be met by: 2019     Patient will increase functional independence with mobility by performin. Sit to stand transfer with [place]  2. Gait  x 350 feet with Modified [place] using Rolling Walker.    Recommend: Outpatient physical therapy at time of discharge.                          History:     Past Medical History:   Diagnosis Date    Acute renal failure     Arthritis     Blind right eye     BPH (benign prostatic hypertrophy)     Bronchitis, chronic     Carotid artery occlusion     Lt carotid endarerectomy done 2018     CHF (congestive heart failure)     Colon polyp     Coronary artery disease     Diabetes mellitus     GERD (gastroesophageal reflux disease)     Hearing aid worn     bilateral    Hernia     Hypertension     Influenza A     Irregular  heart beat     NSVT (nonsustained ventricular tachycardia) 4/18/2018    Renal failure as complication of care     sepsis, renal function returned    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 Coleman    CATARACT EXTRACTION, BILATERAL      EGD (ESOPHAGOGASTRODUODENOSCOPY) N/A 8/30/2018    Performed by Tye Navarrete MD at [facility] ENDO (2ND FLR)    ENDARTERECTOMY-CAROTID Left 2/19/2018    Performed by Silvio Coleman MD at [facility] OR    ESOPHAGOGASTRODUODENOSCOPY  08/30/2018    EYE SURGERY      HERNIA REPAIR Left 09/2013    REPAIR, HERNIA, INGUINAL, WITHOUT HISTORY OF PRIOR REPAIR, AGE 5 YEARS OR OLDER Left 9/11/2013    Performed by Han Brown MD at [facility] OR    REPLACEMENT-VALVE-AORTIC N/A 10/17/2017    Performed by Martínez Keene MD at [facility] CATH LAB    RETINAL DETACHMENT SURGERY       Time Tracking:     PT Received On: 01/26/19  PT Start Time: 0930     PT Stop Time: 1000  PT Total Time (min): 30 min     Billable Minutes: Evaluation 30 min      Ky Mcallister, PT  01/26/2019

## 2019-01-26 NOTE — DISCHARGE SUMMARY
[facility] Hospital Medicine  Discharge Summary      Patient Name: Timbo Gallagher  MRN: [id number]  Admission Date: 1/24/2019  Hospital Length of Stay: 2 days  Discharge Date and Time:  01/26/2019 12:29 PM  Attending Physician: Severiano Galan MD   Discharging Provider: Severiano Galan MD  Primary Care Provider: Cirilo Montero MD      HPI:   83 year old male with non obstructive CAD, s/p TAVR, moderate mitral regurgitation, grade 2 diastolic heart failure, Hx of VT now s/p ICD, pulmonary hypertension and hypertension who presented with worsening shortness of breath. Patient had an ultrasound guided therapeutic thoracentesis done on 1/17 (~ one week PTA) where 1.4L of serous fluid was removed. He felt better after procedure but symptoms then again worsened. He takes furosemide 20 mg daily and is compliant with this. Denied fever, chills, lightheadedness, vomiting, abdominal pain, trouble swallowing, dysuria, diarrhea. Reported some nausea.     In the ED, patient was afebrile and normotensive but in respiratory distress. Per EMS, sats around 90% at room air. CXR with evidence of right pleural effusion and pulmonary edema. Given two doses of furosemide 40 mg IV and placed on BiPAP. Admitted to ICU for close monitoring.      * No surgery found *      Hospital Course:   84 y/o male presents with acute hypoxic respiratory failure.  Hypoxia secondary to pulmonary edema secondary to acute diastolic heart failure and chronic pleural effusion.  Started on IV diuresis.  Responded to IV diuresis and able to wean to NC.  Cardiology and Pulmonary consulted.  Patient also was noted to have gross hematuria and  consulted.  Hematuria probably secondary to traumatic watson placement.  Urology recommended keeping watson for 1 week.  Patient has been doing much better with IV diuresis.  Currently doing well on RA.  No need for thoracentesis from Pulmonary standpoint.  Patient has remained afebrile and  hemodynamically stable.  He will be discharged home with watson catheter to follow up with Urology for watson removal in 5 days.  He will also follow up with PCP, Cardiology and Pulmonary.     Consults:   Consults (From admission, onward)        Status Ordering Provider     Inpatient consult to Cardiology  Once     Provider:  Ned Toribio MD    Completed HENRRY HODGES     Inpatient consult to Pulmonology  Once     Provider:  Sherry Boone MD    Completed CARMENZA MERRILL     Inpatient consult to Social Work  Once     Provider:  (Not yet assigned)    Ordered FILIBERTO BELCHER     Inpatient consult to Urology  Once     Provider:  Alondra Schultz MD    Completed NGOC CARTER          No new Assessment & Plan notes have been filed under this hospital service since the last note was generated.  Service: Hospital Medicine    Final Active Diagnoses:    Diagnosis Date Noted POA    Urinary retention [R33.9] 01/25/2019 Yes    Gross hematuria [R31.0] 01/25/2019 No    Pleurisy with effusion [J90] 01/17/2019 Yes    Subarachnoid hemorrhage following injury with brief loss of consciousness but without open intracranial wound [S06.6X9A] 08/28/2018 Yes    NSVT (nonsustained ventricular tachycardia) [I47.2] 04/18/2018 Yes    Mixed hyperlipidemia [E78.2] 03/28/2018 Yes    CKD (chronic kidney disease), stage III [N18.3] 11/22/2017 Yes    S/P TAVR (transcatheter aortic valve replacement) [Z95.2] 10/18/2017 Not Applicable    Essential hypertension [I10] 12/02/2016 Yes    Type 2 diabetes mellitus with stage 3 chronic kidney disease [E11.22, N18.3] 02/15/2016 Yes      Problems Resolved During this Admission:    Diagnosis Date Noted Date Resolved POA    PRINCIPAL PROBLEM:  Acute on chronic respiratory failure with hypoxia [J96.21] 01/24/2019 01/26/2019 Yes    Acute diastolic heart failure [I50.31] 01/24/2019 01/26/2019 Yes       Discharged Condition: stable    Disposition: Home-Health Care Svc    Follow  "Up:  Follow-up Information     W David Lyon MD In 5 days.    Specialty:  Urology  Contact information:  [address]  [address]  [place] [address]  [phone]             Cirilo Montero MD In 1 week.    Specialty:  Internal Medicine  Contact information:  [address]  [facility] [address]  [phone]             Ned Toribio MD In 3 weeks.    Specialties:  Cardiology, INTERVENTIONAL CARDIOLOGY  Contact information:  [address]  [address]  [place] [address]  [phone]             Sherry Boone MD In 2 weeks.    Specialty:  Pulmonary Disease  Contact information:  [address]  [place] [address]  [phone]                 Patient Instructions:      TRANSFER TUB BENCH FOR HOME USE     Order Specific Question Answer Comments   Type of Transfer Tub Bench: Unpadded    Height: 5' 10" (1.778 m)    Weight: 89.8 kg (198 lb)    Does patient have medical equipment at home? walker, rolling    Does patient have medical equipment at home? cane, straight    Does patient have medical equipment at home? glucometer    Length of need (1-99 months): 99      Diet diabetic     Diet Cardiac     Notify your health care provider if you experience any of the following:  temperature >100.4     Notify your health care provider if you experience any of the following:  persistent nausea and vomiting or diarrhea     Notify your health care provider if you experience any of the following:  severe uncontrolled pain     Notify your health care provider if you experience any of the following:  difficulty breathing or increased cough     Notify your health care provider if you experience any of the following:  persistent dizziness, light-headedness, or visual disturbances     Notify your health care provider if you experience any of the following:  increased confusion or weakness     Activity as tolerated         Pending Diagnostic Studies:     None         Medications:  Reconciled Home Medications:    "   Medication List      CHANGE how you take these medications    aspirin 81 MG EC tablet  Commonly known as:  ECOTRIN  Take 1 tablet (81 mg total) by mouth once daily. Hold until evaluated by Cardiology  What changed:  additional instructions     clopidogrel 75 mg tablet  Commonly known as:  PLAVIX  Take 1 tablet (75 mg total) by mouth once daily. Hold until evaluated by Cardiology  What changed:  additional instructions     furosemide 20 MG tablet  Commonly known as:  LASIX  Take 2 tablets (40 mg total) by mouth once daily.  What changed:  how much to take        CONTINUE taking these medications    acarbose 25 MG Tab  Commonly known as:  PRECOSE  Take 25 mg by mouth 3 (three) times daily with meals.     acetaminophen 500 MG tablet  Commonly known as:  TYLENOL  Take 500 mg by mouth 2 (two) times daily.     amiodarone 200 MG Tab  Commonly known as:  PACERONE  Take 1 tablet (200 mg total) by mouth once daily.     amLODIPine 10 MG tablet  Commonly known as:  NORVASC  Take 10 mg by mouth once daily.     atorvastatin 40 MG tablet  Commonly known as:  LIPITOR  Take 1 tablet (40 mg total) by mouth once daily.     cyanocobalamin 1000 MCG tablet  Commonly known as:  VITAMIN B-12  Take 100 mcg by mouth once a week.     diphenhydrAMINE 50 MG capsule  Commonly known as:  BENADRYL  Take 50 mg by mouth nightly.     famotidine 20 MG tablet  Commonly known as:  PEPCID  Take 20 mg by mouth 2 (two) times daily.     ferrous gluconate 324 MG tablet  Commonly known as:  FERGON  Take 1 tablet (324 mg total) by mouth daily with breakfast.     metoprolol succinate 50 MG 24 hr tablet  Commonly known as:  TOPROL-XL  Take 50 mg by mouth 2 (two) times daily.     multivit-min-FA-lycopen-lutein 300-600-300 mcg Tab  Commonly known as:  CENTRUM SILVER MEN  Take 1 tablet by mouth once daily.     mupirocin 2 % ointment  Commonly known as:  BACTROBAN  Apply topically 3 (three) times daily.     TRINTELLIX 5 mg Tab  Generic drug:  vortioxetine  Take 5  mg by mouth once daily.            Indwelling Lines/Drains at time of discharge:   Lines/Drains/Airways     Drain                 Urethral Catheter 01/25/19 0632 Non-latex 1 day                Time spent on the discharge of patient: >30 minutes  Patient was seen and examined on the date of discharge and determined to be suitable for discharge.         Severiano Galan MD  Department of Hospital Medicine  [facility]

## 2019-01-26 NOTE — PLAN OF CARE
Problem: Adult Inpatient Plan of Care  Goal: Plan of Care Review  Outcome: Ongoing (interventions implemented as appropriate)   01/25/19 1800   Plan of Care Review   Plan of Care Reviewed With patient   Patient has urology, pulmonology and cardiology consults. Wife at bedside

## 2019-01-26 NOTE — PLAN OF CARE
Problem: Physical Therapy Goal  Goal: Physical Therapy Goal  Goals to be met by: 2019     Patient will increase functional independence with mobility by performin. Sit to stand transfer with [place]  2. Gait  x 350 feet with Modified [place] using Rolling Walker.    Recommend: Outpatient physical therapy at time of discharge.        Outcome: Ongoing (interventions implemented as appropriate)  Ky Mcallister, PT  2019

## 2019-01-26 NOTE — PT/OT/SLP PROGRESS
Occupational Therapy      Patient Name:  Timbo Gallagher   MRN:  [id number]    Attempted evaluation again today, patient and wife stated that patient has no OT needs as he is modified independent with his ADL's and functional transfers. Please refer to PT note for patient information on ambulation, OT to sign off.    TARA Landers, MS  1/26/2019

## 2019-01-26 NOTE — NURSING
Oxygen Saturation on Room Air:    At rest in bed:  90% average (as high as 93%)  During exercise around unit:  88% average    1130:  Results read to Dr. Galan

## 2019-01-26 NOTE — PLAN OF CARE
"   01/26/19 1510   Final Note   Assessment Type Final Discharge Note   Anticipated Discharge Disposition Home-Health   What phone number can be called within the next 1-3 days to see how you are doing after discharge? [phone]   Hospital Follow Up  Appt(s) scheduled? Yes   Discharge plans and expectations educations in teach back method with documentation complete? Yes   Right Care Referral Info   Post Acute Recommendation Home-care   Referral Type Home Care    Facility Name [facility] [address] [place] [address]      EDUCATION:  Pt provided with educational information on SOB.  Information reviewed and placed in :My Healthcare Packet" to be brought home for  use as resource after discharge.  Information included:  signs and symptoms to look for at discharge. STACY instructed pt to call the doctor if experiencing symptoms that may indicate a medical emergency: CALL 911 if signs and symptoms worsen. Reminded pt things he will be responsible for to manage his healthcare at home: getting Rx filled, attending follow up appointments, and taking medication as prescribed were discussed.   Teach back method used.  All questions answered.  Patient verbalized understanding of all information.      STACY provided pt with a copy of follow-up appointments. STACY explained/highlighted date, time, and location of each appointment. STACY provided pt with a blue folder and instructed pt to place all medical documents in blue folder. STACY explained to pt the nurse will provide an AVS with diagnosis and instructed pt to place in blue folder and bring to follow-up appointment. STACY notified pt nurse, Laura, all CM needs have been met.     "

## 2019-01-26 NOTE — PROGRESS NOTES
WRITTEN HEALTHCARE DISCHARGE INFORMATION     Things that YOU are RESPONSIBLE for to Manage Your Care At Home:    1. Getting your prescriptions filled.  2. Taking you medications as directed. DO NOT MISS ANY DOSES!  3. Going to your follow-up doctor appointments. This is important because it allows the doctor to monitor your progress and to determine if any changes need to be made to your treatment plan.    If you are unable to make your follow up appointments, please call the number listed and reschedule this appointment.     ____________HELP AT HOME____________________    Experiencing any SIGNS or SYMPTOMS: YOU CAN    Schedule a same day appopintment with your Primary Care Doctor or  you can call [facility] On Call Nurse Care Line for 24/7 assistance at [phone]    If you are experience any signs or symptoms that have become severe, Call 911 and come to your nearest Emergency Room.    Thank you for choosing [facility] and allowing us to care for you.   From your care management team: Lolly NAVAS [facility] [phone]    You should receive a call from [facility] Discharge Department within 48-72 hours to help manage your care after discharge. Please try to make sure that you answer your phone for this important phone call.  Follow-up Information     W David Lyon MD On 2/1/2019.    Specialty:  Urology  Why:  Outpatient Services, urology follow-up appointment @ 1:45PM.   Contact information:  [address]  [address]  [place] [id number]  [phone]             Ned Toribio MD On 2/26/2019.    Specialties:  Cardiology, INTERVENTIONAL CARDIOLOGY  Why:  Outpatient Services, CARDIOLOGY, follow-up appointment @ 10:20PM  Contact information:  [address] [facility] [address]  [address]  [place] [id number]  [phone]             Sherry Boone MD On 2/12/2019.    Specialty:  Pulmonary Disease  Why:  Outpatient Services, Pulmonology follow-up appointment @ 2:00PM.   Contact information:  [address]  [facility]  [address]  [phone]             [facility].    Specialties:  DME Provider, Home Health Services  Why:  Home Health  Contact information:  [address] [id number] [phone]

## 2019-01-28 ENCOUNTER — PATIENT OUTREACH (OUTPATIENT)
Dept: ADMINISTRATIVE | Facility: CLINIC | Age: 84
End: 2019-01-28

## 2019-01-28 NOTE — PATIENT INSTRUCTIONS
"Discharge Instructions for Heart Failure  You have been diagnosed with heart failure. The term "heart failure" sounds scary as it suggests the heart has stopped working. But it actually means the heart is not doing its job as well as it should. Heart failure happens when your heart muscle cannot keep up with your body's need for blood flow. Symptoms of heart failure can be controlled by changes in your lifestyle and by following your doctor's advice.   Home Care  Activity   Ask your doctor about an exercise program. You can benefit from simple activities such as walking or gardening. Exercising most days of the week can make you feel better. Don't be discouraged if your progress is slow at first. Rest as needed and stop activity if you develop symptoms such as chest pain, lightheadedness, or significant shortness of breath. Your doctor may prescribe a cardiac rehabilitation program. This is a program to help recover from heart disease through professional lifestyle counseling and education and medically supervised physical activity.   Diet   Follow a heart healthy diet and work hard to remove salt from your diet. Try to limit total salt intake to 2000 mg a day or less. Salt causes your body to retain water, which can make it harder for your heart to pump. You can limit salt by doing the following:  Limit canned, dried, packaged, and fast foods.  Don't add salt to your food at the table.  Season foods with herbs instead of salt when you cook.  Monitor your fluid intake. Drinking too much fluid can make heart failure worse. It is commonly advised to limit total fluid intake to less than 66 ounces (2 liters) a day.  Limit alcohol. Too much alcohol can be harmful to the heart. Alcohol should be limited to no more than one serving a day for women and two servings a day for men.  Tobacco   Break the smoking habit. Smoking increases your chance of having a heart attack, which will worsen heart failure. Quitting smoking is " the number one thing you can do to improve your health. Enroll in a stop smoking program and ask your doctor about medications or nicotine replacement therapy. These methods improve your chances of success.  Medications   Take your medications exactly as prescribed. Learn the names and purpose of each of your medications. Keep an accurate medication list with you at all times including current dosages. Don't skip doses. If you miss a dose of your medication, take it as soon as you remember, unless it's almost time for your next dose. In that case, just wait and take your next dose at the normal time. Don't take a double dose. If you are unsure, contact your doctor or pharmacist.  Weight monitoring   Weigh yourself every day. Do this at the same time of day and in the same kind of clothes. Ideally, weigh yourself first thing every morning after you empty your bladder, but before you eat breakfast. Record your weight and take a record of it to each of your doctor's visit. If your weight increases by 3 pounds in one day or 5 pounds in one week, you should contact your doctor. This is a sign that you are retaining more fluid than you should be, which can worsen heart failure.  Symptoms   Heart failure can cause a variety of symptoms including the following:  Shortness of breath  Difficulty breathing at night  Swelling in the legs and feet or in the abdomen  Becoming easily fatigued  Irregular or rapid heartbeat  Weakness or lightheadedness  It is important to know what to do if you develop signs of worsening heart failure.  Follow-Up  Make a follow-up appointment as directed by our staff. They will provide specific instruction for timing of appointments. Depending on the type and severity of heart failure you have, you may require follow up as early as 7 days from hospital discharge. Keep appointments for checkups and lab tests that are needed to check your medications and condition.  .    When to Call Your Doctor  Call  your doctor right away if you have any of the following signs of worsening heart failure:  Sudden weight gain (3 or more pounds in one day or 5 or more pounds in one week)  Trouble breathing not related to being active  New or increased swelling of your legs or ankles  Swelling or pain in your abdomen  Breathing trouble at night (waking up short of breath, needing more pillows to breathe)  Frequent coughing that doesnt go away  Feeling much more tired than usual  When to Seek Emergency Medical Attention   Call 911 right away if you develop:  Severe shortness of breath, such that you cannot catch your breath, even resting  Severe chest pain that does not resolve with rest or nitroglycerin  Pink, foamy mucus with cough and shortness of breath  A continuous rapid or irregular heartbeat  Passing out or fainting  Acute stroke symptoms such as sudden numbness or weakness on one side of your face, arm, or leg, or sudden confusion, trouble speaking or vision changes.   © 6931-6392 [employer], [address] [place] [address], [place] [address]. All rights reserved. This information is not intended as a substitute for professional medical care. Always follow your healthcare professional's instructions.

## 2019-01-28 NOTE — Clinical Note
The AVS notes to hold plavix and ASA until appt w/Dr Toribio on Feb 26th. Pt's wife, Corrine gave Mr Gallagher both last night. Pt requesting a call from the doctor for clarification.THANKING YOU IN ADVANCE.Respectfully,Denise Gastelum, RNC3 [facility]

## 2019-01-29 LAB
BACTERIA BLD CULT: NORMAL
BACTERIA BLD CULT: NORMAL

## 2019-01-29 RX ORDER — CLOPIDOGREL BISULFATE 75 MG/1
75 TABLET ORAL DAILY
Qty: 30 TABLET | Refills: 11
Start: 2019-01-29 | End: 2020-01-29

## 2019-01-31 ENCOUNTER — TELEPHONE (OUTPATIENT)
Dept: CARDIOLOGY | Facility: CLINIC | Age: 84
End: 2019-01-31

## 2019-02-01 ENCOUNTER — OFFICE VISIT (OUTPATIENT)
Dept: UROLOGY | Facility: CLINIC | Age: 84
End: 2019-02-01
Payer: MEDICARE

## 2019-02-01 VITALS
HEART RATE: 82 BPM | HEIGHT: 69 IN | BODY MASS INDEX: 27.26 KG/M2 | SYSTOLIC BLOOD PRESSURE: 112 MMHG | DIASTOLIC BLOOD PRESSURE: 70 MMHG | WEIGHT: 184.06 LBS

## 2019-02-01 DIAGNOSIS — N13.8 BPH WITH OBSTRUCTION/LOWER URINARY TRACT SYMPTOMS: ICD-10-CM

## 2019-02-01 DIAGNOSIS — R35.1 NOCTURIA MORE THAN TWICE PER NIGHT: ICD-10-CM

## 2019-02-01 DIAGNOSIS — N40.1 BPH WITH OBSTRUCTION/LOWER URINARY TRACT SYMPTOMS: ICD-10-CM

## 2019-02-01 DIAGNOSIS — R33.9 URINARY RETENTION: Primary | ICD-10-CM

## 2019-02-01 PROCEDURE — 99999 PR PBB SHADOW E&M-EST. PATIENT-LVL III: CPT | Mod: PBBFAC,,, | Performed by: UROLOGY

## 2019-02-01 PROCEDURE — 99214 OFFICE O/P EST MOD 30 MIN: CPT | Mod: S$PBB,,, | Performed by: UROLOGY

## 2019-02-01 PROCEDURE — 99214 PR OFFICE/OUTPT VISIT, EST, LEVL IV, 30-39 MIN: ICD-10-PCS | Mod: S$PBB,,, | Performed by: UROLOGY

## 2019-02-01 PROCEDURE — 99213 OFFICE O/P EST LOW 20 MIN: CPT | Mod: PBBFAC | Performed by: UROLOGY

## 2019-02-01 PROCEDURE — 99999 PR PBB SHADOW E&M-EST. PATIENT-LVL III: ICD-10-PCS | Mod: PBBFAC,,, | Performed by: UROLOGY

## 2019-02-01 RX ORDER — TAMSULOSIN HYDROCHLORIDE 0.4 MG/1
0.4 CAPSULE ORAL DAILY
Qty: 30 CAPSULE | Refills: 11 | Status: ON HOLD | OUTPATIENT
Start: 2019-02-01 | End: 2019-04-29 | Stop reason: HOSPADM

## 2019-02-01 NOTE — PROGRESS NOTES
Subjective:       Patient ID: Timbo Gallagher is a 83 y.o. male who was last seen in this office Visit date not found    Chief Complaint:   Chief Complaint   Patient presents with    Hematuria     pt coming in for follow up from hospital visit        Urinary Retention  Patient complains of urinary retention. Onset of retention was 1 week ago and was gradual in onset. Patient currently does have a urinary catheter in place.  800 ml of urine were drained when catheter was placed. Prior to this event voiding symptoms consisted of slow stream, intermittency. Prior treatments include possibly Avodart. Recent medications that may have affected his voiding include none.  He was given Flomax in the hospital but it is not on his discharge paperwork.      ACTIVE MEDICAL ISSUES:  Patient Active Problem List   Diagnosis    Type 2 diabetes mellitus with stage 3 chronic kidney disease    Diverticular disease of esophagus    Gastroesophageal reflux disease without esophagitis    Benign prostatic hyperplasia without lower urinary tract symptoms    Anemia of chronic disease    Mild protein malnutrition    Incarcerated hiatal hernia    Chronic diastolic heart failure    Essential hypertension    Hiatal hernia with GERD and esophagitis    Aortic valve stenosis    Aortic valve stenosis, unspecified etiology    S/P TAVR (transcatheter aortic valve replacement)    CKD (chronic kidney disease), stage III    Blindness of one eye    Mixed hyperlipidemia    Acute on chronic combined systolic and diastolic heart failure    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    NSVT (nonsustained ventricular tachycardia)    AICD (automatic cardioverter/defibrillator) present    Traumatic subarachnoid bleed with LOC of 30 minutes or less, initial encounter    Subarachnoid hemorrhage following injury with brief loss of consciousness but without open intracranial wound    Oropharyngeal dysphagia    Acute  renal failure superimposed on stage 3 chronic kidney disease    Anemia of chronic renal failure, stage 3 (moderate)    Contusion of rib on right side    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  Review of patient's allergies indicates:   Allergen Reactions    Ciprofloxacin (mixture) Itching     Extreme itching    Antibiotic hc     Hydrochlorothiazide      Leg swelling      Iodine and iodide containing products      Wife and pt unsure    Vancomycin analogues Itching     Current Outpatient Medications   Medication Sig    acarbose (PRECOSE) 25 MG Tab Take 25 mg by mouth 3 (three) times daily with meals.    acetaminophen (TYLENOL) 500 MG tablet Take 500 mg by mouth 2 (two) times daily.    amiodarone (PACERONE) 200 MG Tab Take 1 tablet (200 mg total) by mouth once daily.    amLODIPine (NORVASC) 10 MG tablet Take 10 mg by mouth once daily.    aspirin (ECOTRIN) 81 MG EC tablet Take 1 tablet (81 mg total) by mouth once daily. Hold until evaluated by Cardiology    atorvastatin (LIPITOR) 40 MG tablet Take 1 tablet (40 mg total) by mouth once daily.    cyanocobalamin (VITAMIN B-12) 1000 MCG tablet Take 100 mcg by mouth once a week.    diphenhydrAMINE (BENADRYL) 50 MG capsule Take 50 mg by mouth nightly.    famotidine (PEPCID) 20 MG tablet Take 20 mg by mouth 2 (two) times daily.     ferrous gluconate (FERGON) 324 MG tablet Take 1 tablet (324 mg total) by mouth daily with breakfast.    furosemide (LASIX) 20 MG tablet Take 2 tablets (40 mg total) by mouth once daily.    metoprolol succinate (TOPROL-XL) 50 MG 24 hr tablet Take 50 mg by mouth 2 (two) times daily.     multivit-min-FA-lycopen-lutein (CENTRUM SILVER MEN) 300-600-300 mcg Tab Take 1 tablet by mouth once daily.    mupirocin (BACTROBAN) 2 % ointment Apply topically 3 (three) times daily.     No  "current facility-administered medications for this visit.        Review of Systems   Constitutional: Negative for activity change, fatigue, fever and unexpected weight change.   HENT: Negative for congestion.    Eyes: Negative for redness.   Respiratory: Negative for chest tightness and shortness of breath.    Cardiovascular: Negative for chest pain and leg swelling.   Gastrointestinal: Negative for abdominal pain, constipation, diarrhea, nausea and vomiting.   Genitourinary: Negative for dysuria, flank pain, frequency, hematuria, penile pain, penile swelling, scrotal swelling, testicular pain and urgency.   Musculoskeletal: Negative for arthralgias and back pain.   Neurological: Negative for dizziness and light-headedness.   Psychiatric/Behavioral: Negative for behavioral problems and confusion. The patient is not nervous/anxious.    All other systems reviewed and are negative.      Objective:      Vitals:    02/01/19 1356   BP: 112/70   Pulse: 82   Weight: 83.5 kg (184 lb 1.4 oz)   Height: 5' 9" (1.753 m)     Physical Exam   Nursing note and vitals reviewed.  Constitutional: He is oriented to person, place, and time. He appears well-developed and well-nourished.   HENT:   Head: Normocephalic.   Eyes: Conjunctivae are normal.   Neck: Normal range of motion. Neck supple. No tracheal deviation present. No thyromegaly present.   Cardiovascular: Normal rate and normal heart sounds.    Pulmonary/Chest: Effort normal and breath sounds normal. No respiratory distress. He has no wheezes.   Abdominal: Soft. Bowel sounds are normal. There is no hepatosplenomegaly. There is no tenderness. There is no rebound and no CVA tenderness. No hernia.   Musculoskeletal: Normal range of motion. He exhibits no edema or tenderness.   Lymphadenopathy:     He has no cervical adenopathy.   Neurological: He is alert and oriented to person, place, and time.   Skin: Skin is warm and dry. No rash noted. No erythema.     Psychiatric: He has a " normal mood and affect. His behavior is normal. Judgment and thought content normal.           Assessment:       1. Urinary retention    2. BPH with obstruction/lower urinary tract symptoms    3. Nocturia more than twice per night          Plan:       1. Urinary retention    - Voiding Trial    2. BPH with obstruction/lower urinary tract symptoms  Add flomax   - tamsulosin (FLOMAX) 0.4 mg Cap; Take 1 capsule (0.4 mg total) by mouth once daily.  Dispense: 30 capsule; Refill: 11    3. Nocturia more than twice per night              Follow-up in about 2 weeks (around 2/15/2019) for Follow up.

## 2019-02-04 ENCOUNTER — TELEPHONE (OUTPATIENT)
Dept: UROLOGY | Facility: CLINIC | Age: 84
End: 2019-02-04

## 2019-02-04 NOTE — TELEPHONE ENCOUNTER
----- Message from Jerry Le sent at 2/4/2019  1:23 PM CST -----  Contact: Self/[phone]  Patient called to reschedule his appointment for Catheter removal. Thank you.

## 2019-02-07 ENCOUNTER — TELEPHONE (OUTPATIENT)
Dept: UROLOGY | Facility: CLINIC | Age: 84
End: 2019-02-07

## 2019-02-07 ENCOUNTER — OFFICE VISIT (OUTPATIENT)
Dept: UROLOGY | Facility: CLINIC | Age: 84
End: 2019-02-07
Payer: MEDICARE

## 2019-02-07 VITALS
HEIGHT: 69 IN | BODY MASS INDEX: 27.46 KG/M2 | DIASTOLIC BLOOD PRESSURE: 60 MMHG | WEIGHT: 185.44 LBS | SYSTOLIC BLOOD PRESSURE: 121 MMHG

## 2019-02-07 DIAGNOSIS — R35.1 NOCTURIA: ICD-10-CM

## 2019-02-07 DIAGNOSIS — N40.1 BPH WITH OBSTRUCTION/LOWER URINARY TRACT SYMPTOMS: ICD-10-CM

## 2019-02-07 DIAGNOSIS — N13.8 BPH WITH OBSTRUCTION/LOWER URINARY TRACT SYMPTOMS: ICD-10-CM

## 2019-02-07 DIAGNOSIS — R33.9 URINARY RETENTION: Primary | ICD-10-CM

## 2019-02-07 PROCEDURE — 51700 PR IRRIGATION, BLADDER: ICD-10-PCS | Mod: S$PBB,,, | Performed by: NURSE PRACTITIONER

## 2019-02-07 PROCEDURE — 51700 IRRIGATION OF BLADDER: CPT | Mod: PBBFAC | Performed by: NURSE PRACTITIONER

## 2019-02-07 PROCEDURE — 51700 IRRIGATION OF BLADDER: CPT | Mod: S$PBB,,, | Performed by: NURSE PRACTITIONER

## 2019-02-07 PROCEDURE — 99214 OFFICE O/P EST MOD 30 MIN: CPT | Mod: S$PBB,25,, | Performed by: NURSE PRACTITIONER

## 2019-02-07 PROCEDURE — 99999 PR PBB SHADOW E&M-EST. PATIENT-LVL III: CPT | Mod: PBBFAC,,, | Performed by: NURSE PRACTITIONER

## 2019-02-07 PROCEDURE — 99214 PR OFFICE/OUTPT VISIT, EST, LEVL IV, 30-39 MIN: ICD-10-PCS | Mod: S$PBB,25,, | Performed by: NURSE PRACTITIONER

## 2019-02-07 PROCEDURE — 99213 OFFICE O/P EST LOW 20 MIN: CPT | Mod: PBBFAC,25 | Performed by: NURSE PRACTITIONER

## 2019-02-07 PROCEDURE — 99999 PR PBB SHADOW E&M-EST. PATIENT-LVL III: ICD-10-PCS | Mod: PBBFAC,,, | Performed by: NURSE PRACTITIONER

## 2019-02-07 NOTE — TELEPHONE ENCOUNTER
Called to check on patient Mr. Moulton since he left the office earlier today with a VOT.. Patient's wife states that he has been using the bathroom and is feeling fine.. I told his wife again if he starts to feel like he is having trouble to please call us and let us know.. CAROLYN

## 2019-02-07 NOTE — PROGRESS NOTES
Subjective:       Patient ID: Timbo Gallagher is a 83 y.o. male who is an established patient of Dr. Lyon though new to me was last seen in this office 2/1/2019    Chief Complaint:   Chief Complaint   Patient presents with    Other     Patient here for VOT and the entire process to him and his wife and they both understood.. no other issue     Urinary Retention  Patient admitted 1/24/19 with acute respiratory failure. He was seen as an inpatient consult on 1/25/19 by Dr. Lyon for urinary retention. Watson placed. 800 ml of urine were drained when catheter was placed. Prior to this event voiding symptoms consisted of slow stream, intermittency. Prior treatments include possibly Avodart. Recent medications that may have affected his voiding include none.  He was given Flomax in the hospital but it is not on his discharge paperwork.    Flomax restarted by Dr. Lyon last week which he is currently taking. He had a voiding trial in this office last week that was unsuccessful. He is here today for a repeat voiding trial. Denies dysuria, gross hematuria or complications with watson    ACTIVE MEDICAL ISSUES:  Patient Active Problem List   Diagnosis    Type 2 diabetes mellitus with stage 3 chronic kidney disease    Diverticular disease of esophagus    Gastroesophageal reflux disease without esophagitis    Benign prostatic hyperplasia without lower urinary tract symptoms    Anemia of chronic disease    Mild protein malnutrition    Incarcerated hiatal hernia    Chronic diastolic heart failure    Essential hypertension    Hiatal hernia with GERD and esophagitis    Aortic valve stenosis    Aortic valve stenosis, unspecified etiology    S/P TAVR (transcatheter aortic valve replacement)    CKD (chronic kidney disease), stage III    Blindness of one eye    Mixed hyperlipidemia    Acute on chronic combined systolic and diastolic heart failure    Benign hypertensive heart and renal disease with heart  failure    NSVT (nonsustained ventricular tachycardia)    AICD (automatic cardioverter/defibrillator) present    Traumatic subarachnoid bleed with LOC of 30 minutes or less, initial encounter    Subarachnoid hemorrhage following injury with brief loss of consciousness but without open intracranial wound    Oropharyngeal dysphagia    Acute renal failure superimposed on stage 3 chronic kidney disease    Anemia of chronic renal failure, stage 3 (moderate)    Contusion of rib on right side    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  Review of patient's allergies indicates:   Allergen Reactions    Ciprofloxacin (mixture) Itching     Extreme itching    Antibiotic hc     Hydrochlorothiazide      Leg swelling      Iodine and iodide containing products      Wife and pt unsure    Vancomycin analogues Itching     Current Outpatient Medications   Medication Sig    acarbose (PRECOSE) 25 MG Tab Take 25 mg by mouth 3 (three) times daily with meals.    acetaminophen (TYLENOL) 500 MG tablet Take 500 mg by mouth 2 (two) times daily.    amiodarone (PACERONE) 200 MG Tab Take 1 tablet (200 mg total) by mouth once daily.    amLODIPine (NORVASC) 10 MG tablet Take 10 mg by mouth once daily.    aspirin (ECOTRIN) 81 MG EC tablet Take 1 tablet (81 mg total) by mouth once daily. Hold until evaluated by Cardiology    atorvastatin (LIPITOR) 40 MG tablet Take 1 tablet (40 mg total) by mouth once daily.    cyanocobalamin (VITAMIN B-12) 1000 MCG tablet Take 100 mcg by mouth once a week.    diphenhydrAMINE (BENADRYL) 50 MG capsule Take 50 mg by mouth nightly.    famotidine (PEPCID) 20 MG tablet Take 20 mg by mouth 2 (two) times daily.     ferrous gluconate (FERGON) 324 MG tablet Take 1 tablet (324 mg total) by mouth daily with breakfast.    furosemide (LASIX) 20 MG tablet  "Take 2 tablets (40 mg total) by mouth once daily.    metoprolol succinate (TOPROL-XL) 50 MG 24 hr tablet Take 50 mg by mouth 2 (two) times daily.     multivit-min-FA-lycopen-lutein (CENTRUM SILVER MEN) 300-600-300 mcg Tab Take 1 tablet by mouth once daily.    mupirocin (BACTROBAN) 2 % ointment Apply topically 3 (three) times daily.    tamsulosin (FLOMAX) 0.4 mg Cap Take 1 capsule (0.4 mg total) by mouth once daily.     No current facility-administered medications for this visit.        Review of Systems   Constitutional: Negative for activity change, chills, fatigue, fever and unexpected weight change.   Eyes: Negative for discharge, redness and visual disturbance.   Respiratory: Negative for cough, shortness of breath and wheezing.    Cardiovascular: Negative for chest pain and leg swelling.   Gastrointestinal: Negative for abdominal distention, abdominal pain, constipation, diarrhea, nausea and vomiting.   Genitourinary: Negative for decreased urine volume, difficulty urinating, dysuria, flank pain, frequency, hematuria, testicular pain and urgency.   Musculoskeletal: Negative for arthralgias, joint swelling and myalgias.   Skin: Negative for color change and rash.   Neurological: Negative for dizziness and light-headedness.   Psychiatric/Behavioral: Negative for behavioral problems and confusion. The patient is not nervous/anxious.        Objective:      Vitals:    02/07/19 0855   BP: 121/60   Weight: 84.1 kg (185 lb 6.5 oz)   Height: 5' 9" (1.753 m)     Physical Exam   Constitutional: He is oriented to person, place, and time. He appears well-developed.   HENT:   Head: Normocephalic and atraumatic.   Nose: Nose normal.   Eyes: Conjunctivae are normal. Right eye exhibits no discharge. Left eye exhibits no discharge.   Neck: Normal range of motion. Neck supple. No tracheal deviation present. No thyromegaly present.   Cardiovascular: Normal rate and regular rhythm.    Pulmonary/Chest: Effort normal. No " respiratory distress. He has no wheezes.   Abdominal: Soft. He exhibits no distension. There is no hepatosplenomegaly. There is no tenderness. There is no CVA tenderness. No hernia.   Genitourinary:   Genitourinary Comments: Watson draining clear yellow urine   Musculoskeletal: Normal range of motion. He exhibits no edema.   Neurological: He is alert and oriented to person, place, and time.   Skin: Skin is warm and dry. No rash noted. No erythema.     Psychiatric: He has a normal mood and affect. His behavior is normal. Judgment normal.       Urine dipstick shows not done--watson in place    Voiding Trial: 240cc instilled, 120cc voided    Assessment:       1. Urinary retention    2. BPH with obstruction/lower urinary tract symptoms    3. Nocturia          Plan:       1. Urinary retention  -Previous voiding trial--unsuccessful. Watson replaced  - Voiding Trial today--cautiously successful. Patient cautioned concerning ssx of urinary retention. He will rtc this afternoon if unable and/or having difficulty voiding. Patient and wife verbalized understanding  -Adequate hydration  -Continue Flomax  -Avoid/treat constipation    2. BPH with obstruction/lower urinary tract symptoms  -Flomax    3. Nocturia              Follow-up in about 2 weeks (around 2/21/2019) for Follow up PVR.

## 2019-02-12 ENCOUNTER — OFFICE VISIT (OUTPATIENT)
Dept: PULMONOLOGY | Facility: CLINIC | Age: 84
End: 2019-02-12
Payer: MEDICARE

## 2019-02-12 VITALS
SYSTOLIC BLOOD PRESSURE: 126 MMHG | HEIGHT: 69 IN | DIASTOLIC BLOOD PRESSURE: 65 MMHG | WEIGHT: 186.69 LBS | BODY MASS INDEX: 27.65 KG/M2 | OXYGEN SATURATION: 97 % | HEART RATE: 83 BPM | TEMPERATURE: 98 F

## 2019-02-12 DIAGNOSIS — R06.09 DYSPNEA ON EXERTION: ICD-10-CM

## 2019-02-12 DIAGNOSIS — I50.43 ACUTE ON CHRONIC COMBINED SYSTOLIC AND DIASTOLIC HEART FAILURE: ICD-10-CM

## 2019-02-12 DIAGNOSIS — J90 PLEURAL EFFUSION: Primary | ICD-10-CM

## 2019-02-12 DIAGNOSIS — R06.02 SOB (SHORTNESS OF BREATH): ICD-10-CM

## 2019-02-12 PROCEDURE — 99214 OFFICE O/P EST MOD 30 MIN: CPT | Mod: S$PBB,,, | Performed by: INTERNAL MEDICINE

## 2019-02-12 PROCEDURE — 99214 PR OFFICE/OUTPT VISIT, EST, LEVL IV, 30-39 MIN: ICD-10-PCS | Mod: S$PBB,,, | Performed by: INTERNAL MEDICINE

## 2019-02-12 NOTE — PROGRESS NOTES
"Subjective:       Patient ID: Timbo Gallagher is a 83 y.o. male.    Chief Complaint: hospital f/u    83 year old male with non obstructive CAD, s/p TAVR, moderate mitral regurgitation, grade 2 diastolic heart failure, Hx of VT now s/p ICD, pulmonary hypertension and hypertension who presented with worsening shortness of breath. Patient had an ultrasound guided therapeutic thoracentesis done on 1/17 (~ one week PTA) where 1.4L of serous fluid was removed. He felt better after procedure but symptoms then again worsened. He takes furosemide 20 mg daily and is compliant with this. Denied fever, chills, lightheadedness, vomiting, abdominal pain, trouble swallowing, dysuria, diarrhea. Reported some nausea.      In the ED, patient was afebrile and normotensive but in respiratory distress. Per EMS, sats around 90% at room air. CXR with evidence of right pleural effusion and pulmonary edema. Given two doses of furosemide 40 mg IV and placed on BiPAP. Admitted to ICU for close monitoring.            Seen in the hospital with the above note. Patient continues to have some SOB and LE edema. He reports no sputum production. No unintentional weight loss.   On BID 20mg Lasix.       Review of Systems   Respiratory: Positive for shortness of breath and dyspnea on extertion. Negative for cough, sputum production and wheezing.        Objective:       Vitals:    02/12/19 1408   BP: 126/65   Pulse: 83   Temp: 98.2 °F (36.8 °C)   TempSrc: Oral   SpO2: 97%   Weight: 84.7 kg (186 lb 11.2 oz)   Height: 5' 9" (1.753 m)   PainSc: 0-No pain       Physical Exam   Constitutional: He is oriented to person, place, and time. He appears well-developed and well-nourished. No distress.   HENT:   Head: Normocephalic.   Nose: Nose normal.   Neck: Normal range of motion. Neck supple.   Cardiovascular: Normal rate and regular rhythm.   Pulmonary/Chest: Normal expansion and effort normal.   Abdominal: Soft. Bowel sounds are normal.   Musculoskeletal: " He exhibits edema.   Neurological: He is alert and oriented to person, place, and time.   Skin: Skin is warm and dry. He is not diaphoretic.   Psychiatric: He has a normal mood and affect. His behavior is normal.   Nursing note and vitals reviewed.    Personal Diagnostic Review  none pertinent  No flowsheet data found.      Assessment:       1. Pleural effusion    2. SOB (shortness of breath)    3. Acute on chronic combined systolic and diastolic heart failure    4. Dyspnea on exertion        Outpatient Encounter Medications as of 2/12/2019   Medication Sig Dispense Refill    acarbose (PRECOSE) 25 MG Tab Take 25 mg by mouth 3 (three) times daily with meals.      acetaminophen (TYLENOL) 500 MG tablet Take 500 mg by mouth 2 (two) times daily.      amiodarone (PACERONE) 200 MG Tab Take 1 tablet (200 mg total) by mouth once daily. 30 tablet 11    amLODIPine (NORVASC) 10 MG tablet Take 10 mg by mouth once daily.      aspirin (ECOTRIN) 81 MG EC tablet Take 1 tablet (81 mg total) by mouth once daily. Hold until evaluated by Cardiology  0    atorvastatin (LIPITOR) 40 MG tablet Take 1 tablet (40 mg total) by mouth once daily. 90 tablet 11    cyanocobalamin (VITAMIN B-12) 1000 MCG tablet Take 100 mcg by mouth once a week.      diphenhydrAMINE (BENADRYL) 50 MG capsule Take 50 mg by mouth nightly.      famotidine (PEPCID) 20 MG tablet Take 20 mg by mouth 2 (two) times daily.       ferrous gluconate (FERGON) 324 MG tablet Take 1 tablet (324 mg total) by mouth daily with breakfast. 30 tablet 2    furosemide (LASIX) 20 MG tablet Take 2 tablets (40 mg total) by mouth once daily. 60 tablet 11    metoprolol succinate (TOPROL-XL) 50 MG 24 hr tablet Take 50 mg by mouth 2 (two) times daily.       multivit-min-FA-lycopen-lutein (CENTRUM SILVER MEN) 300-600-300 mcg Tab Take 1 tablet by mouth once daily. 30 tablet 2    mupirocin (BACTROBAN) 2 % ointment Apply topically 3 (three) times daily.      tamsulosin (FLOMAX) 0.4 mg  Cap Take 1 capsule (0.4 mg total) by mouth once daily. 30 capsule 11     No facility-administered encounter medications on file as of 2/12/2019.      Orders Placed This Encounter   Procedures    X-Ray Chest PA And Lateral     Standing Status:   Future     Standing Expiration Date:   2/12/2020     Order Specific Question:   May the Radiologist modify the order per protocol to meet the clinical needs of the patient?     Answer:   Yes    Complete PFT without bronchodilator - Clinic     Standing Status:   Future     Standing Expiration Date:   2/12/2020       Plan:          Acute on chronic combined systolic and diastolic heart failure  Continue diuresis.     Pleural effusion  CXR ordered. He may need repeat PFTs.    Dyspnea on exertion  Patient likely has RIOS from volume overload. Will order PFTs.    Follow up in 3 months.     Sherry Boone MD

## 2019-02-13 ENCOUNTER — HOSPITAL ENCOUNTER (OUTPATIENT)
Dept: RADIOLOGY | Facility: HOSPITAL | Age: 84
Discharge: HOME | End: 2019-02-13
Attending: INTERNAL MEDICINE
Payer: MEDICARE

## 2019-02-13 ENCOUNTER — HOSPITAL ENCOUNTER (OUTPATIENT)
Dept: RESPIRATORY THERAPY | Facility: HOSPITAL | Age: 84
Discharge: HOME | End: 2019-02-13
Attending: INTERNAL MEDICINE
Payer: MEDICARE

## 2019-02-13 DIAGNOSIS — J90 PLEURAL EFFUSION: ICD-10-CM

## 2019-02-13 DIAGNOSIS — R06.02 SOB (SHORTNESS OF BREATH): ICD-10-CM

## 2019-02-13 PROCEDURE — 71046 X-RAY EXAM CHEST 2 VIEWS: CPT | Mod: TC,FY

## 2019-02-13 PROCEDURE — 71046 XR CHEST PA AND LATERAL: ICD-10-PCS | Mod: 26,,, | Performed by: RADIOLOGY

## 2019-02-13 PROCEDURE — 71046 X-RAY EXAM CHEST 2 VIEWS: CPT | Mod: 26,,, | Performed by: RADIOLOGY

## 2019-02-15 LAB
BRPFT: ABNORMAL
DLCO ADJ PRE: 15.32 ML/(MIN*MMHG) (ref 16.76–30.62)
DLCO SINGLE BREATH LLN: 16.76
DLCO SINGLE BREATH PRE REF: 64.7 %
DLCO SINGLE BREATH REF: 23.69
DLCOC SBVA LLN: 2.27
DLCOC SBVA PRE REF: 105.1 %
DLCOC SBVA REF: 3.43
DLCOC SINGLE BREATH LLN: 16.76
DLCOC SINGLE BREATH PRE REF: 64.7 %
DLCOC SINGLE BREATH REF: 23.69
DLCOVA LLN: 2.27
DLCOVA PRE REF: 105.1 %
DLCOVA PRE: 3.61 ML/(MIN*MMHG*L) (ref 2.27–4.59)
DLCOVA REF: 3.43
DLVAADJ PRE: 3.61 ML/(MIN*MMHG*L) (ref 2.27–4.59)
ERVN2 LLN: 0.86
ERVN2 PRE REF: 88 %
ERVN2 PRE: 0.76 L (ref 0.86–0.86)
ERVN2 REF: 0.86
FEF 25 75 LLN: 0.66
FEF 25 75 PRE REF: 30.2 %
FEF 25 75 REF: 1.85
FEV1 FVC LLN: 59
FEV1 FVC PRE REF: 80.5 %
FEV1 FVC REF: 74
FEV1 LLN: 1.82
FEV1 PRE REF: 53.1 %
FEV1 REF: 2.67
FEV6 LLN: 2.57
FEV6 PRE REF: 63.8 %
FEV6 PRE: 2.2 L (ref 2.57–4.32)
FEV6 REF: 3.45
FRCN2 LLN: 2.76
FRCN2 PRE REF: 123.4 %
FRCN2 REF: 3.75
FVC LLN: 2.59
FVC PRE REF: 65.3 %
FVC REF: 3.63
IVC PRE: 1.92 L (ref 2.59–4.67)
IVC SINGLE BREATH LLN: 2.59
IVC SINGLE BREATH PRE REF: 52.8 %
IVC SINGLE BREATH REF: 3.63
PEF LLN: 4.36
PEF PRE REF: 59.6 %
PEF REF: 6.6
PRE DLCO: 15.32 ML/(MIN*MMHG) (ref 16.76–30.62)
PRE FEF 25 75: 0.56 L/S (ref 0.66–3.03)
PRE FET 100: 9.35 SEC
PRE FEV1 FVC: 59.88 % (ref 59.09–89.7)
PRE FEV1: 1.42 L (ref 1.82–3.52)
PRE FRC N2: 4.63 L
PRE FVC: 2.37 L (ref 2.59–4.67)
PRE PEF: 3.93 L/S (ref 4.36–8.84)
RVN2 LLN: 2.21
RVN2 PRE REF: 133.6 %
RVN2 PRE: 3.86 L (ref 2.21–3.56)
RVN2 REF: 2.89
RVN2TLCN2 LLN: 37.35
RVN2TLCN2 PRE REF: 133.9 %
RVN2TLCN2 PRE: 62.06 % (ref 37.35–55.31)
RVN2TLCN2 REF: 46.33
TLCN2 LLN: 5.75
TLCN2 PRE REF: 90.1 %
TLCN2 PRE: 6.22 L (ref 5.75–8.05)
TLCN2 REF: 6.9
VA PRE: 4.21 L (ref 6.75–6.75)
VA SINGLE BREATH LLN: 6.75
VA SINGLE BREATH PRE REF: 62.3 %
VA SINGLE BREATH REF: 6.75
VCMAXN2 LLN: 2.59
VCMAXN2 PRE REF: 65 %
VCMAXN2 PRE: 2.36 L (ref 2.59–4.67)
VCMAXN2 REF: 3.63

## 2019-02-18 ENCOUNTER — OFFICE VISIT (OUTPATIENT)
Dept: NEUROLOGY | Facility: CLINIC | Age: 84
End: 2019-02-18
Payer: MEDICARE

## 2019-02-18 VITALS
DIASTOLIC BLOOD PRESSURE: 63 MMHG | HEIGHT: 69 IN | WEIGHT: 186.31 LBS | SYSTOLIC BLOOD PRESSURE: 134 MMHG | HEART RATE: 73 BPM | BODY MASS INDEX: 27.6 KG/M2

## 2019-02-18 DIAGNOSIS — S06.6X1A SUBARACHNOID HEMORRHAGE FOLLOWING INJURY WITH BRIEF LOSS OF CONSCIOUSNESS BUT WITHOUT OPEN INTRACRANIAL WOUND: Primary | ICD-10-CM

## 2019-02-18 PROCEDURE — 99999 PR PBB SHADOW E&M-EST. PATIENT-LVL III: CPT | Mod: PBBFAC,,, | Performed by: NEUROLOGICAL SURGERY

## 2019-02-18 PROCEDURE — 99213 OFFICE O/P EST LOW 20 MIN: CPT | Mod: PBBFAC | Performed by: NEUROLOGICAL SURGERY

## 2019-02-18 PROCEDURE — 99999 PR PBB SHADOW E&M-EST. PATIENT-LVL III: ICD-10-PCS | Mod: PBBFAC,,, | Performed by: NEUROLOGICAL SURGERY

## 2019-02-18 PROCEDURE — 99215 PR OFFICE/OUTPT VISIT, EST, LEVL V, 40-54 MIN: ICD-10-PCS | Mod: S$PBB,,, | Performed by: NEUROLOGICAL SURGERY

## 2019-02-18 PROCEDURE — 99215 OFFICE O/P EST HI 40 MIN: CPT | Mod: S$PBB,,, | Performed by: NEUROLOGICAL SURGERY

## 2019-02-18 NOTE — LETTER
February 19, 2019      Ned Toribio MD  [address] [facility] [address]  [address]  [facility] [address]           [facility]  [address] [facility] [address]  [facility] [address]  Phone: [phone]  Fax: [phone]          Patient: Timbo Gallagher   MR Number: [id number]   YOB: 1935   Date of Visit: 2/18/2019       Dear Dr. Ned Toribio:    Thank you for referring Timbo Gallagher to me for evaluation. Attached you will find relevant portions of my assessment and plan of care.    If you have questions, please do not hesitate to call me. I look forward to following Timbo Gallagher along with you.    Sincerely,    Koko Moore MD    Enclosure  CC:  No Recipients    If you would like to receive this communication electronically, please contact [email or web address] or [phone] to request more information on [employer] Link access.    For providers and/or their staff who would like to refer a patient to [facility], please contact us through our one-stop-shop provider referral line, [facility], at [phone].    If you feel you have received this communication in error or would no longer like to receive these types of communications, please e-mail [email or web address]
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
## 2019-02-21 ENCOUNTER — OFFICE VISIT (OUTPATIENT)
Dept: UROLOGY | Facility: CLINIC | Age: 84
End: 2019-02-21
Payer: MEDICARE

## 2019-02-21 VITALS
DIASTOLIC BLOOD PRESSURE: 60 MMHG | BODY MASS INDEX: 27.22 KG/M2 | SYSTOLIC BLOOD PRESSURE: 121 MMHG | WEIGHT: 183.75 LBS | HEIGHT: 69 IN

## 2019-02-21 DIAGNOSIS — R33.9 URINARY RETENTION: Primary | ICD-10-CM

## 2019-02-21 DIAGNOSIS — R35.1 NOCTURIA: ICD-10-CM

## 2019-02-21 DIAGNOSIS — N13.8 BPH WITH OBSTRUCTION/LOWER URINARY TRACT SYMPTOMS: ICD-10-CM

## 2019-02-21 DIAGNOSIS — N40.1 BPH WITH OBSTRUCTION/LOWER URINARY TRACT SYMPTOMS: ICD-10-CM

## 2019-02-21 LAB
BILIRUB SERPL-MCNC: NORMAL MG/DL
BLOOD URINE, POC: NORMAL
COLOR, POC UA: YELLOW
GLUCOSE UR QL STRIP: NORMAL
KETONES UR QL STRIP: NORMAL
LEUKOCYTE ESTERASE URINE, POC: NORMAL
NITRITE, POC UA: NORMAL
PH, POC UA: 5
PROTEIN, POC: NORMAL
SPECIFIC GRAVITY, POC UA: 1010
UROBILINOGEN, POC UA: NORMAL

## 2019-02-21 PROCEDURE — 81001 URINALYSIS AUTO W/SCOPE: CPT | Mod: PBBFAC | Performed by: NURSE PRACTITIONER

## 2019-02-21 PROCEDURE — 99999 PR PBB SHADOW E&M-EST. PATIENT-LVL IV: CPT | Mod: PBBFAC,,, | Performed by: NURSE PRACTITIONER

## 2019-02-21 PROCEDURE — 99214 PR OFFICE/OUTPT VISIT, EST, LEVL IV, 30-39 MIN: ICD-10-PCS | Mod: S$PBB,,, | Performed by: NURSE PRACTITIONER

## 2019-02-21 PROCEDURE — 99999 PR PBB SHADOW E&M-EST. PATIENT-LVL IV: ICD-10-PCS | Mod: PBBFAC,,, | Performed by: NURSE PRACTITIONER

## 2019-02-21 PROCEDURE — 51798 US URINE CAPACITY MEASURE: CPT | Mod: PBBFAC | Performed by: NURSE PRACTITIONER

## 2019-02-21 PROCEDURE — 99214 OFFICE O/P EST MOD 30 MIN: CPT | Mod: PBBFAC | Performed by: NURSE PRACTITIONER

## 2019-02-21 PROCEDURE — 99214 OFFICE O/P EST MOD 30 MIN: CPT | Mod: S$PBB,,, | Performed by: NURSE PRACTITIONER

## 2019-02-26 ENCOUNTER — OFFICE VISIT (OUTPATIENT)
Dept: CARDIOLOGY | Facility: CLINIC | Age: 84
End: 2019-02-26
Payer: MEDICARE

## 2019-02-26 VITALS
WEIGHT: 183 LBS | HEART RATE: 80 BPM | SYSTOLIC BLOOD PRESSURE: 116 MMHG | HEIGHT: 69 IN | OXYGEN SATURATION: 94 % | DIASTOLIC BLOOD PRESSURE: 58 MMHG | BODY MASS INDEX: 27.11 KG/M2 | RESPIRATION RATE: 15 BRPM

## 2019-02-26 DIAGNOSIS — I47.29 NSVT (NONSUSTAINED VENTRICULAR TACHYCARDIA): ICD-10-CM

## 2019-02-26 DIAGNOSIS — I10 ESSENTIAL HYPERTENSION: ICD-10-CM

## 2019-02-26 DIAGNOSIS — Z98.890 S/P CAROTID ENDARTERECTOMY: ICD-10-CM

## 2019-02-26 DIAGNOSIS — E78.2 MIXED HYPERLIPIDEMIA: ICD-10-CM

## 2019-02-26 DIAGNOSIS — I50.30 (HFPEF) HEART FAILURE WITH PRESERVED EJECTION FRACTION: Primary | ICD-10-CM

## 2019-02-26 DIAGNOSIS — Z95.2 S/P TAVR (TRANSCATHETER AORTIC VALVE REPLACEMENT): ICD-10-CM

## 2019-02-26 DIAGNOSIS — Z95.810 AICD (AUTOMATIC CARDIOVERTER/DEFIBRILLATOR) PRESENT: ICD-10-CM

## 2019-02-26 DIAGNOSIS — N18.30 CKD (CHRONIC KIDNEY DISEASE), STAGE III: ICD-10-CM

## 2019-02-26 DIAGNOSIS — I50.32 CHRONIC DIASTOLIC HEART FAILURE: ICD-10-CM

## 2019-02-26 PROCEDURE — 99214 PR OFFICE/OUTPT VISIT, EST, LEVL IV, 30-39 MIN: ICD-10-PCS | Mod: S$PBB,,, | Performed by: INTERNAL MEDICINE

## 2019-02-26 PROCEDURE — 99999 PR PBB SHADOW E&M-EST. PATIENT-LVL III: ICD-10-PCS | Mod: PBBFAC,,, | Performed by: INTERNAL MEDICINE

## 2019-02-26 PROCEDURE — 99213 OFFICE O/P EST LOW 20 MIN: CPT | Mod: PBBFAC | Performed by: INTERNAL MEDICINE

## 2019-02-26 PROCEDURE — 99214 OFFICE O/P EST MOD 30 MIN: CPT | Mod: S$PBB,,, | Performed by: INTERNAL MEDICINE

## 2019-02-26 PROCEDURE — 99999 PR PBB SHADOW E&M-EST. PATIENT-LVL III: CPT | Mod: PBBFAC,,, | Performed by: INTERNAL MEDICINE

## 2019-02-26 RX ORDER — CLOPIDOGREL BISULFATE 75 MG/1
75 TABLET ORAL DAILY
Qty: 90 TABLET | Refills: 3 | Status: ON HOLD
Start: 2019-02-26 | End: 2019-11-25 | Stop reason: HOSPADM

## 2019-02-26 NOTE — PROGRESS NOTES
CARDIOVASCULAR PROGRESS NOTE    REASON FOR CONSULT:   Timbo Gallagher is a 83 y.o. male who presents for follow up of TIA/CEA, TAVR.    PCP: Winston Negron: [place]  HISTORY OF PRESENT ILLNESS:   The patient comes in today for follow-up.  He is accompanied by his wife.  In the interim since his last visit, is hospitalized for heart failure.  He seems to doing much better from this perspective.  His only complaint today peers to be some chronic lower extremity edema.  He otherwise has had no angina or dyspnea.  He denies any palpitations, lightheadedness, dizziness, syncope, or defibrillator discharges.  There has been no PND, orthopnea, melena, hematuria, or claudicant symptoms.  The patient tells me he has been placed back on Plavix in addition to his aspirin after discussing this with his neurologist, Dr. Moore.    The patient's wife has concerns in regards to the patient's continued driving.  Given the patient's history of arrhythmia, as well as syncope behind the wheel, I have suggested that he abstain from driving.  The patient appears to be agreeable to this recommendation.    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 ([place])    PAST MEDICAL HISTORY:     Past Medical History:   Diagnosis Date    Acute renal failure     Arthritis     Blind right eye     BPH (benign prostatic hypertrophy)     Bronchitis, chronic     Carotid artery occlusion     Lt carotid endarerectomy done 02/19/2018     CHF (congestive heart failure)     Colon polyp     Coronary artery disease     Diabetes mellitus     GERD (gastroesophageal reflux disease)     Hearing aid worn     bilateral    Hematuria     Hernia     Hypertension     Influenza A     Irregular heart beat     NSVT (nonsustained ventricular tachycardia) 4/18/2018    Renal failure as complication of care     sepsis, renal function returned    S/P TAVR (transcatheter aortic  valve replacement) 10/18/2017    Snoring     Status post implantation of automatic cardioverter/defibrillator (AICD) 04/23/2018    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 Coleman    CATARACT EXTRACTION, BILATERAL      EGD (ESOPHAGOGASTRODUODENOSCOPY) N/A 8/30/2018    Performed by Tye Navarrete MD at [facility] ENDO (2ND FLR)    ENDARTERECTOMY-CAROTID Left 2/19/2018    Performed by Silvio Coleman MD at [facility] OR    ESOPHAGOGASTRODUODENOSCOPY  08/30/2018    EYE SURGERY      HERNIA REPAIR Left 09/2013    REPAIR, HERNIA, INGUINAL, WITHOUT HISTORY OF PRIOR REPAIR, AGE 5 YEARS OR OLDER Left 9/11/2013    Performed by Han Brown MD at [facility] OR    REPLACEMENT-VALVE-AORTIC N/A 10/17/2017    Performed by Martínez Keene MD at [facility] CATH LAB    RETINAL DETACHMENT SURGERY      THORACENTESIS N/A 1/17/2019    Performed by [facility] Diagnostic Provider at [facility] OR       ALLERGIES AND MEDICATION:     Review of patient's allergies indicates:   Allergen Reactions    Vancomycin analogues Itching    Ciprofloxacin (mixture) Itching     Extreme itching and redness     Antibiotic hc         Medication List           Accurate as of 2/26/19 10:09 AM. If you have any questions, ask your nurse or doctor.               CONTINUE taking these medications    acarbose 25 MG Tab  Commonly known as:  PRECOSE     acetaminophen 500 MG tablet  Commonly known as:  TYLENOL     amiodarone 200 MG Tab  Commonly known as:  PACERONE  Take 1 tablet (200 mg total) by mouth once daily.     amLODIPine 10 MG tablet  Commonly known as:  NORVASC     aspirin 81 MG EC tablet  Commonly known as:  ECOTRIN  Take 1 tablet  (81 mg total) by mouth once daily. Hold until evaluated by Cardiology     atorvastatin 40 MG tablet  Commonly known as:  LIPITOR  Take 1 tablet (40 mg total) by mouth once daily.     cyanocobalamin 1000 MCG tablet  Commonly known as:  VITAMIN B-12     diphenhydrAMINE 50 MG capsule  Commonly known as:  BENADRYL     famotidine 20 MG tablet  Commonly known as:  PEPCID     ferrous gluconate 324 MG tablet  Commonly known as:  FERGON  Take 1 tablet (324 mg total) by mouth daily with breakfast.     furosemide 20 MG tablet  Commonly known as:  LASIX  Take 2 tablets (40 mg total) by mouth once daily.     metoprolol succinate 50 MG 24 hr tablet  Commonly known as:  TOPROL-XL     mupirocin 2 % ointment  Commonly known as:  BACTROBAN     tamsulosin 0.4 mg Cap  Commonly known as:  FLOMAX  Take 1 capsule (0.4 mg total) by mouth once daily.              SOCIAL HISTORY:     Social History     Socioeconomic History    Marital status:      Spouse name: Not on file    Number of children: Not on file    Years of education: Not on file    Highest education level: Not on file   Social Needs    Financial resource strain: Not on file    Food insecurity - worry: Not on file    Food insecurity - inability: Not on file    Transportation needs - medical: Not on file    Transportation needs - non-medical: Not on file   Occupational History    Not on file   Tobacco Use    Smoking status: Former Smoker     Packs/day: 3.00     Years: 40.00     Pack years: 120.00     Types: Cigarettes     Last attempt to quit: 1990     Years since quittin.5    Smokeless tobacco: Never Used   Substance and Sexual Activity    Alcohol use: No    Drug use: No    Sexual activity: Not Currently     Partners: Female   Other Topics Concern    Not on file   Social History Narrative    ** Merged History Encounter **            FAMILY HISTORY:     Family History   Problem Relation Age of Onset    Arthritis Mother     Heart disease Father      "Heart failure Father     Diabetes Sister     Heart attack Brother     No Known Problems Maternal Aunt     No Known Problems Maternal Uncle     No Known Problems Paternal Aunt     No Known Problems Paternal Uncle     No Known Problems Maternal Grandmother     No Known Problems Maternal Grandfather     No Known Problems Paternal Grandmother     No Known Problems Paternal Grandfather     Anemia Neg Hx     Arrhythmia Neg Hx     Asthma Neg Hx     Clotting disorder Neg Hx     Fainting Neg Hx     Hyperlipidemia Neg Hx     Hypertension Neg Hx     Stroke Neg Hx     Atrial Septal Defect Neg Hx        REVIEW OF SYSTEMS:   Review of Systems   Constitutional: Negative for chills, diaphoresis and fever.   HENT: Negative for nosebleeds.    Eyes: Negative for blurred vision, double vision and photophobia.   Respiratory: Negative for hemoptysis, shortness of breath and wheezing.    Cardiovascular: Negative for chest pain, palpitations, orthopnea, claudication, leg swelling and PND.   Gastrointestinal: Negative for abdominal pain, blood in stool, heartburn, melena, nausea and vomiting.   Genitourinary: Negative for flank pain and hematuria.   Musculoskeletal: Negative for falls, myalgias and neck pain.   Skin: Negative for rash.   Neurological: Positive for sensory change (R eye chr blind (retinal detachment)). Negative for dizziness, seizures, loss of consciousness, weakness and headaches.   Endo/Heme/Allergies: Negative for polydipsia. Does not bruise/bleed easily.   Psychiatric/Behavioral: Negative for depression and memory loss. The patient is not nervous/anxious.        PHYSICAL EXAM:     Vitals:    02/26/19 1000   BP: (!) 116/58   Pulse: 80   Resp: 15    Body mass index is 27.02 kg/m².  Weight: 83 kg (182 lb 15.7 oz)   Height: 5' 9" (175.3 cm)     Physical Exam   Constitutional: He is oriented to person, place, and time. He appears well-developed and well-nourished. He is cooperative.  Non-toxic appearance. " No distress.   HENT:   Head: Normocephalic and atraumatic.   Eyes: Conjunctivae and EOM are normal. Pupils are equal, round, and reactive to light. No scleral icterus.   Neck: Trachea normal and normal range of motion. Neck supple. Normal carotid pulses and no JVD present. Carotid bruit is not present. No neck rigidity. No tracheal deviation and no edema present. No thyromegaly present.   L CEA scar well healed   Cardiovascular: Normal rate, regular rhythm, S1 normal and S2 normal. PMI is not displaced. Exam reveals no gallop and no friction rub.   Murmur heard.   Systolic murmur is present with a grade of 1/6.  Pulses:       Carotid pulses are 2+ on the right side, and 2+ on the left side.  Pulmonary/Chest: Effort normal and breath sounds normal. No respiratory distress. He has no wheezes. He has no rales. He exhibits no tenderness.   Abdominal: Soft. He exhibits no distension. There is no hepatosplenomegaly.   Musculoskeletal: He exhibits no edema or tenderness.   Feet:   Right Foot:   Skin Integrity: Negative for ulcer.   Left Foot:   Skin Integrity: Negative for ulcer.   Neurological: He is alert and oriented to person, place, and time. No cranial nerve deficit.   Skin: Skin is warm and dry. No rash noted. No erythema.   Psychiatric: He has a normal mood and affect. His speech is normal and behavior is normal.   Vitals reviewed.      DATA:   EKG: (personally reviewed tracing)  2/17/18 SR 87, multifocal PVCs    Laboratory:  CBC:  Recent Labs   Lab 01/13/19  1555 01/15/19  1600 01/24/19  0132   WHITE BLOOD CELL COUNT 6.57 6.60 13.14 H   HEMOGLOBIN 9.5 L 9.4 L 11.1 L   HEMATOCRIT 28.7 L 28.9 L 34.7 L   PLATELETS 169 166 226       CHEMISTRIES:  Recent Labs   Lab 09/02/18  0532 09/03/18  0559  01/24/19  0132 01/25/19  0404 01/26/19  0352   GLUCOSE 135 H 138 H   < > 198 H 145 H 93   SODIUM 137 138   < > 141 141 141   POTASSIUM 4.1 4.2   < > 4.3 3.6 3.7   BUN BLD 25 H 22   < > 20 19 18   CREATININE 1.6 H 1.4   < > 1.8  H 1.5 H 1.5 H   EGFR IF  45.4 A 53.3 A   < > 39 A 49 A 49 A   EGFR IF NON- 39.3 A 46.1 A   < > 34 A 42 A 42 A   CALCIUM 8.6 L 9.0   < > 8.9 8.8 8.8   MAGNESIUM 2.0 2.0  --  2.4  --   --     < > = values in this interval not displayed.       CARDIAC BIOMARKERS:  Recent Labs   Lab 01/13/19  1555 01/24/19  0132 01/24/19  0726   TROPONIN I 0.019 0.022 0.020       COAGS:  Recent Labs   Lab 04/23/18  0226 08/27/18  1621 01/15/19  1600   INR 1.2 1.1 1.1       LIPIDS/LFTS:  Recent Labs   Lab 02/17/18  0831  06/20/18  0954  12/18/18  1530 01/13/19  1555 01/24/19  0132   CHOLESTEROL 147  --  97 L  --   --   --   --    TRIGLYCERIDES 72  --  63  --   --   --   --    HDL 37 L  --  37 L  --   --   --   --    LDL CHOLESTEROL 95.6  --  47.4 L  --   --   --   --    NON-HDL CHOLESTEROL 110  --  60  --   --   --   --    AST 11   < > 17   < > 13 12 35   ALT <5 L   < > 18   < > 8 L 15 19    < > = values in this interval not displayed.       Cardiovascular Testing:  Echo 1/24/19  · Normal left ventricular systolic function. The estimated ejection fraction is 55%  · No wall motion abnormalities.  · Normal right ventricular systolic function.  · There is a 26 mm Brent S3 transcutaneously-placed aortic bioprosthesis present. There is no aortic aortic insufficiency present. Prosthetic aortic valve is normal.  · Moderate-to-severe mitral regurgitation.  · Mild to moderate tricuspid regurgitation.  · Elevated central venous pressure (15 mm Hg).  · The estimated PA systolic pressure is 69 mm Hg  · Pulmonary hypertension present.  · Consider LISSETT for further eval of mitral regurgitation if clinically warranted    Cath 4/20/18  LVEF: 50% by echo  Normal TAVR fxn by echo  Dominance: Right  LM: normal  LAD: MLI              Diag1 prox 60%, mid 50%  Ramus: MLI  LCx: MLI  RCA: dom, prox 30%  Hemostasis:  R Radial band  Impression:  TdP  Nonobst CAD  Normal LV fxn/TAVR fxn by echo  R rad wyatt for  hemostasis  Plan:  Cont ASA/Plavix  Cont amio gtt     LE Venous US 4/17/18  No evidence of deep venous thrombosis in either lower extremity.     Carotid US 3/22/18 (s/p L CEA)  There is 20 - 39% right Internal Carotid stenosis.  There is 20 - 39% left Internal Carotid stenosis.     TAVR 10/17/17  B. 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    No perivalvular leak post procedure per 2D echo.    Post deployment AV mean gradient 2.5 mmHg, Vmax 1.09 m/s.      ASSESSMENT:   # HFpEF, seems euvolemic aside from LE edema  # TdP s/p MDT ICD implant 4/2018, recent ICD shocks noted 12/26-27/18.  Pt reports hospitalization at the time.  # TIA s/p L CEA 2/2018, followed by Dr. Coleman  # AS s/p TAVR 10/2017, normally functioning by echo 2/2018  # HTN, controlled  # HLP, on atorva 40mg qd  # CRI  # DM  # hx of R retinal detachment (chr R eye blindness)  # Hx SAH 8/2018, now back on Plavix (per pt report after their d/w Dr. Moore)    PLAN:   Cont med rx  Stop amlod, ?causing LE edema  TAVR follow up as per [facility]-  I suggested the pt abstain from driving given his hx of arrhythmia and LOC in the past.  He seems agreeable to this.   RTC 2 months with MDT ICD check (mid Apr 2019)      Ned Toribio MD, FACC

## 2019-04-16 ENCOUNTER — OFFICE VISIT (OUTPATIENT)
Dept: CARDIOLOGY | Facility: CLINIC | Age: 84
End: 2019-04-16
Payer: MEDICARE

## 2019-04-16 VITALS
OXYGEN SATURATION: 95 % | RESPIRATION RATE: 15 BRPM | WEIGHT: 192 LBS | SYSTOLIC BLOOD PRESSURE: 118 MMHG | DIASTOLIC BLOOD PRESSURE: 58 MMHG | HEART RATE: 63 BPM | HEIGHT: 69 IN | BODY MASS INDEX: 28.44 KG/M2

## 2019-04-16 DIAGNOSIS — I47.29 NSVT (NONSUSTAINED VENTRICULAR TACHYCARDIA): ICD-10-CM

## 2019-04-16 DIAGNOSIS — N18.30 CKD (CHRONIC KIDNEY DISEASE), STAGE III: ICD-10-CM

## 2019-04-16 DIAGNOSIS — Z95.810 AICD (AUTOMATIC CARDIOVERTER/DEFIBRILLATOR) PRESENT: ICD-10-CM

## 2019-04-16 DIAGNOSIS — I10 ESSENTIAL HYPERTENSION: ICD-10-CM

## 2019-04-16 DIAGNOSIS — I50.32 CHRONIC DIASTOLIC HEART FAILURE: ICD-10-CM

## 2019-04-16 DIAGNOSIS — I50.30 (HFPEF) HEART FAILURE WITH PRESERVED EJECTION FRACTION: Primary | ICD-10-CM

## 2019-04-16 DIAGNOSIS — E78.2 MIXED HYPERLIPIDEMIA: ICD-10-CM

## 2019-04-16 DIAGNOSIS — Z98.890 S/P CAROTID ENDARTERECTOMY: ICD-10-CM

## 2019-04-16 DIAGNOSIS — Z95.2 S/P TAVR (TRANSCATHETER AORTIC VALVE REPLACEMENT): ICD-10-CM

## 2019-04-16 PROCEDURE — 99214 OFFICE O/P EST MOD 30 MIN: CPT | Mod: S$PBB,25,, | Performed by: INTERNAL MEDICINE

## 2019-04-16 PROCEDURE — 99213 OFFICE O/P EST LOW 20 MIN: CPT | Mod: PBBFAC,25 | Performed by: INTERNAL MEDICINE

## 2019-04-16 PROCEDURE — 99999 PR PBB SHADOW E&M-EST. PATIENT-LVL III: ICD-10-PCS | Mod: PBBFAC,,, | Performed by: INTERNAL MEDICINE

## 2019-04-16 PROCEDURE — 93289 INTERROG DEVICE EVAL HEART: CPT | Mod: PBBFAC | Performed by: INTERNAL MEDICINE

## 2019-04-16 PROCEDURE — 93289 INTERROG DEVICE EVAL HEART: CPT | Mod: 26,S$PBB,, | Performed by: INTERNAL MEDICINE

## 2019-04-16 PROCEDURE — 93289 PR INTERROG EVAL, IN PERSON,CARDVERT/DEFIB: ICD-10-PCS | Mod: 26,S$PBB,, | Performed by: INTERNAL MEDICINE

## 2019-04-16 PROCEDURE — 99214 PR OFFICE/OUTPT VISIT, EST, LEVL IV, 30-39 MIN: ICD-10-PCS | Mod: S$PBB,25,, | Performed by: INTERNAL MEDICINE

## 2019-04-16 PROCEDURE — 99999 PR PBB SHADOW E&M-EST. PATIENT-LVL III: CPT | Mod: PBBFAC,,, | Performed by: INTERNAL MEDICINE

## 2019-04-16 RX ORDER — FUROSEMIDE 40 MG/1
40 TABLET ORAL DAILY
Qty: 90 TABLET | Refills: 3 | Status: ON HOLD | OUTPATIENT
Start: 2019-04-16 | End: 2019-04-29 | Stop reason: HOSPADM

## 2019-04-16 NOTE — PROGRESS NOTES
CARDIOVASCULAR PROGRESS NOTE    REASON FOR CONSULT:   Timbo Gallagher is a 83 y.o. male who presents for follow up of TIA/CEA, TAVR.    PCP: Winston Negron: [place]  HISTORY OF PRESENT ILLNESS:   The patient comes in today for follow-up.  He is accompanied by his wife.  His main complaint today appears to be progressive shortness of breath.  He has no angina.  He continues to describe lower extremity edema.  He denies any fever, chills, or cough.  There has been no PND, orthopnea, melena, hematuria, or claudicant symptoms.    The [employer] defibrillator was interrogated in the office today.  Current rhythm is a V paced at 90 beats per minute with underlying complete heart block at 30 beats per minute.  He is pacing 96% in the atrium 99% of the ventricle.  Estimated longevity of the battery is 8 years.  Sensing, and pacing thresholds as well as impedance are normal.  There were 2 episodes of polymorphic VT lasting 10-15 beats both occurring around 11:30 p.m. on 04/11/2019.  The patient does not recall any palpitations at that time.    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 ([place])    PAST MEDICAL HISTORY:     Past Medical History:   Diagnosis Date    Acute renal failure     Arthritis     Blind right eye     BPH (benign prostatic hypertrophy)     Bronchitis, chronic     Carotid artery occlusion     Lt carotid endarerectomy done 02/19/2018     CHF (congestive heart failure)     Colon polyp     Coronary artery disease     Diabetes mellitus     GERD (gastroesophageal reflux disease)     Hearing aid worn     bilateral    Hematuria     Hernia     Hypertension     Influenza A     Irregular heart beat     NSVT (nonsustained ventricular tachycardia) 4/18/2018    Renal failure as complication of care     sepsis, renal function returned    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 Coleman    CATARACT EXTRACTION, BILATERAL      EGD (ESOPHAGOGASTRODUODENOSCOPY) N/A 8/30/2018    Performed by Tye Navarrete [facility] at [facility] ENDO (2ND FLR)    ENDARTERECTOMY-CAROTID Left 2/19/2018    [facility]io [facility] at [facility]    ESOPHAGOGASTRODUODENOSCOPY  08/30/2018    EYE SURGERY      HERNIA REPAIR Left 09/2013    REPAIR, HERNIA, INGUINAL, WITHOUT HISTORY OF PRIOR REPAIR, AGE 5 YEARS OR OLDER Left 9/11/2013    Performed by Han Brown MD at [facility] OR    REPLACEMENT-VALVE-AORTIC N/A 10/17/2017    Performed by Martínez Keene MD at [facility] CATH LAB    RETINAL DETACHMENT SURGERY      THORACENTESIS N/A 1/17/2019    Performed by [facility] at [facility] OR       ALLERGIES AND MEDICATION:     Review of patient's allergies indicates:   Allergen Reactions    Vancomycin analogues Itching    Ciprofloxacin (mixture) Itching     Extreme itching and redness     Antibiotic hc         Medication List           Accurate as of 4/16/19  1:38 PM. If you have any questions, ask your nurse or doctor.               CONTINUE taking these medications    acarbose 25 MG Tab  Commonly known as:  PRECOSE     acetaminophen 500 MG tablet  Commonly known as:  TYLENOL     amiodarone 200 MG Tab  Commonly known as:  PACERONE  Take 1 tablet (200 mg total) by mouth once daily.     aspirin 81 MG EC tablet  Commonly known as:  ECOTRIN  Take 1 tablet (81 mg total) by mouth once daily. Hold until evaluated by Cardiology     atorvastatin 40 MG  tablet  Commonly known as:  LIPITOR  Take 1 tablet (40 mg total) by mouth once daily.     clopidogrel 75 mg tablet  Commonly known as:  PLAVIX  Take 1 tablet (75 mg total) by mouth once daily.     cyanocobalamin 1000 MCG tablet  Commonly known as:  VITAMIN B-12     diphenhydrAMINE 50 MG capsule  Commonly known as:  BENADRYL     famotidine 20 MG tablet  Commonly known as:  PEPCID     ferrous gluconate 324 MG tablet  Commonly known as:  FERGON  Take 1 tablet (324 mg total) by mouth daily with breakfast.     furosemide 20 MG tablet  Commonly known as:  LASIX  Take 2 tablets (40 mg total) by mouth once daily.     metoprolol succinate 50 MG 24 hr tablet  Commonly known as:  TOPROL-XL     mupirocin 2 % ointment  Commonly known as:  BACTROBAN     tamsulosin 0.4 mg Cap  Commonly known as:  FLOMAX  Take 1 capsule (0.4 mg total) by mouth once daily.              SOCIAL HISTORY:     Social History     Socioeconomic History    Marital status:      Spouse name: Not on file    Number of children: Not on file    Years of education: Not on file    Highest education level: Not on file   Occupational History    Not on file   Social Needs    Financial resource strain: Not on file    Food insecurity:     Worry: Not on file     Inability: Not on file    Transportation needs:     Medical: Not on file     Non-medical: Not on file   Tobacco Use    Smoking status: Former Smoker     Packs/day: 3.00     Years: 40.00     Pack years: 120.00     Types: Cigarettes     Last attempt to quit: 1990     Years since quittin.7    Smokeless tobacco: Never Used   Substance and Sexual Activity    Alcohol use: No    Drug use: No    Sexual activity: Not Currently     Partners: Female   Lifestyle    Physical activity:     Days per week: Not on file     Minutes per session: Not on file    Stress: Not on file   Relationships    Social connections:     Talks on phone: Not on file     Gets together: Not on file     Attends  P[employer] service: Not on file     Active member of club or organization: Not on file     Attends meetings of clubs or organizations: Not on file     Relationship status: Not on file   Other Topics Concern    Not on file   Social History Narrative    ** Merged History Encounter **            FAMILY HISTORY:     Family History   Problem Relation Age of Onset    Arthritis Mother     Heart disease Father     Heart failure Father     Diabetes Sister     Heart attack Brother     No Known Problems Maternal Aunt     No Known Problems Maternal Uncle     No Known Problems Paternal Aunt     No Known Problems Paternal Uncle     No Known Problems Maternal Grandmother     No Known Problems Maternal Grandfather     No Known Problems Paternal Grandmother     No Known Problems Paternal Grandfather     Anemia Neg Hx     Arrhythmia Neg Hx     Asthma Neg Hx     Clotting disorder Neg Hx     Fainting Neg Hx     Hyperlipidemia Neg Hx     Hypertension Neg Hx     Stroke Neg Hx     Atrial Septal Defect Neg Hx        REVIEW OF SYSTEMS:   Review of Systems   Constitutional: Negative for chills, diaphoresis and fever.   HENT: Negative for nosebleeds.    Eyes: Negative for blurred vision, double vision and photophobia.   Respiratory: Positive for shortness of breath. Negative for hemoptysis and wheezing.    Cardiovascular: Positive for leg swelling. Negative for chest pain, palpitations, orthopnea, claudication and PND.   Gastrointestinal: Negative for abdominal pain, blood in stool, heartburn, melena, nausea and vomiting.   Genitourinary: Negative for flank pain and hematuria.   Musculoskeletal: Negative for falls, myalgias and neck pain.   Skin: Negative for rash.   Neurological: Positive for sensory change (R eye chr blind (retinal detachment)). Negative for dizziness, seizures, loss of consciousness, weakness and headaches.   Endo/Heme/Allergies: Negative for polydipsia. Does not bruise/bleed easily.  "  Psychiatric/Behavioral: Negative for depression and memory loss. The patient is not nervous/anxious.        PHYSICAL EXAM:     Vitals:    04/16/19 1318   BP: (!) 118/58   Pulse: 63   Resp: 15    Body mass index is 28.36 kg/m².  Weight: 87.1 kg (192 lb 0.3 oz)   Height: 5' 9" (175.3 cm)     Physical Exam   Constitutional: He is oriented to person, place, and time. He appears well-developed and well-nourished. He is cooperative.  Non-toxic appearance. No distress.   HENT:   Head: Normocephalic and atraumatic.   Eyes: Pupils are equal, round, and reactive to light. Conjunctivae and EOM are normal. No scleral icterus.   Neck: Trachea normal and normal range of motion. Neck supple. Normal carotid pulses and no JVD present. Carotid bruit is not present. No neck rigidity. No tracheal deviation and no edema present. No thyromegaly present.   L CEA scar well healed   Cardiovascular: Normal rate, regular rhythm, S1 normal and S2 normal. PMI is not displaced. Exam reveals no gallop and no friction rub.   Murmur heard.   Systolic murmur is present with a grade of 1/6.  Pulses:       Carotid pulses are 2+ on the right side, and 2+ on the left side.  Pulmonary/Chest: Effort normal and breath sounds normal. No respiratory distress. He has no wheezes. He has no rales. He exhibits no tenderness.   Abdominal: Soft. He exhibits no distension. There is no hepatosplenomegaly.   Musculoskeletal: Normal range of motion. He exhibits edema. He exhibits no tenderness.   Feet:   Right Foot:   Skin Integrity: Negative for ulcer.   Left Foot:   Skin Integrity: Negative for ulcer.   Neurological: He is alert and oriented to person, place, and time. No cranial nerve deficit.   Skin: Skin is warm and dry. No rash noted. No erythema.   Psychiatric: He has a normal mood and affect. His speech is normal and behavior is normal.   Vitals reviewed.      DATA:   EKG: (personally reviewed tracing)  2/17/18 SR 87, multifocal " [facility]    Laboratory:  CBC:  Recent Labs   Lab 01/13/19  1555 01/15/19  1600 01/24/19  0132   WHITE BLOOD CELL COUNT 6.57 6.60 13.14 H   HEMOGLOBIN 9.5 L 9.4 L 11.1 L   HEMATOCRIT 28.7 L 28.9 L 34.7 L   PLATELETS 169 166 226       CHEMISTRIES:  Recent Labs   Lab 09/02/18  0532 09/03/18  0559  01/24/19  0132 01/25/19  0404 01/26/19  0352   GLUCOSE 135 H 138 H   < > 198 H 145 H 93   SODIUM 137 138   < > 141 141 141   POTASSIUM 4.1 4.2   < > 4.3 3.6 3.7   BUN BLD 25 H 22   < > 20 19 18   CREATININE 1.6 H 1.4   < > 1.8 H 1.5 H 1.5 H   EGFR IF  45.4 A 53.3 A   < > 39 A 49 A 49 A   EGFR IF NON- 39.3 A 46.1 A   < > 34 A 42 A 42 A   CALCIUM 8.6 L 9.0   < > 8.9 8.8 8.8   MAGNESIUM 2.0 2.0  --  2.4  --   --     < > = values in this interval not displayed.       CARDIAC BIOMARKERS:  Recent Labs   Lab 01/13/19  1555 01/24/19  0132 01/24/19  0726   TROPONIN I 0.019 0.022 0.020       COAGS:  Recent Labs   Lab 04/23/18  0226 08/27/18  1621 01/15/19  1600   INR 1.2 1.1 1.1       LIPIDS/LFTS:  Recent Labs   Lab 02/17/18  0831  06/20/18  0954  12/18/18  1530 01/13/19  1555 01/24/19  0132   CHOLESTEROL 147  --  97 L  --   --   --   --    TRIGLYCERIDES 72  --  63  --   --   --   --    HDL 37 L  --  37 L  --   --   --   --    LDL CHOLESTEROL 95.6  --  47.4 L  --   --   --   --    NON-HDL CHOLESTEROL 110  --  60  --   --   --   --    AST 11   < > 17   < > 13 12 35   ALT <5 L   < > 18   < > 8 L 15 19    < > = values in this interval not displayed.       Cardiovascular Testing:  Echo 1/24/19  · Normal left ventricular systolic function. The estimated ejection fraction is 55%  · No wall motion abnormalities.  · Normal right ventricular systolic function.  · There is a 26 mm Brent S3 transcutaneously-placed aortic bioprosthesis present. There is no aortic aortic insufficiency present. Prosthetic aortic valve is normal.  · Moderate-to-severe mitral regurgitation.  · Mild to moderate tricuspid  regurgitation.  · Elevated central venous pressure (15 mm Hg).  · The estimated PA systolic pressure is 69 mm Hg  · Pulmonary hypertension present.  · Consider LISSETT for further eval of mitral regurgitation if clinically warranted    Cath 4/20/18  LVEF: 50% by echo  Normal TAVR fxn by echo  Dominance: Right  LM: normal  LAD: MLI              Diag1 prox 60%, mid 50%  Ramus: MLI  LCx: MLI  RCA: dom, prox 30%  Hemostasis:  R Radial band  Impression:  TdP  Nonobst CAD  Normal LV fxn/TAVR fxn by echo  R rad vasband for hemostasis  Plan:  Cont ASA/Plavix  Cont amio gtt     LE Venous US 4/17/18  No evidence of deep venous thrombosis in either lower extremity.     Carotid US 3/22/18 (s/p L CEA)  There is 20 - 39% right Internal Carotid stenosis.  There is 20 - 39% left Internal Carotid stenosis.     TAVR 10/17/17  B. 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    No perivalvular leak post procedure per 2D echo.    Post deployment AV mean gradient 2.5 mmHg, Vmax 1.09 m/s.      ASSESSMENT:   # HFpEF, c/o dyspnea and LE edema  # TdP s/p MDT ICD implant 4/2018, TdP/NSVT 4/11/19 on interrogation today.  # TIA s/p L CEA 2/2018, followed by Dr. Coleman  # AS s/p TAVR 10/2017, normally functioning by echo 1/2019  # HTN, controlled  # HLP, on atorva 40mg qd  # CRI  # DM  # hx of R retinal detachment (chr R eye blindness)  # Hx SAH 8/2018, now back on Plavix (per pt report after their d/w Dr. Moore)    PLAN:   Cont med rx  Inc lasix to 40mg bid for 1 week, then back to 40mg qd  [facility] follow [facility] per [facility]  I prev suggested the pt abstain from driving given his hx of arrhythmia and LOC in the past.  He seems agreeable to this.   RTC 2 weeks   Repeat MDT ICD check 3 months (mid July 2019)      Ned Toribio MD, [facility]

## 2019-04-24 ENCOUNTER — HOSPITAL ENCOUNTER (OUTPATIENT)
Dept: RADIOLOGY | Facility: HOSPITAL | Age: 84
Discharge: HOME | DRG: 871 | End: 2019-04-24
Payer: MEDICARE

## 2019-04-24 ENCOUNTER — HOSPITAL ENCOUNTER (INPATIENT)
Facility: HOSPITAL | Age: 84
LOS: 4 days | Discharge: PSYCHIATRIC OR SUBSTANCE USE FACILITY | DRG: 871 | End: 2019-04-29
Attending: EMERGENCY MEDICINE | Admitting: HOSPITALIST
Payer: MEDICARE

## 2019-04-24 DIAGNOSIS — I50.43 ACUTE ON CHRONIC COMBINED SYSTOLIC AND DIASTOLIC HEART FAILURE: ICD-10-CM

## 2019-04-24 DIAGNOSIS — I50.9 ACUTE DECOMPENSATED HEART FAILURE: ICD-10-CM

## 2019-04-24 DIAGNOSIS — J90 PLEURAL EFFUSION ON RIGHT: ICD-10-CM

## 2019-04-24 DIAGNOSIS — R05.9 COUGH: ICD-10-CM

## 2019-04-24 DIAGNOSIS — J81.0 ACUTE PULMONARY EDEMA: ICD-10-CM

## 2019-04-24 DIAGNOSIS — A41.9 SEPSIS: Primary | ICD-10-CM

## 2019-04-24 DIAGNOSIS — F33.2 MAJOR DEPRESSIVE DISORDER, RECURRENT, SEVERE WITHOUT PSYCHOTIC BEHAVIOR: ICD-10-CM

## 2019-04-24 DIAGNOSIS — R45.851 SUICIDAL IDEATIONS: ICD-10-CM

## 2019-04-24 DIAGNOSIS — R05.9 COUGH: Primary | ICD-10-CM

## 2019-04-24 DIAGNOSIS — R06.09 DOE (DYSPNEA ON EXERTION): ICD-10-CM

## 2019-04-24 PROCEDURE — 84484 ASSAY OF TROPONIN QUANT: CPT

## 2019-04-24 PROCEDURE — 93005 ELECTROCARDIOGRAM TRACING: CPT

## 2019-04-24 PROCEDURE — 96367 TX/PROPH/DG ADDL SEQ IV INF: CPT

## 2019-04-24 PROCEDURE — 83735 ASSAY OF MAGNESIUM: CPT

## 2019-04-24 PROCEDURE — 99285 EMERGENCY DEPT VISIT HI MDM: CPT | Mod: 25

## 2019-04-24 PROCEDURE — 80053 COMPREHEN METABOLIC PANEL: CPT

## 2019-04-24 PROCEDURE — 71046 XR CHEST PA AND LATERAL: ICD-10-PCS | Mod: 26,,, | Performed by: RADIOLOGY

## 2019-04-24 PROCEDURE — 93010 ELECTROCARDIOGRAM REPORT: CPT | Mod: ,,, | Performed by: INTERNAL MEDICINE

## 2019-04-24 PROCEDURE — 93010 EKG 12-LEAD: ICD-10-PCS | Mod: ,,, | Performed by: INTERNAL MEDICINE

## 2019-04-24 PROCEDURE — 85025 COMPLETE CBC W/AUTO DIFF WBC: CPT

## 2019-04-24 PROCEDURE — 83605 ASSAY OF LACTIC ACID: CPT

## 2019-04-24 PROCEDURE — 96365 THER/PROPH/DIAG IV INF INIT: CPT

## 2019-04-24 PROCEDURE — 87804 INFLUENZA ASSAY W/OPTIC: CPT | Mod: 59

## 2019-04-24 PROCEDURE — 96361 HYDRATE IV INFUSION ADD-ON: CPT

## 2019-04-24 PROCEDURE — 83880 ASSAY OF NATRIURETIC PEPTIDE: CPT

## 2019-04-24 PROCEDURE — 87040 BLOOD CULTURE FOR BACTERIA: CPT

## 2019-04-24 PROCEDURE — 84100 ASSAY OF PHOSPHORUS: CPT

## 2019-04-24 PROCEDURE — 71046 X-RAY EXAM CHEST 2 VIEWS: CPT | Mod: TC,FY

## 2019-04-24 PROCEDURE — 71046 X-RAY EXAM CHEST 2 VIEWS: CPT | Mod: 26,,, | Performed by: RADIOLOGY

## 2019-04-24 PROCEDURE — 96375 TX/PRO/DX INJ NEW DRUG ADDON: CPT

## 2019-04-24 RX ORDER — ACETAMINOPHEN 500 MG
1000 TABLET ORAL
Status: COMPLETED | OUTPATIENT
Start: 2019-04-24 | End: 2019-04-25

## 2019-04-24 NOTE — LETTER
[address] [place] [address]  Phone: [phone]  Fax: [phone] April 26, 2019     Patient: Timbo Gallagher   YOB: 1935   Date of Visit: 4/24/2019       To Whom It May Concern:    Please allow Mrs. Corrine Gallagher permission to access bank account funds. Mrs. Gallagher is assisting her spouse in hospital under my care. Mr. Gallagher does not have capacity to make medical decisions at this time and requires the assistance of his spouse.     If you have any questions or concerns, please don't hesitate to contact my office.    Sincerely,      Sandra Sun MD

## 2019-04-25 PROBLEM — I50.9 ACUTE DECOMPENSATED HEART FAILURE: Status: ACTIVE | Noted: 2019-04-25

## 2019-04-25 PROBLEM — I50.33 ACUTE ON CHRONIC DIASTOLIC (CONGESTIVE) HEART FAILURE: Status: ACTIVE | Noted: 2019-04-25

## 2019-04-25 PROBLEM — A41.9 SEPSIS: Status: ACTIVE | Noted: 2019-04-25

## 2019-04-25 LAB
ALBUMIN SERPL BCP-MCNC: 3.2 G/DL (ref 3.5–5.2)
ALLENS TEST: ABNORMAL
ALP SERPL-CCNC: 125 U/L (ref 55–135)
ALT SERPL W/O P-5'-P-CCNC: 16 U/L (ref 10–44)
ANION GAP SERPL CALC-SCNC: 10 MMOL/L (ref 8–16)
APPEARANCE FLD: CLEAR
ASCENDING AORTA: 2.3 CM
AST SERPL-CCNC: 19 U/L (ref 10–40)
AV INDEX (PROSTH): 0.42
AV MEAN GRADIENT: 8.29 MMHG
AV PEAK GRADIENT: 15.37 MMHG
AV VALVE AREA: 1.5 CM2
AV VELOCITY RATIO: 0.41
BACTERIA #/AREA URNS HPF: ABNORMAL /HPF
BASOPHILS # BLD AUTO: 0.02 K/UL (ref 0–0.2)
BASOPHILS NFR BLD: 0.2 % (ref 0–1.9)
BILIRUB SERPL-MCNC: 0.6 MG/DL (ref 0.1–1)
BILIRUB UR QL STRIP: NEGATIVE
BNP SERPL-MCNC: 1094 PG/ML (ref 0–99)
BODY FLD TYPE: NORMAL
BSA FOR ECHO PROCEDURE: 2.06 M2
BUN SERPL-MCNC: 19 MG/DL (ref 8–23)
CALCIUM SERPL-MCNC: 9.1 MG/DL (ref 8.7–10.5)
CHLORIDE SERPL-SCNC: 102 MMOL/L (ref 95–110)
CLARITY UR: ABNORMAL
CO2 SERPL-SCNC: 27 MMOL/L (ref 23–29)
COLOR FLD: YELLOW
COLOR UR: YELLOW
CREAT SERPL-MCNC: 1.5 MG/DL (ref 0.5–1.4)
CTP QC/QA: YES
CV ECHO LV RWT: 0.38 CM
DELSYS: ABNORMAL
DIFFERENTIAL METHOD: ABNORMAL
DOP CALC AO PEAK VEL: 1.96 M/S
DOP CALC AO VTI: 42.39 CM
DOP CALC LVOT AREA: 3.56 CM2
DOP CALC LVOT DIAMETER: 2.13 CM
DOP CALC LVOT PEAK VEL: 0.79 M/S
DOP CALC LVOT STROKE VOLUME: 63.57 CM3
DOP CALCLVOT PEAK VEL VTI: 17.85 CM
E WAVE DECELERATION TIME: 230.96 MSEC
E/A RATIO: 1.87
ECHO LV POSTERIOR WALL: 1.04 CM (ref 0.6–1.1)
EOSINOPHIL # BLD AUTO: 0 K/UL (ref 0–0.5)
EOSINOPHIL NFR BLD: 0.4 % (ref 0–8)
ERYTHROCYTE [DISTWIDTH] IN BLOOD BY AUTOMATED COUNT: 14.5 % (ref 11.5–14.5)
EST. GFR  (AFRICAN AMERICAN): 49 ML/MIN/1.73 M^2
EST. GFR  (NON AFRICAN AMERICAN): 42 ML/MIN/1.73 M^2
ESTIMATED AVG GLUCOSE: 160 MG/DL (ref 68–131)
FLOW: 3
FLUAV AG NPH QL: NEGATIVE
FLUBV AG NPH QL: NEGATIVE
FRACTIONAL SHORTENING: 28 % (ref 28–44)
GLUCOSE SERPL-MCNC: 233 MG/DL (ref 70–110)
GLUCOSE UR QL STRIP: NEGATIVE
GRAM STN SPEC: NORMAL
HBA1C MFR BLD HPLC: 7.2 % (ref 4–5.6)
HCO3 UR-SCNC: 30.2 MMOL/L (ref 24–28)
HCT VFR BLD AUTO: 37.9 % (ref 40–54)
HGB BLD-MCNC: 12 G/DL (ref 14–18)
HGB UR QL STRIP: NEGATIVE
HYALINE CASTS #/AREA URNS LPF: 6 /LPF
INR PPP: 1.1 (ref 0.8–1.2)
INTERVENTRICULAR SEPTUM: 1.03 CM (ref 0.6–1.1)
IVRT: 0.07 MSEC
KETONES UR QL STRIP: NEGATIVE
LA MAJOR: 5.45 CM
LA MINOR: 5.53 CM
LA WIDTH: 4.65 CM
LACTATE SERPL-SCNC: 1.5 MMOL/L (ref 0.5–2.2)
LACTATE SERPL-SCNC: 1.7 MMOL/L (ref 0.5–2.2)
LDH SERPL L TO P-CCNC: 201 U/L (ref 110–260)
LEFT ATRIUM SIZE: 4.11 CM
LEFT ATRIUM VOLUME INDEX: 43.9 ML/M2
LEFT ATRIUM VOLUME: 89.18 CM3
LEFT INTERNAL DIMENSION IN SYSTOLE: 3.92 CM (ref 2.1–4)
LEFT VENTRICLE DIASTOLIC VOLUME INDEX: 71.27 ML/M2
LEFT VENTRICLE DIASTOLIC VOLUME: 144.68 ML
LEFT VENTRICLE MASS INDEX: 108.6 G/M2
LEFT VENTRICLE SYSTOLIC VOLUME INDEX: 32.9 ML/M2
LEFT VENTRICLE SYSTOLIC VOLUME: 66.83 ML
LEFT VENTRICULAR INTERNAL DIMENSION IN DIASTOLE: 5.46 CM (ref 3.5–6)
LEFT VENTRICULAR MASS: 220.4 G
LEUKOCYTE ESTERASE UR QL STRIP: ABNORMAL
LYMPHOCYTES # BLD AUTO: 0.9 K/UL (ref 1–4.8)
LYMPHOCYTES NFR BLD: 9 % (ref 18–48)
LYMPHOCYTES NFR FLD MANUAL: 57 %
MAGNESIUM SERPL-MCNC: 1.6 MG/DL (ref 1.6–2.6)
MCH RBC QN AUTO: 31.3 PG (ref 27–31)
MCHC RBC AUTO-ENTMCNC: 31.7 G/DL (ref 32–36)
MCV RBC AUTO: 99 FL (ref 82–98)
MESOTHL CELL NFR FLD MANUAL: 8 %
MICROSCOPIC COMMENT: ABNORMAL
MODE: ABNORMAL
MONOCYTES # BLD AUTO: 0.8 K/UL (ref 0.3–1)
MONOCYTES NFR BLD: 8.5 % (ref 4–15)
MONOS+MACROS NFR FLD MANUAL: 25 %
MV PEAK A VEL: 0.87 M/S
MV PEAK E VEL: 1.63 M/S
NEUTROPHILS # BLD AUTO: 7.9 K/UL (ref 1.8–7.7)
NEUTROPHILS NFR BLD: 81.9 % (ref 38–73)
NEUTROPHILS NFR FLD MANUAL: 10 %
NITRITE UR QL STRIP: NEGATIVE
PCO2 BLDA: 56.2 MMHG (ref 35–45)
PH SMN: 7.34 [PH] (ref 7.35–7.45)
PH UR STRIP: 5 [PH] (ref 5–8)
PHOSPHATE SERPL-MCNC: 3.5 MG/DL (ref 2.7–4.5)
PISA TR MAX VEL: 3.38 M/S
PLATELET # BLD AUTO: 171 K/UL (ref 150–350)
PMV BLD AUTO: 11.4 FL (ref 9.2–12.9)
PO2 BLDA: 38 MMHG (ref 40–60)
POC BE: 3 MMOL/L
POC SATURATED O2: 67 % (ref 95–100)
POC TCO2: 32 MMOL/L (ref 24–29)
POCT GLUCOSE: 142 MG/DL (ref 70–110)
POCT GLUCOSE: 144 MG/DL (ref 70–110)
POCT GLUCOSE: 277 MG/DL (ref 70–110)
POTASSIUM SERPL-SCNC: 3.5 MMOL/L (ref 3.5–5.1)
PROCALCITONIN SERPL IA-MCNC: 0.1 NG/ML
PROT SERPL-MCNC: 6 G/DL (ref 6–8.4)
PROT SERPL-MCNC: 7 G/DL (ref 6–8.4)
PROT UR QL STRIP: ABNORMAL
PROTHROMBIN TIME: 11.4 SEC (ref 9–12.5)
PV PEAK VELOCITY: 0.64 CM/S
RA MAJOR: 5.15 CM
RA PRESSURE: 8 MMHG
RA WIDTH: 4.78 CM
RBC # BLD AUTO: 3.84 M/UL (ref 4.6–6.2)
RBC #/AREA URNS HPF: 4 /HPF (ref 0–4)
RIGHT VENTRICULAR END-DIASTOLIC DIMENSION: 4.26 CM
RV TISSUE DOPPLER FREE WALL SYSTOLIC VELOCITY 1 (APICAL 4 CHAMBER VIEW): 13.93 M/S
SAMPLE: ABNORMAL
SITE: ABNORMAL
SODIUM SERPL-SCNC: 139 MMOL/L (ref 136–145)
SP GR UR STRIP: 1.01 (ref 1–1.03)
TR MAX PG: 45.7 MMHG
TRICUSPID ANNULAR PLANE SYSTOLIC EXCURSION: 2.73 CM
TROPONIN I SERPL DL<=0.01 NG/ML-MCNC: 0.02 NG/ML (ref 0–0.03)
TV REST PULMONARY ARTERY PRESSURE: 54 MMHG
URN SPEC COLLECT METH UR: ABNORMAL
UROBILINOGEN UR STRIP-ACNC: NEGATIVE EU/DL
WBC # BLD AUTO: 9.62 K/UL (ref 3.9–12.7)
WBC # FLD: 149 /CU MM
WBC #/AREA URNS HPF: >100 /HPF (ref 0–5)

## 2019-04-25 PROCEDURE — 89051 BODY FLUID CELL COUNT: CPT

## 2019-04-25 PROCEDURE — 27000221 HC OXYGEN, UP TO 24 HOURS

## 2019-04-25 PROCEDURE — 84145 PROCALCITONIN (PCT): CPT

## 2019-04-25 PROCEDURE — 83615 LACTATE (LD) (LDH) ENZYME: CPT | Mod: 91

## 2019-04-25 PROCEDURE — 99233 PR SUBSEQUENT HOSPITAL CARE,LEVL III: ICD-10-PCS | Mod: ,,, | Performed by: INTERNAL MEDICINE

## 2019-04-25 PROCEDURE — 87086 URINE CULTURE/COLONY COUNT: CPT

## 2019-04-25 PROCEDURE — 87077 CULTURE AEROBIC IDENTIFY: CPT

## 2019-04-25 PROCEDURE — 88305 TISSUE EXAM BY PATHOLOGIST: CPT | Mod: 26,,, | Performed by: PATHOLOGY

## 2019-04-25 PROCEDURE — 88112 CYTOLOGY SPECIMEN- PULMONARY MEDICAL CYTOLOGY: ICD-10-PCS | Mod: 26,,, | Performed by: PATHOLOGY

## 2019-04-25 PROCEDURE — 84311 SPECTROPHOTOMETRY: CPT

## 2019-04-25 PROCEDURE — 88305 CYTOLOGY SPECIMEN- PULMONARY MEDICAL CYTOLOGY: ICD-10-PCS | Mod: 26,,, | Performed by: PATHOLOGY

## 2019-04-25 PROCEDURE — 63600175 PHARM REV CODE 636 W HCPCS: Performed by: HOSPITALIST

## 2019-04-25 PROCEDURE — 99000 SPECIMEN HANDLING OFFICE-LAB: CPT

## 2019-04-25 PROCEDURE — 87088 URINE BACTERIA CULTURE: CPT

## 2019-04-25 PROCEDURE — 87040 BLOOD CULTURE FOR BACTERIA: CPT

## 2019-04-25 PROCEDURE — 84155 ASSAY OF PROTEIN SERUM: CPT

## 2019-04-25 PROCEDURE — 85610 PROTHROMBIN TIME: CPT

## 2019-04-25 PROCEDURE — 94761 N-INVAS EAR/PLS OXIMETRY MLT: CPT

## 2019-04-25 PROCEDURE — 99233 SBSQ HOSP IP/OBS HIGH 50: CPT | Mod: ,,, | Performed by: INTERNAL MEDICINE

## 2019-04-25 PROCEDURE — 87186 SC STD MICRODIL/AGAR DIL: CPT

## 2019-04-25 PROCEDURE — 11000001 HC ACUTE MED/SURG PRIVATE ROOM

## 2019-04-25 PROCEDURE — 88305 TISSUE EXAM BY PATHOLOGIST: CPT | Performed by: PATHOLOGY

## 2019-04-25 PROCEDURE — 94660 CPAP INITIATION&MGMT: CPT

## 2019-04-25 PROCEDURE — 83036 HEMOGLOBIN GLYCOSYLATED A1C: CPT

## 2019-04-25 PROCEDURE — 99223 1ST HOSP IP/OBS HIGH 75: CPT | Mod: ,,, | Performed by: INTERNAL MEDICINE

## 2019-04-25 PROCEDURE — 25000003 PHARM REV CODE 250: Performed by: HOSPITALIST

## 2019-04-25 PROCEDURE — 88112 CYTOPATH CELL ENHANCE TECH: CPT | Mod: 26,,, | Performed by: PATHOLOGY

## 2019-04-25 PROCEDURE — 25000003 PHARM REV CODE 250: Performed by: EMERGENCY MEDICINE

## 2019-04-25 PROCEDURE — 25000242 PHARM REV CODE 250 ALT 637 W/ HCPCS: Performed by: HOSPITALIST

## 2019-04-25 PROCEDURE — 84157 ASSAY OF PROTEIN OTHER: CPT

## 2019-04-25 PROCEDURE — 87205 SMEAR GRAM STAIN: CPT | Mod: 59

## 2019-04-25 PROCEDURE — 83605 ASSAY OF LACTIC ACID: CPT

## 2019-04-25 PROCEDURE — 99900035 HC TECH TIME PER 15 MIN (STAT)

## 2019-04-25 PROCEDURE — 87205 SMEAR GRAM STAIN: CPT

## 2019-04-25 PROCEDURE — 99223 PR INITIAL HOSPITAL CARE,LEVL III: ICD-10-PCS | Mod: ,,, | Performed by: INTERNAL MEDICINE

## 2019-04-25 PROCEDURE — 82945 GLUCOSE OTHER FLUID: CPT

## 2019-04-25 PROCEDURE — 25000242 PHARM REV CODE 250 ALT 637 W/ HCPCS: Performed by: EMERGENCY MEDICINE

## 2019-04-25 PROCEDURE — 94640 AIRWAY INHALATION TREATMENT: CPT

## 2019-04-25 PROCEDURE — 81000 URINALYSIS NONAUTO W/SCOPE: CPT

## 2019-04-25 PROCEDURE — 87070 CULTURE OTHR SPECIMN AEROBIC: CPT | Mod: 59

## 2019-04-25 PROCEDURE — 27000190 HC CPAP FULL FACE MASK W/VALVE

## 2019-04-25 PROCEDURE — 87070 CULTURE OTHR SPECIMN AEROBIC: CPT

## 2019-04-25 PROCEDURE — 87075 CULTR BACTERIA EXCEPT BLOOD: CPT

## 2019-04-25 PROCEDURE — 36415 COLL VENOUS BLD VENIPUNCTURE: CPT

## 2019-04-25 PROCEDURE — 82803 BLOOD GASES ANY COMBINATION: CPT

## 2019-04-25 PROCEDURE — 83615 LACTATE (LD) (LDH) ENZYME: CPT

## 2019-04-25 PROCEDURE — 63600175 PHARM REV CODE 636 W HCPCS: Performed by: EMERGENCY MEDICINE

## 2019-04-25 RX ORDER — ACETAMINOPHEN 325 MG/1
650 TABLET ORAL EVERY 6 HOURS PRN
Status: DISCONTINUED | OUTPATIENT
Start: 2019-04-25 | End: 2019-04-29 | Stop reason: HOSPADM

## 2019-04-25 RX ORDER — FERROUS GLUCONATE 324(38)MG
324 TABLET ORAL
Status: DISCONTINUED | OUTPATIENT
Start: 2019-04-25 | End: 2019-04-29 | Stop reason: HOSPADM

## 2019-04-25 RX ORDER — FUROSEMIDE 10 MG/ML
40 INJECTION INTRAMUSCULAR; INTRAVENOUS 2 TIMES DAILY
Status: DISCONTINUED | OUTPATIENT
Start: 2019-04-25 | End: 2019-04-26

## 2019-04-25 RX ORDER — TAMSULOSIN HYDROCHLORIDE 0.4 MG/1
0.4 CAPSULE ORAL DAILY
Status: DISCONTINUED | OUTPATIENT
Start: 2019-04-25 | End: 2019-04-29 | Stop reason: HOSPADM

## 2019-04-25 RX ORDER — FAMOTIDINE 20 MG/1
20 TABLET, FILM COATED ORAL DAILY
Status: DISCONTINUED | OUTPATIENT
Start: 2019-04-25 | End: 2019-04-29 | Stop reason: HOSPADM

## 2019-04-25 RX ORDER — GLUCAGON 1 MG
1 KIT INJECTION
Status: DISCONTINUED | OUTPATIENT
Start: 2019-04-25 | End: 2019-04-29 | Stop reason: HOSPADM

## 2019-04-25 RX ORDER — METOPROLOL SUCCINATE 50 MG/1
50 TABLET, EXTENDED RELEASE ORAL 2 TIMES DAILY
Status: DISCONTINUED | OUTPATIENT
Start: 2019-04-25 | End: 2019-04-25

## 2019-04-25 RX ORDER — LINEZOLID 2 MG/ML
600 INJECTION, SOLUTION INTRAVENOUS
Status: DISCONTINUED | OUTPATIENT
Start: 2019-04-25 | End: 2019-04-27

## 2019-04-25 RX ORDER — IBUPROFEN 200 MG
24 TABLET ORAL
Status: DISCONTINUED | OUTPATIENT
Start: 2019-04-25 | End: 2019-04-29 | Stop reason: HOSPADM

## 2019-04-25 RX ORDER — ATORVASTATIN CALCIUM 40 MG/1
40 TABLET, FILM COATED ORAL DAILY
Status: DISCONTINUED | OUTPATIENT
Start: 2019-04-25 | End: 2019-04-29 | Stop reason: HOSPADM

## 2019-04-25 RX ORDER — IBUPROFEN 200 MG
16 TABLET ORAL
Status: DISCONTINUED | OUTPATIENT
Start: 2019-04-25 | End: 2019-04-29 | Stop reason: HOSPADM

## 2019-04-25 RX ORDER — IPRATROPIUM BROMIDE AND ALBUTEROL SULFATE 2.5; .5 MG/3ML; MG/3ML
3 SOLUTION RESPIRATORY (INHALATION)
Status: DISCONTINUED | OUTPATIENT
Start: 2019-04-25 | End: 2019-04-29 | Stop reason: HOSPADM

## 2019-04-25 RX ORDER — IPRATROPIUM BROMIDE AND ALBUTEROL SULFATE 2.5; .5 MG/3ML; MG/3ML
3 SOLUTION RESPIRATORY (INHALATION)
Status: COMPLETED | OUTPATIENT
Start: 2019-04-25 | End: 2019-04-25

## 2019-04-25 RX ORDER — FUROSEMIDE 10 MG/ML
40 INJECTION INTRAMUSCULAR; INTRAVENOUS
Status: COMPLETED | OUTPATIENT
Start: 2019-04-25 | End: 2019-04-25

## 2019-04-25 RX ORDER — ONDANSETRON HYDROCHLORIDE 4 MG/5ML
4 SOLUTION ORAL EVERY 6 HOURS PRN
Status: DISCONTINUED | OUTPATIENT
Start: 2019-04-25 | End: 2019-04-29 | Stop reason: HOSPADM

## 2019-04-25 RX ORDER — METOPROLOL SUCCINATE 50 MG/1
50 TABLET, EXTENDED RELEASE ORAL 2 TIMES DAILY
Status: DISCONTINUED | OUTPATIENT
Start: 2019-04-25 | End: 2019-04-29 | Stop reason: HOSPADM

## 2019-04-25 RX ORDER — SODIUM CHLORIDE 0.9 % (FLUSH) 0.9 %
10 SYRINGE (ML) INJECTION
Status: DISCONTINUED | OUTPATIENT
Start: 2019-04-25 | End: 2019-04-29 | Stop reason: HOSPADM

## 2019-04-25 RX ORDER — CEFEPIME HYDROCHLORIDE 1 G/50ML
1 INJECTION, SOLUTION INTRAVENOUS
Status: DISCONTINUED | OUTPATIENT
Start: 2019-04-25 | End: 2019-04-28

## 2019-04-25 RX ORDER — AMIODARONE HYDROCHLORIDE 200 MG/1
200 TABLET ORAL DAILY
Status: DISCONTINUED | OUTPATIENT
Start: 2019-04-25 | End: 2019-04-29 | Stop reason: HOSPADM

## 2019-04-25 RX ORDER — INSULIN ASPART 100 [IU]/ML
0-5 INJECTION, SOLUTION INTRAVENOUS; SUBCUTANEOUS
Status: DISCONTINUED | OUTPATIENT
Start: 2019-04-25 | End: 2019-04-29 | Stop reason: HOSPADM

## 2019-04-25 RX ORDER — GUAIFENESIN 600 MG/1
600 TABLET, EXTENDED RELEASE ORAL 2 TIMES DAILY
Status: DISCONTINUED | OUTPATIENT
Start: 2019-04-25 | End: 2019-04-29 | Stop reason: HOSPADM

## 2019-04-25 RX ADMIN — PIPERACILLIN AND TAZOBACTAM 4.5 G: 4; .5 INJECTION, POWDER, LYOPHILIZED, FOR SOLUTION INTRAVENOUS; PARENTERAL at 12:04

## 2019-04-25 RX ADMIN — SODIUM CHLORIDE 2613 ML: 0.9 INJECTION, SOLUTION INTRAVENOUS at 12:04

## 2019-04-25 RX ADMIN — ATORVASTATIN CALCIUM 40 MG: 40 TABLET, FILM COATED ORAL at 09:04

## 2019-04-25 RX ADMIN — LINEZOLID 600 MG: 600 INJECTION, SOLUTION INTRAVENOUS at 02:04

## 2019-04-25 RX ADMIN — IPRATROPIUM BROMIDE AND ALBUTEROL SULFATE 3 ML: .5; 3 SOLUTION RESPIRATORY (INHALATION) at 12:04

## 2019-04-25 RX ADMIN — ACETAMINOPHEN 650 MG: 325 TABLET, FILM COATED ORAL at 06:04

## 2019-04-25 RX ADMIN — CEFEPIME HYDROCHLORIDE 1 G: 1 INJECTION, SOLUTION INTRAVENOUS at 09:04

## 2019-04-25 RX ADMIN — GUAIFENESIN 600 MG: 600 TABLET, EXTENDED RELEASE ORAL at 09:04

## 2019-04-25 RX ADMIN — ACETAMINOPHEN 1000 MG: 500 TABLET, FILM COATED ORAL at 12:04

## 2019-04-25 RX ADMIN — FERROUS GLUCONATE TAB 324 MG (37.5 MG ELEMENTAL IRON) 324 MG: 324 (37.5 FE) TAB at 09:04

## 2019-04-25 RX ADMIN — IPRATROPIUM BROMIDE AND ALBUTEROL SULFATE 3 ML: .5; 3 SOLUTION RESPIRATORY (INHALATION) at 08:04

## 2019-04-25 RX ADMIN — FUROSEMIDE 40 MG: 10 INJECTION, SOLUTION INTRAVENOUS at 01:04

## 2019-04-25 RX ADMIN — FUROSEMIDE 40 MG: 10 INJECTION, SOLUTION INTRAVENOUS at 09:04

## 2019-04-25 RX ADMIN — AMIODARONE HYDROCHLORIDE 200 MG: 200 TABLET ORAL at 09:04

## 2019-04-25 RX ADMIN — LINEZOLID 600 MG: 600 INJECTION, SOLUTION INTRAVENOUS at 01:04

## 2019-04-25 RX ADMIN — TAMSULOSIN HYDROCHLORIDE 0.4 MG: 0.4 CAPSULE ORAL at 09:04

## 2019-04-25 RX ADMIN — FAMOTIDINE 20 MG: 20 TABLET ORAL at 09:04

## 2019-04-25 RX ADMIN — METOPROLOL SUCCINATE 50 MG: 50 TABLET, EXTENDED RELEASE ORAL at 09:04

## 2019-04-25 RX ADMIN — FUROSEMIDE 40 MG: 10 INJECTION, SOLUTION INTRAVENOUS at 06:04

## 2019-04-25 RX ADMIN — INSULIN ASPART 3 UNITS: 100 INJECTION, SOLUTION INTRAVENOUS; SUBCUTANEOUS at 06:04

## 2019-04-25 NOTE — NURSING
Notified IR/Chrystal that patient's wife states the patient/Mr Gallagher took his Plavix on yesterday morning but has not had an ASA in 3 or 4 days. Verbalized understanding, states Radiologist states PT/INR ok to do procedure. Notified Pulmonary in person of the above. Verbalized understanding.

## 2019-04-25 NOTE — HPI
"HPI: Patient is an 83 year old male with a PMHx of DM, CVA, ICD/SVT, diastolic CHF, AVR, CKD3, and anemia of renal disease who presents to the ED via EMS with complaints of SOB. Patients symptoms began 3 days ago and have gradually worsened. Is not sure if he had a fever or not PTA. Denies a PMHx of Asthma or COPD, but has had a "lung drained" in the past ( January 2019). Has never been placed on CPAP or BIPAP in the past.       Patient was seen by his PCP yesterday for the SOB. Was placed on antibiotics and had a CXR this morning at this facility. Patient nor his wife were not contacted in regards to results.      EMS confirms that the patient had a temp of 101.8 F while on route.      Pt admitted with moderate size right pleural effusion. Pt meets criteria for sepsis. IR consulted for thoracentesis, however on plavix. Also, recently seen by Dr. Toribio 4/16 and recommended increase lasix x one week then return to daily dosing. Spouse reports that with increase in lasix dose he has persistent BLE edema and SOB.   Pt placed on IV lasix and IV abx.     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  # TIA s/p L CEA 2/2018, followed by Dr. Coleman  # AS s/p TAVR 10/2017, normally functioning by echo 1/2019  # HTN, controlled  # HLP, on atorva 40mg qd  # CRI  # DM  # hx of R retinal detachment (chr R eye blindness)  # Hx SAH 8/2018, now back on Plavix (per pt report after their d/w Dr. Moore)    Echo 1/24/19  · Normal left ventricular systolic function. The estimated ejection fraction is 55%  · No wall motion abnormalities.  · Normal right ventricular systolic function.  · There is a 26 mm Brent S3 transcutaneously-placed aortic bioprosthesis present. There is no aortic aortic insufficiency present. Prosthetic aortic valve is normal.  · Moderate-to-severe mitral regurgitation.  · Mild " to moderate tricuspid regurgitation.  · Elevated central venous pressure (15 mm Hg).  · The estimated PA systolic pressure is 69 mm Hg  · Pulmonary hypertension present.  · Consider LISSETT for further eval of mitral regurgitation if clinically warranted     Cath 4/20/18  LVEF: 50% by echo  Normal TAVR fxn by echo  Dominance: Right  LM: normal  LAD: MLI              Diag1 prox 60%, mid 50%  Ramus: MLI  LCx: MLI  RCA: dom, prox 30%  Hemostasis:  R Radial band  Impression:  TdP  Nonobst CAD  Normal LV fxn/TAVR fxn by echo  R rad vasband for hemostasis  Plan:  Cont ASA/Plavix  Cont amio gtt     LE Venous US 4/17/18  No evidence of deep venous thrombosis in either lower extremity.     Carotid US 3/22/18 (s/p L CEA)  There is 20 - 39% right Internal Carotid stenosis.  There is 20 - 39% left Internal Carotid stenosis.     TAVR 10/17/17  B. 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    No perivalvular leak post procedure per 2D echo.    Post deployment AV mean gradient 2.5 mmHg, Vmax 1.09 m/s.

## 2019-04-25 NOTE — SUBJECTIVE & OBJECTIVE
Past Medical History:   Diagnosis Date    Acute renal failure     Arthritis     Blind right eye     BPH (benign prostatic hypertrophy)     Bronchitis, chronic     Carotid artery occlusion     Lt carotid endarerectomy done 02/19/2018     CHF (congestive heart failure)     Colon polyp     Coronary artery disease     Diabetes mellitus     GERD (gastroesophageal reflux disease)     Hearing aid worn     bilateral    Hematuria     Hernia     Hypertension     Influenza A     Irregular heart beat     NSVT (nonsustained ventricular tachycardia) 4/18/2018    Renal failure as complication of care     sepsis, renal function returned    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 Coleman    CATARACT EXTRACTION, BILATERAL      EGD (ESOPHAGOGASTRODUODENOSCOPY) N/A 8/30/2018    Performed by Tye Navarrete MD at [facility] ENDO (2ND FLR)    ENDARTERECTOMY-CAROTID Left 2/19/2018    Per[facility] by [facility]io Coleman [facility] at [facility] OR    ESOPHAGOGASTRODUODENOSCOPY  08/30/2018    EYE SURGERY      HERNIA REPAIR Left 09/2013    REPAIR, HERNIA, INGUINAL, WITHOUT HISTORY OF PRIOR REPAIR, AGE 5 YEARS OR OLDER Left 9/11/2013    Performed by Han Brown MD at [facility] OR    REPLACEMENT-VALVE-AORTIC N/A 10/17/2017    Performed by Martínez Keene MD at [facility] CATH LAB    RETINAL DETACHMENT SURGERY      THORACENTESIS N/A 1/17/2019    Performed by [facility] Diagnostic Provider at [facility] OR       Review of patient's allergies indicates:   Allergen Reactions     Ciprofloxacin (mixture) Itching     Extreme itching    Antibiotic hc     Hydrochlorothiazide      Leg swelling      Iodine and iodide containing products      Wife and pt unsure    Vancomycin analogues Itching       No current facility-administered medications on file prior to encounter.      Current Outpatient Medications on File Prior to Encounter   Medication Sig    acarbose (PRECOSE) 25 MG Tab Take 25 mg by mouth 3 (three) times daily with meals.    acetaminophen (TYLENOL) 500 MG tablet Take 500 mg by mouth 2 (two) times daily.    amiodarone (PACERONE) 200 MG Tab Take 1 tablet (200 mg total) by mouth once daily.    aspirin (ECOTRIN) 81 MG EC tablet Take 1 tablet (81 mg total) by mouth once daily. Hold until evaluated by Cardiology    atorvastatin (LIPITOR) 40 MG tablet Take 1 tablet (40 mg total) by mouth once daily.    clopidogrel (PLAVIX) 75 mg tablet Take 1 tablet (75 mg total) by mouth once daily.    famotidine (PEPCID) 20 MG tablet Take 20 mg by mouth 2 (two) times daily.     ferrous gluconate (FERGON) 324 MG tablet Take 1 tablet (324 mg total) by mouth daily with breakfast.    furosemide (LASIX) 40 MG tablet Take 1 tablet (40 mg total) by mouth once daily. Take 40mg every 12 hours for 1 week (starting 4/16/19), then 40mg daily thereafter.    metoprolol succinate (TOPROL-XL) 50 MG 24 hr tablet Take 50 mg by mouth 2 (two) times daily.     mupirocin (BACTROBAN) 2 % ointment Apply topically 3 (three) times daily.    cyanocobalamin (VITAMIN B-12) 1000 MCG tablet Take 100 mcg by mouth once a week.    diphenhydrAMINE (BENADRYL) 50 MG capsule Take 50 mg by mouth nightly.    tamsulosin (FLOMAX) 0.4 mg Cap Take 1 capsule (0.4 mg total) by mouth once daily.     Family History     Problem Relation (Age of Onset)    Arthritis Mother    Diabetes Sister    Heart attack Brother    Heart disease Father    Heart failure Father    No Known Problems Maternal Aunt, Maternal Uncle, Paternal Aunt, Paternal  Uncle, Maternal Grandmother, Maternal Grandfather, Paternal Grandmother, Paternal Grandfather        Tobacco Use    Smoking status: Former Smoker     Packs/day: 3.00     Years: 40.00     Pack years: 120.00     Types: Cigarettes     Last attempt to quit: 1990     Years since quittin.7    Smokeless tobacco: Never Used   Substance and Sexual Activity    Alcohol use: No    Drug use: No    Sexual activity: Not Currently     Partners: Female     Review of Systems   Constitution: Negative for decreased appetite.   HENT: Negative for ear discharge.    Eyes: Negative for blurred vision.   Endocrine: Negative for polyphagia.   Skin: Negative for nail changes.   Genitourinary: Negative for bladder incontinence.   Neurological: Negative for aphonia.   Psychiatric/Behavioral: Negative for hallucinations.   Allergic/Immunologic: Negative for hives.     Objective:     Vital Signs (Most Recent):  Temp: 98.3 °F (36.8 °C) (19 0259)  Pulse: 62 (19 0846)  Resp: 20 (19 0846)  BP: (!) 107/54 (19 0807)  SpO2: 98 % (19 0846) Vital Signs (24h Range):  Temp:  [98.3 °F (36.8 °C)-101.8 °F (38.8 °C)] 98.3 °F (36.8 °C)  Pulse:  [60-82] 62  Resp:  [17-42] 20  SpO2:  [94 %-100 %] 98 %  BP: (107-183)/(54-80) 107/54     Weight: 87.1 kg (192 lb)  Body mass index is 28.35 kg/m².    SpO2: 98 %  O2 Device (Oxygen Therapy): nasal cannula      Intake/Output Summary (Last 24 hours) at 2019 0953  Last data filed at 2019 0800  Gross per 24 hour   Intake 400 ml   Output 800 ml   Net -400 ml       Lines/Drains/Airways     Drain                 Urethral Catheter 19 0632 Non-latex 90 days          Peripheral Intravenous Line                 Peripheral IV - Single Lumen 19 2332 20 G Right Antecubital less than 1 day                Physical Exam   Constitutional: He is oriented to person, place, and time. He appears well-developed and well-nourished.   HENT:   Head: Normocephalic and atraumatic.    Eyes: Pupils are equal, round, and reactive to light. Conjunctivae are normal.   Neck: Normal range of motion. Neck supple.   Cardiovascular: Normal rate and intact distal pulses.   Murmur heard.  High-pitched blowing holosystolic murmur is present with a grade of 3/6 at the apex.  Pulmonary/Chest: Effort normal. He has decreased breath sounds in the right lower field.   Abdominal: Soft. Bowel sounds are normal.   Musculoskeletal: Normal range of motion.   Neurological: He is alert and oriented to person, place, and time.   Skin: Skin is warm and dry.       Significant Labs: All pertinent lab results from the last 24 hours have been reviewed.    Significant Imaging: Echocardiogram:   2D echo with color flow doppler:   Results for orders placed or performed during the hospital encounter of 08/27/18   2D echo with color flow doppler   Result Value Ref Range    QEF 55 55 - 65    Mitral Valve Regurgitation MODERATE (A)     Diastolic Dysfunction Yes (A)     Est. PA Systolic Pressure 58.98 (A)     Mitral Valve Mobility NORMAL     Tricuspid Valve Regurgitation TRIVIAL

## 2019-04-25 NOTE — ASSESSMENT & PLAN NOTE
Fever upon admission.  Currently on zosyn.  Culture ngtd.  Send to procal to help guide antibiotic decision.  Await thoracentesis.  May consider de-escalation.

## 2019-04-25 NOTE — HPI
Patient is 84 y.o. 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 presented to [facility] on 4/27/19 with worsening sob.  cxr confirmed moderate right effusion.  Pulmonary was consulted for further inputs.    Patient with chronic effusion first noted 8/18.  S/p ir thoracentesis on 1/17/19.  However, fluid analysis was not sent.  Patient was hospitalized sob and treated with antibiotics and escalation of diuretics.  Patient was seen by Dr. Boone as an outpatient and was recommended fluid balance optimization.

## 2019-04-25 NOTE — ASSESSMENT & PLAN NOTE
ECHO 1/24/2019  Conclusion   · Normal left ventricular systolic function. The estimated ejection fraction is 55%  · No wall motion abnormalities.  · Normal right ventricular systolic function.  · There is a 26 mm Brent S3 transcutaneously-placed aortic bioprosthesis present. There is no aortic aortic insufficiency present. Prosthetic aortic valve is normal.  · Moderate-to-severe mitral regurgitation.  · Mild to moderate tricuspid regurgitation.  · Elevated central venous pressure (15 mm Hg).  · The estimated PA systolic pressure is 69 mm Hg  · Pulmonary hypertension present.  · Consider LISSETT for further eval of mitral regurgitation if clinically warranted     Lasix IV BID  Pt was supposed to have follow up appt with cardiology today, will consult for any further recs

## 2019-04-25 NOTE — SUBJECTIVE & OBJECTIVE
Past Medical History:   Diagnosis Date    Acute renal failure     Arthritis     Blind right eye     BPH (benign prostatic hypertrophy)     Bronchitis, chronic     Carotid artery occlusion     Lt carotid endarerectomy done 02/19/2018     CHF (congestive heart failure)     Colon polyp     Coronary artery disease     Diabetes mellitus     GERD (gastroesophageal reflux disease)     Hearing aid worn     bilateral    Hematuria     Hernia     Hypertension     Influenza A     Irregular heart beat     NSVT (nonsustained ventricular tachycardia) 4/18/2018    Renal failure as complication of care     sepsis, renal function returned    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 Coleman    CATARACT EXTRACTION, BILATERAL      EGD (ESOPHAGOGASTRODUODENOSCOPY) N/A 8/30/2018    Performed by Tye [facility]rete [facility] at [facility] ENDO (2ND FLR)    ENDARTERECTOMY-CAROTID Left 2/19/2018    Performed by Sil[facility]io Coleman MD at [facility] OR    ESOPHAGOGASTRODUODENOSCOPY  08/30/2018    EYE SURGERY      HERNIA REPAIR Left 09/2013    REPAIR, HERNIA, INGUINAL, WITHOUT HISTORY OF PRIOR REPAIR, AGE 5 YEARS OR OLDER Left 9/11/2013    Performed by Han Brown MD at [facility] OR    REPLACEMENT-VALVE-AORTIC N/A 10/17/2017    Performed by Martínez Keene MD at [facility] CATH LAB    RETINAL DETACHMENT SURGERY      THORACENTESIS N/A 1/17/2019    Performed by [facility] Diagnostic Provider at [facility] OR       Review of patient's allergies indicates:   Allergen Reactions     Ciprofloxacin (mixture) Itching     Extreme itching    Antibiotic hc     Hydrochlorothiazide      Leg swelling      Iodine and iodide containing products      Wife and pt unsure    Vancomycin analogues Itching       Family History     Problem Relation (Age of Onset)    Arthritis Mother    Diabetes Sister    Heart attack Brother    Heart disease Father    Heart failure Father    No Known Problems Maternal Aunt, Maternal Uncle, Paternal Aunt, Paternal Uncle, Maternal Grandmother, Maternal Grandfather, Paternal Grandmother, Paternal Grandfather        Tobacco Use    Smoking status: Former Smoker     Packs/day: 3.00     Years: 40.00     Pack years: 120.00     Types: Cigarettes     Last attempt to quit: 1990     Years since quittin.7    Smokeless tobacco: Never Used   Substance and Sexual Activity    Alcohol use: No    Drug use: No    Sexual activity: Not Currently     Partners: Female         Review of Systems   Constitutional: Positive for activity change and fatigue.   HENT: Positive for congestion and sinus pressure.    Eyes: Positive for visual disturbance.   Respiratory: Positive for cough and shortness of breath.    Cardiovascular: Positive for leg swelling.   Gastrointestinal: Negative.    Endocrine: Negative.    Genitourinary: Negative.    Musculoskeletal: Negative.    Skin: Negative.    Neurological: Positive for weakness.   Psychiatric/Behavioral: Negative.      Objective:     Vital Signs (Most Recent):  Temp: 98.3 °F (36.8 °C) (19 0846)  Pulse: 62 (19 0846)  Resp: 20 (19 0846)  BP: (!) 107/54 (19 0807)  SpO2: 98 % (19 0846) Vital Signs (24h Range):  Temp:  [98.3 °F (36.8 °C)-101.8 °F (38.8 °C)] 98.3 °F (36.8 °C)  Pulse:  [60-82] 62  Resp:  [17-42] 20  SpO2:  [94 %-100 %] 98 %  BP: (107-183)/(54-80) 107/54     Weight: 87.1 kg (192 lb)  Body mass index is 28.35 kg/m².      Intake/Output Summary (Last 24 hours) at 2019 1148  Last data filed at 2019  0800  Gross per 24 hour   Intake 400 ml   Output 800 ml   Net -400 ml       Physical Exam   Constitutional: He is oriented to person, place, and time. He appears well-developed and well-nourished. No distress.   HENT:   Head: Normocephalic and atraumatic.   Mouth/Throat: Oropharynx is clear and moist.   Eyes: Pupils are equal, round, and reactive to light. EOM are normal.   Neck: Normal range of motion. Neck supple.   Cardiovascular: Normal rate, regular rhythm, normal heart sounds and intact distal pulses.   Pulmonary/Chest: Effort normal. No respiratory distress. He has rales.   Abdominal: Soft. Bowel sounds are normal. He exhibits no distension. There is no tenderness.   Musculoskeletal: Normal range of motion. He exhibits edema.   Neurological: He is alert and oriented to person, place, and time.   Skin: Skin is warm and dry. There is pallor.   Psychiatric: He has a normal mood and affect. His behavior is normal.       Vents:  Oxygen Concentration (%): 40 (04/25/19 0059)    Lines/Drains/Airways     Drain                 Urethral Catheter 01/25/19 0632 Non-latex 90 days          Peripheral Intravenous Line                 Peripheral IV - Single Lumen 04/24/19 2332 20 G Right Antecubital less than 1 day                Significant Labs:    CBC/Anemia Profile:  Recent Labs   Lab 04/24/19  2340   WBC 9.62   HGB 12.0*   HCT 37.9*      MCV 99*   RDW 14.5        Chemistries:  Recent Labs   Lab 04/24/19  2340      K 3.5      CO2 27   BUN 19   CREATININE 1.5*   CALCIUM 9.1   ALBUMIN 3.2*   PROT 7.0   BILITOT 0.6   ALKPHOS 125   ALT 16   AST 19   MG 1.6   PHOS 3.5       ABGs:   Recent Labs   Lab 04/25/19  0038   PH 7.338*   PCO2 56.2*   HCO3 30.2*   POCSATURATED 67*   BE 3     BNP  Recent Labs   Lab 04/24/19  2340   BNP 1,094*       · Echo 4/25/19  · Normal left ventricular systolic function. The estimated ejection fraction is 55%  · Indeterminate left ventricular diastolic function.  · Normal right  ventricular systolic function.  · Moderate left atrial enlargement.  · Moderate right atrial enlargement.  · There is a transcutaneously-placed aortic bioprosthesis present. There is no aortic aortic insufficiency present. Prosthetic aortic valve is normal.  · Moderate-to-severe mitral regurgitation.  · Mild to moderate tricuspid regurgitation.  · The estimated PA systolic pressure is 54 mm Hg  · Pulmonary hypertension present.      Significant Imaging:   CXR: I have reviewed all pertinent results/findings within the past 24 hours and my personal findings are:  4/24/19 large right effusion

## 2019-04-25 NOTE — CONSULTS
"[facility]  Cardiology  Consult Note    Patient Name: Timbo Gallagher  MRN: [id number]  Admission Date: 4/24/2019  Hospital Length of Stay: 0 days  Code Status: Full Code   Attending Provider: Sandra Sun MD   Consulting Provider: Jorge L Black MD  Primary Care Physician: Cirilo Montero MD  Principal Problem:Sepsis    Patient information was obtained from patient and ER records.     Consults  Subjective:     Chief Complaint:  SOB, fever     HPI: Patient is an 83 year old male with a PMHx of DM, CVA, ICD/SVT, diastolic CHF, AVR, CKD3, and anemia of renal disease who presents to the ED via EMS with complaints of SOB. Patients symptoms began 3 days ago and have gradually worsened. Is not sure if he had a fever or not PTA. Denies a PMHx of Asthma or COPD, but has had a "lung drained" in the past ( January 2019). Has never been placed on CPAP or BIPAP in the past.       Patient was seen by his PCP yesterday for the SOB. Was placed on antibiotics and had a CXR this morning at this facility. Patient nor his wife were not contacted in regards to results.      EMS confirms that the patient had a temp of 101.8 F while on route.      Pt admitted with moderate size right pleural effusion. Pt meets criteria for sepsis. IR consulted for thoracentesis, however on plavix. Also, recently seen by Dr. Toribio 4/16 and recommended increase lasix x one week then return to daily dosing. Spouse reports that with increase in lasix dose he has persistent BLE edema and SOB.   Pt placed on IV lasix and IV abx.     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  # TIA s/p L CEA 2/2018, followed by Dr. 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 Moore)    Echo 1/24/19  · Normal left ventricular systolic function. The estimated ejection fraction is 55%  · No wall motion abnormalities.  · Normal right ventricular systolic function.  · There is a 26 mm Brent S3 transcutaneously-placed aortic bioprosthesis present. There is no aortic aortic insufficiency present. Prosthetic aortic valve is normal.  · Moderate-to-severe mitral regurgitation.  · Mild to moderate tricuspid regurgitation.  · Elevated central venous pressure (15 mm Hg).  · The estimated PA systolic pressure is 69 mm Hg  · Pulmonary hypertension present.  · Consider LISSETT for further eval of mitral regurgitation if clinically warranted     Cath 4/20/18  LVEF: 50% by echo  Normal TAVR fxn by echo  Dominance: Right  LM: normal  LAD: MLI              Diag1 prox 60%, mid 50%  Ramus: MLI  LCx: MLI  RCA: dom, prox 30%  Hemostasis:  R Radial band  Impression:  TdP  Nonobst CAD  Normal LV fxn/TAVR fxn by echo  R rad vasband for hemostasis  Plan:  Cont ASA/Plavix  Cont amio gtt     LE Venous US 4/17/18  No evidence of deep venous thrombosis in either lower extremity.     Carotid US 3/22/18 (s/p L CEA)  There is 20 - 39% right Internal Carotid stenosis.  There is 20 - 39% left Internal Carotid stenosis.     TAVR 10/17/17  B. 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    No perivalvular leak post procedure per 2D echo.    Post deployment AV mean gradient 2.5 mmHg, Vmax 1.09 m/s.        Past Medical History:   Diagnosis Date    Acute renal failure     Arthritis     Blind right eye     BPH (benign prostatic hypertrophy)     Bronchitis, chronic     Carotid artery occlusion     Lt carotid endarerectomy done 02/19/2018     CHF (congestive heart failure)     Colon polyp     Coronary artery disease     Diabetes mellitus     GERD (gastroesophageal reflux disease)      Hearing aid worn     bilateral    Hematuria     Hernia     Hypertension     Influenza A     Irregular heart beat     NSVT (nonsustained ventricular tachycardia) 4/18/2018    Renal failure as complication of care     sepsis, renal function returned    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 Coleman    CATARACT EXTRACTION, BILATERAL      EGD (ESOPHAGOGASTRODUODENOSCOPY) N/A 8/30/2018    Performed by Tye [facility]arrete [facility] at [facility] ENDO (2ND FLR)    ENDARTERECTOMY-CAROTID Left 2/19/2018    Performed by [facility]io [facility] at [facility] OR    ESOPHAGOGASTRODUODENOSCOPY  08/30/2018    EYE SURGERY      HERNIA REPAIR Left 09/2013    REPAIR, HERNIA, INGUINAL, WITHOUT HISTORY OF PRIOR REPAIR, AGE 5 YEARS OR OLDER Left 9/11/2013    Performed by Han Brown MD at [facility] OR    REPLACEMENT-VALVE-AORTIC N/A 10/17/2017    Performed by Mart[facility]nez Keene MD at [facility] CATH LAB    RETINAL DETACHMENT SURGERY      THORACENTESIS N/A 1/17/2019    Performed by [facility] Diagnostic Provider at [facility] OR       Review of patient's allergies indicates:   Allergen Reactions    Ciprofloxacin (mixture) Itching     Extreme itching    Antibiotic hc     Hydrochlorothiazide      Leg swelling      Iodine and iodide containing products      Wife and pt unsure    Vancomycin analogues Itching       No current facility-administered medications on file prior to encounter.      Current Outpatient Medications on File Prior to Encounter   Medication Sig    acarbose (PRECOSE) 25  MG Tab Take 25 mg by mouth 3 (three) times daily with meals.    acetaminophen (TYLENOL) 500 MG tablet Take 500 mg by mouth 2 (two) times daily.    amiodarone (PACERONE) 200 MG Tab Take 1 tablet (200 mg total) by mouth once daily.    aspirin (ECOTRIN) 81 MG EC tablet Take 1 tablet (81 mg total) by mouth once daily. Hold until evaluated by Cardiology    atorvastatin (LIPITOR) 40 MG tablet Take 1 tablet (40 mg total) by mouth once daily.    clopidogrel (PLAVIX) 75 mg tablet Take 1 tablet (75 mg total) by mouth once daily.    famotidine (PEPCID) 20 MG tablet Take 20 mg by mouth 2 (two) times daily.     ferrous gluconate (FERGON) 324 MG tablet Take 1 tablet (324 mg total) by mouth daily with breakfast.    furosemide (LASIX) 40 MG tablet Take 1 tablet (40 mg total) by mouth once daily. Take 40mg every 12 hours for 1 week (starting 19), then 40mg daily thereafter.    metoprolol succinate (TOPROL-XL) 50 MG 24 hr tablet Take 50 mg by mouth 2 (two) times daily.     mupirocin (BACTROBAN) 2 % ointment Apply topically 3 (three) times daily.    cyanocobalamin (VITAMIN B-12) 1000 MCG tablet Take 100 mcg by mouth once a week.    diphenhydrAMINE (BENADRYL) 50 MG capsule Take 50 mg by mouth nightly.    tamsulosin (FLOMAX) 0.4 mg Cap Take 1 capsule (0.4 mg total) by mouth once daily.     Family History     Problem Relation (Age of Onset)    Arthritis Mother    Diabetes Sister    Heart attack Brother    Heart disease Father    Heart failure Father    No Known Problems Maternal Aunt, Maternal Uncle, Paternal Aunt, Paternal Uncle, Maternal Grandmother, Maternal Grandfather, Paternal Grandmother, Paternal Grandfather        Tobacco Use    Smoking status: Former Smoker     Packs/day: 3.00     Years: 40.00     Pack years: 120.00     Types: Cigarettes     Last attempt to quit: 1990     Years since quittin.7    Smokeless tobacco: Never Used   Substance and Sexual Activity    Alcohol use: No    Drug use: No     Sexual activity: Not Currently     Partners: Female     Review of Systems   Constitution: Negative for decreased appetite.   HENT: Negative for ear discharge.    Eyes: Negative for blurred vision.   Endocrine: Negative for polyphagia.   Skin: Negative for nail changes.   Genitourinary: Negative for bladder incontinence.   Neurological: Negative for aphonia.   Psychiatric/Behavioral: Negative for hallucinations.   Allergic/Immunologic: Negative for hives.     Objective:     Vital Signs (Most Recent):  Temp: 98.3 °F (36.8 °C) (04/25/19 0259)  Pulse: 62 (04/25/19 0846)  Resp: 20 (04/25/19 0846)  BP: (!) 107/54 (04/25/19 0807)  SpO2: 98 % (04/25/19 0846) Vital Signs (24h Range):  Temp:  [98.3 °F (36.8 °C)-101.8 °F (38.8 °C)] 98.3 °F (36.8 °C)  Pulse:  [60-82] 62  Resp:  [17-42] 20  SpO2:  [94 %-100 %] 98 %  BP: (107-183)/(54-80) 107/54     Weight: 87.1 kg (192 lb)  Body mass index is 28.35 kg/m².    SpO2: 98 %  O2 Device (Oxygen Therapy): nasal cannula      Intake/Output Summary (Last 24 hours) at 4/25/2019 0953  Last data filed at 4/25/2019 0800  Gross per 24 hour   Intake 400 ml   Output 800 ml   Net -400 ml       Lines/Drains/Airways     Drain                 Urethral Catheter 01/25/19 0632 Non-latex 90 days          Peripheral Intravenous Line                 Peripheral IV - Single Lumen 04/24/19 2332 20 G Right Antecubital less than 1 day                Physical Exam   Constitutional: He is oriented to person, place, and time. He appears well-developed and well-nourished.   HENT:   Head: Normocephalic and atraumatic.   Eyes: Pupils are equal, round, and reactive to light. Conjunctivae are normal.   Neck: Normal range of motion. Neck supple.   Cardiovascular: Normal rate and intact distal pulses.   Murmur heard.  High-pitched blowing holosystolic murmur is present with a grade of 3/6 at the apex.  Pulmonary/Chest: Effort normal. He has decreased breath sounds in the right lower field.   Abdominal: Soft. Bowel sounds  are normal.   Musculoskeletal: Normal range of motion.   Neurological: He is alert and oriented to person, place, and time.   Skin: Skin is warm and dry.       Significant Labs: All pertinent lab results from the last 24 hours have been reviewed.    Significant Imaging: Echocardiogram:   2D echo with color flow doppler:   Results for orders placed or performed during the hospital encounter of 08/27/18   2D echo with color flow doppler   Result Value Ref Range    QEF 55 55 - 65    Mitral Valve Regurgitation MODERATE (A)     Diastolic Dysfunction Yes (A)     Est. PA Systolic Pressure 58.98 (A)     Mitral Valve Mobility NORMAL     Tricuspid Valve Regurgitation TRIVIAL      Assessment and Plan:     * Sepsis  Per primary    Acute on chronic diastolic (congestive) heart failure  Diuresis and afterload reduction as tolerated    Recurrent right pleural effusion  Agree with plans for thoracentesis. Ok to hold plavix    AICD (automatic cardioverter/defibrillator) present  Fantrottertronic. Recent interrogation with good device function    S/P TAVR (transcatheter aortic valve replacement)  Adequate valve function on echo 1/24/19. Will repeat    Type 2 diabetes mellitus with stage 3 chronic kidney disease  Per primary        VTE Risk Mitigation (From admission, onward)        Ordered     IP VTE HIGH RISK PATIENT  Once      04/25/19 0250     Place DEYA hose  Until discontinued      04/25/19 0250     Place sequential compression device  Until discontinued      04/25/19 0250          Thank you for your consult. I will follow-up with patient. Please contact us if you have any additional questions.    Jorge L Black MD  Cardiology   [facility]

## 2019-04-25 NOTE — NURSING
Notified tele/earl that patient is going to cardiology and IR. Verbalized understanding.  Patient going to cardiology first via bed with oxygen nc 2l and tele monitor with transporter. No distress noted

## 2019-04-25 NOTE — SUBJECTIVE & OBJECTIVE
Past Medical History:   Diagnosis Date    Acute renal failure     Arthritis     Blind right eye     BPH (benign prostatic hypertrophy)     Bronchitis, chronic     Carotid artery occlusion     Lt carotid endarerectomy done 02/19/2018     CHF (congestive heart failure)     Colon polyp     Coronary artery disease     Diabetes mellitus     GERD (gastroesophageal reflux disease)     Hearing aid worn     bilateral    Hematuria     Hernia     Hypertension     Influenza A     Irregular heart beat     NSVT (nonsustained ventricular tachycardia) 4/18/2018    Renal failure as complication of care     sepsis, renal function returned    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 Coleman    CATARACT EXTRACTION, BILATERAL      EGD (ESOPHAGOGASTRODUODENOSCOPY) N/A 8/30/2018    Performed by Tye Navarrete MD at [facility] ENDO (2ND FLR)    ENDARTERECTOMY-CAROTID Left 2/19/2018    [facility]formed [facility] at [facility] OR    ESOPHAGOGASTRODUODENOSCOPY  08/30/2018    EYE SURGERY      HERNIA REPAIR Left 09/2013    REPAIR, HERNIA, INGUINAL, WITHOUT HISTORY OF PRIOR REPAIR, AGE 5 YEARS OR OLDER Left 9/11/2013    Performed by Han Brown MD at [facility] OR    REPLACEMENT-VALVE-AORTIC N/A 10/17/2017    Performed by Martínez Keene MD at [facility] CATH LAB    RETINAL DETACHMENT SURGERY      THORACENTESIS N/A 1/17/2019    Performed by [facility] Diagnostic Provider at [facility] OR       Review of patient's allergies indicates:   Allergen Reactions     Ciprofloxacin (mixture) Itching     Extreme itching    Antibiotic hc     Hydrochlorothiazide      Leg swelling      Iodine and iodide containing products      Wife and pt unsure    Vancomycin analogues Itching       No current facility-administered medications on file prior to encounter.      Current Outpatient Medications on File Prior to Encounter   Medication Sig    acarbose (PRECOSE) 25 MG Tab Take 25 mg by mouth 3 (three) times daily with meals.    acetaminophen (TYLENOL) 500 MG tablet Take 500 mg by mouth 2 (two) times daily.    amiodarone (PACERONE) 200 MG Tab Take 1 tablet (200 mg total) by mouth once daily.    aspirin (ECOTRIN) 81 MG EC tablet Take 1 tablet (81 mg total) by mouth once daily. Hold until evaluated by Cardiology    atorvastatin (LIPITOR) 40 MG tablet Take 1 tablet (40 mg total) by mouth once daily.    clopidogrel (PLAVIX) 75 mg tablet Take 1 tablet (75 mg total) by mouth once daily.    famotidine (PEPCID) 20 MG tablet Take 20 mg by mouth 2 (two) times daily.     ferrous gluconate (FERGON) 324 MG tablet Take 1 tablet (324 mg total) by mouth daily with breakfast.    furosemide (LASIX) 40 MG tablet Take 1 tablet (40 mg total) by mouth once daily. Take 40mg every 12 hours for 1 week (starting 4/16/19), then 40mg daily thereafter.    metoprolol succinate (TOPROL-XL) 50 MG 24 hr tablet Take 50 mg by mouth 2 (two) times daily.     mupirocin (BACTROBAN) 2 % ointment Apply topically 3 (three) times daily.    cyanocobalamin (VITAMIN B-12) 1000 MCG tablet Take 100 mcg by mouth once a week.    diphenhydrAMINE (BENADRYL) 50 MG capsule Take 50 mg by mouth nightly.    tamsulosin (FLOMAX) 0.4 mg Cap Take 1 capsule (0.4 mg total) by mouth once daily.     Family History     Problem Relation (Age of Onset)    Arthritis Mother    Diabetes Sister    Heart attack Brother    Heart disease Father    Heart failure Father    No Known Problems Maternal Aunt, Maternal Uncle, Paternal Aunt, Paternal  Uncle, Maternal Grandmother, Maternal Grandfather, Paternal Grandmother, Paternal Grandfather        Tobacco Use    Smoking status: Former Smoker     Packs/day: 3.00     Years: 40.00     Pack years: 120.00     Types: Cigarettes     Last attempt to quit: 1990     Years since quittin.7    Smokeless tobacco: Never Used   Substance and Sexual Activity    Alcohol use: No    Drug use: No    Sexual activity: Not Currently     Partners: Female     Review of Systems   Constitutional: Positive for activity change and fatigue.   HENT: Positive for congestion and sinus pressure.    Eyes: Positive for visual disturbance.   Respiratory: Positive for cough and shortness of breath.    Cardiovascular: Positive for leg swelling.   Gastrointestinal: Negative.    Endocrine: Negative.    Genitourinary: Negative.    Musculoskeletal: Negative.    Skin: Negative.    Neurological: Positive for weakness.   Psychiatric/Behavioral: Negative.      Objective:     Vital Signs (Most Recent):  Temp: 98.3 °F (36.8 °C) (19 0259)  Pulse: 72 (19 0807)  Resp: 18 (19 0807)  BP: (!) 107/54 (19 0807)  SpO2: (!) 94 % (19 0807) Vital Signs (24h Range):  Temp:  [98.3 °F (36.8 °C)-101.8 °F (38.8 °C)] 98.3 °F (36.8 °C)  Pulse:  [60-82] 72  Resp:  [17-42] 18  SpO2:  [94 %-100 %] 94 %  BP: (107-183)/(54-80) 107/54     Weight: 87.1 kg (192 lb)  Body mass index is 28.35 kg/m².    Physical Exam   Constitutional: He is oriented to person, place, and time. He appears well-developed and well-nourished. No distress.   HENT:   Head: Normocephalic and atraumatic.   Mouth/Throat: Oropharynx is clear and moist.   Eyes: Pupils are equal, round, and reactive to light. EOM are normal.   Neck: Normal range of motion. Neck supple.   Cardiovascular: Normal rate, regular rhythm, normal heart sounds and intact distal pulses.   Pulmonary/Chest: Effort normal. No respiratory distress. He has rales.   Abdominal: Soft. Bowel sounds are  normal. He exhibits no distension. There is no tenderness.   Musculoskeletal: Normal range of motion. He exhibits edema.   Neurological: He is alert and oriented to person, place, and time.   Skin: Skin is warm and dry. There is pallor.   Psychiatric: He has a normal mood and affect. His behavior is normal.         CRANIAL NERVES     CN III, IV, VI   Pupils are equal, round, and reactive to light.  Extraocular motions are normal.        Significant Labs:   Blood Culture:   Recent Labs   Lab 04/24/19 2340 04/25/19  0000   LABBLOO No Growth to date No Growth to date     CBC:   Recent Labs   Lab 04/24/19 2340   WBC 9.62   HGB 12.0*   HCT 37.9*        CMP:   Recent Labs   Lab 04/24/19 2340      K 3.5      CO2 27   *   BUN 19   CREATININE 1.5*   CALCIUM 9.1   PROT 7.0   ALBUMIN 3.2*   BILITOT 0.6   ALKPHOS 125   AST 19   ALT 16   ANIONGAP 10   EGFRNONAA 42*     Cardiac Markers:   Recent Labs   Lab 04/24/19 2340   BNP 1,094*     Coagulation: No results for input(s): PT, INR, APTT in the last 48 hours.  Lactic Acid:   Recent Labs   Lab 04/24/19 2340 04/25/19  0429   LACTATE 1.7 1.5     Magnesium:   Recent Labs   Lab 04/24/19 2340   MG 1.6     POCT Glucose: No results for input(s): POCTGLUCOSE in the last 48 hours.  Troponin:   Recent Labs   Lab 04/24/19 2340   TROPONINI 0.018     Urine Studies: No results for input(s): COLORU, APPEARANCEUA, PHUR, SPECGRAV, PROTEINUA, GLUCUA, KETONESU, BILIRUBINUA, OCCULTUA, NITRITE, UROBILINOGEN, LEUKOCYTESUR, RBCUA, WBCUA, BACTERIA, SQUAMEPITHEL, HYALINECASTS in the last 48 hours.    Invalid input(s): WRIGHTSUR    Significant Imaging:CXR:  Impression       Suspected mild worsening pulmonary edema with mild increase in volume of moderate right pleural effusion.

## 2019-04-25 NOTE — ED TRIAGE NOTES
Shortness of Breath Pt her via EMS with c/o cough x 2 days. Pt seen by PCP yesterday and started on abx. Pt has temp of 101.8. Pt denies cheat pain and n/v/d

## 2019-04-25 NOTE — ASSESSMENT & PLAN NOTE
Lactate in normal range, but with fever, RR 29 and evidence of possible pneumonia complicated with right pleural effusion, will treat as sepsis  Blood culture pending  Urine culture pending  Placed on zyvox (?allergy to vanc) and Cefepime

## 2019-04-25 NOTE — HPI
"Patient is an 83 year old male with a PMHx of DM, CVA, ICD/SVT, diastolic CHF, AVR, CKD3, and anemia of renal disease who presents to the ED via EMS with complaints of SOB. Patients symptoms began 3 days ago and have gradually worsened. Is not sure if he had a fever or not PTA. Denies a PMHx of Asthma or COPD, but has had a "lung drained" in the past ( January 2019). Has never been placed on CPAP or BIPAP in the past.       Patient was seen by his PCP yesterday for the SOB. Was placed on antibiotics and had a CXR this morning at this facility. Patient nor his wife were not contacted in regards to results.      EMS confirms that the patient had a temp of 101.8 F while on route.     Pt admitted with moderate size right pleural effusion. Pt meets criteria for sepsis. IR consulted for thoracentesis, however on plavix. Also, recently seen by Dr. Toribio 4/16 and recommended increase lasix x one week then return to daily dosing. Spouse reports that with increase in lasix dose he has persistent BLE edema and SOB.   Pt placed on IV lasix and IV abx.       "

## 2019-04-25 NOTE — CONSULTS
[facility]  Pulmonology  Consult Note    Patient Name: Timbo Gallagher  MRN: [id number]  Admission Date: 4/24/2019  Hospital Length of Stay: 0 days  Code Status: Full Code  Attending Physician: Sandra Sun MD  Primary Care Provider: Cirilo Montero MD   Principal Problem: Sepsis    Inpatient consult to Pulmonology  Consult performed by: Dillan Kumar MD  Consult ordered by: Sandra Sun MD        Subjective:     HPI:  Patient is 84 y.o. 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 presented to [facility] on 4/27/19 with worsening sob.  cxr confirmed moderate right effusion.  Pulmonary was consulted for further inputs.    Patient with chronic effusion first noted 8/18.  S/p ir thoracentesis on 1/17/19.  However, fluid analysis was not sent.  Patient was hospitalized sob and treated with antibiotics and escalation of diuretics.  Patient was seen by Dr. Boone as an outpatient and was recommended fluid balance optimization.      Past Medical History:   Diagnosis Date    Acute renal failure     Arthritis     Blind right eye     BPH (benign prostatic hypertrophy)     Bronchitis, chronic     Carotid artery occlusion     Lt carotid endarerectomy done 02/19/2018     CHF (congestive heart failure)     Colon polyp     Coronary  artery disease     Diabetes mellitus     GERD (gastroesophageal reflux disease)     Hearing aid worn     bilateral    Hematuria     Hernia     Hypertension     Influenza A     Irregular heart beat     NSVT (nonsustained ventricular tachycardia) 4/18/2018    Renal failure as complication of care     sepsis, renal function returned    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 Coleman    CATARACT EXTRACTION, BILATERAL      EGD (ESOPHAGOGASTRODUODENOSCOPY) N/A 8/30/2018    Performed by Tye Navarrete MD at [facility] ENDO (2ND FLR)    ENDARTERECTOMY-CAROTID Left 2/19/2018    Performed by Silvio Coleman MD at [facility] OR    ESOPHAGOGASTRODUODENOSCOPY  08/30/2018    EYE SURGERY      HERNIA REPAIR Left 09/2013    REPAIR, HERNIA, INGUINAL, WITHOUT HISTORY OF PRIOR REPAIR, AGE 5 YEARS OR OLDER Left 9/11/2013    Performed by Han Brown MD at [facility] OR    REPLACEMENT-VALVE-AORTIC N/A 10/17/2017    Performed by Martínez Keene MD at [facility] CATH LAB    RETINAL DETACHMENT SURGERY      THORACENTESIS N/A 1/17/2019    Performed by [facility] Diagnostic Provider at [facility] OR       Review of patient's allergies indicates:   Allergen Reactions    Ciprofloxacin (mixture) Itching     Extreme itching    Antibiotic hc     Hydrochlorothiazide      Leg swelling      Iodine and iodide containing products      Wife and pt unsure    Vancomycin analogues Itching       Family History     Problem Relation (Age of Onset)    Arthritis Mother    Diabetes Sister     Heart attack Brother    Heart disease Father    Heart failure Father    No Known Problems Maternal Aunt, Maternal Uncle, Paternal Aunt, Paternal Uncle, Maternal Grandmother, Maternal Grandfather, Paternal Grandmother, Paternal Grandfather        Tobacco Use    Smoking status: Former Smoker     Packs/day: 3.00     Years: 40.00     Pack years: 120.00     Types: Cigarettes     Last attempt to quit: 1990     Years since quittin.7    Smokeless tobacco: Never Used   Substance and Sexual Activity    Alcohol use: No    Drug use: No    Sexual activity: Not Currently     Partners: Female         Review of Systems   Constitutional: Positive for activity change and fatigue.   HENT: Positive for congestion and sinus pressure.    Eyes: Positive for visual disturbance.   Respiratory: Positive for cough and shortness of breath.    Cardiovascular: Positive for leg swelling.   Gastrointestinal: Negative.    Endocrine: Negative.    Genitourinary: Negative.    Musculoskeletal: Negative.    Skin: Negative.    Neurological: Positive for weakness.   Psychiatric/Behavioral: Negative.      Objective:     Vital Signs (Most Recent):  Temp: 98.3 °F (36.8 °C) (19 0846)  Pulse: 62 (19 0846)  Resp: 20 (19 0846)  BP: (!) 107/54 (19 0807)  SpO2: 98 % (19 0846) Vital Signs (24h Range):  Temp:  [98.3 °F (36.8 °C)-101.8 °F (38.8 °C)] 98.3 °F (36.8 °C)  Pulse:  [60-82] 62  Resp:  [17-42] 20  SpO2:  [94 %-100 %] 98 %  BP: (107-183)/(54-80) 107/54     Weight: 87.1 kg (192 lb)  Body mass index is 28.35 kg/m².      Intake/Output Summary (Last 24 hours) at 2019 1145  Last data filed at 2019 0800  Gross per 24 hour   Intake 400 ml   Output 800 ml   Net -400 ml       Physical Exam   Constitutional: He is oriented to person, place, and time. He appears well-developed and well-nourished. No distress.   HENT:   Head: Normocephalic and atraumatic.   Mouth/Throat: Oropharynx is clear and moist.   Eyes:  Pupils are equal, round, and reactive to light. EOM are normal.   Neck: Normal range of motion. Neck supple.   Cardiovascular: Normal rate, regular rhythm, normal heart sounds and intact distal pulses.   Pulmonary/Chest: Effort normal. No respiratory distress. He has rales.   Abdominal: Soft. Bowel sounds are normal. He exhibits no distension. There is no tenderness.   Musculoskeletal: Normal range of motion. He exhibits edema.   Neurological: He is alert and oriented to person, place, and time.   Skin: Skin is warm and dry. There is pallor.   Psychiatric: He has a normal mood and affect. His behavior is normal.       Vents:  Oxygen Concentration (%): 40 (04/25/19 0059)    Lines/Drains/Airways     Drain                 Urethral Catheter 01/25/19 0632 Non-latex 90 days          Peripheral Intravenous Line                 Peripheral IV - Single Lumen 04/24/19 2332 20 G Right Antecubital less than 1 day                Significant Labs:    CBC/Anemia Profile:  Recent Labs   Lab 04/24/19  2340   WBC 9.62   HGB 12.0*   HCT 37.9*      MCV 99*   RDW 14.5        Chemistries:  Recent Labs   Lab 04/24/19  2340      K 3.5      CO2 27   BUN 19   CREATININE 1.5*   CALCIUM 9.1   ALBUMIN 3.2*   PROT 7.0   BILITOT 0.6   ALKPHOS 125   ALT 16   AST 19   MG 1.6   PHOS 3.5       ABGs:   Recent Labs   Lab 04/25/19  0038   PH 7.338*   PCO2 56.2*   HCO3 30.2*   POCSATURATED 67*   BE 3     BNP  Recent Labs   Lab 04/24/19  2340   BNP 1,094*       · Echo 4/25/19  · Normal left ventricular systolic function. The estimated ejection fraction is 55%  · Indeterminate left ventricular diastolic function.  · Normal right ventricular systolic function.  · Moderate left atrial enlargement.  · Moderate right atrial enlargement.  · There is a transcutaneously-placed aortic bioprosthesis present. There is no aortic aortic insufficiency present. Prosthetic aortic valve is normal.  · Moderate-to-severe mitral regurgitation.  · Mild to  moderate tricuspid regurgitation.  · The estimated PA systolic pressure is 54 mm Hg  · Pulmonary hypertension present.      Significant Imaging:   CXR: I have reviewed all pertinent results/findings within the past 24 hours and my personal findings are:  4/24/19 large right effusion    Assessment/Plan:     * Sepsis  Fever upon admission.  Currently on zosyn.  Culture ngtd.  Send to procal to help guide antibiotic decision.  Await thoracentesis.  May consider de-escalation.      Acute on chronic diastolic (congestive) heart failure  Currently on lasix 40 mg bid.  Await repeat echo.      Recurrent right pleural effusion  Chronic since 8/2018.  S/p thoracentesis 1/19 but analysis was not sent.  Suspect volume overload.  Recommend negative fluid balance with escalation of diuretic.  Will need to monitor bun/creatinie closely.      S/P TAVR (transcatheter aortic valve replacement)  Followed by Dr. Toribio.  +MVR as well.            Thank you for your consult. I will follow-up with patient. Please contact us if you have any additional questions.     Dillan Kumar MD  Pulmonology  [facility]

## 2019-04-25 NOTE — PROCEDURES
Radiology Post-Procedure Note    Pre Op Diagnosis: CHF, CVA, presents with SOB, cough, fever, right pleural effusion  Post Op Diagnosis: Same    Procedure: Right thoracentesis    Procedure performed by: Flash Rudd MD    Written Informed Consent Obtained: Yes  Specimen Removed: YES 1.5 L thin yellow fluid  Estimated Blood Loss: Minimal    Findings:   Successful right thoracentesis.      Patient tolerated procedure well.    Flash Rudd MD  Staff Radiologist  Department of Radiology  Pager: [phone]

## 2019-04-25 NOTE — PLAN OF CARE
Problem: Respiratory Compromise COPD (Chronic Obstructive Pulmonary Disease)  Goal: Effective Oxygenation and Ventilation  Outcome: Ongoing (interventions implemented as appropriate)  Patient taking neb treatments as ordered. Using O2. States understanding of therapy

## 2019-04-25 NOTE — NURSING
Notified tele rell/earl placing patient on monitor. Verbalized understanding    2620 notified tele pt has LCW pacemaker. Verbalized understanding

## 2019-04-25 NOTE — PLAN OF CARE
"SW met with patient and his wife to complete discharge planning assessment. Prior to beginning the discharge planning assessment, patient verified name and date of birth. SW educated patient on the discharge process and explained that discharge planning begins at admission. SW reviewed contents of the "[facility] Packet" emphasizing, "Help at home", "Managing your health", and "Your preferences". Patient's wife stated that she transports patient to appointments and would prefer late morning appointments.Wife also stated that they prefer to use [facility] in [place]. Wife stated that patient was independent before hospitalization and did not need much assistance when he was in the hospital. Wife stated patient cooks for himself and baths himself.  SW completend placed "Help at home" questions in his/her "[facility] Packet." SW wrote name and phone number on white communication board.       04/25/19 4280   Discharge Assessment   Assessment Type Discharge Planning Assessment   Confirmed/corrected address and phone number on facesheet? Yes   Assessment information obtained from? Caregiver  (Opal Gallagher (wife) [phone])   Communicated expected length of stay with patient/caregiver yes   Prior to hospitilization cognitive status: Alert/Oriented   Prior to hospitalization functional status: Independent   Current cognitive status: Alert/Oriented   Current Functional Status: Independent   Lives With child(opal), adult;spouse   Able to Return to Prior Arrangements yes   Is patient able to care for self after discharge? No   Patient's perception of discharge disposition home or selfcare   Readmission Within the Last 30 Days no previous admission in last 30 days   Patient currently being followed by outpatient case management? No   Patient currently receives any other outside agency services? No   Equipment Currently Used at Home walker, standard   Do you have any problems affording any of your prescribed medications? No "   Is the patient taking medications as prescribed? yes   Does the patient have transportation home? Yes   Transportation Anticipated public transportation  (taxi cab with spouse)   Does the patient receive services at the [facility]? No   Discharge Plan A   (Follow up appointment with PCP)   Discharge Plan B Home with family  (Follow up appointments with PCP)   DME Needed Upon Discharge  none   Patient/Family in Agreement with Plan yes       [facility] [address] [address]  Phone: [phone] Fax: [phone]

## 2019-04-25 NOTE — H&P
"[facility] Medicine  History & Physical    Patient Name: Timbo Gallagher  MRN: [id number]  Admission Date: 4/24/2019  Attending Physician: Sandra Sun MD   Primary Care Provider: Cirilo Montero MD         Patient information was obtained from patient, spouse/SO and ER records.     Subjective:     Principal Problem:Sepsis    Chief Complaint:   Chief Complaint   Patient presents with    Shortness of Breath     Pt her via EMS with c/o cough x 2 days. Pt seen by PCP yesterday and started on abx. Pt has temp of 101.8.    Cough        HPI: Patient is an 83 year old male with a PMHx of DM, CVA, ICD/SVT, diastolic CHF, AVR, CKD3, and anemia of renal disease who presents to the ED via EMS with complaints of SOB. Patients symptoms began 3 days ago and have gradually worsened. Is not sure if he had a fever or not PTA. Denies a PMHx of Asthma or COPD, but has had a "lung drained" in the past ( January 2019). Has never been placed on CPAP or BIPAP in the past.       Patient was seen by his PCP yesterday for the SOB. Was placed on antibiotics and had a CXR this morning at this facility. Patient nor his wife were not contacted in regards to results.      EMS confirms that the patient had a temp of 101.8 F while on route.     Pt admitted with moderate size right pleural effusion. Pt meets criteria for sepsis. IR consulted for thoracentesis, however on plavix. Also, recently seen by Dr. Toribio 4/16 and recommended increase lasix x one week then return to daily dosing. Spouse reports that with increase in lasix dose he has persistent BLE edema and SOB.   Pt placed on IV lasix and IV abx.         Past Medical History:   Diagnosis Date    Acute renal failure     Arthritis     Blind right eye     BPH (benign prostatic hypertrophy)     Bronchitis, chronic     Carotid artery occlusion     Lt carotid endarerectomy done 02/19/2018     CHF (congestive heart failure)     Colon polyp     " Coronary artery disease     Diabetes mellitus     GERD (gastroesophageal reflux disease)     Hearing aid worn     bilateral    Hematuria     Hernia     Hypertension     Influenza A     Irregular heart beat     NSVT (nonsustained ventricular tachycardia) 4/18/2018    Renal failure as complication of care     sepsis, renal function returned    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 Coleman    CATARACT EXTRACTION, BILATERAL      EGD (ESOPHAGOGASTRODUODENOSCOPY) N/A 8/30/2018    Performed by Tye Navarrete MD at [facility] ENDO (2ND FLR)    ENDARTERECTOMY-CAROTID Left 2/19/2018    Performed by Silvio Coleman MD at [facility] OR    ESOPHAGOGASTRODUODENOSCOPY  08/30/2018    EYE SURGERY      HERNIA REPAIR Left 09/2013    REPAIR, HERNIA, INGUINAL, WITHOUT HISTORY OF PRIOR REPAIR, AGE 5 YEARS OR OLDER Left 9/11/2013    Performed by Han Brown MD at [facility] OR    REPLACEMENT-VALVE-AORTIC N/A 10/17/2017    Performed by Martínez Keene MD at [facility] CATH LAB    RETINAL DETACHMENT SURGERY      THORACENTESIS N/A 1/17/2019    Performed by [facility] Diagnostic Provider at [facility] OR       Review of patient's allergies indicates:   Allergen Reactions    Ciprofloxacin (mixture) Itching     Extreme itching    Antibiotic hc     Hydrochlorothiazide      Leg swelling      Iodine and iodide containing products      Wife and pt unsure    Vancomycin analogues Itching       No current facility-administered medications on file prior to encounter.       Current Outpatient Medications on File Prior to Encounter   Medication Sig    acarbose (PRECOSE) 25 MG Tab Take 25 mg by mouth 3 (three) times daily with meals.    acetaminophen (TYLENOL) 500 MG tablet Take 500 mg by mouth 2 (two) times daily.    amiodarone (PACERONE) 200 MG Tab Take 1 tablet (200 mg total) by mouth once daily.    aspirin (ECOTRIN) 81 MG EC tablet Take 1 tablet (81 mg total) by mouth once daily. Hold until evaluated by Cardiology    atorvastatin (LIPITOR) 40 MG tablet Take 1 tablet (40 mg total) by mouth once daily.    clopidogrel (PLAVIX) 75 mg tablet Take 1 tablet (75 mg total) by mouth once daily.    famotidine (PEPCID) 20 MG tablet Take 20 mg by mouth 2 (two) times daily.     ferrous gluconate (FERGON) 324 MG tablet Take 1 tablet (324 mg total) by mouth daily with breakfast.    furosemide (LASIX) 40 MG tablet Take 1 tablet (40 mg total) by mouth once daily. Take 40mg every 12 hours for 1 week (starting 19), then 40mg daily thereafter.    metoprolol succinate (TOPROL-XL) 50 MG 24 hr tablet Take 50 mg by mouth 2 (two) times daily.     mupirocin (BACTROBAN) 2 % ointment Apply topically 3 (three) times daily.    cyanocobalamin (VITAMIN B-12) 1000 MCG tablet Take 100 mcg by mouth once a week.    diphenhydrAMINE (BENADRYL) 50 MG capsule Take 50 mg by mouth nightly.    tamsulosin (FLOMAX) 0.4 mg Cap Take 1 capsule (0.4 mg total) by mouth once daily.     Family History     Problem Relation (Age of Onset)    Arthritis Mother    Diabetes Sister    Heart attack Brother    Heart disease Father    Heart failure Father    No Known Problems Maternal Aunt, Maternal Uncle, Paternal Aunt, Paternal Uncle, Maternal Grandmother, Maternal Grandfather, Paternal Grandmother, Paternal Grandfather        Tobacco Use    Smoking status: Former Smoker     Packs/day: 3.00     Years: 40.00     Pack years: 120.00     Types: Cigarettes     Last attempt to quit: 1990     Years since quittin.7     Smokeless tobacco: Never Used   Substance and Sexual Activity    Alcohol use: No    Drug use: No    Sexual activity: Not Currently     Partners: Female     Review of Systems   Constitutional: Positive for activity change and fatigue.   HENT: Positive for congestion and sinus pressure.    Eyes: Positive for visual disturbance.   Respiratory: Positive for cough and shortness of breath.    Cardiovascular: Positive for leg swelling.   Gastrointestinal: Negative.    Endocrine: Negative.    Genitourinary: Negative.    Musculoskeletal: Negative.    Skin: Negative.    Neurological: Positive for weakness.   Psychiatric/Behavioral: Negative.      Objective:     Vital Signs (Most Recent):  Temp: 98.3 °F (36.8 °C) (04/25/19 0259)  Pulse: 72 (04/25/19 0807)  Resp: 18 (04/25/19 0807)  BP: (!) 107/54 (04/25/19 0807)  SpO2: (!) 94 % (04/25/19 0807) Vital Signs (24h Range):  Temp:  [98.3 °F (36.8 °C)-101.8 °F (38.8 °C)] 98.3 °F (36.8 °C)  Pulse:  [60-82] 72  Resp:  [17-42] 18  SpO2:  [94 %-100 %] 94 %  BP: (107-183)/(54-80) 107/54     Weight: 87.1 kg (192 lb)  Body mass index is 28.35 kg/m².    Physical Exam   Constitutional: He is oriented to person, place, and time. He appears well-developed and well-nourished. No distress.   HENT:   Head: Normocephalic and atraumatic.   Mouth/Throat: Oropharynx is clear and moist.   Eyes: Pupils are equal, round, and reactive to light. EOM are normal.   Neck: Normal range of motion. Neck supple.   Cardiovascular: Normal rate, regular rhythm, normal heart sounds and intact distal pulses.   Pulmonary/Chest: Effort normal. No respiratory distress. He has rales.   Abdominal: Soft. Bowel sounds are normal. He exhibits no distension. There is no tenderness.   Musculoskeletal: Normal range of motion. He exhibits edema.   Neurological: He is alert and oriented to person, place, and time.   Skin: Skin is warm and dry. There is pallor.   Psychiatric: He has a normal mood and affect. His behavior is  normal.         CRANIAL NERVES     CN III, IV, VI   Pupils are equal, round, and reactive to light.  Extraocular motions are normal.        Significant Labs:   Blood Culture:   Recent Labs   Lab 04/24/19  2340 04/25/19  0000   LABBLOO No Growth to date No Growth to date     CBC:   Recent Labs   Lab 04/24/19 2340   WBC 9.62   HGB 12.0*   HCT 37.9*        CMP:   Recent Labs   Lab 04/24/19  2340      K 3.5      CO2 27   *   BUN 19   CREATININE 1.5*   CALCIUM 9.1   PROT 7.0   ALBUMIN 3.2*   BILITOT 0.6   ALKPHOS 125   AST 19   ALT 16   ANIONGAP 10   EGFRNONAA 42*     Cardiac Markers:   Recent Labs   Lab 04/24/19 2340   BNP 1,094*     Coagulation: No results for input(s): PT, INR, APTT in the last 48 hours.  Lactic Acid:   Recent Labs   Lab 04/24/19  2340 04/25/19  0429   LACTATE 1.7 1.5     Magnesium:   Recent Labs   Lab 04/24/19 2340   MG 1.6     POCT Glucose: No results for input(s): POCTGLUCOSE in the last 48 hours.  Troponin:   Recent Labs   Lab 04/24/19 2340   TROPONINI 0.018     Urine Studies: No results for input(s): COLORU, APPEARANCEUA, PHUR, SPECGRAV, PROTEINUA, GLUCUA, KETONESU, BILIRUBINUA, OCCULTUA, NITRITE, UROBILINOGEN, LEUKOCYTESUR, RBCUA, WBCUA, BACTERIA, SQUAMEPITHEL, HYALINECASTS in the last 48 hours.    Invalid input(s): WRIGHTSUR    Significant Imaging:CXR:  Impression       Suspected mild worsening pulmonary edema with mild increase in volume of moderate right pleural effusion.         Assessment/Plan:     * Sepsis  Lactate in normal range, but with fever, RR 29 and evidence of possible pneumonia complicated with right pleural effusion, will treat as sepsis  Blood culture pending  Urine culture pending  Placed on zyvox (?allergy to vanc) and Cefepime         Acute on chronic diastolic (congestive) heart failure  ECHO 1/24/2019  Conclusion   · Normal left ventricular systolic function. The estimated ejection fraction is 55%  · No wall motion abnormalities.  · Normal  right ventricular systolic function.  · There is a 26 mm Brent S3 transcutaneously-placed aortic bioprosthesis present. There is no aortic aortic insufficiency present. Prosthetic aortic valve is normal.  · Moderate-to-severe mitral regurgitation.  · Mild to moderate tricuspid regurgitation.  · Elevated central venous pressure (15 mm Hg).  · The estimated PA systolic pressure is 69 mm Hg  · Pulmonary hypertension present.  · Consider LISSETT for further eval of mitral regurgitation if clinically warranted     Lasix IV BID  Pt was supposed to have follow up appt with cardiology today, will consult for any further recs      Recurrent right pleural effusion  With evidence of CHF decompensation, likely transudative, though with fever cannot rule out infectious etiology  Plavix held  IR consulted for thoracentesis, though on plavix will defer to their discretion         Anemia of chronic renal failure, stage 3 (moderate)  Stable  Resume oral iron supplementation       AICD (automatic cardioverter/defibrillator) present  Recent interrogated- see cardiology note 4/16/2019  Amiodarone + BB      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  Uncontrolled BP on admit (175/80)  Resumed toprol       S/P TAVR (transcatheter aortic valve replacement)  Follow up cardiology [facility]     Benign prostatic hyperplasia without lower urinary tract symptoms  Resume flomax  Accurate I/Os - h/o urinary retention requiring indwelling watson catheter       Type 2 diabetes mellitus with stage 3 chronic kidney disease  Hold oral medication  SSI  Diabetic diet  A1c 6%, controlled          VTE Risk Mitigation (From admission, onward)        Ordered     IP VTE HIGH RISK PATIENT  Once      04/25/19 0250     Place DEYA hose  Until discontinued      04/25/19 0250     Place sequential compression device  Until discontinued      04/25/19 0250             Sandra Sun MD  Department of Hospital Medicine   [facility]

## 2019-04-25 NOTE — ASSESSMENT & PLAN NOTE
With evidence of CHF decompensation, likely transudative, though with fever cannot rule out infectious etiology  Plavix held  IR consulted for thoracentesis, though on plavix will defer to their discretion

## 2019-04-25 NOTE — ED PROVIDER NOTES
"Encounter Date: 4/24/2019    SCRIBE #1 NOTE: I, Facundo Florez, am scribing for, and in the presence of,  Dr. Nathan. I have scribed the entire note.       History     Chief Complaint   Patient presents with    Shortness of Breath     Pt her via EMS with c/o cough x 2 days. Pt seen by PCP yesterday and started on abx. Pt has temp of 101.8.    Cough     Patient is an 83 year old male with a PMHx of DM, CVA, Renal failure, and CHF, who presents to the ED via EMS with complaints of SOB. Patients symptoms began 3 days ago and have gradually worsened. Is not sure if he had a fever or not PTA. Denies a PMHx of Asthma or COPD, but has had a "lung drained" in the past. Has never been placed on CPAP or BIPAP in the past.      Patient was seen by his PCP yesterday for the SOB. Was placed on antibiotics and had a CXR this morning at this facility. Patient nor his wife were not contacted in regards to results.     EMS confirms that the patient had a temp of 101.8 F while on route.         Review of patient's allergies indicates:   Allergen Reactions    Ciprofloxacin (mixture) Itching     Extreme itching    Antibiotic hc     Hydrochlorothiazide      Leg swelling      Iodine and iodide containing products      Wife and pt unsure    Vancomycin analogues Itching     Past Medical History:   Diagnosis Date    Acute renal failure     Arthritis     Blind right eye     BPH (benign prostatic hypertrophy)     Bronchitis, chronic     Carotid artery occlusion     Lt carotid endarerectomy done 02/19/2018     CHF (congestive heart failure)     Colon polyp     Coronary artery disease     Diabetes mellitus     GERD (gastroesophageal reflux disease)     Hearing aid worn     bilateral    Hematuria     Hernia     Hypertension     Influenza A     Irregular heart beat     NSVT (nonsustained ventricular tachycardia) 4/18/2018    Renal failure as complication of care     sepsis, renal function returned    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 Coleman    CATARACT EXTRACTION, BILATERAL      EGD (ESOPHAGOGASTRODUODENOSCOPY) N/A 8/30/2018    Performed by Tye Navarrete MD at [facility] ENDO (2ND FLR)    ENDARTERECTOMY-CAROTID Left 2/19/2018    Performed by Silvio Coleman MD at [facility] OR    ESOPHAGOGASTRODUODENOSCOPY  08/30/2018    EYE SURGERY      HERNIA REPAIR Left 09/2013    REPAIR, HERNIA, INGUINAL, WITHOUT HISTORY OF PRIOR REPAIR, AGE 5 YEARS OR OLDER Left 9/11/2013    Performed by Han Brown MD at [facility] OR    REPLACEMENT-VALVE-AORTIC N/A 10/17/2017    Performed by Martínez Keene MD at [facility] CATH LAB    RETINAL DETACHMENT SURGERY      THORACENTESIS N/A 1/17/2019    Performed by [facility] Diagnostic Provider at [facility] OR     Family History   Problem Relation Age of Onset    Arthritis Mother     Heart disease Father     Heart failure Father     Diabetes Sister     Heart attack Brother     No Known Problems Maternal Aunt     No Known Problems Maternal Uncle     No Known Problems Paternal Aunt     No Known Problems Paternal Uncle     No Known Problems Maternal Grandmother     No Known Problems Maternal Grandfather     No Known Problems Paternal Grandmother     No Known Problems Paternal Grandfather     Anemia Neg Hx     Arrhythmia Neg Hx     Asthma Neg Hx     Clotting disorder Neg Hx     Fainting Neg Hx     Hyperlipidemia Neg Hx     Hypertension Neg Hx     Stroke Neg Hx     Atrial Septal Defect Neg Hx      Social History      Tobacco Use    Smoking status: Former Smoker     Packs/day: 3.00     Years: 40.00     Pack years: 120.00     Types: Cigarettes     Last attempt to quit: 1990     Years since quittin.7    Smokeless tobacco: Never Used   Substance Use Topics    Alcohol use: No    Drug use: No     Review of Systems   Respiratory: Positive for shortness of breath.    Cardiovascular: Negative for chest pain.   Gastrointestinal: Negative for abdominal pain, diarrhea, nausea and vomiting.   Musculoskeletal: Negative for back pain.   All other systems reviewed and are negative.      Physical Exam     Initial Vitals [199]   BP Pulse Resp Temp SpO2   (!) 175/80 82 (!) 29 (!) 101.8 °F (38.8 °C) 99 %      MAP       --         Physical Exam    Nursing note and vitals reviewed.  Constitutional: He appears well-developed and well-nourished.   HENT:   Head: Normocephalic and atraumatic.   Eyes: Pupils are equal, round, and reactive to light.   Neck: Normal range of motion.   Cardiovascular: Normal rate and normal heart sounds.   Pulmonary/Chest: He has rales (Inspiratory and expiratory ).   Tachypneic    Musculoskeletal:        Right lower leg: He exhibits edema (Pitting ).        Left lower leg: He exhibits edema (Pitting).   Neurological: He is alert and oriented to person, place, and time.   Skin: Skin is dry.   Hot to the touch         ED Course   Procedures  Labs Reviewed   CBC W/ AUTO DIFFERENTIAL - Abnormal; Notable for the following components:       Result Value    RBC 3.84 (*)     Hemoglobin 12.0 (*)     Hematocrit 37.9 (*)     MCV 99 (*)     MCH 31.3 (*)     MCHC 31.7 (*)     Gran # (ANC) 7.9 (*)     Lymph # 0.9 (*)     Gran% 81.9 (*)     Lymph% 9.0 (*)     All other components within normal limits   COMPREHENSIVE METABOLIC PANEL - Abnormal; Notable for the following components:    Glucose 233 (*)     Creatinine 1.5 (*)     Albumin 3.2 (*)     eGFR if  49 (*)     eGFR if non  42  (*)     All other components within normal limits   B-TYPE NATRIURETIC PEPTIDE - Abnormal; Notable for the following components:    BNP 1,094 (*)     All other components within normal limits   ISTAT PROCEDURE - Abnormal; Notable for the following components:    POC PH 7.338 (*)     POC PCO2 56.2 (*)     POC PO2 38 (*)     POC HCO3 30.2 (*)     POC SATURATED O2 67 (*)     POC TCO2 32 (*)     All other components within normal limits   CULTURE, BLOOD   CULTURE, BLOOD   LACTIC ACID, PLASMA   MAGNESIUM   PHOSPHORUS   TROPONIN I   URINALYSIS, REFLEX TO URINE CULTURE   LACTIC ACID, PLASMA   POCT INFLUENZA A/B     Admission on 04/24/2019   Component Date Value Ref Range Status    WBC 04/24/2019 9.62  3.90 - 12.70 K/uL Final    RBC 04/24/2019 3.84* 4.60 - 6.20 M/uL Final    Hemoglobin 04/24/2019 12.0* 14.0 - 18.0 g/dL Final    Hematocrit 04/24/2019 37.9* 40.0 - 54.0 % Final    MCV 04/24/2019 99* 82 - 98 fL Final    MCH 04/24/2019 31.3* 27.0 - 31.0 pg Final    MCHC 04/24/2019 31.7* 32.0 - 36.0 g/dL Final    RDW 04/24/2019 14.5  11.5 - 14.5 % Final    Platelets 04/24/2019 171  150 - 350 K/uL Final    MPV 04/24/2019 11.4  9.2 - 12.9 fL Final    Gran # (ANC) 04/24/2019 7.9* 1.8 - 7.7 K/uL Final    Lymph # 04/24/2019 0.9* 1.0 - 4.8 K/uL Final    Mono # 04/24/2019 0.8  0.3 - 1.0 K/uL Final    Eos # 04/24/2019 0.0  0.0 - 0.5 K/uL Final    Baso # 04/24/2019 0.02  0.00 - 0.20 K/uL Final    Gran% 04/24/2019 81.9* 38.0 - 73.0 % Final    Lymph% 04/24/2019 9.0* 18.0 - 48.0 % Final    Mono% 04/24/2019 8.5  4.0 - 15.0 % Final    Eosinophil% 04/24/2019 0.4  0.0 - 8.0 % Final    Basophil% 04/24/2019 0.2  0.0 - 1.9 % Final    Differential Method 04/24/2019 Automated   Final    Sodium 04/24/2019 139  136 - 145 mmol/L Final    Potassium 04/24/2019 3.5  3.5 - 5.1 mmol/L Final    Chloride 04/24/2019 102  95 - 110 mmol/L Final    CO2 04/24/2019 27  23 - 29 mmol/L Final    Glucose 04/24/2019 233* 70 - 110 mg/dL Final     BUN, Bld 04/24/2019 19  8 - 23 mg/dL Final    Creatinine 04/24/2019 1.5* 0.5 - 1.4 mg/dL Final    Calcium 04/24/2019 9.1  8.7 - 10.5 mg/dL Final    Total Protein 04/24/2019 7.0  6.0 - 8.4 g/dL Final    Albumin 04/24/2019 3.2* 3.5 - 5.2 g/dL Final    Total Bilirubin 04/24/2019 0.6  0.1 - 1.0 mg/dL Final    Comment: For infants and newborns, interpretation of results should be based  on gestational age, weight and in agreement with clinical  observations.  Premature Infant recommended reference ranges:  Up to 24 hours.............<8.0 mg/dL  Up to 48 hours............<12.0 mg/dL  3-5 days..................<15.0 mg/dL  6-29 days.................<15.0 mg/dL      Alkaline Phosphatase 04/24/2019 125  55 - 135 U/L Final    AST 04/24/2019 19  10 - 40 U/L Final    ALT 04/24/2019 16  10 - 44 U/L Final    Anion Gap 04/24/2019 10  8 - 16 mmol/L Final    eGFR if  04/24/2019 49* >60 mL/min/1.73 m^2 Final    eGFR if non African American 04/24/2019 42* >60 mL/min/1.73 m^2 Final    Comment: Calculation used to obtain the estimated glomerular filtration  rate (eGFR) is the CKD-EPI equation.       Lactate (Lactic Acid) 04/24/2019 1.7  0.5 - 2.2 mmol/L Final    Comment: Falsely low lactic acid results can be found in samples   containing >=13.0 mg/dL total bilirubin and/or >=3.5 mg/dL   direct bilirubin.      Magnesium 04/24/2019 1.6  1.6 - 2.6 mg/dL Final    Phosphorus 04/24/2019 3.5  2.7 - 4.5 mg/dL Final    Troponin I 04/24/2019 0.018  0.000 - 0.026 ng/mL Final    Comment: The reference interval for Troponin I represents the 99th percentile   cutoff   for our facility and is consistent with 3rd generation assay   performance.      BNP 04/24/2019 1,094* 0 - 99 pg/mL Final    Values of less than 100 pg/ml are consistent with non-CHF populations.    POC PH 04/25/2019 7.338* 7.35 - 7.45 Final    POC PCO2 04/25/2019 56.2* 35 - 45 mmHg Final    POC PO2 04/25/2019 38* 40 - 60 mmHg Final    POC HCO3  04/25/2019 30.2* 24 - 28 mmol/L Final    POC BE 04/25/2019 3  -2 to 2 mmol/L Final    POC SATURATED O2 04/25/2019 67* 95 - 100 % Final    POC TCO2 04/25/2019 32* 24 - 29 mmol/L Final    Sample 04/25/2019 VENOUS   Final    Site 04/25/2019 Other   Final    Allens Test 04/25/2019 N/A   Final    DelSys 04/25/2019 Nasal Can   Final    Mode 04/25/2019 SPONT   Final    Flow 04/25/2019 3   Final    Rapid Influenza A Ag 04/25/2019 Negative  Negative Final    Rapid Influenza B Ag 04/25/2019 Negative  Negative Final     Acceptable 04/25/2019 Yes   Final   ]  EKG Readings: (Independently Interpreted)   Rate: 83 bpm. Paced rhythm. No STEMI. LAD. January 24th, 2019 comparison.        Imaging Results          X-Ray Chest AP Portable (Final result)  Result time 04/25/19 00:09:16    Final result by Gideon Stover MD (04/25/19 00:09:16)                 Impression:      Suspected mild worsening pulmonary edema with mild increase in volume of moderate right pleural effusion.      Electronically signed by: Gideon Stover MD  Date:    04/25/2019  Time:    00:09             Narrative:    EXAMINATION:  XR CHEST AP PORTABLE    CLINICAL HISTORY:  Sepsis;    TECHNIQUE:  Single frontal view of the chest was performed.    COMPARISON:  10:27.    FINDINGS:  Moderate right-sided pleural effusion is again seen which appears mildly increased in volume compared to earlier exam from the same date.  This results in volume loss and atelectasis of the right lower lobe.  There is suspected mild pulmonary edema.  No significant pleural effusion seen on the left.  No evidence of pneumothorax.  Heart is stable in size noting right heart border is obscured.                                 Medical Decision Making:   ED Management:  1:41: Spoke to Dr. Land, Hospitalist. Discussed the patients case. Will admit the patient. Dr. Land agrees with plan.               Attending Attestation:         Attending Critical Care:   Critical  Care Times:   Direct Patient Care (initial evaluation, reassessments, and time considering the case)................................................................10 minutes.   Additional History from reviewing old medical records or taking additional history from the family, EMS, PCP, etc.......................10 minutes.   Ordering, Reviewing, and Interpreting Diagnostic Studies...............................................................................................................10 minutes.   Documentation..................................................................................................................................................................................10 minutes.   Consultation with other Physicians. .................................................................................................................................................10 minutes.   Consultation with the patient's family directly relating to the patient's condition, care, and DNR status (when patient unable)......10 minutes.   ==============================================================  · Total Critical Care Time - exclusive of procedural time: 60 minutes.  ==============================================================  Critical care was necessary to treat or prevent imminent or life-threatening deterioration of the following conditions: sepsis and respiratory failure.   Critical care was time spent personally by me on the following activities: ordering and performing treatments and interventions, review of x-rays / CT sent with the patient, ordering lab, x-rays, and/or EKG, evaluation of patient's response to treatment, obtaining history from patient or relative, examination of patient, re-evaluation of patient's conition, development of treatment plan with patient or relative, review of old charts and discussion with consultants.   Critical Care Condition: potentially life-threatening                ED Course as of Apr 25 0207   Thu Apr 25, 2019   [id number]    [DL]      ED Course User Index  [DL] Jayson Nathan MD     Clinical Impression:       ICD-10-CM ICD-9-CM   1. Sepsis A41.9 038.9     995.91   2. Cough R05 786.2   3. Acute pulmonary edema J81.0 518.4   4. Pleural effusion on right J90 511.9   5. Acute decompensated heart failure I50.9 428.0   6. 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 Suicidal ideations R45.851 V62.84   8. Acute on chronic combined systolic and diastolic heart failure I50.43 428.43     Of note: suicidal ideation and depression not expressed prior to admission    Disposition:   Disposition: Admitted  Condition: Stable  Reason for referral: pleural effusion, decompensated heart failure  Accepted by Dr. Land for Dr Sandra Sun.                         Jayson Nathan MD  04/29/19 0544

## 2019-04-25 NOTE — ASSESSMENT & PLAN NOTE
Chronic since 8/2018.  S/p thoracentesis 1/19 but analysis was not sent.  Suspect volume overload.  Recommend negative fluid balance with escalation of diuretic.  Will need to monitor bun/creatinie closely.

## 2019-04-26 PROBLEM — I34.0 NON-RHEUMATIC MITRAL REGURGITATION: Status: ACTIVE | Noted: 2019-04-26

## 2019-04-26 PROBLEM — F33.2 MAJOR DEPRESSIVE DISORDER, RECURRENT, SEVERE WITHOUT PSYCHOTIC BEHAVIOR: Status: ACTIVE | Noted: 2019-04-26

## 2019-04-26 LAB
ANION GAP SERPL CALC-SCNC: 5 MMOL/L (ref 8–16)
BASOPHILS # BLD AUTO: 0.01 K/UL (ref 0–0.2)
BASOPHILS NFR BLD: 0.2 % (ref 0–1.9)
BODY FLUID SOURCE, LDH: NORMAL
BUN SERPL-MCNC: 16 MG/DL (ref 8–23)
CALCIUM SERPL-MCNC: 8.5 MG/DL (ref 8.7–10.5)
CHLORIDE SERPL-SCNC: 103 MMOL/L (ref 95–110)
CO2 SERPL-SCNC: 32 MMOL/L (ref 23–29)
CREAT SERPL-MCNC: 1.3 MG/DL (ref 0.5–1.4)
DIFFERENTIAL METHOD: ABNORMAL
EOSINOPHIL # BLD AUTO: 0.1 K/UL (ref 0–0.5)
EOSINOPHIL NFR BLD: 1.3 % (ref 0–8)
ERYTHROCYTE [DISTWIDTH] IN BLOOD BY AUTOMATED COUNT: 14.4 % (ref 11.5–14.5)
EST. GFR  (AFRICAN AMERICAN): 58 ML/MIN/1.73 M^2
EST. GFR  (NON AFRICAN AMERICAN): 50 ML/MIN/1.73 M^2
GLUCOSE FLD-MCNC: 191 MG/DL
GLUCOSE SERPL-MCNC: 127 MG/DL (ref 70–110)
HCT VFR BLD AUTO: 33.8 % (ref 40–54)
HGB BLD-MCNC: 10.7 G/DL (ref 14–18)
LDH FLD L TO P-CCNC: 74 U/L
LYMPHOCYTES # BLD AUTO: 0.8 K/UL (ref 1–4.8)
LYMPHOCYTES NFR BLD: 11.9 % (ref 18–48)
MCH RBC QN AUTO: 30.9 PG (ref 27–31)
MCHC RBC AUTO-ENTMCNC: 31.7 G/DL (ref 32–36)
MCV RBC AUTO: 98 FL (ref 82–98)
MONOCYTES # BLD AUTO: 0.8 K/UL (ref 0.3–1)
MONOCYTES NFR BLD: 11.9 % (ref 4–15)
NEUTROPHILS # BLD AUTO: 4.7 K/UL (ref 1.8–7.7)
NEUTROPHILS NFR BLD: 74.7 % (ref 38–73)
PLATELET # BLD AUTO: 149 K/UL (ref 150–350)
PMV BLD AUTO: 11.2 FL (ref 9.2–12.9)
POCT GLUCOSE: 124 MG/DL (ref 70–110)
POCT GLUCOSE: 160 MG/DL (ref 70–110)
POCT GLUCOSE: 173 MG/DL (ref 70–110)
POCT GLUCOSE: 198 MG/DL (ref 70–110)
POTASSIUM SERPL-SCNC: 3.1 MMOL/L (ref 3.5–5.1)
PROT FLD-MCNC: 2.7 G/DL
RBC # BLD AUTO: 3.46 M/UL (ref 4.6–6.2)
SODIUM SERPL-SCNC: 140 MMOL/L (ref 136–145)
SPECIMEN SOURCE: NORMAL
SPECIMEN SOURCE: NORMAL
TSH SERPL DL<=0.005 MIU/L-ACNC: 0.72 UIU/ML (ref 0.4–4)
WBC # BLD AUTO: 6.3 K/UL (ref 3.9–12.7)

## 2019-04-26 PROCEDURE — 63600175 PHARM REV CODE 636 W HCPCS: Performed by: HOSPITALIST

## 2019-04-26 PROCEDURE — 99232 PR SUBSEQUENT HOSPITAL CARE,LEVL II: ICD-10-PCS | Mod: ,,, | Performed by: INTERNAL MEDICINE

## 2019-04-26 PROCEDURE — 94761 N-INVAS EAR/PLS OXIMETRY MLT: CPT

## 2019-04-26 PROCEDURE — 25000242 PHARM REV CODE 250 ALT 637 W/ HCPCS: Performed by: HOSPITALIST

## 2019-04-26 PROCEDURE — 99233 PR SUBSEQUENT HOSPITAL CARE,LEVL III: ICD-10-PCS | Mod: ,,, | Performed by: INTERNAL MEDICINE

## 2019-04-26 PROCEDURE — 80048 BASIC METABOLIC PNL TOTAL CA: CPT

## 2019-04-26 PROCEDURE — 25000003 PHARM REV CODE 250: Performed by: INTERNAL MEDICINE

## 2019-04-26 PROCEDURE — 99232 SBSQ HOSP IP/OBS MODERATE 35: CPT | Mod: ,,, | Performed by: INTERNAL MEDICINE

## 2019-04-26 PROCEDURE — 90792 PR PSYCHIATRIC DIAGNOSTIC EVALUATION W/MEDICAL SERVICES: ICD-10-PCS | Mod: ,,, | Performed by: NURSE PRACTITIONER

## 2019-04-26 PROCEDURE — 94640 AIRWAY INHALATION TREATMENT: CPT

## 2019-04-26 PROCEDURE — 11000001 HC ACUTE MED/SURG PRIVATE ROOM

## 2019-04-26 PROCEDURE — 36415 COLL VENOUS BLD VENIPUNCTURE: CPT

## 2019-04-26 PROCEDURE — 84443 ASSAY THYROID STIM HORMONE: CPT

## 2019-04-26 PROCEDURE — 85025 COMPLETE CBC W/AUTO DIFF WBC: CPT

## 2019-04-26 PROCEDURE — 27000221 HC OXYGEN, UP TO 24 HOURS

## 2019-04-26 PROCEDURE — 90792 PSYCH DIAG EVAL W/MED SRVCS: CPT | Mod: ,,, | Performed by: NURSE PRACTITIONER

## 2019-04-26 PROCEDURE — 99233 SBSQ HOSP IP/OBS HIGH 50: CPT | Mod: ,,, | Performed by: INTERNAL MEDICINE

## 2019-04-26 PROCEDURE — 63600175 PHARM REV CODE 636 W HCPCS: Performed by: INTERNAL MEDICINE

## 2019-04-26 PROCEDURE — 25000003 PHARM REV CODE 250: Performed by: HOSPITALIST

## 2019-04-26 RX ORDER — CLOPIDOGREL BISULFATE 75 MG/1
75 TABLET ORAL DAILY
Status: DISCONTINUED | OUTPATIENT
Start: 2019-04-26 | End: 2019-04-29 | Stop reason: HOSPADM

## 2019-04-26 RX ORDER — OLANZAPINE 10 MG/2ML
5 INJECTION, POWDER, FOR SOLUTION INTRAMUSCULAR EVERY 8 HOURS PRN
Status: DISCONTINUED | OUTPATIENT
Start: 2019-04-26 | End: 2019-04-29 | Stop reason: HOSPADM

## 2019-04-26 RX ORDER — FUROSEMIDE 10 MG/ML
40 INJECTION INTRAMUSCULAR; INTRAVENOUS DAILY
Status: DISCONTINUED | OUTPATIENT
Start: 2019-04-26 | End: 2019-04-27

## 2019-04-26 RX ADMIN — LINEZOLID 600 MG: 600 INJECTION, SOLUTION INTRAVENOUS at 01:04

## 2019-04-26 RX ADMIN — METOPROLOL SUCCINATE 50 MG: 50 TABLET, EXTENDED RELEASE ORAL at 09:04

## 2019-04-26 RX ADMIN — FUROSEMIDE 40 MG: 10 INJECTION, SOLUTION INTRAVENOUS at 10:04

## 2019-04-26 RX ADMIN — CLOPIDOGREL BISULFATE 75 MG: 75 TABLET ORAL at 09:04

## 2019-04-26 RX ADMIN — GUAIFENESIN 600 MG: 600 TABLET, EXTENDED RELEASE ORAL at 09:04

## 2019-04-26 RX ADMIN — CEFEPIME HYDROCHLORIDE 1 G: 1 INJECTION, SOLUTION INTRAVENOUS at 10:04

## 2019-04-26 RX ADMIN — IPRATROPIUM BROMIDE AND ALBUTEROL SULFATE 3 ML: .5; 3 SOLUTION RESPIRATORY (INHALATION) at 02:04

## 2019-04-26 RX ADMIN — FAMOTIDINE 20 MG: 20 TABLET ORAL at 09:04

## 2019-04-26 RX ADMIN — IPRATROPIUM BROMIDE AND ALBUTEROL SULFATE 3 ML: .5; 3 SOLUTION RESPIRATORY (INHALATION) at 07:04

## 2019-04-26 RX ADMIN — FERROUS GLUCONATE TAB 324 MG (37.5 MG ELEMENTAL IRON) 324 MG: 324 (37.5 FE) TAB at 09:04

## 2019-04-26 RX ADMIN — ATORVASTATIN CALCIUM 40 MG: 40 TABLET, FILM COATED ORAL at 09:04

## 2019-04-26 RX ADMIN — AMIODARONE HYDROCHLORIDE 200 MG: 200 TABLET ORAL at 09:04

## 2019-04-26 RX ADMIN — TAMSULOSIN HYDROCHLORIDE 0.4 MG: 0.4 CAPSULE ORAL at 09:04

## 2019-04-26 RX ADMIN — CEFEPIME HYDROCHLORIDE 1 G: 1 INJECTION, SOLUTION INTRAVENOUS at 09:04

## 2019-04-26 RX ADMIN — IPRATROPIUM BROMIDE AND ALBUTEROL SULFATE 3 ML: .5; 3 SOLUTION RESPIRATORY (INHALATION) at 08:04

## 2019-04-26 NOTE — SUBJECTIVE & OBJECTIVE
Review of Systems   Gastrointestinal: Negative for melena.   Genitourinary: Negative for hematuria.     Objective:     Vital Signs (Most Recent):  Temp: (!) 100.5 °F (38.1 °C) (04/26/19 0445)  Pulse: 63 (04/26/19 0445)  Resp: 20 (04/26/19 0445)  BP: (!) 122/59 (04/26/19 0445)  SpO2: 95 % (04/26/19 0445) Vital Signs (24h Range):  Temp:  [98.2 °F (36.8 °C)-100.5 °F (38.1 °C)] 100.5 °F (38.1 °C)  Pulse:  [60-85] 63  Resp:  [12-20] 20  SpO2:  [92 %-99 %] 95 %  BP: (102-136)/(42-77) 122/59     Weight: 87.1 kg (192 lb)  Body mass index is 28.35 kg/m².     SpO2: 95 %  O2 Device (Oxygen Therapy): nasal cannula      Intake/Output Summary (Last 24 hours) at 4/26/2019 0647  Last data filed at 4/25/2019 1732  Gross per 24 hour   Intake 240 ml   Output 3300 ml   Net -3060 ml       Lines/Drains/Airways     Peripheral Intravenous Line                 Peripheral IV - Single Lumen 04/24/19 2332 20 G Right Antecubital 1 day                Physical Exam   Constitutional: He is oriented to person, place, and time. He appears well-developed and well-nourished.   HENT:   Head: Normocephalic and atraumatic.   Eyes: Pupils are equal, round, and reactive to light. Conjunctivae and EOM are normal. No scleral icterus.   Neck: Normal range of motion. Neck supple. No JVD present. Carotid bruit is not present. No tracheal deviation present. No thyromegaly present.   Cardiovascular: Normal rate, regular rhythm, S1 normal and S2 normal. Exam reveals no gallop and no friction rub.   Murmur heard.   Systolic murmur is present with a grade of 2/6.  Pulmonary/Chest: Effort normal. No respiratory distress. He has wheezes. He has rales. He exhibits no tenderness.   Abdominal: Soft. He exhibits no distension.   Musculoskeletal: He exhibits edema.   Neurological: He is alert and oriented to person, place, and time. He has normal strength. No cranial nerve deficit.   Skin: Skin is warm and dry. No rash noted.   Psychiatric: He has a normal mood and  affect. His behavior is normal.       Current Medications:   albuterol-ipratropium  3 mL Nebulization Q6H WAKE    amiodarone  200 mg Oral Daily    atorvastatin  40 mg Oral Daily    ceFEPime (MAXIPIME) IVPB  1 g Intravenous Q12H    famotidine  20 mg Oral Daily    ferrous gluconate  324 mg Oral Daily with breakfast    furosemide  40 mg Intravenous BID    guaiFENesin  600 mg Oral BID    linezolid 600mg/300ml  600 mg Intravenous Q12H    metoprolol succinate  50 mg Oral BID    tamsulosin  0.4 mg Oral Daily       acetaminophen, dextrose 50%, dextrose 50%, glucagon (human recombinant), glucose, glucose, insulin aspart U-100, ondansetron, sodium chloride 0.9%    Laboratory (all labs reviewed):  CBC:  Recent Labs   Lab 12/18/18  1530 01/13/19  1555 01/15/19  1600 01/24/19  0132 04/24/19  2340   WHITE BLOOD CELL COUNT 9.60 6.57 6.60 13.14 H 9.62   HEMOGLOBIN 11.1 L 9.5 L 9.4 L 11.1 L 12.0 L   HEMATOCRIT 34.2 L 28.7 L 28.9 L 34.7 L 37.9 L   PLATELETS 148 L 169 166 226 171       CHEMISTRIES:  Recent Labs   Lab 09/01/18  0452 09/02/18  0532 09/03/18  0559  01/13/19  1555 01/24/19  0132 01/25/19  0404 01/26/19  0352 04/24/19  2340   GLUCOSE 136 H 135 H 138 H   < > 167 H 198 H 145 H 93 233 H   SODIUM 139 137 138   < > 141 141 141 141 139   POTASSIUM 3.6 4.1 4.2   < > 4.2 4.3 3.6 3.7 3.5   BUN BLD 23 25 H 22   < > 24 H 20 19 18 19   CREATININE 1.5 H 1.6 H 1.4   < > 2.0 H 1.8 H 1.5 H 1.5 H 1.5 H   EGFR IF  49.1 A 45.4 A 53.3 A   < > 35 A 39 A 49 A 49 A 49 A   EGFR IF NON- 42.4 A 39.3 A 46.1 A   < > 30 A 34 A 42 A 42 A 42 A   CALCIUM 8.6 L 8.6 L 9.0   < > 8.7 8.9 8.8 8.8 9.1   MAGNESIUM 2.0 2.0 2.0  --   --  2.4  --   --  1.6    < > = values in this interval not displayed.       CARDIAC BIOMARKERS:  Recent Labs   Lab 08/27/18  2122 01/13/19  1555 01/24/19  0132 01/24/19  0726 04/24/19  2340   TROPONIN I 0.026 0.019 0.022 0.020 0.018       COAGS:  Recent Labs   Lab 04/17/18  0610  04/23/18  0226 08/27/18  1621 01/15/19  1600 04/25/19  0809   INR 1.2 1.2 1.1 1.1 1.1       LIPIDS/LFTS:  Recent Labs   Lab 02/17/18  0831  06/20/18  0954  11/29/18  1117 12/18/18  1530 01/13/19  1555 01/24/19  0132 04/24/19  2340   CHOLESTEROL 147  --  97 L  --   --   --   --   --   --    TRIGLYCERIDES 72  --  63  --   --   --   --   --   --    HDL 37 L  --  37 L  --   --   --   --   --   --    LDL CHOLESTEROL 95.6  --  47.4 L  --   --   --   --   --   --    NON-HDL CHOLESTEROL 110  --  60  --   --   --   --   --   --    AST 11   < > 17   < > 17 13 12 35 19   ALT <5 L   < > 18   < > 14 8 L 15 19 16    < > = values in this interval not displayed.       BNP:  Recent Labs   Lab 01/27/18  0736 04/17/18  0610 01/13/19  1555 01/24/19  0132 04/24/19  2340    H 1,335 H 846 H  846 H 742 H 1,094 H       TSH:  Recent Labs   Lab 02/17/18  0831   TSH 1.450       Free T4:        Diagnostic Results:  Echo 4/25/19 (no changes vs report 1/2019, normal TAVR fxn and MR degree unchanged)  · Normal left ventricular systolic function. The estimated ejection fraction is 55%  · Indeterminate left ventricular diastolic function.  · Normal right ventricular systolic function.  · Moderate left atrial enlargement.  · Moderate right atrial enlargement.  · There is a transcutaneously-placed aortic bioprosthesis present. There is no aortic aortic insufficiency present. Prosthetic aortic valve is normal.  · Moderate-to-severe mitral regurgitation.  · Mild to moderate tricuspid regurgitation.  · The estimated PA systolic pressure is 54 mm Hg  · Pulmonary hypertension present.     Cath 4/20/18  LVEF: 50% by echo  Normal TAVR fxn by echo  Dominance: Right  LM: normal  LAD: MLI              Diag1 prox 60%, mid 50%  Ramus: MLI  LCx: MLI  RCA: dom, prox 30%  Hemostasis:  R Radial band  Impression:  TdP  Nonobst CAD  Normal LV fxn/TAVR fxn by echo  R rad vasband for hemostasis  Plan:  Cont ASA/Plavix  Cont amio gtt     LE Venous US 4/17/18  No  evidence of deep venous thrombosis in either lower extremity.     Carotid US 3/22/18 (s/p L CEA)  There is 20 - 39% right Internal Carotid stenosis.  There is 20 - 39% left Internal Carotid stenosis.     TAVR 10/17/17  B. 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    No perivalvular leak post procedure per 2D echo.    Post deployment AV mean gradient 2.5 mmHg, Vmax 1.09 m/s.

## 2019-04-26 NOTE — PROGRESS NOTES
[facility]  Cardiology  Progress Note    Patient Name: Timbo Gallagher  MRN: [id number]  Admission Date: 4/24/2019  Hospital Length of Stay: 1 days  Code Status: Full Code   Attending Physician: Sandra Sun MD   Primary Care Physician: Cirilo Montero MD  Expected Discharge Date:   Principal Problem:Sepsis    Subjective:     Hospital Course:   4/24/19: adm with sepsis and ?vol overload  4/25/19: echo with normal LV fxn and TAVR fxn, mod-sev MR noted (stable); thoracentesis with 1.5L transudative fluid removed     Interval Hx: still with SOB and LE edema, no cp.  Pt's wife at bedside.    Tele: AV paced (personally reviewed and interpreted)        Review of Systems   Gastrointestinal: Negative for melena.   Genitourinary: Negative for hematuria.     Objective:     Vital Signs (Most Recent):  Temp: (!) 100.5 °F (38.1 °C) (04/26/19 0445)  Pulse: 63 (04/26/19 0445)  Resp: 20 (04/26/19 0445)  BP: (!) 122/59 (04/26/19 0445)  SpO2: 95 % (04/26/19 0445) Vital Signs (24h Range):  Temp:  [98.2 °F (36.8 °C)-100.5 °F (38.1 °C)] 100.5 °F (38.1 °C)  Pulse:  [60-85] 63  Resp:  [12-20] 20  SpO2:  [92 %-99 %] 95 %  BP: (102-136)/(42-77) 122/59     Weight: 87.1 kg (192 lb)  Body mass index is 28.35 kg/m².     SpO2: 95 %  O2 Device (Oxygen Therapy): nasal cannula      Intake/Output Summary (Last 24 hours) at 4/26/2019 0647  Last data filed at 4/25/2019 1732  Gross per 24 hour   Intake 240 ml   Output 3300 ml   Net -3060 ml       Lines/Drains/Airways     Peripheral Intravenous Line                 Peripheral IV - Single Lumen 04/24/19 2332 20 G Right Antecubital 1 day                Physical Exam   Constitutional: He is oriented to person, place, and time. He appears well-developed and well-nourished.   HENT:   Head: Normocephalic and atraumatic.   Eyes: Pupils are equal, round, and reactive to light. Conjunctivae and EOM are normal. No scleral icterus.   Neck: Normal range of motion. Neck supple. No JVD  present. Carotid bruit is not present. No tracheal deviation present. No thyromegaly present.   Cardiovascular: Normal rate, regular rhythm, S1 normal and S2 normal. Exam reveals no gallop and no friction rub.   Murmur heard.   Systolic murmur is present with a grade of 2/6.  Pulmonary/Chest: Effort normal. No respiratory distress. He has wheezes. He has rales. He exhibits no tenderness.   Abdominal: Soft. He exhibits no distension.   Musculoskeletal: He exhibits edema.   Neurological: He is alert and oriented to person, place, and time. He has normal strength. No cranial nerve deficit.   Skin: Skin is warm and dry. No rash noted.   Psychiatric: He has a normal mood and affect. His behavior is normal.       Current Medications:   albuterol-ipratropium  3 mL Nebulization Q6H WAKE    amiodarone  200 mg Oral Daily    atorvastatin  40 mg Oral Daily    ceFEPime (MAXIPIME) IVPB  1 g Intravenous Q12H    famotidine  20 mg Oral Daily    ferrous gluconate  324 mg Oral Daily with breakfast    furosemide  40 mg Intravenous BID    guaiFENesin  600 mg Oral BID    linezolid 600mg/300ml  600 mg Intravenous Q12H    metoprolol succinate  50 mg Oral BID    tamsulosin  0.4 mg Oral Daily       acetaminophen, dextrose 50%, dextrose 50%, glucagon (human recombinant), glucose, glucose, insulin aspart U-100, ondansetron, sodium chloride 0.9%    Laboratory (all labs reviewed):  CBC:  Recent Labs   Lab 12/18/18  1530 01/13/19  1555 01/15/19  1600 01/24/19  0132 04/24/19  2340   WHITE BLOOD CELL COUNT 9.60 6.57 6.60 13.14 H 9.62   HEMOGLOBIN 11.1 L 9.5 L 9.4 L 11.1 L 12.0 L   HEMATOCRIT 34.2 L 28.7 L 28.9 L 34.7 L 37.9 L   PLATELETS 148 L 169 166 226 171       CHEMISTRIES:  Recent Labs   Lab 09/01/18  0452 09/02/18  0532 09/03/18  0559  01/13/19  1555 01/24/19  0132 01/25/19  0404 01/26/19  0352 04/24/19  2340   GLUCOSE 136 H 135 H 138 H   < > 167 H 198 H 145 H 93 233 H   SODIUM 139 137 138   < > 141 141 141 141 139   POTASSIUM 3.6  4.1 4.2   < > 4.2 4.3 3.6 3.7 3.5   BUN BLD 23 25 H 22   < > 24 H 20 19 18 19   CREATININE 1.5 H 1.6 H 1.4   < > 2.0 H 1.8 H 1.5 H 1.5 H 1.5 H   EGFR IF  49.1 A 45.4 A 53.3 A   < > 35 A 39 A 49 A 49 A 49 A   EGFR IF NON- 42.4 A 39.3 A 46.1 A   < > 30 A 34 A 42 A 42 A 42 A   CALCIUM 8.6 L 8.6 L 9.0   < > 8.7 8.9 8.8 8.8 9.1   MAGNESIUM 2.0 2.0 2.0  --   --  2.4  --   --  1.6    < > = values in this interval not displayed.       CARDIAC BIOMARKERS:  Recent Labs   Lab 08/27/18  2122 01/13/19  1555 01/24/19  0132 01/24/19  0726 04/24/19  2340   TROPONIN I 0.026 0.019 0.022 0.020 0.018       COAGS:  Recent Labs   Lab 04/17/18  0610 04/23/18  0226 08/27/18  1621 01/15/19  1600 04/25/19  0809   INR 1.2 1.2 1.1 1.1 1.1       LIPIDS/LFTS:  Recent Labs   Lab 02/17/18  0831  06/20/18  0954  11/29/18  1117 12/18/18  1530 01/13/19  1555 01/24/19  0132 04/24/19  2340   CHOLESTEROL 147  --  97 L  --   --   --   --   --   --    TRIGLYCERIDES 72  --  63  --   --   --   --   --   --    HDL 37 L  --  37 L  --   --   --   --   --   --    LDL CHOLESTEROL 95.6  --  47.4 L  --   --   --   --   --   --    NON-HDL CHOLESTEROL 110  --  60  --   --   --   --   --   --    AST 11   < > 17   < > 17 13 12 35 19   ALT <5 L   < > 18   < > 14 8 L 15 19 16    < > = values in this interval not displayed.       BNP:  Recent Labs   Lab 01/27/18  0736 04/17/18  0610 01/13/19  1555 01/24/19  0132 04/24/19  2340    H 1,335 H 846 H  846 H 742 H 1,094 H       TSH:  Recent Labs   Lab 02/17/18  0831   TSH 1.450       Free T4:        Diagnostic Results:  Echo 4/25/19 (no changes vs report 1/2019, normal TAVR fxn and MR degree unchanged)  · Normal left ventricular systolic function. The estimated ejection fraction is 55%  · Indeterminate left ventricular diastolic function.  · Normal right ventricular systolic function.  · Moderate left atrial enlargement.  · Moderate right atrial enlargement.  · There is a  transcutaneously-placed aortic bioprosthesis present. There is no aortic aortic insufficiency present. Prosthetic aortic valve is normal.  · Moderate-to-severe mitral regurgitation.  · Mild to moderate tricuspid regurgitation.  · The estimated PA systolic pressure is 54 mm Hg  · Pulmonary hypertension present.     Cath 4/20/18  LVEF: 50% by echo  Normal TAVR fxn by echo  Dominance: Right  LM: normal  LAD: MLI              Diag1 prox 60%, mid 50%  Ramus: MLI  LCx: MLI  RCA: dom, prox 30%  Hemostasis:  R Radial band  Impression:  TdP  Nonobst CAD  Normal LV fxn/TAVR fxn by echo  R rad vasband for hemostasis  Plan:  Cont ASA/Plavix  Cont amio gtt     LE Venous US 4/17/18  No evidence of deep venous thrombosis in either lower extremity.     Carotid US 3/22/18 (s/p L CEA)  There is 20 - 39% right Internal Carotid stenosis.  There is 20 - 39% left Internal Carotid stenosis.     TAVR 10/17/17  B. 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    No perivalvular leak post procedure per 2D echo.    Post deployment AV mean gradient 2.5 mmHg, Vmax 1.09 m/s.          Assessment and Plan:     * Sepsis  Mgmt per IM/pulm  Thoracentesis 4/25 appears transudative    Recurrent right pleural effusion  1.5L thoracentesis 4/25/19, appears transudative    Acute on chronic combined systolic and diastolic heart failure  Normal LV fxn  Mod-sev MR noted on echo 4/25/19, stable vs 1/2019, LV fxn normal, TAVR fxn normal  Cont diuresis, goal net neg 1-1.5L daily  Decrease lasix to 40mg IV qd  Labs pending today  Monitor creat    Non-rheumatic mitral regurgitation  Mod-severe native MR (not MVR), stable vs prior echo 1/2019  LV fxn normal  Will consider LISSETT if unable to control sxs/CHF (likely as outpatient)    S/P TAVR (transcatheter aortic valve replacement)  Normal TAVR fxn on echo 4/25/19    Essential hypertension  Cont med rx    Type 2 diabetes mellitus with stage 3  chronic kidney disease  Per primary    Anemia of chronic renal failure, stage 3 (moderate)  Resume Plavix, cont ASA    AICD (automatic cardioverter/defibrillator) present  [employer]. Recent interrogation with good device function.    CKD (chronic kidney disease), stage III  Creat stable, monitor while IV lasix diuresis    Mixed hyperlipidemia  Cont atorva 40mg    Acute on chronic diastolic (congestive) heart failure  Diuresis and afterload reduction as tolerated        VTE Risk Mitigation (From admission, onward)        Ordered     IP VTE HIGH RISK PATIENT  Once      04/25/19 0250     Place DEYA hose  Until discontinued      04/25/19 0250     Place sequential compression device  Until discontinued      04/25/19 0250          Ned Toribio MD  Cardiology  [facility]    Addendum 830am:  Pt's wife came out of room and said pt was planning to leave AMA.  I went to discuss this with pt, and he said he wanted to go home so he could kill himself and also called his wife a whore.  He seems to clearly be having an issue with delirium on top of dementia.  Psych consulted and case d/w Dr. Sun.

## 2019-04-26 NOTE — PROGRESS NOTES
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  Dr. Toribio's note appreciated.  Patient is being PEC for active suicidal ideation.       Objective:     Vital Signs (Most Recent):  Temp: 99.4 °F (37.4 °C) (04/26/19 1156)  Pulse: 62 (04/26/19 1156)  Resp: 17 (04/26/19 1156)  BP: (!) 121/57 (04/26/19 1156)  SpO2: 97 % (04/26/19 1156) Vital Signs (24h Range):  Temp:  [98.2 °F (36.8 °C)-100.5 °F (38.1 °C)] 99.4 °F (37.4 °C)  Pulse:  [60-85] 62  Resp:  [17-20] 17  SpO2:  [92 %-99 %] 97 %  BP: (116-126)/(55-77) 121/57     Weight: 87.1 kg (192 lb)  Body mass index is 28.35 kg/m².      Intake/Output Summary (Last 24 hours) at 4/26/2019 1345  Last data filed at 4/25/2019 1732  Gross per 24 hour   Intake 240 ml   Output 1000 ml   Net -760 ml       Physical Exam   Constitutional: He is oriented to person, place, and time. He appears well-developed and well-nourished. No distress.   HENT:   Head: Normocephalic and atraumatic.   Mouth/Throat: Oropharynx is clear and moist.   Eyes: Pupils are equal, round, and reactive to light. EOM are normal.   Neck: Normal range of motion. Neck supple.   Cardiovascular: Normal rate, regular rhythm, normal heart sounds and intact distal pulses.   Pulmonary/Chest: Effort normal. No respiratory distress. He has rales.   Abdominal: Soft. Bowel sounds are normal. He exhibits no distension. There is no tenderness.   Musculoskeletal: Normal range of motion. He exhibits edema.   Neurological: He is alert and oriented to person, place, and time.   Skin: Skin is warm and dry. There is pallor.   Psychiatric: He has a  normal mood and affect. His behavior is normal.       Vents:  Oxygen Concentration (%): 40 (04/25/19 0059)    Lines/Drains/Airways     Peripheral Intravenous Line                 Peripheral IV - Single Lumen 04/26/19 1000 22 G Left;Posterior Forearm less than 1 day                Significant Labs:    CBC/Anemia Profile:  Recent Labs   Lab 04/24/19  2340 04/26/19  0646   WBC 9.62 6.30   HGB 12.0* 10.7*   HCT 37.9* 33.8*    149*   MCV 99* 98   RDW 14.5 14.4        Chemistries:  Recent Labs   Lab 04/24/19  2340 04/25/19  1348 04/26/19  0646     --  140   K 3.5  --  3.1*     --  103   CO2 27  --  32*   BUN 19  --  16   CREATININE 1.5*  --  1.3   CALCIUM 9.1  --  8.5*   ALBUMIN 3.2*  --   --    PROT 7.0 6.0  --    BILITOT 0.6  --   --    ALKPHOS 125  --   --    ALT 16  --   --    AST 19  --   --    MG 1.6  --   --    PHOS 3.5  --   --        Pleural fluid ldh 74/201  Protein 2.7/7.2  Wbc 149   procal 0.1    Significant Imaging:  CXR: I have reviewed all pertinent results/findings within the past 24 hours and my personal findings are:  improve aeration    Assessment/Plan:     Suicidal ideations  Currently pec.  Psych consult is recommended.      Sepsis  Fever upon admission.  Currently on zosyn.  Culture ngtd.  Normal procal.  Recommend stopping antibiotics.      Recurrent right pleural effusion  Chronic since 8/2018.  S/p thoracentesis 1/19 but analysis was not sent.  Recent chemistry from pleural effusion is transudate.  Negative fluid balance is recommended.             Dillan Kumar MD  Pulmonology  [facility]    Will be available upon request.

## 2019-04-26 NOTE — HOSPITAL COURSE
4/24/19: adm with sepsis and ?vol overload  4/25/19: echo with normal LV fxn and TAVR fxn, mod-sev MR noted (stable); thoracentesis with 1.5L transudative fluid removed   4/26/19: suicidal ideation->PEC (seen by psychiatry    Interval Hx: sitter at bedside.  Seems in much better spirits.  No cp/sob.  Appreciate psychiatry consult.    Tele: AV paced 70 (personally reviewed and interpreted)

## 2019-04-26 NOTE — SUBJECTIVE & OBJECTIVE
Interval History: patient without complaint.  Dr. Toribio's note appreciated.  Patient is being PEC for active suicidal ideation.       Objective:     Vital Signs (Most Recent):  Temp: 99.4 °F (37.4 °C) (04/26/19 1156)  Pulse: 62 (04/26/19 1156)  Resp: 17 (04/26/19 1156)  BP: (!) 121/57 (04/26/19 1156)  SpO2: 97 % (04/26/19 1156) Vital Signs (24h Range):  Temp:  [98.2 °F (36.8 °C)-100.5 °F (38.1 °C)] 99.4 °F (37.4 °C)  Pulse:  [60-85] 62  Resp:  [17-20] 17  SpO2:  [92 %-99 %] 97 %  BP: (116-126)/(55-77) 121/57     Weight: 87.1 kg (192 lb)  Body mass index is 28.35 kg/m².      Intake/Output Summary (Last 24 hours) at 4/26/2019 1345  Last data filed at 4/25/2019 1732  Gross per 24 hour   Intake 240 ml   Output 1000 ml   Net -760 ml       Physical Exam   Constitutional: He is oriented to person, place, and time. He appears well-developed and well-nourished. No distress.   HENT:   Head: Normocephalic and atraumatic.   Mouth/Throat: Oropharynx is clear and moist.   Eyes: Pupils are equal, round, and reactive to light. EOM are normal.   Neck: Normal range of motion. Neck supple.   Cardiovascular: Normal rate, regular rhythm, normal heart sounds and intact distal pulses.   Pulmonary/Chest: Effort normal. No respiratory distress. He has rales.   Abdominal: Soft. Bowel sounds are normal. He exhibits no distension. There is no tenderness.   Musculoskeletal: Normal range of motion. He exhibits edema.   Neurological: He is alert and oriented to person, place, and time.   Skin: Skin is warm and dry. There is pallor.   Psychiatric: He has a normal mood and affect. His behavior is normal.       Vents:  Oxygen Concentration (%): 40 (04/25/19 0059)    Lines/Drains/Airways     Peripheral Intravenous Line                 Peripheral IV - Single Lumen 04/26/19 1000 22 G Left;Posterior Forearm less than 1 day                Significant Labs:    CBC/Anemia Profile:  Recent Labs   Lab 04/24/19  2340 04/26/19  0646   WBC 9.62 6.30   HGB  12.0* 10.7*   HCT 37.9* 33.8*    149*   MCV 99* 98   RDW 14.5 14.4        Chemistries:  Recent Labs   Lab 04/24/19  2340 04/25/19  1348 04/26/19  0646     --  140   K 3.5  --  3.1*     --  103   CO2 27  --  32*   BUN 19  --  16   CREATININE 1.5*  --  1.3   CALCIUM 9.1  --  8.5*   ALBUMIN 3.2*  --   --    PROT 7.0 6.0  --    BILITOT 0.6  --   --    ALKPHOS 125  --   --    ALT 16  --   --    AST 19  --   --    MG 1.6  --   --    PHOS 3.5  --   --        Pleural fluid ldh 74/201  Protein 2.7/7.2  Wbc 149   procal 0.1    Significant Imaging:  CXR: I have reviewed all pertinent results/findings within the past 24 hours and my personal findings are:  improve aeration

## 2019-04-26 NOTE — ASSESSMENT & PLAN NOTE
Fever upon admission.  Currently on zosyn.  Culture ngtd.  Normal procal.  Recommend stopping antibiotics.

## 2019-04-26 NOTE — CONSULTS
[facility]  Psychiatry  Consult Note    Patient Name: Timbo Gallagher  MRN: [id number]   Code Status: Full Code  Admission Date: 4/24/2019  Hospital Length of Stay: 1 days  Attending Physician: Sandra Sun MD  Primary Care Provider: Cirilo Montero MD    Current Legal Status: PEC pending    Patient information was obtained from patient, Wife Opal, current medical record, Nurse Laura and ER records.   Inpatient consult to Psychiatry  Consult performed by: Benigno Jackson NP  Consult ordered by: Ned Toribio MD  Reason for consult: Suicidal  Assessment/Recommendations: * Major depressive disorder, recurrent, 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  His overall symptom complex includes: suicidal ideation with plan to shoot self with pistol, recent suicide attempt with pistol that was stopped by stepson, feels depressed, feels anxious, believes he has the right to shoot/kill himself, irritable, anger at times , poor appetite     Recommendation: PEC. 1: 1 sitter. Notify  of PEC status. Inpatient Psychiatric facility once medically cleared. Upon transfer, please send original PEC/CEC with patient and place copy on the chart. Utilize Zyprexa 5 mg PO/IM q 8 hours PRN agitation or uncontrollable, threatening behavior. Start Prozac 10 mg po qd for depression. Thyroid Function Tests.             Subjective:     Principal Problem:Major depressive disorder, recurrent, 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    Chief Complaint:  Suicidal     HPI: Timbo Gallagher is a 84 year old male who presented to [facility] with difficulty breathing. A Chest x-ray was done which confirmed moderate right effusion.  Pulmonary was consulted for further inputs.        Hospital Course: Timbo Gallagher presents right side lying supine in bed.Oxygen in place via nasal cannula. His wife is at the bedside. He is confused at times and much of the information is taken from and/or confirmed by his wife, Opal. She  states that he came into the hospital because of problems with breathing. He is awake and alert and oriented to person, place, time and situation. His affect is depressed. His speech is clear with no latency, no pressure. He admits to being depressed. He states that he wants to kill himself and has a plan to use a pistol. He states that he tried to kill himself recently with a pistol and his stepson stopped him and this is the only reason he did not kill himself. His wife initially denies that he has access to a gun. He adamantly replies back to her that he does have two guns and she then admits that this is true. He reiterates that he used one of the guns to attempt suicide and his wife initially states that this is not true and states that she has been hearing this for 2 years. He angrily replies to her that he tried to kill herself and her son stopped him and she then states that she didn't know that happened. He states that these are his guns and he has the right to hurt himself and no one should be allowed to stop him.  Timbo Gallagher is suicidal and therefore, recommend PEC be placed. Dr. Varelaant aware.    His overall symptom complex includes: suicidal ideation with plan to shoot self with pistol, recent suicide attempt with pistol that was stopped by stepson, feels depressed, feels anxious, believes he has the right to shoot/kill himself, irritable, anger at times , poor appetite          Patient History           Medical as of 4/26/2019     Past Medical History     Diagnosis Date Comments Source    Acute renal failure -- -- Provider    Arthritis -- -- Provider    Blind right eye -- -- Provider    BPH (benign prostatic hypertrophy) -- -- Provider    Bronchitis, chronic -- -- Provider    Carotid artery occlusion -- Lt carotid endarerectomy done 02/19/2018  Provider    CHF (congestive heart failure) -- -- Provider    Colon polyp -- -- Provider    Coronary artery disease -- -- Provider    Diabetes  mellitus -- -- Provider    GERD (gastroesophageal reflux disease) -- -- Provider    Hearing aid worn -- bilateral Provider    Hematuria -- -- Provider    Hernia -- -- Provider    Hypertension -- -- Provider    Influenza A -- -- Provider    Irregular heart beat -- -- Provider    NSVT (nonsustained ventricular tachycardia) 4/18/2018 -- Provider    Renal failure as complication of care -- sepsis, renal function returned Provider    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 -- Provider    Snoring -- -- Provider    Status post implantation of automatic cardioverter/defibrillator (AICD) 04/23/2018 -- Provider    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 Provider    Stroke 02/2018 TIA s/p L CEA Provider    Suicide attempt -- states tried with gun recently and was stopped by stepson Provider          Pertinent Negatives     Diagnosis Date Noted Comments Source    Difficult intubation 08/02/2013 -- Provider    Hallucination 04/26/2019 -- Provider    History of psychiatric hospitalization 04/26/2019 -- Provider    Hx of psychiatric care 04/26/2019 -- Provider                  Surgical as of 4/26/2019     Past Surgical History     Procedure Laterality Date Comments Source    EYE SURGERY -- -- -- Provider    CATARACT EXTRACTION, BILATERAL -- -- -- Provider    RETINAL DETACHMENT SURGERY -- -- -- Provider    CAROTID ENDARTERECTOMY Left 02/2018 Dr. Coleman Provider    ESOPHAGOGASTRODUODENOSCOPY N/A 8/30/2018 Procedure: EGD (ESOPHAGOGASTRODUODENOSCOPY);  Surgeon: Tye Navarrete MD;  Location: [address];  Service: Endoscopy;  Laterality: N/A; Provider    HERNIA REPAIR Left 09/2013 -- Provider    CARDIAC VALVE SURGERY -- 10/17/2017 AS s/p TAVR Provider    CARDIAC DEFIBRILLATOR PLACEMENT -- 04/23/2018 -- Provider    CARDIAC CATHETERIZATION Left 05/08/17, 04/20/18 -- Provider    ESOPHAGOGASTRODUODENOSCOPY -- 08/30/2018 -- Provider    THORACENTESIS N/A 1/17/2019 Procedure: THORACENTESIS;   Surgeon: Hernan Diagnostic Provider;  Location: [facility] OR;  Service: Radiology;  Laterality: N/A;  1130AM  START  RN PRE OP 1-15-19 Provider                  Family as of 4/26/2019     Problem Relation Name Age of Onset Comments Source    Arthritis Mother -- -- -- Provider    Heart disease Father -- -- -- Provider    Heart failure Father -- -- -- Provider    Diabetes Sister -- -- -- Provider    Heart attack Brother -- -- -- Provider    No Known Problems Maternal Aunt -- -- -- Provider    No Known Problems Maternal Uncle -- -- -- Provider    No Known Problems Paternal Aunt -- -- -- Provider    No Known Problems Paternal Uncle -- -- -- Provider    No Known Problems Maternal Grandmother -- -- -- Provider    No Known Problems Maternal Grandfather -- -- -- Provider    No Known Problems Paternal Grandmother -- -- -- Provider    No Known Problems Paternal Grandfather -- -- -- Provider    Anemia Neg Hx -- -- -- Provider    Arrhythmia Neg Hx -- -- -- Provider    Asthma Neg Hx -- -- -- Provider    Clotting disorder Neg Hx -- -- -- Provider    Fainting Neg Hx -- -- -- Provider    Hyperlipidemia Neg Hx -- -- -- Provider    Hypertension Neg Hx -- -- -- Provider    Stroke Neg Hx -- -- -- Provider    Atrial Septal Defect Neg Hx -- -- -- Provider            Tobacco Use as of 4/26/2019     Smoking Status Smoking Start Date Smoking Quit Date Packs/Day Years Used    Former Smoker -- 8/2/1990 3.00 40.00    Types Comments Smokeless Tobacco Status Smokeless Tobacco Quit Date Source     Cigarettes -- Never Used -- Provider            Alcohol Use as of 4/26/2019     Alcohol Use Drinks/Week Alcohol/Week Comments Source    No -- -- -- Provider    Frequency Standard Drinks Binge Drinking        -- -- --              Drug Use as of 4/26/2019     Drug Use Types Frequency Comments Source    No -- -- -- Provider            Sexual Activity as of 4/26/2019     Sexually Active Birth Control Partners Comments Source    Not Currently -- Female -- Provider             Activities of Daily Living as of 4/26/2019     Activities of Daily Living Question Response Comments Source    Patient feels they ought to cut down on drinking/drug use No -- Provider    Patient annoyed by others criticizing their drinking/drug use No -- Provider    Patient has felt bad or guilty about drinking/drug use No -- Provider    Patient has had a drink/used drugs as an eye opener in the AM No -- Provider            Social Documentation as of 4/26/2019    Lives with wife and yariel.       Source: Provider           Occupational as of 4/26/2019     Occupation Employer Comments Source    Retired -- -- Provider            Socioeconomic as of 4/26/2019     Marital Status Spouse Name Number of Children Years Education Education Level Preferred Language Ethnicity Race Source     Opal -- -- -- English /White White Provider    Financial Resource Strain Food Insecurity: Worry Food Insecurity: Inability Transportation Needs: Medical Transportation Needs: Non-medical    -- -- -- -- --            Pertinent History     Question Response Comments    Lives with family wife and 54 year old yariel    Place in Birth Order -- --    Lives in home --    Number of Siblings -- --    Raised by -- --    Legal Involvement -- --    Childhood Trauma -- --    Criminal History of -- --    Financial Status other Retired    Highest Level of Education -- --    Does patient have access to a firearm? Yes shotgun and pistol     Service -- --    Primary Leisure Activity -- --    Spirituality -- --        Past Medical History:   Diagnosis Date    Acute renal failure     Arthritis     Blind right eye     BPH (benign prostatic hypertrophy)     Bronchitis, chronic     Carotid artery occlusion     Lt carotid endarerectomy done 02/19/2018     CHF (congestive heart failure)     Colon polyp     Coronary artery disease     Diabetes mellitus     GERD (gastroesophageal reflux disease)     Hearing aid worn      bilateral    Hematuria     Hernia     Hypertension     Influenza A     Irregular heart beat     NSVT (nonsustained ventricular tachycardia) 4/18/2018    Renal failure as complication of care     sepsis, renal function returned    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 Coleman    CATARACT EXTRACTION, BILATERAL      EGD (ESOPHAGOGASTRODUODENOSCOPY) N/A 8/30/2018    Performed by Tye Nav[facility]rete MD at [facility] ENDO (2ND FLR)    ENDARTERECTOMY-CAROTID Left 2/19/2018    Performed by Silvio Coleman MD at [facility] OR    ESOPHAGOGASTRODUODENOSCOPY  08/30/2018    EYE SURGERY      HERNIA REPAIR Left 09/2013    REPAIR, HERNIA, INGUINAL, WITHOUT HISTORY OF PRIOR REPAIR, AGE 5 YEARS OR OLDER Left 9/11/2013    Performed by Han Brown MD at [facility] OR    REPLACEMENT-VALVE-AORTIC N/A 10/17/2017    Performed by Martínez Keene MD at [facility] CATH LAB    RETINAL DETACHMENT SURGERY      THORACENTESIS N/A 1/17/2019    Performed by [facility] Diagnostic Provider at [facility] OR     Family History     Problem Relation (Age of Onset)    Arthritis Mother    Diabetes Sister    Heart attack Brother    Heart disease Father    Heart failure Father    No Known Problems Maternal Aunt, Maternal Uncle, Paternal Aunt, Paternal Uncle, Maternal Grandmother, Maternal Grandfather, Paternal Grandmother, Paternal Grandfather        Tobacco Use    Smoking status: Former Smoker     Packs/day: 3.00      Years: 40.00     Pack years: 120.00     Types: Cigarettes     Last attempt to quit: 1990     Years since quittin.7    Smokeless tobacco: Never Used   Substance and Sexual Activity    Alcohol use: No    Drug use: No    Sexual activity: Not Currently     Partners: Female     Review of patient's allergies indicates:   Allergen Reactions    Ciprofloxacin (mixture) Itching     Extreme itching    Antibiotic hc     Hydrochlorothiazide      Leg swelling      Iodine and iodide containing products      Wife and pt unsure    Vancomycin analogues Itching       No current facility-administered medications on file prior to encounter.      Current Outpatient Medications on File Prior to Encounter   Medication Sig    acarbose (PRECOSE) 25 MG Tab Take 25 mg by mouth 3 (three) times daily with meals.    acetaminophen (TYLENOL) 500 MG tablet Take 500 mg by mouth 2 (two) times daily.    amiodarone (PACERONE) 200 MG Tab Take 1 tablet (200 mg total) by mouth once daily.    aspirin (ECOTRIN) 81 MG EC tablet Take 1 tablet (81 mg total) by mouth once daily. Hold until evaluated by Cardiology    atorvastatin (LIPITOR) 40 MG tablet Take 1 tablet (40 mg total) by mouth once daily.    clopidogrel (PLAVIX) 75 mg tablet Take 1 tablet (75 mg total) by mouth once daily.    famotidine (PEPCID) 20 MG tablet Take 20 mg by mouth 2 (two) times daily.     ferrous gluconate (FERGON) 324 MG tablet Take 1 tablet (324 mg total) by mouth daily with breakfast.    furosemide (LASIX) 40 MG tablet Take 1 tablet (40 mg total) by mouth once daily. Take 40mg every 12 hours for 1 week (starting 19), then 40mg daily thereafter.    metoprolol succinate (TOPROL-XL) 50 MG 24 hr tablet Take 50 mg by mouth 2 (two) times daily.     mupirocin (BACTROBAN) 2 % ointment Apply topically 3 (three) times daily.    cyanocobalamin (VITAMIN B-12) 1000 MCG tablet Take 100 mcg by mouth once a week.    diphenhydrAMINE (BENADRYL) 50 MG capsule Take  "50 mg by mouth nightly.    tamsulosin (FLOMAX) 0.4 mg Cap Take 1 capsule (0.4 mg total) by mouth once daily.     Psychotherapeutics (From admission, onward)    None        Review of Systems   Constitutional: Positive for appetite change (decreased appetite).   HENT: Negative for ear pain.    Eyes: Negative for pain.   Respiratory: Negative for chest tightness.    Cardiovascular: Negative for chest pain.   Gastrointestinal: Negative for abdominal pain.   Genitourinary: Negative for flank pain.   Neurological: Negative for headaches.   Psychiatric/Behavioral: Positive for agitation, dysphoric mood and suicidal ideas (with plan to kill self with pistol). Negative for hallucinations. The patient is nervous/anxious.      Strengths and Liabilities: Strength: Patient is expressive/articulate., Liability: Patient has poor judgment, Liability: Patient lacks coping skills.    Objective:     Vital Signs (Most Recent):  Temp: 98.6 °F (37 °C) (04/26/19 0749)  Pulse: 68 (04/26/19 0928)  Resp: 18 (04/26/19 0749)  BP: (!) 124/58 (04/26/19 0749)  SpO2: 99 % (04/26/19 0749) Vital Signs (24h Range):  Temp:  [98.2 °F (36.8 °C)-100.5 °F (38.1 °C)] 98.6 °F (37 °C)  Pulse:  [60-85] 68  Resp:  [12-20] 18  SpO2:  [92 %-99 %] 99 %  BP: (102-136)/(42-77) 124/58     Height: 5' 9" (175.3 cm)  Weight: 87.1 kg (192 lb)  Body mass index is 28.35 kg/m².      Intake/Output Summary (Last 24 hours) at 4/26/2019 1145  Last data filed at 4/25/2019 1732  Gross per 24 hour   Intake 240 ml   Output 2500 ml   Net -2260 ml       Physical Exam   Psychiatric:   EXAMINATION    CONSTITUTIONAL  General Appearance: unremarkable    MUSCULOSKELETAL  Muscle Strength and Tone: not tested  Abnormal Involuntary Movements: none noted  Gait and Station: not observed    PSYCHIATRIC MENTAL STATUS EXAM   Level of Consciousness: awake and alert  Orientation: person, place, time and situation  Grooming: wearing hospital attire  Psychomotor Behavior: no abnormalities " "noted  Speech: no latency, no pressure  Language: no abnormalities noted  Mood: "yes I'm depressed."  Affect: depressed  Thought Process: spontaneous  Associations: intact  Thought Content: delusional thought about his kidney coming out of his body and it being the size of a loaf of bread  Memory: intact for conversatin  Attention: able to focus  Fund of Knowledge: unable to assess  Insight: poor into extent of depression   Judgment: poor into coping          Significant Labs: All pertinent labs within the past 24 hours have been reviewed.    Significant Imaging: None    Assessment/Plan:     * Major depressive disorder, recurrent, 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  His overall symptom complex includes: suicidal ideation with plan to shoot self with pistol, recent suicide attempt with pistol that was stopped by santyon, feels depressed, feels anxious, believes he has the right to shoot/kill himself, irritable, anger at times , poor appetite     Recommendation: PEC. 1: 1 sitter. Notify  of PEC status. Inpatient Psychiatric facility once medically cleared. Upon transfer, please send original PEC/CEC with patient and place copy on the chart. Utilize Zyprexa 5 mg PO/IM q 8 hours PRN agitation or uncontrollable, threatening behavior. Start Prozac 10 mg po qd for depression. Thyroid Function Tests.            Total Time:  60 minutes      Benigno Jackson NP   Psychiatry  [facility] [place]  "

## 2019-04-26 NOTE — NURSING
Patient has been placed on PEC due to suicidal ideations.  CEC has been called in to 's office.  Personal belongings and all contrabands have been removed from patient's room.  Patient was assisted into paper gowns.  Wife took patient's belonging home.  1:1 sitter is at bedside.

## 2019-04-26 NOTE — ASSESSMENT & PLAN NOTE
His overall symptom complex includes: suicidal ideation with plan to shoot self with pistol, recent suicide attempt with pistol that was stopped by santyon, feels depressed, feels anxious, believes he has the right to shoot/kill himself, irritable, anger at times , poor appetite     Recommendation: PEC. 1: 1 sitter. Notify  of PEC status. Inpatient Psychiatric facility once medically cleared. Upon transfer, please send original PEC/CEC with patient and place copy on the chart. Utilize Zyprexa 5 mg PO/IM q 8 hours PRN agitation or uncontrollable, threatening behavior. Start Prozac 10 mg po qd for depression. Thyroid Function Tests.

## 2019-04-26 NOTE — HPI
Timbo Gallagher is a 84 year old male who presented to [facility] with difficulty breathing. A Chest x-ray was done which confirmed moderate right effusion.  Pulmonary was consulted for further inputs.

## 2019-04-26 NOTE — HOSPITAL COURSE
Timbo Gallagher presents right side lying supine in bed.Oxygen in place via nasal cannula. His wife is at the bedside. He is confused at times and much of the information is taken from and/or confirmed by his wife, Opal. She states that he came into the hospital because of problems with breathing. He is awake and alert and oriented to person, place, time and situation. His affect is depressed. His speech is clear with no latency, no pressure. He admits to being depressed. He states that he wants to kill himself and has a plan to use a pistol. He states that he tried to kill himself recently with a pistol and his stepson stopped him and this is the only reason he did not kill himself. His wife initially denies that he has access to a gun. He adamantly replies back to her that he does have two guns and she then admits that this is true. He reiterates that he used one of the guns to attempt suicide and his wife initially states that this is not true and states that she has been hearing this for 2 years. He angrily replies to her that he tried to kill herself and her son stopped him and she then states that she didn't know that happened. He states that these are his guns and he has the right to hurt himself and no one should be allowed to stop him.  Timbo Gallagher is suicidal and therefore, recommend PEC be placed. Dr. Sun aware.    His overall symptom complex includes: suicidal ideation with plan to shoot self with pistol, recent suicide attempt with pistol that was stopped by stepson, feels depressed, feels anxious, believes he has the right to shoot/kill himself, irritable, anger at times , poor appetite

## 2019-04-26 NOTE — ASSESSMENT & PLAN NOTE
Chronic since 8/2018.  S/p thoracentesis 1/19 but analysis was not sent.  Recent chemistry from pleural effusion is transudate.  Negative fluid balance is recommended.

## 2019-04-26 NOTE — NURSING
Patient resting abed quietly, no c/o voiced. Wife assisted Patient to stand up to use urinal. Call light in reach.

## 2019-04-26 NOTE — SUBJECTIVE & OBJECTIVE
Patient History           Medical as of 4/26/2019     Past Medical History     Diagnosis Date Comments Source    Acute renal failure -- -- Provider    Arthritis -- -- Provider    Blind right eye -- -- Provider    BPH (benign prostatic hypertrophy) -- -- Provider    Bronchitis, chronic -- -- Provider    Carotid artery occlusion -- Lt carotid endarerectomy done 02/19/2018  Provider    CHF (congestive heart failure) -- -- Provider    Colon polyp -- -- Provider    Coronary artery disease -- -- Provider    Diabetes mellitus -- -- Provider    GERD (gastroesophageal reflux disease) -- -- Provider    Hearing aid worn -- bilateral Provider    Hematuria -- -- Provider    Hernia -- -- Provider    Hypertension -- -- Provider    Influenza A -- -- Provider    Irregular heart beat -- -- Provider    NSVT (nonsustained ventricular tachycardia) 4/18/2018 -- Provider    Renal failure as complication of care -- sepsis, renal function returned Provider    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 -- Provider    Snoring -- -- Provider    Status post implantation of automatic cardioverter/defibrillator (AICD) 04/23/2018 -- Provider    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 Provider    Stroke 02/2018 TIA s/p L CEA Provider    Suicide attempt -- states tried with gun recently and was stopped by yariel Provider          Pertinent Negatives     Diagnosis Date Noted Comments Source    Difficult intubation 08/02/2013 -- Provider    Hallucination 04/26/2019 -- Provider    History of psychiatric hospitalization 04/26/2019 -- Provider    Hx of psychiatric care 04/26/2019 -- Provider                  Surgical as of 4/26/2019     Past Surgical History     Procedure Laterality Date Comments Source    EYE SURGERY -- -- -- Provider    CATARACT EXTRACTION, BILATERAL -- -- -- Provider    RETINAL DETACHMENT SURGERY -- -- -- Provider    CAROTID ENDARTERECTOMY Left 02/2018 Dr. Coleman Provider     ESOPHAGOGASTRODUODENOSCOPY N/A 8/30/2018 Procedure: EGD (ESOPHAGOGASTRODUODENOSCOPY);  Surgeon: Tye Navarrete MD;  Location: [facility] ([address] [place] [address]);  Service: Endoscopy;  Laterality: N/A; Provider    HERNIA REPAIR Left 09/2013 -- Provider    CARDIAC VALVE SURGERY -- 10/17/2017 AS s/p TAVR Provider    CARDIAC DEFIBRILLATOR PLACEMENT -- 04/23/2018 -- Provider    CARDIAC CATHETERIZATION Left 05/08/17, 04/20/18 -- Provider    ESOPHAGOGASTRODUODENOSCOPY -- 08/30/2018 -- Provider    THORACENTESIS N/A 1/17/2019 Procedure: THORACENTESIS;  Surgeon: Hernan Diagnostic Provider;  Location: [facility];  Service: Radiology;  Laterality: N/A;  1130AM  START  RN PRE OP 1-15-19 Provider                  Family as of 4/26/2019     Problem Relation Name Age of Onset Comments Source    Arthritis Mother -- -- -- Provider    Heart disease Father -- -- -- Provider    Heart failure Father -- -- -- Provider    Diabetes Sister -- -- -- Provider    Heart attack Brother -- -- -- Provider    No Known Problems Maternal Aunt -- -- -- Provider    No Known Problems Maternal Uncle -- -- -- Provider    No Known Problems Paternal Aunt -- -- -- Provider    No Known Problems Paternal Uncle -- -- -- Provider    No Known Problems Maternal Grandmother -- -- -- Provider    No Known Problems Maternal Grandfather -- -- -- Provider    No Known Problems Paternal Grandmother -- -- -- Provider    No Known Problems Paternal Grandfather -- -- -- Provider    Anemia Neg Hx -- -- -- Provider    Arrhythmia Neg Hx -- -- -- Provider    Asthma Neg Hx -- -- -- Provider    Clotting disorder Neg Hx -- -- -- Provider    Fainting Neg Hx -- -- -- Provider    Hyperlipidemia Neg Hx -- -- -- Provider    Hypertension Neg Hx -- -- -- Provider    Stroke Neg Hx -- -- -- Provider    Atrial Septal Defect Neg Hx -- -- -- Provider            Tobacco Use as of 4/26/2019     Smoking Status Smoking Start Date Smoking Quit Date Packs/Day Years Used    Former Smoker -- 8/2/1990 3.00 40.00    Types  Comments Smokeless Tobacco Status Smokeless Tobacco Quit Date Source     Cigarettes -- Never Used -- Provider            Alcohol Use as of 4/26/2019     Alcohol Use Drinks/Week Alcohol/Week Comments Source    No -- -- -- Provider    Frequency Standard Drinks Binge Drinking        -- -- --              Drug Use as of 4/26/2019     Drug Use Types Frequency Comments Source    No -- -- -- Provider            Sexual Activity as of 4/26/2019     Sexually Active Birth Control Partners Comments Source    Not Currently -- Female -- Provider            Activities of Daily Living as of 4/26/2019     Activities of Daily Living Question Response Comments Source    Patient feels they ought to cut down on drinking/drug use No -- Provider    Patient annoyed by others criticizing their drinking/drug use No -- Provider    Patient has felt bad or guilty about drinking/drug use No -- Provider    Patient has had a drink/used drugs as an eye opener in the AM No -- Provider            Social Documentation as of 4/26/2019    Lives with wife and stepson.       Source: Provider           Occupational as of 4/26/2019     Occupation Employer Comments Source    Retired -- -- Provider            Socioeconomic as of 4/26/2019     Marital Status Spouse Name Number of Children Years Education Education Level Preferred Language Ethnicity Race Source     [place] -- -- -- English /White White Provider    Financial Resource Strain Food Insecurity: Worry Food Insecurity: Inability Transportation Needs: Medical Transportation Needs: Non-medical    -- -- -- -- --            Pertinent History     Question Response Comments    Lives with family wife and 54 year old santyon    Place in Birth Order -- --    Lives in home --    Number of Siblings -- --    Raised by -- --    Legal Involvement -- --    Childhood Trauma -- --    Criminal History of -- --    Financial Status other Retired    Highest Level of Education -- --    Does patient have access  to a firearm? Yes shotgun and pistol     Service -- --    Primary Leisure Activity -- --    Spirituality -- --        Past Medical History:   Diagnosis Date    Acute renal failure     Arthritis     Blind right eye     BPH (benign prostatic hypertrophy)     Bronchitis, chronic     Carotid artery occlusion     Lt carotid endarerectomy done 02/19/2018     CHF (congestive heart failure)     Colon polyp     Coronary artery disease     Diabetes mellitus     GERD (gastroesophageal reflux disease)     Hearing aid worn     bilateral    Hematuria     Hernia     Hypertension     Influenza A     Irregular heart beat     NSVT (nonsustained ventricular tachycardia) 4/18/2018    Renal failure as complication of care     sepsis, renal function returned    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 Coleman    CATARACT EXTRACTION, BILATERAL      EGD (ESOPHAGOGASTRODUODENOSCOPY) N/A 8/30/2018    Per[facility] ([facility]ND [facility])    ENDARTERECTOMY-CAROTID Left 2/19/2018    [facility]io [facility] at [facility]    ESOPHAGOGASTRODUODENOSCOPY  08/30/2018    EYE SURGERY      HERNIA REPAIR Left 09/2013    REPAIR, HERNIA, INGUINAL, WITHOUT HISTORY OF PRIOR REPAIR, AGE 5 YEARS OR OLDER Left 9/11/2013    Performed by Han Brown MD at [facility] OR    REPLACEMENT-VALVE-AORTIC N/A 10/17/2017    Per[facility] by Mart[facility]nez [facility]ene [facility] at  [facility] CATH LAB    RETINAL DETACHMENT SURGERY      THORACENTESIS N/A 2019    Performed by [facility] Diagnostic Provider at [facility] OR     Family History     Problem Relation (Age of Onset)    Arthritis Mother    Diabetes Sister    Heart attack Brother    Heart disease Father    Heart failure Father    No Known Problems Maternal Aunt, Maternal Uncle, Paternal Aunt, Paternal Uncle, Maternal Grandmother, Maternal Grandfather, Paternal Grandmother, Paternal Grandfather        Tobacco Use    Smoking status: Former Smoker     Packs/day: 3.00     Years: 40.00     Pack years: 120.00     Types: Cigarettes     Last attempt to quit: 1990     Years since quittin.7    Smokeless tobacco: Never Used   Substance and Sexual Activity    Alcohol use: No    Drug use: No    Sexual activity: Not Currently     Partners: Female     Review of patient's allergies indicates:   Allergen Reactions    Ciprofloxacin (mixture) Itching     Extreme itching    Antibiotic hc     Hydrochlorothiazide      Leg swelling      Iodine and iodide containing products      Wife and pt unsure    Vancomycin analogues Itching       No current facility-administered medications on file prior to encounter.      Current Outpatient Medications on File Prior to Encounter   Medication Sig    acarbose (PRECOSE) 25 MG Tab Take 25 mg by mouth 3 (three) times daily with meals.    acetaminophen (TYLENOL) 500 MG tablet Take 500 mg by mouth 2 (two) times daily.    amiodarone (PACERONE) 200 MG Tab Take 1 tablet (200 mg total) by mouth once daily.    aspirin (ECOTRIN) 81 MG EC tablet Take 1 tablet (81 mg total) by mouth once daily. Hold until evaluated by Cardiology    atorvastatin (LIPITOR) 40 MG tablet Take 1 tablet (40 mg total) by mouth once daily.    clopidogrel (PLAVIX) 75 mg tablet Take 1 tablet (75 mg total) by mouth once daily.    famotidine (PEPCID) 20 MG tablet Take 20 mg by mouth 2 (two) times daily.     ferrous gluconate (FERGON) 324 MG tablet  "Take 1 tablet (324 mg total) by mouth daily with breakfast.    furosemide (LASIX) 40 MG tablet Take 1 tablet (40 mg total) by mouth once daily. Take 40mg every 12 hours for 1 week (starting 4/16/19), then 40mg daily thereafter.    metoprolol succinate (TOPROL-XL) 50 MG 24 hr tablet Take 50 mg by mouth 2 (two) times daily.     mupirocin (BACTROBAN) 2 % ointment Apply topically 3 (three) times daily.    cyanocobalamin (VITAMIN B-12) 1000 MCG tablet Take 100 mcg by mouth once a week.    diphenhydrAMINE (BENADRYL) 50 MG capsule Take 50 mg by mouth nightly.    tamsulosin (FLOMAX) 0.4 mg Cap Take 1 capsule (0.4 mg total) by mouth once daily.     Psychotherapeutics (From admission, onward)    None        Review of Systems   Constitutional: Positive for appetite change (decreased appetite).   HENT: Negative for ear pain.    Eyes: Negative for pain.   Respiratory: Negative for chest tightness.    Cardiovascular: Negative for chest pain.   Gastrointestinal: Negative for abdominal pain.   Genitourinary: Negative for flank pain.   Neurological: Negative for headaches.   Psychiatric/Behavioral: Positive for agitation, dysphoric mood and suicidal ideas (with plan to kill self with pistol). Negative for hallucinations. The patient is nervous/anxious.      Strengths and Liabilities: Strength: Patient is expressive/articulate., Liability: Patient has poor judgment, Liability: Patient lacks coping skills.    Objective:     Vital Signs (Most Recent):  Temp: 98.6 °F (37 °C) (04/26/19 0749)  Pulse: 68 (04/26/19 0928)  Resp: 18 (04/26/19 0749)  BP: (!) 124/58 (04/26/19 0749)  SpO2: 99 % (04/26/19 0749) Vital Signs (24h Range):  Temp:  [98.2 °F (36.8 °C)-100.5 °F (38.1 °C)] 98.6 °F (37 °C)  Pulse:  [60-85] 68  Resp:  [12-20] 18  SpO2:  [92 %-99 %] 99 %  BP: (102-136)/(42-77) 124/58     Height: 5' 9" (175.3 cm)  Weight: 87.1 kg (192 lb)  Body mass index is 28.35 kg/m².      Intake/Output Summary (Last 24 hours) at 4/26/2019 " "1145  Last data filed at 4/25/2019 1732  Gross per 24 hour   Intake 240 ml   Output 2500 ml   Net -2260 ml       Physical Exam   Psychiatric:   EXAMINATION    CONSTITUTIONAL  General Appearance: unremarkable    MUSCULOSKELETAL  Muscle Strength and Tone: not tested  Abnormal Involuntary Movements: none noted  Gait and Station: not observed    PSYCHIATRIC MENTAL STATUS EXAM   Level of Consciousness: awake and alert  Orientation: person, place, time and situation  Grooming: wearing hospital attire  Psychomotor Behavior: no abnormalities noted  Speech: no latency, no pressure  Language: no abnormalities noted  Mood: "yes I'm depressed."  Affect: depressed  Thought Process: spontaneous  Associations: intact  Thought Content: delusional thought about his kidney coming out of his body and it being the size of a loaf of bread  Memory: intact for conversatin  Attention: able to focus  Fund of Knowledge: unable to assess  Insight: poor into extent of depression   Judgment: poor into coping          Significant Labs: All pertinent labs within the past 24 hours have been reviewed.    Significant Imaging: None  "

## 2019-04-26 NOTE — ASSESSMENT & PLAN NOTE
Normal LV fxn  Mod-sev MR noted on echo 4/25/19, stable vs 1/2019, LV fxn normal, TAVR fxn normal  Cont diuresis, goal net neg 1-1.5L daily  Decrease lasix to 40mg IV qd  Labs pending today  Monitor creat

## 2019-04-26 NOTE — PROGRESS NOTES
SW spoke with wife in private after completing discharge assessment. Patient's wife expressed that she is interested in looking for nursing homes for patient. Wife stated that patient's mental state is unstable and is aggressive with her at times. Wife stated that she is also taking care of her son who has a mental illness. Wife stated that she feels that she will not be able to continue to care for son and patient if his health continues to decline and would like to start looking for a nursing home for him. SW gave wife a list of nursing homes from the Medicare website and explained the rating system. Wife reported that she and patient were at [facility] for rehab. Wife stated that patient did not like the facility and did not want to stay.

## 2019-04-26 NOTE — ASSESSMENT & PLAN NOTE
Mod-severe native MR (not MVR), stable vs prior echo 1/2019  LV fxn normal  Will consider LISSETT if unable to control sxs/CHF (likely as outpatient)

## 2019-04-27 LAB
ANION GAP SERPL CALC-SCNC: 6 MMOL/L (ref 8–16)
BACTERIA SPEC AEROBE CULT: NORMAL
BACTERIA UR CULT: NORMAL
BUN SERPL-MCNC: 16 MG/DL (ref 8–23)
CALCIUM SERPL-MCNC: 8.8 MG/DL (ref 8.7–10.5)
CHLORIDE SERPL-SCNC: 99 MMOL/L (ref 95–110)
CHOLEST FLD-MCNC: 24 MG/DL
CO2 SERPL-SCNC: 32 MMOL/L (ref 23–29)
CREAT SERPL-MCNC: 1.4 MG/DL (ref 0.5–1.4)
EST. GFR  (AFRICAN AMERICAN): 53 ML/MIN/1.73 M^2
EST. GFR  (NON AFRICAN AMERICAN): 46 ML/MIN/1.73 M^2
GLUCOSE SERPL-MCNC: 208 MG/DL (ref 70–110)
GRAM STN SPEC: NORMAL
POCT GLUCOSE: 129 MG/DL (ref 70–110)
POCT GLUCOSE: 203 MG/DL (ref 70–110)
POTASSIUM SERPL-SCNC: 3.4 MMOL/L (ref 3.5–5.1)
SODIUM SERPL-SCNC: 137 MMOL/L (ref 136–145)
SPECIMEN SOURCE: NORMAL

## 2019-04-27 PROCEDURE — 99232 PR SUBSEQUENT HOSPITAL CARE,LEVL II: ICD-10-PCS | Mod: ,,, | Performed by: INTERNAL MEDICINE

## 2019-04-27 PROCEDURE — 63600175 PHARM REV CODE 636 W HCPCS: Performed by: HOSPITALIST

## 2019-04-27 PROCEDURE — 99232 SBSQ HOSP IP/OBS MODERATE 35: CPT | Mod: ,,, | Performed by: INTERNAL MEDICINE

## 2019-04-27 PROCEDURE — 25000242 PHARM REV CODE 250 ALT 637 W/ HCPCS: Performed by: HOSPITALIST

## 2019-04-27 PROCEDURE — 94761 N-INVAS EAR/PLS OXIMETRY MLT: CPT

## 2019-04-27 PROCEDURE — 36415 COLL VENOUS BLD VENIPUNCTURE: CPT

## 2019-04-27 PROCEDURE — 25000003 PHARM REV CODE 250: Performed by: HOSPITALIST

## 2019-04-27 PROCEDURE — 27000221 HC OXYGEN, UP TO 24 HOURS

## 2019-04-27 PROCEDURE — 25000003 PHARM REV CODE 250: Performed by: INTERNAL MEDICINE

## 2019-04-27 PROCEDURE — 11000001 HC ACUTE MED/SURG PRIVATE ROOM

## 2019-04-27 PROCEDURE — 94640 AIRWAY INHALATION TREATMENT: CPT

## 2019-04-27 PROCEDURE — 80048 BASIC METABOLIC PNL TOTAL CA: CPT

## 2019-04-27 RX ORDER — MIRTAZAPINE 7.5 MG/1
7.5 TABLET, FILM COATED ORAL NIGHTLY
Status: DISCONTINUED | OUTPATIENT
Start: 2019-04-27 | End: 2019-04-29 | Stop reason: HOSPADM

## 2019-04-27 RX ORDER — FUROSEMIDE 40 MG/1
40 TABLET ORAL 2 TIMES DAILY
Status: DISCONTINUED | OUTPATIENT
Start: 2019-04-27 | End: 2019-04-29 | Stop reason: HOSPADM

## 2019-04-27 RX ADMIN — MIRTAZAPINE 7.5 MG: 7.5 TABLET ORAL at 09:04

## 2019-04-27 RX ADMIN — CLOPIDOGREL BISULFATE 75 MG: 75 TABLET ORAL at 09:04

## 2019-04-27 RX ADMIN — INSULIN ASPART 2 UNITS: 100 INJECTION, SOLUTION INTRAVENOUS; SUBCUTANEOUS at 01:04

## 2019-04-27 RX ADMIN — CEFEPIME HYDROCHLORIDE 1 G: 1 INJECTION, SOLUTION INTRAVENOUS at 09:04

## 2019-04-27 RX ADMIN — METOPROLOL SUCCINATE 50 MG: 50 TABLET, EXTENDED RELEASE ORAL at 09:04

## 2019-04-27 RX ADMIN — INSULIN ASPART 1 UNITS: 100 INJECTION, SOLUTION INTRAVENOUS; SUBCUTANEOUS at 09:04

## 2019-04-27 RX ADMIN — GUAIFENESIN 600 MG: 600 TABLET, EXTENDED RELEASE ORAL at 09:04

## 2019-04-27 RX ADMIN — INSULIN ASPART 2 UNITS: 100 INJECTION, SOLUTION INTRAVENOUS; SUBCUTANEOUS at 05:04

## 2019-04-27 RX ADMIN — IPRATROPIUM BROMIDE AND ALBUTEROL SULFATE 3 ML: .5; 3 SOLUTION RESPIRATORY (INHALATION) at 07:04

## 2019-04-27 RX ADMIN — IPRATROPIUM BROMIDE AND ALBUTEROL SULFATE 3 ML: .5; 3 SOLUTION RESPIRATORY (INHALATION) at 01:04

## 2019-04-27 RX ADMIN — FAMOTIDINE 20 MG: 20 TABLET ORAL at 09:04

## 2019-04-27 RX ADMIN — FERROUS GLUCONATE TAB 324 MG (37.5 MG ELEMENTAL IRON) 324 MG: 324 (37.5 FE) TAB at 09:04

## 2019-04-27 RX ADMIN — ACETAMINOPHEN 650 MG: 325 TABLET, FILM COATED ORAL at 03:04

## 2019-04-27 RX ADMIN — ACETAMINOPHEN 650 MG: 325 TABLET, FILM COATED ORAL at 06:04

## 2019-04-27 RX ADMIN — LINEZOLID 600 MG: 600 INJECTION, SOLUTION INTRAVENOUS at 01:04

## 2019-04-27 RX ADMIN — TAMSULOSIN HYDROCHLORIDE 0.4 MG: 0.4 CAPSULE ORAL at 09:04

## 2019-04-27 RX ADMIN — AMIODARONE HYDROCHLORIDE 200 MG: 200 TABLET ORAL at 09:04

## 2019-04-27 RX ADMIN — FUROSEMIDE 40 MG: 40 TABLET ORAL at 05:04

## 2019-04-27 RX ADMIN — FUROSEMIDE 40 MG: 40 TABLET ORAL at 09:04

## 2019-04-27 RX ADMIN — IPRATROPIUM BROMIDE AND ALBUTEROL SULFATE 3 ML: .5; 3 SOLUTION RESPIRATORY (INHALATION) at 08:04

## 2019-04-27 RX ADMIN — ATORVASTATIN CALCIUM 40 MG: 40 TABLET, FILM COATED ORAL at 09:04

## 2019-04-27 NOTE — PROGRESS NOTES
WRITTEN DISCHARGE INSTRUCTIONS    Cardiology Follow-Up Appointment  Follow-up Information     Ned Toribio MD. Go on 5/3/2019.    Specialties:  Cardiology, INTERVENTIONAL CARDIOLOGY  Why:  1:20pm  Cardiology Hospital Follow-Up appointment with Dr. Toribio  Contact information:  [address]  [address]  [facility] [address]  [phone]

## 2019-04-27 NOTE — PROGRESS NOTES
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 Patients symptoms began 3 days ago and have gradually worsened. Is not sure if he had a fever or not PTA. Denies a PMHx of Asthma or COPD, but has had a "lung drained" in the past ( January 2019). Has never been placed on CPAP or BIPAP in the past.       Patient was seen by his PCP yesterday for the SOB. Was placed on antibiotics and had a CXR this morning at this facility. Patient nor his wife were not contacted in regards to results.      EMS confirms that the patient had a temp of 101.8 F while on route.     Pt admitted with moderate size right pleural effusion. Pt meets criteria for sepsis. IR consulted for thoracentesis, however on plavix. Also, recently seen by Dr. Toribio 4/16 and recommended increase lasix x one week then return to daily dosing. Spouse reports that with increase in lasix dose he has persistent BLE edema and SOB.   Pt placed on IV lasix and IV abx.         Hospital Course:  Pt admitted with CHF and right pleural effusion. Underwent thoracentesis and appears transudative. On medical therapy for CHF and has known valvular disease and ICD. OVernight patient became expressed more aggressive behavior toward spouse and clearly stated that he wanted to kill himself and plan was to go home and put a gun in his mouth. Apparently, the son has had to stop in previous attempts per family.  Psych consulted and pt is PECd.  Will attempt " remeron at night as SSRI contraindicated in current state of prolong QT on EKG.     Pt behavior more appropriate today. PEC/CEC.  Weaning oxygen. When medically cleared can dc to inpatient psych unit    Interval History: no new complaints, reports feeling better today     Review of Systems   Constitutional: Positive for activity change and fatigue.   HENT: Positive for congestion and sinus pressure.    Eyes: Positive for visual disturbance.   Respiratory: Positive for cough and shortness of breath.    Cardiovascular: Positive for leg swelling.   Gastrointestinal: Negative.    Endocrine: Negative.    Genitourinary: Negative.    Musculoskeletal: Negative.    Skin: Negative.    Neurological: Positive for weakness.   Psychiatric/Behavioral: Negative.      Objective:     Vital Signs (Most Recent):  Temp: 98.1 °F (36.7 °C) (04/27/19 1634)  Pulse: 62 (04/27/19 1634)  Resp: 18 (04/27/19 1634)  BP: 129/64 (04/27/19 1634)  SpO2: 95 % (04/27/19 1634) Vital Signs (24h Range):  Temp:  [98 °F (36.7 °C)-98.3 °F (36.8 °C)] 98.1 °F (36.7 °C)  Pulse:  [60-66] 62  Resp:  [17-18] 18  SpO2:  [94 %-98 %] 95 %  BP: (111-132)/(54-64) 129/64     Weight: 87.1 kg (192 lb)  Body mass index is 28.35 kg/m².    Intake/Output Summary (Last 24 hours) at 4/27/2019 1647  Last data filed at 4/27/2019 0900  Gross per 24 hour   Intake 540 ml   Output 1000 ml   Net -460 ml      Physical Exam   Constitutional: He is oriented to person, place, and time. He appears well-developed and well-nourished. No distress.   HENT:   Head: Normocephalic and atraumatic.   Mouth/Throat: Oropharynx is clear and moist.   Eyes: Pupils are equal, round, and reactive to light. EOM are normal.   Neck: Normal range of motion. Neck supple.   Cardiovascular: Normal rate, regular rhythm, normal heart sounds and intact distal pulses.   Pulmonary/Chest: Effort normal. No respiratory distress. He has rales.   Abdominal: Soft. Bowel sounds are normal. He exhibits no distension. There  is no tenderness.   Musculoskeletal: Normal range of motion. He exhibits edema.   Neurological: He is alert and oriented to person, place, and time.   Skin: Skin is warm and dry. There is pallor.   Psychiatric: He has a normal mood and affect. His behavior is normal.       Significant Labs:   Blood Culture: No results for input(s): LABBLOO in the last 48 hours.  BMP:   Recent Labs   Lab 04/26/19  0646   *      K 3.1*      CO2 32*   BUN 16   CREATININE 1.3   CALCIUM 8.5*     CBC:   Recent Labs   Lab 04/26/19 0646   WBC 6.30   HGB 10.7*   HCT 33.8*   *     POCT Glucose:   Recent Labs   Lab 04/26/19  1630 04/26/19 2020 04/27/19  0753   POCTGLUCOSE 173* 198* 129*     Respiratory Culture:   Recent Labs   Lab 04/25/19 2018   GSRESP <10 epithelial cells per low power field.  Many WBC's  No organisms seen   RESPIRATORYC Normal respiratory heriberto     Urine Culture: No results for input(s): LABURIN in the last 48 hours.    Significant Imaging: I have reviewed and interpreted all pertinent imaging results/findings within the past 24 hours.    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 The estimated ejection fraction is 55%  · No wall motion abnormalities.  · Normal right ventricular systolic function.  · There is a 26 mm Brent S3 transcutaneously-placed aortic bioprosthesis present. There is no aortic aortic insufficiency present. Prosthetic aortic valve is normal.  · Moderate-to-severe mitral  regurgitation.  · Mild to moderate tricuspid regurgitation.  · Elevated central venous pressure (15 mm Hg).  · The estimated PA systolic pressure is 69 mm Hg  · Pulmonary hypertension present.  · Consider LISSETT for further eval of mitral regurgitation if clinically warranted     Lasix IV BID  Pt was supposed to have follow up appt with cardiology today, will consult for any further recs      Recurrent right pleural effusion  With evidence of CHF decompensation, likely transudative, though with fever cannot rule out infectious etiology  Plavix held  IR consulted for thoracentesis, though on plavix will defer to their discretion         Anemia of chronic renal failure, stage 3 (moderate)  Stable  Resume oral iron supplementation       AICD (automatic cardioverter/defibrillator) present  Recent interrogated- see cardiology note 4/16/2019  Amiodarone + BB      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  Uncontrolled BP on admit (175/80)  Resumed toprol       S/P TAVR (transcatheter aortic valve replacement)  Follow [facility] cardi[facility]     Benign prostatic hyperplasia without lower urinary tract symptoms  Resume flomax  Accurate I/Os - h/o urinary retention requiring indwelling watson catheter       Type 2 diabetes mellitus with stage 3 chronic kidney disease  Hold oral medication  SSI  Diabetic diet  A1c 6%, controlled          VTE Risk Mitigation (From admission, onward)        Ordered     IP VTE HIGH RISK PATIENT  Once      04/25/19 0250     Place DEYA hose  Until discontinued      04/25/19 0250     Place sequential compression device  Until discontinued      04/25/19 0250              Sandra Sun MD  Department of Hospital Medicine   [facility]

## 2019-04-27 NOTE — HOSPITAL COURSE
Pt admitted with CHF and right pleural effusion. Underwent thoracentesis and appears transudative. On medical therapy for CHF and has known valvular disease and ICD. OVernight patient became expressed more aggressive behavior toward spouse and clearly stated that he wanted to kill himself and plan was to go home and put a gun in his mouth. Apparently, the son has had to stop in previous attempts per family.  Psych consulted and pt is PECd.  Will attempt remeron at night as SSRI contraindicated in current state of prolong QT on EKG.     Pt behavior more appropriate today. PEC/CEC.  Weaning oxygen. When medically cleared can dc to inpatient psych unit  4/28- dc O2, PT/OT consulted, pt is medically stable for discharge to inpatient psych facility  4/29- pt will dc to [facility] and follow up PCP and cardiology as scheduled upon dc from psych unit

## 2019-04-27 NOTE — PROGRESS NOTES
[facility]  Cardiology  Progress Note    Patient Name: Timbo Gallagher  MRN: [id number]  Admission Date: 4/24/2019  Hospital Length of Stay: 2 days  Code Status: Full Code   Attending Physician: Sandra Sun MD   Primary Care Physician: Cirilo Montero MD  Expected Discharge Date:   Principal Problem:Major depressive disorder, recurrent, 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    Subjective:     Hospital Course:   4/24/19: adm with sepsis and ?vol overload  4/25/19: echo with normal LV fxn and TAVR fxn, mod-sev MR noted (stable); thoracentesis with 1.5L transudative fluid removed   4/26/19: suicidal ideation->PEC (seen by psychiatry    Interval Hx: sitter at bedside.  Seems in much better spirits.  No cp/sob.  Appreciate psychiatry consult.    Tele: AV paced 70 (personally reviewed and interpreted)        Review of Systems   Gastrointestinal: Negative for melena.   Genitourinary: Negative for hematuria.     Objective:     Vital Signs (Most Recent):  Temp: 98.1 °F (36.7 °C) (04/27/19 0757)  Pulse: 64 (04/27/19 0757)  Resp: 18 (04/27/19 0757)  BP: (!) 111/55 (04/27/19 0757)  SpO2: 96 % (04/27/19 0757) Vital Signs (24h Range):  Temp:  [98 °F (36.7 °C)-99.4 °F (37.4 °C)] 98.1 °F (36.7 °C)  Pulse:  [60-68] 64  Resp:  [17-18] 18  SpO2:  [94 %-98 %] 96 %  BP: (111-128)/(54-58) 111/55     Weight: 87.1 kg (192 lb)  Body mass index is 28.35 kg/m².     SpO2: 96 %  O2 Device (Oxygen Therapy): room air      Intake/Output Summary (Last 24 hours) at 4/27/2019 0801  Last data filed at 4/27/2019 0524  Gross per 24 hour   Intake 900 ml   Output 300 ml   Net 600 ml       Lines/Drains/Airways     Peripheral Intravenous Line                 Peripheral IV - Single Lumen 04/26/19 1000 22 G Left;Posterior Forearm less than 1 day                Physical Exam   Constitutional: He is oriented to person, place, and time. He appears well-developed and well-nourished.   HENT:   Head: Normocephalic and atraumatic.   Eyes:  Pupils are equal, round, and reactive to light. Conjunctivae and EOM are normal. No scleral icterus.   Neck: Normal range of motion. Neck supple. No JVD present. Carotid bruit is not present. No tracheal deviation present. No thyromegaly present.   Cardiovascular: Normal rate, regular rhythm, S1 normal and S2 normal. Exam reveals no gallop and no friction rub.   Murmur heard.   Systolic murmur is present with a grade of 2/6.  Pulmonary/Chest: Effort normal. No respiratory distress. He has wheezes. He has no rales. He exhibits no tenderness.   Coarse BS bilat   Abdominal: Soft. He exhibits no distension.   Musculoskeletal: Normal range of motion. He exhibits no edema.   Neurological: He is alert and oriented to person, place, and time. He has normal strength. No cranial nerve deficit.   Skin: Skin is warm and dry. No rash noted.   Psychiatric: He has a normal mood and affect. His behavior is normal.       Current Medications:   albuterol-ipratropium  3 mL Nebulization Q6H WAKE    amiodarone  200 mg Oral Daily    atorvastatin  40 mg Oral Daily    ceFEPime (MAXIPIME) IVPB  1 g Intravenous Q12H    clopidogrel  75 mg Oral Daily    famotidine  20 mg Oral Daily    ferrous gluconate  324 mg Oral Daily with breakfast    furosemide  40 mg Intravenous Daily    guaiFENesin  600 mg Oral BID    linezolid 600mg/300ml  600 mg Intravenous Q12H    metoprolol succinate  50 mg Oral BID    tamsulosin  0.4 mg Oral Daily       acetaminophen, dextrose 50%, dextrose 50%, glucagon (human recombinant), glucose, glucose, insulin aspart U-100, OLANZapine, ondansetron, sodium chloride 0.9%    Laboratory (all labs reviewed):  CBC:  Recent Labs   Lab 01/13/19  1555 01/15/19  1600 01/24/19  0132 04/24/19  2340 04/26/19  0646   WHITE BLOOD CELL COUNT 6.57 6.60 13.14 H 9.62 6.30   HEMOGLOBIN 9.5 L 9.4 L 11.1 L 12.0 L 10.7 L   HEMATOCRIT 28.7 L 28.9 L 34.7 L 37.9 L 33.8 L   PLATELETS 169 166 226 171 149 L       CHEMISTRIES:  Recent Labs    Lab 09/01/18  0452 09/02/18  0532 09/03/18  0559  01/24/19  0132 01/25/19  0404 01/26/19  0352 04/24/19  2340 04/26/19  0646   GLUCOSE 136 H 135 H 138 H   < > 198 H 145 H 93 233 H 127 H   SODIUM 139 137 138   < > 141 141 141 139 140   POTASSIUM 3.6 4.1 4.2   < > 4.3 3.6 3.7 3.5 3.1 L   BUN BLD 23 25 H 22   < > 20 19 18 19 16   CREATININE 1.5 H 1.6 H 1.4   < > 1.8 H 1.5 H 1.5 H 1.5 H 1.3   EGFR IF  49.1 A 45.4 A 53.3 A   < > 39 A 49 A 49 A 49 A 58 A   EGFR IF NON- 42.4 A 39.3 A 46.1 A   < > 34 A 42 A 42 A 42 A 50 A   CALCIUM 8.6 L 8.6 L 9.0   < > 8.9 8.8 8.8 9.1 8.5 L   MAGNESIUM 2.0 2.0 2.0  --  2.4  --   --  1.6  --     < > = values in this interval not displayed.       CARDIAC BIOMARKERS:  Recent Labs   Lab 08/27/18  2122 01/13/19  1555 01/24/19  0132 01/24/19  0726 04/24/19  2340   TROPONIN I 0.026 0.019 0.022 0.020 0.018       COAGS:  Recent Labs   Lab 04/17/18  0610 04/23/18  0226 08/27/18  1621 01/15/19  1600 04/25/19  0809   INR 1.2 1.2 1.1 1.1 1.1       LIPIDS/LFTS:  Recent Labs   Lab 02/17/18  0831  06/20/18  0954  11/29/18  1117 12/18/18  1530 01/13/19  1555 01/24/19  0132 04/24/19  2340   CHOLESTEROL 147  --  97 L  --   --   --   --   --   --    TRIGLYCERIDES 72  --  63  --   --   --   --   --   --    HDL 37 L  --  37 L  --   --   --   --   --   --    LDL CHOLESTEROL 95.6  --  47.4 L  --   --   --   --   --   --    NON-HDL CHOLESTEROL 110  --  60  --   --   --   --   --   --    AST 11   < > 17   < > 17 13 12 35 19   ALT <5 L   < > 18   < > 14 8 L 15 19 16    < > = values in this interval not displayed.       BNP:  Recent Labs   Lab 01/27/18  0736 04/17/18  0610 01/13/19  1555 01/24/19  0132 04/24/19  2340    H 1,335 H 846 H  846 H 742 H 1,094 H       TSH:  Recent Labs   Lab 02/17/18  0831 04/26/19  1431   TSH 1.450 0.719       Free T4:        Diagnostic Results:  Echo 4/25/19 (no changes vs report 1/2019, normal TAVR fxn and MR degree unchanged)  · Normal left  ventricular systolic function. The estimated ejection fraction is 55%  · Indeterminate left ventricular diastolic function.  · Normal right ventricular systolic function.  · Moderate left atrial enlargement.  · Moderate right atrial enlargement.  · There is a transcutaneously-placed aortic bioprosthesis present. There is no aortic aortic insufficiency present. Prosthetic aortic valve is normal.  · Moderate-to-severe mitral regurgitation.  · Mild to moderate tricuspid regurgitation.  · The estimated PA systolic pressure is 54 mm Hg  · Pulmonary hypertension present.     Cath 4/20/18  LVEF: 50% by echo  Normal TAVR fxn by echo  Dominance: Right  LM: normal  LAD: MLI              Diag1 prox 60%, mid 50%  Ramus: MLI  LCx: MLI  RCA: dom, prox 30%  Hemostasis:  R Radial band  Impression:  TdP  Nonobst CAD  Normal LV fxn/TAVR fxn by echo  R rad vasband for hemostasis  Plan:  Cont ASA/Plavix  Cont amio gtt     LE Venous US 4/17/18  No evidence of deep venous thrombosis in either lower extremity.     Carotid US 3/22/18 (s/p L CEA)  There is 20 - 39% right Internal Carotid stenosis.  There is 20 - 39% left Internal Carotid stenosis.     TAVR 10/17/17  B. 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    No perivalvular leak post procedure per 2D echo.    Post deployment AV mean gradient 2.5 mmHg, Vmax 1.09 m/s.          Assessment and Plan:     Sepsis  Mgmt per IM/pulm  Thoracentesis 4/25 appears transudative    Recurrent right pleural effusion  1.5L thoracentesis 4/25/19, appears transudative    Acute on chronic combined systolic and diastolic heart failure  Normal LV fxn  Mod-sev MR noted on echo 4/25/19, stable vs 1/2019, LV fxn normal, TAVR fxn normal  Cont diuresis, goal net neg 1-1.5L daily  Change lasix to 40mg po bid  Labs pending today  Monitor creat    Non-rheumatic mitral regurgitation  Mod-severe native MR (not MVR), stable vs prior echo  1/2019  LV fxn normal  Will consider LISSETT if unable to control sxs/CHF (likely as outpatient)    S/P TAVR (transcatheter aortic valve replacement)  Normal TAVR fxn on echo 4/25/19    Essential hypertension  Cont med rx    Type 2 diabetes mellitus with stage 3 chronic kidney disease  Per primary    Anemia of chronic renal failure, stage 3 (moderate)  Resume Plavix, cont ASA    AICD (automatic cardioverter/defibrillator) present  [employer]. Recent interrogation with good device function.    CKD (chronic kidney disease), stage III  Creat stable, monitor while IV lasix diuresis    Mixed hyperlipidemia  Cont atorva 40mg    Acute on chronic diastolic (congestive) heart failure  Diuresis and afterload reduction as tolerated        VTE Risk Mitigation (From admission, onward)        Ordered     IP VTE HIGH RISK PATIENT  Once      04/25/19 0250     Place DEYA hose  Until discontinued      04/25/19 0250     Place sequential compression device  Until discontinued      04/25/19 0250        Cardiology will sign off, pls call with questions.  Pt to follow up with me after discharge.    Ned Toribio MD  Cardiology  [facility] [place]

## 2019-04-27 NOTE — PLAN OF CARE
Problem: Fall Injury Risk  Goal: Absence of Fall and Fall-Related Injury  Outcome: Ongoing (interventions implemented as appropriate)  Intervention: Identify and Manage Contributors to Fall Injury Risk     04/27/19 0520   Manage Acute Allergic Reaction   Medication Review/Management medications reviewed   Identify and Manage Contributors to Fall Injury Risk   Self-Care Promotion independence encouraged;BADL personal objects within reach;BADL personal routines maintained         Problem: Adult Inpatient Plan of Care  Goal: Plan of Care Review  Outcome: Ongoing (interventions implemented as appropriate)     04/27/19 0520   Plan of Care Review   Plan of Care Reviewed With patient

## 2019-04-27 NOTE — CONSULTS
Cardiology Hospital Follow-Up scheduled with Dr. Toribio as requested in consult; see appointment note

## 2019-04-27 NOTE — NURSING
Patient abed this shift, rested quietly and has flat affect. No s/sx distress noted. Call light in reach, cont.'s to be PEC.

## 2019-04-27 NOTE — ASSESSMENT & PLAN NOTE
Normal LV fxn  Mod-sev MR noted on echo 4/25/19, stable vs 1/2019, LV fxn normal, TAVR fxn normal  Cont diuresis, goal net neg 1-1.5L daily  Change lasix to 40mg po bid  Labs pending today  Monitor creat

## 2019-04-27 NOTE — ASSESSMENT & PLAN NOTE
Lactate in normal range, but with fever, RR 29 and evidence of possible pneumonia complicated with right pleural effusion, will treat as sepsis  Blood culture pending  Urine culture - low yield pseudomonas   zyvox dc'd  Continue cefepime

## 2019-04-27 NOTE — PROGRESS NOTES
"[facility]  Progress Note    Patient Name: Timbo Gallagher  MRN: [id number]  Patient Class: IP- Inpatient   Admission Date: 4/24/2019  Length of Stay: 2 days  Attending Physician: Sandra Sun MD  Primary Care Provider: Cirilo Montero MD        Subjective:     Principal Problem:Major depressive disorder, recurrent, 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    HPI:  Patient is an 83 year old male with a PMHx of DM, CVA, ICD/SVT, diastolic CHF, AVR, CKD3, and anemia of renal disease who presents to the ED via EMS with complaints of SOB. Patients symptoms began 3 days ago and have gradually worsened. Is not sure if he had a fever or not PTA. Denies a PMHx of Asthma or COPD, but has had a "lung drained" in the past ( January 2019). Has never been placed on CPAP or BIPAP in the past.       Patient was seen by his PCP yesterday for the SOB. Was placed on antibiotics and had a CXR this morning at this facility. Patient nor his wife were not contacted in regards to results.      EMS confirms that the patient had a temp of 101.8 F while on route.     Pt admitted with moderate size right pleural effusion. Pt meets criteria for sepsis. IR consulted for thoracentesis, however on plavix. Also, recently seen by Dr. Toribio 4/16 and recommended increase lasix x one week then return to daily dosing. Spouse reports that with increase in lasix dose he has persistent BLE edema and SOB.   Pt placed on IV lasix and IV abx.         Hospital Course:  Pt admitted with CHF and right pleural effusion. Underwent thoracentesis and appears transudative. On medical therapy for CHF and has known valvular disease and ICD. OVernight patient became expressed more aggressive behavior toward spouse and clearly stated that he wanted to kill himself and plan was to go home and put a gun in his mouth. Apparently, the son has had to stop in previous attempts per family.  Psych consulted and pt is PECd.  Will attempt " "remeron at night as SSRI contraindicated in current state of prolong QT on EKG.     Interval History: pt now downplaying his statement of suicide attempt, reports feels "horrible"    Review of Systems   Constitutional: Positive for activity change and fatigue.   HENT: Positive for congestion and sinus pressure.    Eyes: Positive for visual disturbance.   Respiratory: Positive for cough and shortness of breath.    Cardiovascular: Positive for leg swelling.   Gastrointestinal: Negative.    Endocrine: Negative.    Genitourinary: Negative.    Musculoskeletal: Negative.    Skin: Negative.    Neurological: Positive for weakness.   Psychiatric/Behavioral: Negative.      Objective:     Vital Signs (Most Recent):  Temp: 98.1 °F (36.7 °C) (04/27/19 0448)  Pulse: 63 (04/27/19 0448)  Resp: 18 (04/27/19 0448)  BP: (!) 114/57 (04/27/19 0448)  SpO2: 95 % (04/27/19 0448) Vital Signs (24h Range):  Temp:  [98 °F (36.7 °C)-99.4 °F (37.4 °C)] 98.1 °F (36.7 °C)  Pulse:  [60-68] 63  Resp:  [17-18] 18  SpO2:  [94 %-99 %] 95 %  BP: (112-128)/(54-58) 114/57     Weight: 87.1 kg (192 lb)  Body mass index is 28.35 kg/m².    Intake/Output Summary (Last 24 hours) at 4/27/2019 0742  Last data filed at 4/27/2019 0524  Gross per 24 hour   Intake 900 ml   Output 300 ml   Net 600 ml      Physical Exam   Constitutional: He is oriented to person, place, and time. He appears well-developed and well-nourished. No distress.   HENT:   Head: Normocephalic and atraumatic.   Mouth/Throat: Oropharynx is clear and moist.   Eyes: Pupils are equal, round, and reactive to light. EOM are normal.   Neck: Normal range of motion. Neck supple.   Cardiovascular: Normal rate, regular rhythm, normal heart sounds and intact distal pulses.   Pulmonary/Chest: Effort normal. No respiratory distress. He has rales.   Abdominal: Soft. Bowel sounds are normal. He exhibits no distension. There is no tenderness.   Musculoskeletal: Normal range of motion. He exhibits edema. "   Neurological: He is alert and oriented to person, place, and time.   Skin: Skin is warm and dry. There is pallor.   Psychiatric: He has a normal mood and affect. His behavior is normal.       Significant Labs: All pertinent labs within the past 24 hours have been reviewed.    Significant Imaging: I have reviewed and interpreted all pertinent imaging results/findings within the past 24 hours.    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    D/w psych   Remeron qhs  Limited on meds due to prolong Qt    Sepsis  Lactate in normal range, but with fever, RR 29 and evidence of possible pneumonia complicated with right pleural effusion, will treat as sepsis  Blood culture pending  Urine culture pending  Placed on zyvox (?allergy to vanc) and Cefepime         Acute on chronic diastolic (congestive) heart failure  ECHO 1/24/2019  Conclusion   · Normal left ventricular systolic function. The estimated ejection fraction is 55%  · No wall motion abnormalities.  · Normal right ventricular systolic function.  · There is a 26 mm Brnet S3 transcutaneously-placed aortic bioprosthesis present. There is no aortic aortic insufficiency present. Prosthetic aortic valve is normal.  · Moderate-to-severe mitral regurgitation.  · Mild to moderate tricuspid regurgitation.  · Elevated central venous pressure (15 mm Hg).  · The estimated PA systolic pressure is 69 mm Hg  · Pulmonary hypertension present.  · Consider LISSETT for further eval of mitral regurgitation if clinically warranted     Lasix IV BID  Pt was supposed to have follow up appt with cardiology today, will consult for any further recs      Recurrent right pleural effusion  With evidence of CHF decompensation, likely transudative, though with fever cannot rule out infectious etiology  Plavix held  IR consulted for thoracentesis, though on plavix will defer to their discretion         Anemia of chronic renal failure, stage 3  (moderate)  Stable  Resume oral iron supplementation       AICD (automatic cardioverter/defibrillator) present  Recent interrogated- see cardiology note 4/16/2019  Amiodarone + BB      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  Uncontrolled BP on admit (175/80)  Resumed toprol       S/P TAVR (transcatheter aortic valve replacement)  [facility] cardi[facility]     Benign prostatic hyperplasia without lower urinary tract symptoms  Resume flomax  Accurate I/Os - h/o urinary retention requiring indwelling watson catheter       Type 2 diabetes mellitus with stage 3 chronic kidney disease  Hold oral medication  SSI  Diabetic diet  A1c 6%, controlled          VTE Risk Mitigation (From admission, onward)        Ordered     IP VTE HIGH RISK PATIENT  Once      04/25/19 0250     Place DEYA hose  Until discontinued      04/25/19 0250     Place sequential compression device  Until discontinued      04/25/19 0250              Sandra Sun MD  Department of Hospital Medicine   [facility]

## 2019-04-27 NOTE — SUBJECTIVE & OBJECTIVE
Review of Systems   Gastrointestinal: Negative for melena.   Genitourinary: Negative for hematuria.     Objective:     Vital Signs (Most Recent):  Temp: 98.1 °F (36.7 °C) (04/27/19 0757)  Pulse: 64 (04/27/19 0757)  Resp: 18 (04/27/19 0757)  BP: (!) 111/55 (04/27/19 0757)  SpO2: 96 % (04/27/19 0757) Vital Signs (24h Range):  Temp:  [98 °F (36.7 °C)-99.4 °F (37.4 °C)] 98.1 °F (36.7 °C)  Pulse:  [60-68] 64  Resp:  [17-18] 18  SpO2:  [94 %-98 %] 96 %  BP: (111-128)/(54-58) 111/55     Weight: 87.1 kg (192 lb)  Body mass index is 28.35 kg/m².     SpO2: 96 %  O2 Device (Oxygen Therapy): room air      Intake/Output Summary (Last 24 hours) at 4/27/2019 0801  Last data filed at 4/27/2019 0524  Gross per 24 hour   Intake 900 ml   Output 300 ml   Net 600 ml       Lines/Drains/Airways     Peripheral Intravenous Line                 Peripheral IV - Single Lumen 04/26/19 1000 22 G Left;Posterior Forearm less than 1 day                Physical Exam   Constitutional: He is oriented to person, place, and time. He appears well-developed and well-nourished.   HENT:   Head: Normocephalic and atraumatic.   Eyes: Pupils are equal, round, and reactive to light. Conjunctivae and EOM are normal. No scleral icterus.   Neck: Normal range of motion. Neck supple. No JVD present. Carotid bruit is not present. No tracheal deviation present. No thyromegaly present.   Cardiovascular: Normal rate, regular rhythm, S1 normal and S2 normal. Exam reveals no gallop and no friction rub.   Murmur heard.   Systolic murmur is present with a grade of 2/6.  Pulmonary/Chest: Effort normal. No respiratory distress. He has wheezes. He has no rales. He exhibits no tenderness.   Coarse BS bilat   Abdominal: Soft. He exhibits no distension.   Musculoskeletal: Normal range of motion. He exhibits no edema.   Neurological: He is alert and oriented to person, place, and time. He has normal strength. No cranial nerve deficit.   Skin: Skin is warm and dry. No rash noted.    Psychiatric: He has a normal mood and affect. His behavior is normal.       Current Medications:   albuterol-ipratropium  3 mL Nebulization Q6H WAKE    amiodarone  200 mg Oral Daily    atorvastatin  40 mg Oral Daily    ceFEPime (MAXIPIME) IVPB  1 g Intravenous Q12H    clopidogrel  75 mg Oral Daily    famotidine  20 mg Oral Daily    ferrous gluconate  324 mg Oral Daily with breakfast    furosemide  40 mg Intravenous Daily    guaiFENesin  600 mg Oral BID    linezolid 600mg/300ml  600 mg Intravenous Q12H    metoprolol succinate  50 mg Oral BID    tamsulosin  0.4 mg Oral Daily       acetaminophen, dextrose 50%, dextrose 50%, glucagon (human recombinant), glucose, glucose, insulin aspart U-100, OLANZapine, ondansetron, sodium chloride 0.9%    Laboratory (all labs reviewed):  CBC:  Recent Labs   Lab 01/13/19  1555 01/15/19  1600 01/24/19  0132 04/24/19  2340 04/26/19  0646   WHITE BLOOD CELL COUNT 6.57 6.60 13.14 H 9.62 6.30   HEMOGLOBIN 9.5 L 9.4 L 11.1 L 12.0 L 10.7 L   HEMATOCRIT 28.7 L 28.9 L 34.7 L 37.9 L 33.8 L   PLATELETS 169 166 226 171 149 L       CHEMISTRIES:  Recent Labs   Lab 09/01/18  0452 09/02/18  0532 09/03/18  0559  01/24/19  0132 01/25/19  0404 01/26/19  0352 04/24/19  2340 04/26/19  0646   GLUCOSE 136 H 135 H 138 H   < > 198 H 145 H 93 233 H 127 H   SODIUM 139 137 138   < > 141 141 141 139 140   POTASSIUM 3.6 4.1 4.2   < > 4.3 3.6 3.7 3.5 3.1 L   BUN BLD 23 25 H 22   < > 20 19 18 19 16   CREATININE 1.5 H 1.6 H 1.4   < > 1.8 H 1.5 H 1.5 H 1.5 H 1.3   EGFR IF  49.1 A 45.4 A 53.3 A   < > 39 A 49 A 49 A 49 A 58 A   EGFR IF NON- 42.4 A 39.3 A 46.1 A   < > 34 A 42 A 42 A 42 A 50 A   CALCIUM 8.6 L 8.6 L 9.0   < > 8.9 8.8 8.8 9.1 8.5 L   MAGNESIUM 2.0 2.0 2.0  --  2.4  --   --  1.6  --     < > = values in this interval not displayed.       CARDIAC BIOMARKERS:  Recent Labs   Lab 08/27/18  2122 01/13/19  1555 01/24/19  0132 01/24/19  0726 04/24/19  2340   TROPONIN I  0.026 0.019 0.022 0.020 0.018       COAGS:  Recent Labs   Lab 04/17/18  0610 04/23/18  0226 08/27/18  1621 01/15/19  1600 04/25/19  0809   INR 1.2 1.2 1.1 1.1 1.1       LIPIDS/LFTS:  Recent Labs   Lab 02/17/18  0831  06/20/18  0954  11/29/18  1117 12/18/18  1530 01/13/19  1555 01/24/19  0132 04/24/19  2340   CHOLESTEROL 147  --  97 L  --   --   --   --   --   --    TRIGLYCERIDES 72  --  63  --   --   --   --   --   --    HDL 37 L  --  37 L  --   --   --   --   --   --    LDL CHOLESTEROL 95.6  --  47.4 L  --   --   --   --   --   --    NON-HDL CHOLESTEROL 110  --  60  --   --   --   --   --   --    AST 11   < > 17   < > 17 13 12 35 19   ALT <5 L   < > 18   < > 14 8 L 15 19 16    < > = values in this interval not displayed.       BNP:  Recent Labs   Lab 01/27/18  0736 04/17/18  0610 01/13/19  1555 01/24/19  0132 04/24/19  2340    H 1,335 H 846 H  846 H 742 H 1,094 H       TSH:  Recent Labs   Lab 02/17/18  0831 04/26/19  1431   TSH 1.450 0.719       Free T4:        Diagnostic Results:  Echo 4/25/19 (no changes vs report 1/2019, normal TAVR fxn and MR degree unchanged)  · Normal left ventricular systolic function. The estimated ejection fraction is 55%  · Indeterminate left ventricular diastolic function.  · Normal right ventricular systolic function.  · Moderate left atrial enlargement.  · Moderate right atrial enlargement.  · There is a transcutaneously-placed aortic bioprosthesis present. There is no aortic aortic insufficiency present. Prosthetic aortic valve is normal.  · Moderate-to-severe mitral regurgitation.  · Mild to moderate tricuspid regurgitation.  · The estimated PA systolic pressure is 54 mm Hg  · Pulmonary hypertension present.     Cath 4/20/18  LVEF: 50% by echo  Normal TAVR fxn by echo  Dominance: Right  LM: normal  LAD: MLI              Diag1 prox 60%, mid 50%  Ramus: MLI  LCx: MLI  RCA: dom, prox 30%  Hemostasis:  R Radial band  Impression:  TdP  Nonobst CAD  Normal LV fxn/TAVR fxn by  echo  R rad vasband for hemostasis  Plan:  Cont ASA/Plavix  Cont amio gtt     LE Venous US 4/17/18  No evidence of deep venous thrombosis in either lower extremity.     Carotid US 3/22/18 (s/p L CEA)  There is 20 - 39% right Internal Carotid stenosis.  There is 20 - 39% left Internal Carotid stenosis.     TAVR 10/17/17  B. 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    No perivalvular leak post procedure per 2D echo.    Post deployment AV mean gradient 2.5 mmHg, Vmax 1.09 m/s.

## 2019-04-27 NOTE — SUBJECTIVE & OBJECTIVE
Interval History: no new complaints, reports feeling better today     Review of Systems   Constitutional: Positive for activity change and fatigue.   HENT: Positive for congestion and sinus pressure.    Eyes: Positive for visual disturbance.   Respiratory: Positive for cough and shortness of breath.    Cardiovascular: Positive for leg swelling.   Gastrointestinal: Negative.    Endocrine: Negative.    Genitourinary: Negative.    Musculoskeletal: Negative.    Skin: Negative.    Neurological: Positive for weakness.   Psychiatric/Behavioral: Negative.      Objective:     Vital Signs (Most Recent):  Temp: 98.1 °F (36.7 °C) (04/27/19 1634)  Pulse: 62 (04/27/19 1634)  Resp: 18 (04/27/19 1634)  BP: 129/64 (04/27/19 1634)  SpO2: 95 % (04/27/19 1634) Vital Signs (24h Range):  Temp:  [98 °F (36.7 °C)-98.3 °F (36.8 °C)] 98.1 °F (36.7 °C)  Pulse:  [60-66] 62  Resp:  [17-18] 18  SpO2:  [94 %-98 %] 95 %  BP: (111-132)/(54-64) 129/64     Weight: 87.1 kg (192 lb)  Body mass index is 28.35 kg/m².    Intake/Output Summary (Last 24 hours) at 4/27/2019 1647  Last data filed at 4/27/2019 0900  Gross per 24 hour   Intake 540 ml   Output 1000 ml   Net -460 ml      Physical Exam   Constitutional: He is oriented to person, place, and time. He appears well-developed and well-nourished. No distress.   HENT:   Head: Normocephalic and atraumatic.   Mouth/Throat: Oropharynx is clear and moist.   Eyes: Pupils are equal, round, and reactive to light. EOM are normal.   Neck: Normal range of motion. Neck supple.   Cardiovascular: Normal rate, regular rhythm, normal heart sounds and intact distal pulses.   Pulmonary/Chest: Effort normal. No respiratory distress. He has rales.   Abdominal: Soft. Bowel sounds are normal. He exhibits no distension. There is no tenderness.   Musculoskeletal: Normal range of motion. He exhibits edema.   Neurological: He is alert and oriented to person, place, and time.   Skin: Skin is warm and dry. There is pallor.    Psychiatric: He has a normal mood and affect. His behavior is normal.       Significant Labs:   Blood Culture: No results for input(s): LABBLOO in the last 48 hours.  BMP:   Recent Labs   Lab 04/26/19  0646   *      K 3.1*      CO2 32*   BUN 16   CREATININE 1.3   CALCIUM 8.5*     CBC:   Recent Labs   Lab 04/26/19 0646   WBC 6.30   HGB 10.7*   HCT 33.8*   *     POCT Glucose:   Recent Labs   Lab 04/26/19  1630 04/26/19  2020 04/27/19  0753   POCTGLUCOSE 173* 198* 129*     Respiratory Culture:   Recent Labs   Lab 04/25/19  2018   GSRESP <10 epithelial cells per low power field.  Many WBC's  No organisms seen   RESPIRATORYC Normal respiratory heriberto     Urine Culture: No results for input(s): LABURIN in the last 48 hours.    Significant Imaging: I have reviewed and interpreted all pertinent imaging results/findings within the past 24 hours.

## 2019-04-27 NOTE — SUBJECTIVE & OBJECTIVE
"Interval History: pt now downplaying his statement of suicide attempt, reports feels "horrible"    Review of Systems   Constitutional: Positive for activity change and fatigue.   HENT: Positive for congestion and sinus pressure.    Eyes: Positive for visual disturbance.   Respiratory: Positive for cough and shortness of breath.    Cardiovascular: Positive for leg swelling.   Gastrointestinal: Negative.    Endocrine: Negative.    Genitourinary: Negative.    Musculoskeletal: Negative.    Skin: Negative.    Neurological: Positive for weakness.   Psychiatric/Behavioral: Negative.      Objective:     Vital Signs (Most Recent):  Temp: 98.1 °F (36.7 °C) (04/27/19 0448)  Pulse: 63 (04/27/19 0448)  Resp: 18 (04/27/19 0448)  BP: (!) 114/57 (04/27/19 0448)  SpO2: 95 % (04/27/19 0448) Vital Signs (24h Range):  Temp:  [98 °F (36.7 °C)-99.4 °F (37.4 °C)] 98.1 °F (36.7 °C)  Pulse:  [60-68] 63  Resp:  [17-18] 18  SpO2:  [94 %-99 %] 95 %  BP: (112-128)/(54-58) 114/57     Weight: 87.1 kg (192 lb)  Body mass index is 28.35 kg/m².    Intake/Output Summary (Last 24 hours) at 4/27/2019 0742  Last data filed at 4/27/2019 0524  Gross per 24 hour   Intake 900 ml   Output 300 ml   Net 600 ml      Physical Exam   Constitutional: He is oriented to person, place, and time. He appears well-developed and well-nourished. No distress.   HENT:   Head: Normocephalic and atraumatic.   Mouth/Throat: Oropharynx is clear and moist.   Eyes: Pupils are equal, round, and reactive to light. EOM are normal.   Neck: Normal range of motion. Neck supple.   Cardiovascular: Normal rate, regular rhythm, normal heart sounds and intact distal pulses.   Pulmonary/Chest: Effort normal. No respiratory distress. He has rales.   Abdominal: Soft. Bowel sounds are normal. He exhibits no distension. There is no tenderness.   Musculoskeletal: Normal range of motion. He exhibits edema.   Neurological: He is alert and oriented to person, place, and time.   Skin: Skin is warm " and dry. There is pallor.   Psychiatric: He has a normal mood and affect. His behavior is normal.       Significant Labs: All pertinent labs within the past 24 hours have been reviewed.    Significant Imaging: I have reviewed and interpreted all pertinent imaging results/findings within the past 24 hours.

## 2019-04-28 LAB
ANION GAP SERPL CALC-SCNC: 5 MMOL/L (ref 8–16)
BUN SERPL-MCNC: 14 MG/DL (ref 8–23)
CALCIUM SERPL-MCNC: 8.9 MG/DL (ref 8.7–10.5)
CHLORIDE SERPL-SCNC: 104 MMOL/L (ref 95–110)
CO2 SERPL-SCNC: 33 MMOL/L (ref 23–29)
CREAT SERPL-MCNC: 1.2 MG/DL (ref 0.5–1.4)
EST. GFR  (AFRICAN AMERICAN): >60 ML/MIN/1.73 M^2
EST. GFR  (NON AFRICAN AMERICAN): 55 ML/MIN/1.73 M^2
GLUCOSE SERPL-MCNC: 107 MG/DL (ref 70–110)
POCT GLUCOSE: 115 MG/DL (ref 70–110)
POCT GLUCOSE: 135 MG/DL (ref 70–110)
POCT GLUCOSE: 230 MG/DL (ref 70–110)
POCT GLUCOSE: 246 MG/DL (ref 70–110)
POTASSIUM SERPL-SCNC: 2.9 MMOL/L (ref 3.5–5.1)
SODIUM SERPL-SCNC: 142 MMOL/L (ref 136–145)

## 2019-04-28 PROCEDURE — 94640 AIRWAY INHALATION TREATMENT: CPT

## 2019-04-28 PROCEDURE — 25000003 PHARM REV CODE 250: Performed by: HOSPITALIST

## 2019-04-28 PROCEDURE — 25000003 PHARM REV CODE 250: Performed by: INTERNAL MEDICINE

## 2019-04-28 PROCEDURE — 63600175 PHARM REV CODE 636 W HCPCS: Performed by: HOSPITALIST

## 2019-04-28 PROCEDURE — 11000001 HC ACUTE MED/SURG PRIVATE ROOM

## 2019-04-28 PROCEDURE — 94761 N-INVAS EAR/PLS OXIMETRY MLT: CPT

## 2019-04-28 PROCEDURE — 80048 BASIC METABOLIC PNL TOTAL CA: CPT

## 2019-04-28 PROCEDURE — 27000221 HC OXYGEN, UP TO 24 HOURS

## 2019-04-28 PROCEDURE — 36415 COLL VENOUS BLD VENIPUNCTURE: CPT

## 2019-04-28 PROCEDURE — 25000242 PHARM REV CODE 250 ALT 637 W/ HCPCS: Performed by: HOSPITALIST

## 2019-04-28 RX ORDER — POTASSIUM CHLORIDE 20 MEQ/1
40 TABLET, EXTENDED RELEASE ORAL 2 TIMES DAILY
Status: DISCONTINUED | OUTPATIENT
Start: 2019-04-28 | End: 2019-04-29 | Stop reason: HOSPADM

## 2019-04-28 RX ADMIN — POTASSIUM CHLORIDE 40 MEQ: 1500 TABLET, EXTENDED RELEASE ORAL at 03:04

## 2019-04-28 RX ADMIN — TAMSULOSIN HYDROCHLORIDE 0.4 MG: 0.4 CAPSULE ORAL at 09:04

## 2019-04-28 RX ADMIN — FERROUS GLUCONATE TAB 324 MG (37.5 MG ELEMENTAL IRON) 324 MG: 324 (37.5 FE) TAB at 09:04

## 2019-04-28 RX ADMIN — INSULIN ASPART 2 UNITS: 100 INJECTION, SOLUTION INTRAVENOUS; SUBCUTANEOUS at 06:04

## 2019-04-28 RX ADMIN — POTASSIUM CHLORIDE 40 MEQ: 1500 TABLET, EXTENDED RELEASE ORAL at 08:04

## 2019-04-28 RX ADMIN — METOPROLOL SUCCINATE 50 MG: 50 TABLET, EXTENDED RELEASE ORAL at 08:04

## 2019-04-28 RX ADMIN — IPRATROPIUM BROMIDE AND ALBUTEROL SULFATE 3 ML: .5; 3 SOLUTION RESPIRATORY (INHALATION) at 08:04

## 2019-04-28 RX ADMIN — CLOPIDOGREL BISULFATE 75 MG: 75 TABLET ORAL at 09:04

## 2019-04-28 RX ADMIN — MIRTAZAPINE 7.5 MG: 7.5 TABLET ORAL at 08:04

## 2019-04-28 RX ADMIN — IPRATROPIUM BROMIDE AND ALBUTEROL SULFATE 3 ML: .5; 3 SOLUTION RESPIRATORY (INHALATION) at 02:04

## 2019-04-28 RX ADMIN — AMIODARONE HYDROCHLORIDE 200 MG: 200 TABLET ORAL at 09:04

## 2019-04-28 RX ADMIN — INSULIN ASPART 1 UNITS: 100 INJECTION, SOLUTION INTRAVENOUS; SUBCUTANEOUS at 08:04

## 2019-04-28 RX ADMIN — FUROSEMIDE 40 MG: 40 TABLET ORAL at 06:04

## 2019-04-28 RX ADMIN — FAMOTIDINE 20 MG: 20 TABLET ORAL at 09:04

## 2019-04-28 RX ADMIN — CEFEPIME HYDROCHLORIDE 1 G: 1 INJECTION, SOLUTION INTRAVENOUS at 09:04

## 2019-04-28 RX ADMIN — METOPROLOL SUCCINATE 50 MG: 50 TABLET, EXTENDED RELEASE ORAL at 09:04

## 2019-04-28 RX ADMIN — GUAIFENESIN 600 MG: 600 TABLET, EXTENDED RELEASE ORAL at 09:04

## 2019-04-28 RX ADMIN — ATORVASTATIN CALCIUM 40 MG: 40 TABLET, FILM COATED ORAL at 09:04

## 2019-04-28 RX ADMIN — GUAIFENESIN 600 MG: 600 TABLET, EXTENDED RELEASE ORAL at 08:04

## 2019-04-28 RX ADMIN — FUROSEMIDE 40 MG: 40 TABLET ORAL at 09:04

## 2019-04-28 NOTE — PROGRESS NOTES
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 Patients symptoms began 3 days ago and have gradually worsened. Is not sure if he had a fever or not PTA. Denies a PMHx of Asthma or COPD, but has had a "lung drained" in the past ( January 2019). Has never been placed on CPAP or BIPAP in the past.       Patient was seen by his PCP yesterday for the SOB. Was placed on antibiotics and had a CXR this morning at this facility. Patient nor his wife were not contacted in regards to results.      EMS confirms that the patient had a temp of 101.8 F while on route.     Pt admitted with moderate size right pleural effusion. Pt meets criteria for sepsis. IR consulted for thoracentesis, however on plavix. Also, recently seen by Dr. Toribio 4/16 and recommended increase lasix x one week then return to daily dosing. Spouse reports that with increase in lasix dose he has persistent BLE edema and SOB.   Pt placed on IV lasix and IV abx.         Hospital Course:  Pt admitted with CHF and right pleural effusion. Underwent thoracentesis and appears transudative. On medical therapy for CHF and has known valvular disease and ICD. OVernight patient became expressed more aggressive behavior toward spouse and clearly stated that he wanted to kill himself and plan was to go home and put a gun in his mouth. Apparently, the son has had to stop in previous attempts per family.  Psych consulted and pt is PECd.  Will attempt " remeron at night as SSRI contraindicated in current state of prolong QT on EKG.     Pt behavior more appropriate today. PEC/CEC.  Weaning oxygen. When medically cleared can dc to inpatient psych unit  4/28- dc O2, PT/OT consulted, pt is medically stable for discharge to inpatient psych facility    Interval History: pt resting in bed and denies CP and SOB    Review of Systems   Constitutional: Positive for activity change. Negative for fatigue.   HENT: Positive for congestion and sinus pressure.    Eyes: Negative for visual disturbance.   Respiratory: Positive for cough. Negative for shortness of breath.    Cardiovascular: Negative for leg swelling.   Gastrointestinal: Negative.    Endocrine: Negative.    Genitourinary: Negative.    Musculoskeletal: Negative.    Skin: Negative.    Neurological: Positive for weakness.   Psychiatric/Behavioral: Negative.      Objective:     Vital Signs (Most Recent):  Temp: 96.8 °F (36 °C) (04/28/19 1139)  Pulse: 60 (04/28/19 1409)  Resp: 18 (04/28/19 1409)  BP: (!) 127/59 (04/28/19 1139)  SpO2: (!) 93 % (04/28/19 1421) Vital Signs (24h Range):  Temp:  [96.8 °F (36 °C)-98.8 °F (37.1 °C)] 96.8 °F (36 °C)  Pulse:  [60-70] 60  Resp:  [18] 18  SpO2:  [91 %-100 %] 93 %  BP: (119-131)/(58-64) 127/59     Weight: 84.2 kg (185 lb 10 oz)  Body mass index is 29.07 kg/m².    Intake/Output Summary (Last 24 hours) at 4/28/2019 1425  Last data filed at 4/28/2019 1200  Gross per 24 hour   Intake 650 ml   Output 2525 ml   Net -1875 ml      Physical Exam   Constitutional: He is oriented to person, place, and time. He appears well-developed and well-nourished. No distress.   HENT:   Head: Normocephalic and atraumatic.   Mouth/Throat: Oropharynx is clear and moist.   Eyes: Pupils are equal, round, and reactive to light. EOM are normal.   Neck: Normal range of motion. Neck supple.   Cardiovascular: Normal rate, regular rhythm, normal heart sounds and intact distal pulses.   Pulmonary/Chest: Effort normal.  No respiratory distress. He has rales.   Abdominal: Soft. Bowel sounds are normal. He exhibits no distension. There is no tenderness.   Musculoskeletal: Normal range of motion. He exhibits edema.   Neurological: He is alert and oriented to person, place, and time.   Skin: Skin is warm and dry. There is pallor.   Psychiatric: He has a normal mood and affect. His behavior is normal.       Significant Labs:   BMP:   Recent Labs   Lab 04/28/19  0609         K 2.9*      CO2 33*   BUN 14   CREATININE 1.2   CALCIUM 8.9     CBC: No results for input(s): WBC, HGB, HCT, PLT in the last 48 hours.  POCT Glucose:   Recent Labs   Lab 04/27/19  1935/19  0802 04/28/19  1140   POCTGLUCOSE 203* 115* 135*     Urine Culture: No results for input(s): LABURIN in the last 48 hours.    Significant Imaging: I have reviewed all pertinent imaging results/findings within the past 24 hours.    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    D/w psych   Remeron qhs  Limited on meds due to prolong Qt  Pt medically stable for dc to inpatient [facility]  PT/OT consulted for dc planning     Sepsis  Lactate in normal range, but with fever, RR 29 and evidence of possible pneumonia complicated with right pleural effusion, will treat as sepsis  Blood culture pending  Urine culture - low yield pseudomonas   zyvox dc'd  Continue cefepime     Resolved  <10k colonies on urine culture -pseudomonas and CXR improved - dc antibiotics          Suicidal ideations  Psych placement pending  PEC/CEC      Acute on chronic diastolic (congestive) heart failure  ECHO 1/24/2019  Conclusion   · Normal left ventricular systolic function. The estimated ejection fraction is 55%  · No wall motion abnormalities.  · Normal right ventricular systolic function.  · There is a 26 mm Brent S3 transcutaneously-placed aortic bioprosthesis present. There is no aortic aortic insufficiency present. Prosthetic aortic  valve is normal.  · Moderate-to-severe mitral regurgitation.  · Mild to moderate tricuspid regurgitation.  · Elevated central venous pressure (15 mm Hg).  · The estimated PA systolic pressure is 69 mm Hg  · Pulmonary hypertension present.  · Consider LISSETT for further eval of mitral regurgitation if clinically warranted     Lasix IV BID  Pt was supposed to have follow up appt with cardiology today, will consult for any further recs      Recurrent right pleural effusion  With evidence of CHF decompensation, likely transudative, though with fever cannot rule out infectious etiology  Plavix held  IR consulted for thoracentesis, though on plavix will defer to their discretion         Anemia of chronic renal failure, stage 3 (moderate)  Stable  Resume oral iron supplementation       AICD (automatic cardioverter/defibrillator) present  Recent interrogated- see cardiology note 4/16/2019  Amiodarone + BB      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  Uncontrolled BP on admit (175/80)  Resumed toprol       Acute on chronic combined systolic and diastolic heart failure    Improved with lasix  Weaned O2 off     S/P TAVR (transcatheter aortic valve replacement)  Follow [facility] cardi[facility]     Benign prostatic hyperplasia without lower urinary tract symptoms  Resume flomax  Accurate I/Os - h/o urinary retention requiring indwelling watson catheter       Type 2 diabetes mellitus with stage 3 chronic kidney disease  Hold oral medication  SSI  Diabetic diet  A1c 6%, controlled          VTE Risk Mitigation (From admission, onward)        Ordered     IP VTE HIGH RISK PATIENT  Once      04/25/19 0250     Place DEYA hose  Until discontinued      04/25/19 0250     Place sequential compression device  Until discontinued      04/25/19 0250              Sandra Sun MD  Department of Hospital Medicine   [facility]

## 2019-04-28 NOTE — ASSESSMENT & PLAN NOTE
D/w psych   Remeron qhs  Limited on meds due to prolong Qt  Pt medically stable for dc to inpatient [facility]  PT/OT consulted for dc planning

## 2019-04-28 NOTE — SUBJECTIVE & OBJECTIVE
Interval History: pt resting in bed and denies CP and SOB    Review of Systems   Constitutional: Positive for activity change. Negative for fatigue.   HENT: Positive for congestion and sinus pressure.    Eyes: Negative for visual disturbance.   Respiratory: Positive for cough. Negative for shortness of breath.    Cardiovascular: Negative for leg swelling.   Gastrointestinal: Negative.    Endocrine: Negative.    Genitourinary: Negative.    Musculoskeletal: Negative.    Skin: Negative.    Neurological: Positive for weakness.   Psychiatric/Behavioral: Negative.      Objective:     Vital Signs (Most Recent):  Temp: 96.8 °F (36 °C) (04/28/19 1139)  Pulse: 60 (04/28/19 1409)  Resp: 18 (04/28/19 1409)  BP: (!) 127/59 (04/28/19 1139)  SpO2: (!) 93 % (04/28/19 1421) Vital Signs (24h Range):  Temp:  [96.8 °F (36 °C)-98.8 °F (37.1 °C)] 96.8 °F (36 °C)  Pulse:  [60-70] 60  Resp:  [18] 18  SpO2:  [91 %-100 %] 93 %  BP: (119-131)/(58-64) 127/59     Weight: 84.2 kg (185 lb 10 oz)  Body mass index is 29.07 kg/m².    Intake/Output Summary (Last 24 hours) at 4/28/2019 1425  Last data filed at 4/28/2019 1200  Gross per 24 hour   Intake 650 ml   Output 2525 ml   Net -1875 ml      Physical Exam   Constitutional: He is oriented to person, place, and time. He appears well-developed and well-nourished. No distress.   HENT:   Head: Normocephalic and atraumatic.   Mouth/Throat: Oropharynx is clear and moist.   Eyes: Pupils are equal, round, and reactive to light. EOM are normal.   Neck: Normal range of motion. Neck supple.   Cardiovascular: Normal rate, regular rhythm, normal heart sounds and intact distal pulses.   Pulmonary/Chest: Effort normal. No respiratory distress. He has rales.   Abdominal: Soft. Bowel sounds are normal. He exhibits no distension. There is no tenderness.   Musculoskeletal: Normal range of motion. He exhibits edema.   Neurological: He is alert and oriented to person, place, and time.   Skin: Skin is warm and dry.  There is pallor.   Psychiatric: He has a normal mood and affect. His behavior is normal.       Significant Labs:   BMP:   Recent Labs   Lab 04/28/19  0609         K 2.9*      CO2 33*   BUN 14   CREATININE 1.2   CALCIUM 8.9     CBC: No results for input(s): WBC, HGB, HCT, PLT in the last 48 hours.  POCT Glucose:   Recent Labs   Lab 04/27/19  1935/19  0802 04/28/19  1140   POCTGLUCOSE 203* 115* 135*     Urine Culture: No results for input(s): LABURIN in the last 48 hours.    Significant Imaging: I have reviewed all pertinent imaging results/findings within the past 24 hours.

## 2019-04-28 NOTE — CONSULTS
SW faxed pt referral to [facility] [phone], SW sent pt referral through [facility] to [facility].

## 2019-04-28 NOTE — PLAN OF CARE
Problem: Adult Inpatient Plan of Care  Goal: Plan of Care Review  Outcome: Ongoing (interventions implemented as appropriate)     04/28/19 8635   Plan of Care Review   Plan of Care Reviewed With patient   Patient free from fall or injury. Scheduled meds administered and tolerated well. No c/o pain. Remains on IV antibiotics. Patient remains PECd per order with 1:1 sitter at bedside. Patient tossing and turning throughout the night, and talking in his sleep. Trying to get up, easily redirected. Safety maintained. Will continue to monitor.

## 2019-04-29 VITALS
RESPIRATION RATE: 18 BRPM | BODY MASS INDEX: 29.13 KG/M2 | WEIGHT: 185.63 LBS | HEIGHT: 67 IN | DIASTOLIC BLOOD PRESSURE: 62 MMHG | SYSTOLIC BLOOD PRESSURE: 143 MMHG | TEMPERATURE: 99 F | HEART RATE: 64 BPM | OXYGEN SATURATION: 91 %

## 2019-04-29 PROBLEM — A41.9 SEPSIS: Status: RESOLVED | Noted: 2019-04-25 | Resolved: 2019-04-29

## 2019-04-29 LAB
ANION GAP SERPL CALC-SCNC: 7 MMOL/L (ref 8–16)
BACTERIA SPEC AEROBE CULT: NO GROWTH
BACTERIA SPEC ANAEROBE CULT: NORMAL
BUN SERPL-MCNC: 15 MG/DL (ref 8–23)
CALCIUM SERPL-MCNC: 9.3 MG/DL (ref 8.7–10.5)
CHLORIDE SERPL-SCNC: 103 MMOL/L (ref 95–110)
CO2 SERPL-SCNC: 33 MMOL/L (ref 23–29)
CREAT SERPL-MCNC: 1.3 MG/DL (ref 0.5–1.4)
EST. GFR  (AFRICAN AMERICAN): 58 ML/MIN/1.73 M^2
EST. GFR  (NON AFRICAN AMERICAN): 50 ML/MIN/1.73 M^2
GLUCOSE SERPL-MCNC: 102 MG/DL (ref 70–110)
POCT GLUCOSE: 116 MG/DL (ref 70–110)
POCT GLUCOSE: 150 MG/DL (ref 70–110)
POCT GLUCOSE: 210 MG/DL (ref 70–110)
POCT GLUCOSE: 245 MG/DL (ref 70–110)
POTASSIUM SERPL-SCNC: 3.8 MMOL/L (ref 3.5–5.1)
SODIUM SERPL-SCNC: 143 MMOL/L (ref 136–145)

## 2019-04-29 PROCEDURE — 36415 COLL VENOUS BLD VENIPUNCTURE: CPT

## 2019-04-29 PROCEDURE — 94640 AIRWAY INHALATION TREATMENT: CPT

## 2019-04-29 PROCEDURE — 94761 N-INVAS EAR/PLS OXIMETRY MLT: CPT

## 2019-04-29 PROCEDURE — 97535 SELF CARE MNGMENT TRAINING: CPT

## 2019-04-29 PROCEDURE — 25000003 PHARM REV CODE 250: Performed by: INTERNAL MEDICINE

## 2019-04-29 PROCEDURE — 25000242 PHARM REV CODE 250 ALT 637 W/ HCPCS: Performed by: HOSPITALIST

## 2019-04-29 PROCEDURE — 97165 OT EVAL LOW COMPLEX 30 MIN: CPT

## 2019-04-29 PROCEDURE — 25000003 PHARM REV CODE 250: Performed by: HOSPITALIST

## 2019-04-29 PROCEDURE — 80048 BASIC METABOLIC PNL TOTAL CA: CPT

## 2019-04-29 RX ORDER — FUROSEMIDE 40 MG/1
40 TABLET ORAL 2 TIMES DAILY
Qty: 60 TABLET | Refills: 11 | Status: ON HOLD | OUTPATIENT
Start: 2019-04-29 | End: 2019-11-25 | Stop reason: HOSPADM

## 2019-04-29 RX ORDER — GUAIFENESIN 600 MG/1
600 TABLET, EXTENDED RELEASE ORAL 2 TIMES DAILY
COMMUNITY
Start: 2019-04-29

## 2019-04-29 RX ORDER — TAMSULOSIN HYDROCHLORIDE 0.4 MG/1
0.4 CAPSULE ORAL DAILY
Qty: 30 CAPSULE | Refills: 11 | Status: SHIPPED | OUTPATIENT
Start: 2019-04-30 | End: 2020-04-29

## 2019-04-29 RX ORDER — MIRTAZAPINE 7.5 MG/1
7.5 TABLET, FILM COATED ORAL NIGHTLY
Qty: 30 TABLET | Refills: 11 | Status: SHIPPED | OUTPATIENT
Start: 2019-04-29 | End: 2020-04-28

## 2019-04-29 RX ORDER — METOPROLOL SUCCINATE 50 MG/1
50 TABLET, EXTENDED RELEASE ORAL 2 TIMES DAILY
Qty: 60 TABLET | Refills: 11 | Status: SHIPPED | OUTPATIENT
Start: 2019-04-29 | End: 2020-04-28

## 2019-04-29 RX ORDER — FAMOTIDINE 20 MG/1
20 TABLET, FILM COATED ORAL DAILY
Qty: 30 TABLET | Refills: 11 | Status: SHIPPED | OUTPATIENT
Start: 2019-04-30 | End: 2020-04-29

## 2019-04-29 RX ORDER — ACETAMINOPHEN 325 MG/1
650 TABLET ORAL EVERY 6 HOURS PRN
Refills: 0 | COMMUNITY
Start: 2019-04-29

## 2019-04-29 RX ORDER — POTASSIUM CHLORIDE 20 MEQ/1
40 TABLET, EXTENDED RELEASE ORAL 2 TIMES DAILY
Qty: 120 TABLET | Refills: 0
Start: 2019-04-29 | End: 2019-05-29

## 2019-04-29 RX ORDER — FERROUS GLUCONATE 324(38)MG
324 TABLET ORAL
COMMUNITY
Start: 2019-04-30

## 2019-04-29 RX ADMIN — ATORVASTATIN CALCIUM 40 MG: 40 TABLET, FILM COATED ORAL at 08:04

## 2019-04-29 RX ADMIN — ONDANSETRON HYDROCHLORIDE 4 MG: 4 SOLUTION ORAL at 01:04

## 2019-04-29 RX ADMIN — CLOPIDOGREL BISULFATE 75 MG: 75 TABLET ORAL at 08:04

## 2019-04-29 RX ADMIN — FUROSEMIDE 40 MG: 40 TABLET ORAL at 08:04

## 2019-04-29 RX ADMIN — GUAIFENESIN 600 MG: 600 TABLET, EXTENDED RELEASE ORAL at 08:04

## 2019-04-29 RX ADMIN — FAMOTIDINE 20 MG: 20 TABLET ORAL at 08:04

## 2019-04-29 RX ADMIN — AMIODARONE HYDROCHLORIDE 200 MG: 200 TABLET ORAL at 08:04

## 2019-04-29 RX ADMIN — IPRATROPIUM BROMIDE AND ALBUTEROL SULFATE 3 ML: .5; 3 SOLUTION RESPIRATORY (INHALATION) at 07:04

## 2019-04-29 RX ADMIN — METOPROLOL SUCCINATE 50 MG: 50 TABLET, EXTENDED RELEASE ORAL at 08:04

## 2019-04-29 RX ADMIN — POTASSIUM CHLORIDE 40 MEQ: 1500 TABLET, EXTENDED RELEASE ORAL at 08:04

## 2019-04-29 RX ADMIN — TAMSULOSIN HYDROCHLORIDE 0.4 MG: 0.4 CAPSULE ORAL at 08:04

## 2019-04-29 NOTE — PT/OT/SLP EVAL
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  He presents with self care and functional mobility deficits R/T generalized weakness and c/o nausea. Performance deficits affecting function: weakness, impaired endurance, impaired self care skills, impaired functional mobilty, gait instability, impaired balance, decreased safety awareness.      Rehab Prognosis: Good; patient would benefit from acute skilled OT services to address these deficits and reach maximum level of function.       Plan:     Patient to be seen 3 x/week to address the above listed problems via self-care/home management, therapeutic activities, therapeutic exercises  · Plan of Care Expires: 05/13/19  · Plan of Care Reviewed with: patient, spouse    Subjective     Chief Complaint: feels nauseated  Patient/Family Comments/goals: Patient wants to controm the nausea and is requesting medicine    Occupational Profile:  Living Environment: The patient lives with his spouse in a SS house with 4 JASON and B HR  Previous level of function: The patient amb without use of a AD and was (I) with bathing dressing.  Roles and Routines: The patient did not give details.   Equipment Used at Home:  walker, standard, wheelchair  Assistance upon Discharge: spouse    Pain/Comfort:  · Pain Rating 1: 0/10(c/o feeling nauseated)  · Pain Addressed 1: Nurse notified    Patients cultural, spiritual, Moravian conflicts given the current situation: no    Objective:     Communicated with: Hellen villar prior to session.  Patient found  right sidelying with telemetry(hospital safety sitter) upon OT entry to room.    General Precautions: Standard, fall   Orthopedic Precautions:N/A   Braces: N/A     Occupational Performance:    Bed Mobility:    · Patient completed Scooting/Bridging with contact guard assistance  · Patient completed Supine to Sit with contact guard assistance and HOB elevated    Functional Mobility/Transfers:  · Patient completed Sit <> Stand Transfer with contact guard assistance  with  no assistive device    · The patient stepped from the bed to the chair with CGA  · Functional Mobility: The patient ambulated with CGA (no AD) from the bed to the toilet, sink and back to the EOB ~ 30'.    Activities of Daily Living:  · Grooming: stand by assistance and contact guard assistance to stand at the sink to wash his hands and face and comb his hair.  · Upper Body Dressing: The patient was wearing paper scrub top and declined use of a hospital gown or pants  · Lower Body Dressing: The patient refused hospital pants  · Toileting: contact guard assistance to stand at the toilet to urinate. The patient was able to manage his briefs    Cognitive/Visual Perceptual:  Cognitive/Psychosocial Skills:     -       Oriented to: Person   -       Follows Commands/attention:Follows two-step commands  -       Communication: clear/fluent  -       Memory: Impaired STM  -       Safety awareness/insight to disability: impaired   -       Mood/Affect/Coping skills/emotional control: Appropriate to situation    Physical Exam:  Balance: -       fair+  Postural examination/scapula alignment:    -       No postural abnormalities identified  Skin integrity: Visible skin intact  Edema:  None noted  Upper Extremity Range of Motion:     -       Right Upper Extremity: WFL  -       Left Upper Extremity: WFL  Upper Extremity Strength:    -       Right Upper Extremity: WFL  -       Left Upper Extremity: WFL    AMPAC 6 Click ADL:  AMPAC Total Score: 20    Treatment &  Education:  The patient was agreeable to OT eval despite c/o nausea. The patient participated in toileting and grooming at the sink. The patient's spouse was present at the end of the treatment and was educated re: OT role and POC.  Education:    Patient left up in chair with all lines intact, call button in reach, nurse, Hellen notified and spouse and hospital sitter present    GOALS:   Multidisciplinary Problems     Occupational Therapy Goals        Problem: Occupational Therapy Goal    Goal Priority Disciplines Outcome Interventions   Occupational Therapy Goal     OT, PT/OT Ongoing (interventions implemented as appropriate)    Description:  Goals to be met by: 5/13/19    Patient will increase functional independence with ADLs by performing:    UE Dressing with Stand-by Assistance.  LE Dressing with Stand-by Assistance.  Grooming while standing at sink with Stand-by Assistance.  Supine to sit with Supervision.  Step transfer with Stand-by Assistance  Toilet transfer to toilet with Stand-by Assistance.  Upper extremity exercise program x15 reps per handout, with assistance as needed.                      History:     Past Medical History:   Diagnosis Date    Acute renal failure     Arthritis     Blind right eye     BPH (benign prostatic hypertrophy)     Bronchitis, chronic     Carotid artery occlusion     Lt carotid endarerectomy done 02/19/2018     CHF (congestive heart failure)     Colon polyp     Coronary artery disease     Diabetes mellitus     GERD (gastroesophageal reflux disease)     Hearing aid worn     bilateral    Hematuria     Hernia     Hypertension     Influenza A     Irregular heart beat     NSVT (nonsustained ventricular tachycardia) 4/18/2018    Renal failure as complication of care     sepsis, renal function returned    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    Snoring     Status post implantation of automatic cardioverter/defibrillator (AICD) 04/23/2018     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 Coleman    CATARACT EXTRACTION, BILATERAL      EGD (ESOPHAGOGASTRODUODENOSCOPY) N/A 8/30/2018    Performed by Tye Navarrete MD at [facility] ENDO (2ND FLR)    ENDARTERECTOMY-CAROTID Left 2/19/2018    [facility] Silvio Coleman [facility] at [facility] OR    ESOPHAGOGASTRODUODENOSCOPY  08/30/2018    EYE SURGERY      HERNIA REPAIR Left 09/2013    REPAIR, HERNIA, INGUINAL, WITHOUT HISTORY OF PRIOR REPAIR, AGE 5 YEARS OR OLDER Left 9/11/2013    Performed by Han Brown MD at [facility] OR    REPLACEMENT-VALVE-AORTIC N/A 10/17/2017    Performed by Mart[facility]nez Keene MD at [facility] CATH LAB    RETINAL DETACHMENT SURGERY      THORACENTESIS N/A 1/17/2019    Performed by [facility] Diagnostic Provider at [facility] OR       Time Tracking:     OT Date of Treatment: 04/29/19  OT Start Time: 1208  OT Stop Time: 1235  OT Total Time (min): 27 min    Billable Minutes:Evaluation 17  Self Care/Home Management 10  Total Time 27    Ebony Coates OT  4/29/2019

## 2019-04-29 NOTE — PLAN OF CARE
Problem: Adult Inpatient Plan of Care  Goal: Plan of Care Review  Outcome: Ongoing (interventions implemented as appropriate)     04/29/19 0768   Plan of Care Review   Plan of Care Reviewed With patient   Patient resting most of the shift. Scheduled meds administered as ordered and tolerated well. BGC complete, SSI administered as ordered. No c/o pain. Patient remains PEC'd as ordered; 1:1 sitter at bedside. Voiding via urinal with assistance. Weight shift assistanc e provided. Safety maintained.  Will continue to monitor.

## 2019-04-29 NOTE — CONSULTS
STACY spoke to Rossi (admissions) with [facility] that stated facility will possibly accept patient for admission. Rossi requesting for updated labs and notes. STACY faxed labs and MD notes to Rossi @ [phone].

## 2019-04-29 NOTE — PLAN OF CARE
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 [I13.0]  Yes    Acute on chronic combined systolic and diastolic heart failure [I50.43]  Yes    Mixed hyperlipidemia [E78.2]  Yes    CKD (chronic kidney disease), stage III [N18.3]  Yes    S/P TAVR (transcatheter aortic valve replacement) [Z95.2]  Not Applicable    Essential hypertension [I10]  Yes    Benign prostatic hyperplasia without lower urinary tract symptoms [N40.0]  Yes    Type 2 diabetes mellitus with stage 3 chronic kidney disease [E11.22, N18.3]  Yes      Resolved Hospital Problems    Diagnosis Date Resolved POA    Sepsis [A41.9] 04/29/2019 Yes     Priority: 1 - High       Allergies:  Review of patient's allergies indicates:   Allergen Reactions    Ciprofloxacin (mixture) Itching     Extreme itching    Antibiotic hc     Hydrochlorothiazide      Leg swelling      Iodine and iodide containing products      Wife and pt unsure    Vancomycin analogues Itching       Diet: cardiac diet and diabetic diet: 2000  calorie    Activities: Activity as tolerated    Nursing: Suicide Precautions       Medications: Review discharge medications with patient and family and provide education.      Current Discharge Medication List      CONTINUE these medications which have NOT CHANGED    Details   acarbose (PRECOSE) 25 MG Tab Take 25 mg by mouth 3 (three) times daily with meals.      acetaminophen (TYLENOL) 500 MG tablet Take 500 mg by mouth 2 (two) times daily.      amiodarone (PACERONE) 200 MG Tab Take 1 tablet (200 mg total) by mouth once daily.  Qty: 30 tablet, Refills: 11      aspirin (ECOTRIN) 81 MG EC tablet Take 1 tablet (81 mg total) by mouth once daily. Hold until evaluated by Cardiology  Refills: 0      atorvastatin (LIPITOR) 40 MG tablet Take 1 tablet (40 mg total) by mouth once daily.  Qty: 90 tablet, Refills: 11      clopidogrel (PLAVIX) 75 mg tablet Take 1 tablet (75 mg total) by mouth once daily.  Qty: 90 tablet, Refills: 3      famotidine (PEPCID) 20 MG tablet Take 20 mg by mouth 2 (two) times daily.       ferrous gluconate (FERGON) 324 MG tablet Take 1 tablet (324 mg total) by mouth daily with breakfast.  Qty: 30 tablet, Refills: 2      furosemide (LASIX) 40 MG tablet Take 1 tablet (40 mg total) by mouth once daily. Take 40mg every 12 hours for 1 week (starting 4/16/19), then 40mg daily thereafter.  Qty: 90 tablet, Refills: 3      metoprolol succinate (TOPROL-XL) 50 MG 24 hr tablet Take 50 mg by mouth 2 (two) times daily.       mupirocin (BACTROBAN) 2 % ointment Apply topically 3 (three) times daily.      cyanocobalamin (VITAMIN B-12) 1000 MCG tablet Take 100 mcg by mouth once a week.      diphenhydrAMINE (BENADRYL) 50 MG capsule Take 50 mg by mouth nightly.      tamsulosin (FLOMAX) 0.4 mg Cap Take 1 capsule (0.4 mg total) by mouth once daily.  Qty: 30 capsule, Refills: 11    Associated Diagnoses: BPH with obstruction/lower urinary tract symptoms                  _________________________________  Sandra Sun  MD  04/29/2019

## 2019-04-29 NOTE — PLAN OF CARE
Rossi (admissions) contacted  to provide call report information (call [phone], Dr. Mccann).  provided nurse Hellen with call report information and transportation will be arranged with a stretcher.         04/29/19 1416   Post-Acute Status   Post-Acute Authorization Placement  ([facility])   Post-Acute Placement Status Set-up Complete   Discharge Delays (!) Patient Transportation

## 2019-04-29 NOTE — PLAN OF CARE
Rossi (admissions) with [facility] informed  facility accepted patient for admission. Rossi requested  fax PEC/CEC to [phone]. Rossi informed  once she receives PEC/CEC she will call  with call report information.         04/29/19 6465   Post-Acute Status   Post-Acute Authorization Placement  ([facility])   Post-Acute Placement Status Set-up Complete   Discharge Delays (!) Other  (Pending call report information from facility)

## 2019-04-29 NOTE — NURSING
Patient discharged at this time to [facility] in stretcher. cec in folder with transport. Family following patient. Patient ambulated to stretcher with standby assist, he went in paper scrubs. Belongings with family.

## 2019-04-29 NOTE — NURSING
cec verified and in chart. Patient denying si/hi today. He is calm and cooperative. 1:1 staff at bedside. Will continue to monitor for safety.

## 2019-04-29 NOTE — PROGRESS NOTES
[facility] ambulance stated they will be running 30 min late, per charge nurse Mercedes pt does not need security to be discharged, pt is PEC

## 2019-04-29 NOTE — NURSING
"Removed iv (catheter intact) and tele box. Patient stating he feels in disbelief that he is going to the "crazy house" he is allowed to verbalize his feelings and process care plan. "what did I say that made this happen? I cant believe im not going home." he is reeducated about PEC/CEC and care plan. He denies si once again.   "

## 2019-04-29 NOTE — PLAN OF CARE
Problem: Occupational Therapy Goal  Goal: Occupational Therapy Goal  Goals to be met by: 5/13/19    Patient will increase functional independence with ADLs by performing:    UE Dressing with Stand-by Assistance.  LE Dressing with Stand-by Assistance.  Grooming while standing at sink with Stand-by Assistance.  Supine to sit with Supervision.  Step transfer with Stand-by Assistance  Toilet transfer to toilet with Stand-by Assistance.  Upper extremity exercise program x15 reps per handout, with assistance as needed.    Outcome: Ongoing (interventions implemented as appropriate)  OT eval is complete. Patient will benefit from OT to address functional deficits.    Comments: The patient will benefit from OT at the next level of care.

## 2019-04-29 NOTE — PLAN OF CARE
04/29/19 1425   Final Note   Assessment Type Final Discharge Note   Anticipated Discharge Disposition Psych  ([facility])   Hospital Follow Up  Appt(s) scheduled? No   Discharge plans and expectations educations in teach back method with documentation complete? No   Right Care Referral Info   Post Acute Recommendation Other   Referral Type Psychiatric Hospital   Facility Name [facility]   Street [address]   [facility], [place], [place]

## 2019-04-29 NOTE — PLAN OF CARE
STACY set up transportation for patient to be transported to [facility] [address] [facility]. Transportation will arrive at 3:19 pm.        04/29/19 1424   Post-Acute Status   Post-Acute Authorization Other  (Transportation )   Discharge Delays (!) Patient Transportation     ADT 30 order placed for  Transportation.  ETA: 3:19 pm  If transportation does not arrive at ETA time nurse will be instructed to follow protocol for transportation below:  How can I get in touch directly with dispatch, if needed?                 Non-emergent dispatch: [phone]                                    Emergent dispatch: [phone]  Non-emergent (stretcher): [phone]  Escalation Needs (PFC Lead): [phone]

## 2019-04-30 LAB
BACTERIA BLD CULT: NORMAL
BACTERIA BLD CULT: NORMAL

## 2019-04-30 NOTE — PT/OT/SLP DISCHARGE
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  Please refer to prior OT notes for functional status.    Assessment:      Patient was discharged unexpectedly.  Information required to complete an accurate discharge summary is unknown.  Refer to therapy initial evaluation and last progress note for initial and most recent functional status and goal achievement.  Recommendations made may be found in medical record.    Objective:     GOALS:   Multidisciplinary Problems     Occupational Therapy Goals     Not on file          Multidisciplinary Problems (Resolved)        Problem: Occupational Therapy Goal    Goal Priority Disciplines Outcome Interventions   Occupational Therapy Goal   (Resolved)     OT, PT/OT Outcome(s) achieved    Description:  Goals to be met by: 5/13/19    Patient will increase functional independence with ADLs by performing:    UE Dressing with Stand-by Assistance.  LE Dressing with Stand-by Assistance.  Grooming while standing at sink with Stand-by Assistance.  Supine to sit with Supervision.  Step transfer with Stand-by Assistance  Toilet transfer to toilet with Stand-by Assistance.  Upper extremity exercise program x15 reps per handout, with assistance as needed.                      Reasons for Discontinuation of Therapy Services  Transfer to alternate level of care.      Plan:     Patient Discharged to: [facility]    Ebony Coates OT  4/30/2019

## 2019-05-07 NOTE — DISCHARGE SUMMARY
"[facility] Hospital Medicine  Discharge Summary      Patient Name: Timbo Gallagher  MRN: [id number]  Admission Date: 4/24/2019  Hospital Length of Stay: 4 days  Discharge Date and Time: 4/29/2019  Attending Physician: No att. providers found   Discharging Provider: Sandra Sun MD  Primary Care Provider: Cirilo Montero MD      HPI:   Patient is an 83 year old male with a PMHx of DM, CVA, ICD/SVT, diastolic CHF, AVR, CKD3, and anemia of renal disease who presents to the ED via EMS with complaints of SOB. Patients symptoms began 3 days ago and have gradually worsened. Is not sure if he had a fever or not PTA. Denies a PMHx of Asthma or COPD, but has had a "lung drained" in the past ( January 2019). Has never been placed on CPAP or BIPAP in the past.       Patient was seen by his PCP yesterday for the SOB. Was placed on antibiotics and had a CXR this morning at this facility. Patient nor his wife were not contacted in regards to results.      EMS confirms that the patient had a temp of 101.8 F while on route.     Pt admitted with moderate size right pleural effusion. Pt meets criteria for sepsis. IR consulted for thoracentesis, however on plavix. Also, recently seen by Dr. Toribio 4/16 and recommended increase lasix x one week then return to daily dosing. Spouse reports that with increase in lasix dose he has persistent BLE edema and SOB.   Pt placed on IV lasix and IV abx.         * No surgery found *      Hospital Course:   Pt admitted with CHF and right pleural effusion. Underwent thoracentesis and appears transudative. On medical therapy for CHF and has known valvular disease and ICD. OVernight patient became expressed more aggressive behavior toward spouse and clearly stated that he wanted to kill himself and plan was to go home and put a gun in his mouth. Apparently, the son has had to stop in previous attempts per family.  Psych consulted and pt is PECd.  Will attempt remeron at night " as SSRI contraindicated in current state of prolong QT on EKG.     Pt behavior more appropriate today. PEC/CEC.  Weaning oxygen. When medically cleared can dc to inpatient psych unit  4/28- dc O2, PT/OT consulted, pt is medically stable for discharge to inpatient psych facility  4/29- pt will dc to [facility] unit and follow up PCP and cardiology as scheduled upon dc from psych unit     Consults:   Consults (From admission, onward)        Status Ordering Provider     Inpatient consult to Cardiology  Once     Provider:  Jorge L Black MD    Completed MARYJANE AMADOR     Inpatient consult to Interventional Radiology  Once     Provider:  Flash Rudd MD    Completed MARYJANE AMADOR     Inpatient consult to Psychiatry  Once     Provider:  Warren Galeano MD    Completed GADIEL PARR     Inpatient consult to Pulmonology  Once     Provider:  Dillan Kumar MD    Completed MARYJANE AMADOR     Inpatient consult to Social Work  Once     Provider:  (Not yet assigned)    Completed GADIEL PARR     Inpatient consult to Social Work  Once     Provider:  (Not yet assigned)    Completed MARYJANE AMADOR            Final Active Diagnoses:    Diagnosis Date Noted POA    PRINCIPAL PROBLEM:  Major depressive disorder, recurrent, 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 [F33.2] 04/26/2019 Yes    Non-rheumatic mitral regurgitation [I34.0] 04/26/2019 Yes    Suicidal ideations [R45.851]  Not Applicable    Acute on chronic diastolic (congestive) heart failure [I50.33] 04/25/2019 Yes    Recurrent right pleural effusion [J90] 02/12/2019 Yes    Anemia of chronic renal failure, stage 3 (moderate) [N18.3, D63.1] 08/29/2018 Yes    AICD (automatic cardioverter/defibrillator) present [Z95.810] 04/24/2018 Yes    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 [I13.0] 04/17/2018 Yes    Acute on chronic combined systolic and diastolic heart failure [I50.43] 04/17/2018 Yes    Mixed hyperlipidemia  [E78.2] 03/28/2018 Yes    CKD (chronic kidney disease), stage III [N18.3] 11/22/2017 Yes    S/P TAVR (transcatheter aortic valve replacement) [Z95.2] 10/18/2017 Not Applicable    Essential hypertension [I10] 12/02/2016 Yes    Benign prostatic hyperplasia without lower urinary tract symptoms [N40.0] 02/17/2016 Yes    Type 2 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 Go on 5/3/2019.    Specialties:  Cardiology, INTERVENTIONAL CARDIOLOGY  Why:  1:20pm  Cardiology Hospital Follow-Up appointment with Dr. Toribio  Contact information:  [address] [facility] [address]  [address]  [facility] [address]  [phone]             [facility]. Go on 4/29/2019.    Specialties:  Behavioral Health, Psychiatry, Psychology, Psychiatric Facility  Why:  Psych Hospital  Contact information:  [address] [facility] [place] [address]  [phone]                 Patient Instructions:      Diet diabetic     Diet Cardiac     Activity as tolerated       Pending Diagnostic Studies:     Procedure Component Value Units Date/Time    Freeze and Hold, [facility] [[id number]] Collected:  04/25/19 1305    Order Status:  Sent Lab Status:  No result     Specimen:  Body Fluid from Pleural Fluid          Medications:  Reconciled Home Medications:      Medication List      START taking these medications    guaiFENesin 600 mg 12 hr tablet  Commonly known as:  MUCINEX  Take 1 tablet (600 mg total) by mouth 2 (two) times daily.     mirtazapine 7.5 MG Tab  Commonly known as:  REMERON  Take 1 tablet (7.5 mg total) by mouth every evening.     potassium chloride SA 20 MEQ tablet  Commonly known as:  K-DUR,KLOR-CON  Take 2 tablets (40 mEq total) by mouth 2 (two) times  daily.        CHANGE how you take these medications    acetaminophen 325 MG tablet  Commonly known as:  TYLENOL  Take 2 tablets (650 mg total) by mouth every 6 (six) hours as needed.  What changed:    · medication strength  · how much to take  · when to take this  · reasons to take this     famotidine 20 MG tablet  Commonly known as:  PEPCID  Take 1 tablet (20 mg total) by mouth once daily.  What changed:  when to take this     furosemide 40 MG tablet  Commonly known as:  LASIX  Take 1 tablet (40 mg total) by mouth 2 (two) times daily.  What changed:    · when to take this  · additional instructions        CONTINUE taking these medications    acarbose 25 MG Tab  Commonly known as:  PRECOSE  Take 25 mg by mouth 3 (three) times daily with meals.     aspirin 81 MG EC tablet  Commonly known as:  ECOTRIN  Take 1 tablet (81 mg total) by mouth once daily. Hold until evaluated by Cardiology     atorvastatin 40 MG tablet  Commonly known as:  LIPITOR  Take 1 tablet (40 mg total) by mouth once daily.     clopidogrel 75 mg tablet  Commonly known as:  PLAVIX  Take 1 tablet (75 mg total) by mouth once daily.     cyanocobalamin 1000 MCG tablet  Commonly known as:  VITAMIN B-12  Take 100 mcg by mouth once a week.     ferrous gluconate 324 MG tablet  Commonly known as:  FERGON  Take 1 tablet (324 mg total) by mouth daily with breakfast.     metoprolol succinate 50 MG 24 hr tablet  Commonly known as:  TOPROL-XL  Take 1 tablet (50 mg total) by mouth 2 (two) times daily.     tamsulosin 0.4 mg Cap  Commonly known as:  FLOMAX  Take 1 capsule (0.4 mg total) by mouth once daily.        STOP taking these medications    amiodarone 200 MG Tab  Commonly known as:  PACERONE     diphenhydrAMINE 50 MG capsule  Commonly known as:  BENADRYL     mupirocin 2 % ointment  Commonly known as:  BACTROBAN            Indwelling Lines/Drains at time of discharge:   Lines/Drains/Airways          None          Time spent on the discharge of patient: 40  minutes  Patient was seen and examined on the date of discharge and determined to be suitable for discharge.         Sandra Sun MD  Department of Hospital Medicine  [facility]

## 2019-05-16 ENCOUNTER — HOSPITAL ENCOUNTER (OUTPATIENT)
Dept: RADIOLOGY | Facility: HOSPITAL | Age: 84
Discharge: HOME | End: 2019-05-16
Attending: NURSE PRACTITIONER
Payer: MEDICARE

## 2019-05-16 DIAGNOSIS — N13.8 BPH WITH OBSTRUCTION/LOWER URINARY TRACT SYMPTOMS: ICD-10-CM

## 2019-05-16 DIAGNOSIS — N40.1 BPH WITH OBSTRUCTION/LOWER URINARY TRACT SYMPTOMS: ICD-10-CM

## 2019-05-16 PROCEDURE — 76770 US RETROPERITONEAL COMPLETE: ICD-10-PCS | Mod: 26,,, | Performed by: RADIOLOGY

## 2019-05-16 PROCEDURE — 76770 US EXAM ABDO BACK WALL COMP: CPT | Mod: 26,,, | Performed by: RADIOLOGY

## 2019-05-16 PROCEDURE — 76770 US EXAM ABDO BACK WALL COMP: CPT | Mod: TC

## 2019-05-19 ENCOUNTER — HOSPITAL ENCOUNTER (EMERGENCY)
Facility: HOSPITAL | Age: 84
Discharge: HOME | End: 2019-05-19
Attending: EMERGENCY MEDICINE
Payer: MEDICARE

## 2019-05-19 VITALS
DIASTOLIC BLOOD PRESSURE: 78 MMHG | OXYGEN SATURATION: 97 % | RESPIRATION RATE: 23 BRPM | HEIGHT: 70 IN | BODY MASS INDEX: 23.77 KG/M2 | TEMPERATURE: 98 F | WEIGHT: 166 LBS | SYSTOLIC BLOOD PRESSURE: 123 MMHG | HEART RATE: 77 BPM

## 2019-05-19 DIAGNOSIS — R11.0 NAUSEA: Primary | ICD-10-CM

## 2019-05-19 LAB
ALBUMIN SERPL BCP-MCNC: 2.8 G/DL (ref 3.5–5.2)
ALP SERPL-CCNC: 120 U/L (ref 55–135)
ALT SERPL W/O P-5'-P-CCNC: 28 U/L (ref 10–44)
ANION GAP SERPL CALC-SCNC: 10 MMOL/L (ref 8–16)
AST SERPL-CCNC: 25 U/L (ref 10–40)
BASOPHILS # BLD AUTO: 0.03 K/UL (ref 0–0.2)
BASOPHILS NFR BLD: 0.3 % (ref 0–1.9)
BILIRUB SERPL-MCNC: 0.4 MG/DL (ref 0.1–1)
BILIRUB UR QL STRIP: NEGATIVE
BUN SERPL-MCNC: 23 MG/DL (ref 8–23)
CALCIUM SERPL-MCNC: 8.8 MG/DL (ref 8.7–10.5)
CHLORIDE SERPL-SCNC: 108 MMOL/L (ref 95–110)
CLARITY UR: CLEAR
CO2 SERPL-SCNC: 25 MMOL/L (ref 23–29)
COLOR UR: YELLOW
CREAT SERPL-MCNC: 1.7 MG/DL (ref 0.5–1.4)
DIFFERENTIAL METHOD: ABNORMAL
EOSINOPHIL # BLD AUTO: 0.2 K/UL (ref 0–0.5)
EOSINOPHIL NFR BLD: 2.1 % (ref 0–8)
ERYTHROCYTE [DISTWIDTH] IN BLOOD BY AUTOMATED COUNT: 15.2 % (ref 11.5–14.5)
EST. GFR  (AFRICAN AMERICAN): 42 ML/MIN/1.73 M^2
EST. GFR  (NON AFRICAN AMERICAN): 36 ML/MIN/1.73 M^2
GLUCOSE SERPL-MCNC: 133 MG/DL (ref 70–110)
GLUCOSE UR QL STRIP: NEGATIVE
GLUCOSE, RANDOM: 133 MG/DL (ref 60–200)
HCT VFR BLD AUTO: 37.1 % (ref 40–54)
HGB BLD-MCNC: 11.6 G/DL (ref 14–18)
HGB UR QL STRIP: NEGATIVE
KETONES UR QL STRIP: NEGATIVE
LEUKOCYTE ESTERASE UR QL STRIP: NEGATIVE
LIPASE SERPL-CCNC: 56 U/L (ref 4–60)
LYMPHOCYTES # BLD AUTO: 1.9 K/UL (ref 1–4.8)
LYMPHOCYTES NFR BLD: 20.8 % (ref 18–48)
MCH RBC QN AUTO: 30.4 PG (ref 27–31)
MCHC RBC AUTO-ENTMCNC: 31.3 G/DL (ref 32–36)
MCV RBC AUTO: 97 FL (ref 82–98)
MONOCYTES # BLD AUTO: 0.7 K/UL (ref 0.3–1)
MONOCYTES NFR BLD: 7.6 % (ref 4–15)
NEUTROPHILS # BLD AUTO: 6.4 K/UL (ref 1.8–7.7)
NEUTROPHILS NFR BLD: 69.2 % (ref 38–73)
NITRITE UR QL STRIP: NEGATIVE
PH UR STRIP: 5 [PH] (ref 5–8)
PLATELET # BLD AUTO: 186 K/UL (ref 150–350)
PMV BLD AUTO: 11.3 FL (ref 9.2–12.9)
POTASSIUM SERPL-SCNC: 3.5 MMOL/L (ref 3.5–5.1)
PROT SERPL-MCNC: 6.5 G/DL (ref 6–8.4)
PROT UR QL STRIP: NEGATIVE
RBC # BLD AUTO: 3.81 M/UL (ref 4.6–6.2)
SODIUM SERPL-SCNC: 143 MMOL/L (ref 136–145)
SP GR UR STRIP: 1.01 (ref 1–1.03)
TROPONIN I SERPL DL<=0.01 NG/ML-MCNC: 0.02 NG/ML (ref 0–0.03)
URN SPEC COLLECT METH UR: NORMAL
UROBILINOGEN UR STRIP-ACNC: NEGATIVE EU/DL
WBC # BLD AUTO: 9.25 K/UL (ref 3.9–12.7)

## 2019-05-19 PROCEDURE — 84484 ASSAY OF TROPONIN QUANT: CPT

## 2019-05-19 PROCEDURE — 83690 ASSAY OF LIPASE: CPT

## 2019-05-19 PROCEDURE — 63600175 PHARM REV CODE 636 W HCPCS: Performed by: EMERGENCY MEDICINE

## 2019-05-19 PROCEDURE — 85025 COMPLETE CBC W/AUTO DIFF WBC: CPT

## 2019-05-19 PROCEDURE — 99284 EMERGENCY DEPT VISIT MOD MDM: CPT | Mod: 25

## 2019-05-19 PROCEDURE — 96374 THER/PROPH/DIAG INJ IV PUSH: CPT

## 2019-05-19 PROCEDURE — 93010 EKG 12-LEAD: ICD-10-PCS | Mod: ,,, | Performed by: INTERNAL MEDICINE

## 2019-05-19 PROCEDURE — 81003 URINALYSIS AUTO W/O SCOPE: CPT

## 2019-05-19 PROCEDURE — 80053 COMPREHEN METABOLIC PANEL: CPT

## 2019-05-19 PROCEDURE — 93005 ELECTROCARDIOGRAM TRACING: CPT

## 2019-05-19 PROCEDURE — 93010 ELECTROCARDIOGRAM REPORT: CPT | Mod: ,,, | Performed by: INTERNAL MEDICINE

## 2019-05-19 RX ORDER — ESCITALOPRAM OXALATE 10 MG/1
10 TABLET ORAL DAILY
COMMUNITY

## 2019-05-19 RX ORDER — DONEPEZIL HYDROCHLORIDE 5 MG/1
5 TABLET, FILM COATED ORAL NIGHTLY
COMMUNITY

## 2019-05-19 RX ORDER — ONDANSETRON 2 MG/ML
4 INJECTION INTRAMUSCULAR; INTRAVENOUS
Status: COMPLETED | OUTPATIENT
Start: 2019-05-19 | End: 2019-05-19

## 2019-05-19 RX ORDER — AMIODARONE HYDROCHLORIDE 200 MG/1
TABLET ORAL DAILY
COMMUNITY

## 2019-05-19 RX ORDER — ONDANSETRON 4 MG/1
4 TABLET, ORALLY DISINTEGRATING ORAL EVERY 8 HOURS PRN
Qty: 12 TABLET | Refills: 0 | Status: ON HOLD | OUTPATIENT
Start: 2019-05-19 | End: 2019-11-25 | Stop reason: HOSPADM

## 2019-05-19 RX ORDER — CYPROHEPTADINE HYDROCHLORIDE 4 MG/1
4 TABLET ORAL 3 TIMES DAILY PRN
COMMUNITY

## 2019-05-19 RX ORDER — LOSARTAN POTASSIUM 100 MG/1
100 TABLET ORAL DAILY
Status: ON HOLD | COMMUNITY
End: 2019-11-25 | Stop reason: HOSPADM

## 2019-05-19 RX ORDER — CROMOLYN SODIUM 40 MG/ML
1 SOLUTION/ DROPS OPHTHALMIC 4 TIMES DAILY
COMMUNITY
End: 2019-11-16 | Stop reason: ALTCHOICE

## 2019-05-19 RX ADMIN — ONDANSETRON 4 MG: 2 INJECTION INTRAMUSCULAR; INTRAVENOUS at 11:05

## 2019-05-19 NOTE — ED PROVIDER NOTES
Encounter Date: 5/19/2019    SCRIBE #1 NOTE: I, Marietta Porter, am scribing for, and in the presence of,  Saad Lopez MD. I have scribed the following portions of the note - Other sections scribed: HPI/ROS.       History     Chief Complaint   Patient presents with    Abdominal Pain     pt reports lower abd pain since this morning; denies n/v at this time as well as fever     CC: Nausea      HPI: This 84 y.o. male presents to the ED via EMS accompanied by wife for an emergent evaluation of mild abdominal pain and nausea. Pt reports he woke up this morning feeling nauseous. He went to the [employer] and did not eat because the order was taking too long, as there were many customers. The abdominal pain began prior to that. States BMs have been pretty regular. No abdominal problems reported. No prior tx. Denies fever, chills, vomiting, abdominal swelling, chest pain, SOB, cough, and any other associated symptoms.     Medical hx includes hernia, blind R eye, DM, chronic bronchitis, irregular heart beat, HTN, GERD, BPH, arthritis, colon polyp, CHF, stroke, stenosis of aortic and mitral valve, coronary artery occlusion, NSVT, acute renal failure, CAD, hematuria, and suicide attempt.     The history is provided by the patient. No  was used.     Review of patient's allergies indicates:   Allergen Reactions    Ciprofloxacin (mixture) Itching     Extreme itching    Antibiotic hc     Hydrochlorothiazide      Leg swelling      Iodine and iodide containing products      Wife and pt unsure    Vancomycin analogues Itching     Past Medical History:   Diagnosis Date    Acute renal failure     Arthritis     Blind right eye     BPH (benign prostatic hypertrophy)     Bronchitis, chronic     Carotid artery occlusion     Lt carotid endarerectomy done 02/19/2018     CHF (congestive heart failure)     Colon polyp     Coronary artery disease     Diabetes mellitus     GERD (gastroesophageal  reflux disease)     Hearing aid worn     bilateral    Hematuria     Hernia     Hypertension     Influenza A     Irregular heart beat     NSVT (nonsustained ventricular tachycardia) 4/18/2018    Renal failure as complication of care     sepsis, renal function returned    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 Coleman    CATARACT EXTRACTION, BILATERAL      EGD (ESOPHAGOGASTRODUODENOSCOPY) N/A 8/30/2018    Performed by Tye Navarrete MD at [facility] ENDO (2ND FLR)    ENDARTERECTOMY-CAROTID Left 2/19/2018    Performed by Silvio Coleman MD at [facility] OR    ESOPHAGOGASTRODUODENOSCOPY  08/30/2018    EYE SURGERY      HERNIA REPAIR Left 09/2013    REPAIR, HERNIA, INGUINAL, WITHOUT HISTORY OF PRIOR REPAIR, AGE 5 YEARS OR OLDER Left 9/11/2013    Performed by Han Brown MD at [facility] OR    REPLACEMENT-VALVE-AORTIC N/A 10/17/2017    Performed by Martínez Keene MD at [facility] CATH LAB    RETINAL DETACHMENT SURGERY      THORACENTESIS N/A 1/17/2019    Performed by [facility] Diagnostic Provider at [facility] OR     Family History   Problem Relation Age of Onset    Arthritis Mother     Heart disease Father     Heart failure Father     Diabetes Sister     Heart attack Brother     No Known Problems Maternal Aunt     No Known Problems Maternal Uncle     No Known Problems Paternal Aunt     No Known Problems Paternal Uncle     No Known Problems Maternal Grandmother      No Known Problems Maternal Grandfather     No Known Problems Paternal Grandmother     No Known Problems Paternal Grandfather     Anemia Neg Hx     Arrhythmia Neg Hx     Asthma Neg Hx     Clotting disorder Neg Hx     Fainting Neg Hx     Hyperlipidemia Neg Hx     Hypertension Neg Hx     Stroke Neg Hx     Atrial Septal Defect Neg Hx      Social History     Tobacco Use    Smoking status: Former Smoker     Packs/day: 3.00     Years: 40.00     Pack years: 120.00     Types: Cigarettes     Last attempt to quit: 1990     Years since quittin.8    Smokeless tobacco: Never Used   Substance Use Topics    Alcohol use: No    Drug use: No     Review of Systems   Constitutional: Negative for chills and fever.   HENT: Negative for congestion, ear pain, rhinorrhea and sore throat.    Eyes: Negative for pain and visual disturbance.   Respiratory: Negative for cough and shortness of breath.    Cardiovascular: Negative for chest pain.   Gastrointestinal: Positive for abdominal pain (mild) and nausea. Negative for diarrhea and vomiting.   Genitourinary: Negative for dysuria.   Musculoskeletal: Negative for back pain and neck pain.   Skin: Negative for rash.   Neurological: Negative for headaches.   All other systems reviewed and are negative.      Physical Exam     Initial Vitals [19 0937]   BP Pulse Resp Temp SpO2   (!) 176/81 72 18 97.5 °F (36.4 °C) (!) 92 %      MAP       --         Physical Exam    Nursing note and vitals reviewed.  Constitutional: He appears well-developed and well-nourished.   HENT:   Head: Atraumatic.   Eyes: EOM are normal. Pupils are equal, round, and reactive to light.   Neck: Normal range of motion. Neck supple. No JVD present.   Cardiovascular: Normal rate, regular rhythm, normal heart sounds and intact distal pulses. Exam reveals no gallop and no friction rub.    No murmur heard.  Pulmonary/Chest: Effort normal and breath sounds normal.   Abdominal: Soft. Bowel sounds are normal.    Musculoskeletal: Normal range of motion.   Lymphadenopathy:     He has no cervical adenopathy.   Neurological: He is alert and oriented to person, place, and time. He has normal strength.   Skin: Skin is warm and dry.   Psychiatric: He has a normal mood and affect. Thought content normal.         ED Course   Procedures  Labs Reviewed   CBC W/ AUTO DIFFERENTIAL - Abnormal; Notable for the following components:       Result Value    RBC 3.81 (*)     Hemoglobin 11.6 (*)     Hematocrit 37.1 (*)     Mean Corpuscular Hemoglobin Conc 31.3 (*)     RDW 15.2 (*)     All other components within normal limits   COMPREHENSIVE METABOLIC PANEL - Abnormal; Notable for the following components:    Glucose 133 (*)     Creatinine 1.7 (*)     Albumin 2.8 (*)     eGFR if  42 (*)     eGFR if non  36 (*)     All other components within normal limits   LIPASE   URINALYSIS, REFLEX TO URINE CULTURE    Narrative:     Preferred Collection Type->Urine, Clean Catch   TROPONIN I     EKG Readings: (Independently Interpreted)   Initial Reading: No STEMI. Rhythm: Paced Rhythm. Heart Rate: 60.     ECG Results          EKG 12-lead (Final result)  Result time 05/22/19 22:25:19    Final result by Interface, Lab In [facility] (05/22/19 22:25:19)                 Narrative:    Test Reason : R079    Vent. Rate : 060 BPM     Atrial Rate : 220 BPM     P-R Int : 000 ms          QRS Dur : 216 ms      QT Int : 578 ms       P-R-T Axes : 000 -59 083 degrees     QTc Int : 578 ms    AV dual-paced complexes  Confirmed by John Zhou MD ([id number]) on 5/22/2019 10:25:14 PM    Referred By: [email or web address]   SELF           Confirmed By:John Zhou MD                             EKG 12-LEAD (Final result)  Result time 05/30/19 13:49:20    Final result by Unknown User (05/30/19 13:49:20)                                Imaging Results    None            patient was sitting on a donut shop and had some nausea and then a brief possible syncopal  episode.  Likely vasovagal.  Patient now feels fine.  Family states this happened patient before.  Exam normal. Workup negative. Due to patient's pacemaker and cardiac history discussed overnight observation.  Patient declined.  Family comfortable taking the patient home.  Close follow-up with cardiologist.  Return for new or worsening or return of symptomatology.          Scribe Attestation:   Scribe #1: I performed the above scribed service and the documentation accurately describes the services I performed. I attest to the accuracy of the note.    Attending Attestation:           Physician Attestation for Scribe:  Physician Attestation Statement for Scribe #1: I, Saad Lopez MD, reviewed documentation, as scribed by Marietta Porter in my presence, and it is both accurate and complete.                    Clinical Impression:       ICD-10-CM ICD-9-CM   1. Nausea R11.0 787.02                                Saad Lopez MD  06/02/19 1405

## 2019-05-19 NOTE — ED TRIAGE NOTES
Pt arrived via ems with c/o lower abd pain that began about 2 hours ago while at the donut shop; pt states it began before he started to eat; denies n/v; states some chronic diarrhea; denies abd pain, painful urination or blood in urine at this time; denies fever or chills, HA, dizziness, lightheadedness

## 2019-05-21 ENCOUNTER — OFFICE VISIT (OUTPATIENT)
Dept: UROLOGY | Facility: CLINIC | Age: 84
End: 2019-05-21
Payer: MEDICARE

## 2019-05-21 VITALS
DIASTOLIC BLOOD PRESSURE: 60 MMHG | HEIGHT: 70 IN | WEIGHT: 170.44 LBS | SYSTOLIC BLOOD PRESSURE: 122 MMHG | BODY MASS INDEX: 24.4 KG/M2

## 2019-05-21 DIAGNOSIS — N28.1 RENAL CYST, RIGHT: ICD-10-CM

## 2019-05-21 DIAGNOSIS — N40.1 BPH WITH OBSTRUCTION/LOWER URINARY TRACT SYMPTOMS: ICD-10-CM

## 2019-05-21 DIAGNOSIS — R35.1 NOCTURIA: ICD-10-CM

## 2019-05-21 DIAGNOSIS — N13.8 BPH WITH OBSTRUCTION/LOWER URINARY TRACT SYMPTOMS: ICD-10-CM

## 2019-05-21 DIAGNOSIS — R33.9 URINARY RETENTION: Primary | ICD-10-CM

## 2019-05-21 LAB
BILIRUB SERPL-MCNC: NORMAL MG/DL
BLOOD URINE, POC: NORMAL
COLOR, POC UA: YELLOW
GLUCOSE UR QL STRIP: NORMAL
KETONES UR QL STRIP: NORMAL
LEUKOCYTE ESTERASE URINE, POC: NORMAL
NITRITE, POC UA: NORMAL
PH, POC UA: 5
PROTEIN, POC: NORMAL
SPECIFIC GRAVITY, POC UA: 1015
UROBILINOGEN, POC UA: NORMAL

## 2019-05-21 PROCEDURE — 99214 OFFICE O/P EST MOD 30 MIN: CPT | Mod: PBBFAC | Performed by: NURSE PRACTITIONER

## 2019-05-21 PROCEDURE — 81001 URINALYSIS AUTO W/SCOPE: CPT | Mod: PBBFAC | Performed by: NURSE PRACTITIONER

## 2019-05-21 PROCEDURE — 99214 OFFICE O/P EST MOD 30 MIN: CPT | Mod: S$PBB,,, | Performed by: NURSE PRACTITIONER

## 2019-05-21 PROCEDURE — 99214 PR OFFICE/OUTPT VISIT, EST, LEVL IV, 30-39 MIN: ICD-10-PCS | Mod: S$PBB,,, | Performed by: NURSE PRACTITIONER

## 2019-05-21 PROCEDURE — 99999 PR PBB SHADOW E&M-EST. PATIENT-LVL IV: ICD-10-PCS | Mod: PBBFAC,,, | Performed by: NURSE PRACTITIONER

## 2019-05-21 PROCEDURE — 99999 PR PBB SHADOW E&M-EST. PATIENT-LVL IV: CPT | Mod: PBBFAC,,, | Performed by: NURSE PRACTITIONER

## 2019-05-21 NOTE — PROGRESS NOTES
Subjective:       Patient ID: Timbo Gallagher is a 84 y.o. male who was last seen in this office 2/21/2019    Chief Complaint:   Chief Complaint   Patient presents with    Follow-up     patient is here for US results .. patient states he is doing fine .. everything is working fine.. no new issues stated     Urinary Retention  Patient admitted 1/24/19 with acute respiratory failure. He was seen as an inpatient consult on 1/25/19 by Dr. Lyon for urinary retention. Colunga placed. 800 ml of urine were drained when catheter was placed. Prior to this event voiding symptoms consisted of slow stream, intermittency. Prior treatments include possibly Avodart. Recent medications that may have affected his voiding include none.  He was given Flomax in the hospital but it is not on his discharge paperwork.    Flomax restarted by Dr. Lyon at previous visit which he is currently taking. He had an unsuccessful voiding trial with Dr. Lyon on 2/3/19. Colunga replaced. Repeat voiding trial on 2/7/19--cautiously successful.     He was admitted last month for CHF and R pleural effusion. He underwent thoracentesis during hospital admission. He was later admitted to inpatient psych unit d/t aggressive behavior and suicidal ideation    He returns today with a renal ultrasound. He is feeling better. He remains of Flomax. Denies dysuria, gross hematuria or flank pain. No new issues      PVR (bladder scan) last visit - 0 ml    ACTIVE MEDICAL ISSUES:  Patient Active Problem List   Diagnosis    Type 2 diabetes mellitus with stage 3 chronic kidney disease    Diverticular disease of esophagus    Gastroesophageal reflux disease without esophagitis    Benign prostatic hyperplasia without lower urinary tract symptoms    Anemia of chronic disease    Mild protein malnutrition    Incarcerated hiatal hernia    Chronic diastolic heart failure    Essential hypertension    Hiatal hernia with GERD and esophagitis    Aortic valve  stenosis    Aortic valve stenosis, unspecified etiology    S/P TAVR (transcatheter aortic valve replacement)    CKD (chronic kidney disease), stage III    Blindness of one eye    Mixed hyperlipidemia    Acute on chronic combined systolic and diastolic heart failure    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    NSVT (nonsustained ventricular tachycardia)    AICD (automatic cardioverter/defibrillator) present    Traumatic subarachnoid bleed with LOC of 30 minutes or less, initial encounter    Subarachnoid hemorrhage following injury with brief loss of consciousness but without open intracranial wound    Oropharyngeal dysphagia    Acute renal failure superimposed on stage 3 chronic kidney disease    Anemia of chronic renal failure, stage 3 (moderate)    Contusion of rib on right side    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  Review of patient's allergies indicates:   Allergen Reactions    Ciprofloxacin (mixture) Itching     Extreme itching    Antibiotic hc     Hydrochlorothiazide      Leg swelling      Iodine and iodide containing products      Wife and pt unsure    Vancomycin analogues Itching     Current Outpatient Medications   Medication Sig    acarbose (PRECOSE) 25 MG Tab Take 25 mg by mouth 3 (three) times daily with meals.    acetaminophen (TYLENOL) 325 MG tablet Take 2 tablets (650 mg total) by mouth every 6 (six) hours as needed.    amiodarone (PACERONE) 200 MG Tab Take by mouth once daily.    aspirin (ECOTRIN) 81 MG EC tablet Take 1 tablet (81 mg total) by mouth once daily. Hold  until evaluated by Cardiology    clopidogrel (PLAVIX) 75 mg tablet Take 1 tablet (75 mg total) by mouth once daily.    cromolyn (OPTICROM) 4 % ophthalmic solution 1 drop 4 (four) times daily.    cyanocobalamin (VITAMIN B-12) 1000 MCG tablet Take 100 mcg by mouth once a week.    cyproheptadine (PERIACTIN) 4 mg tablet Take 4 mg by mouth 3 (three) times daily as needed.    donepezil (ARICEPT) 5 MG tablet Take 5 mg by mouth every evening.    escitalopram oxalate (LEXAPRO) 10 MG tablet Take 10 mg by mouth once daily.    famotidine (PEPCID) 20 MG tablet Take 1 tablet (20 mg total) by mouth once daily.    ferrous gluconate (FERGON) 324 MG tablet Take 1 tablet (324 mg total) by mouth daily with breakfast.    furosemide (LASIX) 40 MG tablet Take 1 tablet (40 mg total) by mouth 2 (two) times daily.    guaiFENesin (MUCINEX) 600 mg 12 hr tablet Take 1 tablet (600 mg total) by mouth 2 (two) times daily.    losartan (COZAAR) 100 MG tablet Take 100 mg by mouth once daily.    metoprolol succinate (TOPROL-XL) 50 MG 24 hr tablet Take 1 tablet (50 mg total) by mouth 2 (two) times daily.    mirtazapine (REMERON) 7.5 MG Tab Take 1 tablet (7.5 mg total) by mouth every evening.    ondansetron (ZOFRAN-ODT) 4 MG TbDL Take 1 tablet (4 mg total) by mouth every 8 (eight) hours as needed (nausea).    potassium chloride SA (K-DUR,KLOR-CON) 20 MEQ tablet Take 2 tablets (40 mEq total) by mouth 2 (two) times daily.    SITagliptin (JANUVIA) 50 MG Tab Take 100 mg by mouth once daily.    tamsulosin (FLOMAX) 0.4 mg Cap Take 1 capsule (0.4 mg total) by mouth once daily.    atorvastatin (LIPITOR) 40 MG tablet Take 1 tablet (40 mg total) by mouth once daily.     No current facility-administered medications for this visit.        Review of Systems   Constitutional: Negative for activity change, chills, fatigue, fever and unexpected weight change.   Eyes: Negative for discharge, redness and visual disturbance.   Respiratory: Negative for  "cough, shortness of breath and wheezing.    Cardiovascular: Negative for chest pain and leg swelling.   Gastrointestinal: Negative for abdominal distention, abdominal pain, constipation, diarrhea, nausea and vomiting.   Genitourinary: Negative for decreased urine volume, difficulty urinating, discharge, dysuria, flank pain, frequency, hematuria, scrotal swelling, testicular pain and urgency.   Musculoskeletal: Negative for arthralgias, joint swelling and myalgias.   Skin: Negative for color change and rash.   Neurological: Negative for dizziness and light-headedness.   Psychiatric/Behavioral: Negative for behavioral problems and confusion. The patient is not nervous/anxious.        Objective:      Vitals:    05/21/19 1113   BP: 122/60   Weight: 77.3 kg (170 lb 6.7 oz)   Height: 5' 10" (1.778 m)     Physical Exam   Constitutional: He is oriented to person, place, and time. He appears well-developed.   HENT:   Head: Normocephalic and atraumatic.   Nose: Nose normal.   Eyes: Conjunctivae are normal. Right eye exhibits no discharge. Left eye exhibits no discharge.   Neck: Normal range of motion. Neck supple. No tracheal deviation present. No thyromegaly present.   Cardiovascular: Normal rate and regular rhythm.    Pulmonary/Chest: Effort normal. No respiratory distress. He has no wheezes.   Abdominal: Soft. He exhibits no distension. There is no hepatosplenomegaly. There is no tenderness. There is no CVA tenderness. No hernia.   Genitourinary:   Genitourinary Comments: Patient declined exam   Musculoskeletal: Normal range of motion. He exhibits no edema.   Neurological: He is alert and oriented to person, place, and time.   Skin: Skin is warm and dry. No rash noted. No erythema.     Psychiatric: He has a normal mood and affect. His behavior is normal. Judgment normal.       Urine dipstick shows trace LE      Narrative     EXAMINATION:  US RETROPERITONEAL COMPLETE    CLINICAL HISTORY:  Benign prostatic hyperplasia with " lower urinary tract symptoms    TECHNIQUE:  Ultrasound of the kidneys and urinary bladder was performed including color flow and Doppler evaluation of the kidneys.    COMPARISON:  Prior CT from 08/27/2018    FINDINGS:  Right kidney: The right kidney measures 10 cm. No cortical thinning. No loss of corticomedullary distinction. Resistive index measures 0.81 . No renal stone. No hydronephrosis.  There is an anechoic simple cyst projecting superiorly from the upper pole of the right kidney measuring 2 cm in long axis.  Additional hypoechoic well-circumscribed lesions at the lateral aspect of the right kidney measuring up to 1.3 cm are favored to reflect additional cysts although do not definitely demonstrate anechoic echotexture or posterior acoustic enhancement and solid lesions are not excluded.    Left kidney: The left kidney measures 10.8 cm. No cortical thinning. No loss of corticomedullary distinction. Resistive index measures 0.87.  No mass. No renal stone. No hydronephrosis.    The bladder is partially distended at the time of scanning and demonstrates diffuse mild wall thickening.  The prostate is mildly enlarged measuring 4.5 x 4.3 x 4.1 cm and impresses upon the bladder.   Impression       1. Elevated resistive indices bilaterally suggesting chronic medical renal disease  2. Several well-circumscribed right renal lesions are favored to reflect cysts but several do not definitely demonstrate posterior acoustic enhancement or anechoic echotexture and solid lesions cannot be excluded.  Renal mass protocol CT or short-term follow-up ultrasound should be considered  3. Mild diffuse bladder wall thickening suggesting chronic partial bladder outlet obstruction.  4. Mild prostatomegaly.      Electronically signed by: Roxy Inman MD  Date: 05/16/2019  Time: 11:44     Reviewed with patient and wife    Assessment:       1. Urinary retention    2. BPH with obstruction/lower urinary tract symptoms    3. Nocturia     4. Renal cyst, right          Plan:       1. Urinary retention  -Voiding trial 2/3/19--unsuccessful. Colunga replaced  -Voiding trial 2/7/19--cautiously successful  -PVR last visit--0 ml  -Continue Flomax  - POCT urinalysis, dipstick or tablet reag    2. BPH with obstruction/lower urinary tract symptoms  -Stay on Flomax    3. Nocturia  -stable    4. Renal cyst, right  -RUP simple cyst, ?solid lesions to R kidney. Recommend f/u with CT urogram or REBECCA  -His creatinine will not support contrasted study at this time. Will proceed with REBECCA in 6 months  - US Retroperitoneal Complete (Kidney and; Future              Follow up in about 6 months (around 11/21/2019) for Follow up.

## 2019-07-01 NOTE — ASSESSMENT & PLAN NOTE
Discharge Note  Short Stay      SUMMARY     Admit Date: 7/1/2019    Attending Physician: Nadya Mcfarland      Discharge Physician: Nadya Mcfarland      Discharge Date: 7/1/2019 1:41 PM    Procedure(s) (LRB):  Injection,steroid,epidural,transforaminal approach LUMBAR TRANSFORAMINAL RIGHT L5 AND S1 TF ARNOLDO (Right)    Final Diagnosis: Thoracic and lumbosacral neuritis [M54.14, M54.17]    Disposition: Home or self care    Patient Instructions:   Current Discharge Medication List      CONTINUE these medications which have NOT CHANGED    Details   albuterol (PROVENTIL/VENTOLIN HFA) 90 mcg/actuation inhaler Inhale 2 puffs into the lungs every 6 (six) hours as needed for Wheezing or Shortness of Breath. Rescue  Qty: 1 Inhaler, Refills: 0    Associated Diagnoses: Viral bronchitis; Cough      calcium-vitamin D3 500 MG, 1,250MG, 200 UNITS (CALCIUM-VITAMIN D) 500 mg(1,250mg) -200 unit per tablet Take 4 tablets by mouth 2 (two) times daily with meals.       fish oil-omega-3 fatty acids 300-1,000 mg capsule Take 2 g by mouth once daily.      FLUZONE QUAD 4485-4295, PF, 60 mcg (15 mcg x 4)/0.5 mL Syrg Inject 60 mcg as directed.  Refills: 0      letrozole (FEMARA) 2.5 mg Tab Take 1 tablet (2.5 mg total) by mouth once daily.  Qty: 90 tablet, Refills: 3    Associated Diagnoses: Malignant neoplasm of upper-outer quadrant of right breast in female, estrogen receptor positive      MAGNESIUM CARBONATE ORAL Take by mouth.      multivitamin capsule Take 2 capsules by mouth once daily.       omeprazole (PRILOSEC) 40 MG capsule Take 40 mg by mouth once daily.      potassium chloride SA (K-DUR,KLOR-CON) 10 MEQ tablet Take 20 mEq by mouth once daily.      SYNTHROID 125 mcg tablet Take 1 tablet (125 mcg total) by mouth once daily.  Qty: 30 tablet, Refills: 12    Associated Diagnoses: Post-surgical hypothyroidism; History of thyroid cancer      triamcinolone acetonide 0.1% (KENALOG) 0.1 % cream Apply topically 2 (two) times daily. for 10 days  Qty: 15 g,  Transient in L eye  Associated with palpitations and sensation of vertigo  Felt due to L carotid stenosis  Dr. Malu romero's vasc surg consult  Also appreciate Dr. Henderson's neuro recs:  Temporal arteritis may cause TMVL but normal CRP, no headaches or jaw claudiation. Will obtain ESR.           -Vascular surgery consult. CTA of head and neck vs conventional angiogram to confirm US findings.           -For now continue clopidogrel and ASA as well as statin.           -Do not treat BP aggressively as it may cause symptoms     Refills: 0    Associated Diagnoses: Rash and nonspecific skin eruption                 Discharge Diagnosis: Thoracic and lumbosacral neuritis [M54.14, M54.17]  Condition on Discharge: Stable with no complications to procedure   Diet on Discharge: Same as before.  Activity: as per instruction sheet.  Discharge to: Home with a responsible adult.  Follow up: 2-4 weeks

## 2019-07-06 NOTE — ASSESSMENT & PLAN NOTE
BS managed.   145 this AM   COntinue sliding scale  Had been on metformin at home; however, gfr is 46 - now on hold, so will be on NO DM meds until followup visit  Reassess at that time     Patient is a 67y old  Female who presents with a chief complaint of gallbladder hydrops (18 Jun 2019 13:54)    HPI:  68 yo F w/ hx of CAD s/p PCI and hypothyroidism sent by gastroenterologist for gallbladder hydrops. Pt found to have elevated liver enzymes by PMD in Apr 2019, referred to GI for w/u. MRCP 2 days ago significant for gallbladder hydrops 4.7cm with probable impacted stone at neck, also liver cirrhosis w/ mild ascites. Admits to social etoh, denies binge drinking, denies abdominal pain, N/V. Admits to decreased appetite in past 2 weeks, pt tried to maintain low salt diet. Reports BLE edema developed in past 2 days as well as weakness. Denies fever, chills, abdominal pain, SOB, dysuria.    In ED, evaluated by surgery -> no interventions at this time (15 Shashank 2019 01:53)    PAST MEDICAL & SURGICAL HISTORY:  Hypothyroidism  CAD S/P percutaneous coronary angioplasty  No significant past surgical history    patient seen and examined independently on morning rounds, chart reviewed and discussed with the medicine resident.    no overnight events--ate better breakfast this morning (oatmeal)-  bedside- LUE slight increase in edema (pvl removed from RUE ac this morninhg)    PE:  GEN-NAD, AAOx3,  PULM- Clear to auscultation bilaterally, fair air entry  CVS- +s1/s2 RRR no murmurs  GI- soft NT obese ND +bs, no rebound, no guarding, +echhymosis  EXT- 1+ bilateral LE edema, 2+ LUE edema               Labs:                        8.9    14.95 )-----------( 171      ( 06 Jul 2019 06:43 )             27.2     CBC Full  -  ( 06 Jul 2019 06:43 )  WBC Count : 14.95 K/uL  RBC Count : 2.84 M/uL  Hemoglobin : 8.9 g/dL  Hematocrit : 27.2 %  Platelet Count - Automated : 171 K/uL  Mean Cell Volume : 95.8 fL  Mean Cell Hemoglobin : 31.3 pg  Mean Cell Hemoglobin Concentration : 32.7 g/dL  Auto Neutrophil # : x  Auto Lymphocyte # : x  Auto Monocyte # : x  Auto Eosinophil # : x  Auto Basophil # : x  Auto Neutrophil % : x  Auto Lymphocyte % : x  Auto Monocyte % : x  Auto Eosinophil % : x  Auto Basophil % : x      07-06    145  |  101  |  95<HH>  ----------------------------<  155<H>  3.4<L>   |  31  |  0.9    Ca    8.7      06 Jul 2019 06:43    TPro  5.7<L>  /  Alb  3.2<L>  /  TBili  3.2<H>  /  DBili  1.3<H>  /  AST  86<H>  /  ALT  86<H>  /  AlkPhos  454<H>  07-06      Microbiology:      Vital Signs Last 24 Hrs  T(C): 36.1 (06 Jul 2019 06:09), Max: 37.1 (05 Jul 2019 12:30)  T(F): 97 (06 Jul 2019 06:09), Max: 98.7 (05 Jul 2019 12:30)  HR: 69 (06 Jul 2019 06:09) (62 - 69)  BP: 123/59 (06 Jul 2019 06:09) (103/47 - 137/61)  BP(mean): --  RR: 18 (06 Jul 2019 06:09) (18 - 18)  SpO2: 95% (06 Jul 2019 08:43) (95% - 96%)    I&O's Summary    05 Jul 2019 07:01  -  06 Jul 2019 07:00  --------------------------------------------------------  IN: 0 mL / OUT: 3850 mL / NET: -3850 mL    06 Jul 2019 07:01  -  06 Jul 2019 10:18  --------------------------------------------------------  IN: 210 mL / OUT: 0 mL / NET: 210 mL

## 2019-09-03 ENCOUNTER — OFFICE VISIT (OUTPATIENT)
Dept: CARDIOLOGY | Facility: CLINIC | Age: 84
End: 2019-09-03
Payer: MEDICARE

## 2019-09-03 VITALS
HEIGHT: 70 IN | OXYGEN SATURATION: 95 % | HEART RATE: 66 BPM | DIASTOLIC BLOOD PRESSURE: 64 MMHG | SYSTOLIC BLOOD PRESSURE: 120 MMHG | RESPIRATION RATE: 15 BRPM | WEIGHT: 175.25 LBS | BODY MASS INDEX: 25.09 KG/M2

## 2019-09-03 DIAGNOSIS — Z95.2 S/P TAVR (TRANSCATHETER AORTIC VALVE REPLACEMENT): Primary | ICD-10-CM

## 2019-09-03 DIAGNOSIS — I50.30 (HFPEF) HEART FAILURE WITH PRESERVED EJECTION FRACTION: ICD-10-CM

## 2019-09-03 DIAGNOSIS — Z95.810 AICD (AUTOMATIC CARDIOVERTER/DEFIBRILLATOR) PRESENT: ICD-10-CM

## 2019-09-03 DIAGNOSIS — I34.0 NON-RHEUMATIC MITRAL REGURGITATION: ICD-10-CM

## 2019-09-03 DIAGNOSIS — N18.30 TYPE 2 DIABETES MELLITUS WITH STAGE 3 CHRONIC KIDNEY DISEASE, WITHOUT LONG-TERM CURRENT USE OF INSULIN: ICD-10-CM

## 2019-09-03 DIAGNOSIS — E78.2 MIXED HYPERLIPIDEMIA: ICD-10-CM

## 2019-09-03 DIAGNOSIS — E11.22 TYPE 2 DIABETES MELLITUS WITH STAGE 3 CHRONIC KIDNEY DISEASE, WITHOUT LONG-TERM CURRENT USE OF INSULIN: ICD-10-CM

## 2019-09-03 DIAGNOSIS — N18.30 CKD (CHRONIC KIDNEY DISEASE), STAGE III: ICD-10-CM

## 2019-09-03 DIAGNOSIS — I50.32 CHRONIC DIASTOLIC HEART FAILURE: ICD-10-CM

## 2019-09-03 DIAGNOSIS — H54.40 BLINDNESS OF ONE EYE: ICD-10-CM

## 2019-09-03 DIAGNOSIS — I10 ESSENTIAL HYPERTENSION: ICD-10-CM

## 2019-09-03 DIAGNOSIS — I47.29 NSVT (NONSUSTAINED VENTRICULAR TACHYCARDIA): ICD-10-CM

## 2019-09-03 PROCEDURE — 99999 PR PBB SHADOW E&M-EST. PATIENT-LVL III: ICD-10-PCS | Mod: PBBFAC,,, | Performed by: INTERNAL MEDICINE

## 2019-09-03 PROCEDURE — 99214 OFFICE O/P EST MOD 30 MIN: CPT | Mod: S$PBB,,, | Performed by: INTERNAL MEDICINE

## 2019-09-03 PROCEDURE — 99214 PR OFFICE/OUTPT VISIT, EST, LEVL IV, 30-39 MIN: ICD-10-PCS | Mod: S$PBB,,, | Performed by: INTERNAL MEDICINE

## 2019-09-03 PROCEDURE — 93010 ELECTROCARDIOGRAM REPORT: CPT | Mod: S$PBB,,, | Performed by: INTERNAL MEDICINE

## 2019-09-03 PROCEDURE — 99213 OFFICE O/P EST LOW 20 MIN: CPT | Mod: PBBFAC | Performed by: INTERNAL MEDICINE

## 2019-09-03 PROCEDURE — 93005 ELECTROCARDIOGRAM TRACING: CPT | Mod: PBBFAC | Performed by: INTERNAL MEDICINE

## 2019-09-03 PROCEDURE — 99999 PR PBB SHADOW E&M-EST. PATIENT-LVL III: CPT | Mod: PBBFAC,,, | Performed by: INTERNAL MEDICINE

## 2019-09-03 PROCEDURE — 93010 EKG 12-LEAD: ICD-10-PCS | Mod: S$PBB,,, | Performed by: INTERNAL MEDICINE

## 2019-09-03 NOTE — PROGRESS NOTES
CARDIOVASCULAR PROGRESS NOTE    REASON FOR CONSULT:   Timbo Gallagher is a 84 y.o. male who presents for follow up of TIA/CEA, TAVR.    PCP: Winston Negron: [place]  HISTORY OF PRESENT ILLNESS:   The patient comes in today for follow-up.  He is accompanied by his wife.  In the interim since the last office visit, he was hospitalized with sepsis.  He says be doing much better at present.  He denies intercurrent angina, describes class 1-2 dyspnea stating that he is able to mow his lawn and walk 1/4 to 1/2 mi without any limitations.  He has had no palpitations, lightheadedness, dizziness, syncope, or defibrillator discharges.  There has been no PND, orthopnea, or lower extremity edema.  He denies melena, hematuria, or claudicant symptoms of a limiting nature.    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 ([place])    PAST MEDICAL HISTORY:     Past Medical History:   Diagnosis Date    Acute renal failure     Arthritis     Blind right eye     BPH (benign prostatic hypertrophy)     Bronchitis, chronic     Carotid artery occlusion     Lt carotid endarerectomy done 02/19/2018     CHF (congestive heart failure)     Colon polyp     Coronary artery disease     Diabetes mellitus     GERD (gastroesophageal reflux disease)     Hearing aid worn     bilateral    Hematuria     Hernia     Hypertension     Influenza A     Irregular heart beat     NSVT (nonsustained ventricular tachycardia) 4/18/2018    Renal failure as complication of care     sepsis, renal function returned    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 Coleman    CATARACT EXTRACTION, BILATERAL      EGD (ESOPHAGOGASTRODUODENOSCOPY) N/A 8/30/2018    Performed [facility] ENDO (2ND FLR)    ENDARTERECTOMY-CAROTID Left 2/19/2018    [facility] at [facility] OR    ESOPHAGOGASTRODUODENOSCOPY  08/30/2018    EYE SURGERY      HERNIA REPAIR Left 09/2013    REPAIR, HERNIA, INGUINAL, WITHOUT HISTORY OF PRIOR REPAIR, AGE 5 YEARS OR OLDER Left 9/11/2013    Performed by Han Brown MD at [facility] OR    REPLACEMENT-VALVE-AORTIC N/A 10/17/2017    Performed by Martínez Keene MD at [facility] CATH LAB    RETINAL DETACHMENT SURGERY      THORACENTESIS N/A 1/17/2019    Performed by [facility] Diagnostic Provider at [facility] OR       ALLERGIES AND MEDICATION:     Review of patient's allergies indicates:   Allergen Reactions    Vancomycin analogues Itching    Ciprofloxacin (mixture) Itching     Extreme itching and redness     Antibiotic hc         Medication List           Accurate as of 9/3/19  2:17 PM. If you have any questions, ask your nurse or doctor.               CONTINUE taking these medications    acarbose 25 MG Tab  Commonly known as:  PRECOSE     acetaminophen 325 MG tablet  Commonly known as:  TYLENOL  Take 2 tablets (650 mg total) by mouth every 6 (six) hours as needed.     amiodarone 200 MG Tab  Commonly known as:  PACERONE     aspirin 81 MG EC tablet  Commonly known as:  ECOTRIN  Take 1 tablet (81 mg total) by mouth once daily. Hold until evaluated by Cardiology     atorvastatin 40 MG tablet  Commonly known as:  LIPITOR  Take 1 tablet (40 mg total) by mouth once daily.     clopidogrel 75 mg tablet  Commonly known as:  PLAVIX  Take 1 tablet (75 mg total) by mouth once  daily.     cromolyn 4 % ophthalmic solution  Commonly known as:  OPTICROM     cyanocobalamin 1000 MCG tablet  Commonly known as:  VITAMIN B-12     cyproheptadine 4 mg tablet  Commonly known as:  PERIACTIN     donepezil 5 MG tablet  Commonly known as:  ARICEPT     escitalopram oxalate 10 MG tablet  Commonly known as:  LEXAPRO     famotidine 20 MG tablet  Commonly known as:  PEPCID  Take 1 tablet (20 mg total) by mouth once daily.     ferrous gluconate 324 MG tablet  Commonly known as:  FERGON  Take 1 tablet (324 mg total) by mouth daily with breakfast.     furosemide 40 MG tablet  Commonly known as:  LASIX  Take 1 tablet (40 mg total) by mouth 2 (two) times daily.     guaiFENesin 600 mg 12 hr tablet  Commonly known as:  MUCINEX  Take 1 tablet (600 mg total) by mouth 2 (two) times daily.     losartan 100 MG tablet  Commonly known as:  COZAAR     metoprolol succinate 50 MG 24 hr tablet  Commonly known as:  TOPROL-XL  Take 1 tablet (50 mg total) by mouth 2 (two) times daily.     mirtazapine 7.5 MG Tab  Commonly known as:  REMERON  Take 1 tablet (7.5 mg total) by mouth every evening.     ondansetron 4 MG Tbdl  Commonly known as:  ZOFRAN-ODT  Take 1 tablet (4 mg total) by mouth every 8 (eight) hours as needed (nausea).     SITagliptin 50 MG Tab  Commonly known as:  JANUVIA     tamsulosin 0.4 mg Cap  Commonly known as:  FLOMAX  Take 1 capsule (0.4 mg total) by mouth once daily.              SOCIAL HISTORY:     Social History     Socioeconomic History    Marital status:      Spouse name: Opal    Number of children: Not on file    Years of education: Not on file    Highest education level: Not on file   Occupational History    Occupation: Retired   Social Needs    Financial resource strain: Not on file    Food insecurity:     Worry: Not on file     Inability: Not on file    Transportation needs:     Medical: Not on file     Non-medical: Not on file   Tobacco Use    Smoking status: Former Smoker     Packs/day:  3.00     Years: 40.00     Pack years: 120.00     Types: Cigarettes     Last attempt to quit: 1990     Years since quittin.1    Smokeless tobacco: Never Used   Substance and Sexual Activity    Alcohol use: No    Drug use: No    Sexual activity: Not Currently     Partners: Female   Lifestyle    Physical activity:     Days per week: Not on file     Minutes per session: Not on file    Stress: Not on file   Relationships    Social connections:     Talks on phone: Not on file     Gets together: Not on file     Attends Religion service: Not on file     Active member of club or organization: Not on file     Attends meetings of clubs or organizations: Not on file     Relationship status: Not on file   Other Topics Concern    Patient feels they ought to cut down on drinking/drug use No    Patient annoyed by others criticizing their drinking/drug use No    Patient has felt bad or guilty about drinking/drug use No    Patient has had a drink/used drugs as an eye opener in the AM No   Social History Narrative    Lives with wife and stepson.                FAMILY HISTORY:     Family History   Problem Relation Age of Onset    Arthritis Mother     Heart disease Father     Heart failure Father     Diabetes Sister     Heart attack Brother     No Known Problems Maternal Aunt     No Known Problems Maternal Uncle     No Known Problems Paternal Aunt     No Known Problems Paternal Uncle     No Known Problems Maternal Grandmother     No Known Problems Maternal Grandfather     No Known Problems Paternal Grandmother     No Known Problems Paternal Grandfather     Anemia Neg Hx     Arrhythmia Neg Hx     Asthma Neg Hx     Clotting disorder Neg Hx     Fainting Neg Hx     Hyperlipidemia Neg Hx     Hypertension Neg Hx     Stroke Neg Hx     Atrial Septal Defect Neg Hx        REVIEW OF SYSTEMS:   Review of Systems   Constitutional: Negative for chills, diaphoresis and fever.   HENT: Negative for nosebleeds.   "  Eyes: Negative for blurred vision, double vision and photophobia.   Respiratory: Negative for hemoptysis, shortness of breath and wheezing.    Cardiovascular: Negative for chest pain, palpitations, orthopnea, claudication, leg swelling and PND.   Gastrointestinal: Negative for abdominal pain, blood in stool, heartburn, melena, nausea and vomiting.   Genitourinary: Negative for flank pain and hematuria.   Musculoskeletal: Negative for falls, myalgias and neck pain.   Skin: Negative for rash.   Neurological: Positive for sensory change (R eye chr blind (retinal detachment)). Negative for dizziness, seizures, loss of consciousness, weakness and headaches.   Endo/Heme/Allergies: Negative for polydipsia. Does not bruise/bleed easily.   Psychiatric/Behavioral: Negative for depression and memory loss. The patient is not nervous/anxious.        PHYSICAL EXAM:     Vitals:    09/03/19 1404   BP: 120/64   Pulse: 66   Resp: 15    Body mass index is 25.15 kg/m².  Weight: 79.5 kg (175 lb 4.3 oz)   Height: 5' 10" (177.8 cm)     Physical Exam   Constitutional: He is oriented to person, place, and time. He appears well-developed and well-nourished. He is cooperative.  Non-toxic appearance. No distress.   HENT:   Head: Normocephalic and atraumatic.   Eyes: Pupils are equal, round, and reactive to light. Conjunctivae and EOM are normal. No scleral icterus.   Neck: Trachea normal and normal range of motion. Neck supple. Normal carotid pulses and no JVD present. Carotid bruit is not present. No neck rigidity. No tracheal deviation and no edema present. No thyromegaly present.   L CEA scar well healed   Cardiovascular: Normal rate, regular rhythm, S1 normal and S2 normal. PMI is not displaced. Exam reveals no gallop and no friction rub.   Murmur heard.   Systolic murmur is present with a grade of 1/6.  Pulses:       Carotid pulses are 2+ on the right side, and 2+ on the left side.  Pulmonary/Chest: Effort normal and breath sounds " normal. No respiratory distress. He has no wheezes. He has no rales. He exhibits no tenderness.   Abdominal: Soft. He exhibits no distension. There is no hepatosplenomegaly.   Musculoskeletal: Normal range of motion. He exhibits no edema or tenderness.   Feet:   Right Foot:   Skin Integrity: Negative for ulcer.   Left Foot:   Skin Integrity: Negative for ulcer.   Neurological: He is alert and oriented to person, place, and time. No cranial nerve deficit.   Skin: Skin is warm and dry. No rash noted. No erythema.   Psychiatric: He has a normal mood and affect. His speech is normal and behavior is normal.   Vitals reviewed.      DATA:   EKG: (personally reviewed tracing)  9/3/19 AV paced 66    Laboratory:  CBC:  Recent Labs   Lab 04/24/19 2340 04/26/19  0646 05/19/19  0957   WBC 9.62 6.30 9.25   Hemoglobin 12.0 L 10.7 L 11.6 L   Hematocrit 37.9 L 33.8 L 37.1 L   Platelets 171 149 L 186       CHEMISTRIES:  Recent Labs   Lab 09/03/18  0559  01/24/19  0132  04/24/19  2340  04/28/19  0609 04/29/19  0409 05/19/19  0957   Glucose 138 H   < > 198 H   < > 233 H   < > 107 102 133 H   Sodium 138   < > 141   < > 139   < > 142 143 143   Potassium 4.2   < > 4.3   < > 3.5   < > 2.9 L 3.8 3.5   BUN, Bld 22   < > 20   < > 19   < > 14 15 23   Creatinine 1.4   < > 1.8 H   < > 1.5 H   < > 1.2 1.3 1.7 H   eGFR if  53.3 A   < > 39 A   < > 49 A   < > >60 58 A 42 A   eGFR if non  46.1 A   < > 34 A   < > 42 A   < > 55 A 50 A 36 A   Calcium 9.0   < > 8.9   < > 9.1   < > 8.9 9.3 8.8   Magnesium 2.0  --  2.4  --  1.6  --   --   --   --     < > = values in this interval not displayed.       CARDIAC BIOMARKERS:  Recent Labs   Lab 01/24/19  0726 04/24/19 2340 05/19/19  0957   Troponin I 0.020 0.018 0.019       COAGS:  Recent Labs   Lab 08/27/18  1621 01/15/19  1600 04/25/19  0809   INR 1.1 1.1 1.1       LIPIDS/LFTS:  Recent Labs   Lab 02/17/18  0831  06/20/18  0954  01/24/19  0132 04/24/19  2340 05/19/19  0957    Cholesterol 147  --  97 L  --   --   --   --    Triglycerides 72  --  63  --   --   --   --    HDL 37 L  --  37 L  --   --   --   --    LDL Cholesterol 95.6  --  47.4 L  --   --   --   --    Non-HDL Cholesterol 110  --  60  --   --   --   --    AST 11   < > 17   < > 35 19 25   ALT <5 L   < > 18   < > 19 16 28    < > = values in this interval not displayed.       Cardiovascular Testing:  Echo 4/25/19 (no changes vs report 1/2019, normal TAVR fxn and MR degree unchanged)  · Normal left ventricular systolic function. The estimated ejection fraction is 55%  · Indeterminate left ventricular diastolic function.  · Normal right ventricular systolic function.  · Moderate left atrial enlargement.  · Moderate right atrial enlargement.  · There is a transcutaneously-placed aortic bioprosthesis present. There is no aortic aortic insufficiency present. Prosthetic aortic valve is normal.  · Moderate-to-severe mitral regurgitation.  · Mild to moderate tricuspid regurgitation.  · The estimated PA systolic pressure is 54 mm Hg  · Pulmonary hypertension present.    Cath 4/20/18  LVEF: 50% by echo  Normal TAVR fxn by echo  Dominance: Right  LM: normal  LAD: MLI              Diag1 prox 60%, mid 50%  Ramus: MLI  LCx: MLI  RCA: dom, prox 30%  Hemostasis:  R Radial band  Impression:  TdP  Nonobst CAD  Normal LV fxn/TAVR fxn by echo  R rad vasband for hemostasis  Plan:  Cont ASA/Plavix  Cont amio gtt     LE Venous US 4/17/18  No evidence of deep venous thrombosis in either lower extremity.     Carotid US 3/22/18 (s/p L CEA)  There is 20 - 39% right Internal Carotid stenosis.  There is 20 - 39% left Internal Carotid stenosis.     TAVR 10/17/17  B. 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    No perivalvular leak post procedure per 2D echo.    Post deployment AV mean gradient 2.5 mmHg, Vmax 1.09 m/s.      ASSESSMENT:   # HFpEF, appears euvolemic.  Class II sxs.  #  TdP s/p MDT ICD implant 4/2018, TdP/NSVT 4/11/19 on interrogation 4/16/19.  # TIA s/p L CEA 2/2018, followed by Dr. Coleman  # AS s/p TAVR 10/2017, normally functioning by echo 4/2019  # MR, mod-severe by echo 4/2019  # HTN, controlled  # HLP, on atorva 40mg qd  # CKD3  # DM  # hx of R retinal detachment (chr R eye blindness)  # Hx SAH 8/2018, now back on Plavix (per pt report after their d/w Dr. Moore)    PLAN:   Cont med rx  RTC 3 months with echo and MDT ICD check (early Dec 2019)      Ned Toribio MD, FACC

## 2019-09-05 NOTE — ASSESSMENT & PLAN NOTE
19  Conrado Rivas : 1967 Sex: female  Age: 46 y.o. Chief Complaint   Patient presents with    Hyperlipidemia    Thyroid Problem    Gastroesophageal Reflux       HPI:  46 y.o. female here for annual wellness exam/Lisa form. Review of Systems   Constitutional: Negative for diaphoresis and fever. HENT: Negative for congestion, ear pain, sinus pain and sore throat. Eyes: Negative for pain and visual disturbance. Respiratory: Negative for cough and shortness of breath. Cardiovascular: Negative for chest pain and palpitations. Gastrointestinal: Negative for abdominal pain, constipation and diarrhea. Endocrine: Negative for polydipsia. Genitourinary: Negative for dysuria, frequency and vaginal discharge. Musculoskeletal: Negative for arthralgias, back pain and myalgias. Skin: Negative for rash. Allergic/Immunologic: Negative for environmental allergies. Neurological: Negative for dizziness, seizures, syncope and headaches. Hematological: Negative for adenopathy. Psychiatric/Behavioral: Negative for confusion. The patient is not nervous/anxious. Current Outpatient Medications:     atorvastatin (LIPITOR) 20 MG tablet, , Disp: , Rfl:     levothyroxine (SYNTHROID) 100 MCG tablet, , Disp: , Rfl:     pantoprazole (PROTONIX) 40 MG tablet, , Disp: , Rfl:     Calcium Carbonate-Vit D-Min (CALCIUM 1200 PO), , Disp: , Rfl:     aspirin 81 MG tablet, Take by mouth, Disp: , Rfl:   No Known Allergies    No past medical history on file. No past surgical history on file. No family history on file.   Social History     Socioeconomic History    Marital status:      Spouse name: Not on file    Number of children: Not on file    Years of education: Not on file    Highest education level: Not on file   Occupational History    Not on file   Social Needs    Financial resource strain: Not on file    Food insecurity:     Worry: Not on file     Inability: Not on file No signs of acute exacerbation  Does have bilateral lower extremity edema- attributed to amlodipine which was recently discontinued  Monitor  Continue metoprolol and losartan. Resume lasix   Assessment and Plan:  Wallace Estrada was seen today for hyperlipidemia, thyroid problem and gastroesophageal reflux. Diagnoses and all orders for this visit:    Routine general medical examination at a health care facility    Reviewed Pmhx, meds, diet, records. See form. No follow-ups on file.      Seen By:  Joe Castorena DO

## 2019-10-09 NOTE — CONSULTS
William Chin Patient Age: 7 year old  MESSAGE:       Mom calling stating having question regarding medication.    Mom requesting to speak with RN.    Message confirmed with caller.     Call connected to [facility] Peds Triage Queue.  Routed to Provider's clinical pool.      WEIGHT AND HEIGHT:   Wt Readings from Last 1 Encounters:   10/03/19 (!) 51.4 kg (113 lb 6.4 oz) (>99 %, Z= 3.12)*     * Growth percentiles are based on CDC (Boys, 2-20 Years) data.     Ht Readings from Last 1 Encounters:   10/03/19 4' 5.5\" (1.359 m) (97 %, Z= 1.85)*     * Growth percentiles are based on CDC (Boys, 2-20 Years) data.     BMI Readings from Last 1 Encounters:   10/03/19 27.86 kg/m² (>99 %, Z= 2.67)*     * Growth percentiles are based on CDC (Boys, 2-20 Years) data.       ALLERGIES:  Amoxil; Milk    (food or med); [place]   (food or med); Grapefruit   (food or med); and Red dye   (food or med)  Current Outpatient Medications   Medication   • Lisdexamfetamine Dimesylate (VYVANSE) 10 MG Chew Tab   • levalbuterol (XOPENEX) 0.63 MG/3ML nebulizer solution   • albuterol 108 (90 Base) MCG/ACT inhaler   • levalbuterol (XOPENEX HFA) 45 MCG/ACT inhaler   • Spacer/Aero-Holding Chambers Device   • Loratadine (CLARITIN PO)     No current facility-administered medications for this visit.      PHARMACY to use:           Pharmacy preference(s) on file:   [id number] - [place], [place] - [address]  [address]  [facility] [address]  Phone: [phone] Fax: [phone]      CALL BACK INFO: Ok to leave response (including medical information) on answering machine  ROUTING: Patient's physician/staff        PCP: Denis Meza MD         INS: Payor: BLUE CROSS BLUE [facility] / Plan: PPO [id number] / Product Type: O MISC   PATIENT ADDRESS:  [address] [place] [id number]   Inpatient Radiology Pre-procedure Note    History of Present Illness:  Timbo Gallagher is a 84 y.o. male who presents for right thoracentesis.  Admission H&P reviewed.  Past Medical History:   Diagnosis Date    Acute renal failure     Arthritis     Blind right eye     BPH (benign prostatic hypertrophy)     Bronchitis, chronic     Carotid artery occlusion     Lt carotid endarerectomy done 02/19/2018     CHF (congestive heart failure)     Colon polyp     Coronary artery disease     Diabetes mellitus     GERD (gastroesophageal reflux disease)     Hearing aid worn     bilateral    Hematuria     Hernia     Hypertension     Influenza A     Irregular heart beat     NSVT (nonsustained ventricular tachycardia) 4/18/2018    Renal failure as complication of care     sepsis, renal function returned    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 Coleman    CATARACT EXTRACTION, BILATERAL      EGD (ESOPHAGOGASTRODUODENOSCOPY) N/A 8/30/2018    Performed by Tye Navarrete MD at [facility] ENDO (2ND FLR)    ENDARTERECTOMY-CAROTID Left 2/19/2018    Per[facility] by [facility]io [facility] at [facility] OR    ESOPHAGOGASTRODUODENOSCOPY  08/30/2018    EYE SURGERY      HERNIA REPAIR Left 09/2013    REPAIR, HERNIA, INGUINAL, WITHOUT HISTORY OF PRIOR REPAIR, AGE 5 YEARS OR OLDER Left 9/11/2013    Performed by Han Brown MD at [facility] OR    REPLACEMENT-VALVE-AORTIC N/A 10/17/2017    Performed by Martínez Keene MD at [facility] CATH LAB    RETINAL DETACHMENT  SURGERY      THORACENTESIS N/A 1/17/2019    Performed by [facility] Diagnostic Provider at [facility] OR       Review of Systems:   As documented in primary team H&P    Home Meds:   Prior to Admission medications    Medication Sig Start Date End Date Taking? Authorizing Provider   acarbose (PRECOSE) 25 MG Tab Take 25 mg by mouth 3 (three) times daily with meals.   Yes Historical Provider, MD   acetaminophen (TYLENOL) 500 MG tablet Take 500 mg by mouth 2 (two) times daily.   Yes Historical Provider, MD   amiodarone (PACERONE) 200 MG Tab Take 1 tablet (200 mg total) by mouth once daily. 5/20/18 5/20/19 Yes Cirilo Montero MD   aspirin (ECOTRIN) 81 MG EC tablet Take 1 tablet (81 mg total) by mouth once daily. Hold until evaluated by Cardiology 1/26/19  Yes Severiano Galan MD   atorvastatin (LIPITOR) 40 MG tablet Take 1 tablet (40 mg total) by mouth once daily. 3/22/18 4/25/19 Yes Silvio Coleman MD   clopidogrel (PLAVIX) 75 mg tablet Take 1 tablet (75 mg total) by mouth once daily. 2/26/19  Yes Ned Toribio MD   famotidine (PEPCID) 20 MG tablet Take 20 mg by mouth 2 (two) times daily.    Yes Historical Provider, MD   ferrous gluconate (FERGON) 324 MG tablet Take 1 tablet (324 mg total) by mouth daily with breakfast. 1/13/19  Yes Brandt Funez MD   furosemide (LASIX) 40 MG tablet Take 1 tablet (40 mg total) by mouth once daily. Take 40mg every 12 hours for 1 week (starting 4/16/19), then 40mg daily thereafter. 4/16/19  Yes Ned Toribio MD   metoprolol succinate (TOPROL-XL) 50 MG 24 hr tablet Take 50 mg by mouth 2 (two) times daily.    Yes Historical Provider, MD   mupirocin (BACTROBAN) 2 % ointment Apply topically 3 (three) times daily.   Yes Historical Provider, MD   cyanocobalamin (VITAMIN B-12) 1000 MCG tablet Take 100 mcg by mouth once a week.    Historical Provider, MD   diphenhydrAMINE (BENADRYL) 50 MG capsule Take 50 mg by mouth nightly.    Historical Provider, MD   tamsulosin (FLOMAX) 0.4 mg Cap  Take 1 capsule (0.4 mg total) by mouth once daily. 2/1/19 3/3/19  MIGUEL Lyon MD     Scheduled Meds:    albuterol-ipratropium  3 mL Nebulization Q6H WAKE    amiodarone  200 mg Oral Daily    atorvastatin  40 mg Oral Daily    ceFEPime (MAXIPIME) IVPB  1 g Intravenous Q12H    famotidine  20 mg Oral Daily    ferrous gluconate  324 mg Oral Daily with breakfast    furosemide  40 mg Intravenous BID    guaiFENesin  600 mg Oral BID    linezolid 600mg/300ml  600 mg Intravenous Q12H    metoprolol succinate  50 mg Oral BID    tamsulosin  0.4 mg Oral Daily     Continuous Infusions:   PRN Meds:acetaminophen, dextrose 50%, dextrose 50%, glucagon (human recombinant), glucose, glucose, insulin aspart U-100, ondansetron, sodium chloride 0.9%  Anticoagulants/Antiplatelets: Plavix held today    Allergies:   Review of patient's allergies indicates:   Allergen Reactions    Ciprofloxacin (mixture) Itching     Extreme itching    Antibiotic hc     Hydrochlorothiazide      Leg swelling      Iodine and iodide containing products      Wife and pt unsure    Vancomycin analogues Itching     Sedation Hx: have not been any systemic reactions    Labs:  Recent Labs   Lab 04/25/19  0809   INR 1.1       Recent Labs   Lab 04/24/19  2340   WBC 9.62   HGB 12.0*   HCT 37.9*   MCV 99*         Recent Labs   Lab 04/24/19  2340   *      K 3.5      CO2 27   BUN 19   CREATININE 1.5*   CALCIUM 9.1   MG 1.6   ALT 16   AST 19   ALBUMIN 3.2*   BILITOT 0.6         Vitals:  Temp: 98.3 °F (36.8 °C) (04/25/19 0846)  Pulse: 62 (04/25/19 0846)  Resp: 20 (04/25/19 0846)  BP: (!) 107/54 (04/25/19 0807)  SpO2: 98 % (04/25/19 0846)     Physical Exam:  ASA: 2   Mallampati: N/A    General: no acute distress  Mental Status: alert and oriented to person, place and time  HEENT: normocephalic, atraumatic  Chest: unlabored breathing  Heart: regular heart rate  Abdomen: nondistended  Extremity: moves all extremities    Plan: proceed  with US guided right thoracentesis  Sedation Plan: local anesthesia    Flash Rudd MD  Staff Radiologist  Department of Radiology  Pager: [phone]

## 2019-11-07 ENCOUNTER — TELEPHONE (OUTPATIENT)
Dept: RADIOLOGY | Facility: HOSPITAL | Age: 84
End: 2019-11-07

## 2019-11-07 ENCOUNTER — HOSPITAL ENCOUNTER (OUTPATIENT)
Dept: RADIOLOGY | Facility: HOSPITAL | Age: 84
Discharge: HOME | End: 2019-11-07
Attending: NURSE PRACTITIONER
Payer: MEDICARE

## 2019-11-07 DIAGNOSIS — N28.1 RENAL CYST, RIGHT: ICD-10-CM

## 2019-11-07 PROCEDURE — 76770 US EXAM ABDO BACK WALL COMP: CPT | Mod: 26,,, | Performed by: RADIOLOGY

## 2019-11-07 PROCEDURE — 76770 US EXAM ABDO BACK WALL COMP: CPT | Mod: TC

## 2019-11-07 PROCEDURE — 76770 US RETROPERITONEAL COMPLETE: ICD-10-PCS | Mod: 26,,, | Performed by: RADIOLOGY

## 2019-11-16 ENCOUNTER — ANESTHESIA EVENT (OUTPATIENT)
Dept: SURGERY | Facility: HOSPITAL | Age: 84
DRG: 199 | End: 2019-11-16
Payer: MEDICARE

## 2019-11-16 ENCOUNTER — HOSPITAL ENCOUNTER (INPATIENT)
Facility: HOSPITAL | Age: 84
LOS: 9 days | Discharge: HOSPICE | DRG: 199 | End: 2019-11-25
Attending: EMERGENCY MEDICINE | Admitting: EMERGENCY MEDICINE
Payer: MEDICARE

## 2019-11-16 ENCOUNTER — ANESTHESIA (OUTPATIENT)
Dept: SURGERY | Facility: HOSPITAL | Age: 84
DRG: 199 | End: 2019-11-16
Payer: MEDICARE

## 2019-11-16 DIAGNOSIS — J94.2 HEMOTHORAX ON RIGHT: Primary | ICD-10-CM

## 2019-11-16 DIAGNOSIS — R06.02 SHORTNESS OF BREATH: ICD-10-CM

## 2019-11-16 DIAGNOSIS — S22.41XA CLOSED FRACTURE OF MULTIPLE RIBS OF RIGHT SIDE, INITIAL ENCOUNTER: ICD-10-CM

## 2019-11-16 PROBLEM — N17.9 ACUTE RENAL FAILURE SUPERIMPOSED ON STAGE 3 CHRONIC KIDNEY DISEASE: Status: RESOLVED | Noted: 2018-08-29 | Resolved: 2019-11-16

## 2019-11-16 PROBLEM — N18.30 ACUTE RENAL FAILURE SUPERIMPOSED ON STAGE 3 CHRONIC KIDNEY DISEASE: Status: RESOLVED | Noted: 2018-08-29 | Resolved: 2019-11-16

## 2019-11-16 PROBLEM — R78.81 BACTEREMIA: Status: RESOLVED | Noted: 2018-11-30 | Resolved: 2019-11-16

## 2019-11-16 PROBLEM — I50.33 ACUTE ON CHRONIC DIASTOLIC (CONGESTIVE) HEART FAILURE: Status: RESOLVED | Noted: 2019-04-25 | Resolved: 2019-11-16

## 2019-11-16 PROBLEM — A28.0: Status: RESOLVED | Noted: 2018-11-29 | Resolved: 2019-11-16

## 2019-11-16 LAB
ALBUMIN SERPL BCP-MCNC: 3.1 G/DL (ref 3.5–5.2)
ALP SERPL-CCNC: 123 U/L (ref 55–135)
ALT SERPL W/O P-5'-P-CCNC: 14 U/L (ref 10–44)
ANION GAP SERPL CALC-SCNC: 8 MMOL/L (ref 8–16)
APTT BLDCRRT: 29.6 SEC (ref 21–32)
AST SERPL-CCNC: 18 U/L (ref 10–40)
BASOPHILS # BLD AUTO: 0.03 K/UL (ref 0–0.2)
BASOPHILS NFR BLD: 0.3 % (ref 0–1.9)
BILIRUB SERPL-MCNC: 0.5 MG/DL (ref 0.1–1)
BILIRUB UR QL STRIP: NEGATIVE
BUN SERPL-MCNC: 27 MG/DL (ref 8–23)
CALCIUM SERPL-MCNC: 9.3 MG/DL (ref 8.7–10.5)
CHLORIDE SERPL-SCNC: 106 MMOL/L (ref 95–110)
CLARITY UR: CLEAR
CO2 SERPL-SCNC: 27 MMOL/L (ref 23–29)
COLOR UR: NORMAL
CREAT SERPL-MCNC: 1.7 MG/DL (ref 0.5–1.4)
DIFFERENTIAL METHOD: ABNORMAL
EOSINOPHIL # BLD AUTO: 0.1 K/UL (ref 0–0.5)
EOSINOPHIL NFR BLD: 0.8 % (ref 0–8)
ERYTHROCYTE [DISTWIDTH] IN BLOOD BY AUTOMATED COUNT: 13.7 % (ref 11.5–14.5)
EST. GFR  (AFRICAN AMERICAN): 42 ML/MIN/1.73 M^2
EST. GFR  (NON AFRICAN AMERICAN): 36 ML/MIN/1.73 M^2
GLUCOSE SERPL-MCNC: 205 MG/DL (ref 70–110)
GLUCOSE UR QL STRIP: NEGATIVE
HCT VFR BLD AUTO: 38.6 % (ref 40–54)
HGB BLD-MCNC: 12.2 G/DL (ref 14–18)
HGB UR QL STRIP: NEGATIVE
IMM GRANULOCYTES # BLD AUTO: 0.04 K/UL (ref 0–0.04)
IMM GRANULOCYTES NFR BLD AUTO: 0.4 % (ref 0–0.5)
INR PPP: 1 (ref 0.8–1.2)
KETONES UR QL STRIP: NEGATIVE
LEUKOCYTE ESTERASE UR QL STRIP: NEGATIVE
LYMPHOCYTES # BLD AUTO: 0.9 K/UL (ref 1–4.8)
LYMPHOCYTES NFR BLD: 9.2 % (ref 18–48)
MAGNESIUM SERPL-MCNC: 2.3 MG/DL (ref 1.6–2.6)
MCH RBC QN AUTO: 31.6 PG (ref 27–31)
MCHC RBC AUTO-ENTMCNC: 31.6 G/DL (ref 32–36)
MCV RBC AUTO: 100 FL (ref 82–98)
MONOCYTES # BLD AUTO: 0.9 K/UL (ref 0.3–1)
MONOCYTES NFR BLD: 9.1 % (ref 4–15)
NEUTROPHILS # BLD AUTO: 7.9 K/UL (ref 1.8–7.7)
NEUTROPHILS NFR BLD: 80.2 % (ref 38–73)
NITRITE UR QL STRIP: NEGATIVE
NRBC BLD-RTO: 0 /100 WBC
PH UR STRIP: 6 [PH] (ref 5–8)
PLATELET # BLD AUTO: 157 K/UL (ref 150–350)
PMV BLD AUTO: 11.5 FL (ref 9.2–12.9)
POCT GLUCOSE: 167 MG/DL (ref 70–110)
POCT GLUCOSE: 176 MG/DL (ref 70–110)
POCT GLUCOSE: 186 MG/DL (ref 70–110)
POCT GLUCOSE: 212 MG/DL (ref 70–110)
POTASSIUM SERPL-SCNC: 3.5 MMOL/L (ref 3.5–5.1)
PROT SERPL-MCNC: 7.2 G/DL (ref 6–8.4)
PROT UR QL STRIP: NEGATIVE
PROTHROMBIN TIME: 10.6 SEC (ref 9–12.5)
RBC # BLD AUTO: 3.86 M/UL (ref 4.6–6.2)
SODIUM SERPL-SCNC: 141 MMOL/L (ref 136–145)
SP GR UR STRIP: 1.01 (ref 1–1.03)
URN SPEC COLLECT METH UR: NORMAL
UROBILINOGEN UR STRIP-ACNC: NEGATIVE EU/DL
WBC # BLD AUTO: 9.9 K/UL (ref 3.9–12.7)

## 2019-11-16 PROCEDURE — 63600175 PHARM REV CODE 636 W HCPCS: Performed by: SURGERY

## 2019-11-16 PROCEDURE — 11000001 HC ACUTE MED/SURG PRIVATE ROOM

## 2019-11-16 PROCEDURE — 25000003 PHARM REV CODE 250: Performed by: EMERGENCY MEDICINE

## 2019-11-16 PROCEDURE — 83036 HEMOGLOBIN GLYCOSYLATED A1C: CPT

## 2019-11-16 PROCEDURE — 63600175 PHARM REV CODE 636 W HCPCS: Performed by: NURSE ANESTHETIST, CERTIFIED REGISTERED

## 2019-11-16 PROCEDURE — 85730 THROMBOPLASTIN TIME PARTIAL: CPT

## 2019-11-16 PROCEDURE — D9220A PRA ANESTHESIA: ICD-10-PCS | Mod: ANES,,, | Performed by: ANESTHESIOLOGY

## 2019-11-16 PROCEDURE — 36415 COLL VENOUS BLD VENIPUNCTURE: CPT

## 2019-11-16 PROCEDURE — 25000003 PHARM REV CODE 250: Performed by: SURGERY

## 2019-11-16 PROCEDURE — 83735 ASSAY OF MAGNESIUM: CPT

## 2019-11-16 PROCEDURE — 37000009 HC ANESTHESIA EA ADD 15 MINS: Performed by: SURGERY

## 2019-11-16 PROCEDURE — C1729 CATH, DRAINAGE: HCPCS | Performed by: SURGERY

## 2019-11-16 PROCEDURE — 36000704 HC OR TIME LEV I 1ST 15 MIN: Performed by: SURGERY

## 2019-11-16 PROCEDURE — D9220A PRA ANESTHESIA: ICD-10-PCS | Mod: CRNA,,, | Performed by: NURSE ANESTHETIST, CERTIFIED REGISTERED

## 2019-11-16 PROCEDURE — S0028 INJECTION, FAMOTIDINE, 20 MG: HCPCS | Performed by: EMERGENCY MEDICINE

## 2019-11-16 PROCEDURE — 80053 COMPREHEN METABOLIC PANEL: CPT

## 2019-11-16 PROCEDURE — 85025 COMPLETE CBC W/AUTO DIFF WBC: CPT

## 2019-11-16 PROCEDURE — 71000033 HC RECOVERY, INTIAL HOUR: Performed by: SURGERY

## 2019-11-16 PROCEDURE — 99285 EMERGENCY DEPT VISIT HI MDM: CPT | Mod: 25

## 2019-11-16 PROCEDURE — D9220A PRA ANESTHESIA: Mod: CRNA,,, | Performed by: NURSE ANESTHETIST, CERTIFIED REGISTERED

## 2019-11-16 PROCEDURE — 37000008 HC ANESTHESIA 1ST 15 MINUTES: Performed by: SURGERY

## 2019-11-16 PROCEDURE — 25000003 PHARM REV CODE 250: Performed by: HOSPITALIST

## 2019-11-16 PROCEDURE — D9220A PRA ANESTHESIA: Mod: ANES,,, | Performed by: ANESTHESIOLOGY

## 2019-11-16 PROCEDURE — 81003 URINALYSIS AUTO W/O SCOPE: CPT

## 2019-11-16 PROCEDURE — 36000705 HC OR TIME LEV I EA ADD 15 MIN: Performed by: SURGERY

## 2019-11-16 PROCEDURE — 85610 PROTHROMBIN TIME: CPT

## 2019-11-16 PROCEDURE — 63600175 PHARM REV CODE 636 W HCPCS: Performed by: ANESTHESIOLOGY

## 2019-11-16 PROCEDURE — 63600175 PHARM REV CODE 636 W HCPCS: Performed by: EMERGENCY MEDICINE

## 2019-11-16 RX ORDER — AMIODARONE HYDROCHLORIDE 200 MG/1
200 TABLET ORAL DAILY
Status: DISCONTINUED | OUTPATIENT
Start: 2019-11-16 | End: 2019-11-25 | Stop reason: HOSPADM

## 2019-11-16 RX ORDER — FAMOTIDINE 20 MG/1
20 TABLET, FILM COATED ORAL DAILY
Status: DISCONTINUED | OUTPATIENT
Start: 2019-11-16 | End: 2019-11-25 | Stop reason: HOSPADM

## 2019-11-16 RX ORDER — FERROUS GLUCONATE 324(38)MG
324 TABLET ORAL
Status: DISCONTINUED | OUTPATIENT
Start: 2019-11-17 | End: 2019-11-25 | Stop reason: HOSPADM

## 2019-11-16 RX ORDER — SODIUM CHLORIDE, SODIUM LACTATE, POTASSIUM CHLORIDE, CALCIUM CHLORIDE 600; 310; 30; 20 MG/100ML; MG/100ML; MG/100ML; MG/100ML
INJECTION, SOLUTION INTRAVENOUS CONTINUOUS PRN
Status: DISCONTINUED | OUTPATIENT
Start: 2019-11-16 | End: 2019-11-16

## 2019-11-16 RX ORDER — GLUCAGON 1 MG
1 KIT INJECTION
Status: DISCONTINUED | OUTPATIENT
Start: 2019-11-16 | End: 2019-11-25 | Stop reason: HOSPADM

## 2019-11-16 RX ORDER — AMOXICILLIN 250 MG
2 CAPSULE ORAL 2 TIMES DAILY
Status: DISCONTINUED | OUTPATIENT
Start: 2019-11-16 | End: 2019-11-25 | Stop reason: HOSPADM

## 2019-11-16 RX ORDER — ATORVASTATIN CALCIUM 40 MG/1
40 TABLET, FILM COATED ORAL DAILY
Status: DISCONTINUED | OUTPATIENT
Start: 2019-11-16 | End: 2019-11-25 | Stop reason: HOSPADM

## 2019-11-16 RX ORDER — ACETAMINOPHEN 325 MG/1
650 TABLET ORAL EVERY 6 HOURS PRN
Status: DISCONTINUED | OUTPATIENT
Start: 2019-11-16 | End: 2019-11-16

## 2019-11-16 RX ORDER — SODIUM CHLORIDE 0.9 % (FLUSH) 0.9 %
10 SYRINGE (ML) INJECTION
Status: DISCONTINUED | OUTPATIENT
Start: 2019-11-16 | End: 2019-11-25 | Stop reason: HOSPADM

## 2019-11-16 RX ORDER — ONDANSETRON 2 MG/ML
4 INJECTION INTRAMUSCULAR; INTRAVENOUS EVERY 8 HOURS PRN
Status: DISCONTINUED | OUTPATIENT
Start: 2019-11-16 | End: 2019-11-16

## 2019-11-16 RX ORDER — METOPROLOL SUCCINATE 50 MG/1
50 TABLET, EXTENDED RELEASE ORAL 2 TIMES DAILY
Status: DISCONTINUED | OUTPATIENT
Start: 2019-11-16 | End: 2019-11-25 | Stop reason: HOSPADM

## 2019-11-16 RX ORDER — ACETAMINOPHEN 325 MG/1
650 TABLET ORAL EVERY 8 HOURS PRN
Status: DISCONTINUED | OUTPATIENT
Start: 2019-11-16 | End: 2019-11-16

## 2019-11-16 RX ORDER — PROPOFOL 10 MG/ML
VIAL (ML) INTRAVENOUS
Status: DISCONTINUED | OUTPATIENT
Start: 2019-11-16 | End: 2019-11-16

## 2019-11-16 RX ORDER — HYDROCODONE BITARTRATE AND ACETAMINOPHEN 5; 325 MG/1; MG/1
1 TABLET ORAL EVERY 4 HOURS PRN
Status: DISCONTINUED | OUTPATIENT
Start: 2019-11-16 | End: 2019-11-25 | Stop reason: HOSPADM

## 2019-11-16 RX ORDER — CLOPIDOGREL BISULFATE 75 MG/1
75 TABLET ORAL DAILY
Status: DISCONTINUED | OUTPATIENT
Start: 2019-11-16 | End: 2019-11-16

## 2019-11-16 RX ORDER — INSULIN ASPART 100 [IU]/ML
1-10 INJECTION, SOLUTION INTRAVENOUS; SUBCUTANEOUS EVERY 6 HOURS PRN
Status: DISCONTINUED | OUTPATIENT
Start: 2019-11-16 | End: 2019-11-25 | Stop reason: HOSPADM

## 2019-11-16 RX ORDER — POLYETHYLENE GLYCOL 3350 17 G/17G
17 POWDER, FOR SOLUTION ORAL 2 TIMES DAILY PRN
Status: DISCONTINUED | OUTPATIENT
Start: 2019-11-16 | End: 2019-11-25 | Stop reason: HOSPADM

## 2019-11-16 RX ORDER — GUAIFENESIN 600 MG/1
600 TABLET, EXTENDED RELEASE ORAL 2 TIMES DAILY
Status: DISCONTINUED | OUTPATIENT
Start: 2019-11-16 | End: 2019-11-25 | Stop reason: HOSPADM

## 2019-11-16 RX ORDER — FAMOTIDINE 10 MG/ML
20 INJECTION INTRAVENOUS DAILY
Status: DISCONTINUED | OUTPATIENT
Start: 2019-11-16 | End: 2019-11-16

## 2019-11-16 RX ORDER — MORPHINE SULFATE 10 MG/ML
2 INJECTION INTRAMUSCULAR; INTRAVENOUS; SUBCUTANEOUS EVERY 4 HOURS PRN
Status: DISCONTINUED | OUTPATIENT
Start: 2019-11-16 | End: 2019-11-25 | Stop reason: HOSPADM

## 2019-11-16 RX ORDER — ESCITALOPRAM OXALATE 10 MG/1
10 TABLET ORAL DAILY
Status: DISCONTINUED | OUTPATIENT
Start: 2019-11-16 | End: 2019-11-25 | Stop reason: HOSPADM

## 2019-11-16 RX ORDER — ACETAMINOPHEN 325 MG/1
650 TABLET ORAL EVERY 4 HOURS PRN
Status: DISCONTINUED | OUTPATIENT
Start: 2019-11-16 | End: 2019-11-25 | Stop reason: HOSPADM

## 2019-11-16 RX ORDER — LIDOCAINE HCL/PF 100 MG/5ML
SYRINGE (ML) INTRAVENOUS
Status: DISCONTINUED | OUTPATIENT
Start: 2019-11-16 | End: 2019-11-16

## 2019-11-16 RX ORDER — LOSARTAN POTASSIUM 25 MG/1
100 TABLET ORAL DAILY
Status: DISCONTINUED | OUTPATIENT
Start: 2019-11-16 | End: 2019-11-18

## 2019-11-16 RX ORDER — MIRTAZAPINE 7.5 MG/1
7.5 TABLET, FILM COATED ORAL NIGHTLY
Status: DISCONTINUED | OUTPATIENT
Start: 2019-11-16 | End: 2019-11-25 | Stop reason: HOSPADM

## 2019-11-16 RX ORDER — CYPROHEPTADINE HYDROCHLORIDE 4 MG/1
4 TABLET ORAL 3 TIMES DAILY PRN
Status: DISCONTINUED | OUTPATIENT
Start: 2019-11-16 | End: 2019-11-25 | Stop reason: HOSPADM

## 2019-11-16 RX ORDER — BUPIVACAINE HYDROCHLORIDE AND EPINEPHRINE 2.5; 5 MG/ML; UG/ML
INJECTION, SOLUTION EPIDURAL; INFILTRATION; INTRACAUDAL; PERINEURAL
Status: DISCONTINUED | OUTPATIENT
Start: 2019-11-16 | End: 2019-11-16 | Stop reason: HOSPADM

## 2019-11-16 RX ORDER — MORPHINE SULFATE 10 MG/ML
2 INJECTION INTRAMUSCULAR; INTRAVENOUS; SUBCUTANEOUS
Status: COMPLETED | OUTPATIENT
Start: 2019-11-16 | End: 2019-11-16

## 2019-11-16 RX ORDER — HYDROCODONE BITARTRATE AND ACETAMINOPHEN 5; 325 MG/1; MG/1
1 TABLET ORAL
Status: COMPLETED | OUTPATIENT
Start: 2019-11-16 | End: 2019-11-16

## 2019-11-16 RX ORDER — ONDANSETRON 4 MG/1
4 TABLET, ORALLY DISINTEGRATING ORAL EVERY 8 HOURS PRN
Status: DISCONTINUED | OUTPATIENT
Start: 2019-11-16 | End: 2019-11-16

## 2019-11-16 RX ORDER — ASPIRIN 81 MG/1
81 TABLET ORAL DAILY
Status: DISCONTINUED | OUTPATIENT
Start: 2019-11-16 | End: 2019-11-18

## 2019-11-16 RX ORDER — DONEPEZIL HYDROCHLORIDE 5 MG/1
5 TABLET, FILM COATED ORAL NIGHTLY
Status: DISCONTINUED | OUTPATIENT
Start: 2019-11-16 | End: 2019-11-25 | Stop reason: HOSPADM

## 2019-11-16 RX ORDER — ONDANSETRON 2 MG/ML
8 INJECTION INTRAMUSCULAR; INTRAVENOUS EVERY 6 HOURS PRN
Status: DISCONTINUED | OUTPATIENT
Start: 2019-11-16 | End: 2019-11-25 | Stop reason: HOSPADM

## 2019-11-16 RX ORDER — ONDANSETRON 2 MG/ML
4 INJECTION INTRAMUSCULAR; INTRAVENOUS ONCE
Status: COMPLETED | OUTPATIENT
Start: 2019-11-16 | End: 2019-11-16

## 2019-11-16 RX ORDER — FUROSEMIDE 40 MG/1
40 TABLET ORAL 2 TIMES DAILY
Status: DISCONTINUED | OUTPATIENT
Start: 2019-11-16 | End: 2019-11-18

## 2019-11-16 RX ORDER — PNV NO.95/FERROUS FUM/FOLIC AC 28MG-0.8MG
100 TABLET ORAL WEEKLY
Status: DISCONTINUED | OUTPATIENT
Start: 2019-11-16 | End: 2019-11-25 | Stop reason: HOSPADM

## 2019-11-16 RX ORDER — BISACODYL 10 MG
10 SUPPOSITORY, RECTAL RECTAL DAILY PRN
Status: DISCONTINUED | OUTPATIENT
Start: 2019-11-16 | End: 2019-11-25 | Stop reason: HOSPADM

## 2019-11-16 RX ADMIN — ESCITALOPRAM OXALATE 10 MG: 10 TABLET ORAL at 01:11

## 2019-11-16 RX ADMIN — PROPOFOL 10 MG: 10 INJECTION, EMULSION INTRAVENOUS at 11:11

## 2019-11-16 RX ADMIN — LOSARTAN POTASSIUM 100 MG: 25 TABLET ORAL at 01:11

## 2019-11-16 RX ADMIN — METOPROLOL SUCCINATE 50 MG: 50 TABLET, FILM COATED, EXTENDED RELEASE ORAL at 01:11

## 2019-11-16 RX ADMIN — METOPROLOL SUCCINATE 50 MG: 50 TABLET, FILM COATED, EXTENDED RELEASE ORAL at 08:11

## 2019-11-16 RX ADMIN — SENNOSIDES AND DOCUSATE SODIUM 2 TABLET: 8.6; 5 TABLET ORAL at 08:11

## 2019-11-16 RX ADMIN — MIRTAZAPINE 7.5 MG: 7.5 TABLET ORAL at 08:11

## 2019-11-16 RX ADMIN — PROPOFOL 40 MG: 10 INJECTION, EMULSION INTRAVENOUS at 11:11

## 2019-11-16 RX ADMIN — MORPHINE SULFATE 2 MG: 10 INJECTION INTRAVENOUS at 05:11

## 2019-11-16 RX ADMIN — FUROSEMIDE 40 MG: 40 TABLET ORAL at 08:11

## 2019-11-16 RX ADMIN — FAMOTIDINE 20 MG: 10 INJECTION INTRAVENOUS at 08:11

## 2019-11-16 RX ADMIN — ATORVASTATIN CALCIUM 40 MG: 40 TABLET, FILM COATED ORAL at 01:11

## 2019-11-16 RX ADMIN — HYDROCODONE BITARTRATE AND ACETAMINOPHEN 1 TABLET: 5; 325 TABLET ORAL at 09:11

## 2019-11-16 RX ADMIN — FUROSEMIDE 40 MG: 40 TABLET ORAL at 01:11

## 2019-11-16 RX ADMIN — INSULIN ASPART 4 UNITS: 100 INJECTION, SOLUTION INTRAVENOUS; SUBCUTANEOUS at 05:11

## 2019-11-16 RX ADMIN — PROPOFOL 20 MG: 10 INJECTION, EMULSION INTRAVENOUS at 11:11

## 2019-11-16 RX ADMIN — DONEPEZIL HYDROCHLORIDE 5 MG: 5 TABLET, FILM COATED ORAL at 08:11

## 2019-11-16 RX ADMIN — SODIUM CHLORIDE, SODIUM LACTATE, POTASSIUM CHLORIDE, AND CALCIUM CHLORIDE: .6; .31; .03; .02 INJECTION, SOLUTION INTRAVENOUS at 11:11

## 2019-11-16 RX ADMIN — GUAIFENESIN 600 MG: 600 TABLET, EXTENDED RELEASE ORAL at 08:11

## 2019-11-16 RX ADMIN — ASPIRIN 81 MG: 81 TABLET, COATED ORAL at 01:11

## 2019-11-16 RX ADMIN — GUAIFENESIN 600 MG: 600 TABLET, EXTENDED RELEASE ORAL at 01:11

## 2019-11-16 RX ADMIN — AMIODARONE HYDROCHLORIDE 200 MG: 200 TABLET ORAL at 01:11

## 2019-11-16 RX ADMIN — ONDANSETRON HYDROCHLORIDE 4 MG: 2 SOLUTION INTRAMUSCULAR; INTRAVENOUS at 12:11

## 2019-11-16 RX ADMIN — VITAM B12 100 MCG: 100 TAB at 01:11

## 2019-11-16 RX ADMIN — PROMETHAZINE HYDROCHLORIDE 6.25 MG: 25 INJECTION INTRAMUSCULAR; INTRAVENOUS at 12:11

## 2019-11-16 RX ADMIN — HYDROCODONE BITARTRATE AND ACETAMINOPHEN 1 TABLET: 5; 325 TABLET ORAL at 03:11

## 2019-11-16 RX ADMIN — LIDOCAINE HYDROCHLORIDE 50 MG: 20 INJECTION, SOLUTION INTRAVENOUS at 11:11

## 2019-11-16 NOTE — HPI
83 y/o male presents to the ER complaining of constant right sided rib pain since 4 days ago.  Symptoms worsened yesterday.  Patient was seen at urgent care for recent fall and diagnosed with multiple right sided rib fractures.  He reports associated shortness of breath, right sided abdominal pain and cough. Patient reports taking tylenol for symptoms without relief.  He states no other associated symptoms.  Patient also complaining of right elbow swelling after fall.  Patient denies any fever, chills or hemoptysis.  Chest CT showing large volume pleural fluid, concerning for hemothorax.  Patient had been doing well prior to fall with no recent complaints.  No other complaints.

## 2019-11-16 NOTE — SUBJECTIVE & OBJECTIVE
Past Medical History:   Diagnosis Date    Acute renal failure     Arthritis     Blind right eye     BPH (benign prostatic hypertrophy)     Bronchitis, chronic     Carotid artery occlusion     Lt carotid endarerectomy done 02/19/2018     CHF (congestive heart failure)     Colon polyp     Coronary artery disease     Diabetes mellitus     GERD (gastroesophageal reflux disease)     Hearing aid worn     bilateral    Hematuria     Hernia     Hypertension     Influenza A     Irregular heart beat     NSVT (nonsustained ventricular tachycardia) 4/18/2018    Renal failure as complication of care     sepsis, renal function returned    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 Coleman    CATARACT EXTRACTION, BILATERAL      ESOPHAGOGASTRODUODENOSCOPY N/A 8/30/2018    Procedure: EGD (ESOPHAGOGASTRODUODENOSCOPY);  Surgeon: Tye Navarrete MD;  Location: [address]);  Service: Endoscopy;  Laterality: N/A;    ESOPHAGOGASTRODUODENOSCOPY  08/30/2018    EYE SURGERY      HERNIA REPAIR Left 09/2013    RETINAL DETACHMENT SURGERY      THORACENTESIS N/A 1/17/2019    Procedure: THORACENTESIS;  Surgeon: [facility] Diagnostic Provider;  Location: [facility];  Service: Radiology;  Laterality: N/A;  1130AM  START  RN PRE OP 1-15-19       Review of patient's allergies indicates:   Allergen Reactions    Ciprofloxacin (mixture) Itching     Extreme itching    Antibiotic hc      Hydrochlorothiazide      Leg swelling      Iodine and iodide containing products      Wife and pt unsure    Vancomycin analogues Itching       No current facility-administered medications on file prior to encounter.      Current Outpatient Medications on File Prior to Encounter   Medication Sig    acarbose (PRECOSE) 25 MG Tab Take 25 mg by mouth 3 (three) times daily with meals.    acetaminophen (TYLENOL) 325 MG tablet Take 2 tablets (650 mg total) by mouth every 6 (six) hours as needed.    amiodarone (PACERONE) 200 MG Tab Take by mouth once daily.    aspirin (ECOTRIN) 81 MG EC tablet Take 1 tablet (81 mg total) by mouth once daily. Hold until evaluated by Cardiology    atorvastatin (LIPITOR) 40 MG tablet Take 1 tablet (40 mg total) by mouth once daily.    clopidogrel (PLAVIX) 75 mg tablet Take 1 tablet (75 mg total) by mouth once daily.    cyanocobalamin (VITAMIN B-12) 1000 MCG tablet Take 100 mcg by mouth once a week.    cyproheptadine (PERIACTIN) 4 mg tablet Take 4 mg by mouth 3 (three) times daily as needed.    donepezil (ARICEPT) 5 MG tablet Take 5 mg by mouth every evening.    escitalopram oxalate (LEXAPRO) 10 MG tablet Take 10 mg by mouth once daily.    famotidine (PEPCID) 20 MG tablet Take 1 tablet (20 mg total) by mouth once daily.    ferrous gluconate (FERGON) 324 MG tablet Take 1 tablet (324 mg total) by mouth daily with breakfast.    furosemide (LASIX) 40 MG tablet Take 1 tablet (40 mg total) by mouth 2 (two) times daily.    guaiFENesin (MUCINEX) 600 mg 12 hr tablet Take 1 tablet (600 mg total) by mouth 2 (two) times daily.    losartan (COZAAR) 100 MG tablet Take 100 mg by mouth once daily.    metoprolol succinate (TOPROL-XL) 50 MG 24 hr tablet Take 1 tablet (50 mg total) by mouth 2 (two) times daily.    mirtazapine (REMERON) 7.5 MG Tab Take 1 tablet (7.5 mg total) by mouth every evening.    ondansetron (ZOFRAN-ODT) 4 MG TbDL Take 1 tablet (4 mg total) by mouth every 8 (eight)  hours as needed (nausea).    SITagliptin (JANUVIA) 50 MG Tab Take 100 mg by mouth once daily.    tamsulosin (FLOMAX) 0.4 mg Cap Take 1 capsule (0.4 mg total) by mouth once daily.    [DISCONTINUED] cromolyn (OPTICROM) 4 % ophthalmic solution 1 drop 4 (four) times daily.     Family History     Problem Relation (Age of Onset)    Arthritis Mother    Diabetes Sister    Heart attack Brother    Heart disease Father    Heart failure Father    No Known Problems Maternal Aunt, Maternal Uncle, Paternal Aunt, Paternal Uncle, Maternal Grandmother, Maternal Grandfather, Paternal Grandmother, Paternal Grandfather        Tobacco Use    Smoking status: Former Smoker     Packs/day: 3.00     Years: 40.00     Pack years: 120.00     Types: Cigarettes     Last attempt to quit: 1990     Years since quittin.3    Smokeless tobacco: Never Used   Substance and Sexual Activity    Alcohol use: No    Drug use: No    Sexual activity: Not Currently     Partners: Female     Review of Systems   Constitutional: Negative for chills and fever.   HENT: Negative for ear discharge and ear pain.    Eyes: Negative for pain and itching.   Respiratory: Positive for cough and shortness of breath.    Cardiovascular: Positive for chest pain. Negative for palpitations.   Gastrointestinal: Negative for abdominal distention and diarrhea.   Endocrine: Negative for polyphagia and polyuria.   Genitourinary: Negative for difficulty urinating and dysuria.   Musculoskeletal: Negative for neck pain and neck stiffness.   Skin: Negative for rash and wound.   Neurological: Negative for seizures and syncope.   Psychiatric/Behavioral: Negative for agitation and hallucinations.     Objective:     Vital Signs (Most Recent):  Temp: 97.7 °F (36.5 °C) (19 1149)  Pulse: 61 (19 1149)  Resp: 10 (19 1149)  BP: (!) 121/58 (19 1149)  SpO2: 98 % (19 1149) Vital Signs (24h Range):  Temp:  [97.7 °F (36.5 °C)-98.2 °F (36.8 °C)] 97.7 °F (36.5  °C)  Pulse:  [59-86] 61  Resp:  [10-26] 10  SpO2:  [93 %-98 %] 98 %  BP: (121-168)/(58-87) 121/58     Weight: 79.8 kg (175 lb 14.8 oz)  Body mass index is 25.24 kg/m².    Physical Exam   Constitutional: He is oriented to person, place, and time. No distress.   HENT:   Head: Normocephalic and atraumatic.   Eyes: Conjunctivae are normal. Right eye exhibits no discharge. Left eye exhibits no discharge.   Neck: Neck supple. No tracheal deviation present.   Cardiovascular: Normal rate and regular rhythm.   Pulmonary/Chest: Effort normal.   Decreased BS right base   Abdominal: Soft. Bowel sounds are normal.   Musculoskeletal:   Right elbow effusion   Neurological: He is alert and oriented to person, place, and time.   Skin: Skin is warm and dry. He is not diaphoretic.           Significant Labs:   BMP:   Recent Labs   Lab 11/16/19  0323   *      K 3.5      CO2 27   BUN 27*   CREATININE 1.7*   CALCIUM 9.3   MG 2.3     CBC:   Recent Labs   Lab 11/16/19  0323   WBC 9.90   HGB 12.2*   HCT 38.6*          Significant Imaging: I have reviewed all pertinent imaging results/findings within the past 24 hours.

## 2019-11-16 NOTE — NURSING
Patient arrived on unit calm and alert accompanied w/ wife at bedside.  O2 therapy @ 2LNC.  Will continue to monitor.

## 2019-11-16 NOTE — ED PROVIDER NOTES
Encounter Date: 11/16/2019    SCRIBE #1 NOTE: I, Grace Hopkins, am scribing for, and in the presence of,  Mega Lambert MD. I have scribed the following portions of the note - Other sections scribed: HPI, ROS, PE.       History     Chief Complaint   Patient presents with    Rib pain     trip and fall 4 days ago, seen at urgent care and dx with R rib fractures, c/o pain tonight      This is a 84 y.o. 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 Patient was seen at urgent care for recent fall and diagnosed with multiple right sided rib fractures. He reports associated shortness of breath, right sided abdominal pain and cough.  Denies fever.  Denies hemoptysis.  No nausea or vomiting. No hematuria  He denies neck pain, and back pain. Patient reports taking tylenol for symptoms without relief. He states no other associated symptoms. Patient adds movement and deep breathing worsens symptoms. Patient notes nothing alleviates symptoms.     The history is provided by the patient. No  was used.     Review of patient's allergies indicates:   Allergen Reactions    Ciprofloxacin (mixture) Itching     Extreme itching    Antibiotic hc      Hydrochlorothiazide      Leg swelling      Iodine and iodide containing products      Wife and pt unsure    Vancomycin analogues Itching     Past Medical History:   Diagnosis Date    Acute renal failure     Arthritis     Blind right eye     BPH (benign prostatic hypertrophy)     Bronchitis, chronic     Carotid artery occlusion     Lt carotid endarerectomy done 02/19/2018     CHF (congestive heart failure)     Colon polyp     Coronary artery disease     Diabetes mellitus     GERD (gastroesophageal reflux disease)     Hearing aid worn     bilateral    Hematuria     Hernia     Hypertension     Influenza A     Irregular heart beat     NSVT (nonsustained ventricular tachycardia) 4/18/2018    Renal failure as complication of care     sepsis, renal function returned    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 Coleman    CATARACT EXTRACTION, BILATERAL      ESOPHAGOGASTRODUODENOSCOPY N/A 8/30/2018    Procedure: EGD (ESOPHAGOGASTRODUODENOSCOPY);  Surgeon: Tye Navarrete MD;  Location: [address]);  Service: Endoscopy;  Laterality: N/A;    ESOPHAGOGASTRODUODENOSCOPY  08/30/2018    EYE SURGERY      HERNIA REPAIR Left 09/2013    RETINAL DETACHMENT SURGERY      THORACENTESIS N/A 1/17/2019    Procedure: THORACENTESIS;  Surgeon: [facility] Diagnostic Provider;  Location: [facility];  Service: Radiology;  Laterality: N/A;  1130AM  START  RN PRE OP  1-15-19     Family History   Problem Relation Age of Onset    Arthritis Mother     Heart disease Father     Heart failure Father     Diabetes Sister     Heart attack Brother     No Known Problems Maternal Aunt     No Known Problems Maternal Uncle     No Known Problems Paternal Aunt     No Known Problems Paternal Uncle     No Known Problems Maternal Grandmother     No Known Problems Maternal Grandfather     No Known Problems Paternal Grandmother     No Known Problems Paternal Grandfather     Anemia Neg Hx     Arrhythmia Neg Hx     Asthma Neg Hx     Clotting disorder Neg Hx     Fainting Neg Hx     Hyperlipidemia Neg Hx     Hypertension Neg Hx     Stroke Neg Hx     Atrial Septal Defect Neg Hx      Social History     Tobacco Use    Smoking status: Former Smoker     Packs/day: 3.00     Years: 40.00     Pack years: 120.00     Types: Cigarettes     Last attempt to quit: 1990     Years since quittin.3    Smokeless tobacco: Never Used   Substance Use Topics    Alcohol use: No    Drug use: No     Review of Systems   Constitutional: Negative for chills, diaphoresis, fatigue and fever.   HENT: Negative for congestion, sinus pain and sore throat.    Respiratory: Positive for cough and shortness of breath. Negative for wheezing and stridor.         (+) Rib pain.   Cardiovascular: Negative for chest pain, palpitations and leg swelling.   Gastrointestinal: Positive for abdominal pain. Negative for diarrhea, nausea and vomiting.   Genitourinary: Negative for dysuria, flank pain, frequency and hematuria.   Musculoskeletal: Negative for back pain, joint swelling and neck pain.   Skin: Negative for wound.   Neurological: Negative for dizziness, syncope, facial asymmetry, speech difficulty, weakness, numbness and headaches.   Psychiatric/Behavioral: Negative for confusion.       Physical Exam     Initial Vitals [19 0241]   BP Pulse Resp Temp SpO2   (!) 148/87 86 15 98 °F (36.7 °C) 95 %      MAP        --         Physical Exam    Nursing note and vitals reviewed.  Constitutional: He appears well-developed and well-nourished. He is not diaphoretic. No distress.   HENT:   Head: Normocephalic and atraumatic.   Nose: Nose normal.   Mouth/Throat: Oropharynx is clear and moist.   Eyes: Conjunctivae and EOM are normal. Pupils are equal, round, and reactive to light. Right eye exhibits no discharge. Left eye exhibits no discharge. No scleral icterus.   Neck: Neck supple. No tracheal deviation present. No JVD present.   No cervical spine tenderness.   Cardiovascular: Normal rate, regular rhythm and normal heart sounds. Exam reveals no gallop and no friction rub.    No murmur heard.  Pulmonary/Chest: No stridor. No respiratory distress. He has decreased breath sounds (on right compared to left.). He has no wheezes. He has no rhonchi. He has no rales. He exhibits tenderness.   Breath sounds on right absent.  No chest wall crepitus or ecchymosis.   Abdominal: Soft. He exhibits no distension and no mass. Bowel sounds are increased. There is tenderness in the right upper quadrant. There is no rebound and no guarding.   (+) Tenderness to right anterior lateral lower rib.  Tender palpation right upper quadrant abdomen.       Musculoskeletal: Normal range of motion. He exhibits no edema or tenderness.   No musculoskeletal deformities. No joint effusions.  No thoracic or lumbar spine tenderness.   Neurological: He is alert and oriented to person, place, and time. No cranial nerve deficit.   Skin: Skin is warm and dry.   Psychiatric: He has a normal mood and affect. His behavior is normal. Judgment and thought content normal.         ED Course   Procedures  Labs Reviewed   CBC W/ AUTO DIFFERENTIAL - Abnormal; Notable for the following components:       Result Value    RBC 3.86 (*)     Hemoglobin 12.2 (*)     Hematocrit 38.6 (*)     Mean Corpuscular Volume 100 (*)     Mean Corpuscular Hemoglobin 31.6 (*)     Mean Corpuscular  Hemoglobin Conc 31.6 (*)     Gran # (ANC) 7.9 (*)     Lymph # 0.9 (*)     Gran% 80.2 (*)     Lymph% 9.2 (*)     All other components within normal limits   COMPREHENSIVE METABOLIC PANEL - Abnormal; Notable for the following components:    Glucose 205 (*)     BUN, Bld 27 (*)     Creatinine 1.7 (*)     Albumin 3.1 (*)     eGFR if  42 (*)     eGFR if non  36 (*)     All other components within normal limits   URINALYSIS, REFLEX TO URINE CULTURE    Narrative:     Preferred Collection Type->Urine, Clean Catch   MAGNESIUM   PROTIME-INR   APTT          Imaging Results          CT Chest Abdomen Pelvis Without Contrast (XPD) (Final result)  Result time 11/16/19 05:01:40   Procedure changed from CT Abdomen Pelvis  Without Contrast     Final result by Marni Brooks MD (11/16/19 05:01:40)                 Impression:      1. Mildly displaced acute fractures involving the right 9th, 10th and 11th ribs.  2. Large volume of simple appearing right pleural fluid with associated compressive atelectasis/collapse of the right lower lobe with heterogeneous parenchymal calcifications, similar to prior examination.  Slight ground-glass opacity within the aerated pulmonary parenchyma could relate to pulmonary edema versus non infectious or infectious inflammatory etiology.  Clinical correlation advised.  3. 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  4. Mild cardiomegaly, calcific atherosclerosis of the coronary vessels and aortic valve replacement.  5. 2.2 cm hypodense right renal lesion which does not demonstrate imaging characteristics of a simple cyst on CT examination and was incompletely characterized on ultrasound.  Consider short-term interval ultrasound follow-up or dedicated renal mass protocol CT for additional evaluation.  6. Nonspecific bilateral perinephric fat stranding, similar to prior examination although correlation with urinalysis is recommended.  7. Multiple  additional findings as above.      Electronically signed by: Marni Brooks MD  Date:    11/16/2019  Time:    05:01             Narrative:    EXAMINATION:  CT CHEST ABDOMEN PELVIS WITHOUT CONTRAST(XPD)    CLINICAL HISTORY:  Abd trauma, blunt, patient is stable;    TECHNIQUE:  Low dose axial images, sagittal and coronal reformations were obtained from the thoracic inlet to the pubic symphysis .  No oral or IV contrast.    COMPARISON:  CT chest, abdomen and pelvis 08/27/2018, chest CT 09/01/2018, renal ultrasound 2019    FINDINGS:  There is a left-sided cardiac pacing device in place.  The heart is prominent in size.  There is calcific atherosclerosis of the coronary vessels.  There is postoperative change of aortic valve replacement.  No significant pericardial fluid is present.  The thoracic aorta nonaneurysmal with atherosclerosis.  There are upper limit of normal sized precarinal lymph nodes, unchanged.  No axillary adenopathy.  Limited evaluation of hilar contours on this noncontrast examination.    The trachea and proximal airways are patent.  There is a large volume of right pleural fluid.  There is associated compressive right lower lobe atelectasis/volume loss with heterogeneous parenchymal calcifications, similar to prior examinations.  This pleural effusion demonstrates attenuation values suggestive of simple fluid.  There is slight patchy ground-glass opacity throughout the pulmonary parenchyma which can be seen with pulmonary edema versus infectious or non infectious inflammatory process.  The esophagus is dilated/patulous with a large leftward projecting esophageal diverticulum once again identified.  There is no evidence of pneumothorax.  There are mildly displaced rib fractures of the right 9th, 10th and 11th ribs.    Evaluation of solid organ parenchyma is limited due to noncontrast technique.  The liver is diffusely hyperattenuating which can be seen with underlying amiodarone administration.  The  gallbladder demonstrates no definite radiopaque stone.  No intra or extrahepatic biliary ductal dilatation.  There is fatty atrophy of the pancreas with a few small calcifications in the pancreatic head.  The spleen and adrenal glands demonstrate an unremarkable noncontrast CT appearance.    The kidneys demonstrate no evidence of hydronephrosis or nephrolithiasis.  There is a 2.2 cm slightly hypodense lesion in the inferior pole of the right kidney which does not demonstrate imaging characteristics of a cyst on CT.  Recent renal ultrasound could not definitively exclude underlying solid renal mass.  Consider short-term interval follow-up or renal mass protocol CT for more definitive evaluation as warranted clinically.  There is bilateral nonspecific significant perinephric fat stranding.  The urinary bladder is distended.  The prostate is prominent in size with dystrophic calcifications.    The abdominal aorta is nonaneurysmal with significant atherosclerosis.  No bulky lymphadenopathy.  The visualized loops of large and small bowel demonstrate no evidence of obstruction or inflammatory change.  There is no ascites, free intraperitoneal air or portal venous gas.  Visualized osseous structures of the abdomen and pelvis demonstrate no acute abnormalities.                                 Medical Decision Making:   Initial Assessment:   Patient presents with persistent right-sided pain due to rib fractures.  Complains of mild shortness of breath. Also complains of right upper quadrant pain. Decreased breath sounds on the right thorax on exam.  Concerning for hemothorax or pneumothorax.  Patient no distress. Normal oxygen saturation.  Normal blood pressure and heart rate.  Patient has multiple medical problems and is currently taking Plavix.  Last dose was yesterday morning.  Will obtain CT of the chest/abdomen/pelvis.  Will obtain lab work.  Differential Diagnosis:   Pneumothorax, hemothorax, pneumonia, rib fractures,  liver laceration,  Clinical Tests:   Lab Tests: Ordered and Reviewed  The following lab test(s) were unremarkable: CBC, CMP and Urinalysis  Radiological Study: Ordered and Reviewed  ED Management:  0520:  Large hemothorax present associated with multiple rib fractures on the right.  No intra-abdominal injuries.  Patient is hemodynamically stable. Discussed with Dr. Tay with General surgery.  Will admit to hospital medicine service and will see in consult.  Plan to place chest tube today.  Discussed results patient family.  Pain controlled after Norco.            Scribe Attestation:   Scribe #1: I performed the above scribed service and the documentation accurately describes the services I performed. I attest to the accuracy of the note.                          Clinical Impression:       ICD-10-CM ICD-9-CM   1. Hemothorax on right J94.2 511.89   2. Closed fracture of multiple ribs of right side, initial encounter S22.41XA 807.09   3. Shortness of breath R06.02 786.05         Disposition:   Disposition: Admitted  Condition: Stable         Scribe attestation: I, Mega Lambert MD, personally performed the services described in this documentation. All medical record entries made by the scribe were at my direction and in my presence.  I have reviewed the chart and agree that the record reflects my personal performance and is accurate and complete.             Mega Lambert MD  11/16/19 [id number]

## 2019-11-16 NOTE — CONSULTS
Surgery consult    84-year-old male patient presented to [facility] Emergency room with lower chest upper abdominal pain. The patient reports dry for 5 days ago he fell and was seen in Urgent Care.  At that time he had right-sided rib fractures.  Workup here shows a large right pleural effusion causing compression of his right lower lobe his major complaint is inspiratory pain on the right side of his chest and shortness of breath secondary to this.  Current is lying in bed complaining of the pain he is in no distress    Past medical history-congestive heart failure, coronary artery disease, diabetes mellitus, gastroesophageal reflux disease, hematuria, SVT, aortic stenosis, history of atrial fibrillation    Past surgical history-defibrillator placement AICD, transcatheter aortic valve replacement, hernia repair, colonoscopy    Medications-please see the list provided    Physical examination    Vital signs stable afebrile    Chest is decreased breath sounds in right middle and lower lobes with slight expiratory wheezing    Left lobe clear    5 cm left subclavian area with ST pocket    Heart regular rate and rhythm    Extremities no clubbing cyanosis or edema    Abdomen soft nontender nondistended    White blood cell count 9.0, hemoglobin 12.2, hematocrit 30.6, platelet count 157    Coumadin 1.0,    Sodium 141, potassium 3.5, chloride 106, CO2 27, BUN 27 creatinine 1.7    CT scan of the chest and abdomen were performed is a large right pleural effusion which appears to be simple in nature with no loculations    Assessment after much consideration I think the best thing is right chest tube placement    Plan will take to operating room placed right chest tube.  Other options for a pigtail catheter placement thoracentesis I did not think these we evacuated the entire area as well as chest tube placement the operating room. I discussed at length with the patient and his wife.  Will place chest tube today

## 2019-11-16 NOTE — TRANSFER OF CARE
"Anesthesia Transfer of Care Note    Patient: Timbo Gallagher    Procedure(s) Performed: Procedure(s) (LRB):  INSERTION, CATHETER, INTERCOSTAL, FOR DRAINAGE (Right)    Patient location: PACU    Anesthesia Type: MAC    Transport from OR: Transported from OR on 2-3 L/min O2 by NC with adequate spontaneous ventilation    Post pain: adequate analgesia    Post assessment: no apparent anesthetic complications and tolerated procedure well    Post vital signs: stable    Level of consciousness: awake and alert    Nausea/Vomiting: no nausea/vomiting    Complications: none    Transfer of care protocol was followed      Last vitals:   Visit Vitals  BP (!) 121/58 (BP Location: Left arm, Patient Position: Lying)   Pulse 61   Temp 36.5 °C (97.7 °F) (Oral)   Resp 10   Ht 5' 10" (1.778 m)   Wt 79.8 kg (175 lb 14.8 oz)   SpO2 98%   BMI 25.24 kg/m²     "

## 2019-11-16 NOTE — OP NOTE
Operative report    Name of surgeon Silvio Tay    Procedure-right chest tube placement and evacuation of right hydrothorax    Twenty-eight Vincentian chest tube    Estimated blood loss minimal    Findings-2.5 L of serous fluid in right chest    Procedure in detail    After appropriate informed consent was signed the patient is taken the operating room was transferred operating table underwent monitored anesthesia care.  I will order Organization time-out had been performed and reexamine patient marked patient had the correct patient and correct procedure. And the correct side. The the right chest was prepped and draped in normal fashion. At approximately the 8th or 9th intercostal space an incision was made with a 15 blade after the area been anesthetized with 0.25% Marcaine with epinephrine.  This was done for about 1-2 cm that interspace of the rib was encountered and a guide over the rib without any intense.  With the Natalia Simmskauer suction was placed in approximately 2 and 0.5 L of fluid was evacuated. A right 28 Vincentian chest tube was placed in a posterior position without incident and sewn with 2 nylon x2 in a sterile dressing was placed was no air leak at the end of the case    Chest tube was working well    Chest x-ray is pending    The patient tolerated procedure for the cover room stable condition.

## 2019-11-16 NOTE — ED TRIAGE NOTES
Pt went to an urgent care where pt received an CXR 4 days pta & was informed that he might have refractured some of his ribs on the right side. Pt reports that he tripped & fell across his bathroom tub. Pt reports pain with pressure will radiate to rt abdomen & back. Pt also congested with intermittent coughing which causes increased rib pain. Pt also expresses pain with deep breath

## 2019-11-16 NOTE — PROGRESS NOTES
Film reviewed . Looks much better than pre-op ct. Still with pneumothorax  But with right chest tube in place over right diaphragm area.. Reading discussed with radiology who will addend his report

## 2019-11-16 NOTE — H&P
[facility] Hospital Medicine  History & Physical    Patient Name: Timbo Gallagher  MRN: [id number]  Admission Date: 11/16/2019  Attending Physician: Severiano Galan MD   Primary Care Provider: Cirilo Montero MD         Patient information was obtained from patient and ER records.     Subjective:     Principal Problem:Hemothorax on right    Chief Complaint:   Chief Complaint   Patient presents with    Rib pain     trip and fall 4 days ago, seen at urgent care and dx with R rib fractures, c/o pain tonight         HPI: 83 y/o male presents to the ER complaining of constant right sided rib pain since 4 days ago.  Symptoms worsened yesterday.  Patient was seen at urgent care for recent fall and diagnosed with multiple right sided rib fractures.  He reports associated shortness of breath, right sided abdominal pain and cough. Patient reports taking tylenol for symptoms without relief.  He states no other associated symptoms.  Patient also complaining of right elbow swelling after fall.  Patient denies any fever, chills or hemoptysis.  Chest CT showing large volume pleural fluid, concerning for hemothorax.  Patient had been doing well prior to fall with no recent complaints.  No other complaints.    Past Medical History:   Diagnosis Date    Acute renal failure     Arthritis     Blind right eye     BPH (benign prostatic hypertrophy)     Bronchitis, chronic     Carotid artery occlusion     Lt carotid endarerectomy done 02/19/2018     CHF (congestive heart failure)     Colon polyp     Coronary artery disease     Diabetes mellitus     GERD (gastroesophageal reflux disease)     Hearing aid worn     bilateral    Hematuria     Hernia     Hypertension     Influenza A     Irregular heart beat     NSVT (nonsustained ventricular tachycardia) 4/18/2018    Renal failure as complication of care     sepsis, renal function returned    S/P TAVR (transcatheter aortic valve replacement)  10/18/2017    Snoring     Status post implantation of automatic cardioverter/defibrillator (AICD) 04/23/2018    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 Coleman    CATARACT EXTRACTION, BILATERAL      ESOPHAGOGASTRODUODENOSCOPY N/A 8/30/2018    Procedure: EGD (ESOPHAGOGASTRODUODENOSCOPY);  Surgeon: Tye Navarrete MD;  Location: [address]);  Service: Endoscopy;  Laterality: N/A;    ESOPHAGOGASTRODUODENOSCOPY  08/30/2018    EYE SURGERY      HERNIA REPAIR Left 09/2013    RETINAL DETACHMENT SURGERY      THORACENTESIS N/A 1/17/2019    Procedure: THORACENTESIS;  [facility];  Location: [facility] OR;  Service: Radiology;  Laterality: N/A;  1130AM  START  RN PRE OP 1-15-19       Review of patient's allergies indicates:   Allergen Reactions    Ciprofloxacin (mixture) Itching     Extreme itching    Antibiotic hc     Hydrochlorothiazide      Leg swelling      Iodine and iodide containing products      Wife and pt unsure    Vancomycin analogues Itching       No current facility-administered medications on file prior to encounter.      Current Outpatient Medications on File Prior to Encounter   Medication Sig    acarbose (PRECOSE) 25 MG Tab Take 25 mg by mouth 3 (three) times daily with meals.    acetaminophen (TYLENOL) 325 MG tablet Take 2 tablets (650 mg total) by mouth every 6 (six) hours as needed.    amiodarone (PACERONE) 200 MG Tab Take by mouth once daily.    aspirin (ECOTRIN) 81 MG EC tablet Take 1 tablet (81 mg total) by mouth once daily. Hold until evaluated by Cardiology    atorvastatin  (LIPITOR) 40 MG tablet Take 1 tablet (40 mg total) by mouth once daily.    clopidogrel (PLAVIX) 75 mg tablet Take 1 tablet (75 mg total) by mouth once daily.    cyanocobalamin (VITAMIN B-12) 1000 MCG tablet Take 100 mcg by mouth once a week.    cyproheptadine (PERIACTIN) 4 mg tablet Take 4 mg by mouth 3 (three) times daily as needed.    donepezil (ARICEPT) 5 MG tablet Take 5 mg by mouth every evening.    escitalopram oxalate (LEXAPRO) 10 MG tablet Take 10 mg by mouth once daily.    famotidine (PEPCID) 20 MG tablet Take 1 tablet (20 mg total) by mouth once daily.    ferrous gluconate (FERGON) 324 MG tablet Take 1 tablet (324 mg total) by mouth daily with breakfast.    furosemide (LASIX) 40 MG tablet Take 1 tablet (40 mg total) by mouth 2 (two) times daily.    guaiFENesin (MUCINEX) 600 mg 12 hr tablet Take 1 tablet (600 mg total) by mouth 2 (two) times daily.    losartan (COZAAR) 100 MG tablet Take 100 mg by mouth once daily.    metoprolol succinate (TOPROL-XL) 50 MG 24 hr tablet Take 1 tablet (50 mg total) by mouth 2 (two) times daily.    mirtazapine (REMERON) 7.5 MG Tab Take 1 tablet (7.5 mg total) by mouth every evening.    ondansetron (ZOFRAN-ODT) 4 MG TbDL Take 1 tablet (4 mg total) by mouth every 8 (eight) hours as needed (nausea).    SITagliptin (JANUVIA) 50 MG Tab Take 100 mg by mouth once daily.    tamsulosin (FLOMAX) 0.4 mg Cap Take 1 capsule (0.4 mg total) by mouth once daily.    [DISCONTINUED] cromolyn (OPTICROM) 4 % ophthalmic solution 1 drop 4 (four) times daily.     Family History     Problem Relation (Age of Onset)    Arthritis Mother    Diabetes Sister    Heart attack Brother    Heart disease Father    Heart failure Father    No Known Problems Maternal Aunt, Maternal Uncle, Paternal Aunt, Paternal Uncle, Maternal Grandmother, Maternal Grandfather, Paternal Grandmother, Paternal Grandfather        Tobacco Use    Smoking status: Former Smoker     Packs/day: 3.00     Years: 40.00      Pack years: 120.00     Types: Cigarettes     Last attempt to quit: 1990     Years since quittin.3    Smokeless tobacco: Never Used   Substance and Sexual Activity    Alcohol use: No    Drug use: No    Sexual activity: Not Currently     Partners: Female     Review of Systems   Constitutional: Negative for chills and fever.   HENT: Negative for ear discharge and ear pain.    Eyes: Negative for pain and itching.   Respiratory: Positive for cough and shortness of breath.    Cardiovascular: Positive for chest pain. Negative for palpitations.   Gastrointestinal: Negative for abdominal distention and diarrhea.   Endocrine: Negative for polyphagia and polyuria.   Genitourinary: Negative for difficulty urinating and dysuria.   Musculoskeletal: Negative for neck pain and neck stiffness.   Skin: Negative for rash and wound.   Neurological: Negative for seizures and syncope.   Psychiatric/Behavioral: Negative for agitation and hallucinations.     Objective:     Vital Signs (Most Recent):  Temp: 97.7 °F (36.5 °C) (19 1149)  Pulse: 61 (19 1149)  Resp: 10 (19 1149)  BP: (!) 121/58 (19 1149)  SpO2: 98 % (19 1149) Vital Signs (24h Range):  Temp:  [97.7 °F (36.5 °C)-98.2 °F (36.8 °C)] 97.7 °F (36.5 °C)  Pulse:  [59-86] 61  Resp:  [10-26] 10  SpO2:  [93 %-98 %] 98 %  BP: (121-168)/(58-87) 121/58     Weight: 79.8 kg (175 lb 14.8 oz)  Body mass index is 25.24 kg/m².    Physical Exam   Constitutional: He is oriented to person, place, and time. No distress.   HENT:   Head: Normocephalic and atraumatic.   Eyes: Conjunctivae are normal. Right eye exhibits no discharge. Left eye exhibits no discharge.   Neck: Neck supple. No tracheal deviation present.   Cardiovascular: Normal rate and regular rhythm.   Pulmonary/Chest: Effort normal.   Decreased BS right base   Abdominal: Soft. Bowel sounds are normal.   Musculoskeletal:   Right elbow effusion   Neurological: He is alert and oriented to person,  place, and time.   Skin: Skin is warm and dry. He is not diaphoretic.           Significant Labs:   BMP:   Recent Labs   Lab 11/16/19  0323   *      K 3.5      CO2 27   BUN 27*   CREATININE 1.7*   CALCIUM 9.3   MG 2.3     CBC:   Recent Labs   Lab 11/16/19  0323   WBC 9.90   HGB 12.2*   HCT 38.6*          Significant Imaging: I have reviewed all pertinent imaging results/findings within the past 24 hours.    Assessment/Plan:     * Hemothorax on right  S/P fall with rib fractures.  CT showing high volume pleural fluid.  Concerning for hemothorax.  Appreciate Surgery input.  Patient going today for chest tube placement.  H/H stable.      AICD (automatic cardioverter/defibrillator) present        Mixed hyperlipidemia  Continue Statin.      CKD (chronic kidney disease), stage III  Creat seems to be at baseline.  Avoid nephrotoxic medications.      Aortic valve stenosis  S/P TAVR      Essential hypertension  Resume home meds with parameters.  Low Na diet.      Chronic diastolic heart failure  No evidence of acute exacerbation.  Continue home meds including Lasix.      Anemia of chronic disease  H/H similar to previous labs.  Continue to monitor with possible hemothorax.      Type 2 diabetes mellitus with stage 3 chronic kidney disease  Hold home oral medications.  Diabetic diet and insulin sliding scale.        VTE Risk Mitigation (From admission, onward)         Ordered     IP VTE HIGH RISK PATIENT  Once      11/16/19 0712     Place DEYA hose  Until discontinued      11/16/19 0712                   Severiano Galan MD  Department of Hospital Medicine   [facility]

## 2019-11-16 NOTE — PLAN OF CARE
Problem: Adult Inpatient Plan of Care  Goal: Plan of Care Review  Outcome: Ongoing, Progressing     Patient oriented x4 and cooperative.  [place] and blind in R eye; room orientation stays consistent. Spouse at bedside. Tolerating ADA diet. Chest tube in place w/ sanguinous output >700 cc. Bed extender in place. Will continue to monitor.

## 2019-11-16 NOTE — ASSESSMENT & PLAN NOTE
S/P fall with rib fractures.  CT showing high volume pleural fluid.  Concerning for hemothorax.  Appreciate Surgery input.  Patient going today for chest tube placement.  H/H stable.

## 2019-11-16 NOTE — PLAN OF CARE
"SW met with patient spouse, Corrine Gallagher, to complete discharge needs assessment. SW reviewed with patient contents of "[facility]" including "help at home", "things patient responsible for to manage her health at home" and "preferences". Patient was able to verbalize her help at home is _Corrine Gallagher, spouse. SW discussed with patient wife the things he's responsible for to manage his health at home would be by going on his doctor appointments, taking medications as prescribed, and getting prescriptions filled. SW wrote name and phone number on white communication board. Patient prefers morning appointments.    Cirilo Montero MD    Extended Emergency Contact Information  Primary Emergency Contact: Corrine Gallagher  Address: [address] [place] [address] [id number] [place] [address]  Home Phone: [phone]  Relation: Spouse  Secondary Emergency Contact: Michell Frank   [facility]  Home Phone: [phone]  Mobile Phone: [phone]  Relation: Daughter       [facility] [address] [place] [address]  Phone: [phone] Fax: [phone]       11/16/19 1446   Discharge Assessment   Assessment Type Discharge Planning Assessment   Confirmed/corrected address and phone number on facesheet? Yes   Assessment information obtained from? Caregiver   Prior to hospitilization cognitive status: Unable to Assess   Prior to hospitalization functional status: Needs Assistance   Current cognitive status: Unable to Assess   Current Functional Status: Needs Assistance   Lives With spouse;child(opal), adult   Is patient able to care for self after discharge? No   Who are your caregiver(s) and their phone number(s)? Corrine Gallagher (wife [phone])   Patient's perception of discharge disposition home health   Readmission Within the Last 30 Days no previous admission in last 30 days   Patient currently being followed by outpatient case " management? No   Patient currently receives any other outside agency services? No   Equipment Currently Used at Home walker, shawnee   Do you have any problems affording any of your prescribed medications? No   Is the patient taking medications as prescribed? yes   Does the patient have transportation home? No  (Pt reported that she lacks lacks transportation because the family car is at home.  She plans to contact one of her children to bring her home so she can  her car.)   Does the patient receive services at the [facility]? No   Discharge Plan A Home Health   DME Needed Upon Discharge  none   Patient/Family in Agreement with Plan yes

## 2019-11-16 NOTE — ANESTHESIA PREPROCEDURE EVALUATION
11/16/2019  Timbo Gallagher is a 84 y.o., male.    Anesthesia Evaluation     I have reviewed the Nursing Notes.      Review of Systems  Social:  Former Smoker   Hematology/Oncology:        Hematology Comments: On plavix   Cardiovascular:   Pacemaker Hypertension Valvular problems/Murmurs (mod-severe MR), MR CAD  Dysrhythmias ([place] & NSVT)  CHF hyperlipidemia RIOS ECG has been reviewed. Echo 4/2019  · Normal left ventricular systolic function. The estimated ejection fraction is 55%  · Indeterminate left ventricular diastolic function.  · Normal right ventricular systolic function.  · Moderate left atrial enlargement.  · Moderate right atrial enlargement.  · There is a transcutaneously-placed aortic bioprosthesis present. There is no aortic aortic insufficiency present. Prosthetic aortic valve is normal.  · Moderate-to-severe mitral regurgitation.  · Mild to moderate tricuspid regurgitation.  · The estimated PA systolic pressure is 54 mm Hg  · Pulmonary hypertension present.    S/p TAVR w/ normal functioning   Pulmonary:   Denies Pneumonia Denies Asthma.  Denies Recent URI.  Denies Sleep Apnea. Right pleural effusion (hemothorax) following right rib fx in pt on plavix  Note: 120 pack yr hx. No formal dx of COPD   Renal/:   Chronic Renal Disease, CRI BPH    Hepatic/GI:   No Bowel Prep. Denies PUD. Hiatal Hernia, GERD Denies Hepatitis.    Neurological:   TIA, CVA Denies Seizures.    Endocrine:   Diabetes, type 2 Denies Hypothyroidism. Denies Hyperthyroidism.    Psych:   Psychiatric History          Physical Exam  General:  Well nourished                 Anesthesia Plan  Type of Anesthesia, risks & benefits discussed:  Anesthesia Type:  MAC  Patient's Preference:   Intra-op Monitoring Plan: standard ASA monitors  Intra-op Monitoring Plan Comments:   Post Op Pain Control Plan:   Post Op Pain Control Plan  Comments:   Induction:   IV  Beta Blocker:  Patient is on a Beta-Blocker and has received one dose within the past 24 hours (No further documentation required).       Informed Consent: Patient understands risks and agrees with Anesthesia plan.  Questions answered. Anesthesia consent signed with patient.  ASA Score: 3  emergent   Day of Surgery Review of History & Physical:  There are no significant changes.  H&P update referred to the provider.     Anesthesia Plan Notes: 84 yr old man w/ PMH most signif for AICD placement for [place] and NSVT, mod-severe Mitral Regurg,   Pulm HTN (PA pressure 54), successful TAVR, CKD, and 120 pack-yr smoking hx (no longer smoking) here w/   R-sided hemothorax following rib fracture in setting of plavix. Pt requires chest tube.   Plan: non-rebreather and preoxygenate very well prior to giving any sedative  Give propofol (no versed and no fentanyl) for procedure and do MAC w/ spontaneous ventilation  If pt becomes apneic use jaw thrust and avoid positive pressure ventilation (to avoid tension ptx)  After procedure we can immediately titrate in fentanyl and give offirmev  Note: apnea would be problematic for 2 reasons: 1) positive pressure ventilation should be avoided given risk of tension ptx  2) apnea (hypercarbia or hypoxia) would worsen pulm HTN        Ready For Surgery From Anesthesia Perspective.

## 2019-11-17 LAB
BASOPHILS # BLD AUTO: 0.02 K/UL (ref 0–0.2)
BASOPHILS NFR BLD: 0.2 % (ref 0–1.9)
DIFFERENTIAL METHOD: ABNORMAL
EOSINOPHIL # BLD AUTO: 0 K/UL (ref 0–0.5)
EOSINOPHIL NFR BLD: 0.2 % (ref 0–8)
ERYTHROCYTE [DISTWIDTH] IN BLOOD BY AUTOMATED COUNT: 13.6 % (ref 11.5–14.5)
ESTIMATED AVG GLUCOSE: 146 MG/DL (ref 68–131)
HBA1C MFR BLD HPLC: 6.7 % (ref 4–5.6)
HCT VFR BLD AUTO: 28.2 % (ref 40–54)
HGB BLD-MCNC: 9 G/DL (ref 14–18)
IMM GRANULOCYTES # BLD AUTO: 0.04 K/UL (ref 0–0.04)
IMM GRANULOCYTES NFR BLD AUTO: 0.3 % (ref 0–0.5)
LYMPHOCYTES # BLD AUTO: 1.1 K/UL (ref 1–4.8)
LYMPHOCYTES NFR BLD: 8.8 % (ref 18–48)
MCH RBC QN AUTO: 31.8 PG (ref 27–31)
MCHC RBC AUTO-ENTMCNC: 31.9 G/DL (ref 32–36)
MCV RBC AUTO: 100 FL (ref 82–98)
MONOCYTES # BLD AUTO: 1.2 K/UL (ref 0.3–1)
MONOCYTES NFR BLD: 10.2 % (ref 4–15)
NEUTROPHILS # BLD AUTO: 9.8 K/UL (ref 1.8–7.7)
NEUTROPHILS NFR BLD: 80.3 % (ref 38–73)
NRBC BLD-RTO: 0 /100 WBC
PLATELET # BLD AUTO: 156 K/UL (ref 150–350)
PMV BLD AUTO: 11.8 FL (ref 9.2–12.9)
POCT GLUCOSE: 156 MG/DL (ref 70–110)
POCT GLUCOSE: 157 MG/DL (ref 70–110)
POCT GLUCOSE: 183 MG/DL (ref 70–110)
POCT GLUCOSE: 185 MG/DL (ref 70–110)
RBC # BLD AUTO: 2.83 M/UL (ref 4.6–6.2)
WBC # BLD AUTO: 12.21 K/UL (ref 3.9–12.7)

## 2019-11-17 PROCEDURE — 85025 COMPLETE CBC W/AUTO DIFF WBC: CPT

## 2019-11-17 PROCEDURE — 27000221 HC OXYGEN, UP TO 24 HOURS

## 2019-11-17 PROCEDURE — 36415 COLL VENOUS BLD VENIPUNCTURE: CPT

## 2019-11-17 PROCEDURE — 51798 US URINE CAPACITY MEASURE: CPT

## 2019-11-17 PROCEDURE — 11000001 HC ACUTE MED/SURG PRIVATE ROOM

## 2019-11-17 PROCEDURE — 25000003 PHARM REV CODE 250: Performed by: HOSPITALIST

## 2019-11-17 PROCEDURE — 63600175 PHARM REV CODE 636 W HCPCS: Performed by: INTERNAL MEDICINE

## 2019-11-17 PROCEDURE — 63600175 PHARM REV CODE 636 W HCPCS: Performed by: SURGERY

## 2019-11-17 PROCEDURE — 94761 N-INVAS EAR/PLS OXIMETRY MLT: CPT

## 2019-11-17 PROCEDURE — 25000003 PHARM REV CODE 250: Performed by: SURGERY

## 2019-11-17 RX ORDER — GUAIFENESIN/DEXTROMETHORPHAN 100-10MG/5
5 SYRUP ORAL EVERY 6 HOURS
Status: DISCONTINUED | OUTPATIENT
Start: 2019-11-17 | End: 2019-11-25 | Stop reason: HOSPADM

## 2019-11-17 RX ORDER — CETIRIZINE HYDROCHLORIDE 5 MG/1
5 TABLET ORAL DAILY
Status: DISCONTINUED | OUTPATIENT
Start: 2019-11-17 | End: 2019-11-25 | Stop reason: HOSPADM

## 2019-11-17 RX ORDER — BENZONATATE 100 MG/1
200 CAPSULE ORAL 3 TIMES DAILY PRN
Status: DISCONTINUED | OUTPATIENT
Start: 2019-11-17 | End: 2019-11-25 | Stop reason: HOSPADM

## 2019-11-17 RX ADMIN — ESCITALOPRAM OXALATE 10 MG: 10 TABLET ORAL at 08:11

## 2019-11-17 RX ADMIN — SENNOSIDES AND DOCUSATE SODIUM 2 TABLET: 8.6; 5 TABLET ORAL at 08:11

## 2019-11-17 RX ADMIN — ASPIRIN 81 MG: 81 TABLET, COATED ORAL at 08:11

## 2019-11-17 RX ADMIN — MIRTAZAPINE 7.5 MG: 7.5 TABLET ORAL at 08:11

## 2019-11-17 RX ADMIN — INSULIN ASPART 2 UNITS: 100 INJECTION, SOLUTION INTRAVENOUS; SUBCUTANEOUS at 12:11

## 2019-11-17 RX ADMIN — GUAIFENESIN AND DEXTROMETHORPHAN 5 ML: 100; 10 SYRUP ORAL at 05:11

## 2019-11-17 RX ADMIN — AMIODARONE HYDROCHLORIDE 200 MG: 200 TABLET ORAL at 08:11

## 2019-11-17 RX ADMIN — DONEPEZIL HYDROCHLORIDE 5 MG: 5 TABLET, FILM COATED ORAL at 08:11

## 2019-11-17 RX ADMIN — FUROSEMIDE 40 MG: 40 TABLET ORAL at 08:11

## 2019-11-17 RX ADMIN — CARBAMIDE PEROXIDE 6.5% 5 DROP: 6.5 LIQUID AURICULAR (OTIC) at 08:11

## 2019-11-17 RX ADMIN — INSULIN ASPART 2 UNITS: 100 INJECTION, SOLUTION INTRAVENOUS; SUBCUTANEOUS at 08:11

## 2019-11-17 RX ADMIN — FAMOTIDINE 20 MG: 20 TABLET ORAL at 08:11

## 2019-11-17 RX ADMIN — GUAIFENESIN 600 MG: 600 TABLET, EXTENDED RELEASE ORAL at 08:11

## 2019-11-17 RX ADMIN — LOSARTAN POTASSIUM 100 MG: 25 TABLET ORAL at 08:11

## 2019-11-17 RX ADMIN — FERROUS GLUCONATE TAB 324 MG (37.5 MG ELEMENTAL IRON) 324 MG: 324 (37.5 FE) TAB at 08:11

## 2019-11-17 RX ADMIN — ACETAMINOPHEN 650 MG: 325 TABLET ORAL at 02:11

## 2019-11-17 RX ADMIN — ATORVASTATIN CALCIUM 40 MG: 40 TABLET, FILM COATED ORAL at 08:11

## 2019-11-17 RX ADMIN — METOPROLOL SUCCINATE 50 MG: 50 TABLET, FILM COATED, EXTENDED RELEASE ORAL at 08:11

## 2019-11-17 RX ADMIN — SODIUM CHLORIDE 500 ML: 0.9 INJECTION, SOLUTION INTRAVENOUS at 10:11

## 2019-11-17 RX ADMIN — MORPHINE SULFATE 2 MG: 10 INJECTION INTRAVENOUS at 01:11

## 2019-11-17 RX ADMIN — CETIRIZINE HYDROCHLORIDE 5 MG: 5 TABLET ORAL at 01:11

## 2019-11-17 NOTE — ASSESSMENT & PLAN NOTE
H/H similar to previous labs.  Now with anemia of acute blood loss as expected after chest tube placement.

## 2019-11-17 NOTE — PROGRESS NOTES
Surgery    Patient resting well new complaints reports breathing much easier    Vital signs stable afebrile right chest with over a L out overnight    No air leak    Site looks good    Chest x-ray just taken reviewed by me pneumothorax difficulty improved but looks like still has significant right pleural effusion    Will continue chest tube. May reimaged with CT scan to evaluate if the effusion does not start to resolve over the next day or so.

## 2019-11-17 NOTE — HOSPITAL COURSE
83 y/o male with recent fall and rib fractures presented with cough and SOB.  Imaging with new large right sided pleural effusion.  Admitted with concern of hemothorax.  Surgery consulted.  S/P chest tube placement on 11/16.  Anemia of acute blood loss with dropping H/H.,  ASA held.  Patient also retaining urine.  Colunga placed.  Labs showing ARF.  Urology consulted and patient started on IVF's.,ARF improved,hold IVF ,  Over night had worsening respiratory status,consern for aspiration,strated on zosyn,NPO,consulted ST,had chest CT.was stable on NC O 2 .passed swallow study,started on soft diet.  IR placed pig tail CT on right lung ,for worsening fluid collection on right lung on CT chest,he was stable on NC O 2,  Consulted palliative care for DNR status.patient and wife agree with DNR status,patient like pursue with Hospice,wife was agree,CT chest was removed,pleurodex cath was placed and  patient has been discharged to Hospice.

## 2019-11-17 NOTE — NURSING
Patient has voided since straight cath this am. Bladder scan shows 180CC.  Patient is very drowsy, w/ poor appetite, and no urge to void at this time.  Will continue to monitor. Will notify Dr. Galan.

## 2019-11-17 NOTE — NURSING
Patient c/o burning at chest tube insertion site.  No sign of subq emphysema; RLL crackles. Juju Tay.

## 2019-11-17 NOTE — NURSING
"Notified Dr. Galan of pt's c/o R ear ache and sore throat. MD advised "ok for chloraseptic spray."    Notified Dr. Tay this am of patient's confusion and accidental removal of CT dressing.  Night shift replaced dressing. CXR completed this am.  Patient showing no s/s of SOB/resp distress.  Will continue to monitor.     "

## 2019-11-17 NOTE — NURSING
"Notified Dr. Galan of no urinary output overnight and bladder scan shows >480cc.  MD ordered, "straight cath." Straight cath patient with 1000cc removed. Patient tolerated well.   "

## 2019-11-17 NOTE — ASSESSMENT & PLAN NOTE
S/P fall with rib fractures.  CT showing high volume pleural fluid.  Concerning for hemothorax.  Appreciate Surgery input.  S/P chest tube placement on 11/16

## 2019-11-17 NOTE — PROGRESS NOTES
[facility] Hospital Medicine  Progress Note    Patient Name: Timbo Gallagher  MRN: [id number]  Patient Class: IP- Inpatient   Admission Date: 11/16/2019  Length of Stay: 1 days  Attending Physician: Severiano Galan MD  Primary Care Provider: Cirilo Montero MD        Subjective:     Principal Problem:Hemothorax on right        HPI:  83 y/o male presents to the ER complaining of constant right sided rib pain since 4 days ago.  Symptoms worsened yesterday.  Patient was seen at urgent care for recent fall and diagnosed with multiple right sided rib fractures.  He reports associated shortness of breath, right sided abdominal pain and cough. Patient reports taking tylenol for symptoms without relief.  He states no other associated symptoms.  Patient also complaining of right elbow swelling after fall.  Patient denies any fever, chills or hemoptysis.  Chest CT showing large volume pleural fluid, concerning for hemothorax.  Patient had been doing well prior to fall with no recent complaints.  No other complaints.    Overview/Hospital Course:  83 y/o male with recent fall and rib fractures presented with cough and SOB.  Imaging with new large right sided pleural effusion.  Admitted with concern of hemothorax.  Surgery consulted.  S/P chest tube placement on 11/16.    Interval History: Complaining of sore throat and right ear pain.    Review of Systems   HENT: Negative for ear discharge and ear pain.    Eyes: Negative for pain and itching.   Endocrine: Negative for polyphagia and polyuria.   Neurological: Negative for seizures and syncope.     Objective:     Vital Signs (Most Recent):  Temp: 98 °F (36.7 °C) (11/17/19 1145)  Pulse: 63 (11/17/19 1145)  Resp: 16 (11/17/19 1145)  BP: (!) 104/46 (11/17/19 1145)  SpO2: 96 % (11/17/19 1145) Vital Signs (24h Range):  Temp:  [97.2 °F (36.2 °C)-98.3 °F (36.8 °C)] 98 °F (36.7 °C)  Pulse:  [60-63] 63  Resp:  [16-20] 16  SpO2:  [94 %-100 %] 96 %  BP:  (104-139)/(46-63) 104/46     Weight: 79.8 kg (175 lb 14.8 oz)  Body mass index is 25.24 kg/m².    Intake/Output Summary (Last 24 hours) at 11/17/2019 1512  Last data filed at 11/17/2019 1228  Gross per 24 hour   Intake 1200 ml   Output 2385 ml   Net -1185 ml      Physical Exam   Constitutional: He is oriented to person, place, and time. No distress.   HENT:   Head: Normocephalic and atraumatic.   Eyes: Conjunctivae are normal. Right eye exhibits no discharge. Left eye exhibits no discharge.   Neck: Neck supple. No tracheal deviation present.   Cardiovascular: Normal rate and regular rhythm.   Pulmonary/Chest: Effort normal.   Right sided chest tube in place   Abdominal: Soft. Bowel sounds are normal.   Musculoskeletal:   Right elbow effusion   Neurological: He is alert and oriented to person, place, and time.   Skin: Skin is warm and dry. He is not diaphoretic.       Significant Labs:   BMP:   Recent Labs   Lab 11/16/19  0323   *      K 3.5      CO2 27   BUN 27*   CREATININE 1.7*   CALCIUM 9.3   MG 2.3     CBC:   Recent Labs   Lab 11/16/19  0323 11/17/19  0418   WBC 9.90 12.21   HGB 12.2* 9.0*   HCT 38.6* 28.2*    156       Significant Imaging: I have reviewed all pertinent imaging results/findings within the past 24 hours.      Assessment/Plan:      * Hemothorax on right  S/P fall with rib fractures.  CT showing high volume pleural fluid.  Concerning for hemothorax.  Appreciate Surgery input.  S/P chest tube placement on 11/16      AICD (automatic cardioverter/defibrillator) present        Mixed hyperlipidemia  Continue Statin.      CKD (chronic kidney disease), stage III  Creat seems to be at baseline.  Avoid nephrotoxic medications.      Aortic valve stenosis  S/P TAVR      Essential hypertension  Resume home meds with parameters.  Low Na diet.      Chronic diastolic heart failure  No evidence of acute exacerbation.  Continue home meds including Lasix.      Anemia of chronic disease  H/H  similar to previous labs.  Now with anemia of acute blood loss as expected after chest tube placement.      Type 2 diabetes mellitus with stage 3 chronic kidney disease  Hold home oral medications.  Diabetic diet and insulin sliding scale.        VTE Risk Mitigation (From admission, onward)         Ordered     Place sequential compression device  Until discontinued      11/16/19 1200     IP VTE HIGH RISK PATIENT  Once      11/16/19 0712     Place DEYA hose  Until discontinued      11/16/19 0712                      Severiano Galan MD  Department of Hospital Medicine   [facility]

## 2019-11-17 NOTE — NURSING
Pt woke up trying to get a cup of water and  accidentally pulled off dressing on chest tube. Dressing changed cdi. No leakage. Will cont to monitor

## 2019-11-17 NOTE — SUBJECTIVE & OBJECTIVE
Interval History: Complaining of sore throat and right ear pain.    Review of Systems   HENT: Negative for ear discharge and ear pain.    Eyes: Negative for pain and itching.   Endocrine: Negative for polyphagia and polyuria.   Neurological: Negative for seizures and syncope.     Objective:     Vital Signs (Most Recent):  Temp: 98 °F (36.7 °C) (11/17/19 1145)  Pulse: 63 (11/17/19 1145)  Resp: 16 (11/17/19 1145)  BP: (!) 104/46 (11/17/19 1145)  SpO2: 96 % (11/17/19 1145) Vital Signs (24h Range):  Temp:  [97.2 °F (36.2 °C)-98.3 °F (36.8 °C)] 98 °F (36.7 °C)  Pulse:  [60-63] 63  Resp:  [16-20] 16  SpO2:  [94 %-100 %] 96 %  BP: (104-139)/(46-63) 104/46     Weight: 79.8 kg (175 lb 14.8 oz)  Body mass index is 25.24 kg/m².    Intake/Output Summary (Last 24 hours) at 11/17/2019 1512  Last data filed at 11/17/2019 1228  Gross per 24 hour   Intake 1200 ml   Output 2385 ml   Net -1185 ml      Physical Exam   Constitutional: He is oriented to person, place, and time. No distress.   HENT:   Head: Normocephalic and atraumatic.   Eyes: Conjunctivae are normal. Right eye exhibits no discharge. Left eye exhibits no discharge.   Neck: Neck supple. No tracheal deviation present.   Cardiovascular: Normal rate and regular rhythm.   Pulmonary/Chest: Effort normal.   Right sided chest tube in place   Abdominal: Soft. Bowel sounds are normal.   Musculoskeletal:   Right elbow effusion   Neurological: He is alert and oriented to person, place, and time.   Skin: Skin is warm and dry. He is not diaphoretic.       Significant Labs:   BMP:   Recent Labs   Lab 11/16/19  0323   *      K 3.5      CO2 27   BUN 27*   CREATININE 1.7*   CALCIUM 9.3   MG 2.3     CBC:   Recent Labs   Lab 11/16/19  0323 11/17/19  0418   WBC 9.90 12.21   HGB 12.2* 9.0*   HCT 38.6* 28.2*    156       Significant Imaging: I have reviewed all pertinent imaging results/findings within the past 24 hours.

## 2019-11-17 NOTE — NURSING
Pt remains free from fall/injury. No distress noted. Complaint of pain during shift prn pain med given. Chest tube in place. Meds given w/out difficulty. 2L NC no SOB. Wife remains at bedside. Safety measures maintained will cont to monitor

## 2019-11-18 LAB
ANION GAP SERPL CALC-SCNC: 5 MMOL/L (ref 8–16)
BASOPHILS # BLD AUTO: 0.02 K/UL (ref 0–0.2)
BASOPHILS NFR BLD: 0.2 % (ref 0–1.9)
BUN SERPL-MCNC: 50 MG/DL (ref 8–23)
CALCIUM SERPL-MCNC: 8.6 MG/DL (ref 8.7–10.5)
CHLORIDE SERPL-SCNC: 103 MMOL/L (ref 95–110)
CO2 SERPL-SCNC: 29 MMOL/L (ref 23–29)
CREAT SERPL-MCNC: 3 MG/DL (ref 0.5–1.4)
DIFFERENTIAL METHOD: ABNORMAL
EOSINOPHIL # BLD AUTO: 0.1 K/UL (ref 0–0.5)
EOSINOPHIL NFR BLD: 0.9 % (ref 0–8)
ERYTHROCYTE [DISTWIDTH] IN BLOOD BY AUTOMATED COUNT: 13.6 % (ref 11.5–14.5)
EST. GFR  (AFRICAN AMERICAN): 21 ML/MIN/1.73 M^2
EST. GFR  (NON AFRICAN AMERICAN): 18 ML/MIN/1.73 M^2
GLUCOSE SERPL-MCNC: 141 MG/DL (ref 70–110)
HCT VFR BLD AUTO: 24.5 % (ref 40–54)
HCT VFR BLD AUTO: 25 % (ref 40–54)
HGB BLD-MCNC: 7.5 G/DL (ref 14–18)
HGB BLD-MCNC: 8.1 G/DL (ref 14–18)
IMM GRANULOCYTES # BLD AUTO: 0.09 K/UL (ref 0–0.04)
IMM GRANULOCYTES NFR BLD AUTO: 0.7 % (ref 0–0.5)
LYMPHOCYTES # BLD AUTO: 0.9 K/UL (ref 1–4.8)
LYMPHOCYTES NFR BLD: 7.1 % (ref 18–48)
MCH RBC QN AUTO: 31.1 PG (ref 27–31)
MCHC RBC AUTO-ENTMCNC: 30.6 G/DL (ref 32–36)
MCV RBC AUTO: 102 FL (ref 82–98)
MONOCYTES # BLD AUTO: 1.2 K/UL (ref 0.3–1)
MONOCYTES NFR BLD: 9.4 % (ref 4–15)
NEUTROPHILS # BLD AUTO: 10.3 K/UL (ref 1.8–7.7)
NEUTROPHILS NFR BLD: 81.7 % (ref 38–73)
NRBC BLD-RTO: 0 /100 WBC
PLATELET # BLD AUTO: 157 K/UL (ref 150–350)
PMV BLD AUTO: 11.5 FL (ref 9.2–12.9)
POCT GLUCOSE: 162 MG/DL (ref 70–110)
POCT GLUCOSE: 169 MG/DL (ref 70–110)
POCT GLUCOSE: 171 MG/DL (ref 70–110)
POCT GLUCOSE: 175 MG/DL (ref 70–110)
POTASSIUM SERPL-SCNC: 4.5 MMOL/L (ref 3.5–5.1)
RBC # BLD AUTO: 2.41 M/UL (ref 4.6–6.2)
SODIUM SERPL-SCNC: 137 MMOL/L (ref 136–145)
WBC # BLD AUTO: 12.6 K/UL (ref 3.9–12.7)

## 2019-11-18 PROCEDURE — 25000003 PHARM REV CODE 250: Performed by: SURGERY

## 2019-11-18 PROCEDURE — 99222 1ST HOSP IP/OBS MODERATE 55: CPT | Mod: ,,, | Performed by: UROLOGY

## 2019-11-18 PROCEDURE — 80048 BASIC METABOLIC PNL TOTAL CA: CPT

## 2019-11-18 PROCEDURE — 85014 HEMATOCRIT: CPT

## 2019-11-18 PROCEDURE — 36415 COLL VENOUS BLD VENIPUNCTURE: CPT

## 2019-11-18 PROCEDURE — 99222 PR INITIAL HOSPITAL CARE,LEVL II: ICD-10-PCS | Mod: ,,, | Performed by: UROLOGY

## 2019-11-18 PROCEDURE — 85018 HEMOGLOBIN: CPT

## 2019-11-18 PROCEDURE — 85025 COMPLETE CBC W/AUTO DIFF WBC: CPT

## 2019-11-18 PROCEDURE — 11000001 HC ACUTE MED/SURG PRIVATE ROOM

## 2019-11-18 PROCEDURE — 63600175 PHARM REV CODE 636 W HCPCS: Performed by: HOSPITALIST

## 2019-11-18 PROCEDURE — 25000003 PHARM REV CODE 250: Performed by: HOSPITALIST

## 2019-11-18 RX ORDER — TAMSULOSIN HYDROCHLORIDE 0.4 MG/1
0.4 CAPSULE ORAL DAILY
Status: DISCONTINUED | OUTPATIENT
Start: 2019-11-19 | End: 2019-11-25 | Stop reason: HOSPADM

## 2019-11-18 RX ORDER — SODIUM CHLORIDE 9 MG/ML
INJECTION, SOLUTION INTRAVENOUS CONTINUOUS
Status: DISCONTINUED | OUTPATIENT
Start: 2019-11-18 | End: 2019-11-21

## 2019-11-18 RX ADMIN — INSULIN ASPART 1 UNITS: 100 INJECTION, SOLUTION INTRAVENOUS; SUBCUTANEOUS at 09:11

## 2019-11-18 RX ADMIN — GUAIFENESIN AND DEXTROMETHORPHAN 5 ML: 100; 10 SYRUP ORAL at 06:11

## 2019-11-18 RX ADMIN — FAMOTIDINE 20 MG: 20 TABLET ORAL at 08:11

## 2019-11-18 RX ADMIN — METOPROLOL SUCCINATE 50 MG: 50 TABLET, FILM COATED, EXTENDED RELEASE ORAL at 09:11

## 2019-11-18 RX ADMIN — SENNOSIDES AND DOCUSATE SODIUM 2 TABLET: 8.6; 5 TABLET ORAL at 08:11

## 2019-11-18 RX ADMIN — ATORVASTATIN CALCIUM 40 MG: 40 TABLET, FILM COATED ORAL at 08:11

## 2019-11-18 RX ADMIN — ACETAMINOPHEN 650 MG: 325 TABLET ORAL at 01:11

## 2019-11-18 RX ADMIN — GUAIFENESIN AND DEXTROMETHORPHAN 5 ML: 100; 10 SYRUP ORAL at 01:11

## 2019-11-18 RX ADMIN — SODIUM CHLORIDE: 0.9 INJECTION, SOLUTION INTRAVENOUS at 06:11

## 2019-11-18 RX ADMIN — BISACODYL 10 MG: 10 SUPPOSITORY RECTAL at 02:11

## 2019-11-18 RX ADMIN — SODIUM CHLORIDE: 0.9 INJECTION, SOLUTION INTRAVENOUS at 08:11

## 2019-11-18 RX ADMIN — AMIODARONE HYDROCHLORIDE 200 MG: 200 TABLET ORAL at 08:11

## 2019-11-18 RX ADMIN — FERROUS GLUCONATE TAB 324 MG (37.5 MG ELEMENTAL IRON) 324 MG: 324 (37.5 FE) TAB at 08:11

## 2019-11-18 RX ADMIN — INSULIN ASPART 2 UNITS: 100 INJECTION, SOLUTION INTRAVENOUS; SUBCUTANEOUS at 05:11

## 2019-11-18 RX ADMIN — GUAIFENESIN 600 MG: 600 TABLET, EXTENDED RELEASE ORAL at 08:11

## 2019-11-18 RX ADMIN — INSULIN ASPART 2 UNITS: 100 INJECTION, SOLUTION INTRAVENOUS; SUBCUTANEOUS at 12:11

## 2019-11-18 RX ADMIN — INSULIN ASPART 2 UNITS: 100 INJECTION, SOLUTION INTRAVENOUS; SUBCUTANEOUS at 08:11

## 2019-11-18 RX ADMIN — DONEPEZIL HYDROCHLORIDE 5 MG: 5 TABLET, FILM COATED ORAL at 08:11

## 2019-11-18 RX ADMIN — ESCITALOPRAM OXALATE 10 MG: 10 TABLET ORAL at 08:11

## 2019-11-18 RX ADMIN — GUAIFENESIN AND DEXTROMETHORPHAN 5 ML: 100; 10 SYRUP ORAL at 12:11

## 2019-11-18 RX ADMIN — HYDROCODONE BITARTRATE AND ACETAMINOPHEN 1 TABLET: 5; 325 TABLET ORAL at 08:11

## 2019-11-18 RX ADMIN — CETIRIZINE HYDROCHLORIDE 5 MG: 5 TABLET ORAL at 08:11

## 2019-11-18 RX ADMIN — GUAIFENESIN AND DEXTROMETHORPHAN 5 ML: 100; 10 SYRUP ORAL at 05:11

## 2019-11-18 RX ADMIN — CARBAMIDE PEROXIDE 6.5% 5 DROP: 6.5 LIQUID AURICULAR (OTIC) at 08:11

## 2019-11-18 RX ADMIN — MIRTAZAPINE 7.5 MG: 7.5 TABLET ORAL at 08:11

## 2019-11-18 NOTE — ASSESSMENT & PLAN NOTE
Now with ARF.  ARF probably combination of obstruction and pre renal.  Colunga placed.  Start IVF hydration.  Avoid nephrotoxic medications.  Stop Losartan.

## 2019-11-18 NOTE — ANESTHESIA POSTPROCEDURE EVALUATION
Anesthesia Post Evaluation    Patient: Timbo Gallagher    Procedure(s) Performed: Procedure(s) (LRB):  INSERTION, CATHETER, INTERCOSTAL, FOR DRAINAGE (Right)    Final Anesthesia Type: MAC    Patient location during evaluation: PACU  Patient participation: Yes- Able to Participate  Level of consciousness: awake and alert  Post-procedure vital signs: reviewed and stable  Pain management: adequate  Airway patency: patent    PONV status at discharge: No PONV  Anesthetic complications: no      Cardiovascular status: blood pressure returned to baseline and hemodynamically stable  Respiratory status: unassisted and spontaneous ventilation  Hydration status: euvolemic  Follow-up not needed.          Vitals Value Taken Time   /56 11/18/2019 11:43 AM   Temp 36.4 °C (97.5 °F) 11/18/2019 11:43 AM   Pulse 60 11/18/2019 11:43 AM   Resp 18 11/18/2019 11:43 AM   SpO2 91 % 11/18/2019 11:43 AM         Event Time     Out of Recovery 12:36:00          Pain/Asael Score: Pain Rating Prior to Med Admin: 6 (11/18/2019  1:11 AM)  Pain Rating Post Med Admin: 0 (11/17/2019  3:53 PM)

## 2019-11-18 NOTE — ASSESSMENT & PLAN NOTE
Resume home meds with parameters.  Low Na diet.  Holding Losartan and Lasix secondary to worsening renal failure.

## 2019-11-18 NOTE — NURSING
Pt side lying in bed asleep easily awaken, wife at bedside. Scheduled meds given w/out difficulty; held Metoprolol BP 94/46 HR 65. Tele monitor 8693. Pacemaker. Chest tube in place. Accu check 185. IV saline lock. Repositioned in bed. Safety measures maintained. Bed alarm set. Will cont to monitor.

## 2019-11-18 NOTE — SUBJECTIVE & OBJECTIVE
Past Medical History:   Diagnosis Date    Acute renal failure     Arthritis     Blind right eye     BPH (benign prostatic hypertrophy)     Bronchitis, chronic     Carotid artery occlusion     Lt carotid endarerectomy done 02/19/2018     CHF (congestive heart failure)     Colon polyp     Coronary artery disease     Diabetes mellitus     GERD (gastroesophageal reflux disease)     Hearing aid worn     bilateral    Hematuria     Hernia     Hypertension     Influenza A     Irregular heart beat     NSVT (nonsustained ventricular tachycardia) 4/18/2018    Renal failure as complication of care     sepsis, renal function returned    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 Coleman    CATARACT EXTRACTION, BILATERAL      ESOPHAGOGASTRODUODENOSCOPY N/A 8/30/2018    Procedure: EGD (ESOPHAGOGASTRODUODENOSCOPY);  Surgeon: Tye Navarrete MD;  Location: [address]);  Service: Endoscopy;  Laterality: N/A;    ESOPHAGOGASTRODUODENOSCOPY  08/30/2018    EYE SURGERY      HERNIA REPAIR Left 09/2013    RETINAL DETACHMENT SURGERY      THORACENTESIS N/A 1/17/2019    Procedure: THORACENTESIS;  Surgeon: [facility] Diagnostic Provider;  Location: [facility] OR;  Service: Radiology;  Laterality: N/A;  1130AM  START  RN PRE OP 1-15-19    TUBE THORACOTOMY Right 11/16/2019    Procedure: INSERTION, CATHETER, INTERCOSTAL, FOR DRAINAGE;  Surgeon: Silvio Tay MD;  Location:  [facility] OR;  Service: General;  Laterality: Right;       Review of patient's allergies indicates:   Allergen Reactions    Ciprofloxacin (mixture) Itching     Extreme itching    Antibiotic hc     Hydrochlorothiazide      Leg swelling      Iodine and iodide containing products      Wife and pt unsure    Vancomycin analogues Itching       Family History     Problem Relation (Age of Onset)    Arthritis Mother    Diabetes Sister    Heart attack Brother    Heart disease Father    Heart failure Father    No Known Problems Maternal Aunt, Maternal Uncle, Paternal Aunt, Paternal Uncle, Maternal Grandmother, Maternal Grandfather, Paternal Grandmother, Paternal Grandfather          Tobacco Use    Smoking status: Former Smoker     Packs/day: 3.00     Years: 40.00     Pack years: 120.00     Types: Cigarettes     Last attempt to quit: 1990     Years since quittin.3    Smokeless tobacco: Never Used   Substance and Sexual Activity    Alcohol use: No    Drug use: No    Sexual activity: Not Currently     Partners: Female       Review of Systems   Constitutional: Negative for chills and fever.   HENT: Positive for hearing loss. Negative for sore throat and trouble swallowing.    Eyes: Negative.    Respiratory: Negative for shortness of breath.    Cardiovascular: Negative for chest pain.   Gastrointestinal: Positive for constipation. Negative for abdominal distention, abdominal pain, diarrhea, nausea and vomiting.   Genitourinary: Positive for difficulty urinating. Negative for frequency and hematuria.   Musculoskeletal: Negative for arthralgias and neck pain.   Skin: Negative for color change, pallor and rash.   Neurological: Negative for dizziness and seizures.   Hematological: Negative for adenopathy.   Psychiatric/Behavioral: Negative for confusion.   All other systems reviewed and are negative.      Objective:     Temp:  [97.5 °F (36.4 °C)-98.4 °F (36.9 °C)] 98.1 °F (36.7 °C)  Pulse:  [60-80] 80  Resp:  [18-20]  18  SpO2:  [91 %-98 %] 93 %  BP: ()/(43-58) 128/58     Body mass index is 25.24 kg/m².    Date 11/18/19 0700 - 11/19/19 0659   Shift 7649-6981 6845-7964 1679-2299 24 Hour Total   INTAKE   I.V.(mL/kg)  621.7(7.8)  621.7(7.8)   Shift Total(mL/kg)  621.7(7.8)  621.7(7.8)   OUTPUT   Urine(mL/kg/hr)  600  600   Chest Tube 420   420   Shift Total(mL/kg) 420(5.3) 600(7.5)  1020(12.8)   Weight (kg) 79.8 79.8 79.8 79.8     Bladder Scan Volume (mL): 467 mL (11/18/19 0900)    Drains     Drain                 Chest Tube 11/16/19 1135 1 Right 28 Fr. 2 days         Urethral Catheter 11/18/19 0927 less than 1 day                Physical Exam   Vitals reviewed.  Constitutional: He is oriented to person, place, and time. He appears well-developed and well-nourished. No distress.   HENT:   Head: Normocephalic and atraumatic.   Eyes: Right eye exhibits no discharge. Left eye exhibits no discharge. No scleral icterus.   Dilated R pupil   Neck: Normal range of motion. Neck supple.   Cardiovascular: Normal rate and regular rhythm.    Pulmonary/Chest: Effort normal and breath sounds normal. No respiratory distress.   Chest tube in place   Abdominal: Soft. Bowel sounds are normal. He exhibits no distension. There is no tenderness. There is no rebound and no guarding.   Genitourinary:   Genitourinary Comments: Colunga - clear yellow urine   Musculoskeletal: Normal range of motion.   Neurological: He is alert and oriented to person, place, and time.   Skin: Skin is warm and dry. He is not diaphoretic. No erythema.         Significant Labs:    BMP:  Recent Labs   Lab 11/16/19  0323 11/18/19  0506    137   K 3.5 4.5    103   CO2 27 29   BUN 27* 50*   CREATININE 1.7* 3.0*   CALCIUM 9.3 8.6*       CBC:  Recent Labs   Lab 11/16/19 0323 11/17/19  0418 11/18/19  0506 11/18/19  1523   WBC 9.90 12.21 12.60  --    HGB 12.2* 9.0* 7.5* 8.1*   HCT 38.6* 28.2* 24.5* 25.0*    156 157  --        Blood Culture: No results for  input(s): LABBLOO in the last 168 hours.  Urine Culture: No results for input(s): LABURIN in the last 168 hours.  Urine Studies:   Recent Labs   Lab 11/16/19  0357   COLORU Straw   APPEARANCEUA Clear   PHUR 6.0   SPECGRAV 1.010   PROTEINUA Negative   GLUCUA Negative   KETONESU Negative   BILIRUBINUA Negative   OCCULTUA Negative   NITRITE Negative   UROBILINOGEN Negative   LEUKOCYTESUR Negative       Significant Imaging:  All pertinent imaging results/findings from the past 24 hours have been reviewed.

## 2019-11-18 NOTE — SUBJECTIVE & OBJECTIVE
Interval History: Unable to urinate and watson placed.    Review of Systems   HENT: Negative for ear discharge and ear pain.    Eyes: Negative for pain and itching.   Endocrine: Negative for polyphagia and polyuria.   Neurological: Negative for seizures and syncope.     Objective:     Vital Signs (Most Recent):  Temp: 97.5 °F (36.4 °C) (11/18/19 1143)  Pulse: 60 (11/18/19 1143)  Resp: 18 (11/18/19 1143)  BP: (!) 115/56 (11/18/19 1143)  SpO2: (!) 91 % (11/18/19 1143) Vital Signs (24h Range):  Temp:  [97.5 °F (36.4 °C)-98.4 °F (36.9 °C)] 97.5 °F (36.4 °C)  Pulse:  [60-76] 60  Resp:  [18-20] 18  SpO2:  [91 %-98 %] 91 %  BP: ()/(43-56) 115/56     Weight: 79.8 kg (175 lb 14.8 oz)  Body mass index is 25.24 kg/m².    Intake/Output Summary (Last 24 hours) at 11/18/2019 1546  Last data filed at 11/18/2019 1500  Gross per 24 hour   Intake 861.67 ml   Output 1320 ml   Net -458.33 ml      Physical Exam   Constitutional: He is oriented to person, place, and time. No distress.   HENT:   Head: Normocephalic and atraumatic.   Eyes: Conjunctivae are normal. Right eye exhibits no discharge. Left eye exhibits no discharge.   Neck: Neck supple. No tracheal deviation present.   Cardiovascular: Normal rate and regular rhythm.   Pulmonary/Chest: Effort normal.   Right sided chest tube in place   Abdominal: Soft. Bowel sounds are normal.   Musculoskeletal:   Right elbow effusion   Neurological: He is alert and oriented to person, place, and time.   Skin: Skin is warm and dry. He is not diaphoretic.       Significant Labs:   BMP:   Recent Labs   Lab 11/18/19  0506   *      K 4.5      CO2 29   BUN 50*   CREATININE 3.0*   CALCIUM 8.6*     CBC:   Recent Labs   Lab 11/17/19  0418 11/18/19  0506   WBC 12.21 12.60   HGB 9.0* 7.5*   HCT 28.2* 24.5*    157       Significant Imaging: I have reviewed all pertinent imaging results/findings within the past 24 hours.

## 2019-11-18 NOTE — CONSULTS
[facility]  Urology  Consult Note    Patient Name: Timbo Gallagher  MRN: [id number]  Admission Date: 11/16/2019  Hospital Length of Stay: 2   Code Status: Full Code   Attending Provider: Severiano Galan MD   Consulting Provider: Alondra Schultz MD  Primary Care Physician: Cirilo Montero MD  Principal Problem:Hemothorax on right    Inpatient consult to Urology  Consult performed by: Alondra Schultz MD  Consult ordered by: Severiano Galan MD          Subjective:     HPI:  85yo M with UR. He reports some baseline LUTS - slow stream intermittently. He is followed by urology as outpatient - last seen 5/19. He has had issues with UR in the past. He is not currently on BPH medications in hospital. He was on Flomax at last  visit. Colunga catheter placed with 300cc UOP. He is unsure when his last BM was, suppository given earlier today.     Past Medical History:   Diagnosis Date    Acute renal failure     Arthritis     Blind right eye     BPH (benign prostatic hypertrophy)     Bronchitis, chronic     Carotid artery occlusion     Lt carotid endarerectomy done 02/19/2018     CHF (congestive heart failure)     Colon polyp     Coronary artery disease     Diabetes mellitus     GERD (gastroesophageal reflux disease)     Hearing aid worn     bilateral    Hematuria     Hernia     Hypertension     Influenza A     Irregular heart beat     NSVT (nonsustained ventricular tachycardia) 4/18/2018    Renal failure as complication of care     sepsis, renal function returned    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 Coleman    CATARACT EXTRACTION, BILATERAL      ESOPHAGOGASTRODUODENOSCOPY N/A 2018    Procedure: EGD (ESOPHAGOGASTRODUODENOSCOPY);  Surgeon: Tye Navarrete MD;  Location: [facility] ENDO (2ND FLR);  Service: Endoscopy;  Laterality: N/A;    ESOPHAGOGASTRODUODENOSCOPY  2018    EYE SURGERY      HERNIA REPAIR Left 2013    RETINAL DETACHMENT SURGERY      THORACENTESIS N/A 2019    Procedure: THORACENTESIS;  Surgeon: [facility] Diagnostic Provider;  Location: [facility] OR;  Service: Radiology;  Laterality: N/A;  1130AM  START  RN PRE OP 1-15-19    TUBE THORACOTOMY Right 2019    Procedure: INSERTION, CATHETER, INTERCOSTAL, FOR DRAINAGE;  Surgeon: Silvio Tay MD;  Location: [facility];  Service: General;  Laterality: Right;       Review of patient's allergies indicates:   Allergen Reactions    Ciprofloxacin (mixture) Itching     Extreme itching    Antibiotic hc     Hydrochlorothiazide      Leg swelling      Iodine and iodide containing products      Wife and pt unsure    Vancomycin analogues Itching       Family History     Problem Relation (Age of Onset)    Arthritis Mother    Diabetes Sister    Heart attack Brother    Heart disease Father    Heart failure Father    No Known Problems Maternal Aunt, Maternal Uncle, Paternal Aunt, Paternal Uncle, Maternal Grandmother, Maternal Grandfather, Paternal Grandmother, Paternal Grandfather          Tobacco Use    Smoking status: Former Smoker     Packs/day: 3.00     Years: 40.00     Pack years: 120.00     Types: Cigarettes     Last attempt to quit: 1990     Years since quittin.3    Smokeless tobacco: Never Used   Substance and Sexual Activity    Alcohol use: No    Drug use: No    Sexual activity: Not Currently     Partners: Female       Review of Systems    Constitutional: Negative for chills and fever.   HENT: Positive for hearing loss. Negative for sore throat and trouble swallowing.    Eyes: Negative.    Respiratory: Negative for shortness of breath.    Cardiovascular: Negative for chest pain.   Gastrointestinal: Positive for constipation. Negative for abdominal distention, abdominal pain, diarrhea, nausea and vomiting.   Genitourinary: Positive for difficulty urinating. Negative for frequency and hematuria.   Musculoskeletal: Negative for arthralgias and neck pain.   Skin: Negative for color change, pallor and rash.   Neurological: Negative for dizziness and seizures.   Hematological: Negative for adenopathy.   Psychiatric/Behavioral: Negative for confusion.   All other systems reviewed and are negative.      Objective:     Temp:  [97.5 °F (36.4 °C)-98.4 °F (36.9 °C)] 98.1 °F (36.7 °C)  Pulse:  [60-80] 80  Resp:  [18-20] 18  SpO2:  [91 %-98 %] 93 %  BP: ()/(43-58) 128/58     Body mass index is 25.24 kg/m².    Date 11/18/19 0700 - 11/19/19 0659   Shift 2736-1114 9385-9426 2560-0505 24 Hour Total   INTAKE   I.V.(mL/kg)  621.7(7.8)  621.7(7.8)   Shift Total(mL/kg)  621.7(7.8)  621.7(7.8)   OUTPUT   Urine(mL/kg/hr)  600  600   Chest Tube 420   420   Shift Total(mL/kg) 420(5.3) 600(7.5)  1020(12.8)   Weight (kg) 79.8 79.8 79.8 79.8     Bladder Scan Volume (mL): 467 mL (11/18/19 0900)    Drains     Drain                 Chest Tube 11/16/19 1135 1 Right 28 Fr. 2 days         Urethral Catheter 11/18/19 0927 less than 1 day                Physical Exam   Vitals reviewed.  Constitutional: He is oriented to person, place, and time. He appears well-developed and well-nourished. No distress.   HENT:   Head: Normocephalic and atraumatic.   Eyes: Right eye exhibits no discharge. Left eye exhibits no discharge. No scleral icterus.   Dilated R pupil   Neck: Normal range of motion. Neck supple.   Cardiovascular: Normal rate and regular rhythm.    Pulmonary/Chest: Effort  normal and breath sounds normal. No respiratory distress.   Chest tube in place   Abdominal: Soft. Bowel sounds are normal. He exhibits no distension. There is no tenderness. There is no rebound and no guarding.   Genitourinary:   Genitourinary Comments: Colunga - clear yellow urine   Musculoskeletal: Normal range of motion.   Neurological: He is alert and oriented to person, place, and time.   Skin: Skin is warm and dry. He is not diaphoretic. No erythema.         Significant Labs:    BMP:  Recent Labs   Lab 11/16/19  0323 11/18/19  0506    137   K 3.5 4.5    103   CO2 27 29   BUN 27* 50*   CREATININE 1.7* 3.0*   CALCIUM 9.3 8.6*       CBC:  Recent Labs   Lab 11/16/19  0323 11/17/19  0418 11/18/19  0506 11/18/19  1523   WBC 9.90 12.21 12.60  --    HGB 12.2* 9.0* 7.5* 8.1*   HCT 38.6* 28.2* 24.5* 25.0*    156 157  --        Blood Culture: No results for input(s): LABBLOO in the last 168 hours.  Urine Culture: No results for input(s): LABURIN in the last 168 hours.  Urine Studies:   Recent Labs   Lab 11/16/19  0357   COLORU Straw   APPEARANCEUA Clear   PHUR 6.0   SPECGRAV 1.010   PROTEINUA Negative   GLUCUA Negative   KETONESU Negative   BILIRUBINUA Negative   OCCULTUA Negative   NITRITE Negative   UROBILINOGEN Negative   LEUKOCYTESUR Negative       Significant Imaging:  All pertinent imaging results/findings from the past 24 hours have been reviewed.          Assessment and Plan:     Urinary retention   - Start Flomax if able to tolerate   - Colunga x 3-5 days   - Avoid/treat constipation    BPH with obstruction/lower urinary tract symptoms   - Start Flomax if BP able to tolerate, will defer to primary        VTE Risk Mitigation (From admission, onward)         Ordered     Place sequential compression device  Until discontinued      11/16/19 1200     IP VTE HIGH RISK PATIENT  Once      11/16/19 0712     Place DEYA hose  Until discontinued      11/16/19 0712                Thank you for your consult. I  will follow-up with patient. Please contact us if you have any additional questions.    Alondra Schultz MD  Urology  [facility]

## 2019-11-18 NOTE — PROGRESS NOTES
Surgery Progress Note    Primary Problem: Hemothorax on right    Other problems:   Active Hospital Problems    Diagnosis  POA    *Hemothorax on right [J94.2]  Yes    AICD (automatic cardioverter/defibrillator) present [Z95.810]  Yes     [employer] dual AICD 4/23/18      Mixed hyperlipidemia [E78.2]  Yes    CKD (chronic kidney disease), stage III [N18.3]  Yes    Aortic valve stenosis [I35.0]  Yes    Essential hypertension [I10]  Yes    Chronic diastolic heart failure [I50.32]  Yes    Anemia of chronic disease [D63.8]  Yes    Type 2 diabetes mellitus with stage 3 chronic kidney disease [E11.22, N18.3]  Yes      Resolved Hospital Problems   No resolved problems to display.       HD #  LOS: 2 days   POD #2 Days Post-Op right chest tube insertion    Overnight events:  No acute events overnight.  No shortness of breath     Diet - Diet diabetic [facility]; 2000 Calorie; Cardiac (Low Na/Chol)     I/O last 3 completed shifts:  In: 1640 [P.O.:1440; I.V.:200]  Out: 5980 [Urine:2300; Other:2300; Chest Tube:1380]    Intake/Output Summary (Last 24 hours) at 11/18/2019 0637  Last data filed at 11/17/2019 1800  Gross per 24 hour   Intake 600 ml   Output 2445 ml   Net -1845 ml       Temp:  [97.9 °F (36.6 °C)-98.4 °F (36.9 °C)] 98 °F (36.7 °C)  Pulse:  [60-75] 64  Resp:  [16-20] 18  SpO2:  [94 %-98 %] 98 %  BP: ()/(43-61) 108/51  Chest tube 325 serosanguineous  Gen: alert, well appearing, and in no distress,    Chest:  Breath sounds equal bilateral  No air leak    Recent Labs   Lab 11/16/19  0323 11/17/19  0418 11/18/19  0506   WBC 9.90 12.21 12.60   HGB 12.2* 9.0* 7.5*   HCT 38.6* 28.2* 24.5*    156 157     --   --    K 3.5  --   --      --   --    BUN 27*  --   --    *  --   --    PROT 7.2  --   --    ALBUMIN 3.1*  --   --    BILITOT 0.5  --   --    AST 18  --   --    ALKPHOS 123  --   --    ALT 14  --   --         Assessment:  Status post chest tube insertion, hemopneumothorax    Plan:   Follow-up chest x-ray this morning.  If pleural effusion not fully resolved, will get CT to better evaluate.  May get IR for drain placement.    Giles Lezama MD

## 2019-11-18 NOTE — ASSESSMENT & PLAN NOTE
H/H similar to previous labs.  Now with anemia of acute blood loss secondary to hemothorax.  Significant drop in H/H.  Repeat labs and transfuse if lower.

## 2019-11-18 NOTE — NURSING
Pt aaox2, disoriented to time. Pt able to turn himself in bed. R chest tube with moderate drainage in dressing, output during am shift 300ml. Oxygen @ 2L nc. watson catheter draining clear yellow urine. Suppository given, no bm as now. Call light w/i reach, bed in lowest position, scds/drake hose on

## 2019-11-18 NOTE — HPI
85yo M with UR. He reports some baseline LUTS - slow stream intermittently. He is followed by urology as outpatient - last seen 5/19. He has had issues with UR in the past. He is not currently on BPH medications in hospital. He was on Flomax at last  visit. Colunga catheter placed with 300cc UOP. He is unsure when his last BM was, suppository given earlier today.

## 2019-11-18 NOTE — NURSING
BP 88/43 HR 68 rechecked twice. Notified MD; new orders given. Pt aaox3. No distress noted. 2L NC, no SOB. Pt asleep but easily awaken. Repositioned and elevated HOB. Will cont to monitor

## 2019-11-18 NOTE — PROGRESS NOTES
[facility] Hospital Medicine  Progress Note    Patient Name: Timbo Gallagher  MRN: [id number]  Patient Class: IP- Inpatient   Admission Date: 11/16/2019  Length of Stay: 2 days  Attending Physician: Severiano Galan MD  Primary Care Provider: Cirilo Montero MD        Subjective:     Principal Problem:Hemothorax on right        HPI:  83 y/o male presents to the ER complaining of constant right sided rib pain since 4 days ago.  Symptoms worsened yesterday.  Patient was seen at urgent care for recent fall and diagnosed with multiple right sided rib fractures.  He reports associated shortness of breath, right sided abdominal pain and cough. Patient reports taking tylenol for symptoms without relief.  He states no other associated symptoms.  Patient also complaining of right elbow swelling after fall.  Patient denies any fever, chills or hemoptysis.  Chest CT showing large volume pleural fluid, concerning for hemothorax.  Patient had been doing well prior to fall with no recent complaints.  No other complaints.    Overview/Hospital Course:  83 y/o male with recent fall and rib fractures presented with cough and SOB.  Imaging with new large right sided pleural effusion.  Admitted with concern of hemothorax.  Surgery consulted.  S/P chest tube placement on 11/16.  Anemia of acute blood loss with dropping H/H.  ASA held.  Patient also retaining urine.  Watson placed.  Labs showing ARF.  Urology consulted and patient started on IVF's.    Interval History: Unable to urinate and watson placed.    Review of Systems   HENT: Negative for ear discharge and ear pain.    Eyes: Negative for pain and itching.   Endocrine: Negative for polyphagia and polyuria.   Neurological: Negative for seizures and syncope.     Objective:     Vital Signs (Most Recent):  Temp: 97.5 °F (36.4 °C) (11/18/19 1143)  Pulse: 60 (11/18/19 1143)  Resp: 18 (11/18/19 1143)  BP: (!) 115/56 (11/18/19 1143)  SpO2: (!) 91 % (11/18/19 1143)  Vital Signs (24h Range):  Temp:  [97.5 °F (36.4 °C)-98.4 °F (36.9 °C)] 97.5 °F (36.4 °C)  Pulse:  [60-76] 60  Resp:  [18-20] 18  SpO2:  [91 %-98 %] 91 %  BP: ()/(43-56) 115/56     Weight: 79.8 kg (175 lb 14.8 oz)  Body mass index is 25.24 kg/m².    Intake/Output Summary (Last 24 hours) at 11/18/2019 1546  Last data filed at 11/18/2019 1500  Gross per 24 hour   Intake 861.67 ml   Output 1320 ml   Net -458.33 ml      Physical Exam   Constitutional: He is oriented to person, place, and time. No distress.   HENT:   Head: Normocephalic and atraumatic.   Eyes: Conjunctivae are normal. Right eye exhibits no discharge. Left eye exhibits no discharge.   Neck: Neck supple. No tracheal deviation present.   Cardiovascular: Normal rate and regular rhythm.   Pulmonary/Chest: Effort normal.   Right sided chest tube in place   Abdominal: Soft. Bowel sounds are normal.   Musculoskeletal:   Right elbow effusion   Neurological: He is alert and oriented to person, place, and time.   Skin: Skin is warm and dry. He is not diaphoretic.       Significant Labs:   BMP:   Recent Labs   Lab 11/18/19  0506   *      K 4.5      CO2 29   BUN 50*   CREATININE 3.0*   CALCIUM 8.6*     CBC:   Recent Labs   Lab 11/17/19  0418 11/18/19  0506   WBC 12.21 12.60   HGB 9.0* 7.5*   HCT 28.2* 24.5*    157       Significant Imaging: I have reviewed all pertinent imaging results/findings within the past 24 hours.      Assessment/Plan:      * Hemothorax on right  S/P fall with rib fractures.  CT showing high volume pleural fluid.  Concerning for hemothorax.  Appreciate Surgery input.  S/P chest tube placement on 11/16      Anemia of chronic disease  H/H similar to previous labs.  Now with anemia of acute blood loss secondary to hemothorax.  Significant drop in H/H.  Repeat labs and transfuse if lower.      CKD (chronic kidney disease), stage III  Now with ARF.  ARF probably combination of obstruction and pre renal.  Colunga placed.   Start IVF hydration.  Avoid nephrotoxic medications.  Stop Losartan.      AICD (automatic cardioverter/defibrillator) present        Mixed hyperlipidemia  Continue Statin.      Aortic valve stenosis  S/P TAVR      Essential hypertension  Resume home meds with parameters.  Low Na diet.  Holding Losartan and Lasix secondary to worsening renal failure.      Chronic diastolic heart failure  No evidence of acute exacerbation.  Seems more fluid depleted.  Hold Lasix.      Type 2 diabetes mellitus with stage 3 chronic kidney disease  Hold home oral medications.  Diabetic diet and insulin sliding scale.        VTE Risk Mitigation (From admission, onward)         Ordered     Place sequential compression device  Until discontinued      11/16/19 1200     IP VTE HIGH RISK PATIENT  Once      11/16/19 0712     Place DEYA hose  Until discontinued      11/16/19 0712                      Severiano Galan MD  Department of Hospital Medicine   [facility]

## 2019-11-19 PROBLEM — N17.9 ACUTE RENAL FAILURE SUPERIMPOSED ON STAGE 3 CHRONIC KIDNEY DISEASE: Status: ACTIVE | Noted: 2019-11-19

## 2019-11-19 PROBLEM — N18.30 ACUTE RENAL FAILURE SUPERIMPOSED ON STAGE 3 CHRONIC KIDNEY DISEASE: Status: ACTIVE | Noted: 2019-11-19

## 2019-11-19 LAB
ANION GAP SERPL CALC-SCNC: 8 MMOL/L (ref 8–16)
BASOPHILS # BLD AUTO: 0.02 K/UL (ref 0–0.2)
BASOPHILS NFR BLD: 0.2 % (ref 0–1.9)
BUN SERPL-MCNC: 39 MG/DL (ref 8–23)
CALCIUM SERPL-MCNC: 8.3 MG/DL (ref 8.7–10.5)
CHLORIDE SERPL-SCNC: 110 MMOL/L (ref 95–110)
CO2 SERPL-SCNC: 23 MMOL/L (ref 23–29)
CREAT SERPL-MCNC: 2 MG/DL (ref 0.5–1.4)
DIFFERENTIAL METHOD: ABNORMAL
EOSINOPHIL # BLD AUTO: 0.1 K/UL (ref 0–0.5)
EOSINOPHIL NFR BLD: 0.7 % (ref 0–8)
ERYTHROCYTE [DISTWIDTH] IN BLOOD BY AUTOMATED COUNT: 13.7 % (ref 11.5–14.5)
EST. GFR  (AFRICAN AMERICAN): 34 ML/MIN/1.73 M^2
EST. GFR  (NON AFRICAN AMERICAN): 30 ML/MIN/1.73 M^2
GLUCOSE SERPL-MCNC: 145 MG/DL (ref 70–110)
HCT VFR BLD AUTO: 23.2 % (ref 40–54)
HGB BLD-MCNC: 7.2 G/DL (ref 14–18)
IMM GRANULOCYTES # BLD AUTO: 0.07 K/UL (ref 0–0.04)
IMM GRANULOCYTES NFR BLD AUTO: 0.6 % (ref 0–0.5)
LYMPHOCYTES # BLD AUTO: 0.6 K/UL (ref 1–4.8)
LYMPHOCYTES NFR BLD: 5.3 % (ref 18–48)
MCH RBC QN AUTO: 31.4 PG (ref 27–31)
MCHC RBC AUTO-ENTMCNC: 31 G/DL (ref 32–36)
MCV RBC AUTO: 101 FL (ref 82–98)
MONOCYTES # BLD AUTO: 1.1 K/UL (ref 0.3–1)
MONOCYTES NFR BLD: 9.1 % (ref 4–15)
NEUTROPHILS # BLD AUTO: 9.9 K/UL (ref 1.8–7.7)
NEUTROPHILS NFR BLD: 84.1 % (ref 38–73)
NRBC BLD-RTO: 0 /100 WBC
PLATELET # BLD AUTO: 177 K/UL (ref 150–350)
PMV BLD AUTO: 11.1 FL (ref 9.2–12.9)
POCT GLUCOSE: 151 MG/DL (ref 70–110)
POCT GLUCOSE: 195 MG/DL (ref 70–110)
POCT GLUCOSE: 199 MG/DL (ref 70–110)
POCT GLUCOSE: 204 MG/DL (ref 70–110)
POTASSIUM SERPL-SCNC: 3.9 MMOL/L (ref 3.5–5.1)
RBC # BLD AUTO: 2.29 M/UL (ref 4.6–6.2)
SODIUM SERPL-SCNC: 141 MMOL/L (ref 136–145)
WBC # BLD AUTO: 11.71 K/UL (ref 3.9–12.7)

## 2019-11-19 PROCEDURE — 11000001 HC ACUTE MED/SURG PRIVATE ROOM

## 2019-11-19 PROCEDURE — 85025 COMPLETE CBC W/AUTO DIFF WBC: CPT

## 2019-11-19 PROCEDURE — 25000003 PHARM REV CODE 250: Performed by: HOSPITALIST

## 2019-11-19 PROCEDURE — 80048 BASIC METABOLIC PNL TOTAL CA: CPT

## 2019-11-19 PROCEDURE — 94761 N-INVAS EAR/PLS OXIMETRY MLT: CPT

## 2019-11-19 PROCEDURE — 27000221 HC OXYGEN, UP TO 24 HOURS

## 2019-11-19 PROCEDURE — 94799 UNLISTED PULMONARY SVC/PX: CPT

## 2019-11-19 PROCEDURE — 36415 COLL VENOUS BLD VENIPUNCTURE: CPT

## 2019-11-19 PROCEDURE — 25000003 PHARM REV CODE 250: Performed by: SURGERY

## 2019-11-19 PROCEDURE — 99232 PR SUBSEQUENT HOSPITAL CARE,LEVL II: ICD-10-PCS | Mod: ,,, | Performed by: UROLOGY

## 2019-11-19 PROCEDURE — 63600175 PHARM REV CODE 636 W HCPCS: Performed by: HOSPITALIST

## 2019-11-19 PROCEDURE — 99232 SBSQ HOSP IP/OBS MODERATE 35: CPT | Mod: ,,, | Performed by: UROLOGY

## 2019-11-19 RX ORDER — CLONIDINE HYDROCHLORIDE 0.1 MG/1
0.1 TABLET ORAL EVERY 4 HOURS PRN
Status: DISCONTINUED | OUTPATIENT
Start: 2019-11-19 | End: 2019-11-25 | Stop reason: HOSPADM

## 2019-11-19 RX ORDER — AMLODIPINE BESYLATE 5 MG/1
10 TABLET ORAL DAILY
Status: DISCONTINUED | OUTPATIENT
Start: 2019-11-19 | End: 2019-11-25 | Stop reason: HOSPADM

## 2019-11-19 RX ORDER — DEXTROMETHORPHAN POLISTIREX 30 MG/5 ML
1 SUSPENSION, EXTENDED RELEASE 12 HR ORAL ONCE
Status: COMPLETED | OUTPATIENT
Start: 2019-11-19 | End: 2019-11-19

## 2019-11-19 RX ADMIN — CETIRIZINE HYDROCHLORIDE 5 MG: 5 TABLET ORAL at 08:11

## 2019-11-19 RX ADMIN — GUAIFENESIN AND DEXTROMETHORPHAN 5 ML: 100; 10 SYRUP ORAL at 12:11

## 2019-11-19 RX ADMIN — SENNOSIDES AND DOCUSATE SODIUM 2 TABLET: 8.6; 5 TABLET ORAL at 08:11

## 2019-11-19 RX ADMIN — ATORVASTATIN CALCIUM 40 MG: 40 TABLET, FILM COATED ORAL at 08:11

## 2019-11-19 RX ADMIN — AMIODARONE HYDROCHLORIDE 200 MG: 200 TABLET ORAL at 08:11

## 2019-11-19 RX ADMIN — FAMOTIDINE 20 MG: 20 TABLET ORAL at 08:11

## 2019-11-19 RX ADMIN — SODIUM CHLORIDE: 0.9 INJECTION, SOLUTION INTRAVENOUS at 04:11

## 2019-11-19 RX ADMIN — POLYETHYLENE GLYCOL 3350 17 G: 17 POWDER, FOR SOLUTION ORAL at 12:11

## 2019-11-19 RX ADMIN — ACETAMINOPHEN 650 MG: 325 TABLET ORAL at 09:11

## 2019-11-19 RX ADMIN — GUAIFENESIN AND DEXTROMETHORPHAN 5 ML: 100; 10 SYRUP ORAL at 05:11

## 2019-11-19 RX ADMIN — TAMSULOSIN HYDROCHLORIDE 0.4 MG: 0.4 CAPSULE ORAL at 08:11

## 2019-11-19 RX ADMIN — DONEPEZIL HYDROCHLORIDE 5 MG: 5 TABLET, FILM COATED ORAL at 09:11

## 2019-11-19 RX ADMIN — CARBAMIDE PEROXIDE 6.5% 5 DROP: 6.5 LIQUID AURICULAR (OTIC) at 09:11

## 2019-11-19 RX ADMIN — GUAIFENESIN 600 MG: 600 TABLET, EXTENDED RELEASE ORAL at 09:11

## 2019-11-19 RX ADMIN — BISACODYL 10 MG: 10 SUPPOSITORY RECTAL at 04:11

## 2019-11-19 RX ADMIN — CARBAMIDE PEROXIDE 6.5% 5 DROP: 6.5 LIQUID AURICULAR (OTIC) at 08:11

## 2019-11-19 RX ADMIN — AMLODIPINE BESYLATE 10 MG: 5 TABLET ORAL at 08:11

## 2019-11-19 RX ADMIN — ONDANSETRON HYDROCHLORIDE 8 MG: 2 SOLUTION INTRAMUSCULAR; INTRAVENOUS at 10:11

## 2019-11-19 RX ADMIN — SENNOSIDES AND DOCUSATE SODIUM 2 TABLET: 8.6; 5 TABLET ORAL at 09:11

## 2019-11-19 RX ADMIN — METOPROLOL SUCCINATE 50 MG: 50 TABLET, FILM COATED, EXTENDED RELEASE ORAL at 09:11

## 2019-11-19 RX ADMIN — INSULIN ASPART 2 UNITS: 100 INJECTION, SOLUTION INTRAVENOUS; SUBCUTANEOUS at 12:11

## 2019-11-19 RX ADMIN — INSULIN ASPART 1 UNITS: 100 INJECTION, SOLUTION INTRAVENOUS; SUBCUTANEOUS at 09:11

## 2019-11-19 RX ADMIN — FERROUS GLUCONATE TAB 324 MG (37.5 MG ELEMENTAL IRON) 324 MG: 324 (37.5 FE) TAB at 08:11

## 2019-11-19 RX ADMIN — ESCITALOPRAM OXALATE 10 MG: 10 TABLET ORAL at 08:11

## 2019-11-19 RX ADMIN — MIRTAZAPINE 7.5 MG: 7.5 TABLET ORAL at 09:11

## 2019-11-19 RX ADMIN — METOPROLOL SUCCINATE 50 MG: 50 TABLET, FILM COATED, EXTENDED RELEASE ORAL at 08:11

## 2019-11-19 RX ADMIN — ACETAMINOPHEN 650 MG: 325 TABLET ORAL at 03:11

## 2019-11-19 RX ADMIN — MINERAL OIL 1 ENEMA: 100 ENEMA RECTAL at 01:11

## 2019-11-19 RX ADMIN — INSULIN ASPART 2 UNITS: 100 INJECTION, SOLUTION INTRAVENOUS; SUBCUTANEOUS at 08:11

## 2019-11-19 RX ADMIN — INSULIN ASPART 4 UNITS: 100 INJECTION, SOLUTION INTRAVENOUS; SUBCUTANEOUS at 05:11

## 2019-11-19 RX ADMIN — HYDROCODONE BITARTRATE AND ACETAMINOPHEN 1 TABLET: 5; 325 TABLET ORAL at 01:11

## 2019-11-19 NOTE — PLAN OF CARE
Problem: Fall Injury Risk  Goal: Absence of Fall and Fall-Related Injury  Outcome: Ongoing, Progressing     Problem: Adult Inpatient Plan of Care  Goal: Plan of Care Review  Outcome: Ongoing, Progressing  Goal: Patient-Specific Goal (Individualization)  Outcome: Ongoing, Progressing  Goal: Readiness for Transition of Care  Outcome: Ongoing, Progressing   No falls, injury or trauma thus far this shift. Chest tube to -20cm suction, bloody drainage observed. Dressing to right chest with moist drainage observed. Continue with plan of care and continue to monitor patient.

## 2019-11-19 NOTE — PROGRESS NOTES
[facility] Hospital Medicine  Progress Note    Patient Name: Timbo Gallagher  MRN: [id number]  Patient Class: IP- Inpatient   Admission Date: 11/16/2019  Length of Stay: 3 days  Attending Physician: Miguelina Farr MD  Primary Care Provider: Cirilo Montero MD        Subjective:     Principal Problem:Hemothorax on right        HPI:  83 y/o male presents to the ER complaining of constant right sided rib pain since 4 days ago.  Symptoms worsened yesterday.  Patient was seen at urgent care for recent fall and diagnosed with multiple right sided rib fractures.  He reports associated shortness of breath, right sided abdominal pain and cough. Patient reports taking tylenol for symptoms without relief.  He states no other associated symptoms.  Patient also complaining of right elbow swelling after fall.  Patient denies any fever, chills or hemoptysis.  Chest CT showing large volume pleural fluid, concerning for hemothorax.  Patient had been doing well prior to fall with no recent complaints.  No other complaints.    Overview/Hospital Course:  83 y/o male with recent fall and rib fractures presented with cough and SOB.  Imaging with new large right sided pleural effusion.  Admitted with concern of hemothorax.  Surgery consulted.  S/P chest tube placement on 11/16.  Anemia of acute blood loss with dropping H/H.  ASA held.  Patient also retaining urine.  Watson placed.  Labs showing ARF.  Urology consulted and patient started on IVF's.ARF is improving,    Interval History: Unable to urinate and watson placed.    Review of Systems   HENT: Negative for ear discharge and ear pain.    Eyes: Negative for pain and itching.   Endocrine: Negative for polyphagia and polyuria.   Neurological: Negative for seizures and syncope.     Objective:     Vital Signs (Most Recent):  Temp: 98.7 °F (37.1 °C) (11/19/19 0736)  Pulse: 64 (11/19/19 0736)  Resp: 18 (11/19/19 0736)  BP: (!) 155/67 (11/19/19 0736)  SpO2: 95 %  (11/19/19 0736) Vital Signs (24h Range):  Temp:  [97.5 °F (36.4 °C)-98.7 °F (37.1 °C)] 98.7 °F (37.1 °C)  Pulse:  [60-80] 64  Resp:  [14-18] 18  SpO2:  [91 %-97 %] 95 %  BP: (115-155)/(51-67) 155/67     Weight: 79.8 kg (175 lb 14.8 oz)  Body mass index is 25.24 kg/m².    Intake/Output Summary (Last 24 hours) at 11/19/2019 1044  Last data filed at 11/19/2019 0900  Gross per 24 hour   Intake 3338.34 ml   Output 2240 ml   Net 1098.34 ml      Physical Exam   Constitutional: He is oriented to person, place, and time. No distress.   HENT:   Head: Normocephalic and atraumatic.   Eyes: Conjunctivae are normal. Right eye exhibits no discharge. Left eye exhibits no discharge.   Neck: Neck supple. No tracheal deviation present.   Cardiovascular: Normal rate and regular rhythm.   Pulmonary/Chest: Effort normal.   Right sided chest tube in place   Abdominal: Soft. Bowel sounds are normal.   Musculoskeletal:   Right elbow effusion   Neurological: He is alert and oriented to person, place, and time.   Skin: Skin is warm and dry. He is not diaphoretic.       Significant Labs:   BMP:   Recent Labs   Lab 11/19/19  0813   *      K 3.9      CO2 23   BUN 39*   CREATININE 2.0*   CALCIUM 8.3*     CBC:   Recent Labs   Lab 11/18/19  0506 11/18/19  1523 11/19/19  0516   WBC 12.60  --  11.71   HGB 7.5* 8.1* 7.2*   HCT 24.5* 25.0* 23.2*     --  177       Significant Imaging: I have reviewed all pertinent imaging results/findings within the past 24 hours.      Assessment/Plan:      * Hemothorax on right  S/P fall with rib fractures.  CT showing high volume pleural fluid.  Concerning for hemothorax.  Appreciate Surgery input.  S/P chest tube placement on 11/16      Essential hypertension  Resume home meds with parameters.  Low Na diet.  Holding Losartan and Lasix secondary to worsening renal failure.      Type 2 diabetes mellitus with stage 3 chronic kidney disease  Hold home oral medications.  Diabetic diet and insulin  sliding scale.      Chronic diastolic heart failure  No evidence of acute exacerbation.  Seems more fluid depleted.  Hold Lasix.      Anemia of chronic disease  H/H similar to previous labs.  Now with anemia of acute blood loss secondary to hemothorax.  Significant drop in H/H.  Repeat labs and transfuse if lower.      Acute renal failure superimposed on stage 3 chronic kidney disease  improving with IVF.      AICD (automatic cardioverter/defibrillator) present        Mixed hyperlipidemia  Continue Statin.      CKD (chronic kidney disease), stage III  Now with ARF.  ARF probably combination of obstruction and pre renal.  Colunga placed.  Start IVF hydration.  Avoid nephrotoxic medications.  Stop Losartan.      Aortic valve stenosis  S/P TAVR        VTE Risk Mitigation (From admission, onward)         Ordered     Place sequential compression device  Until discontinued      11/16/19 1200     IP VTE HIGH RISK PATIENT  Once      11/16/19 0712     Place DEYA hose  Until discontinued      11/16/19 0712                      Miguelina Farr MD  Department of Hospital Medicine   [facility]

## 2019-11-19 NOTE — PROGRESS NOTES
"gen surg  No sob, mild R chest pain w deep breath   ct yest report and films reviewed, min pleural fluid remains  Ct 300cc old bloody output overnight reported to be by nurse  Ct no aitr leak-draining old blood  Lungs good bs bilat, T ct dressijng dry  Blood pressure 136/60, pulse 64, temperature 98 °F (36.7 °C), temperature source Oral, resp. rate 14, height 5' 10" (1.778 m), weight 79.8 kg (175 lb 14.8 oz), SpO2 97 %.  a/p s/p r tube thorocosotmy for presumed hemothorax after rib fx-ct to water seal, IS, pain control, oob   "

## 2019-11-19 NOTE — NURSING
Pt aaox2, chest tube to right chest to water seal, output during am shift 300ml, dressing reinforced. Colunga catheter draining clear yellow urine. IS encouraged, pt able to reach 500ml. Iv fluids infusing as needed. Enema and suppository given, no bm yet. Scds/drake hose on. Bed alarm on

## 2019-11-19 NOTE — PROGRESS NOTES
[facility]  Urology  Progress Note    Patient Name: Timbo Gallagher  MRN: [id number]  Admission Date: 11/16/2019  Hospital Length of Stay: 3 days  Code Status: Full Code   Attending Provider: Miguelina Farr MD   Primary Care Physician: Cirilo Montero MD    Subjective:     HPI:  83yo M with UR. He reports some baseline LUTS - slow stream intermittently. He is followed by urology as outpatient - last seen 5/19. He has had issues with UR in the past. He is not currently on BPH medications in hospital. He was on Flomax at last  visit. Colunga catheter placed with 300cc UOP. He is unsure when his last BM was, suppository given earlier today.     Interval History: Tolerating Colunga    Review of Systems   Constitutional: Negative.    HENT: Negative.    Eyes: Negative.    Respiratory: Negative for cough, chest tightness and shortness of breath.    Cardiovascular: Negative for chest pain.   Gastrointestinal: Negative.  Negative for constipation, diarrhea and nausea.   Musculoskeletal: Negative.    Neurological: Negative.    Psychiatric/Behavioral: Negative.      Objective:     Temp:  [97.5 °F (36.4 °C)-98.7 °F (37.1 °C)] 98.7 °F (37.1 °C)  Pulse:  [60-80] 64  Resp:  [14-18] 18  SpO2:  [91 %-97 %] 95 %  BP: (115-155)/(51-67) 155/67     Body mass index is 25.24 kg/m².      Bladder Scan Volume (mL): 467 mL (11/18/19 0900)    Drains     Drain                 Chest Tube 11/16/19 1135 1 Right 28 Fr. 2 days         Urethral Catheter 11/18/19 0927 less than 1 day                Physical Exam   Nursing note and vitals reviewed.  Constitutional: He is oriented to person, place, and time. He appears well-developed and well-nourished.   HENT:   Head: Normocephalic.   Eyes: Conjunctivae are normal.   Neck: Normal range of motion. No tracheal deviation present. No thyromegaly present.   Cardiovascular: Normal rate and normal heart sounds.    Pulmonary/Chest: Effort normal and breath sounds normal. No  respiratory distress. He has no wheezes.   Abdominal: Soft. He exhibits no distension and no mass. There is no hepatosplenomegaly. There is no tenderness. There is no rebound, no guarding and no CVA tenderness. No hernia. Hernia confirmed negative in the right inguinal area and confirmed negative in the left inguinal area.   Genitourinary: Testes normal and penis normal. Right testis shows no mass and no tenderness. Left testis shows no mass and no tenderness.   Genitourinary Comments: Colunga with yellow urine   Musculoskeletal: He exhibits no edema or tenderness.   Lymphadenopathy: No inguinal adenopathy noted on the right or left side.   Neurological: He is alert and oriented to person, place, and time.   Skin: Skin is warm and dry. No rash noted. No erythema.     Psychiatric: He has a normal mood and affect. His behavior is normal. Judgment and thought content normal.       Significant Labs:    BMP:  Recent Labs   Lab 11/16/19  0323 11/18/19  0506    137   K 3.5 4.5    103   CO2 27 29   BUN 27* 50*   CREATININE 1.7* 3.0*   CALCIUM 9.3 8.6*       CBC:   Recent Labs   Lab 11/17/19  0418 11/18/19  0506 11/18/19  1523 11/19/19  0516   WBC 12.21 12.60  --  11.71   HGB 9.0* 7.5* 8.1* 7.2*   HCT 28.2* 24.5* 25.0* 23.2*    157  --  177       Blood Culture: No results for input(s): LABBLOO in the last 168 hours.  Urine Culture: No results for input(s): LABURIN in the last 168 hours.    Significant Imaging:                    Assessment/Plan:     Urinary retention   - Start Flomax if able to tolerate   - Colunga x 2-4 days   - Avoid/treat constipation    BPH with obstruction/lower urinary tract symptoms   - Start Flomax if BP able to tolerate, will defer to primary        VTE Risk Mitigation (From admission, onward)         Ordered     Place sequential compression device  Until discontinued      11/16/19 1200     IP VTE HIGH RISK PATIENT  Once      11/16/19 0712     Place DEYA hose  Until discontinued       11/16/19 0712                WERNER Lyon MD  Urology  [facility]

## 2019-11-19 NOTE — SUBJECTIVE & OBJECTIVE
Interval History: Unable to urinate and watson placed.    Review of Systems   HENT: Negative for ear discharge and ear pain.    Eyes: Negative for pain and itching.   Endocrine: Negative for polyphagia and polyuria.   Neurological: Negative for seizures and syncope.     Objective:     Vital Signs (Most Recent):  Temp: 98.7 °F (37.1 °C) (11/19/19 0736)  Pulse: 64 (11/19/19 0736)  Resp: 18 (11/19/19 0736)  BP: (!) 155/67 (11/19/19 0736)  SpO2: 95 % (11/19/19 0736) Vital Signs (24h Range):  Temp:  [97.5 °F (36.4 °C)-98.7 °F (37.1 °C)] 98.7 °F (37.1 °C)  Pulse:  [60-80] 64  Resp:  [14-18] 18  SpO2:  [91 %-97 %] 95 %  BP: (115-155)/(51-67) 155/67     Weight: 79.8 kg (175 lb 14.8 oz)  Body mass index is 25.24 kg/m².    Intake/Output Summary (Last 24 hours) at 11/19/2019 1044  Last data filed at 11/19/2019 0900  Gross per 24 hour   Intake 3338.34 ml   Output 2240 ml   Net 1098.34 ml      Physical Exam   Constitutional: He is oriented to person, place, and time. No distress.   HENT:   Head: Normocephalic and atraumatic.   Eyes: Conjunctivae are normal. Right eye exhibits no discharge. Left eye exhibits no discharge.   Neck: Neck supple. No tracheal deviation present.   Cardiovascular: Normal rate and regular rhythm.   Pulmonary/Chest: Effort normal.   Right sided chest tube in place   Abdominal: Soft. Bowel sounds are normal.   Musculoskeletal:   Right elbow effusion   Neurological: He is alert and oriented to person, place, and time.   Skin: Skin is warm and dry. He is not diaphoretic.       Significant Labs:   BMP:   Recent Labs   Lab 11/19/19  0813   *      K 3.9      CO2 23   BUN 39*   CREATININE 2.0*   CALCIUM 8.3*     CBC:   Recent Labs   Lab 11/18/19  0506 11/18/19  1523 11/19/19  0516   WBC 12.60  --  11.71   HGB 7.5* 8.1* 7.2*   HCT 24.5* 25.0* 23.2*     --  177       Significant Imaging: I have reviewed all pertinent imaging results/findings within the past 24 hours.

## 2019-11-19 NOTE — SUBJECTIVE & OBJECTIVE
Interval History: Tolerating Colunga    Review of Systems   Constitutional: Negative.    HENT: Negative.    Eyes: Negative.    Respiratory: Negative for cough, chest tightness and shortness of breath.    Cardiovascular: Negative for chest pain.   Gastrointestinal: Negative.  Negative for constipation, diarrhea and nausea.   Musculoskeletal: Negative.    Neurological: Negative.    Psychiatric/Behavioral: Negative.      Objective:     Temp:  [97.5 °F (36.4 °C)-98.7 °F (37.1 °C)] 98.7 °F (37.1 °C)  Pulse:  [60-80] 64  Resp:  [14-18] 18  SpO2:  [91 %-97 %] 95 %  BP: (115-155)/(51-67) 155/67     Body mass index is 25.24 kg/m².      Bladder Scan Volume (mL): 467 mL (11/18/19 0900)    Drains     Drain                 Chest Tube 11/16/19 1135 1 Right 28 Fr. 2 days         Urethral Catheter 11/18/19 0927 less than 1 day                Physical Exam   Nursing note and vitals reviewed.  Constitutional: He is oriented to person, place, and time. He appears well-developed and well-nourished.   HENT:   Head: Normocephalic.   Eyes: Conjunctivae are normal.   Neck: Normal range of motion. No tracheal deviation present. No thyromegaly present.   Cardiovascular: Normal rate and normal heart sounds.    Pulmonary/Chest: Effort normal and breath sounds normal. No respiratory distress. He has no wheezes.   Abdominal: Soft. He exhibits no distension and no mass. There is no hepatosplenomegaly. There is no tenderness. There is no rebound, no guarding and no CVA tenderness. No hernia. Hernia confirmed negative in the right inguinal area and confirmed negative in the left inguinal area.   Genitourinary: Testes normal and penis normal. Right testis shows no mass and no tenderness. Left testis shows no mass and no tenderness.   Genitourinary Comments: Colunga with yellow urine   Musculoskeletal: He exhibits no edema or tenderness.   Lymphadenopathy: No inguinal adenopathy noted on the right or left side.   Neurological: He is alert and oriented to  person, place, and time.   Skin: Skin is warm and dry. No rash noted. No erythema.     Psychiatric: He has a normal mood and affect. His behavior is normal. Judgment and thought content normal.       Significant Labs:    BMP:  Recent Labs   Lab 11/16/19  0323 11/18/19  0506    137   K 3.5 4.5    103   CO2 27 29   BUN 27* 50*   CREATININE 1.7* 3.0*   CALCIUM 9.3 8.6*       CBC:   Recent Labs   Lab 11/17/19  0418 11/18/19  0506 11/18/19  1523 11/19/19  0516   WBC 12.21 12.60  --  11.71   HGB 9.0* 7.5* 8.1* 7.2*   HCT 28.2* 24.5* 25.0* 23.2*    157  --  177       Blood Culture: No results for input(s): LABBLOO in the last 168 hours.  Urine Culture: No results for input(s): LABURIN in the last 168 hours.    Significant Imaging:

## 2019-11-20 LAB
ANION GAP SERPL CALC-SCNC: 5 MMOL/L (ref 8–16)
BASOPHILS # BLD AUTO: 0.01 K/UL (ref 0–0.2)
BASOPHILS NFR BLD: 0.1 % (ref 0–1.9)
BUN SERPL-MCNC: 31 MG/DL (ref 8–23)
CALCIUM SERPL-MCNC: 8.6 MG/DL (ref 8.7–10.5)
CHLORIDE SERPL-SCNC: 112 MMOL/L (ref 95–110)
CO2 SERPL-SCNC: 26 MMOL/L (ref 23–29)
CREAT SERPL-MCNC: 1.5 MG/DL (ref 0.5–1.4)
DIFFERENTIAL METHOD: ABNORMAL
EOSINOPHIL # BLD AUTO: 0 K/UL (ref 0–0.5)
EOSINOPHIL NFR BLD: 0.1 % (ref 0–8)
ERYTHROCYTE [DISTWIDTH] IN BLOOD BY AUTOMATED COUNT: 14.1 % (ref 11.5–14.5)
EST. GFR  (AFRICAN AMERICAN): 49 ML/MIN/1.73 M^2
EST. GFR  (NON AFRICAN AMERICAN): 42 ML/MIN/1.73 M^2
GLUCOSE SERPL-MCNC: 163 MG/DL (ref 70–110)
HCT VFR BLD AUTO: 26.2 % (ref 40–54)
HGB BLD-MCNC: 8.1 G/DL (ref 14–18)
IMM GRANULOCYTES # BLD AUTO: 0.12 K/UL (ref 0–0.04)
IMM GRANULOCYTES NFR BLD AUTO: 0.9 % (ref 0–0.5)
LYMPHOCYTES # BLD AUTO: 0.4 K/UL (ref 1–4.8)
LYMPHOCYTES NFR BLD: 2.9 % (ref 18–48)
MCH RBC QN AUTO: 32 PG (ref 27–31)
MCHC RBC AUTO-ENTMCNC: 30.9 G/DL (ref 32–36)
MCV RBC AUTO: 104 FL (ref 82–98)
MONOCYTES # BLD AUTO: 1.2 K/UL (ref 0.3–1)
MONOCYTES NFR BLD: 9.1 % (ref 4–15)
NEUTROPHILS # BLD AUTO: 11.9 K/UL (ref 1.8–7.7)
NEUTROPHILS NFR BLD: 86.9 % (ref 38–73)
NRBC BLD-RTO: 0 /100 WBC
PLATELET # BLD AUTO: 205 K/UL (ref 150–350)
PMV BLD AUTO: 10.9 FL (ref 9.2–12.9)
POCT GLUCOSE: 139 MG/DL (ref 70–110)
POCT GLUCOSE: 167 MG/DL (ref 70–110)
POCT GLUCOSE: 194 MG/DL (ref 70–110)
POCT GLUCOSE: 213 MG/DL (ref 70–110)
POCT GLUCOSE: 215 MG/DL (ref 70–110)
POTASSIUM SERPL-SCNC: 4.2 MMOL/L (ref 3.5–5.1)
RBC # BLD AUTO: 2.53 M/UL (ref 4.6–6.2)
SODIUM SERPL-SCNC: 143 MMOL/L (ref 136–145)
WBC # BLD AUTO: 13.66 K/UL (ref 3.9–12.7)

## 2019-11-20 PROCEDURE — 97535 SELF CARE MNGMENT TRAINING: CPT

## 2019-11-20 PROCEDURE — 25000003 PHARM REV CODE 250: Performed by: HOSPITALIST

## 2019-11-20 PROCEDURE — 80048 BASIC METABOLIC PNL TOTAL CA: CPT

## 2019-11-20 PROCEDURE — 63600175 PHARM REV CODE 636 W HCPCS: Performed by: HOSPITALIST

## 2019-11-20 PROCEDURE — 97116 GAIT TRAINING THERAPY: CPT

## 2019-11-20 PROCEDURE — 99232 PR SUBSEQUENT HOSPITAL CARE,LEVL II: ICD-10-PCS | Mod: ,,, | Performed by: UROLOGY

## 2019-11-20 PROCEDURE — 11000001 HC ACUTE MED/SURG PRIVATE ROOM

## 2019-11-20 PROCEDURE — 85025 COMPLETE CBC W/AUTO DIFF WBC: CPT

## 2019-11-20 PROCEDURE — 97162 PT EVAL MOD COMPLEX 30 MIN: CPT

## 2019-11-20 PROCEDURE — 25000003 PHARM REV CODE 250: Performed by: SURGERY

## 2019-11-20 PROCEDURE — 27000221 HC OXYGEN, UP TO 24 HOURS

## 2019-11-20 PROCEDURE — 99232 SBSQ HOSP IP/OBS MODERATE 35: CPT | Mod: ,,, | Performed by: UROLOGY

## 2019-11-20 PROCEDURE — 97166 OT EVAL MOD COMPLEX 45 MIN: CPT

## 2019-11-20 PROCEDURE — 36415 COLL VENOUS BLD VENIPUNCTURE: CPT

## 2019-11-20 PROCEDURE — 94799 UNLISTED PULMONARY SVC/PX: CPT

## 2019-11-20 RX ADMIN — GUAIFENESIN AND DEXTROMETHORPHAN 5 ML: 100; 10 SYRUP ORAL at 05:11

## 2019-11-20 RX ADMIN — FERROUS GLUCONATE TAB 324 MG (37.5 MG ELEMENTAL IRON) 324 MG: 324 (37.5 FE) TAB at 08:11

## 2019-11-20 RX ADMIN — METOPROLOL SUCCINATE 50 MG: 50 TABLET, FILM COATED, EXTENDED RELEASE ORAL at 09:11

## 2019-11-20 RX ADMIN — AMIODARONE HYDROCHLORIDE 200 MG: 200 TABLET ORAL at 08:11

## 2019-11-20 RX ADMIN — CETIRIZINE HYDROCHLORIDE 5 MG: 5 TABLET ORAL at 08:11

## 2019-11-20 RX ADMIN — GUAIFENESIN 600 MG: 600 TABLET, EXTENDED RELEASE ORAL at 09:11

## 2019-11-20 RX ADMIN — CARBAMIDE PEROXIDE 6.5% 5 DROP: 6.5 LIQUID AURICULAR (OTIC) at 08:11

## 2019-11-20 RX ADMIN — GUAIFENESIN AND DEXTROMETHORPHAN 5 ML: 100; 10 SYRUP ORAL at 11:11

## 2019-11-20 RX ADMIN — SENNOSIDES AND DOCUSATE SODIUM 2 TABLET: 8.6; 5 TABLET ORAL at 09:11

## 2019-11-20 RX ADMIN — SODIUM CHLORIDE: 0.9 INJECTION, SOLUTION INTRAVENOUS at 01:11

## 2019-11-20 RX ADMIN — FAMOTIDINE 20 MG: 20 TABLET ORAL at 08:11

## 2019-11-20 RX ADMIN — MIRTAZAPINE 7.5 MG: 7.5 TABLET ORAL at 09:11

## 2019-11-20 RX ADMIN — INSULIN ASPART 4 UNITS: 100 INJECTION, SOLUTION INTRAVENOUS; SUBCUTANEOUS at 01:11

## 2019-11-20 RX ADMIN — GUAIFENESIN AND DEXTROMETHORPHAN 5 ML: 100; 10 SYRUP ORAL at 01:11

## 2019-11-20 RX ADMIN — METOPROLOL SUCCINATE 50 MG: 50 TABLET, FILM COATED, EXTENDED RELEASE ORAL at 08:11

## 2019-11-20 RX ADMIN — CEFTRIAXONE 1 G: 1 INJECTION, SOLUTION INTRAVENOUS at 01:11

## 2019-11-20 RX ADMIN — GUAIFENESIN 600 MG: 600 TABLET, EXTENDED RELEASE ORAL at 08:11

## 2019-11-20 RX ADMIN — INSULIN ASPART 4 UNITS: 100 INJECTION, SOLUTION INTRAVENOUS; SUBCUTANEOUS at 06:11

## 2019-11-20 RX ADMIN — GUAIFENESIN AND DEXTROMETHORPHAN 5 ML: 100; 10 SYRUP ORAL at 12:11

## 2019-11-20 RX ADMIN — GUAIFENESIN AND DEXTROMETHORPHAN 5 ML: 100; 10 SYRUP ORAL at 06:11

## 2019-11-20 RX ADMIN — ACETAMINOPHEN 650 MG: 325 TABLET ORAL at 09:11

## 2019-11-20 RX ADMIN — ESCITALOPRAM OXALATE 10 MG: 10 TABLET ORAL at 08:11

## 2019-11-20 RX ADMIN — SENNOSIDES AND DOCUSATE SODIUM 2 TABLET: 8.6; 5 TABLET ORAL at 08:11

## 2019-11-20 RX ADMIN — INSULIN ASPART 2 UNITS: 100 INJECTION, SOLUTION INTRAVENOUS; SUBCUTANEOUS at 08:11

## 2019-11-20 RX ADMIN — DONEPEZIL HYDROCHLORIDE 5 MG: 5 TABLET, FILM COATED ORAL at 09:11

## 2019-11-20 RX ADMIN — AMLODIPINE BESYLATE 10 MG: 5 TABLET ORAL at 08:11

## 2019-11-20 RX ADMIN — CARBAMIDE PEROXIDE 6.5% 5 DROP: 6.5 LIQUID AURICULAR (OTIC) at 09:11

## 2019-11-20 RX ADMIN — TAMSULOSIN HYDROCHLORIDE 0.4 MG: 0.4 CAPSULE ORAL at 08:11

## 2019-11-20 RX ADMIN — ATORVASTATIN CALCIUM 40 MG: 40 TABLET, FILM COATED ORAL at 08:11

## 2019-11-20 NOTE — PLAN OF CARE
Problem: Fall Injury Risk  Goal: Absence of Fall and Fall-Related Injury  Outcome: Ongoing, Progressing     Problem: Adult Inpatient Plan of Care  Goal: Plan of Care Review  Outcome: Ongoing, Progressing     Problem: Adult Inpatient Plan of Care  Goal: Absence of Hospital-Acquired Illness or Injury  Outcome: Ongoing, Progressing     Problem: Adult Inpatient Plan of Care  Goal: Readiness for Transition of Care  Outcome: Ongoing, Progressing

## 2019-11-20 NOTE — PT/OT/SLP EVAL
Physical Therapy Evaluation    Patient Name:  Timbo Gallagher   MRN:  [id number]    Recommendations:     Discharge Recommendations:  home health PT(with 24 hour supervision/assistance)   Discharge Equipment Recommendations: bedside commode   Barriers to discharge home: Inaccessible home    Assessment:     Timbo Gallagher is a 84 y.o. male admitted with a medical diagnosis of Hemothorax on right.  He presents with the following impairments/functional limitations:  weakness, impaired endurance, impaired functional mobilty, gait instability, impaired balance, visual deficits, impaired cognition, decreased coordination, decreased upper extremity function, decreased lower extremity function, decreased safety awareness, pain, impaired skin, impaired cardiopulmonary response to activity.    Rehab Prognosis: Fair+; patient would benefit from acute skilled PT services to address these deficits and reach maximum level of function.    Recent Surgery: Procedure(s) (LRB):  INSERTION, CATHETER, INTERCOSTAL, FOR DRAINAGE (Right) 4 Days Post-Op    Plan:     During this hospitalization, patient to be seen 5 x/week to address the identified rehab impairments via gait training, therapeutic activities, therapeutic exercises and progress toward the following goals:    · Plan of Care Expires:  12/04/19    Subjective     Chief Complaint: weakness and dizziness  Patient/Family Comments/goals: Pt agreeable to therapy.   Pain/Comfort:  Pain Rating 1: (R flank pain)    Living Environment:  Pt lives with spouse in a SS house with ~3 steps and B HR at entry.   Prior to admission, patients level of function was ambulatory with str cane.  Equipment used at home: cane, straight, walker, rolling, wheelchair, bath bench.  Upon discharge, patient will have assistance from family (spouse, daughter and son).    Objective:     Patient found HOB elevated with watson catheter, chest tube, oxygen 2L, peripheral IV, telemetry, bed alarm, SCD(Avasys  monitor) upon PT entry to room.    General Precautions: Standard, fall, respiratory, diabetic, vision impaired, hearing impaired   Orthopedic Precautions:N/A   Braces: N/A     Exams:  · Cognitive Exam:  Patient is oriented to Person, Place and Time.  Pt able to state his  and age.    · Gross Motor Coordination:  WFL  · Postural Exam:  Patient presented with the following abnormalities:    · -       Rounded shoulders  · -       Forward head  · Skin Integrity/Edema:      · -       Skin integrity: R flank CT insertion/dressings intact; prophylactic dressing to sacrum intact  · -       Edema: None noted BLE  · BLE ROM: WFL  · BLE Strength: WFL    Functional Mobility:  Pt pleasantly confused, able to follow simple commands to participate in therapy.  Pt did have some conversations during therapy that was not appropriate to situation, able to be redirected however required repetitions.      · Bed Mobility:     · Scooting: stand by assistance  · Supine to Sit: stand by assistance with HOB elevated   · Transfers:     · Sit to Stand:  contact guard assistance with rolling walker x 2 trials   · Bed to Chair: contact guard assistance and minimum assistance with  rolling walker  using  Step Transfer; Pt had difficulty following commands to sit down in bedside chair.    · Gait: Pt ambulated ~30 ft and ~70 ft with min A-CGA using RW, 2L O2 NC, and chair to follow.  Pt with minimal forward flexed posture, minimal unsteadiness, decreased step length, and decreased mike.  · Balance: Pt with fair dynamic standing balance.       Therapeutic Activities and Exercises:  Pt educated on call light and calling for nursing assistance with OOB activities.  Pt with confusion and will need reinforcement, has Avasys monitor and chair alarm while up in the chair.    AM-PAC 6 CLICK MOBILITY  Total Score:21     Patient left up in chair on seat cushion and BLE elevated on pillow with all lines intact, call button in reach, chair alarm on,  nurse Wale notified and [place] monitor present.    GOALS:   Multidisciplinary Problems     Physical Therapy Goals        Problem: Physical Therapy Goal    Goal Priority Disciplines Outcome Goal Variances Interventions   Physical Therapy Goal     PT, PT/OT      Description:  Goals to be met by: 19     Patient will increase functional independence with mobility by performin. Supine to sit with Supervision  2. Rolling to Left and Right with Supervision  3. Sit to stand transfer with Supervision using RW  4. Bed to chair transfer with Supervision using Rolling Walker  5. Gait >250 feet with Supervision using Rolling Walker  6. Lower extremity exercise program 2 sets x10 reps per handout, with supervision                      History:     Past Medical History:   Diagnosis Date    Acute renal failure     Arthritis     Blind right eye     BPH (benign prostatic hypertrophy)     Bronchitis, chronic     Carotid artery occlusion     Lt carotid endarerectomy done 2018     CHF (congestive heart failure)     Colon polyp     Coronary artery disease     Diabetes mellitus     GERD (gastroesophageal reflux disease)     Hearing aid worn     bilateral    Hematuria     Hernia     Hypertension     Influenza A     Irregular heart beat     NSVT (nonsustained ventricular tachycardia) 2018    Renal failure as complication of care     sepsis, renal function returned    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 Coleman    CATARACT EXTRACTION, BILATERAL      ESOPHAGOGASTRODUODENOSCOPY N/A 8/30/2018    Procedure: EGD (ESOPHAGOGASTRODUODENOSCOPY);  Surgeon: Tye Navarrete MD;  Location: [address]);  Service: Endoscopy;  Laterality: N/A;    ESOPHAGOGASTRODUODENOSCOPY  08/30/2018    EYE SURGERY      HERNIA REPAIR Left 09/2013    RETINAL DETACHMENT SURGERY      THORACENTESIS N/A 1/17/2019    Procedure: THORACENTESIS;  Surgeon: Hernan Diagnostic Provider;  Location: [facility];  Service: Radiology;  Laterality: N/A;  1130AM  START  RN PRE OP 1-15-19    TUBE THORACOTOMY Right 11/16/2019    Procedure: INSERTION, CATHETER, INTERCOSTAL, FOR DRAINAGE;  Surgeon: Silvio Tay MD;  Location: [facility];  Service: General;  Laterality: Right;       Time Tracking:     PT Received On: 11/20/19  PT Start Time: 0907     PT Stop Time: 0937  PT Total Time (min): 30 min     Billable Minutes: Evaluation 20 min (co-eval with OT) and Gait Training 10 min      Olga José, PT  11/20/2019

## 2019-11-20 NOTE — PROGRESS NOTES
Surgery Progress Note    Primary Problem: Hemothorax on right    Other problems:   Active Hospital Problems    Diagnosis  POA    *Hemothorax on right [J94.2]  Yes    Acute renal failure superimposed on stage 3 chronic kidney disease [N17.9, N18.3]  Yes    Urinary retention [R33.9]  Yes    AICD (automatic cardioverter/defibrillator) present [Z95.810]  Yes     [employer] dual AICD 4/23/18      Mixed hyperlipidemia [E78.2]  Yes    CKD (chronic kidney disease), stage III [N18.3]  Yes    Aortic valve stenosis [I35.0]  Yes    Essential hypertension [I10]  Yes    Chronic diastolic heart failure [I50.32]  Yes    Anemia of chronic disease [D63.8]  Yes    BPH with obstruction/lower urinary tract symptoms [N40.1, N13.8]  Yes    Type 2 diabetes mellitus with stage 3 chronic kidney disease [E11.22, N18.3]  Yes      Resolved Hospital Problems   No resolved problems to display.       HD #  LOS: 4 days   POD #4 Days Post-Op status post chest tube placement    Overnight events:  No acute events overnight.  Denies shortness of breath      Diet - Diet diabetic [facility]; 2000 Calorie; Cardiac (Low Na/Chol)     I/O last 3 completed shifts:  In: 3698.3 [P.O.:1680; I.V.:2018.3]  Out: 3540 [Urine:2900; Chest Tube:640]    Intake/Output Summary (Last 24 hours) at 11/20/2019 0644  Last data filed at 11/20/2019 0600  Gross per 24 hour   Intake 3133 ml   Output 1450 ml   Net 1683 ml     Chest tube output 450 old blood  Temp:  [97.4 °F (36.3 °C)-98.7 °F (37.1 °C)] 98 °F (36.7 °C)  Pulse:  [60-75] 73  Resp:  [14-18] 16  SpO2:  [89 %-96 %] 89 %  BP: (121-155)/(52-67) 123/59    Gen: alert, well appearing, and in no distress,    Chest:  Clear to auscultation bilaterally. No air leak  Recent Labs   Lab 11/16/19  0323 11/17/19  0418 11/18/19  0506 11/18/19  1523 11/19/19  0516 11/19/19  0813 11/20/19  0454   WBC 9.90 12.21 12.60  --  11.71  --  13.66*   HGB 12.2* 9.0* 7.5* 8.1* 7.2*  --  8.1*   HCT 38.6* 28.2* 24.5* 25.0* 23.2*  --   26.2*    156 157  --  177  --  205     --  137  --   --  141 143   K 3.5  --  4.5  --   --  3.9 4.2     --  103  --   --  110 112*   BUN 27*  --  50*  --   --  39* 31*   *  --  141*  --   --  145* 163*   PROT 7.2  --   --   --   --   --   --    ALBUMIN 3.1*  --   --   --   --   --   --    BILITOT 0.5  --   --   --   --   --   --    AST 18  --   --   --   --   --   --    ALKPHOS 123  --   --   --   --   --   --    ALT 14  --   --   --   --   --   --         Assessment:  Status post chest tube for hemothorax    Plan:  Continue chest tube to water seal  Follow-up a.m. chest x-ray    Giles Lezama MD

## 2019-11-20 NOTE — PLAN OF CARE
Problem: Occupational Therapy Goal  Goal: Occupational Therapy Goal  Description  Goals to be met by: 12/4/19     Patient will increase functional independence with ADLs by performing:    Feeding with Set-up Assistance.  UE Dressing with Set-up Assistance.  LE Dressing with Stand-by Assistance.  Grooming while standing at sink with Stand-by Assistance.  Toileting from bedside commode with Stand-by Assistance for hygiene and clothing management.   Supine to sit with Supervision.  Step transfer with Stand-by Assistance  Toilet transfer to bedside commode with Stand-by Assistance.  Upper extremity exercise program x15 reps per handout, with assistance as needed.     Outcome: Ongoing, Progressing     Pt will continue to benefit from acute OT in order to increase safety awareness and independence with ADLs and all aspects of functional mobility. OT rec. HHOT with 24 hour supervision/assist at d/c to regain PLOF and reduce risk of falls/unplanned readmission.

## 2019-11-20 NOTE — PLAN OF CARE
Problem: Physical Therapy Goal  Goal: Physical Therapy Goal  Description  Goals to be met by: 19     Patient will increase functional independence with mobility by performin. Supine to sit with Supervision  2. Rolling to Left and Right with Supervision  3. Sit to stand transfer with Supervision using RW  4. Bed to chair transfer with Supervision using Rolling Walker  5. Gait >250 feet with Supervision using Rolling Walker  6. Lower extremity exercise program 2 sets x10 reps per handout, with supervision     Outcome: Ongoing, Progressing    Pt ambulated ~30 ft and ~70 ft with min A-CGA using RW and 2L O2 NC.

## 2019-11-20 NOTE — PLAN OF CARE
11/20/19 1534   Discharge Reassessment   Assessment Type Discharge Planning Reassessment   Discharge recommendations:  Home with HH, BSC and 24 hour supervision.  Wife agrees to plan.

## 2019-11-20 NOTE — PROGRESS NOTES
[facility]  Urology  Progress Note    Patient Name: Timbo Gallagher  MRN: [id number]  Admission Date: 11/16/2019  Hospital Length of Stay: 4 days  Code Status: Full Code   Attending Provider: Miguelina Farr MD   Primary Care Physician: Cirilo Montero MD    Subjective:     HPI:  83yo M with UR. He reports some baseline LUTS - slow stream intermittently. He is followed by urology as outpatient - last seen 5/19. He has had issues with UR in the past. He is not currently on BPH medications in hospital. He was on Flomax at last  visit. Colunga catheter placed with 300cc UOP. He is unsure when his last BM was, suppository given earlier today.     Interval History: confused this morning.  Tolerating Colunga when asked.    Review of Systems   Unable to perform ROS: Mental status change     Objective:     Temp:  [97.4 °F (36.3 °C)-98.5 °F (36.9 °C)] 98.5 °F (36.9 °C)  Pulse:  [60-76] 76  Resp:  [14-18] 18  SpO2:  [89 %-95 %] 94 %  BP: (121-144)/(52-65) 144/65     Body mass index is 25.24 kg/m².      Bladder Scan Volume (mL): 467 mL (11/18/19 0900)    Drains     Drain                 Chest Tube 11/16/19 1135 1 Right 28 Fr. 3 days         Urethral Catheter 11/18/19 0927 1 day                Physical Exam   Nursing note and vitals reviewed.  Constitutional: He is oriented to person, place, and time. He appears well-developed and well-nourished.   HENT:   Head: Normocephalic.   Eyes: Conjunctivae are normal.   Neck: Normal range of motion. Neck supple. No tracheal deviation present. No thyromegaly present.   Cardiovascular: Normal rate and normal heart sounds.    Pulmonary/Chest: Effort normal and breath sounds normal. No respiratory distress. He has no wheezes.   Abdominal: Soft. Bowel sounds are normal. There is no hepatosplenomegaly. There is no tenderness. There is no rebound and no CVA tenderness. No hernia.   Musculoskeletal: Normal range of motion. He exhibits no edema or tenderness.    Lymphadenopathy:     He has no cervical adenopathy.   Neurological: He is alert and oriented to person, place, and time.   Skin: Skin is warm and dry. No rash noted. No erythema.     Psychiatric: He has a normal mood and affect. His behavior is normal. Judgment and thought content normal.       Significant Labs:    BMP:  Recent Labs   Lab 11/18/19  0506 11/19/19  0813 11/20/19  0454    141 143   K 4.5 3.9 4.2    110 112*   CO2 29 23 26   BUN 50* 39* 31*   CREATININE 3.0* 2.0* 1.5*   CALCIUM 8.6* 8.3* 8.6*       CBC:   Recent Labs   Lab 11/18/19  0506 11/18/19  1523 11/19/19  0516 11/20/19  0454   WBC 12.60  --  11.71 13.66*   HGB 7.5* 8.1* 7.2* 8.1*   HCT 24.5* 25.0* 23.2* 26.2*     --  177 205       Blood Culture: No results for input(s): LABBLOO in the last 168 hours.  Urine Culture: No results for input(s): LABURIN in the last 168 hours.    Significant Imaging:                    Assessment/Plan:     Urinary retention   - Start Flomax if able to tolerate   - Colunga x 1-3 days   - Avoid/treat constipation    BPH with obstruction/lower urinary tract symptoms   - Start Flomax if BP able to tolerate, will defer to primary        VTE Risk Mitigation (From admission, onward)         Ordered     Place sequential compression device  Until discontinued      11/16/19 1200     IP VTE HIGH RISK PATIENT  Once      11/16/19 0712     Place DEYA hose  Until discontinued      11/16/19 0712                WERNER Lyon MD  Urology  [facility]

## 2019-11-20 NOTE — PT/OT/SLP EVAL
Occupational Therapy   Evaluation    Name: Timbo Gallagher  MRN: [id number]  Admitting Diagnosis:  Hemothorax on right 4 Days Post-Op    Recommendations:     Discharge Recommendations: home health OT(with 24 hour supervision/assistance)  Discharge Equipment Recommendations:  bedside commode  Barriers to discharge:  None    Assessment:     Timbo Gallagher is a 84 y.o. male with a medical diagnosis of Hemothorax on right.  He presents with decreased independence/safety with ADLs and functional mobility. Performance deficits affecting function: weakness, impaired functional mobilty, impaired cognition, decreased safety awareness, impaired endurance, gait instability, pain, impaired balance, decreased upper extremity function, impaired skin, impaired self care skills, decreased lower extremity function, decreased coordination, visual deficits.      Rehab Prognosis: fair +; patient would benefit from acute skilled OT services to address these deficits and reach maximum level of function.       Plan:     Patient to be seen 5 x/week to address the above listed problems via self-care/home management, therapeutic activities, therapeutic exercises  · Plan of Care Expires: 12/04/19  · Plan of Care Reviewed with: patient    Subjective     Chief Complaint: spilled water all over himself while sitting up in the chair   Patient/Family Comments/goals: agreeable to ambulate 2x with seated rest break     Occupational Profile:  Living Environment: Pt lives with his wife and step-son in a SS house with ~4 steps at entry and B/L HR.   Previous level of function: Pt reports independence with ADLs and ambulates with the str c primarily, sometimes using his RW.   Equipment Used at Home:  cane, straight, walker, rolling, wheelchair, bath bench  Assistance upon Discharge: family (wife, step-son, daughter)     Pain/Comfort:  · Pain Rating 1: (R flank pain)    Patients cultural, spiritual, Sikhism conflicts given the current  situation: no    Objective:     Communicated with: Patient found HOB elevated with bed alarm, watson catheter, oxygen, peripheral IV, telemetry, SCD, chest tube(Avasys) upon OT entry to room.    General Precautions: Standard, fall, respiratory, vision impaired, hearing impaired   Orthopedic Precautions:N/A   Braces: N/A     Occupational Performance:    Bed Mobility:    · Patient completed Rolling/Turning to Left with  stand by assistance  · Patient completed Scooting/Bridging with stand by assistance  · Patient completed Supine to Sit with stand by assistance, with side rail and HOB elevated    Functional Mobility/Transfers:  · Patient completed Sit <> Stand Transfer with contact guard assistance  with  rolling walker   · Patient completed Bed <> Chair Transfer using Step Transfer technique with minimum assistance with rolling walker  · Functional Mobility: Pt ambulated within the hallway with chair following with MIN A/CGA with RW and chair following with 2L NC. Pt required max verbal cueing to avoid obstacles within the hallway. Pt required 1 seated rest break.  Please refer to PT note for gait assessment.     Activities of Daily Living:  · Feeding:  minimum assistance to help with pouring cup of water; OT went back into room after eval as pt spilled water on his gown/lap tray   · Upper Body Dressing: moderate assistance with changing gown and donning back gown with verbal cueing    Cognitive/Visual Perceptual:  Cognitive/Psychosocial Skills:     -       Oriented to: Person, Place and Time   -       Follows Commands/attention:follows most one-step commands; confusion towards the end of session with difficulty following one/two-step commands  -       Communication: clear/fluent  -       Memory: Impaired STM and Poor immediate recall  -       Safety awareness/insight to disability: impaired   -       Mood/Affect/Coping skills/emotional control: Appropriate to situation and Pleasant  Visual/Perceptual:      -blind R  eye; able to read toothpaste label      Physical Exam:  Balance:    -       seated: SBA/SUP; standing: CGA/MIN A with RW  Postural examination/scapula alignment:    -       Rounded shoulders  -       Forward head  Skin integrity: Visible skin intact  Edema:  no BUE edema noted  Sensation:    -       Intact  light/touch BUE  Dominant hand:    -       Right  Upper Extremity Range of Motion:     -       Right Upper Extremity: WFL  -       Left Upper Extremity: WFL  Upper Extremity Strength:    -       Right Upper Extremity: WFL  -       Left Upper Extremity: WFL   Strength:    -       Right Upper Extremity: WFL  -       Left Upper Extremity: WFL  Fine Motor Coordination:    -       Intact  Left hand, manipulation of objects and Right hand, manipulation of objects  Gross motor coordination:   WFL    AMPAC 6 Click ADL:  AMPAC Total Score: 18    Treatment & Education:  · Pt educated on OT role/POC.   · Importance of OOB activity with staff assistance.  · Safety during functional t/f and mobility   · Pt refused to eat his breakfast, stating that he will wait until he goes home later. Pt encouraged to increase nutritional intake while he is in the hospital  · Increased time required to have pt sit in the chair after ambulation as pt was very confused and required max verbal cueing and MOD A   · Increased time for pt to clean himself up after spilling water on himself, but able to complete this with set-up   · White board updated   · Multiple self-care tasks/functional mobility completed- assistance level noted above   · All questions/concerns answered within OT scope of practice     Education:    Patient left up in chair with all lines intact, call button in reach, chair alarm on, nurse, Wale, eduardo and Alexeyasys in place with lap tray in front of pt     GOALS:   Multidisciplinary Problems     Occupational Therapy Goals        Problem: Occupational Therapy Goal    Goal Priority Disciplines Outcome Interventions    Occupational Therapy Goal     OT, PT/OT Ongoing, Progressing    Description:  Goals to be met by: 12/4/19     Patient will increase functional independence with ADLs by performing:    Feeding with Set-up Assistance.  UE Dressing with Set-up Assistance.  LE Dressing with Stand-by Assistance.  Grooming while standing at sink with Stand-by Assistance.  Toileting from bedside commode with Stand-by Assistance for hygiene and clothing management.   Supine to sit with Supervision.  Step transfer with Stand-by Assistance  Toilet transfer to bedside commode with Stand-by Assistance.  Upper extremity exercise program x15 reps per handout, with assistance as needed.                      History:     Past Medical History:   Diagnosis Date    Acute renal failure     Arthritis     Blind right eye     BPH (benign prostatic hypertrophy)     Bronchitis, chronic     Carotid artery occlusion     Lt carotid endarerectomy done 02/19/2018     CHF (congestive heart failure)     Colon polyp     Coronary artery disease     Diabetes mellitus     GERD (gastroesophageal reflux disease)     Hearing aid worn     bilateral    Hematuria     Hernia     Hypertension     Influenza A     Irregular heart beat     NSVT (nonsustained ventricular tachycardia) 4/18/2018    Renal failure as complication of care     sepsis, renal function returned    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 Coleman    CATARACT EXTRACTION, BILATERAL      ESOPHAGOGASTRODUODENOSCOPY N/A 8/30/2018    Procedure: EGD (ESOPHAGOGASTRODUODENOSCOPY);  Surgeon: Tye Navarrete MD;  Location: [address]);  Service: Endoscopy;  Laterality: N/A;    ESOPHAGOGASTRODUODENOSCOPY  08/30/2018    EYE SURGERY      HERNIA REPAIR Left 09/2013    RETINAL DETACHMENT SURGERY      THORACENTESIS N/A 1/17/2019    Procedure: THORACENTESIS;  Surgeon: [facility] Diagnostic Provider;  Location: [facility];  Service: Radiology;  Laterality: N/A;  1130AM  START  RN PRE OP 1-15-19    TUBE THORACOTOMY Right 11/16/2019    Procedure: INSERTION, CATHETER, INTERCOSTAL, FOR DRAINAGE;  Surgeon: Silvio Tay MD;  Location: [facility];  Service: General;  Laterality: Right;       Time Tracking:     OT Date of Treatment: 11/20/19  OT Start Time: 0907  OT Stop Time: 0935  OT Total Time (min): 28 min     2nd visit:  Start Time: 0945  End Time: 0933   Total Time: 8 min: self-care     Billable Minutes:Evaluation 20 min (co-eval with PT)  Self Care/Home Management 8 min  Total Time 36 min    Tatyana Simon OT  11/20/2019

## 2019-11-20 NOTE — SUBJECTIVE & OBJECTIVE
Interval History: confused this morning.  Tolerating Colunga when asked.    Review of Systems   Unable to perform ROS: Mental status change     Objective:     Temp:  [97.4 °F (36.3 °C)-98.5 °F (36.9 °C)] 98.5 °F (36.9 °C)  Pulse:  [60-76] 76  Resp:  [14-18] 18  SpO2:  [89 %-95 %] 94 %  BP: (121-144)/(52-65) 144/65     Body mass index is 25.24 kg/m².      Bladder Scan Volume (mL): 467 mL (11/18/19 0900)    Drains     Drain                 Chest Tube 11/16/19 1135 1 Right 28 Fr. 3 days         Urethral Catheter 11/18/19 0927 1 day                Physical Exam   Nursing note and vitals reviewed.  Constitutional: He is oriented to person, place, and time. He appears well-developed and well-nourished.   HENT:   Head: Normocephalic.   Eyes: Conjunctivae are normal.   Neck: Normal range of motion. Neck supple. No tracheal deviation present. No thyromegaly present.   Cardiovascular: Normal rate and normal heart sounds.    Pulmonary/Chest: Effort normal and breath sounds normal. No respiratory distress. He has no wheezes.   Abdominal: Soft. Bowel sounds are normal. There is no hepatosplenomegaly. There is no tenderness. There is no rebound and no CVA tenderness. No hernia.   Musculoskeletal: Normal range of motion. He exhibits no edema or tenderness.   Lymphadenopathy:     He has no cervical adenopathy.   Neurological: He is alert and oriented to person, place, and time.   Skin: Skin is warm and dry. No rash noted. No erythema.     Psychiatric: He has a normal mood and affect. His behavior is normal. Judgment and thought content normal.       Significant Labs:    BMP:  Recent Labs   Lab 11/18/19  0506 11/19/19  0813 11/20/19  0454    141 143   K 4.5 3.9 4.2    110 112*   CO2 29 23 26   BUN 50* 39* 31*   CREATININE 3.0* 2.0* 1.5*   CALCIUM 8.6* 8.3* 8.6*       CBC:   Recent Labs   Lab 11/18/19  0506 11/18/19  1523 11/19/19  0516 11/20/19  0454   WBC 12.60  --  11.71 13.66*   HGB 7.5* 8.1* 7.2* 8.1*   HCT 24.5* 25.0*  23.2* 26.2*     --  177 205       Blood Culture: No results for input(s): LABBLOO in the last 168 hours.  Urine Culture: No results for input(s): LABURIN in the last 168 hours.    Significant Imaging:

## 2019-11-20 NOTE — ASSESSMENT & PLAN NOTE
S/P fall with rib fractures.  CT showing high volume pleural fluid.  Concerning for hemothorax.  Appreciate Surgery input.  S/P chest tube placement on 11/16    Chest ray show slight improvement in right lower lung opacity

## 2019-11-20 NOTE — SUBJECTIVE & OBJECTIVE
Interval History: Unable to urinate and watson placed.    Review of Systems   HENT: Negative for ear discharge and ear pain.    Eyes: Negative for pain and itching.   Endocrine: Negative for polyphagia and polyuria.   Neurological: Negative for seizures and syncope.     Objective:     Vital Signs (Most Recent):  Temp: 98.5 °F (36.9 °C) (11/20/19 0727)  Pulse: 76 (11/20/19 0727)  Resp: 18 (11/20/19 0727)  BP: (!) 144/65 (11/20/19 0727)  SpO2: (!) 94 % (11/20/19 0727) Vital Signs (24h Range):  Temp:  [97.4 °F (36.3 °C)-98.5 °F (36.9 °C)] 98.5 °F (36.9 °C)  Pulse:  [60-76] 76  Resp:  [14-18] 18  SpO2:  [89 %-95 %] 94 %  BP: (121-144)/(52-65) 144/65     Weight: 79.8 kg (175 lb 14.8 oz)  Body mass index is 25.24 kg/m².    Intake/Output Summary (Last 24 hours) at 11/20/2019 0853  Last data filed at 11/20/2019 0600  Gross per 24 hour   Intake 3133 ml   Output 1450 ml   Net 1683 ml      Physical Exam   Constitutional: He is oriented to person, place, and time. No distress.   HENT:   Head: Normocephalic and atraumatic.   Eyes: Conjunctivae are normal. Right eye exhibits no discharge. Left eye exhibits no discharge.   Neck: Neck supple. No tracheal deviation present.   Cardiovascular: Normal rate and regular rhythm.   Pulmonary/Chest: Effort normal.   Right sided chest tube in place   Abdominal: Soft. Bowel sounds are normal.   Musculoskeletal:   Right elbow effusion   Neurological: He is alert and oriented to person, place, and time.   Skin: Skin is warm and dry. He is not diaphoretic.       Significant Labs:   BMP:   Recent Labs   Lab 11/20/19  0454   *      K 4.2   *   CO2 26   BUN 31*   CREATININE 1.5*   CALCIUM 8.6*     CBC:   Recent Labs   Lab 11/18/19  1523 11/19/19  0516 11/20/19  0454   WBC  --  11.71 13.66*   HGB 8.1* 7.2* 8.1*   HCT 25.0* 23.2* 26.2*   PLT  --  177 205       Significant Imaging: I have reviewed all pertinent imaging results/findings within the past 24 hours.

## 2019-11-21 LAB
ANION GAP SERPL CALC-SCNC: 5 MMOL/L (ref 8–16)
BASOPHILS # BLD AUTO: 0.01 K/UL (ref 0–0.2)
BASOPHILS NFR BLD: 0.1 % (ref 0–1.9)
BUN SERPL-MCNC: 26 MG/DL (ref 8–23)
CALCIUM SERPL-MCNC: 8.5 MG/DL (ref 8.7–10.5)
CHLORIDE SERPL-SCNC: 110 MMOL/L (ref 95–110)
CO2 SERPL-SCNC: 25 MMOL/L (ref 23–29)
CREAT SERPL-MCNC: 1.3 MG/DL (ref 0.5–1.4)
DIFFERENTIAL METHOD: ABNORMAL
EOSINOPHIL # BLD AUTO: 0 K/UL (ref 0–0.5)
EOSINOPHIL NFR BLD: 0.2 % (ref 0–8)
ERYTHROCYTE [DISTWIDTH] IN BLOOD BY AUTOMATED COUNT: 14.3 % (ref 11.5–14.5)
EST. GFR  (AFRICAN AMERICAN): 58 ML/MIN/1.73 M^2
EST. GFR  (NON AFRICAN AMERICAN): 50 ML/MIN/1.73 M^2
GLUCOSE SERPL-MCNC: 126 MG/DL (ref 70–110)
HCT VFR BLD AUTO: 23.2 % (ref 40–54)
HGB BLD-MCNC: 7.1 G/DL (ref 14–18)
IMM GRANULOCYTES # BLD AUTO: 0.09 K/UL (ref 0–0.04)
IMM GRANULOCYTES NFR BLD AUTO: 0.6 % (ref 0–0.5)
LYMPHOCYTES # BLD AUTO: 0.5 K/UL (ref 1–4.8)
LYMPHOCYTES NFR BLD: 3.4 % (ref 18–48)
MCH RBC QN AUTO: 31.4 PG (ref 27–31)
MCHC RBC AUTO-ENTMCNC: 30.6 G/DL (ref 32–36)
MCV RBC AUTO: 103 FL (ref 82–98)
MONOCYTES # BLD AUTO: 1.6 K/UL (ref 0.3–1)
MONOCYTES NFR BLD: 11.1 % (ref 4–15)
NEUTROPHILS # BLD AUTO: 12.6 K/UL (ref 1.8–7.7)
NEUTROPHILS NFR BLD: 84.6 % (ref 38–73)
NRBC BLD-RTO: 0 /100 WBC
PLATELET # BLD AUTO: 202 K/UL (ref 150–350)
PMV BLD AUTO: 10.4 FL (ref 9.2–12.9)
POCT GLUCOSE: 120 MG/DL (ref 70–110)
POCT GLUCOSE: 141 MG/DL (ref 70–110)
POCT GLUCOSE: 144 MG/DL (ref 70–110)
POCT GLUCOSE: 151 MG/DL (ref 70–110)
POTASSIUM SERPL-SCNC: 4.1 MMOL/L (ref 3.5–5.1)
RBC # BLD AUTO: 2.26 M/UL (ref 4.6–6.2)
SODIUM SERPL-SCNC: 140 MMOL/L (ref 136–145)
WBC # BLD AUTO: 14.84 K/UL (ref 3.9–12.7)

## 2019-11-21 PROCEDURE — 85025 COMPLETE CBC W/AUTO DIFF WBC: CPT

## 2019-11-21 PROCEDURE — 63600175 PHARM REV CODE 636 W HCPCS: Performed by: SURGERY

## 2019-11-21 PROCEDURE — 25000003 PHARM REV CODE 250: Performed by: HOSPITALIST

## 2019-11-21 PROCEDURE — 25000003 PHARM REV CODE 250: Performed by: SURGERY

## 2019-11-21 PROCEDURE — 99232 PR SUBSEQUENT HOSPITAL CARE,LEVL II: ICD-10-PCS | Mod: ,,, | Performed by: UROLOGY

## 2019-11-21 PROCEDURE — 36415 COLL VENOUS BLD VENIPUNCTURE: CPT

## 2019-11-21 PROCEDURE — 99232 SBSQ HOSP IP/OBS MODERATE 35: CPT | Mod: ,,, | Performed by: UROLOGY

## 2019-11-21 PROCEDURE — 11000001 HC ACUTE MED/SURG PRIVATE ROOM

## 2019-11-21 PROCEDURE — 27000221 HC OXYGEN, UP TO 24 HOURS

## 2019-11-21 PROCEDURE — 63600175 PHARM REV CODE 636 W HCPCS: Performed by: HOSPITALIST

## 2019-11-21 PROCEDURE — 94761 N-INVAS EAR/PLS OXIMETRY MLT: CPT

## 2019-11-21 PROCEDURE — 80048 BASIC METABOLIC PNL TOTAL CA: CPT

## 2019-11-21 RX ADMIN — AMLODIPINE BESYLATE 10 MG: 5 TABLET ORAL at 10:11

## 2019-11-21 RX ADMIN — GUAIFENESIN 600 MG: 600 TABLET, EXTENDED RELEASE ORAL at 10:11

## 2019-11-21 RX ADMIN — AMIODARONE HYDROCHLORIDE 200 MG: 200 TABLET ORAL at 10:11

## 2019-11-21 RX ADMIN — INSULIN ASPART 2 UNITS: 100 INJECTION, SOLUTION INTRAVENOUS; SUBCUTANEOUS at 05:11

## 2019-11-21 RX ADMIN — CEFTRIAXONE 1 G: 1 INJECTION, SOLUTION INTRAVENOUS at 10:11

## 2019-11-21 RX ADMIN — ESCITALOPRAM OXALATE 10 MG: 10 TABLET ORAL at 10:11

## 2019-11-21 RX ADMIN — FAMOTIDINE 20 MG: 20 TABLET ORAL at 10:11

## 2019-11-21 RX ADMIN — GUAIFENESIN AND DEXTROMETHORPHAN 5 ML: 100; 10 SYRUP ORAL at 05:11

## 2019-11-21 RX ADMIN — METOPROLOL SUCCINATE 50 MG: 50 TABLET, FILM COATED, EXTENDED RELEASE ORAL at 10:11

## 2019-11-21 RX ADMIN — GUAIFENESIN AND DEXTROMETHORPHAN 5 ML: 100; 10 SYRUP ORAL at 11:11

## 2019-11-21 RX ADMIN — SENNOSIDES AND DOCUSATE SODIUM 2 TABLET: 8.6; 5 TABLET ORAL at 10:11

## 2019-11-21 RX ADMIN — CARBAMIDE PEROXIDE 6.5% 5 DROP: 6.5 LIQUID AURICULAR (OTIC) at 10:11

## 2019-11-21 RX ADMIN — MORPHINE SULFATE 2 MG: 10 INJECTION INTRAVENOUS at 08:11

## 2019-11-21 RX ADMIN — SODIUM CHLORIDE: 0.9 INJECTION, SOLUTION INTRAVENOUS at 01:11

## 2019-11-21 RX ADMIN — INSULIN ASPART 2 UNITS: 100 INJECTION, SOLUTION INTRAVENOUS; SUBCUTANEOUS at 12:11

## 2019-11-21 RX ADMIN — GUAIFENESIN AND DEXTROMETHORPHAN 5 ML: 100; 10 SYRUP ORAL at 06:11

## 2019-11-21 RX ADMIN — FERROUS GLUCONATE TAB 324 MG (37.5 MG ELEMENTAL IRON) 324 MG: 324 (37.5 FE) TAB at 07:11

## 2019-11-21 RX ADMIN — CETIRIZINE HYDROCHLORIDE 5 MG: 5 TABLET ORAL at 10:11

## 2019-11-21 RX ADMIN — TAMSULOSIN HYDROCHLORIDE 0.4 MG: 0.4 CAPSULE ORAL at 10:11

## 2019-11-21 RX ADMIN — ATORVASTATIN CALCIUM 40 MG: 40 TABLET, FILM COATED ORAL at 10:11

## 2019-11-21 NOTE — PROGRESS NOTES
[facility] Hospital Medicine  Progress Note    Patient Name: Timbo Gallagher  MRN: [id number]  Patient Class: IP- Inpatient   Admission Date: 11/16/2019  Length of Stay: 5 days  Attending Physician: Miguelina Farr MD  Primary Care Provider: Cirilo Montero MD        Subjective:     Principal Problem:Hemothorax on right        HPI:  85 y/o male presents to the ER complaining of constant right sided rib pain since 4 days ago.  Symptoms worsened yesterday.  Patient was seen at urgent care for recent fall and diagnosed with multiple right sided rib fractures.  He reports associated shortness of breath, right sided abdominal pain and cough. Patient reports taking tylenol for symptoms without relief.  He states no other associated symptoms.  Patient also complaining of right elbow swelling after fall.  Patient denies any fever, chills or hemoptysis.  Chest CT showing large volume pleural fluid, concerning for hemothorax.  Patient had been doing well prior to fall with no recent complaints.  No other complaints.    Overview/Hospital Course:  85 y/o male with recent fall and rib fractures presented with cough and SOB.  Imaging with new large right sided pleural effusion.  Admitted with concern of hemothorax.  Surgery consulted.  S/P chest tube placement on 11/16.  Anemia of acute blood loss with dropping H/H.will monitor for another day,  ASA held.  Patient also retaining urine.  Watson placed.  Labs showing ARF.  Urology consulted and patient started on IVF's.ARF is improving,ARF improved,hold IVF at this time.  Chest ray show slight improvement in right lower lung opacity.    Interval History: Unable to urinate and watson placed.    Review of Systems   HENT: Negative for ear discharge and ear pain.    Eyes: Negative for pain and itching.   Endocrine: Negative for polyphagia and polyuria.   Neurological: Negative for seizures and syncope.     Objective:     Vital Signs (Most Recent):  Temp: 97.6  °F (36.4 °C) (11/21/19 0712)  Pulse: 67 (11/21/19 0712)  Resp: 18 (11/21/19 0712)  BP: 130/60 (11/21/19 0712)  SpO2: 95 % (11/21/19 0712) Vital Signs (24h Range):  Temp:  [97.5 °F (36.4 °C)-98.3 °F (36.8 °C)] 97.6 °F (36.4 °C)  Pulse:  [66-80] 67  Resp:  [18-20] 18  SpO2:  [93 %-97 %] 95 %  BP: (118-137)/(56-65) 130/60     Weight: 79.8 kg (175 lb 14.8 oz)  Body mass index is 25.24 kg/m².    Intake/Output Summary (Last 24 hours) at 11/21/2019 0901  Last data filed at 11/21/2019 0633  Gross per 24 hour   Intake 2220 ml   Output 2065 ml   Net 155 ml      Physical Exam   Constitutional: He is oriented to person, place, and time. No distress.   HENT:   Head: Normocephalic and atraumatic.   Eyes: Conjunctivae are normal. Right eye exhibits no discharge. Left eye exhibits no discharge.   Neck: Neck supple. No tracheal deviation present.   Cardiovascular: Normal rate and regular rhythm.   Pulmonary/Chest: Effort normal.   Right sided chest tube in place   Abdominal: Soft. Bowel sounds are normal.   Musculoskeletal:   Right elbow effusion   Neurological: He is alert and oriented to person, place, and time.   Skin: Skin is warm and dry. He is not diaphoretic.       Significant Labs:   BMP:   Recent Labs   Lab 11/21/19  0536   *      K 4.1      CO2 25   BUN 26*   CREATININE 1.3   CALCIUM 8.5*     CBC:   Recent Labs   Lab 11/20/19  0454 11/21/19  0536   WBC 13.66* 14.84*   HGB 8.1* 7.1*   HCT 26.2* 23.2*    202       Significant Imaging: I have reviewed all pertinent imaging results/findings within the past 24 hours.      Assessment/Plan:      * Hemothorax on right  S/P fall with rib fractures.  CT showing high volume pleural fluid.  Concerning for hemothorax.  Appreciate Surgery input.  S/P chest tube placement on 11/16    Chest ray show slight improvement in right lower lung opacity    Essential hypertension  Resume home meds with parameters.  Low Na diet.  Holding Losartan and Lasix secondary to  worsening renal failure.      Type 2 diabetes mellitus with stage 3 chronic kidney disease  Hold home oral medications.  Diabetic diet and insulin sliding scale.      Chronic diastolic heart failure  No evidence of acute exacerbation.  Seems more fluid depleted.  Hold Lasix.      Anemia of chronic disease  H/H similar to previous labs.  Now with anemia of acute blood loss secondary to hemothorax.  Significant drop in H/H.will monitor for another day.  Repeat labs and transfuse if lower.      Acute renal failure superimposed on stage 3 chronic kidney disease  improving with IVF.,ARF improved,hold IVF at this time.      AICD (automatic cardioverter/defibrillator) present        Mixed hyperlipidemia  Continue Statin.      CKD (chronic kidney disease), stage III  Now with ARF.  ARF probably combination of obstruction and pre renal.  Colunga placed.  Start IVF hydration.  Avoid nephrotoxic medications.  Stop Losartan.      Aortic valve stenosis  S/P TAVR        VTE Risk Mitigation (From admission, onward)         Ordered     Place sequential compression device  Until discontinued      11/16/19 1200     IP VTE HIGH RISK PATIENT  Once      11/16/19 0712     Place DEYA hose  Until discontinued      11/16/19 0712                      Miguelina Farr MD  Department of Hospital Medicine   [facility]

## 2019-11-21 NOTE — PLAN OF CARE
Pt resting comfortably throughout evening.  Pt c/o rib pain, relieved by PRN tylenol.    Pt sating in 90s on 2 L NC.  Chest tube intact, no evidence of leak.  Dressing changed.    Pt paced on telemetry.    Tele sitter in place for fall prevention.  Bed alarm on, nonskid socks in place.      Colunga catheter in place, catheter care provided.      SCDs on.

## 2019-11-21 NOTE — PROGRESS NOTES
[facility]  Urology  Progress Note    Patient Name: Timbo Gallagher  MRN: [id number]  Admission Date: 11/16/2019  Hospital Length of Stay: 5 days  Code Status: Full Code   Attending Provider: Miguelina Farr MD   Primary Care Physician: Cirilo Montero MD    Subjective:     HPI:  83yo M with UR. He reports some baseline LUTS - slow stream intermittently. He is followed by urology as outpatient - last seen 5/19. He has had issues with UR in the past. He is not currently on BPH medications in hospital. He was on Flomax at last  visit. Colunga catheter placed with 300cc UOP. He is unsure when his last BM was, suppository given earlier today.     Interval History: Colunga draining well. Pt sleeping, difficult to arouse.     Review of Systems   Unable to perform ROS: Mental status change     Objective:     Temp:  [97.5 °F (36.4 °C)-98.5 °F (36.9 °C)] 97.5 °F (36.4 °C)  Pulse:  [66-80] 70  Resp:  [18-20] 18  SpO2:  [93 %-98 %] 93 %  BP: (118-144)/(56-65) 129/63     Body mass index is 25.24 kg/m².      Bladder Scan Volume (mL): 467 mL (11/18/19 0900)    Drains     Drain                 Chest Tube 11/16/19 1135 1 Right 28 Fr. 4 days         Urethral Catheter 11/18/19 0927 2 days                Physical Exam   Vitals reviewed.  Constitutional: He appears well-developed and well-nourished. No distress.   HENT:   Head: Normocephalic and atraumatic.   Eyes: Right eye exhibits no discharge. Left eye exhibits no discharge. No scleral icterus.   Neck: Normal range of motion. Neck supple.   Cardiovascular: Normal rate and regular rhythm.    Pulmonary/Chest: Effort normal and breath sounds normal. No respiratory distress.   Abdominal: Soft. Bowel sounds are normal. He exhibits no distension. There is no tenderness. There is no rebound and no guarding.   Genitourinary:   Genitourinary Comments: Colunga - clear yellow urine   Musculoskeletal: Normal range of motion.   Skin: Skin is warm and dry. He is not  diaphoretic. No erythema.         Significant Labs:    BMP:  Recent Labs   Lab 11/19/19  0813 11/20/19  0454 11/21/19  0536    143 140   K 3.9 4.2 4.1    112* 110   CO2 23 26 25   BUN 39* 31* 26*   CREATININE 2.0* 1.5* 1.3   CALCIUM 8.3* 8.6* 8.5*       CBC:   Recent Labs   Lab 11/19/19  0516 11/20/19  0454 11/21/19  0536   WBC 11.71 13.66* 14.84*   HGB 7.2* 8.1* 7.1*   HCT 23.2* 26.2* 23.2*    205 202       Blood Culture: No results for input(s): LABBLOO in the last 168 hours.  Urine Culture: No results for input(s): LABURIN in the last 168 hours.  Urine Studies:   Recent Labs   Lab 11/16/19  0357   COLORU Straw   APPEARANCEUA Clear   PHUR 6.0   SPECGRAV 1.010   PROTEINUA Negative   GLUCUA Negative   KETONESU Negative   BILIRUBINUA Negative   OCCULTUA Negative   NITRITE Negative   UROBILINOGEN Negative   LEUKOCYTESUR Negative       Significant Imaging:  All pertinent imaging results/findings from the past 24 hours have been reviewed.                  Assessment/Plan:     Urinary retention   - Flomax   - Colunga x 1-3 days, may be better to wait until pt less confused/more alert   - Avoid/treat constipation    BPH with obstruction/lower urinary tract symptoms   - Continue Flomax         VTE Risk Mitigation (From admission, onward)         Ordered     Place sequential compression device  Until discontinued      11/16/19 1200     IP VTE HIGH RISK PATIENT  Once      11/16/19 0712     Place DEYA hose  Until discontinued      11/16/19 0712                Alondra Schultz MD  Urology  [facility]

## 2019-11-21 NOTE — PROGRESS NOTES
SW met patient and wife at bedside. SW introduced herself and wrote contact information on the board.

## 2019-11-21 NOTE — ASSESSMENT & PLAN NOTE
H/H similar to previous labs.  Now with anemia of acute blood loss secondary to hemothorax.  Significant drop in H/H.will monitor for another day.  Repeat labs and transfuse if lower.

## 2019-11-21 NOTE — PROGRESS NOTES
Surgery Progress Note    Primary Problem: Hemothorax on right    Other problems:   Active Hospital Problems    Diagnosis  POA    *Hemothorax on right [J94.2]  Yes    Acute renal failure superimposed on stage 3 chronic kidney disease [N17.9, N18.3]  Yes    Urinary retention [R33.9]  Yes    AICD (automatic cardioverter/defibrillator) present [Z95.810]  Yes     [employer] dual AICD 4/23/18      Mixed hyperlipidemia [E78.2]  Yes    CKD (chronic kidney disease), stage III [N18.3]  Yes    Aortic valve stenosis [I35.0]  Yes    Essential hypertension [I10]  Yes    Chronic diastolic heart failure [I50.32]  Yes    Anemia of chronic disease [D63.8]  Yes    BPH with obstruction/lower urinary tract symptoms [N40.1, N13.8]  Yes    Type 2 diabetes mellitus with stage 3 chronic kidney disease [E11.22, N18.3]  Yes      Resolved Hospital Problems   No resolved problems to display.       HD #  LOS: 5 days   POD #5 Days Post-Op right chest tube insertion    Overnight events:  No acute events overnight.  Denies shortness of breath or chest pain      Diet - Diet diabetic [facility]; 2000 Calorie; Cardiac (Low Na/Chol)     I/O last 3 completed shifts:  In: 3613 [P.O.:2280; I.V.:1333]  Out: 2800 [Urine:2150; Chest Tube:650]    Intake/Output Summary (Last 24 hours) at 11/21/2019 0646  Last data filed at 11/21/2019 0633  Gross per 24 hour   Intake 2460 ml   Output 2065 ml   Net 395 ml       Temp:  [97.5 °F (36.4 °C)-98.5 °F (36.9 °C)] 97.5 °F (36.4 °C)  Pulse:  [66-80] 68  Resp:  [18-20] 18  SpO2:  [93 %-98 %] 93 %  BP: (118-144)/(56-65) 129/63  Chest tube 340, 24 hr    Gen: alert, well appearing, and in no distress,    Chest:  Chest tube output old blood no air leak    Recent Labs   Lab 11/16/19  0323 11/17/19  0418 11/18/19  0506 11/18/19  1523 11/19/19  0516 11/19/19  0813 11/20/19  0454 11/21/19  0536   WBC 9.90 12.21 12.60  --  11.71  --  13.66* 14.84*   HGB 12.2* 9.0* 7.5* 8.1* 7.2*  --  8.1* 7.1*   HCT 38.6* 28.2*  24.5* 25.0* 23.2*  --  26.2* 23.2*    156 157  --  177  --  205 202     --  137  --   --  141 143 140   K 3.5  --  4.5  --   --  3.9 4.2 4.1     --  103  --   --  110 112* 110   BUN 27*  --  50*  --   --  39* 31* 26*   *  --  141*  --   --  145* 163* 126*   PROT 7.2  --   --   --   --   --   --   --    ALBUMIN 3.1*  --   --   --   --   --   --   --    BILITOT 0.5  --   --   --   --   --   --   --    AST 18  --   --   --   --   --   --   --    ALKPHOS 123  --   --   --   --   --   --   --    ALT 14  --   --   --   --   --   --   --         Assessment:  Status post chest tube insertion for right-sided hemo thorax    Plan:  Follow-up a.m. chest x-ray  Will d/c chest tube when output <200 cc/day    Giles Lezama MD

## 2019-11-21 NOTE — ASSESSMENT & PLAN NOTE
- Flomax   - Colunga x 1-3 days, may be better to wait until pt less confused/more alert   - Avoid/treat constipation

## 2019-11-21 NOTE — NURSING
Patient is becoming more confused and pulled chair alarm system out of wall. Telesitter in place, bed alarm set,  and near nurse's station.  Redirect/reorient and will continue to monitor.

## 2019-11-21 NOTE — PT/OT/SLP PROGRESS
Physical Therapy      Patient Name:  Timbo Gallagher   MRN:  [id number]    Patient not seen today for PT tx secondary to (Pt sleeping and difficulty to arouse to participate in therapy.). Will follow-up as able.    Olga José, PT

## 2019-11-21 NOTE — PROGRESS NOTES
Had changed pt's chest tube dressing at 0400.  Went to turn patient at 0630, dressing saturated.  Surgery notified, stated drainage is okay and to change PRN.

## 2019-11-21 NOTE — PT/OT/SLP PROGRESS
"Occupational Therapy      Patient Name:  Timbo Gallagher   MRN:  [id number]    Patient not seen today secondary to Other (Comment)(Pt sound asleep, difficult to arouse. Pt then awoke to say "get the hell out of here"). Will follow-up later as able.    Tatyana Simon, OT  11/21/2019  "

## 2019-11-21 NOTE — SUBJECTIVE & OBJECTIVE
Interval History: Unable to urinate and watson placed.    Review of Systems   HENT: Negative for ear discharge and ear pain.    Eyes: Negative for pain and itching.   Endocrine: Negative for polyphagia and polyuria.   Neurological: Negative for seizures and syncope.     Objective:     Vital Signs (Most Recent):  Temp: 97.6 °F (36.4 °C) (11/21/19 0712)  Pulse: 67 (11/21/19 0712)  Resp: 18 (11/21/19 0712)  BP: 130/60 (11/21/19 0712)  SpO2: 95 % (11/21/19 0712) Vital Signs (24h Range):  Temp:  [97.5 °F (36.4 °C)-98.3 °F (36.8 °C)] 97.6 °F (36.4 °C)  Pulse:  [66-80] 67  Resp:  [18-20] 18  SpO2:  [93 %-97 %] 95 %  BP: (118-137)/(56-65) 130/60     Weight: 79.8 kg (175 lb 14.8 oz)  Body mass index is 25.24 kg/m².    Intake/Output Summary (Last 24 hours) at 11/21/2019 0901  Last data filed at 11/21/2019 0633  Gross per 24 hour   Intake 2220 ml   Output 2065 ml   Net 155 ml      Physical Exam   Constitutional: He is oriented to person, place, and time. No distress.   HENT:   Head: Normocephalic and atraumatic.   Eyes: Conjunctivae are normal. Right eye exhibits no discharge. Left eye exhibits no discharge.   Neck: Neck supple. No tracheal deviation present.   Cardiovascular: Normal rate and regular rhythm.   Pulmonary/Chest: Effort normal.   Right sided chest tube in place   Abdominal: Soft. Bowel sounds are normal.   Musculoskeletal:   Right elbow effusion   Neurological: He is alert and oriented to person, place, and time.   Skin: Skin is warm and dry. He is not diaphoretic.       Significant Labs:   BMP:   Recent Labs   Lab 11/21/19  0536   *      K 4.1      CO2 25   BUN 26*   CREATININE 1.3   CALCIUM 8.5*     CBC:   Recent Labs   Lab 11/20/19  0454 11/21/19  0536   WBC 13.66* 14.84*   HGB 8.1* 7.1*   HCT 26.2* 23.2*    202       Significant Imaging: I have reviewed all pertinent imaging results/findings within the past 24 hours.

## 2019-11-21 NOTE — PLAN OF CARE
Problem: Adult Inpatient Plan of Care  Goal: Plan of Care Review  Outcome: Ongoing, Progressing     Patient oriented x 2-3 w/ intermittent confusion/agitation. Poor appetite but, per patient, he does not like hospital food.  BCG monitored and SSI administered as ordered. Turned patient q2h.  CT to waterseal w/ approx 120cc of dark, red/sanguinous output; CT dressing w/ small amount of dried drainage.  Pain assessed frequently. Colunga in place w/ clear, yellow output.  Will continue to montior.

## 2019-11-21 NOTE — SUBJECTIVE & OBJECTIVE
Interval History: Colunga draining well. Pt sleeping, difficult to arouse.     Review of Systems   Unable to perform ROS: Mental status change     Objective:     Temp:  [97.5 °F (36.4 °C)-98.5 °F (36.9 °C)] 97.5 °F (36.4 °C)  Pulse:  [66-80] 70  Resp:  [18-20] 18  SpO2:  [93 %-98 %] 93 %  BP: (118-144)/(56-65) 129/63     Body mass index is 25.24 kg/m².      Bladder Scan Volume (mL): 467 mL (11/18/19 0900)    Drains     Drain                 Chest Tube 11/16/19 1135 1 Right 28 Fr. 4 days         Urethral Catheter 11/18/19 0927 2 days                Physical Exam   Vitals reviewed.  Constitutional: He appears well-developed and well-nourished. No distress.   HENT:   Head: Normocephalic and atraumatic.   Eyes: Right eye exhibits no discharge. Left eye exhibits no discharge. No scleral icterus.   Neck: Normal range of motion. Neck supple.   Cardiovascular: Normal rate and regular rhythm.    Pulmonary/Chest: Effort normal and breath sounds normal. No respiratory distress.   Abdominal: Soft. Bowel sounds are normal. He exhibits no distension. There is no tenderness. There is no rebound and no guarding.   Genitourinary:   Genitourinary Comments: Colunga - clear yellow urine   Musculoskeletal: Normal range of motion.   Skin: Skin is warm and dry. He is not diaphoretic. No erythema.         Significant Labs:    BMP:  Recent Labs   Lab 11/19/19  0813 11/20/19  0454 11/21/19  0536    143 140   K 3.9 4.2 4.1    112* 110   CO2 23 26 25   BUN 39* 31* 26*   CREATININE 2.0* 1.5* 1.3   CALCIUM 8.3* 8.6* 8.5*       CBC:   Recent Labs   Lab 11/19/19  0516 11/20/19  0454 11/21/19  0536   WBC 11.71 13.66* 14.84*   HGB 7.2* 8.1* 7.1*   HCT 23.2* 26.2* 23.2*    205 202       Blood Culture: No results for input(s): LABBLOO in the last 168 hours.  Urine Culture: No results for input(s): LABURIN in the last 168 hours.  Urine Studies:   Recent Labs   Lab 11/16/19  0357   COLORU Straw   APPEARANCEUA Clear   PHUR 6.0   SPECGRAV  1.010   PROTEINUA Negative   GLUCUA Negative   KETONESU Negative   BILIRUBINUA Negative   OCCULTUA Negative   NITRITE Negative   UROBILINOGEN Negative   LEUKOCYTESUR Negative       Significant Imaging:  All pertinent imaging results/findings from the past 24 hours have been reviewed.

## 2019-11-22 LAB
ANION GAP SERPL CALC-SCNC: 5 MMOL/L (ref 8–16)
BASOPHILS # BLD AUTO: 0.02 K/UL (ref 0–0.2)
BASOPHILS NFR BLD: 0.1 % (ref 0–1.9)
BUN SERPL-MCNC: 26 MG/DL (ref 8–23)
CALCIUM SERPL-MCNC: 9.1 MG/DL (ref 8.7–10.5)
CHLORIDE SERPL-SCNC: 113 MMOL/L (ref 95–110)
CO2 SERPL-SCNC: 24 MMOL/L (ref 23–29)
CREAT SERPL-MCNC: 1.3 MG/DL (ref 0.5–1.4)
DIFFERENTIAL METHOD: ABNORMAL
EOSINOPHIL # BLD AUTO: 0.1 K/UL (ref 0–0.5)
EOSINOPHIL NFR BLD: 0.6 % (ref 0–8)
ERYTHROCYTE [DISTWIDTH] IN BLOOD BY AUTOMATED COUNT: 14.5 % (ref 11.5–14.5)
EST. GFR  (AFRICAN AMERICAN): 58 ML/MIN/1.73 M^2
EST. GFR  (NON AFRICAN AMERICAN): 50 ML/MIN/1.73 M^2
GLUCOSE SERPL-MCNC: 110 MG/DL (ref 70–110)
HCT VFR BLD AUTO: 24.3 % (ref 40–54)
HGB BLD-MCNC: 7.6 G/DL (ref 14–18)
IMM GRANULOCYTES # BLD AUTO: 0.11 K/UL (ref 0–0.04)
IMM GRANULOCYTES NFR BLD AUTO: 0.8 % (ref 0–0.5)
LYMPHOCYTES # BLD AUTO: 1.1 K/UL (ref 1–4.8)
LYMPHOCYTES NFR BLD: 7.4 % (ref 18–48)
MCH RBC QN AUTO: 32.1 PG (ref 27–31)
MCHC RBC AUTO-ENTMCNC: 31.3 G/DL (ref 32–36)
MCV RBC AUTO: 103 FL (ref 82–98)
MONOCYTES # BLD AUTO: 1.5 K/UL (ref 0.3–1)
MONOCYTES NFR BLD: 10.4 % (ref 4–15)
NEUTROPHILS # BLD AUTO: 11.5 K/UL (ref 1.8–7.7)
NEUTROPHILS NFR BLD: 80.7 % (ref 38–73)
NRBC BLD-RTO: 0 /100 WBC
PLATELET # BLD AUTO: 224 K/UL (ref 150–350)
PMV BLD AUTO: 10.7 FL (ref 9.2–12.9)
POCT GLUCOSE: 119 MG/DL (ref 70–110)
POCT GLUCOSE: 127 MG/DL (ref 70–110)
POCT GLUCOSE: 145 MG/DL (ref 70–110)
POCT GLUCOSE: 148 MG/DL (ref 70–110)
POTASSIUM SERPL-SCNC: 4.4 MMOL/L (ref 3.5–5.1)
RBC # BLD AUTO: 2.37 M/UL (ref 4.6–6.2)
SODIUM SERPL-SCNC: 142 MMOL/L (ref 136–145)
WBC # BLD AUTO: 14.28 K/UL (ref 3.9–12.7)

## 2019-11-22 PROCEDURE — 99000 SPECIMEN HANDLING OFFICE-LAB: CPT

## 2019-11-22 PROCEDURE — 94761 N-INVAS EAR/PLS OXIMETRY MLT: CPT

## 2019-11-22 PROCEDURE — 36415 COLL VENOUS BLD VENIPUNCTURE: CPT

## 2019-11-22 PROCEDURE — 27000221 HC OXYGEN, UP TO 24 HOURS

## 2019-11-22 PROCEDURE — 63600175 PHARM REV CODE 636 W HCPCS: Performed by: SURGERY

## 2019-11-22 PROCEDURE — 99232 SBSQ HOSP IP/OBS MODERATE 35: CPT | Mod: ,,, | Performed by: UROLOGY

## 2019-11-22 PROCEDURE — 80048 BASIC METABOLIC PNL TOTAL CA: CPT

## 2019-11-22 PROCEDURE — 99232 PR SUBSEQUENT HOSPITAL CARE,LEVL II: ICD-10-PCS | Mod: ,,, | Performed by: UROLOGY

## 2019-11-22 PROCEDURE — 63600175 PHARM REV CODE 636 W HCPCS: Performed by: HOSPITALIST

## 2019-11-22 PROCEDURE — 94799 UNLISTED PULMONARY SVC/PX: CPT

## 2019-11-22 PROCEDURE — 11000001 HC ACUTE MED/SURG PRIVATE ROOM

## 2019-11-22 PROCEDURE — 25000003 PHARM REV CODE 250: Performed by: SURGERY

## 2019-11-22 PROCEDURE — 25000003 PHARM REV CODE 250: Performed by: HOSPITALIST

## 2019-11-22 PROCEDURE — 92610 EVALUATE SWALLOWING FUNCTION: CPT

## 2019-11-22 PROCEDURE — 85025 COMPLETE CBC W/AUTO DIFF WBC: CPT

## 2019-11-22 RX ADMIN — MIRTAZAPINE 7.5 MG: 7.5 TABLET ORAL at 08:11

## 2019-11-22 RX ADMIN — MORPHINE SULFATE 2 MG: 10 INJECTION INTRAVENOUS at 05:11

## 2019-11-22 RX ADMIN — CARBAMIDE PEROXIDE 6.5% 5 DROP: 6.5 LIQUID AURICULAR (OTIC) at 10:11

## 2019-11-22 RX ADMIN — SENNOSIDES AND DOCUSATE SODIUM 2 TABLET: 8.6; 5 TABLET ORAL at 08:11

## 2019-11-22 RX ADMIN — GUAIFENESIN 600 MG: 600 TABLET, EXTENDED RELEASE ORAL at 09:11

## 2019-11-22 RX ADMIN — METOPROLOL SUCCINATE 50 MG: 50 TABLET, FILM COATED, EXTENDED RELEASE ORAL at 08:11

## 2019-11-22 RX ADMIN — PIPERACILLIN AND TAZOBACTAM 4.5 G: 4; .5 INJECTION, POWDER, FOR SOLUTION INTRAVENOUS at 08:11

## 2019-11-22 RX ADMIN — PIPERACILLIN AND TAZOBACTAM 4.5 G: 4; .5 INJECTION, POWDER, FOR SOLUTION INTRAVENOUS at 10:11

## 2019-11-22 RX ADMIN — HYDROCODONE BITARTRATE AND ACETAMINOPHEN 1 TABLET: 5; 325 TABLET ORAL at 08:11

## 2019-11-22 RX ADMIN — CARBAMIDE PEROXIDE 6.5% 5 DROP: 6.5 LIQUID AURICULAR (OTIC) at 09:11

## 2019-11-22 RX ADMIN — DONEPEZIL HYDROCHLORIDE 5 MG: 5 TABLET, FILM COATED ORAL at 08:11

## 2019-11-22 NOTE — SEDATION DOCUMENTATION
Report called to HOLA Nelson. Chest tube placed to R posterior lung. Dressing CDI, no redness, swelling or hematoma noted. CXR done before transport. Specimen sent to lab for freeze & hold. Remains on 2LNC. GIOVANNY WOODY.

## 2019-11-22 NOTE — PT/OT/SLP PROGRESS
Physical Therapy      Patient Name:  Timbo Gallagher   MRN:  [id number]    Patient not seen today secondary to Unavailable (pt is off the unit for testing ). Will follow-up as able .    Christina Dias, PTA

## 2019-11-22 NOTE — PROGRESS NOTES
Hospital Medicine  Cross-Cover Note        Diagnosis leading to hospitalization: Hemothorax on right    Length of Stay since admission: 6     HPI - Interval History:       Called by nursing staff for evaluation of chest tube.  General surgery was called as well.  Concern about increased respiratory distress and chest tube leakage.    Vitals:  Vitals:    11/21/19 1646 11/21/19 1950 11/21/19 2310 11/22/19 0337   BP: (!) 120/59 (!) 128/59 (!) 124/56 (!) 116/58   BP Location: Right arm Left arm Right arm Left arm   Patient Position: Lying Lying Lying Lying   Pulse: 60 65 72 64   Resp: 16 (!) 23 (!) 23 (!) 22   Temp: 98.3 °F (36.8 °C) 97.7 °F (36.5 °C) 98.8 °F (37.1 °C) 98.8 °F (37.1 °C)   TempSrc: Oral Axillary Axillary Axillary   SpO2: 95% (!) 92% (!) 94% (!) 94%   Weight:       Height:           Physical exam:  Patient in right lateral decubitus position.  Chest tube to low suction with some scant leakage and bleeding.  Patient denies pain. Awake, answering questions probably, hard of hearing.  No apparent distress.    Current Medications:  Scheduled Meds:   amiodarone  200 mg Oral Daily    amLODIPine  10 mg Oral Daily    atorvastatin  40 mg Oral Daily    carbamide peroxide  5 drop Right Ear BID    cefTRIAXone (ROCEPHIN) IVPB  1 g Intravenous Q24H    cetirizine  5 mg Oral Daily    cyanocobalamin  100 mcg Oral Weekly    dextromethorphan-guaifenesin  mg/5 ml  5 mL Oral Q6H    donepezil  5 mg Oral QHS    escitalopram oxalate  10 mg Oral Daily    famotidine  20 mg Oral Daily    ferrous gluconate  324 mg Oral Daily with breakfast    guaiFENesin  600 mg Oral BID    metoprolol succinate  50 mg Oral BID    mirtazapine  7.5 mg Oral QHS    senna-docusate 8.6-50 mg  2 tablet Oral BID    tamsulosin  0.4 mg Oral Daily     Continuous Infusions:  PRN Meds:.acetaminophen, benzonatate, bisacodyl, cloNIDine, cyproheptadine, dextrose 50%, glucagon (human recombinant), HYDROcodone-acetaminophen, influenza, insulin  aspart U-100, morphine, ondansetron, polyethylene glycol, sodium chloride 0.9%    Lab Results:     CBC:  Recent Labs   Lab 11/19/19  0516 11/20/19  0454 11/21/19  0536   WBC 11.71 13.66* 14.84*   HGB 7.2* 8.1* 7.1*    205 202   * 104* 103*   MCH 31.4* 32.0* 31.4*   MCHC 31.0* 30.9* 30.6*   RBC 2.29* 2.53* 2.26*       CMP:  Recent Labs   Lab 11/16/19  0323 11/18/19  0506 11/19/19  0813 11/20/19  0454 11/21/19  0536    137 141 143 140   K 3.5 4.5 3.9 4.2 4.1    103 110 112* 110   CO2 27 29 23 26 25   BUN 27* 50* 39* 31* 26*   CREATININE 1.7* 3.0* 2.0* 1.5* 1.3   CALCIUM 9.3 8.6* 8.3* 8.6* 8.5*   MG 2.3  --   --   --   --    PROT 7.2  --   --   --   --    BILITOT 0.5  --   --   --   --    ALKPHOS 123  --   --   --   --    ALT 14  --   --   --   --    AST 18  --   --   --   --      Coagulantion studies  Recent Labs   Lab 11/16/19  0323   INR 1.0     Cardiac Enzymes  No results for input(s): CPK, CKMB, TROPONINI in the last 168 hours.  No results for input(s): LACTATE in the last 168 hours.      Consults:  IP CONSULT TO GENERAL SURGERY  IP CONSULT TO UROLOGY     Assessment and Plan:    Active Hospital Problems    Diagnosis  POA    *Hemothorax on right [J94.2]  Yes    Acute renal failure superimposed on stage 3 chronic kidney disease [N17.9, N18.3]  Yes    Urinary retention [R33.9]  Yes    AICD (automatic cardioverter/defibrillator) present [Z95.810]  Yes     [employer] dual AICD 4/23/18      Mixed hyperlipidemia [E78.2]  Yes    CKD (chronic kidney disease), stage III [N18.3]  Yes    Aortic valve stenosis [I35.0]  Yes    Essential hypertension [I10]  Yes    Chronic diastolic heart failure [I50.32]  Yes    Anemia of chronic disease [D63.8]  Yes    BPH with obstruction/lower urinary tract symptoms [N40.1, N13.8]  Yes    Type 2 diabetes mellitus with stage 3 chronic kidney disease [E11.22, N18.3]  Yes      Resolved Hospital Problems   No resolved problems to display.       Labs were  reviewed.  Imaging was reviewed.  Problem listed reviewed and updated where needed.    Additional orders placed:    1. Continue right lateral decubitus positioning  2. Continue chest to with continuous suction  3. General surgery to re-evaluate chest tube this morning    Ned Sanchez MD, MPH  [facility]   Pager: [phone]

## 2019-11-22 NOTE — SUBJECTIVE & OBJECTIVE
Interval History:stable on NC O 2 at this time.    Review of Systems   HENT: Negative for ear discharge and ear pain.    Eyes: Negative for pain and itching.   Endocrine: Negative for polyphagia and polyuria.   Neurological: Negative for seizures and syncope.     Objective:     Vital Signs (Most Recent):  Temp: 99.4 °F (37.4 °C) (11/22/19 0712)  Pulse: 70 (11/22/19 0839)  Resp: 19 (11/22/19 0839)  BP: 129/60 (11/22/19 0712)  SpO2: (!) 93 % (11/22/19 0839) Vital Signs (24h Range):  Temp:  [97.7 °F (36.5 °C)-99.8 °F (37.7 °C)] 99.4 °F (37.4 °C)  Pulse:  [60-76] 70  Resp:  [16-28] 19  SpO2:  [88 %-96 %] 93 %  BP: (116-129)/(56-60) 129/60     Weight: 79.8 kg (175 lb 14.8 oz)  Body mass index is 25.24 kg/m².    Intake/Output Summary (Last 24 hours) at 11/22/2019 1006  Last data filed at 11/22/2019 0823  Gross per 24 hour   Intake 240 ml   Output 1310 ml   Net -1070 ml      Physical Exam   Constitutional: He is oriented to person, place, and time. No distress.   HENT:   Head: Normocephalic and atraumatic.   Eyes: Conjunctivae are normal. Right eye exhibits no discharge. Left eye exhibits no discharge.   Neck: Neck supple. No tracheal deviation present.   Cardiovascular: Normal rate and regular rhythm.   Pulmonary/Chest: Effort normal.   Right sided chest tube in place   Abdominal: Soft. Bowel sounds are normal.   Musculoskeletal:   Right elbow effusion   Neurological: He is alert and oriented to person, place, and time.   Skin: Skin is warm and dry. He is not diaphoretic.       Significant Labs:   BMP:   Recent Labs   Lab 11/22/19 0528         K 4.4   *   CO2 24   BUN 26*   CREATININE 1.3   CALCIUM 9.1     CBC:   Recent Labs   Lab 11/21/19  0536 11/22/19 0528   WBC 14.84* 14.28*   HGB 7.1* 7.6*   HCT 23.2* 24.3*    224       Significant Imaging: I have reviewed all pertinent imaging results/findings within the past 24 hours.

## 2019-11-22 NOTE — PT/OT/SLP EVAL
Speech Language Pathology Evaluation  Bedside Swallow    Patient Name:  Timbo Gallagher   MRN:  [id number]  Admitting Diagnosis: Hemothorax on right    Recommendations:                 General Recommendations:  Dysphagia therapy  Diet recommendations:  Mechanical soft, Chopped meat, No Rice, No Bread(Chopped meat with gravy)     Aspiration Precautions: 1 bite/sip at a time, Assistance with meals, Check for pocketing/oral residue, HOB to 90 degrees, Monitor for s/s of aspiration, Remain upright 30 minutes post meal, Small bites/sips and Standard aspiration precautions   General Precautions: Standard, aspiration  Communication strategies:  provide increased time to answer, pt is [place]    History:     Past Medical History:   Diagnosis Date    Acute renal failure     Arthritis     Blind right eye     BPH (benign prostatic hypertrophy)     Bronchitis, chronic     Carotid artery occlusion     Lt carotid endarerectomy done 02/19/2018     CHF (congestive heart failure)     Colon polyp     Coronary artery disease     Diabetes mellitus     GERD (gastroesophageal reflux disease)     Hearing aid worn     bilateral    Hematuria     Hernia     Hypertension     Influenza A     Irregular heart beat     NSVT (nonsustained ventricular tachycardia) 4/18/2018    Renal failure as complication of care     sepsis, renal function returned    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 Coleman    CATARACT EXTRACTION, BILATERAL      ESOPHAGOGASTRODUODENOSCOPY N/A 8/30/2018    Procedure: EGD (ESOPHAGOGASTRODUODENOSCOPY);  Surgeon: Tye Navarrete MD;  Location: [address]);  Service: Endoscopy;  Laterality: N/A;    ESOPHAGOGASTRODUODENOSCOPY  08/30/2018    EYE SURGERY      HERNIA REPAIR Left 09/2013    RETINAL DETACHMENT SURGERY      THORACENTESIS N/A 1/17/2019    Procedure: THORACENTESIS;  Surgeon: [facility] Diagnostic Provider;  Location: [facility];  Service: Radiology;  Laterality: N/A;  1130AM  START  RN PRE OP 1-15-19    TUBE THORACOTOMY Right 11/16/2019    Procedure: INSERTION, CATHETER, INTERCOSTAL, FOR DRAINAGE;  Surgeon: Silvio Tay MD;  Location: [facility];  Service: General;  Laterality: Right;       Modified Barium Swallow: 2/15/16 with no evidence of aspiration; penetration occurred intermittently    Chest X-Rays: Most recent 11/22/19    [facility].        Subjective   Pt in bed upon ST arrival. He was agreeable to HOB being adjusted to upright and participation in bedside swallow evaluation.       Objective:     Oral Musculature Evaluation  · Oral Musculature: WFL, general weakness  · Dentition: edentulous  · Secretion Management: adequate  · Mandibular Strength and Mobility: WFL  · Lingual Strength and Mobility: WFL  · Volitional Swallow: Delayed   · Voice Prior to PO Intake: (Clear)  · Oral Musculature Comments: Generalized weakness; pt demo ability to participate in PO intake with decreased risk for aspiration    Bedside Swallow Eval:   Consistencies Assessed:  · Ice Chips x3 via spoon  · Thin liquids x3 via straw  · Puree x4   · Soft solids x3     Oral Phase:   · Prolonged mastication  · Pocketing   Right pt able to clear with liquid wash  · Slow oral transit time    Pharyngeal Phase:   · decreased hyolaryngeal excursion to palpation  · delayed swallow initation    Compensatory Strategies  · Liquid wash to clear lingual  stasis of soft solid x1 and lingual sweep      Treatment: [facility] dysphagia treatment to monitor diet tolerance.   Participation in bedside swallow eval does not rule out risk for silent aspiration.     Assessment:     Timbo Gallagher is a 84 y.o. male with a diagnosis of Hemothorax on right. He presents with mild to moderate oropharyngeal dysphagia c/b prolonged mastication, min pocketing on right side, delayed A-P transport.     Goals:   Multidisciplinary Problems     SLP Goals        Problem: SLP Goal    Goal Priority Disciplines Outcome   SLP Goal     SLP Ongoing, Progressing   Description:  1. Pt will tolerate [facility] soft diet and thin liquid without overt s/s of aspiration.                     Plan:     · Patient to be seen:  3 x/week   · Plan of Care expires:     · Plan of Care reviewed with:  patient, spouse   · SLP Follow-Up:  Yes       Discharge recommendations:  (TBD)   Barriers to Discharge:  None    Time Tracking:     SLP Treatment Date:   11/22/19  Speech Start Time:  1345  Speech Stop Time:  1410     Speech Total Time (min):  25 min    Billable Minutes: Eval Swallow and Oral Function 25min    Dee Lam CF-SLP  11/22/2019

## 2019-11-22 NOTE — PT/OT/SLP PROGRESS
Occupational Therapy      Patient Name:  Timbo Gallagher   MRN:  [id number]    Patient not seen today secondary to Unavailable (Comment)(Pt off the floor for medical testing. ). Will follow-up later as able.    Tatyana Simon OT  11/22/2019

## 2019-11-22 NOTE — PROGRESS NOTES
CT chest reviewed. Worsening pleural effusion on the right. Discussed with IR pigtail catheter placement.

## 2019-11-22 NOTE — PROCEDURES
Interventional Radiology Procedure Note    Procedure: r chest tube insertion    Pre procedure diagnosis: right hydropneumothorax    Post procedure diagnosis: same    : MD Kim    Specimens removed: 8 cc serous fluid    Estimated Blood Loss: 0 ml     Complications: none     Findings: r chest tube inserted/8fr

## 2019-11-22 NOTE — HPI
HPI:  83 y/o male presents to the ER complaining of constant right sided rib pain since 4 days ago.  Symptoms worsened yesterday.  Patient was seen at urgent care for recent fall and diagnosed with multiple right sided rib fractures.  He reports associated shortness of breath, right sided abdominal pain and cough. Patient reports taking tylenol for symptoms without relief.  He states no other associated symptoms.  Patient also complaining of right elbow swelling after fall.  Patient denies any fever, chills or hemoptysis.  Chest CT showing large volume pleural fluid, concerning for hemothorax.  Patient had been doing well prior to fall with no recent complaints.  No other complaints.     Overview/Hospital Course:  83 y/o male with recent fall and rib fractures presented with cough and SOB.  Imaging with new large right sided pleural effusion.  Admitted with concern of hemothorax.  Surgery consulted.  S/P chest tube placement on 11/16.  Anemia of acute blood loss with dropping H/H.will monitor for another day,  ASA held.  Patient also retaining urine.  Colunga placed.  Labs showing ARF.  Urology consulted and patient started on IVF's.ARF is improving,ARF improved,hold IVF at this time.  Over night had worsening respiratory status,consern for aspiration,strated on zosyn,NPO,consulted ,tony gooden chest CT.stable on NC O 2 at this time.  Consulted palliative care .

## 2019-11-22 NOTE — PLAN OF CARE
Problem: Adult Inpatient Plan of Care  Goal: Plan of Care Review  Outcome: Ongoing, Progressing     Patient oriented x 2-3 w/ intermittent confusion/agitation. NPO status maintained pending SLP.  BCG monitored. Turned patient q2h.  CT to waterseal w/ approx 200 cc output (drsg chg), Right pigtail pleural cath w/ approx 150 cc output; both red/sanguinous output; CT dressing w/ small amount of dried drainage.  Oral suction at bedside for copious, thick secretions. Pain assessed frequently. Colunga in place w/ clear, yellow output. Turned q2h. Will continue to montior.

## 2019-11-22 NOTE — SUBJECTIVE & OBJECTIVE
Interval History: no specific complaints.  He has not been out of bed very much.    Review of Systems   Unable to perform ROS: Mental status change     Objective:     Temp:  [97.7 °F (36.5 °C)-99.8 °F (37.7 °C)] 99.4 °F (37.4 °C)  Pulse:  [60-76] 70  Resp:  [16-28] 22  SpO2:  [88 %-96 %] 92 %  BP: (116-129)/(56-60) 129/60     Body mass index is 25.24 kg/m².      Bladder Scan Volume (mL): 467 mL (11/18/19 0900)    Drains     Drain                 Chest Tube 11/16/19 1135 1 Right 28 Fr. 5 days         Urethral Catheter 11/18/19 0927 3 days                Physical Exam   Nursing note and vitals reviewed.  Constitutional: He is oriented to person, place, and time. He appears well-developed and well-nourished.   HENT:   Head: Normocephalic.   Eyes: Conjunctivae are normal.   Neck: Normal range of motion. Neck supple. No tracheal deviation present. No thyromegaly present.   Cardiovascular: Normal rate and normal heart sounds.    Pulmonary/Chest: Effort normal and breath sounds normal. No respiratory distress. He has no wheezes.   Chest tube in place   Abdominal: Soft. Bowel sounds are normal. There is no hepatosplenomegaly. There is no tenderness. There is no rebound and no CVA tenderness. No hernia.   Genitourinary:   Genitourinary Comments: Colunga in place with yellow urine   Musculoskeletal: Normal range of motion. He exhibits no edema or tenderness.   Lymphadenopathy:     He has no cervical adenopathy.   Neurological: He is alert and oriented to person, place, and time.   Skin: Skin is warm and dry. No rash noted. No erythema.     Psychiatric: He has a normal mood and affect. His behavior is normal. Judgment and thought content normal.       Significant Labs:    BMP:  Recent Labs   Lab 11/20/19 0454 11/21/19 0536 11/22/19 0528    140 142   K 4.2 4.1 4.4   * 110 113*   CO2 26 25 24   BUN 31* 26* 26*   CREATININE 1.5* 1.3 1.3   CALCIUM 8.6* 8.5* 9.1       CBC:   Recent Labs   Lab 11/20/19 0454  11/21/19  0536 11/22/19  0528   WBC 13.66* 14.84* 14.28*   HGB 8.1* 7.1* 7.6*   HCT 26.2* 23.2* 24.3*    202 224       Blood Culture: No results for input(s): LABBLOO in the last 168 hours.  Urine Culture: No results for input(s): LABURIN in the last 168 hours.    Significant Imaging:

## 2019-11-22 NOTE — NURSING
Pt has copious amount of bloody drainage on chest tube bandage. While changing dressing a small amount of drainage drained back into lung from tube. Pt sounded in distress, gurgling. At this time it was audible to the naked ear which was a change from before. Pt awakens and states he is tired and just wants to go. I was given in report that pts confusion has worsened throughout day shift. Pt very lethargic. meds held due to pt lethargy and SOB.

## 2019-11-22 NOTE — NURSING
Patient has a wet, productive cough (very thick secretions) with RML & RLL severely diminished/coarse breath sounds. R CT dressing saturates frequently as well. CT output approx 200cc q12h. Patient sleeps most of the day and does not want to eat.

## 2019-11-22 NOTE — CONSULTS
[facility]  Palliative Medicine  Consult Note    Patient Name: Timbo Gallagher  MRN: [id number]  Admission Date: 11/16/2019  Hospital Length of Stay: 6 days  Code Status: Full Code   Attending Provider: Miguelina Farr MD  Consulting Provider: Marta Finn NP  Primary Care Physician: Cirilo Montero MD  Principal Problem:Hemothorax on right    Patient information was obtained from patient, past medical records and ER records.        Thank you for your consult. I will follow-up with patient. Please contact us if you have any additional questions.    Subjective:       HPI:  85 y/o male presents to the ER complaining of constant right sided rib pain since 4 days ago.  Symptoms worsened yesterday.  Patient was seen at urgent care for recent fall and diagnosed with multiple right sided rib fractures.  He reports associated shortness of breath, right sided abdominal pain and cough. Patient reports taking tylenol for symptoms without relief.  He states no other associated symptoms.  Patient also complaining of right elbow swelling after fall.  Patient denies any fever, chills or hemoptysis.  Chest CT showing large volume pleural fluid, concerning for hemothorax.  Patient had been doing well prior to fall with no recent complaints.  No other complaints.     Overview/Hospital Course:  85 y/o male with recent fall and rib fractures presented with cough and SOB.  Imaging with new large right sided pleural effusion.  Admitted with concern of hemothorax.  Surgery consulted.  S/P chest tube placement on 11/16.  Anemia of acute blood loss with dropping H/H.will monitor for another day,  ASA held.  Patient also retaining urine.  Colunga placed.  Labs showing ARF.  Urology consulted and patient started on IVF's.ARF is improving,ARF improved,hold IVF at this time.  Over night had worsening respiratory status,consern for aspiration,strated on zosyn,NPO,consulted ST,will hane chest CT.stable on NC O 2 at  this time.  Consulted palliative care .             Past Medical History:   Diagnosis Date    Acute renal failure     Arthritis     Blind right eye     BPH (benign prostatic hypertrophy)     Bronchitis, chronic     Carotid artery occlusion     Lt carotid endarerectomy done 02/19/2018     CHF (congestive heart failure)     Colon polyp     Coronary artery disease     Diabetes mellitus     GERD (gastroesophageal reflux disease)     Hearing aid worn     bilateral    Hematuria     Hernia     Hypertension     Influenza A     Irregular heart beat     NSVT (nonsustained ventricular tachycardia) 4/18/2018    Renal failure as complication of care     sepsis, renal function returned    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 Coleman    CATARACT EXTRACTION, BILATERAL      ESOPHAGOGASTRODUODENOSCOPY N/A 8/30/2018    Procedure: EGD (ESOPHAGOGASTRODUODENOSCOPY);  Surgeon: Tye Navarrete MD;  Location: [address]);  Service: Endoscopy;  Laterality: N/A;    ESOPHAGOGASTRODUODENOSCOPY  08/30/2018    EYE SURGERY      HERNIA REPAIR Left 09/2013    RETINAL DETACHMENT SURGERY      THORACENTESIS N/A 1/17/2019    Procedure: THORACENTESIS;  Surgeon: Hernan Diagnostic Provider;  Location: [facility];  Service: Radiology;  Laterality: N/A;  1130AM  START  RN PRE OP 1-15-19    TUBE THORACOTOMY Right 11/16/2019    Procedure: INSERTION, CATHETER, INTERCOSTAL, FOR  DRAINAGE;  Surgeon: Silvio Tay MD;  Location: [facility];  Service: General;  Laterality: Right;       Review of patient's allergies indicates:   Allergen Reactions    Ciprofloxacin (mixture) Itching     Extreme itching    Antibiotic hc     Hydrochlorothiazide      Leg swelling      Iodine and iodide containing products      Wife and pt unsure    Vancomycin analogues Itching       Medications:  Continuous Infusions:  Scheduled Meds:   amiodarone  200 mg Oral Daily    amLODIPine  10 mg Oral Daily    atorvastatin  40 mg Oral Daily    carbamide peroxide  5 drop Right Ear BID    cetirizine  5 mg Oral Daily    cyanocobalamin  100 mcg Oral Weekly    dextromethorphan-guaifenesin  mg/5 ml  5 mL Oral Q6H    donepezil  5 mg Oral QHS    escitalopram oxalate  10 mg Oral Daily    famotidine  20 mg Oral Daily    ferrous gluconate  324 mg Oral Daily with breakfast    guaiFENesin  600 mg Oral BID    metoprolol succinate  50 mg Oral BID    mirtazapine  7.5 mg Oral QHS    piperacillin-tazobactam (ZOSYN) IVPB  4.5 g Intravenous Q8H    senna-docusate 8.6-50 mg  2 tablet Oral BID    tamsulosin  0.4 mg Oral Daily     PRN Meds:acetaminophen, benzonatate, bisacodyl, cloNIDine, cyproheptadine, dextrose 50%, glucagon (human recombinant), HYDROcodone-acetaminophen, influenza, insulin aspart U-100, morphine, ondansetron, polyethylene glycol, sodium chloride 0.9%    Family History     Problem Relation (Age of Onset)    Arthritis Mother    Diabetes Sister    Heart attack Brother    Heart disease Father    Heart failure Father    No Known Problems Maternal Aunt, Maternal Uncle, Paternal Aunt, Paternal Uncle, Maternal Grandmother, Maternal Grandfather, Paternal Grandmother, Paternal Grandfather        Tobacco Use    Smoking status: Former Smoker     Packs/day: 3.00     Years: 40.00     Pack years: 120.00     Types: Cigarettes     Last attempt to quit: 1990     Years since quittin.3    Smokeless tobacco:  Never Used   Substance and Sexual Activity    Alcohol use: No    Drug use: No    Sexual activity: Not Currently     Partners: Female       Review of Systems   Constitutional: Positive for activity change, appetite change and fatigue.   Respiratory: Positive for cough and shortness of breath.      Objective:     Vital Signs (Most Recent):  Temp: 99.4 °F (37.4 °C) (11/22/19 0712)  Pulse: 70 (11/22/19 0839)  Resp: 19 (11/22/19 0839)  BP: 129/60 (11/22/19 0712)  SpO2: (!) 93 % (11/22/19 0839) Vital Signs (24h Range):  Temp:  [97.7 °F (36.5 °C)-99.8 °F (37.7 °C)] 99.4 °F (37.4 °C)  Pulse:  [60-76] 70  Resp:  [16-28] 19  SpO2:  [88 %-96 %] 93 %  BP: (116-129)/(56-60) 129/60     Weight: 79.8 kg (175 lb 14.8 oz)  Body mass index is 25.24 kg/m².    Performance Status: 30    Physical Exam   Constitutional: He is oriented to person, place, and time.   Pulmonary/Chest:   Right side chest tube, oxygen BNC    Abdominal: Soft. Bowel sounds are normal.   Neurological: He is alert and oriented to person, place, and time.   Skin: Skin is warm and dry.   ecchymosis    Nursing note and vitals reviewed.      Significant Labs: All pertinent labs within the past 24 hours have been reviewed.  CBC:   Recent Labs   Lab 11/22/19 0528   WBC 14.28*   HGB 7.6*   HCT 24.3*   *        BMP:  Recent Labs   Lab 11/22/19 0528         K 4.4   *   CO2 24   BUN 26*   CREATININE 1.3   CALCIUM 9.1     LFT:  Lab Results   Component Value Date    AST 18 11/16/2019    ALKPHOS 123 11/16/2019    BILITOT 0.5 11/16/2019     Albumin:   Albumin   Date Value Ref Range Status   11/16/2019 3.1 (L) 3.5 - 5.2 g/dL Final     Protein:   Total Protein   Date Value Ref Range Status   11/16/2019 7.2 6.0 - 8.4 g/dL Final     Lactic acid:   Lab Results   Component Value Date    LACTATE 1.5 04/25/2019    LACTATE 1.7 04/24/2019       Significant Imaging: I have reviewed all pertinent imaging results/findings within the past 24  hours.    Advance Care Planning   Advanced Directives::  Living Will: No  LaPOST: No  Do Not Resuscitate Status: No  Medical Power of : No    Decision-Making Capacity: Patient answered questions       Living Arrangements: Lives with spouse    ASSESSMENT/PLAN:    Palliative encounter:    Advance Care Planning   Patient is alert and oriented. He is very weak and voice is soft. Mouth crusted with dried secretions. Audible congestion. Right chest tube, breathing slightly labored but patient denies feeling SOB. Patient coughing occasionally during conversation.     [facility]  I engaged the patient in a conversation about advance care planning and we specifically addressed what the goals of care would be moving forward, in light of the patient's change in clinical status. He states he is tired meaning of living in his current condition when I asked him to be more specific.    We did not specifically address the patient's likely prognosis. However, after review of his medical records and assessment of his current status I do not feel he will have a good outcome       Code Status  In light of the patients advanced and life limiting illness,I engaged the the patient in a conversation about the patient's preferences for care  at the very end of life. The patient wishes to have a natural, peaceful death.  Along those lines, the patient does not wish to have CPR or other invasive treatments performed when his heart and/or breathing stops. He has an AICD and will at some point need the defibrillator turned off.    I do feel he is appropriate for hospice and will discuss this as an option. I will discuss all of this when his wife arrives.     -continue current treatment  - patient wants DNR (will address this and defibrillator once spouse arrives)  -Patient is appropriate for hospice (will discuss as an option)   -Palliative will continue to follow      Addendum:    Spouse arrived. We discussed current clinical status and  "likely poor outcome, code status and hospice. Patient and spouse agreeable to DNR. Spouse states " I do not want to see him suffer." patient states " please give me water and let me go home and spend the time with my wife and be comfortable." Spoke with spouse in [facility] and she states " I think he has been dying for a long time and I don't think he will come out of this." she would like for him to go to an inpatient hospice because she feels she cannot take care of him. They live in [place] nad she feels [facility] will be better. Notified Dee and she is working this weekend and will f/u.   Spouse also asks for a letter from physician to state he is ill so she can address needs at [facility]. Requested by [facility]    Updated Dr Gamboa       > 50% of 70 min visit spent in chart review, face to face discussion of goals of care,  symptom assessment, coordination of care and emotional support.    Marta Finn, NP  Palliative Medicine  [facility]            "

## 2019-11-22 NOTE — NURSING
Oral suction at bedside. Pigtail cath to CT drsg CDI. Patient on 2LNC. Colunga in place.  Patient remains NPO.  Will continue to monitor.

## 2019-11-22 NOTE — PROGRESS NOTES
[facility] Hospital Medicine  Progress Note    Patient Name: Timbo Gallagher  MRN: [id number]  Patient Class: IP- Inpatient   Admission Date: 11/16/2019  Length of Stay: 6 days  Attending Physician: Miguelina Farr MD  Primary Care Provider: Cirilo Montero MD        Subjective:     Principal Problem:Hemothorax on right        HPI:  83 y/o male presents to the ER complaining of constant right sided rib pain since 4 days ago.  Symptoms worsened yesterday.  Patient was seen at urgent care for recent fall and diagnosed with multiple right sided rib fractures.  He reports associated shortness of breath, right sided abdominal pain and cough. Patient reports taking tylenol for symptoms without relief.  He states no other associated symptoms.  Patient also complaining of right elbow swelling after fall.  Patient denies any fever, chills or hemoptysis.  Chest CT showing large volume pleural fluid, concerning for hemothorax.  Patient had been doing well prior to fall with no recent complaints.  No other complaints.    Overview/Hospital Course:  83 y/o male with recent fall and rib fractures presented with cough and SOB.  Imaging with new large right sided pleural effusion.  Admitted with concern of hemothorax.  Surgery consulted.  S/P chest tube placement on 11/16.  Anemia of acute blood loss with dropping H/H.will monitor for another day,  ASA held.  Patient also retaining urine.  Colunga placed.  Labs showing ARF.  Urology consulted and patient started on IVF's.ARF is improving,ARF improved,hold IVF at this time.  Over night had worsening respiratory status,consern for aspiration,strated on zosyn,NPO,consulted ,[facility] [place] chest CT.stable on NC O 2 at this time.  Consulted palliative care for DNR status.      Interval History:stable on NC O 2 at this time.    Review of Systems   HENT: Negative for ear discharge and ear pain.    Eyes: Negative for pain and itching.   Endocrine: Negative for  polyphagia and polyuria.   Neurological: Negative for seizures and syncope.     Objective:     Vital Signs (Most Recent):  Temp: 99.4 °F (37.4 °C) (11/22/19 0712)  Pulse: 70 (11/22/19 0839)  Resp: 19 (11/22/19 0839)  BP: 129/60 (11/22/19 0712)  SpO2: (!) 93 % (11/22/19 0839) Vital Signs (24h Range):  Temp:  [97.7 °F (36.5 °C)-99.8 °F (37.7 °C)] 99.4 °F (37.4 °C)  Pulse:  [60-76] 70  Resp:  [16-28] 19  SpO2:  [88 %-96 %] 93 %  BP: (116-129)/(56-60) 129/60     Weight: 79.8 kg (175 lb 14.8 oz)  Body mass index is 25.24 kg/m².    Intake/Output Summary (Last 24 hours) at 11/22/2019 1006  Last data filed at 11/22/2019 0823  Gross per 24 hour   Intake 240 ml   Output 1310 ml   Net -1070 ml      Physical Exam   Constitutional: He is oriented to person, place, and time. No distress.   HENT:   Head: Normocephalic and atraumatic.   Eyes: Conjunctivae are normal. Right eye exhibits no discharge. Left eye exhibits no discharge.   Neck: Neck supple. No tracheal deviation present.   Cardiovascular: Normal rate and regular rhythm.   Pulmonary/Chest: Effort normal.   Right sided chest tube in place   Abdominal: Soft. Bowel sounds are normal.   Musculoskeletal:   Right elbow effusion   Neurological: He is alert and oriented to person, place, and time.   Skin: Skin is warm and dry. He is not diaphoretic.       Significant Labs:   BMP:   Recent Labs   Lab 11/22/19 0528         K 4.4   *   CO2 24   BUN 26*   CREATININE 1.3   CALCIUM 9.1     CBC:   Recent Labs   Lab 11/21/19  0536 11/22/19  0528   WBC 14.84* 14.28*   HGB 7.1* 7.6*   HCT 23.2* 24.3*    224       Significant Imaging: I have reviewed all pertinent imaging results/findings within the past 24 hours.      Assessment/Plan:      * Hemothorax on right  S/P fall with rib fractures.  CT showing high volume pleural fluid.  Concerning for hemothorax.  Appreciate Surgery input.  S/P chest tube placement on 11/16  Over night had worsening respiratory  status,consern for aspiration,strated on zosyn,NPO,consulted ST,will hane chest CT.stable on NC O 2 at this time.    Essential hypertension  Resume home meds with parameters.  Low Na diet.  Holding Losartan and Lasix secondary to worsening renal failure.      Type 2 diabetes mellitus with stage 3 chronic kidney disease  Hold home oral medications.  Diabetic diet and insulin sliding scale.      Chronic diastolic heart failure  No evidence of acute exacerbation.  Seems more fluid depleted.  Hold Lasix.      Anemia of chronic disease  H/H similar to previous labs.  Now with anemia of acute blood loss secondary to hemothorax.  Significant drop in H/H.will monitor for another day.  Repeat labs and transfuse if lower.      Acute renal failure superimposed on stage 3 chronic kidney disease  improving with IVF.,ARF improved,hold IVF at this time.      AICD (automatic cardioverter/defibrillator) present        Mixed hyperlipidemia  Continue Statin.      CKD (chronic kidney disease), stage III  Now with ARF.  ARF probably combination of obstruction and pre renal.  Colunga placed.  Start IVF hydration.  Avoid nephrotoxic medications.  Stop Losartan.      Aortic valve stenosis  S/P TAVR        VTE Risk Mitigation (From admission, onward)         Ordered     Place sequential compression device  Until discontinued      11/16/19 1200     IP VTE HIGH RISK PATIENT  Once      11/16/19 0712     Place DEYA hose  Until discontinued      11/16/19 0712                      Miguelina Farr MD  Department of Hospital Medicine   [facility]

## 2019-11-22 NOTE — NURSING
Pt treated with morphine x 2 this shift. Pt crying asking me to make them stop doing anything to him. Pt is starting to run a low grade temp 99.8 axillary. Resp 28. Chest tube drainage is a dark red. Chest tube to -20 CWS.

## 2019-11-22 NOTE — ASSESSMENT & PLAN NOTE
S/P fall with rib fractures.  CT showing high volume pleural fluid.  Concerning for hemothorax.  Appreciate Surgery input.  S/P chest tube placement on 11/16  Over night had worsening respiratory status,consern for aspiration,strated on zosyn,NPO,consulted tony [facility] chest CT.stable on NC O 2 at this time.

## 2019-11-22 NOTE — SUBJECTIVE & OBJECTIVE
Past Medical History:   Diagnosis Date    Acute renal failure     Arthritis     Blind right eye     BPH (benign prostatic hypertrophy)     Bronchitis, chronic     Carotid artery occlusion     Lt carotid endarerectomy done 02/19/2018     CHF (congestive heart failure)     Colon polyp     Coronary artery disease     Diabetes mellitus     GERD (gastroesophageal reflux disease)     Hearing aid worn     bilateral    Hematuria     Hernia     Hypertension     Influenza A     Irregular heart beat     NSVT (nonsustained ventricular tachycardia) 4/18/2018    Renal failure as complication of care     sepsis, renal function returned    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 Coleman    CATARACT EXTRACTION, BILATERAL      ESOPHAGOGASTRODUODENOSCOPY N/A 8/30/2018    Procedure: EGD (ESOPHAGOGASTRODUODENOSCOPY);  Surgeon: Tye Navarrete MD;  Location: [address]);  Service: Endoscopy;  Laterality: N/A;    ESOPHAGOGASTRODUODENOSCOPY  08/30/2018    EYE SURGERY      HERNIA REPAIR Left 09/2013    RETINAL DETACHMENT SURGERY      THORACENTESIS N/A 1/17/2019    Procedure: THORACENTESIS;  Surgeon: [facility] Diagnostic Provider;  Location: [facility];  Service: Radiology;  Laterality: N/A;  1130AM  START  RN PRE OP 1-15-19    TUBE THORACOTOMY Right 11/16/2019    Procedure: INSERTION, CATHETER, INTERCOSTAL, FOR DRAINAGE;  Surgeon: Silvio Tay MD;   Location: [facility] OR;  Service: General;  Laterality: Right;       Review of patient's allergies indicates:   Allergen Reactions    Ciprofloxacin (mixture) Itching     Extreme itching    Antibiotic hc     Hydrochlorothiazide      Leg swelling      Iodine and iodide containing products      Wife and pt unsure    Vancomycin analogues Itching       Medications:  Continuous Infusions:  Scheduled Meds:   amiodarone  200 mg Oral Daily    amLODIPine  10 mg Oral Daily    atorvastatin  40 mg Oral Daily    carbamide peroxide  5 drop Right Ear BID    cetirizine  5 mg Oral Daily    cyanocobalamin  100 mcg Oral Weekly    dextromethorphan-guaifenesin  mg/5 ml  5 mL Oral Q6H    donepezil  5 mg Oral QHS    escitalopram oxalate  10 mg Oral Daily    famotidine  20 mg Oral Daily    ferrous gluconate  324 mg Oral Daily with breakfast    guaiFENesin  600 mg Oral BID    metoprolol succinate  50 mg Oral BID    mirtazapine  7.5 mg Oral QHS    piperacillin-tazobactam (ZOSYN) IVPB  4.5 g Intravenous Q8H    senna-docusate 8.6-50 mg  2 tablet Oral BID    tamsulosin  0.4 mg Oral Daily     PRN Meds:acetaminophen, benzonatate, bisacodyl, cloNIDine, cyproheptadine, dextrose 50%, glucagon (human recombinant), HYDROcodone-acetaminophen, influenza, insulin aspart U-100, morphine, ondansetron, polyethylene glycol, sodium chloride 0.9%    Family History     Problem Relation (Age of Onset)    Arthritis Mother    Diabetes Sister    Heart attack Brother    Heart disease Father    Heart failure Father    No Known Problems Maternal Aunt, Maternal Uncle, Paternal Aunt, Paternal Uncle, Maternal Grandmother, Maternal Grandfather, Paternal Grandmother, Paternal Grandfather        Tobacco Use    Smoking status: Former Smoker     Packs/day: 3.00     Years: 40.00     Pack years: 120.00     Types: Cigarettes     Last attempt to quit: 1990     Years since quittin.3    Smokeless tobacco: Never Used   Substance and Sexual Activity     Alcohol use: No    Drug use: No    Sexual activity: Not Currently     Partners: Female       Review of Systems   Constitutional: Positive for activity change, appetite change and fatigue.   Respiratory: Positive for cough and shortness of breath.      Objective:     Vital Signs (Most Recent):  Temp: 99.4 °F (37.4 °C) (11/22/19 0712)  Pulse: 70 (11/22/19 0839)  Resp: 19 (11/22/19 0839)  BP: 129/60 (11/22/19 0712)  SpO2: (!) 93 % (11/22/19 0839) Vital Signs (24h Range):  Temp:  [97.7 °F (36.5 °C)-99.8 °F (37.7 °C)] 99.4 °F (37.4 °C)  Pulse:  [60-76] 70  Resp:  [16-28] 19  SpO2:  [88 %-96 %] 93 %  BP: (116-129)/(56-60) 129/60     Weight: 79.8 kg (175 lb 14.8 oz)  Body mass index is 25.24 kg/m².    Performance Status: 30    Physical Exam   Constitutional: He is oriented to person, place, and time.   Pulmonary/Chest:   Right side chest tube, oxygen BNC    Abdominal: Soft. Bowel sounds are normal.   Neurological: He is alert and oriented to person, place, and time.   Skin: Skin is warm and dry.   ecchymosis    Nursing note and vitals reviewed.      Significant Labs: All pertinent labs within the past 24 hours have been reviewed.  CBC:   Recent Labs   Lab 11/22/19 0528   WBC 14.28*   HGB 7.6*   HCT 24.3*   *        BMP:  Recent Labs   Lab 11/22/19 0528         K 4.4   *   CO2 24   BUN 26*   CREATININE 1.3   CALCIUM 9.1     LFT:  Lab Results   Component Value Date    AST 18 11/16/2019    ALKPHOS 123 11/16/2019    BILITOT 0.5 11/16/2019     Albumin:   Albumin   Date Value Ref Range Status   11/16/2019 3.1 (L) 3.5 - 5.2 g/dL Final     Protein:   Total Protein   Date Value Ref Range Status   11/16/2019 7.2 6.0 - 8.4 g/dL Final     Lactic acid:   Lab Results   Component Value Date    LACTATE 1.5 04/25/2019    LACTATE 1.7 04/24/2019       Significant Imaging: I have reviewed all pertinent imaging results/findings within the past 24 hours.    Advance Care Planning   Advanced  Directives::  Living Will: No  LaPOST: No  Do Not Resuscitate Status: No  Medical Power of : No    Decision-Making Capacity: Patient answered questions       Living Arrangements: Lives with spouse    ASSESSMENT/PLAN:    Palliative encounter:    Advance Care Planning   Patient is alert and oriented. He is very weak and voice is soft. Mouth crusted with dried secretions. Audible congestion. Right chest tube, breathing slightly labored but patient denies feeling SOB. Patient coughing occasionally during conversation.     [facility]  I engaged the patient in a conversation about advance care planning and we specifically addressed what the goals of care would be moving forward, in light of the patient's change in clinical status. He states he is tired meaning of living in his current condition when I asked him to be more specific.    We did not specifically address the patient's likely prognosis. However, after review of his medical records and assessment of his current status I do not feel he will have a good outcome       Code Status  In light of the patients advanced and life limiting illness,I engaged the the patient in a conversation about the patient's preferences for care  at the very end of life. The patient wishes to have a natural, peaceful death.  Along those lines, the patient does not wish to have CPR or other invasive treatments performed when his heart and/or breathing stops. He has an AICD and will at some point need the defibrillator turned off.    I do feel he is appropriate for hospice and will discuss this as an option. I will discuss all of this when his wife arrives.

## 2019-11-22 NOTE — PROGRESS NOTES
Surgery Progress Note    Primary Problem: Hemothorax on right    Other problems:   Active Hospital Problems    Diagnosis  POA    *Hemothorax on right [J94.2]  Yes    Acute renal failure superimposed on stage 3 chronic kidney disease [N17.9, N18.3]  Yes    Urinary retention [R33.9]  Yes    AICD (automatic cardioverter/defibrillator) present [Z95.810]  Yes     Medtronic dual AICD 4/23/18      Mixed hyperlipidemia [E78.2]  Yes    CKD (chronic kidney disease), stage III [N18.3]  Yes    Aortic valve stenosis [I35.0]  Yes    Essential hypertension [I10]  Yes    Chronic diastolic heart failure [I50.32]  Yes    Anemia of chronic disease [D63.8]  Yes    BPH with obstruction/lower urinary tract symptoms [N40.1, N13.8]  Yes    Type 2 diabetes mellitus with stage 3 chronic kidney disease [E11.22, N18.3]  Yes      Resolved Hospital Problems   No resolved problems to display.       HD #  LOS: 6 days   POD #6 Days Post-Op chest tube insertion    Overnight events:  Decreased sats overnight.  Still on nasal cannula.  Still with drainage around chest tube site.      Diet - Diet diabetic [facility]; 2000 Calorie; Cardiac (Low Na/Chol)     I/O last 3 completed shifts:  In: 2820 [P.O.:960; I.V.:1860]  Out: 2815 [Urine:2275; Chest Tube:540]    Intake/Output Summary (Last 24 hours) at 11/22/2019 0651  Last data filed at 11/22/2019 0600  Gross per 24 hour   Intake 360 ml   Output 1310 ml   Net -950 ml       Temp:  [97.6 °F (36.4 °C)-99.8 °F (37.7 °C)] 99.8 °F (37.7 °C)  Pulse:  [60-76] 76  Resp:  [16-28] 28  SpO2:  [88 %-96 %] 88 %  BP: (116-130)/(56-60) 116/58  Chest tube output 410 dark bloody  Gen:  Resting comfortably, no apparent distress.    Decreased breath sounds on the right.  Right chest without crepitus.  Ecchymoses along his chest wall. Chest tube system taken down and inspected by me.  No evidence of any leak. No air leak.  Recent Labs   Lab 11/16/19  0323  11/18/19  0506 11/18/19  1523 11/19/19  0516  11/19/19  0813 11/20/19  0454 11/21/19  0536 11/22/19  0528   WBC 9.90   < > 12.60  --  11.71  --  13.66* 14.84* 14.28*   HGB 12.2*   < > 7.5* 8.1* 7.2*  --  8.1* 7.1* 7.6*   HCT 38.6*   < > 24.5* 25.0* 23.2*  --  26.2* 23.2* 24.3*      < > 157  --  177  --  205 202 224     --  137  --   --  141 143 140 142   K 3.5  --  4.5  --   --  3.9 4.2 4.1 4.4     --  103  --   --  110 112* 110 113*   BUN 27*  --  50*  --   --  39* 31* 26* 26*   *  --  141*  --   --  145* 163* 126* 110   PROT 7.2  --   --   --   --   --   --   --   --    ALBUMIN 3.1*  --   --   --   --   --   --   --   --    BILITOT 0.5  --   --   --   --   --   --   --   --    AST 18  --   --   --   --   --   --   --   --    ALKPHOS 123  --   --   --   --   --   --   --   --    ALT 14  --   --   --   --   --   --   --   --     < > = values in this interval not displayed.      Chest x-ray reviewed by me- opacification of right hemithorax.  Associated volume loss with tracheal deviation to the right    Assessment:  Right hemothorax status post chest tube, worsening opacification on chest x-ray, anemia    Plan:  Continue chest tube to suction  Repeat CT chest to evaluate for parenchymal disease vs retained hemothorax, effusion  Will defer to primary whether transfusion warranted or not.    Giles Lezama MD

## 2019-11-23 LAB
ANION GAP SERPL CALC-SCNC: 6 MMOL/L (ref 8–16)
BASOPHILS # BLD AUTO: 0.02 K/UL (ref 0–0.2)
BASOPHILS NFR BLD: 0.2 % (ref 0–1.9)
BUN SERPL-MCNC: 26 MG/DL (ref 8–23)
CALCIUM SERPL-MCNC: 9 MG/DL (ref 8.7–10.5)
CHLORIDE SERPL-SCNC: 112 MMOL/L (ref 95–110)
CO2 SERPL-SCNC: 24 MMOL/L (ref 23–29)
CREAT SERPL-MCNC: 1.3 MG/DL (ref 0.5–1.4)
DIFFERENTIAL METHOD: ABNORMAL
EOSINOPHIL # BLD AUTO: 0.1 K/UL (ref 0–0.5)
EOSINOPHIL NFR BLD: 0.9 % (ref 0–8)
ERYTHROCYTE [DISTWIDTH] IN BLOOD BY AUTOMATED COUNT: 14.1 % (ref 11.5–14.5)
EST. GFR  (AFRICAN AMERICAN): 58 ML/MIN/1.73 M^2
EST. GFR  (NON AFRICAN AMERICAN): 50 ML/MIN/1.73 M^2
GLUCOSE SERPL-MCNC: 115 MG/DL (ref 70–110)
HCT VFR BLD AUTO: 24.6 % (ref 40–54)
HGB BLD-MCNC: 7.6 G/DL (ref 14–18)
IMM GRANULOCYTES # BLD AUTO: 0.1 K/UL (ref 0–0.04)
IMM GRANULOCYTES NFR BLD AUTO: 0.8 % (ref 0–0.5)
LYMPHOCYTES # BLD AUTO: 0.9 K/UL (ref 1–4.8)
LYMPHOCYTES NFR BLD: 7.3 % (ref 18–48)
MCH RBC QN AUTO: 31.9 PG (ref 27–31)
MCHC RBC AUTO-ENTMCNC: 30.9 G/DL (ref 32–36)
MCV RBC AUTO: 103 FL (ref 82–98)
MONOCYTES # BLD AUTO: 1.3 K/UL (ref 0.3–1)
MONOCYTES NFR BLD: 11.3 % (ref 4–15)
NEUTROPHILS # BLD AUTO: 9.4 K/UL (ref 1.8–7.7)
NEUTROPHILS NFR BLD: 79.5 % (ref 38–73)
NRBC BLD-RTO: 0 /100 WBC
PLATELET # BLD AUTO: 228 K/UL (ref 150–350)
PMV BLD AUTO: 10.8 FL (ref 9.2–12.9)
POCT GLUCOSE: 133 MG/DL (ref 70–110)
POCT GLUCOSE: 153 MG/DL (ref 70–110)
POCT GLUCOSE: 161 MG/DL (ref 70–110)
POCT GLUCOSE: 173 MG/DL (ref 70–110)
POTASSIUM SERPL-SCNC: 4.4 MMOL/L (ref 3.5–5.1)
RBC # BLD AUTO: 2.38 M/UL (ref 4.6–6.2)
SODIUM SERPL-SCNC: 142 MMOL/L (ref 136–145)
WBC # BLD AUTO: 11.84 K/UL (ref 3.9–12.7)

## 2019-11-23 PROCEDURE — 80048 BASIC METABOLIC PNL TOTAL CA: CPT

## 2019-11-23 PROCEDURE — 85025 COMPLETE CBC W/AUTO DIFF WBC: CPT

## 2019-11-23 PROCEDURE — 99232 SBSQ HOSP IP/OBS MODERATE 35: CPT | Mod: ,,, | Performed by: UROLOGY

## 2019-11-23 PROCEDURE — 27000221 HC OXYGEN, UP TO 24 HOURS

## 2019-11-23 PROCEDURE — 36415 COLL VENOUS BLD VENIPUNCTURE: CPT

## 2019-11-23 PROCEDURE — 25000003 PHARM REV CODE 250: Performed by: HOSPITALIST

## 2019-11-23 PROCEDURE — 94799 UNLISTED PULMONARY SVC/PX: CPT

## 2019-11-23 PROCEDURE — 99232 PR SUBSEQUENT HOSPITAL CARE,LEVL II: ICD-10-PCS | Mod: ,,, | Performed by: UROLOGY

## 2019-11-23 PROCEDURE — 63600175 PHARM REV CODE 636 W HCPCS: Performed by: HOSPITALIST

## 2019-11-23 PROCEDURE — 25000003 PHARM REV CODE 250: Performed by: SURGERY

## 2019-11-23 PROCEDURE — 11000001 HC ACUTE MED/SURG PRIVATE ROOM

## 2019-11-23 PROCEDURE — 94761 N-INVAS EAR/PLS OXIMETRY MLT: CPT

## 2019-11-23 RX ADMIN — GUAIFENESIN 600 MG: 600 TABLET, EXTENDED RELEASE ORAL at 09:11

## 2019-11-23 RX ADMIN — METOPROLOL SUCCINATE 50 MG: 50 TABLET, FILM COATED, EXTENDED RELEASE ORAL at 08:11

## 2019-11-23 RX ADMIN — AMLODIPINE BESYLATE 10 MG: 5 TABLET ORAL at 09:11

## 2019-11-23 RX ADMIN — ESCITALOPRAM OXALATE 10 MG: 10 TABLET ORAL at 09:11

## 2019-11-23 RX ADMIN — PIPERACILLIN AND TAZOBACTAM 4.5 G: 4; .5 INJECTION, POWDER, FOR SOLUTION INTRAVENOUS at 12:11

## 2019-11-23 RX ADMIN — GUAIFENESIN AND DEXTROMETHORPHAN 5 ML: 100; 10 SYRUP ORAL at 05:11

## 2019-11-23 RX ADMIN — CARBAMIDE PEROXIDE 6.5% 5 DROP: 6.5 LIQUID AURICULAR (OTIC) at 08:11

## 2019-11-23 RX ADMIN — TAMSULOSIN HYDROCHLORIDE 0.4 MG: 0.4 CAPSULE ORAL at 09:11

## 2019-11-23 RX ADMIN — SENNOSIDES AND DOCUSATE SODIUM 2 TABLET: 8.6; 5 TABLET ORAL at 09:11

## 2019-11-23 RX ADMIN — PIPERACILLIN AND TAZOBACTAM 4.5 G: 4; .5 INJECTION, POWDER, FOR SOLUTION INTRAVENOUS at 07:11

## 2019-11-23 RX ADMIN — HYDROCODONE BITARTRATE AND ACETAMINOPHEN 1 TABLET: 5; 325 TABLET ORAL at 08:11

## 2019-11-23 RX ADMIN — GUAIFENESIN 600 MG: 600 TABLET, EXTENDED RELEASE ORAL at 08:11

## 2019-11-23 RX ADMIN — HYDROCODONE BITARTRATE AND ACETAMINOPHEN 1 TABLET: 5; 325 TABLET ORAL at 04:11

## 2019-11-23 RX ADMIN — DONEPEZIL HYDROCHLORIDE 5 MG: 5 TABLET, FILM COATED ORAL at 08:11

## 2019-11-23 RX ADMIN — FAMOTIDINE 20 MG: 20 TABLET ORAL at 09:11

## 2019-11-23 RX ADMIN — GUAIFENESIN AND DEXTROMETHORPHAN 5 ML: 100; 10 SYRUP ORAL at 12:11

## 2019-11-23 RX ADMIN — ATORVASTATIN CALCIUM 40 MG: 40 TABLET, FILM COATED ORAL at 09:11

## 2019-11-23 RX ADMIN — VITAM B12 100 MCG: 100 TAB at 09:11

## 2019-11-23 RX ADMIN — PIPERACILLIN AND TAZOBACTAM 4.5 G: 4; .5 INJECTION, POWDER, FOR SOLUTION INTRAVENOUS at 03:11

## 2019-11-23 RX ADMIN — CETIRIZINE HYDROCHLORIDE 5 MG: 5 TABLET ORAL at 09:11

## 2019-11-23 RX ADMIN — SENNOSIDES AND DOCUSATE SODIUM 2 TABLET: 8.6; 5 TABLET ORAL at 08:11

## 2019-11-23 RX ADMIN — CARBAMIDE PEROXIDE 6.5% 5 DROP: 6.5 LIQUID AURICULAR (OTIC) at 09:11

## 2019-11-23 RX ADMIN — METOPROLOL SUCCINATE 50 MG: 50 TABLET, FILM COATED, EXTENDED RELEASE ORAL at 09:11

## 2019-11-23 RX ADMIN — POLYETHYLENE GLYCOL 3350 17 G: 17 POWDER, FOR SOLUTION ORAL at 04:11

## 2019-11-23 RX ADMIN — AMIODARONE HYDROCHLORIDE 200 MG: 200 TABLET ORAL at 09:11

## 2019-11-23 RX ADMIN — MIRTAZAPINE 7.5 MG: 7.5 TABLET ORAL at 08:11

## 2019-11-23 NOTE — SUBJECTIVE & OBJECTIVE
Interval History:stable on NC O 2 at this time.    Review of Systems   HENT: Negative for ear discharge and ear pain.    Eyes: Negative for pain and itching.   Endocrine: Negative for polyphagia and polyuria.   Neurological: Negative for seizures and syncope.     Objective:     Vital Signs (Most Recent):  Temp: 98.1 °F (36.7 °C) (11/23/19 0735)  Pulse: 64 (11/23/19 0735)  Resp: 18 (11/23/19 0735)  BP: (!) 144/63 (11/23/19 0735)  SpO2: 97 % (11/23/19 0735) Vital Signs (24h Range):  Temp:  [97.1 °F (36.2 °C)-98.6 °F (37 °C)] 98.1 °F (36.7 °C)  Pulse:  [61-75] 64  Resp:  [16-22] 18  SpO2:  [92 %-98 %] 97 %  BP: (125-144)/(45-64) 144/63     Weight: 79.8 kg (175 lb 14.8 oz)  Body mass index is 25.24 kg/m².    Intake/Output Summary (Last 24 hours) at 11/23/2019 0751  Last data filed at 11/23/2019 0600  Gross per 24 hour   Intake 120 ml   Output 1765 ml   Net -1645 ml      Physical Exam   Constitutional: He is oriented to person, place, and time. No distress.   HENT:   Head: Normocephalic and atraumatic.   Eyes: Conjunctivae are normal. Right eye exhibits no discharge. Left eye exhibits no discharge.   Neck: Neck supple. No tracheal deviation present.   Cardiovascular: Normal rate and regular rhythm.   Pulmonary/Chest: Effort normal.   Right sided chest tube in place   Abdominal: Soft. Bowel sounds are normal.   Musculoskeletal:   Right elbow effusion   Neurological: He is alert and oriented to person, place, and time.   Skin: Skin is warm and dry. He is not diaphoretic.       Significant Labs:   BMP:   Recent Labs   Lab 11/23/19  0449   *      K 4.4   *   CO2 24   BUN 26*   CREATININE 1.3   CALCIUM 9.0     CBC:   Recent Labs   Lab 11/22/19  0528 11/23/19 0449   WBC 14.28* 11.84   HGB 7.6* 7.6*   HCT 24.3* 24.6*    228       Significant Imaging: I have reviewed all pertinent imaging results/findings within the past 24 hours.

## 2019-11-23 NOTE — ASSESSMENT & PLAN NOTE
- Flomax   - Voiding trial when pt less confused/more alert   - Hospice/palliative care plans noted. Could continue watson for comfort if desired. Pt unable to answer this questions today. Consider attempted void trial if pt/family desire.    - Avoid/treat constipation

## 2019-11-23 NOTE — ASSESSMENT & PLAN NOTE
S/P fall with rib fractures.  CT showing high volume pleural fluid.  Concerning for hemothorax.  Appreciate Surgery input.  S/P chest tube placement on 11/16  Over night had worsening respiratory status,consern for aspiration,strated on zosyn,NPO,consulted tony BARAKAT chest CT.stable on NC O 2 at this time.passeed swallow study,on soft diet.  Consulted palliative care for DNR status.patient and wife agree with DNR status,patient like pursue with Hospice,will speake with wife as well.called wife waiting she come to Hospital.  IR placed pig tail CT on right lung ,he is stable on NC O 2,

## 2019-11-23 NOTE — CONSULTS
Consult received for hospice.  Wife stated that she wanted to take him home with hospice and then changed her mind and thought in patient would be better.  [place].TN called Opal at [facility] to see if they were able to provide a bed at [facility].  Awaiting response.   TN also spoke with Dee at [facility] who stated they received a call yesterday but no information.  TN explained the situation.  Chanell stated that wife could stay with the patient or they would provide [employer] transportation.  Wife has agreed to speak to both companies.

## 2019-11-23 NOTE — PROGRESS NOTES
Surgery Progress Note    Primary Problem: Hemothorax on right    Other problems:   Active Hospital Problems    Diagnosis  POA    *Hemothorax on right [J94.2]  Yes    Acute renal failure superimposed on stage 3 chronic kidney disease [N17.9, N18.3]  Yes    Urinary retention [R33.9]  Yes    AICD (automatic cardioverter/defibrillator) present [Z95.810]  Yes     [employer]tronic dual AICD 4/23/18      Mixed hyperlipidemia [E78.2]  Yes    CKD (chronic kidney disease), stage III [N18.3]  Yes    Aortic valve stenosis [I35.0]  Yes    Essential hypertension [I10]  Yes    Chronic diastolic heart failure [I50.32]  Yes    Anemia of chronic disease [D63.8]  Yes    BPH with obstruction/lower urinary tract symptoms [N40.1, N13.8]  Yes    Type 2 diabetes mellitus with stage 3 chronic kidney disease [E11.22, N18.3]  Yes      Resolved Hospital Problems   No resolved problems to display.       HD #  LOS: 7 days   POD #7 Days Post-Op right chest tube insertion  Overnight events:  More alert today.  IR placement of pigtail catheter yesterday.      Diet - Diet Dysphagia Mechanical Soft (IDDSI Level 5) Thin     I/O last 3 completed shifts:  In: 120 [P.O.:120]  Out: 2325 [Urine:1375; Other:405; Chest Tube:545]    Intake/Output Summary (Last 24 hours) at 11/23/2019 0818  Last data filed at 11/23/2019 0600  Gross per 24 hour   Intake 120 ml   Output 1765 ml   Net -1645 ml    IR drain 405  Chest tube 335  Both serosanguineous/old blood  Temp:  [97.1 °F (36.2 °C)-98.6 °F (37 °C)] 98.1 °F (36.7 °C)  Pulse:  [61-75] 64  Resp:  [16-22] 18  SpO2:  [92 %-98 %] 96 %  BP: (125-144)/(45-64) 144/63    Gen: alert, well appearing, and in no distress,    Decreased breath sounds on the right. No air leak.    Recent Labs   Lab 11/19/19  0516 11/19/19  0813 11/20/19  0454 11/21/19  0536 11/22/19  0528 11/23/19  0449   WBC 11.71  --  13.66* 14.84* 14.28* 11.84   HGB 7.2*  --  8.1* 7.1* 7.6* 7.6*   HCT 23.2*  --  26.2* 23.2* 24.3* 24.6*     --  205  202 224 228   NA  --  141 143 140 142 142   K  --  3.9 4.2 4.1 4.4 4.4   CL  --  110 112* 110 113* 112*   BUN  --  39* 31* 26* 26* 26*   GLU  --  145* 163* 126* 110 115*        Assessment:  Right-sided pleural effusion, hemothorax, status post drainage    Plan:  Chest tube in IR drain not ready to be removed  Plans for hospice, palliative take care noted.  Agree with DNR.    Giles Lezama MD

## 2019-11-23 NOTE — PROGRESS NOTES
[facility] Hospital Medicine  Progress Note    Patient Name: Timbo Gallagher  MRN: [id number]  Patient Class: IP- Inpatient   Admission Date: 11/16/2019  Length of Stay: 7 days  Attending Physician: Miguelina Farr MD  Primary Care Provider: Cirilo Montero MD        Subjective:     Principal Problem:Hemothorax on right        HPI:  83 y/o male presents to the ER complaining of constant right sided rib pain since 4 days ago.  Symptoms worsened yesterday.  Patient was seen at urgent care for recent fall and diagnosed with multiple right sided rib fractures.  He reports associated shortness of breath, right sided abdominal pain and cough. Patient reports taking tylenol for symptoms without relief.  He states no other associated symptoms.  Patient also complaining of right elbow swelling after fall.  Patient denies any fever, chills or hemoptysis.  Chest CT showing large volume pleural fluid, concerning for hemothorax.  Patient had been doing well prior to fall with no recent complaints.  No other complaints.    Overview/Hospital Course:  83 y/o male with recent fall and rib fractures presented with cough and SOB.  Imaging with new large right sided pleural effusion.  Admitted with concern of hemothorax.  Surgery consulted.  S/P chest tube placement on 11/16.  Anemia of acute blood loss with dropping H/H.will monitor for another day,  ASA held.  Patient also retaining urine.  Colunga placed.  Labs showing ARF.  Urology consulted and patient started on IVF's.ARF is improving,ARF improved,hold IVF at this time.  Over night had worsening respiratory status,consern for aspiration,strated on zosyn,NPO,consulted ST,will hane chest CT.stable on NC O 2 at this time.passed swallow study,started on soft diet.  Consulted palliative care for DNR status.patient and wife agree with DNR status,patient like pursue with Hospice,will speake with wife as well.called wife waiting she come to Hospital.  IR placed  pig tail CT on right lung ,he is stable on NC O 2,      Interval History:stable on NC O 2 at this time.    Review of Systems   HENT: Negative for ear discharge and ear pain.    Eyes: Negative for pain and itching.   Endocrine: Negative for polyphagia and polyuria.   Neurological: Negative for seizures and syncope.     Objective:     Vital Signs (Most Recent):  Temp: 98.1 °F (36.7 °C) (11/23/19 0735)  Pulse: 64 (11/23/19 0735)  Resp: 18 (11/23/19 0735)  BP: (!) 144/63 (11/23/19 0735)  SpO2: 97 % (11/23/19 0735) Vital Signs (24h Range):  Temp:  [97.1 °F (36.2 °C)-98.6 °F (37 °C)] 98.1 °F (36.7 °C)  Pulse:  [61-75] 64  Resp:  [16-22] 18  SpO2:  [92 %-98 %] 97 %  BP: (125-144)/(45-64) 144/63     Weight: 79.8 kg (175 lb 14.8 oz)  Body mass index is 25.24 kg/m².    Intake/Output Summary (Last 24 hours) at 11/23/2019 0751  Last data filed at 11/23/2019 0600  Gross per 24 hour   Intake 120 ml   Output 1765 ml   Net -1645 ml      Physical Exam   Constitutional: He is oriented to person, place, and time. No distress.   HENT:   Head: Normocephalic and atraumatic.   Eyes: Conjunctivae are normal. Right eye exhibits no discharge. Left eye exhibits no discharge.   Neck: Neck supple. No tracheal deviation present.   Cardiovascular: Normal rate and regular rhythm.   Pulmonary/Chest: Effort normal.   Right sided chest tube in place   Abdominal: Soft. Bowel sounds are normal.   Musculoskeletal:   Right elbow effusion   Neurological: He is alert and oriented to person, place, and time.   Skin: Skin is warm and dry. He is not diaphoretic.       Significant Labs:   BMP:   Recent Labs   Lab 11/23/19 0449   *      K 4.4   *   CO2 24   BUN 26*   CREATININE 1.3   CALCIUM 9.0     CBC:   Recent Labs   Lab 11/22/19  0528 11/23/19 0449   WBC 14.28* 11.84   HGB 7.6* 7.6*   HCT 24.3* 24.6*    228       Significant Imaging: I have reviewed all pertinent imaging results/findings within the past 24  hours.      Assessment/Plan:      * Hemothorax on right  S/P fall with rib fractures.  CT showing high volume pleural fluid.  Concerning for hemothorax.  Appreciate Surgery input.  S/P chest tube placement on 11/16  Over night had worsening respiratory status,consern for aspiration,strated on zosyn,NPO,consulted tony BARAKAT chest CT.stable on NC O 2 at this time.passeed swallow study,on soft diet.  Consulted palliative care for DNR status.patient and wife agree with DNR status,patient like pursue with Hospice,will speake with wife as well.called wife waiting she come to Hospital.  IR placed pig tail CT on right lung ,he is stable on NC O 2,      Essential hypertension  Resume home meds with parameters.  Low Na diet.  Holding Losartan and Lasix secondary to worsening renal failure.      Type 2 diabetes mellitus with stage 3 chronic kidney disease  Hold home oral medications.  Diabetic diet and insulin sliding scale.      Chronic diastolic heart failure  No evidence of acute exacerbation.  Seems more fluid depleted.  Hold Lasix.      Anemia of chronic disease  H/H similar to previous labs.  Now with anemia of acute blood loss secondary to hemothorax.  Significant drop in H/H.will monitor for another day.  Repeat labs and transfuse if lower.      Acute renal failure superimposed on stage 3 chronic kidney disease  improving with IVF.,ARF improved,hold IVF at this time.      AICD (automatic cardioverter/defibrillator) present        Mixed hyperlipidemia  Continue Statin.      CKD (chronic kidney disease), stage III  Now with ARF.  ARF probably combination of obstruction and pre renal.  Colunga placed.  Start IVF hydration.  Avoid nephrotoxic medications.  Stop Losartan.      Aortic valve stenosis  S/P TAVR        VTE Risk Mitigation (From admission, onward)         Ordered     Place sequential compression device  Until discontinued      11/16/19 1200     IP VTE HIGH RISK PATIENT  Once      11/16/19 0712     Place DEYA hose   Until discontinued      11/16/19 0712                      Miguelina Farr MD  Department of Hospital Medicine   [facility]

## 2019-11-23 NOTE — PROGRESS NOTES
Called to bedside by pts wife. Pt c/o new crackling sound coming from chest tube. Upon assessment crackling sound noted to be coming from chest tube site and inadequate amount of water in [facility]. Atrium was changer earlier this shift.Sterile water was added to fill line. After adequate water was added, crackling sound . Bridger Vidales notified. Dr Lezama was notified. New order received to hook pt to suction for 5 hours and continue to monitor if pt presents with distress obtain a chest xray.  Pt denies any distress at this time. No SOB noted. O2 sat 96%.

## 2019-11-23 NOTE — PROGRESS NOTES
[facility]  Urology  Progress Note    Patient Name: Timbo Gallagher  MRN: [id number]  Admission Date: 11/16/2019  Hospital Length of Stay: 7 days  Code Status: DNR   Attending Provider: Miguelina Farr MD   Primary Care Physician: Cirilo Montero MD    Subjective:     HPI:  83yo M with UR. He reports some baseline LUTS - slow stream intermittently. He is followed by urology as outpatient - last seen 5/19. He has had issues with UR in the past. He is not currently on BPH medications in hospital. He was on Flomax at last  visit. Colunga catheter placed with 300cc UOP. He is unsure when his last BM was, suppository given earlier today.     Interval History: Sleeping on exam.     Review of Systems   Unable to perform ROS: Mental status change     Objective:     Temp:  [97.1 °F (36.2 °C)-98.6 °F (37 °C)] 98.1 °F (36.7 °C)  Pulse:  [61-75] 64  Resp:  [16-20] 18  SpO2:  [92 %-98 %] 96 %  BP: (125-144)/(45-64) 144/63     Body mass index is 25.24 kg/m².      Bladder Scan Volume (mL): 467 mL (11/18/19 0900)    Drains     Drain                 Chest Tube 11/16/19 1135 1 Right 28 Fr. 6 days         Urethral Catheter 11/18/19 0927 5 days         Chest Tube 11/22/19 1130 Right Pleural 8 Fr. less than 1 day         Drain/Device  11/22/19 1130 Right back pigtail less than 1 day                Physical Exam   Vitals reviewed.  Constitutional: He appears well-developed and well-nourished. No distress.   HENT:   Head: Normocephalic and atraumatic.   Eyes: Right eye exhibits no discharge. Left eye exhibits no discharge. No scleral icterus.   Neck: Normal range of motion. Neck supple.   Cardiovascular: Normal rate and regular rhythm.    Pulmonary/Chest: Effort normal and breath sounds normal. No respiratory distress.   Abdominal: Soft. Bowel sounds are normal. He exhibits no distension. There is no tenderness. There is no rebound and no guarding.   Genitourinary:   Genitourinary Comments: Colunga - clear yellow  urine   Musculoskeletal: Normal range of motion.   Skin: Skin is warm and dry. He is not diaphoretic. No erythema.         Significant Labs:    BMP:  Recent Labs   Lab 11/21/19  0536 11/22/19  0528 11/23/19  0449    142 142   K 4.1 4.4 4.4    113* 112*   CO2 25 24 24   BUN 26* 26* 26*   CREATININE 1.3 1.3 1.3   CALCIUM 8.5* 9.1 9.0       CBC:   Recent Labs   Lab 11/21/19  0536 11/22/19  0528 11/23/19  0449   WBC 14.84* 14.28* 11.84   HGB 7.1* 7.6* 7.6*   HCT 23.2* 24.3* 24.6*    224 228       Blood Culture: No results for input(s): LABBLOO in the last 168 hours.  Urine Culture: No results for input(s): LABURIN in the last 168 hours.  Urine Studies: No results for input(s): COLORU, APPEARANCEUA, PHUR, SPECGRAV, PROTEINUA, GLUCUA, KETONESU, BILIRUBINUA, OCCULTUA, NITRITE, UROBILINOGEN, LEUKOCYTESUR, RBCUA, WBCUA, BACTERIA, SQUAMEPITHEL, HYALINECASTS in the last 168 hours.    Invalid input(s): WRIGHTSUR    Significant Imaging:  All pertinent imaging results/findings from the past 24 hours have been reviewed.                  Assessment/Plan:     Urinary retention   - Flomax   - Voiding trial when pt less confused/more alert   - Hospice/palliative care plans noted. Could continue watson for comfort if desired. Pt unable to answer this questions today. Consider attempted void trial if pt/family desire.    - Avoid/treat constipation    BPH with obstruction/lower urinary tract symptoms   - Continue Flomax         VTE Risk Mitigation (From admission, onward)         Ordered     Place sequential compression device  Until discontinued      11/16/19 1200     IP VTE HIGH RISK PATIENT  Once      11/16/19 0712     Place DEYA hose  Until discontinued      11/16/19 0712                Alondra Schultz MD  Urology  [facility] [place]

## 2019-11-23 NOTE — SUBJECTIVE & OBJECTIVE
Interval History: Sleeping on exam.     Review of Systems   Unable to perform ROS: Mental status change     Objective:     Temp:  [97.1 °F (36.2 °C)-98.6 °F (37 °C)] 98.1 °F (36.7 °C)  Pulse:  [61-75] 64  Resp:  [16-20] 18  SpO2:  [92 %-98 %] 96 %  BP: (125-144)/(45-64) 144/63     Body mass index is 25.24 kg/m².      Bladder Scan Volume (mL): 467 mL (11/18/19 0900)    Drains     Drain                 Chest Tube 11/16/19 1135 1 Right 28 Fr. 6 days         Urethral Catheter 11/18/19 0927 5 days         Chest Tube 11/22/19 1130 Right Pleural 8 Fr. less than 1 day         Drain/Device  11/22/19 1130 Right back pigtail less than 1 day                Physical Exam   Vitals reviewed.  Constitutional: He appears well-developed and well-nourished. No distress.   HENT:   Head: Normocephalic and atraumatic.   Eyes: Right eye exhibits no discharge. Left eye exhibits no discharge. No scleral icterus.   Neck: Normal range of motion. Neck supple.   Cardiovascular: Normal rate and regular rhythm.    Pulmonary/Chest: Effort normal and breath sounds normal. No respiratory distress.   Abdominal: Soft. Bowel sounds are normal. He exhibits no distension. There is no tenderness. There is no rebound and no guarding.   Genitourinary:   Genitourinary Comments: Colunga - clear yellow urine   Musculoskeletal: Normal range of motion.   Skin: Skin is warm and dry. He is not diaphoretic. No erythema.         Significant Labs:    BMP:  Recent Labs   Lab 11/21/19  0536 11/22/19  0528 11/23/19  0449    142 142   K 4.1 4.4 4.4    113* 112*   CO2 25 24 24   BUN 26* 26* 26*   CREATININE 1.3 1.3 1.3   CALCIUM 8.5* 9.1 9.0       CBC:   Recent Labs   Lab 11/21/19  0536 11/22/19  0528 11/23/19  0449   WBC 14.84* 14.28* 11.84   HGB 7.1* 7.6* 7.6*   HCT 23.2* 24.3* 24.6*    224 228       Blood Culture: No results for input(s): LABBLOO in the last 168 hours.  Urine Culture: No results for input(s): LABURIN in the last 168 hours.  Urine  Studies: No results for input(s): COLORU, APPEARANCEUA, PHUR, SPECGRAV, PROTEINUA, GLUCUA, KETONESU, BILIRUBINUA, OCCULTUA, NITRITE, UROBILINOGEN, LEUKOCYTESUR, RBCUA, WBCUA, BACTERIA, SQUAMEPITHEL, HYALINECASTS in the last 168 hours.    Invalid input(s): SUSHILA    Significant Imaging:  All pertinent imaging results/findings from the past 24 hours have been reviewed.

## 2019-11-24 LAB
ANION GAP SERPL CALC-SCNC: 5 MMOL/L (ref 8–16)
BASOPHILS # BLD AUTO: 0.02 K/UL (ref 0–0.2)
BASOPHILS NFR BLD: 0.2 % (ref 0–1.9)
BUN SERPL-MCNC: 20 MG/DL (ref 8–23)
CALCIUM SERPL-MCNC: 9 MG/DL (ref 8.7–10.5)
CHLORIDE SERPL-SCNC: 113 MMOL/L (ref 95–110)
CO2 SERPL-SCNC: 28 MMOL/L (ref 23–29)
CREAT SERPL-MCNC: 1.1 MG/DL (ref 0.5–1.4)
DIFFERENTIAL METHOD: ABNORMAL
EOSINOPHIL # BLD AUTO: 0.2 K/UL (ref 0–0.5)
EOSINOPHIL NFR BLD: 1.6 % (ref 0–8)
ERYTHROCYTE [DISTWIDTH] IN BLOOD BY AUTOMATED COUNT: 14 % (ref 11.5–14.5)
EST. GFR  (AFRICAN AMERICAN): >60 ML/MIN/1.73 M^2
EST. GFR  (NON AFRICAN AMERICAN): >60 ML/MIN/1.73 M^2
GLUCOSE SERPL-MCNC: 122 MG/DL (ref 70–110)
HCT VFR BLD AUTO: 24.7 % (ref 40–54)
HGB BLD-MCNC: 7.6 G/DL (ref 14–18)
IMM GRANULOCYTES # BLD AUTO: 0.12 K/UL (ref 0–0.04)
IMM GRANULOCYTES NFR BLD AUTO: 1 % (ref 0–0.5)
LYMPHOCYTES # BLD AUTO: 0.8 K/UL (ref 1–4.8)
LYMPHOCYTES NFR BLD: 7.1 % (ref 18–48)
MCH RBC QN AUTO: 31.7 PG (ref 27–31)
MCHC RBC AUTO-ENTMCNC: 30.8 G/DL (ref 32–36)
MCV RBC AUTO: 103 FL (ref 82–98)
MONOCYTES # BLD AUTO: 1.3 K/UL (ref 0.3–1)
MONOCYTES NFR BLD: 11.5 % (ref 4–15)
NEUTROPHILS # BLD AUTO: 9.1 K/UL (ref 1.8–7.7)
NEUTROPHILS NFR BLD: 78.6 % (ref 38–73)
NRBC BLD-RTO: 0 /100 WBC
PLATELET # BLD AUTO: 245 K/UL (ref 150–350)
PMV BLD AUTO: 10.6 FL (ref 9.2–12.9)
POCT GLUCOSE: 139 MG/DL (ref 70–110)
POCT GLUCOSE: 145 MG/DL (ref 70–110)
POCT GLUCOSE: 179 MG/DL (ref 70–110)
POTASSIUM SERPL-SCNC: 4.5 MMOL/L (ref 3.5–5.1)
RBC # BLD AUTO: 2.4 M/UL (ref 4.6–6.2)
SODIUM SERPL-SCNC: 146 MMOL/L (ref 136–145)
WBC # BLD AUTO: 11.6 K/UL (ref 3.9–12.7)

## 2019-11-24 PROCEDURE — 25000003 PHARM REV CODE 250: Performed by: HOSPITALIST

## 2019-11-24 PROCEDURE — 99232 SBSQ HOSP IP/OBS MODERATE 35: CPT | Mod: ,,, | Performed by: UROLOGY

## 2019-11-24 PROCEDURE — 94761 N-INVAS EAR/PLS OXIMETRY MLT: CPT

## 2019-11-24 PROCEDURE — 99232 PR SUBSEQUENT HOSPITAL CARE,LEVL II: ICD-10-PCS | Mod: ,,, | Performed by: UROLOGY

## 2019-11-24 PROCEDURE — 25000003 PHARM REV CODE 250: Performed by: SURGERY

## 2019-11-24 PROCEDURE — 63600175 PHARM REV CODE 636 W HCPCS: Performed by: HOSPITALIST

## 2019-11-24 PROCEDURE — 85025 COMPLETE CBC W/AUTO DIFF WBC: CPT

## 2019-11-24 PROCEDURE — 36415 COLL VENOUS BLD VENIPUNCTURE: CPT

## 2019-11-24 PROCEDURE — 80048 BASIC METABOLIC PNL TOTAL CA: CPT

## 2019-11-24 PROCEDURE — 11000001 HC ACUTE MED/SURG PRIVATE ROOM

## 2019-11-24 RX ADMIN — CARBAMIDE PEROXIDE 6.5% 5 DROP: 6.5 LIQUID AURICULAR (OTIC) at 09:11

## 2019-11-24 RX ADMIN — PIPERACILLIN AND TAZOBACTAM 4.5 G: 4; .5 INJECTION, POWDER, FOR SOLUTION INTRAVENOUS at 06:11

## 2019-11-24 RX ADMIN — HYDROCODONE BITARTRATE AND ACETAMINOPHEN 1 TABLET: 5; 325 TABLET ORAL at 05:11

## 2019-11-24 RX ADMIN — PIPERACILLIN AND TAZOBACTAM 4.5 G: 4; .5 INJECTION, POWDER, FOR SOLUTION INTRAVENOUS at 03:11

## 2019-11-24 RX ADMIN — DONEPEZIL HYDROCHLORIDE 5 MG: 5 TABLET, FILM COATED ORAL at 09:11

## 2019-11-24 RX ADMIN — HYDROCODONE BITARTRATE AND ACETAMINOPHEN 1 TABLET: 5; 325 TABLET ORAL at 10:11

## 2019-11-24 RX ADMIN — PIPERACILLIN AND TAZOBACTAM 4.5 G: 4; .5 INJECTION, POWDER, FOR SOLUTION INTRAVENOUS at 11:11

## 2019-11-24 RX ADMIN — GUAIFENESIN AND DEXTROMETHORPHAN 5 ML: 100; 10 SYRUP ORAL at 12:11

## 2019-11-24 RX ADMIN — INSULIN ASPART 2 UNITS: 100 INJECTION, SOLUTION INTRAVENOUS; SUBCUTANEOUS at 12:11

## 2019-11-24 RX ADMIN — SENNOSIDES AND DOCUSATE SODIUM 2 TABLET: 8.6; 5 TABLET ORAL at 09:11

## 2019-11-24 RX ADMIN — METOPROLOL SUCCINATE 50 MG: 50 TABLET, FILM COATED, EXTENDED RELEASE ORAL at 09:11

## 2019-11-24 RX ADMIN — GUAIFENESIN 600 MG: 600 TABLET, EXTENDED RELEASE ORAL at 09:11

## 2019-11-24 RX ADMIN — FERROUS GLUCONATE TAB 324 MG (37.5 MG ELEMENTAL IRON) 324 MG: 324 (37.5 FE) TAB at 08:11

## 2019-11-24 RX ADMIN — FAMOTIDINE 20 MG: 20 TABLET ORAL at 09:11

## 2019-11-24 RX ADMIN — ATORVASTATIN CALCIUM 40 MG: 40 TABLET, FILM COATED ORAL at 09:11

## 2019-11-24 RX ADMIN — AMIODARONE HYDROCHLORIDE 200 MG: 200 TABLET ORAL at 09:11

## 2019-11-24 RX ADMIN — GUAIFENESIN AND DEXTROMETHORPHAN 5 ML: 100; 10 SYRUP ORAL at 05:11

## 2019-11-24 RX ADMIN — CETIRIZINE HYDROCHLORIDE 5 MG: 5 TABLET ORAL at 09:11

## 2019-11-24 RX ADMIN — ESCITALOPRAM OXALATE 10 MG: 10 TABLET ORAL at 09:11

## 2019-11-24 RX ADMIN — MIRTAZAPINE 7.5 MG: 7.5 TABLET ORAL at 09:11

## 2019-11-24 RX ADMIN — TAMSULOSIN HYDROCHLORIDE 0.4 MG: 0.4 CAPSULE ORAL at 09:11

## 2019-11-24 RX ADMIN — AMLODIPINE BESYLATE 10 MG: 5 TABLET ORAL at 09:11

## 2019-11-24 NOTE — SUBJECTIVE & OBJECTIVE
Interval History: Colunga draining well. Sleeping.     Review of Systems   Unable to perform ROS: Mental status change     Objective:     Temp:  [97.3 °F (36.3 °C)-98.2 °F (36.8 °C)] 97.6 °F (36.4 °C)  Pulse:  [61-68] 61  Resp:  [18-20] 18  SpO2:  [94 %-97 %] 94 %  BP: (139-147)/(63-66) 140/65     Body mass index is 25.24 kg/m².    Date 11/24/19 0700 - 11/25/19 0659   Shift 6431-0618 5333-4650 5269-2746 24 Hour Total   INTAKE   P.O. 120   120   Shift Total(mL/kg) 120(1.5)   120(1.5)   OUTPUT   Shift Total(mL/kg)       Weight (kg) 79.8 79.8 79.8 79.8     Bladder Scan Volume (mL): 467 mL (11/18/19 0900)    Drains     Drain                 Chest Tube 11/16/19 1135 1 Right 28 Fr. 7 days         Urethral Catheter 11/18/19 0927 5 days         Chest Tube 11/22/19 1130 Right Pleural 8 Fr. 1 day         Drain/Device  11/22/19 1130 Right back pigtail 1 day                Physical Exam   Vitals reviewed.  Constitutional: He appears well-developed and well-nourished. No distress.   HENT:   Head: Normocephalic and atraumatic.   Eyes: Right eye exhibits no discharge. Left eye exhibits no discharge. No scleral icterus.   Neck: Normal range of motion. Neck supple.   Cardiovascular: Normal rate and regular rhythm.    Pulmonary/Chest: Effort normal and breath sounds normal. No respiratory distress.   Abdominal: Soft. Bowel sounds are normal. He exhibits no distension. There is no tenderness. There is no rebound and no guarding.   Genitourinary:   Genitourinary Comments: Colunga - clear yellow urine   Musculoskeletal: Normal range of motion.   Skin: Skin is warm and dry. He is not diaphoretic. No erythema.         Significant Labs:    BMP:  Recent Labs   Lab 11/22/19  0528 11/23/19  0449 11/24/19  0511    142 146*   K 4.4 4.4 4.5   * 112* 113*   CO2 24 24 28   BUN 26* 26* 20   CREATININE 1.3 1.3 1.1   CALCIUM 9.1 9.0 9.0       CBC:   Recent Labs   Lab 11/22/19  0528 11/23/19  0449 11/24/19  0511   WBC 14.28* 11.84 11.60   HGB  7.6* 7.6* 7.6*   HCT 24.3* 24.6* 24.7*    228 245       Blood Culture: No results for input(s): LABBLOO in the last 168 hours.  Urine Culture: No results for input(s): LABURIN in the last 168 hours.  Urine Studies: No results for input(s): COLORU, APPEARANCEUA, PHUR, SPECGRAV, PROTEINUA, GLUCUA, KETONESU, BILIRUBINUA, OCCULTUA, NITRITE, UROBILINOGEN, LEUKOCYTESUR, RBCUA, WBCUA, BACTERIA, SQUAMEPITHEL, HYALINECASTS in the last 168 hours.    Invalid input(s): WRIGHTSUR    Significant Imaging:  All pertinent imaging results/findings from the past 24 hours have been reviewed.

## 2019-11-24 NOTE — PROGRESS NOTES
[facility]  Urology  Progress Note    Patient Name: Timbo Gallagher  MRN: [id number]  Admission Date: 11/16/2019  Hospital Length of Stay: 8 days  Code Status: DNR   Attending Provider: Miguelina Farr MD   Primary Care Physician: Cirilo Montero MD    Subjective:     HPI:  85yo M with UR. He reports some baseline LUTS - slow stream intermittently. He is followed by urology as outpatient - last seen 5/19. He has had issues with UR in the past. He is not currently on BPH medications in hospital. He was on Flomax at last  visit. Colunga catheter placed with 300cc UOP. He is unsure when his last BM was, suppository given earlier today.     Interval History: Colunga draining well. Sleeping.     Review of Systems   Unable to perform ROS: Mental status change     Objective:     Temp:  [97.3 °F (36.3 °C)-98.2 °F (36.8 °C)] 97.6 °F (36.4 °C)  Pulse:  [61-68] 61  Resp:  [18-20] 18  SpO2:  [94 %-97 %] 94 %  BP: (139-147)/(63-66) 140/65     Body mass index is 25.24 kg/m².    Date 11/24/19 0700 - 11/25/19 0659   Shift 6931-2640 0162-9890 1322-6962 24 Hour Total   INTAKE   P.O. 120   120   Shift Total(mL/kg) 120(1.5)   120(1.5)   OUTPUT   Shift Total(mL/kg)       Weight (kg) 79.8 79.8 79.8 79.8     Bladder Scan Volume (mL): 467 mL (11/18/19 0900)    Drains     Drain                 Chest Tube 11/16/19 1135 1 Right 28 Fr. 7 days         Urethral Catheter 11/18/19 0927 5 days         Chest Tube 11/22/19 1130 Right Pleural 8 Fr. 1 day         Drain/Device  11/22/19 1130 Right back pigtail 1 day                Physical Exam   Vitals reviewed.  Constitutional: He appears well-developed and well-nourished. No distress.   HENT:   Head: Normocephalic and atraumatic.   Eyes: Right eye exhibits no discharge. Left eye exhibits no discharge. No scleral icterus.   Neck: Normal range of motion. Neck supple.   Cardiovascular: Normal rate and regular rhythm.    Pulmonary/Chest: Effort normal and breath sounds normal.  No respiratory distress.   Abdominal: Soft. Bowel sounds are normal. He exhibits no distension. There is no tenderness. There is no rebound and no guarding.   Genitourinary:   Genitourinary Comments: Watson - clear yellow urine   Musculoskeletal: Normal range of motion.   Skin: Skin is warm and dry. He is not diaphoretic. No erythema.         Significant Labs:    BMP:  Recent Labs   Lab 11/22/19 0528 11/23/19 0449 11/24/19  0511    142 146*   K 4.4 4.4 4.5   * 112* 113*   CO2 24 24 28   BUN 26* 26* 20   CREATININE 1.3 1.3 1.1   CALCIUM 9.1 9.0 9.0       CBC:   Recent Labs   Lab 11/22/19 0528 11/23/19 0449 11/24/19  0511   WBC 14.28* 11.84 11.60   HGB 7.6* 7.6* 7.6*   HCT 24.3* 24.6* 24.7*    228 245       Blood Culture: No results for input(s): LABBLOO in the last 168 hours.  Urine Culture: No results for input(s): LABURIN in the last 168 hours.  Urine Studies: No results for input(s): COLORU, APPEARANCEUA, PHUR, SPECGRAV, PROTEINUA, GLUCUA, KETONESU, BILIRUBINUA, OCCULTUA, NITRITE, UROBILINOGEN, LEUKOCYTESUR, RBCUA, WBCUA, BACTERIA, SQUAMEPITHEL, HYALINECASTS in the last 168 hours.    Invalid input(s): WRIGHTSUR    Significant Imaging:  All pertinent imaging results/findings from the past 24 hours have been reviewed.                  Assessment/Plan:     Urinary retention   - Flomax   - Voiding trial when pt less confused/more alert   - Hospice/palliative care plans noted. Could continue watson for comfort if desired. Pt unable to answer this questions today. Consider attempted void trial if pt/family desire.    - Avoid/treat constipation    BPH with obstruction/lower urinary tract symptoms   - Continue Flomax         VTE Risk Mitigation (From admission, onward)         Ordered     Place sequential compression device  Until discontinued      11/16/19 1200     IP VTE HIGH RISK PATIENT  Once      11/16/19 0712     Place DEYA hose  Until discontinued      11/16/19 0712                Alondra TOLENTINO  MD Anto[place]  Urology  [facility] [place]

## 2019-11-24 NOTE — PROGRESS NOTES
[facility] Hospital Medicine  Progress Note    Patient Name: Timbo Gallagher  MRN: [id number]  Patient Class: IP- Inpatient   Admission Date: 11/16/2019  Length of Stay: 8 days  Attending Physician: Miguelina Farr MD  Primary Care Provider: Cirilo Montero MD        Subjective:     Principal Problem:Hemothorax on right        HPI:  85 y/o male presents to the ER complaining of constant right sided rib pain since 4 days ago.  Symptoms worsened yesterday.  Patient was seen at urgent care for recent fall and diagnosed with multiple right sided rib fractures.  He reports associated shortness of breath, right sided abdominal pain and cough. Patient reports taking tylenol for symptoms without relief.  He states no other associated symptoms.  Patient also complaining of right elbow swelling after fall.  Patient denies any fever, chills or hemoptysis.  Chest CT showing large volume pleural fluid, concerning for hemothorax.  Patient had been doing well prior to fall with no recent complaints.  No other complaints.    Overview/Hospital Course:  85 y/o male with recent fall and rib fractures presented with cough and SOB.  Imaging with new large right sided pleural effusion.  Admitted with concern of hemothorax.  Surgery consulted.  S/P chest tube placement on 11/16.  Anemia of acute blood loss with dropping H/H.will monitor for another day,  ASA held.  Patient also retaining urine.  Colunga placed.  Labs showing ARF.  Urology consulted and patient started on IVF's.ARF is improving,ARF improved,hold IVF at this time.  Over night had worsening respiratory status,consern for aspiration,strated on zosyn,NPO,consulted ST,will hane chest CT.stable on NC O 2 at this time.passed swallow study,started on soft diet.  Consulted palliative care for DNR status.patient and wife agree with DNR status,patient like pursue with Hospice,will speake with wife as well.called wife waiting she come to Hospital.  IR placed  pig tail CT on right lung ,he is stable on NC O 2,  Patient and family interested in Hospice,see if we can remove CT and place pleurodex cath in AM for more comfort,    Interval History:stable on NC O 2 at this time.    Review of Systems   HENT: Negative for ear discharge and ear pain.    Eyes: Negative for pain and itching.   Endocrine: Negative for polyphagia and polyuria.   Neurological: Negative for seizures and syncope.     Objective:     Vital Signs (Most Recent):  Temp: 97.6 °F (36.4 °C) (11/24/19 0735)  Pulse: 61 (11/24/19 0735)  Resp: 18 (11/24/19 0735)  BP: (!) 140/65 (11/24/19 0735)  SpO2: (!) 94 % (11/24/19 0735) Vital Signs (24h Range):  Temp:  [97.3 °F (36.3 °C)-98.2 °F (36.8 °C)] 97.6 °F (36.4 °C)  Pulse:  [61-68] 61  Resp:  [18-20] 18  SpO2:  [94 %-97 %] 94 %  BP: (139-147)/(63-66) 140/65     Weight: 79.8 kg (175 lb 14.8 oz)  Body mass index is 25.24 kg/m².    Intake/Output Summary (Last 24 hours) at 11/24/2019 0751  Last data filed at 11/24/2019 0600  Gross per 24 hour   Intake 240 ml   Output 2661 ml   Net -2421 ml      Physical Exam   Constitutional: He is oriented to person, place, and time. No distress.   HENT:   Head: Normocephalic and atraumatic.   Eyes: Conjunctivae are normal. Right eye exhibits no discharge. Left eye exhibits no discharge.   Neck: Neck supple. No tracheal deviation present.   Cardiovascular: Normal rate and regular rhythm.   Pulmonary/Chest: Effort normal.   Right sided chest tube in place   Abdominal: Soft. Bowel sounds are normal.   Musculoskeletal:   Right elbow effusion   Neurological: He is alert and oriented to person, place, and time.   Skin: Skin is warm and dry. He is not diaphoretic.       Significant Labs:   BMP:   Recent Labs   Lab 11/24/19  0511   *   *   K 4.5   *   CO2 28   BUN 20   CREATININE 1.1   CALCIUM 9.0     CBC:   Recent Labs   Lab 11/23/19  0449 11/24/19  0511   WBC 11.84 11.60   HGB 7.6* 7.6*   HCT 24.6* 24.7*    245        Significant Imaging: I have reviewed all pertinent imaging results/findings within the past 24 hours.      Assessment/Plan:      * Hemothorax on right  S/P fall with rib fractures.  CT showing high volume pleural fluid.  Concerning for hemothorax.  Appreciate Surgery input.  S/P chest tube placement on 11/16  Over night had worsening respiratory status,consern for aspiration,strated on zosyn,NPO,consulted ST,will hane chest CT.stable on NC O 2 at this time.passeed swallow study,on soft diet.  Consulted palliative care for DNR status.patient and wife agree with DNR status,patient like pursue with Hospice,will speake with wife as well.called wife waiting she come to Hospital.  IR placed pig tail CT on right lung ,he is stable on NC O 2,  Patient and family interested in Hospice,see if we can remove CT and place pleurodex cath in AM for more comfort,      Essential hypertension  Resume home meds with parameters.  Low Na diet.  Holding Losartan and Lasix secondary to worsening renal failure.      Type 2 diabetes mellitus with stage 3 chronic kidney disease  Hold home oral medications.  Diabetic diet and insulin sliding scale.      Chronic diastolic heart failure  No evidence of acute exacerbation.  Seems more fluid depleted.  Hold Lasix.      Anemia of chronic disease  H/H similar to previous labs.  Now with anemia of acute blood loss secondary to hemothorax.  Significant drop in H/H.will monitor for another day.  Repeat labs and transfuse if lower.      Acute renal failure superimposed on stage 3 chronic kidney disease  improving with IVF.,ARF improved,hold IVF at this time.      AICD (automatic cardioverter/defibrillator) present        Mixed hyperlipidemia  Continue Statin.      CKD (chronic kidney disease), stage III  Now with ARF.  ARF probably combination of obstruction and pre renal.  Colunga placed.  Start IVF hydration.  Avoid nephrotoxic medications.  Stop Losartan.      Aortic valve stenosis  S/P  TAVR        VTE Risk Mitigation (From admission, onward)         Ordered     Place sequential compression device  Until discontinued      11/16/19 1200     IP VTE HIGH RISK PATIENT  Once      11/16/19 0712     Place DEYA hose  Until discontinued      11/16/19 0712                      Miguelina Farr MD  Department of Hospital Medicine   [facility]

## 2019-11-24 NOTE — NURSING
Pt still gurgling then spitting up blood tinged and black sputum. Pt treated for pain. Chest tubes to water seal with 28 fr fluctuating. Bed locked and low with call light within reach. Bed alarm on and audible. Tele sitter at bedside. Pt not tolerating head being put down while being turned or changed.

## 2019-11-24 NOTE — PROGRESS NOTES
Surgery Progress Note    Primary Problem: Hemothorax on right    Other problems:   Active Hospital Problems    Diagnosis  POA    *Hemothorax on right [J94.2]  Yes    Acute renal failure superimposed on stage 3 chronic kidney disease [N17.9, N18.3]  Yes    Urinary retention [R33.9]  Yes    AICD (automatic cardioverter/defibrillator) present [Z95.810]  Yes     [employer] dual AICD 4/23/18      Mixed hyperlipidemia [E78.2]  Yes    CKD (chronic kidney disease), stage III [N18.3]  Yes    Aortic valve stenosis [I35.0]  Yes    Essential hypertension [I10]  Yes    Chronic diastolic heart failure [I50.32]  Yes    Anemia of chronic disease [D63.8]  Yes    BPH with obstruction/lower urinary tract symptoms [N40.1, N13.8]  Yes    Type 2 diabetes mellitus with stage 3 chronic kidney disease [E11.22, N18.3]  Yes      Resolved Hospital Problems   No resolved problems to display.       HD #  LOS: 8 days   POD #8 Days Post-Op    Overnight events:  No acute events overnight.  Patient states he is not doing so great.  Denies any shortness of breath.    Diet - Diet Dysphagia Mechanical Soft (IDDSI Level 5) Thin  Diet NPO Except for: Sips with Medication     I/O last 3 completed shifts:  In: 240 [P.O.:240]  Out: 3636 [Urine:2800; Other:250; Stool:1; Chest Tube:585]    Intake/Output Summary (Last 24 hours) at 11/24/2019 0827  Last data filed at 11/24/2019 0600  Gross per 24 hour   Intake 240 ml   Output 2661 ml   Net -2421 ml     Pigtail catheter 100 dark serosanguineous  Chest tube 410 dark serosanguineous  Temp:  [97.3 °F (36.3 °C)-98.2 °F (36.8 °C)] 97.6 °F (36.4 °C)  Pulse:  [61-68] 61  Resp:  [18-20] 18  SpO2:  [94 %-97 %] 94 %  BP: (139-147)/(63-66) 140/65    Gen: alert, well appearing, and in no distress,    Chest tube in place without air leak.  Ecchymoses and bruising on his right side.    Recent Labs   Lab 11/20/19  0454 11/21/19  0536 11/22/19  0528 11/23/19  0449 11/24/19  0511   WBC 13.66* 14.84* 14.28* 11.84 11.60    HGB 8.1* 7.1* 7.6* 7.6* 7.6*   HCT 26.2* 23.2* 24.3* 24.6* 24.7*    202 224 228 245    140 142 142 146*   K 4.2 4.1 4.4 4.4 4.5   * 110 113* 112* 113*   BUN 31* 26* 26* 26* 20   * 126* 110 115* 122*        Assessment:  Right-sided hemothorax, pleural effusion after fall.  Status post chest tube in pigtail catheter    Plan:  Follow-up a.m. chest x-ray  Plans noted for hospice and possible PleurX placement.  Will remove chest tube once PleurX placed    Giles Lezama MD

## 2019-11-24 NOTE — ASSESSMENT & PLAN NOTE
S/P fall with rib fractures.  CT showing high volume pleural fluid.  Concerning for hemothorax.  Appreciate Surgery input.  S/P chest tube placement on 11/16  Over night had worsening respiratory status,consern for aspiration,strated on zosyn,NPO,consulted tony BARAKAT chest CT.stable on NC O 2 at this time.passeed swallow study,on soft diet.  Consulted palliative care for DNR status.patient and wife agree with DNR status,patient like pursue with Hospice,will speake with wife as well.called wife waiting she come to Hospital.  IR placed pig tail CT on right lung ,he is stable on NC O 2,  Patient and family interested in Hospice,see if we can remove CT and place pleurodex cath in AM for more comfort,

## 2019-11-24 NOTE — PLAN OF CARE
Problem: Fall Injury Risk  Goal: Absence of Fall and Fall-Related Injury  Outcome: Ongoing, Progressing  Intervention: Identify and Manage Contributors to Fall Injury Risk  Flowsheets (Taken 11/24/2019 1712)  Self-Care Promotion: independence encouraged; BADL personal objects within reach; meal setup provided  Medication Review/Management: medications reviewed; high risk medications identified     Problem: Adult Inpatient Plan of Care  Goal: Plan of Care Review  Outcome: Ongoing, Progressing  Flowsheets (Taken 11/24/2019 1712)  Plan of Care Reviewed With: patient  Goal: Patient-Specific Goal (Individualization)  Outcome: Ongoing, Progressing  Goal: Absence of Hospital-Acquired Illness or Injury  Outcome: Ongoing, Progressing  Intervention: Identify and Manage Fall Risk  Flowsheets (Taken 11/24/2019 1712)  Safety Promotion/Fall Prevention: assistive device/personal item within reach; nonskid shoes/socks when out of bed; side rails raised x 2; instructed to call staff for mobility; medications reviewed; Fall Risk reviewed with patient/family  Goal: Optimal Comfort and Wellbeing  Outcome: Ongoing, Progressing  Goal: Readiness for Transition of Care  Outcome: Ongoing, Progressing  Goal: Rounds/Family Conference  Outcome: Ongoing, Progressing     Problem: Diabetes Comorbidity  Goal: Blood Glucose Level Within Desired Range  Outcome: Ongoing, Progressing     Problem: Skin Injury Risk Increased  Goal: Skin Health and Integrity  Outcome: Ongoing, Progressing  Intervention: Optimize Skin Protection  Flowsheets (Taken 11/24/2019 1712)  Pressure Reduction Techniques: frequent weight shift encouraged; weight shift assistance provided; heels elevated off bed; pressure points protected  Head of Bed (HOB): HOB at 30-45 degrees     Problem: Infection  Goal: Infection Symptom Resolution  Outcome: Ongoing, Progressing  Intervention: Prevent or Manage Infection  Flowsheets (Taken 11/24/2019 1712)  Infection Management: aseptic  technique maintained     Problem: Coping Ineffective  Goal: Effective Coping  Outcome: Ongoing, Progressing   Pt remains free from falls and pressure injuries.able to make needs known,kasie meds well,iv abx remains in progress,no s/s adverse reaction noted,pain controlled by prn pain medication, chest tubes remains intact,ss  drainage noted,dressing clean dry intact to chest tube sites,watson cath p/I draining clear yellow urine to  bag,cath care done,safety maintained,continue monitoring.

## 2019-11-24 NOTE — PT/OT/SLP DISCHARGE
Physical Therapy Discharge Summary    Name: Timbo Gallagher  MRN: [id number]   Principal Problem: Hemothorax on right     Patient Discharged from acute Physical Therapy on 19.  Please refer to prior PT notes for functional status.     Assessment:     Patient appropriate for care in another setting.    Objective:     GOALS:   Multidisciplinary Problems     Physical Therapy Goals        Problem: Physical Therapy Goal    Goal Priority Disciplines Outcome Goal Variances Interventions   Physical Therapy Goal     PT, PT/OT Ongoing, Progressing     Description:  Goals to be met by: 19     Patient will increase functional independence with mobility by performin. Supine to sit with Supervision  2. Rolling to Left and Right with Supervision  3. Sit to stand transfer with Supervision using RW  4. Bed to chair transfer with Supervision using Rolling Walker  5. Gait >250 feet with Supervision using Rolling Walker  6. Lower extremity exercise program 2 sets x10 reps per handout, with supervision                      Reasons for Discontinuation of Therapy Services  Patient is unable to continue work toward goals because of medical or psychosocial complications.      Plan:     Patient Discharged to: Palliative Care/Hospice; Pt/family decided on inpatient hospice.    Olga José, PT  2019

## 2019-11-24 NOTE — PT/OT/SLP DISCHARGE
Occupational Therapy Discharge Summary    Timbo Gallagher  MRN: [id number]   Principal Problem: Hemothorax on right      Patient Discharged from acute Occupational Therapy on 11/24/19.  Please refer to prior OT notes for functional status.    Assessment:      Pt and family have decided pt to be d/c to inpatient hospice.     Objective:     GOALS:   Multidisciplinary Problems     Occupational Therapy Goals        Problem: Occupational Therapy Goal    Goal Priority Disciplines Outcome Interventions   Occupational Therapy Goal     OT, PT/OT Ongoing, Progressing    Description:  Goals to be met by: 12/4/19     Patient will increase functional independence with ADLs by performing:    Feeding with Set-up Assistance.  UE Dressing with Set-up Assistance.  LE Dressing with Stand-by Assistance.  Grooming while standing at sink with Stand-by Assistance.  Toileting from bedside commode with Stand-by Assistance for hygiene and clothing management.   Supine to sit with Supervision.  Step transfer with Stand-by Assistance  Toilet transfer to bedside commode with Stand-by Assistance.  Upper extremity exercise program x15 reps per handout, with assistance as needed.                      Reasons for Discontinuation of Therapy Services  Therapist determines that the patient will no longer benefit from therapy services.      Plan:     Patient Discharged to: Palliative Care/Hospice    Tatyana Simon OT  11/24/2019

## 2019-11-24 NOTE — PROGRESS NOTES
Pt resting quietly resp e/u. No crackle sounds heard from chest tube. Chest tube remains at suction. No distress noted. Will continue to monitor. Wife @ bs in attendance.

## 2019-11-24 NOTE — SUBJECTIVE & OBJECTIVE
Interval History:stable on NC O 2 at this time.    Review of Systems   HENT: Negative for ear discharge and ear pain.    Eyes: Negative for pain and itching.   Endocrine: Negative for polyphagia and polyuria.   Neurological: Negative for seizures and syncope.     Objective:     Vital Signs (Most Recent):  Temp: 97.6 °F (36.4 °C) (11/24/19 0735)  Pulse: 61 (11/24/19 0735)  Resp: 18 (11/24/19 0735)  BP: (!) 140/65 (11/24/19 0735)  SpO2: (!) 94 % (11/24/19 0735) Vital Signs (24h Range):  Temp:  [97.3 °F (36.3 °C)-98.2 °F (36.8 °C)] 97.6 °F (36.4 °C)  Pulse:  [61-68] 61  Resp:  [18-20] 18  SpO2:  [94 %-97 %] 94 %  BP: (139-147)/(63-66) 140/65     Weight: 79.8 kg (175 lb 14.8 oz)  Body mass index is 25.24 kg/m².    Intake/Output Summary (Last 24 hours) at 11/24/2019 0751  Last data filed at 11/24/2019 0600  Gross per 24 hour   Intake 240 ml   Output 2661 ml   Net -2421 ml      Physical Exam   Constitutional: He is oriented to person, place, and time. No distress.   HENT:   Head: Normocephalic and atraumatic.   Eyes: Conjunctivae are normal. Right eye exhibits no discharge. Left eye exhibits no discharge.   Neck: Neck supple. No tracheal deviation present.   Cardiovascular: Normal rate and regular rhythm.   Pulmonary/Chest: Effort normal.   Right sided chest tube in place   Abdominal: Soft. Bowel sounds are normal.   Musculoskeletal:   Right elbow effusion   Neurological: He is alert and oriented to person, place, and time.   Skin: Skin is warm and dry. He is not diaphoretic.       Significant Labs:   BMP:   Recent Labs   Lab 11/24/19  0511   *   *   K 4.5   *   CO2 28   BUN 20   CREATININE 1.1   CALCIUM 9.0     CBC:   Recent Labs   Lab 11/23/19  0449 11/24/19  0511   WBC 11.84 11.60   HGB 7.6* 7.6*   HCT 24.6* 24.7*    245       Significant Imaging: I have reviewed all pertinent imaging results/findings within the past 24 hours.

## 2019-11-25 VITALS
HEIGHT: 70 IN | WEIGHT: 175.94 LBS | BODY MASS INDEX: 25.19 KG/M2 | TEMPERATURE: 99 F | DIASTOLIC BLOOD PRESSURE: 64 MMHG | OXYGEN SATURATION: 93 % | HEART RATE: 67 BPM | RESPIRATION RATE: 18 BRPM | SYSTOLIC BLOOD PRESSURE: 142 MMHG

## 2019-11-25 LAB
ANION GAP SERPL CALC-SCNC: 3 MMOL/L (ref 8–16)
BASOPHILS # BLD AUTO: 0.02 K/UL (ref 0–0.2)
BASOPHILS NFR BLD: 0.2 % (ref 0–1.9)
BUN SERPL-MCNC: 16 MG/DL (ref 8–23)
CALCIUM SERPL-MCNC: 8.7 MG/DL (ref 8.7–10.5)
CHLORIDE SERPL-SCNC: 110 MMOL/L (ref 95–110)
CO2 SERPL-SCNC: 29 MMOL/L (ref 23–29)
CREAT SERPL-MCNC: 1.2 MG/DL (ref 0.5–1.4)
DIFFERENTIAL METHOD: ABNORMAL
EOSINOPHIL # BLD AUTO: 0.1 K/UL (ref 0–0.5)
EOSINOPHIL NFR BLD: 1 % (ref 0–8)
ERYTHROCYTE [DISTWIDTH] IN BLOOD BY AUTOMATED COUNT: 14.3 % (ref 11.5–14.5)
EST. GFR  (AFRICAN AMERICAN): >60 ML/MIN/1.73 M^2
EST. GFR  (NON AFRICAN AMERICAN): 55 ML/MIN/1.73 M^2
GLUCOSE SERPL-MCNC: 178 MG/DL (ref 70–110)
HCT VFR BLD AUTO: 24.1 % (ref 40–54)
HGB BLD-MCNC: 7.4 G/DL (ref 14–18)
IMM GRANULOCYTES # BLD AUTO: 0.14 K/UL (ref 0–0.04)
IMM GRANULOCYTES NFR BLD AUTO: 1.3 % (ref 0–0.5)
LYMPHOCYTES # BLD AUTO: 0.6 K/UL (ref 1–4.8)
LYMPHOCYTES NFR BLD: 5.5 % (ref 18–48)
MCH RBC QN AUTO: 32.3 PG (ref 27–31)
MCHC RBC AUTO-ENTMCNC: 30.7 G/DL (ref 32–36)
MCV RBC AUTO: 105 FL (ref 82–98)
MONOCYTES # BLD AUTO: 1 K/UL (ref 0.3–1)
MONOCYTES NFR BLD: 9.4 % (ref 4–15)
NEUTROPHILS # BLD AUTO: 8.8 K/UL (ref 1.8–7.7)
NEUTROPHILS NFR BLD: 82.6 % (ref 38–73)
NRBC BLD-RTO: 0 /100 WBC
PLATELET # BLD AUTO: 238 K/UL (ref 150–350)
PMV BLD AUTO: 10.6 FL (ref 9.2–12.9)
POCT GLUCOSE: 166 MG/DL (ref 70–110)
POTASSIUM SERPL-SCNC: 4.3 MMOL/L (ref 3.5–5.1)
RBC # BLD AUTO: 2.29 M/UL (ref 4.6–6.2)
SODIUM SERPL-SCNC: 142 MMOL/L (ref 136–145)
WBC # BLD AUTO: 10.71 K/UL (ref 3.9–12.7)

## 2019-11-25 PROCEDURE — 94761 N-INVAS EAR/PLS OXIMETRY MLT: CPT

## 2019-11-25 PROCEDURE — 63600175 PHARM REV CODE 636 W HCPCS: Performed by: HOSPITALIST

## 2019-11-25 PROCEDURE — 36415 COLL VENOUS BLD VENIPUNCTURE: CPT

## 2019-11-25 PROCEDURE — 27000221 HC OXYGEN, UP TO 24 HOURS

## 2019-11-25 PROCEDURE — 85025 COMPLETE CBC W/AUTO DIFF WBC: CPT

## 2019-11-25 PROCEDURE — 63600175 PHARM REV CODE 636 W HCPCS: Performed by: RADIOLOGY

## 2019-11-25 PROCEDURE — 80048 BASIC METABOLIC PNL TOTAL CA: CPT

## 2019-11-25 PROCEDURE — 25000003 PHARM REV CODE 250: Performed by: SURGERY

## 2019-11-25 PROCEDURE — 25000003 PHARM REV CODE 250: Performed by: HOSPITALIST

## 2019-11-25 PROCEDURE — 99232 SBSQ HOSP IP/OBS MODERATE 35: CPT | Mod: ,,, | Performed by: UROLOGY

## 2019-11-25 PROCEDURE — 99232 PR SUBSEQUENT HOSPITAL CARE,LEVL II: ICD-10-PCS | Mod: ,,, | Performed by: UROLOGY

## 2019-11-25 RX ORDER — AMLODIPINE BESYLATE 10 MG/1
10 TABLET ORAL DAILY
Qty: 30 TABLET | Refills: 11
Start: 2019-11-25 | End: 2020-11-24

## 2019-11-25 RX ORDER — FENTANYL CITRATE 50 UG/ML
INJECTION, SOLUTION INTRAMUSCULAR; INTRAVENOUS CODE/TRAUMA/SEDATION MEDICATION
Status: COMPLETED | OUTPATIENT
Start: 2019-11-25 | End: 2019-11-25

## 2019-11-25 RX ORDER — POLYETHYLENE GLYCOL 3350 17 G/17G
17 POWDER, FOR SOLUTION ORAL 2 TIMES DAILY PRN
Refills: 0
Start: 2019-11-25

## 2019-11-25 RX ORDER — BISACODYL 10 MG
10 SUPPOSITORY, RECTAL RECTAL DAILY PRN
Refills: 0
Start: 2019-11-25

## 2019-11-25 RX ADMIN — ESCITALOPRAM OXALATE 10 MG: 10 TABLET ORAL at 09:11

## 2019-11-25 RX ADMIN — FAMOTIDINE 20 MG: 20 TABLET ORAL at 09:11

## 2019-11-25 RX ADMIN — CETIRIZINE HYDROCHLORIDE 5 MG: 5 TABLET ORAL at 09:11

## 2019-11-25 RX ADMIN — METOPROLOL SUCCINATE 50 MG: 50 TABLET, FILM COATED, EXTENDED RELEASE ORAL at 09:11

## 2019-11-25 RX ADMIN — HYDROCODONE BITARTRATE AND ACETAMINOPHEN 1 TABLET: 5; 325 TABLET ORAL at 03:11

## 2019-11-25 RX ADMIN — GUAIFENESIN AND DEXTROMETHORPHAN 5 ML: 100; 10 SYRUP ORAL at 11:11

## 2019-11-25 RX ADMIN — PIPERACILLIN AND TAZOBACTAM 4.5 G: 4; .5 INJECTION, POWDER, FOR SOLUTION INTRAVENOUS at 11:11

## 2019-11-25 RX ADMIN — PIPERACILLIN AND TAZOBACTAM 4.5 G: 4; .5 INJECTION, POWDER, FOR SOLUTION INTRAVENOUS at 03:11

## 2019-11-25 RX ADMIN — FENTANYL CITRATE 25 MCG: 50 INJECTION, SOLUTION INTRAMUSCULAR; INTRAVENOUS at 12:11

## 2019-11-25 RX ADMIN — TAMSULOSIN HYDROCHLORIDE 0.4 MG: 0.4 CAPSULE ORAL at 09:11

## 2019-11-25 RX ADMIN — GUAIFENESIN 600 MG: 600 TABLET, EXTENDED RELEASE ORAL at 09:11

## 2019-11-25 RX ADMIN — AMLODIPINE BESYLATE 10 MG: 5 TABLET ORAL at 09:11

## 2019-11-25 RX ADMIN — SENNOSIDES AND DOCUSATE SODIUM 2 TABLET: 8.6; 5 TABLET ORAL at 09:11

## 2019-11-25 RX ADMIN — CARBAMIDE PEROXIDE 6.5% 5 DROP: 6.5 LIQUID AURICULAR (OTIC) at 09:11

## 2019-11-25 RX ADMIN — AMIODARONE HYDROCHLORIDE 200 MG: 200 TABLET ORAL at 09:11

## 2019-11-25 RX ADMIN — ATORVASTATIN CALCIUM 40 MG: 40 TABLET, FILM COATED ORAL at 09:11

## 2019-11-25 NOTE — CONSULTS
An 84 year old male admitted 11/16/19 from home with hemothorax on right; AICD present; mixed hyperlipidemia; CKD Stage 3; aortic valve stenosis; essential hypertension; chronic diastolic heart failure; anemia of chronic disease; DM II with Stage 3 CKD  PMH: Acute renal failure; arthritis; blind right eye; BPH; chronic bronchitis; carotid artery occlusion-left carotid endardarectomy 2/19/18; CHF; colon polyp; CAD; DM II; GERD; hematuria; HTN; NSVT; AICD; stroke- TIA S/P L CEA; suicide attempt  11/25 WBC 10.71 Hgb 7.4 Hct 24.1   11/16 Alb 3.1 A1C 6.7  Wife reports had a history of skin breakdown on sacrum, but this wound is worse than previous breakdowns  On Isoflex mattress; wearing Z Flex boots; DEYA hose have been removed; watson catheter; bed mobility with assistance  Assessment:  Photodocumentation    Sacral ulcer- Wound at least a Stage 3 with white slough in base of ulcer 5 mm(L) 4 mm(W) 3 mm(D). Scant serous drainage. Erythema surrounding ulcer.     Left anterior lower leg- Stage 2 pressure injury from DEYA hose. Linear area 5 mm(W).     Left medial lower leg pressure injury- Linear 3 cm area of erythema at site of DEYA hose.   Treatment:  Local wound care to sacrum with Aquacel foam sacral dressing. Removed DEYA hose from site of skin breakdown on left lower leg. Wearing Z Flex boots to offload pressure. Patient transferring to [facility] this pm.   Treatment plan discussed with nursing and wife.

## 2019-11-25 NOTE — PLAN OF CARE
STACY faxed hospice orders to [facility].      11/25/19 6410   Post-Acute Status   Post-Acute Authorization Home Health/Hospice   Home Health/Hospice Status Additional Clinical Requested

## 2019-11-25 NOTE — NURSING
Pt ok to transfer per Dr. Leung,[facility], 2 [facility] EMT here to transfer pt to [facility] hosp[facility],pt now on stretcher d/c to hospice,dressing clean,dry,intact to old and new chest tube sites,wife at his side,no c/o pain,safety maintained .

## 2019-11-25 NOTE — PROGRESS NOTES
"gen surg  Easily arousable, no sob  Blood pressure 131/63, pulse 64, temperature 98.6 °F (37 °C), temperature source Oral, resp. rate 18, height 5' 10" (1.778 m), weight 79.8 kg (175 lb 14.8 oz), SpO2 97 %.  lungs course bs on right, good bs on left  ct's w ss output-no air leak 50/150cc during day yest-not totaled overnight  A/p R pl effusion-for pleurex cath exchange today, will remove ct after this done; then home on hospice  "

## 2019-11-25 NOTE — PROCEDURES
Radiology Post-Procedure Note    Pre Op Diagnosis: Right hemothorax with plans for discharge to hospice  Post Op Diagnosis: Same    Procedure: US and fluoroscopic-guided placement of a right-sided tunneled pleural drainage catheter    Procedure performed by: Wiliam Bond MD    Written Informed Consent Obtained: Yes  Specimen Removed: No   Estimated Blood Loss: Minimal    Findings:   Successful placement of a tunneled right-sided pleural drainage catheter with subsequent removal of IR placed right non-tunneled pleural drain under moderate conscious sedation. Patient tolerated the procedure well. No immediate post-procedural complications noted.     CXR in 3 hours to assess for potential development of delayed post-procedural complications.     Thank you for considering IR for the care of your patient.     Wiliam Bond MD  Interventional Radiology

## 2019-11-25 NOTE — NURSING
Pt still coughing up copious amounts of blood tinged and black mucous. chest tubes x 2 to water seal. Pt allows us to turn him but continues to scoot back on his back, off wedge. Pt has been becoming increasingly confused at night. Pt has remained free from falls, injuries or signs of infection this shift. Bed is locked and low with call light within reach. Side rails up x 3. Tele sitter at bedside.

## 2019-11-25 NOTE — PLAN OF CARE
[facility]  Department of Hospital Medicine  [address] [place] [address]  [phone] after hours  [phone] fax    HOSPICE  ORDERS    11/25/2019    Admit to Hospice:  Inpatient Service   Diagnoses:   Active Hospital Problems    Diagnosis  POA    *Hemothorax on right [J94.2]  Yes    Essential hypertension [I10]  Yes     Priority: 6     Type 2 diabetes mellitus with stage 3 chronic kidney disease [E11.22, N18.3]  Yes     Priority: 7     Chronic diastolic heart failure [I50.32]  Yes     Priority: 8     Anemia of chronic disease [D63.8]  Yes     Priority: 9     Acute renal failure superimposed on stage 3 chronic kidney disease [N17.9, N18.3]  Yes    Urinary retention [R33.9]  Yes    AICD (automatic cardioverter/defibrillator) present [Z95.810]  Yes     [employer] dual AICD 4/23/18      Mixed hyperlipidemia [E78.2]  Yes    CKD (chronic kidney disease), stage III [N18.3]  Yes    Aortic valve stenosis [I35.0]  Yes    BPH with obstruction/lower urinary tract symptoms [N40.1, N13.8]  Yes      Resolved Hospital Problems   No resolved problems to display.       Hospice Qualifying Diagnoses:rigjht hemothorax,CHF,CAD,malnutrition        Patient has a life expectancy < 6 months due to:    Vital Signs: Routine per Hospice Protocol.    Code Status: DNR     Allergies:   Review of patient's allergies indicates:   Allergen Reactions    Ciprofloxacin (mixture) Itching     Extreme itching    Antibiotic hc     Hydrochlorothiazide      Leg swelling      Iodine and iodide containing products      Wife and pt unsure    Vancomycin analogues Itching       Diet: mechanical soft diet,thin,aspiration precaution     Activities: As tolerated    Nursing: Per Hospice Routine.          Pleurodex cath bag,empty,care as routine.as needed daily when abdomen distended and he has SOB and discomfort.    Colunga Care: Empty Colunga bag Q shift and PRN.  Change Colunga every month.    Routine  Skin for Bedridden Patients: Apply moisture barrier cream to all skin folds and   wet areas in perineal area daily and after baths and all bowel movements.    - Sacral wound care with meplex daily     Oxygen: 2 liter Nc O 2      Medications:      Kenney Gallagher   Home Medication Instructions PRIYANKA:[id number]    Printed on:11/25/19 5205   Medication Information                      acarbose (PRECOSE) 25 MG Tab  Take 25 mg by mouth 3 (three) times daily with meals.             acetaminophen (TYLENOL) 325 MG tablet  Take 2 tablets (650 mg total) by mouth every 6 (six) hours as needed.for pain or fever              amiodarone (PACERONE) 200 MG Tab  Take by mouth once daily.             amLODIPine (NORVASC) 10 MG tablet  Take 1 tablet (10 mg total) by mouth once daily.                          atorvastatin (LIPITOR) 40 MG tablet  Take 1 tablet (40 mg total) by mouth once daily.             bisacodyl (DULCOLAX) 10 mg Supp  Place 1 suppository (10 mg total) rectally daily as needed.for constipation              cyanocobalamin (VITAMIN B-12) 1000 MCG tablet  Take 100 mcg by mouth once a week.                          donepezil (ARICEPT) 5 MG tablet  Take 5 mg by mouth every evening.             escitalopram oxalate (LEXAPRO) 10 MG tablet  Take 10 mg by mouth once daily.             famotidine (PEPCID) 20 MG tablet  Take 1 tablet (20 mg total) by mouth once daily.             ferrous gluconate (FERGON) 324 MG tablet  Take 1 tablet (324 mg total) by mouth daily with breakfast.             guaiFENesin (MUCINEX) 600 mg 12 hr tablet  Take 1 tablet (600 mg total) by mouth 2 (two) times daily.             metoprolol succinate (TOPROL-XL) 50 MG 24 hr tablet  Take 1 tablet (50 mg total) by mouth 2 (two) times daily.             mirtazapine (REMERON) 7.5 MG Tab  Take 1 tablet (7.5 mg total) by mouth every evening.             polyethylene glycol (GLYCOLAX) 17 gram PwPk  Take 17 g by mouth 2 (two) times daily as needed.for  constipation              SITagliptin (JANUVIA) 50 MG Tab  Take 100 mg by mouth once daily.             tamsulosin (FLOMAX) 0.4 mg Cap  Take 1 capsule (0.4 mg total) by mouth once daily.    Augmentin 875/125 po bid until 11.30.19                          Future Orders:  Hospice Medical Director may dictate new orders for comfortable care measures & sign death certificate.        _________________________________  Miguelina Farr MD  11/25/2019

## 2019-11-25 NOTE — DISCHARGE SUMMARY
[facility] Hospital Medicine  Discharge Summary      Patient Name: Timbo Gallagher  MRN: [id number]  Admission Date: 11/16/2019  Hospital Length of Stay: 9 days  Discharge Date and Time:  11/25/2019 11:17 AM  Attending Physician: Miguelina Farr MD   Discharging Provider: Miguelina Farr MD  Primary Care Provider: Cirilo Montero MD      HPI:   85 y/o male presents to the ER complaining of constant right sided rib pain since 4 days ago.  Symptoms worsened yesterday.  Patient was seen at urgent care for recent fall and diagnosed with multiple right sided rib fractures.  He reports associated shortness of breath, right sided abdominal pain and cough. Patient reports taking tylenol for symptoms without relief.  He states no other associated symptoms.  Patient also complaining of right elbow swelling after fall.  Patient denies any fever, chills or hemoptysis.  Chest CT showing large volume pleural fluid, concerning for hemothorax.  Patient had been doing well prior to fall with no recent complaints.  No other complaints.    Procedure(s) (LRB):  INSERTION, CATHETER, INTERCOSTAL, FOR DRAINAGE (Right)      Hospital Course:   85 y/o male with recent fall and rib fractures presented with cough and SOB.  Imaging with new large right sided pleural effusion.  Admitted with concern of hemothorax.  Surgery consulted.  S/P chest tube placement on 11/16.  Anemia of acute blood loss with dropping H/H.,  ASA held.  Patient also retaining urine.  Colunga placed.  Labs showing ARF.  Urology consulted and patient started on IVF's.,ARF improved,hold IVF ,  Over night had worsening respiratory status,consern for aspiration,strated on zosyn,NPO,consulted ST,had chest CT.was stable on NC O 2 .passed swallow study,started on soft diet.  IR placed pig tail CT on right lung ,for worsening fluid collection on right lung on CT chest,he was stable on NC O 2,  Consulted palliative care for DNR status.patient and  wife agree with DNR status,patient like pursue with Hospice,wife was agree,CT chest was removed,pleurodex cath was placed and  patient has been discharged to Hospice.     Consults:   Consults (From admission, onward)        Status Ordering Provider     Inpatient consult to General Surgery  Once     Provider:  Silvio Tay MD    Completed HENRRY HODGES     Inpatient consult to Interventional Radiology  Once     Provider:  (Not yet assigned)    Completed MELVINA RIVERA     Inpatient consult to Interventional Radiology  Once     Provider:  (Not yet assigned)    Acknowledged AKHONDZADEH, ABDOLAZIM     Inpatient consult to Palliative Care  Once     Provider:  (Not yet assigned)    Completed AKHONDZADEH, ABDOLAZIM     Inpatient consult to Social Work  Once     Provider:  (Not yet assigned)    Completed AKHONDZADEH, ABDOLAZIM     Inpatient consult to Spiritual Care  Once     Provider:  (Not yet assigned)    Acknowledged AKHONDZADEH, ABDOLAZIM     Inpatient consult to Urology  Once     Provider:  MIGUEL Lyon MD    Completed FILIBERTO BELCHER          No new Assessment & Plan notes have been filed under this hospital service since the last note was generated.  Service: Hospital Medicine    Final Active Diagnoses:    Diagnosis Date Noted POA    PRINCIPAL PROBLEM:  Hemothorax on right [J94.2] 11/16/2019 Yes    Essential hypertension [I10] 12/02/2016 Yes    Type 2 diabetes mellitus with stage 3 chronic kidney disease [E11.22, N18.3] 02/15/2016 Yes    Chronic diastolic heart failure [I50.32] 09/30/2016 Yes    Anemia of chronic disease [D63.8] 02/17/2016 Yes    Acute renal failure superimposed on stage 3 chronic kidney disease [N17.9, N18.3] 11/19/2019 Yes    Urinary retention [R33.9] 01/25/2019 Yes    AICD (automatic cardioverter/defibrillator) present [Z95.810] 04/24/2018 Yes    Mixed hyperlipidemia [E78.2] 03/28/2018 Yes    CKD (chronic kidney disease), stage III [N18.3] 11/22/2017 Yes     Aortic valve stenosis [I35.0] 05/08/2017 Yes    BPH with obstruction/lower urinary tract symptoms [N40.1, N13.8] 02/17/2016 Yes      Problems Resolved During this Admission:       Discharged Condition: stable    Disposition: Hospice/Medical Facility    Follow Up:  Follow-up Information     Cirilo Montero MD In 1 week.    Specialty:  Internal Medicine  Contact information:  [address]  [facility] [id number] [phone]                 Patient Instructions:      Activity as tolerated       Significant Diagnostic Studies: Labs:   BMP:   Recent Labs   Lab 11/24/19  0511 11/25/19  0513   * 178*   * 142   K 4.5 4.3   * 110   CO2 28 29   BUN 20 16   CREATININE 1.1 1.2   CALCIUM 9.0 8.7   , CMP   Recent Labs   Lab 11/24/19  0511 11/25/19  0513   * 142   K 4.5 4.3   * 110   CO2 28 29   * 178*   BUN 20 16   CREATININE 1.1 1.2   CALCIUM 9.0 8.7   ANIONGAP 5* 3*   ESTGFRAFRICA >60 >60   EGFRNONAA >60 55*    and CBC   Recent Labs   Lab 11/24/19  0511 11/25/19  0513   WBC 11.60 10.71   HGB 7.6* 7.4*   HCT 24.7* 24.1*    238     Microbiology:   Blood Culture   Lab Results   Component Value Date    LABBLOO No growth after 5 days. 04/25/2019    and Urine Culture    Lab Results   Component Value Date    LABURIN PSEUDOMONAS AERUGINOSA  < 10,000 cfu/ml   04/25/2019     Radiology: X-Ray: CXR: X-Ray Chest 1 View (CXR):   Results for orders placed or performed during the hospital encounter of 11/16/19   X-Ray Chest 1 View    Narrative    EXAMINATION:  XR CHEST 1 VIEW    CLINICAL HISTORY:  pleural effusion;    TECHNIQUE:  Single frontal view of the chest was performed.    COMPARISON:  11/24/2019    FINDINGS:  There is a pigtail catheter seen within the superior right chest.  There is continued consolidation and effusion overlying the entirety of the right chest.  The left pulmonary parenchyma shows bibasilar atelectasis similar to prior exam.      Impression    There are 2  right-sided chest tubes in place with a right hydropneumothorax appearing similar to prior exam.      Electronically signed by: Lesley Alvarez MD  Date:    11/25/2019  Time:    09:40    and X-Ray Chest PA and Lateral (CXR): No results found for this visit on 11/16/19.  CT scan: chest     Pending Diagnostic Studies:     Procedure Component Value Units Date/Time    Freeze and Hold, [facility] [[id number]] Collected:  11/22/19 1130    Order Status:  Sent Lab Status:  No result     Specimen:  Body Fluid from Pleural Fluid     IR Pleural Cath Placement Tunneled [[id number]]     Order Status:  Sent Lab Status:  No result     X-Ray Chest AP Portable [[id number]]     Order Status:  Sent Lab Status:  No result     X-Ray Chest AP Portable [[id number]]     Order Status:  Sent Lab Status:  No result          Medications:  Reconciled Home Medications:      Medication List      START taking these medications    amLODIPine 10 MG tablet  Commonly known as:  NORVASC  Take 1 tablet (10 mg total) by mouth once daily.     bisacodyl 10 mg Supp  Commonly known as:  DULCOLAX  Place 1 suppository (10 mg total) rectally daily as needed.     polyethylene glycol 17 gram Pwpk  Commonly known as:  GLYCOLAX  Take 17 g by mouth 2 (two) times daily as needed.        CONTINUE taking these medications    acarbose 25 MG Tab  Commonly known as:  PRECOSE  Take 25 mg by mouth 3 (three) times daily with meals.     acetaminophen 325 MG tablet  Commonly known as:  TYLENOL  Take 2 tablets (650 mg total) by mouth every 6 (six) hours as needed.     amiodarone 200 MG Tab  Commonly known as:  PACERONE  Take by mouth once daily.     aspirin 81 MG EC tablet  Commonly known as:  ECOTRIN  Take 1 tablet (81 mg total) by mouth once daily. Hold until evaluated by Cardiology     atorvastatin 40 MG tablet  Commonly known as:  LIPITOR  Take 1 tablet (40 mg total) by mouth once daily.     cyanocobalamin 1000 MCG tablet  Commonly known as:  VITAMIN B-12  Take 100 mcg by mouth once  a week.     cyproheptadine 4 mg tablet  Commonly known as:  PERIACTIN  Take 4 mg by mouth 3 (three) times daily as needed.     donepezil 5 MG tablet  Commonly known as:  ARICEPT  Take 5 mg by mouth every evening.     escitalopram oxalate 10 MG tablet  Commonly known as:  LEXAPRO  Take 10 mg by mouth once daily.     famotidine 20 MG tablet  Commonly known as:  PEPCID  Take 1 tablet (20 mg total) by mouth once daily.     ferrous gluconate 324 MG tablet  Commonly known as:  FERGON  Take 1 tablet (324 mg total) by mouth daily with breakfast.     guaiFENesin 600 mg 12 hr tablet  Commonly known as:  MUCINEX  Take 1 tablet (600 mg total) by mouth 2 (two) times daily.     metoprolol succinate 50 MG 24 hr tablet  Commonly known as:  TOPROL-XL  Take 1 tablet (50 mg total) by mouth 2 (two) times daily.     mirtazapine 7.5 MG Tab  Commonly known as:  REMERON  Take 1 tablet (7.5 mg total) by mouth every evening.     SITagliptin 50 MG Tab  Commonly known as:  JANUVIA  Take 100 mg by mouth once daily.     tamsulosin 0.4 mg Cap  Commonly known as:  FLOMAX  Take 1 capsule (0.4 mg total) by mouth once daily.        STOP taking these medications    clopidogrel 75 mg tablet  Commonly known as:  PLAVIX     furosemide 40 MG tablet  Commonly known as:  LASIX     losartan 100 MG tablet  Commonly known as:  COZAAR     ondansetron 4 MG Tbdl  Commonly known as:  ZOFRAN-ODT            Indwelling Lines/Drains at time of discharge:   Lines/Drains/Airways     Drain                 Chest Tube 11/16/19 1135 1 Right 28 Fr. 8 days         Urethral Catheter 11/18/19 0927 7 days         Chest Tube 11/22/19 1130 Right Pleural 8 Fr. 2 days         Drain/Device  11/22/19 1130 Right back pigtail 2 days          Pressure Ulcer                 Pressure Injury Left lower Leg -- days         Pressure Injury 11/24/19 1800 Coccyx less than 1 day                Time spent on the discharge of patient: over 30  minutes  Patient was seen and examined on the date of  discharge and determined to be suitable for discharge.         Miguelina Farr MD  Department of Hospital Medicine  [facility]

## 2019-11-25 NOTE — SEDATION DOCUMENTATION
Report called to EMILIANO Kowalski. PleurX catheter placed to R lung. Site dressed with gauze and tegaderm, CDI; no redness or swelling noted. 200 ml fluid removed from lung. Posterior pleural drain removed per IR MD. CXR due @ 1600. Surgery to remove remaining large chest tube. WALE. ANNALISE.

## 2019-11-25 NOTE — ASSESSMENT & PLAN NOTE
He wants to keep Colunga for convenience  Will sign off, call with questions  Colunga will need to be changed monthly

## 2019-11-25 NOTE — NURSING
Pt leaving unit in bed going to IR with pt transport and his wife at his side,ANNALISE,denies pain,safety maintained.

## 2019-11-25 NOTE — CARE UPDATE
Upon review of 4 hour post-op CXR, there is no PNX or evidence of delayed development of other potential post-procedural complications s/p removal of nontunneled and placement of a tunneled right pleural drainage catheter.     From IR perspective, pt is okay for discharge.     Thank you for considering IR for the care of your patient.     Wiliam Bond MD  Interventional Radiology

## 2019-11-25 NOTE — NURSING
Report received from IR nurse Luis Eduardo Young,pt will be in route to unit in 15 min,chest  Xray due at 4 pm.

## 2019-11-25 NOTE — CONSULTS
Inpatient Radiology Pre-procedure Note    History of Present Illness:  Timbo Gallagher is a 84 y.o. male with multiple post-traumatic right-sided rib fractures complicated by large right-sided hemothorax that persist despite placement of non-tunneled pleural drainage catheters x 2 associated with SOB. Pt with poor conditioning and has opted to be DNR and comfort care with hospice upon discharge. However, pt with persistent sizable right hemothorax and sob requiring continued drainage and, pt cannot be discharged with current non-tunneled pleural catheter requiring removal and placement of a tunneled right pleural drainage catheter of preferably larger bore to assist with more adequate drainage of right hemothorax in outpatient hospice setting.     A new inpatient IR consult placed for placement of a right-sided tunneled pleural drainage catheter under moderate conscious sedation and removal of previously placed non-tunneled right pleural drain.        Admission H&P reviewed.  Past Medical History:   Diagnosis Date    Acute renal failure     Arthritis     Blind right eye     BPH (benign prostatic hypertrophy)     Bronchitis, chronic     Carotid artery occlusion     Lt carotid endarerectomy done 02/19/2018     CHF (congestive heart failure)     Colon polyp     Coronary artery disease     Diabetes mellitus     GERD (gastroesophageal reflux disease)     Hearing aid worn     bilateral    Hematuria     Hernia     Hypertension     Influenza A     Irregular heart beat     NSVT (nonsustained ventricular tachycardia) 4/18/2018    Renal failure as complication of care     sepsis, renal function returned    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 Coleman    CATARACT EXTRACTION, BILATERAL      ESOPHAGOGASTRODUODENOSCOPY N/A 8/30/2018    Procedure: EGD (ESOPHAGOGASTRODUODENOSCOPY);  Surgeon: Tye Navarrete MD;  Location: [facility] ENDO ([address] [place] [address]);  Service: Endoscopy;  Laterality: N/A;    ESOPHAGOGASTRODUODENOSCOPY  08/30/2018    EYE SURGERY      HERNIA REPAIR Left 09/2013    RETINAL DETACHMENT SURGERY      THORACENTESIS N/A 1/17/2019    Procedure: THORACENTESIS;  Surgeon: [facility] Diagnostic Provider;  Location: [facility] OR;  Service: Radiology;  Laterality: N/A;  1130AM  START  RN PRE OP 1-15-19    TUBE THORACOTOMY Right 11/16/2019    Procedure: INSERTION, CATHETER, INTERCOSTAL, FOR DRAINAGE;  Surgeon: Silvio Tay MD;  Location: [facility] OR;  Service: General;  Laterality: Right;       Review of Systems:   As documented in primary team H&P    Home Meds:   Prior to Admission medications    Medication Sig Start Date End Date Taking? Authorizing Provider   acarbose (PRECOSE) 25 MG Tab Take 25 mg by mouth 3 (three) times daily with meals.    Historical Provider, MD   acetaminophen (TYLENOL) 325 MG tablet Take 2 tablets (650 mg total) by mouth every 6 (six) hours as needed. 4/29/19   Sandra Sun MD   amiodarone (PACERONE) 200 MG Tab Take by mouth once daily.    Historical Provider, MD   amLODIPine (NORVASC) 10 MG tablet Take 1 tablet (10 mg total) by mouth once daily. 11/25/19 11/24/20  Miguelina Farr MD   aspirin (ECOTRIN) 81 MG EC tablet Take 1 tablet (81 mg total) by mouth once daily. Hold until evaluated by Cardiology 1/26/19   Severiano Galan MD   atorvastatin (LIPITOR) 40 MG tablet Take 1 tablet (40 mg total) by mouth once daily. 3/22/18 5/19/19   Silvio Coleman MD   bisacodyl (DULCOLAX) 10 mg Supp Place 1 suppository (10 mg total) rectally daily as needed. 11/25/19   Miguelina Farr MD   cyanocobalamin (VITAMIN B-12) 1000 MCG tablet Take 100 mcg by mouth once a week.    Historical Provider, MD   cyproheptadine (PERIACTIN) 4 mg tablet Take 4 mg by mouth 3 (three) times daily as needed.    Historical Provider, MD   donepezil (ARICEPT) 5 MG tablet Take 5 mg by mouth every evening.    Historical Provider, MD   escitalopram oxalate (LEXAPRO) 10 MG tablet Take 10 mg by mouth once daily.    Historical Provider, MD   famotidine (PEPCID) 20 MG tablet Take 1 tablet (20 mg total) by mouth once daily. 4/30/19 4/29/20  Sandra Sun MD   ferrous gluconate (FERGON) 324 MG tablet Take 1 tablet (324 mg total) by mouth daily with breakfast. 4/30/19   Sandra Sun MD   guaiFENesin (MUCINEX) 600 mg 12 hr tablet Take 1 tablet (600 mg total) by mouth 2 (two) times daily. 4/29/19   Sandra Sun MD   metoprolol succinate (TOPROL-XL) 50 MG 24 hr tablet Take 1 tablet (50 mg total) by mouth 2 (two) times daily. 4/29/19 4/28/20  Sandra Sun MD   mirtazapine (REMERON) 7.5 MG Tab Take 1 tablet (7.5 mg total) by mouth every evening. 4/29/19 4/28/20  Sandra Sun MD   polyethylene glycol (GLYCOLAX) 17 gram PwPk Take 17 g by mouth 2 (two) times daily as needed. 11/25/19   Miguelina Farr MD   SITagliptin (JANUVIA) 50 MG Tab Take 100 mg by mouth once daily.    Historical Provider, MD   tamsulosin (FLOMAX) 0.4 mg Cap Take 1 capsule (0.4 mg total) by mouth once daily. 4/30/19 4/29/20  Sandra Sun MD   clopidogrel (PLAVIX) 75 mg tablet Take 1 tablet (75 mg total) by mouth once daily. 2/26/19 11/25/19  Ned Toribio MD   furosemide (LASIX) 40 MG tablet Take 1 tablet (40 mg total) by mouth 2 (two) times daily. 4/29/19 11/25/19  Sandra Sun MD   losartan (COZAAR) 100 MG tablet Take 100 mg by mouth once daily.  11/25/19  Historical  Provider, MD   ondansetron (ZOFRAN-ODT) 4 MG TbDL Take 1 tablet (4 mg total) by mouth every 8 (eight) hours as needed (nausea). 5/19/19 11/25/19  Saad Lopez MD     Scheduled Meds:    amiodarone  200 mg Oral Daily    amLODIPine  10 mg Oral Daily    atorvastatin  40 mg Oral Daily    carbamide peroxide  5 drop Right Ear BID    cetirizine  5 mg Oral Daily    cyanocobalamin  100 mcg Oral Weekly    dextromethorphan-guaifenesin  mg/5 ml  5 mL Oral Q6H    donepezil  5 mg Oral QHS    escitalopram oxalate  10 mg Oral Daily    famotidine  20 mg Oral Daily    ferrous gluconate  324 mg Oral Daily with breakfast    guaiFENesin  600 mg Oral BID    metoprolol succinate  50 mg Oral BID    mirtazapine  7.5 mg Oral QHS    piperacillin-tazobactam (ZOSYN) IVPB  4.5 g Intravenous Q8H    senna-docusate 8.6-50 mg  2 tablet Oral BID    tamsulosin  0.4 mg Oral Daily     Continuous Infusions:   PRN Meds:acetaminophen, benzonatate, bisacodyl, cloNIDine, cyproheptadine, dextrose 50%, glucagon (human recombinant), HYDROcodone-acetaminophen, influenza, insulin aspart U-100, morphine, ondansetron, polyethylene glycol, sodium chloride 0.9%  Anticoagulants/Antiplatelets: no anticoagulation    Allergies:   Review of patient's allergies indicates:   Allergen Reactions    Ciprofloxacin (mixture) Itching     Extreme itching    Antibiotic hc     Hydrochlorothiazide      Leg swelling      Iodine and iodide containing products      Wife and pt unsure    Vancomycin analogues Itching     Sedation Hx: have not been any systemic reactions    Labs:  No results for input(s): INR in the last 168 hours.    Invalid input(s):  PT,  PTT    Recent Labs   Lab 11/25/19  0513   WBC 10.71   HGB 7.4*   HCT 24.1*   *         Recent Labs   Lab 11/25/19  0513   *      K 4.3      CO2 29   BUN 16   CREATININE 1.2   CALCIUM 8.7         Vitals:  Temp: 98.8 °F (37.1 °C) (11/25/19 0708)  Pulse: 64 (11/25/19  0708)  Resp: 18 (11/25/19 0708)  BP: 127/62 (11/25/19 0708)  SpO2: 97 % (11/25/19 0710)     Physical Exam:  ASA: III  Mallampati: II    General: no acute distress  Mental Status: alert and oriented to person, place and time  HEENT: normocephalic, atraumatic  Chest: mildly labored breathing  Heart: regular heart rate  Abdomen: nondistended  Extremity: moves all extremities    A/P:  84 y.o. male with large right-sided hemothorax that persist despite placement of non-tunneled pleural drainage catheters x 2 associated with SOB. Pt with poor conditioning and has opted to be DNR and comfort care with hospice upon discharge. However, pt with persistent sizable right hemothorax and sob requiring continued drainage and, pt cannot be discharged with current non-tunneled pleural catheter requiring removal and placement of a tunneled right pleural drainage catheter of preferably larger bore to assist with more adequate drainage of right hemothorax in outpatient hospice setting.     1. Right hemothorax - Will attempt placement of a right-sided tunneled pleural drainage catheter under moderate conscious sedation and removal of previously placed non-tunneled right pleural drain.     Risks (including, but not limited to, pain, bleeding, infection, damage to nearby structures, failure to obtain sufficient material for a diagnosis, the need for additional procedures, and death), benefits, and alternatives were discussed with the patient. All questions were answered to the best of my abilities. The patient wishes to proceed with the procedure. Written informed consent was obtained.    Thank you for considering IR for the care of your patient.     Wiliam Bond MD  Interventional Radiology

## 2019-11-25 NOTE — SUBJECTIVE & OBJECTIVE
Interval History: seem more awake today.  He wants to keep Colunga     Review of Systems   Constitutional: Negative.  Negative for fever.   HENT: Negative.    Eyes: Negative.    Respiratory: Negative for cough, chest tightness and shortness of breath.    Cardiovascular: Negative for chest pain.   Gastrointestinal: Negative.  Negative for constipation, diarrhea and nausea.   Genitourinary: Positive for difficulty urinating.   Musculoskeletal: Negative.    Neurological: Negative.    Psychiatric/Behavioral: Negative.      Objective:     Temp:  [97.5 °F (36.4 °C)-98.8 °F (37.1 °C)] 98.8 °F (37.1 °C)  Pulse:  [60-69] 64  Resp:  [18-19] 18  SpO2:  [95 %-98 %] 97 %  BP: (127-139)/(61-65) 127/62     Body mass index is 25.24 kg/m².      Bladder Scan Volume (mL): 467 mL (11/18/19 0900)    Drains     Drain                 Chest Tube 11/16/19 1135 1 Right 28 Fr. 8 days         Urethral Catheter 11/18/19 0927 6 days         Chest Tube 11/22/19 1130 Right Pleural 8 Fr. 2 days         Drain/Device  11/22/19 1130 Right back pigtail 2 days                Physical Exam   Nursing note and vitals reviewed.  Constitutional: He is oriented to person, place, and time. He appears well-developed and well-nourished.   HENT:   Head: Normocephalic.   Eyes: Conjunctivae are normal.   Neck: Normal range of motion. Neck supple. No tracheal deviation present. No thyromegaly present.   Cardiovascular: Normal rate and normal heart sounds.    Pulmonary/Chest: Effort normal and breath sounds normal. No respiratory distress. He has no wheezes.   Abdominal: Soft. Bowel sounds are normal. There is no hepatosplenomegaly. There is no tenderness. There is no rebound and no CVA tenderness. No hernia.   Genitourinary:   Genitourinary Comments: Colunga in place with yellow urine   Musculoskeletal: Normal range of motion. He exhibits no edema or tenderness.   Lymphadenopathy:     He has no cervical adenopathy.   Neurological: He is alert and oriented to person,  place, and time.   Skin: Skin is warm and dry. No rash noted. No erythema.     Psychiatric: He has a normal mood and affect. His behavior is normal. Judgment and thought content normal.       Significant Labs:    BMP:  Recent Labs   Lab 11/23/19 0449 11/24/19 0511 11/25/19 0513    146* 142   K 4.4 4.5 4.3   * 113* 110   CO2 24 28 29   BUN 26* 20 16   CREATININE 1.3 1.1 1.2   CALCIUM 9.0 9.0 8.7       CBC:   Recent Labs   Lab 11/23/19 0449 11/24/19 0511 11/25/19  0513   WBC 11.84 11.60 10.71   HGB 7.6* 7.6* 7.4*   HCT 24.6* 24.7* 24.1*    245 238       Blood Culture: No results for input(s): LABBLOO in the last 168 hours.  Urine Culture: No results for input(s): LABURIN in the last 168 hours.    Significant Imaging:

## 2019-11-25 NOTE — NURSING
"Pt wife informed about sacral coccyx open area she then states "are you talking about that [place] between his two cheeks he been having that he got it at [facility],they was treating it then they sent him home and I was treating it,"   "

## 2019-11-25 NOTE — PLAN OF CARE
STACY spoke with nurse Meghana and informed her that patient was ready for discharge from  standpoint.    ADT 30 order placed for Transportation.  Requested  time: 2:00 pm (stretcher)  If transportation does not arrive at ETA time nurse will be instructed to follow protocol for transportation below:   How can I get in touch directly with dispatch, if needed?                 Non-emergent dispatch: [phone]      NURSING:  If transportation does not arrive at requested time please call the above Non Emergent Dispatcher.  If issue not resolved please escalate to your charge nurse for further instructions.       11/25/19 1242   Final Note   Assessment Type Final Discharge Note   Anticipated Discharge Disposition [facility] Follow Up  Appt(s) scheduled? No   Discharge plans and expectations educations in teach back method with documentation complete? No   Right Care Referral Info   Post Acute Recommendation Other   Referral Type Hospice   Facility Name [place]

## 2019-11-25 NOTE — NURSING
Pt noted with open area to sacral coccyx,and non blanchable redness to lt lower leg below knee,foam dressing applied,he also c/o pain to bilat heels,no redness or open area noted,hell boots were applied in am by this nurse.

## 2019-11-25 NOTE — PROGRESS NOTES
[facility]  Urology  Progress Note    Patient Name: Timbo Gallagher  MRN: [id number]  Admission Date: 11/16/2019  Hospital Length of Stay: 9 days  Code Status: DNR   Attending Provider: Miguelina Farr MD   Primary Care Physician: Cirilo Montero MD    Subjective:     HPI:  85yo M with UR. He reports some baseline LUTS - slow stream intermittently. He is followed by urology as outpatient - last seen 5/19. He has had issues with UR in the past. He is not currently on BPH medications in hospital. He was on Flomax at last  visit. Colunga catheter placed with 300cc UOP. He is unsure when his last BM was, suppository given earlier today.     Interval History: seem more awake today.  He wants to keep Colunga     Review of Systems   Constitutional: Negative.  Negative for fever.   HENT: Negative.    Eyes: Negative.    Respiratory: Negative for cough, chest tightness and shortness of breath.    Cardiovascular: Negative for chest pain.   Gastrointestinal: Negative.  Negative for constipation, diarrhea and nausea.   Genitourinary: Positive for difficulty urinating.   Musculoskeletal: Negative.    Neurological: Negative.    Psychiatric/Behavioral: Negative.      Objective:     Temp:  [97.5 °F (36.4 °C)-98.8 °F (37.1 °C)] 98.8 °F (37.1 °C)  Pulse:  [60-69] 64  Resp:  [18-19] 18  SpO2:  [95 %-98 %] 97 %  BP: (127-139)/(61-65) 127/62     Body mass index is 25.24 kg/m².      Bladder Scan Volume (mL): 467 mL (11/18/19 0900)    Drains     Drain                 Chest Tube 11/16/19 1135 1 Right 28 Fr. 8 days         Urethral Catheter 11/18/19 0927 6 days         Chest Tube 11/22/19 1130 Right Pleural 8 Fr. 2 days         Drain/Device  11/22/19 1130 Right back pigtail 2 days                Physical Exam   Nursing note and vitals reviewed.  Constitutional: He is oriented to person, place, and time. He appears well-developed and well-nourished.   HENT:   Head: Normocephalic.   Eyes: Conjunctivae are normal.    Neck: Normal range of motion. Neck supple. No tracheal deviation present. No thyromegaly present.   Cardiovascular: Normal rate and normal heart sounds.    Pulmonary/Chest: Effort normal and breath sounds normal. No respiratory distress. He has no wheezes.   Abdominal: Soft. Bowel sounds are normal. There is no hepatosplenomegaly. There is no tenderness. There is no rebound and no CVA tenderness. No hernia.   Genitourinary:   Genitourinary Comments: Colunga in place with yellow urine   Musculoskeletal: Normal range of motion. He exhibits no edema or tenderness.   Lymphadenopathy:     He has no cervical adenopathy.   Neurological: He is alert and oriented to person, place, and time.   Skin: Skin is warm and dry. No rash noted. No erythema.     Psychiatric: He has a normal mood and affect. His behavior is normal. Judgment and thought content normal.       Significant Labs:    BMP:  Recent Labs   Lab 11/23/19 0449 11/24/19  0511 11/25/19  0513    146* 142   K 4.4 4.5 4.3   * 113* 110   CO2 24 28 29   BUN 26* 20 16   CREATININE 1.3 1.1 1.2   CALCIUM 9.0 9.0 8.7       CBC:   Recent Labs   Lab 11/23/19 0449 11/24/19  0511 11/25/19  0513   WBC 11.84 11.60 10.71   HGB 7.6* 7.6* 7.4*   HCT 24.6* 24.7* 24.1*    245 238       Blood Culture: No results for input(s): LABBLOO in the last 168 hours.  Urine Culture: No results for input(s): LABURIN in the last 168 hours.    Significant Imaging:                    Assessment/Plan:     Urinary retention  He wants to keep Colunga for convenience  Will sign off, call with questions  Colunga will need to be changed monthly     BPH with obstruction/lower urinary tract symptoms   - Continue Flomax         VTE Risk Mitigation (From admission, onward)         Ordered     Place sequential compression device  Until discontinued      11/16/19 1200     IP VTE HIGH RISK PATIENT  Once      11/16/19 0712     Place DEYA hose  Until discontinued      11/16/19 0712                W  David Lyon MD  Urology  [facility]

## 2019-11-26 LAB — POCT GLUCOSE: 161 MG/DL (ref 70–110)

## 2019-11-26 NOTE — PHYSICIAN QUERY
PT Name: Timbo Gallagher  MR #: [id number]     Physician Query Form - Documentation Clarification      CDS: Brandy Capley, RN  Email: [email or web address]  Spectralink:  [phone] (Tues-Thurs)    This form is a permanent document in the medical record.     Query Date: November 26, 2019    By submitting this query, we are merely seeking further clarification of documentation. Please utilize your independent clinical judgment when addressing the question(s) below.    The Medical record reflects the following:    The Medical record contains the following:   Indicator  Supporting Clinical Findings Location in Medical Record    Redness     X Decubitus, Pressure Ulcer, etc. Pressure Ulcer   Pressure Injury Left lower Leg             -- days   Pressure Injury 11/24/19 1800 Coccyx less than 1 day    Pressure Injury 11/24/19 1800 Coccyx Stage 3  Date First Assessed/Time First Assessed: 11/24/19 1800   Pressure Injury Present on Admission: suspected hospital acquired   Location: Coccyx Staging: Stage 3    Pressure Injury 11/24/19 1800 Left lower Leg Stage 2  Date First Assessed/Time First Assessed: 11/24/19 1800   Pressure Injury Present on Admission: suspected hospital acquired Side:   Left Orientation: lower Location:   Leg Is this injury device related?: (c) Other (see comments)   Staging: Stage 2   Discharge summary 11/25        LDA flowsheet 11/24          LDA flowsheet 11/24    Deep Tissue Injury     X Wound Care Consult Wife reports had a history of skin breakdown on sacrum, but this wound is worse than previous breakdowns      Sacral ulcer- Wound at least a Stage 3 with white slough in base of ulcer 5 mm(L) 4 mm(W) 3 mm(D). Scant serous drainage. Erythema surrounding ulcer.          Left anterior lower leg- Stage 2 pressure injury from DEYA hose. Linear area 5 mm(W).           Left medial lower leg pressure injury- Linear 3 cm area of erythema at site of DEYA hose Wound care consult 11/25    Medication       Treatment      Other       National Pressure Ulcer Advisory Panel (2007) Pressure Ulcer Definitions & Stages:    Stage I:  Intact skin with non-blanchable redness of a localized area usually over a bony prominence.   Stage II:  Partial thickness loss of dermis presenting as a shallow open ulcer with a red pink wound bed, without slough.   Stage III: Full thickness tissue loss. Subcutaneous fat may be visible but bone, tendon or muscle is not exposed. Slough may be present but does not obscure the depth of tissue loss. May include undermining and tunneling.   Stage IV: Full thickness tissue loss with exposed bone, tendon or muscle. Slough or eschar may be present on some parts of the wound bed. Often include undermining and tunneling.   Unstageable:   Full thickness tissue loss in which the base of the ulcer is covered by slough and/or eschar in the wound bed. Until enough slough and/or eschar is removed to expose the base of the wound, the true depth, and therefore stage, cannot be determined.   Deep Tissue Injury: Purple or maroon localized area of discolored intact skin or blood-filled blister due to damage of underlying soft tissue from  pressure and/or shear.     Provider, please specify the diagnosis or diagnoses associated with above clinical findings.  [  ] Decubitus (Pressure) Ulcer / Deep Tissue Injury   (please specify site, laterality, stage, and POA status)    SITE LATERALITY STAGE PRESENT ON ADMISSION?    [x  ]  sacrum  3 [  x] yes    [  ] no    [  ] unknown    [ [   ] ] clinically undetermined    [ x ]  Left anterior lower leg  2 [  ] yes    [ x ] no   [  ] unknown    [ [   ] ] clinically undetermined    [  x]  Left medial lower leg  2 [  ] yes    [ x ] no    [  ] unknown    [ [   ] ] clinically undetermined    [  ]  other site (specify): __________ [  ]  right       [  ]  left  [  ] yes    [  ] no    [  ] unknown    [ [   ] ] clinically undetermined   [  ] Other Integumentary Diagnosis (please  specify): _________      [ [   ] ] Clinically Undetermined                                                                                                                [  ] Clinically Undetermined

## 2021-01-01 NOTE — ASSESSMENT & PLAN NOTE
Lactate in normal range, but with fever, RR 29 and evidence of possible pneumonia complicated with right pleural effusion, will treat as sepsis  Blood culture pending  Urine culture - low yield pseudomonas   zyvox dc'd  Continue cefepime     Resolved  <10k colonies on urine culture -pseudomonas and CXR improved - dc antibiotics         Initial (On Arrival)

## 2021-05-28 NOTE — PROGRESS NOTES
[facility] Hospital Medicine  Progress Note    Patient Name: Timbo Gallagher  MRN: [id number]  Patient Class: IP- Inpatient   Admission Date: 11/16/2019  Length of Stay: 4 days  Attending Physician: Miguelina Farr MD  Primary Care Provider: Cirilo Montero MD        Subjective:     Principal Problem:Hemothorax on right        HPI:  85 y/o male presents to the ER complaining of constant right sided rib pain since 4 days ago.  Symptoms worsened yesterday.  Patient was seen at urgent care for recent fall and diagnosed with multiple right sided rib fractures.  He reports associated shortness of breath, right sided abdominal pain and cough. Patient reports taking tylenol for symptoms without relief.  He states no other associated symptoms.  Patient also complaining of right elbow swelling after fall.  Patient denies any fever, chills or hemoptysis.  Chest CT showing large volume pleural fluid, concerning for hemothorax.  Patient had been doing well prior to fall with no recent complaints.  No other complaints.    Overview/Hospital Course:  85 y/o male with recent fall and rib fractures presented with cough and SOB.  Imaging with new large right sided pleural effusion.  Admitted with concern of hemothorax.  Surgery consulted.  S/P chest tube placement on 11/16.  Anemia of acute blood loss with dropping H/H.  ASA held.  Patient also retaining urine.  Watson placed.  Labs showing ARF.  Urology consulted and patient started on IVF's.ARF is improving,  Chest ray show slight improvement in right lower lung opacity.    Interval History: Unable to urinate and watson placed.    Review of Systems   HENT: Negative for ear discharge and ear pain.    Eyes: Negative for pain and itching.   Endocrine: Negative for polyphagia and polyuria.   Neurological: Negative for seizures and syncope.     Objective:     Vital Signs (Most Recent):  Temp: 98.5 °F (36.9 °C) (11/20/19 0727)  Pulse: 76 (11/20/19 0727)  Resp:  18 (11/20/19 0727)  BP: (!) 144/65 (11/20/19 0727)  SpO2: (!) 94 % (11/20/19 0727) Vital Signs (24h Range):  Temp:  [97.4 °F (36.3 °C)-98.5 °F (36.9 °C)] 98.5 °F (36.9 °C)  Pulse:  [60-76] 76  Resp:  [14-18] 18  SpO2:  [89 %-95 %] 94 %  BP: (121-144)/(52-65) 144/65     Weight: 79.8 kg (175 lb 14.8 oz)  Body mass index is 25.24 kg/m².    Intake/Output Summary (Last 24 hours) at 11/20/2019 0853  Last data filed at 11/20/2019 0600  Gross per 24 hour   Intake 3133 ml   Output 1450 ml   Net 1683 ml      Physical Exam   Constitutional: He is oriented to person, place, and time. No distress.   HENT:   Head: Normocephalic and atraumatic.   Eyes: Conjunctivae are normal. Right eye exhibits no discharge. Left eye exhibits no discharge.   Neck: Neck supple. No tracheal deviation present.   Cardiovascular: Normal rate and regular rhythm.   Pulmonary/Chest: Effort normal.   Right sided chest tube in place   Abdominal: Soft. Bowel sounds are normal.   Musculoskeletal:   Right elbow effusion   Neurological: He is alert and oriented to person, place, and time.   Skin: Skin is warm and dry. He is not diaphoretic.       Significant Labs:   BMP:   Recent Labs   Lab 11/20/19  0454   *      K 4.2   *   CO2 26   BUN 31*   CREATININE 1.5*   CALCIUM 8.6*     CBC:   Recent Labs   Lab 11/18/19  1523 11/19/19  0516 11/20/19  0454   WBC  --  11.71 13.66*   HGB 8.1* 7.2* 8.1*   HCT 25.0* 23.2* 26.2*   PLT  --  177 205       Significant Imaging: I have reviewed all pertinent imaging results/findings within the past 24 hours.      Assessment/Plan:      * Hemothorax on right  S/P fall with rib fractures.  CT showing high volume pleural fluid.  Concerning for hemothorax.  Appreciate Surgery input.  S/P chest tube placement on 11/16    Chest ray show slight improvement in right lower lung opacity    Essential hypertension  Resume home meds with parameters.  Low Na diet.  Holding Losartan and Lasix secondary to worsening renal  failure.      Type 2 diabetes mellitus with stage 3 chronic kidney disease  Hold home oral medications.  Diabetic diet and insulin sliding scale.      Chronic diastolic heart failure  No evidence of acute exacerbation.  Seems more fluid depleted.  Hold Lasix.      Anemia of chronic disease  H/H similar to previous labs.  Now with anemia of acute blood loss secondary to hemothorax.  Significant drop in H/H.  Repeat labs and transfuse if lower.      Acute renal failure superimposed on stage 3 chronic kidney disease  improving with IVF.      AICD (automatic cardioverter/defibrillator) present        Mixed hyperlipidemia  Continue Statin.      CKD (chronic kidney disease), stage III  Now with ARF.  ARF probably combination of obstruction and pre renal.  Colunga placed.  Start IVF hydration.  Avoid nephrotoxic medications.  Stop Losartan.      Aortic valve stenosis  S/P TAVR        VTE Risk Mitigation (From admission, onward)         Ordered     Place sequential compression device  Until discontinued      11/16/19 1200     IP VTE HIGH RISK PATIENT  Once      11/16/19 0712     Place DEYA hose  Until discontinued      11/16/19 0712                      Miguelina Farr MD  Department of Hospital Medicine   [facility]   Home

## 2021-12-13 NOTE — PROGRESS NOTES
[facility] Hospital Medicine  Progress Note    Patient Name: Timbo Gallagher  MRN: [id number]  Patient Class: IP- Inpatient   Admission Date: 1/24/2019  Length of Stay: 1 days  Attending Physician: Severiano Galan MD  Primary Care Provider: Cirilo Montero MD        Subjective:     Principal Problem:Acute on chronic respiratory failure with hypoxia    HPI:  83 year old male with non obstructive CAD, s/p TAVR, moderate mitral regurgitation, grade 2 diastolic heart failure, Hx of VT now s/p ICD, pulmonary hypertension and hypertension who presented with worsening shortness of breath. Patient had an ultrasound guided therapeutic thoracentesis done on 1/17 (~ one week PTA) where 1.4L of serous fluid was removed. He felt better after procedure but symptoms then again worsened. He takes furosemide 20 mg daily and is compliant with this. Denied fever, chills, lightheadedness, vomiting, abdominal pain, trouble swallowing, dysuria, diarrhea. Reported some nausea.     In the ED, patient was afebrile and normotensive but in respiratory distress. Per EMS, sats around 90% at room air. CXR with evidence of right pleural effusion and pulmonary edema. Given two doses of furosemide 40 mg IV and placed on BiPAP. Admitted to ICU for close monitoring.      Hospital Course:  82 y/o male presents with acute hypoxic respiratory failure.  Hypoxia secondary to pulmonary edema and chronic pleural effusion.  Started on IV diuresis.  Responded to IV diuresis and able to wean to NC.  Cardiology and Pulmonary consulted.    Interval History: Feeling better. Wants to go home.    Review of Systems   HENT: Negative for ear discharge and ear pain.    Eyes: Negative for pain and itching.   Endocrine: Negative for polyphagia and polyuria.   Neurological: Negative for seizures and syncope.     Objective:     Vital Signs (Most Recent):  Temp: 99 °F (37.2 °C) (01/25/19 1213)  Pulse: 67 (01/25/19 1213)  Resp: 20 (01/25/19 1213)  BP:  128/60 (01/25/19 1213)  SpO2: 95 % (01/25/19 1213) Vital Signs (24h Range):  Temp:  [97.5 °F (36.4 °C)-99 °F (37.2 °C)] 99 °F (37.2 °C)  Pulse:  [60-78] 67  Resp:  [17-28] 20  SpO2:  [90 %-96 %] 95 %  BP: (104-156)/(55-68) 128/60     Weight: 89.8 kg (198 lb)  Body mass index is 28.41 kg/m².    Intake/Output Summary (Last 24 hours) at 1/25/2019 1501  Last data filed at 1/25/2019 1200  Gross per 24 hour   Intake 240 ml   Output 5925 ml   Net -5685 ml      Physical Exam   Constitutional: He is oriented to person, place, and time. He appears well-developed. No distress.   Non toxic appearing   HENT:   Head: Normocephalic and atraumatic.   Mouth/Throat: Oropharynx is clear and moist.   Eyes: Conjunctivae and EOM are normal.   Neck: Normal range of motion.   Cardiovascular: Normal rate, regular rhythm and intact distal pulses.   Pulmonary/Chest: Effort normal. He has no wheezes. He has no rales.   Breathing comfortably on NC.  Diminished breath sound right base   Abdominal: Soft. Bowel sounds are normal.   Musculoskeletal: Normal range of motion. He exhibits no edema.   Neurological: He is alert and oriented to person, place, and time.   Skin: Skin is warm and dry. He is not diaphoretic.   Psychiatric: He has a normal mood and affect. His behavior is normal. Judgment and thought content normal.   Nursing note and vitals reviewed.      Significant Labs:   BMP:   Recent Labs   Lab 01/24/19 0132 01/25/19  0404   * 145*    141   K 4.3 3.6    105   CO2 21* 28   BUN 20 19   CREATININE 1.8* 1.5*   CALCIUM 8.9 8.8   MG 2.4  --      CBC:   Recent Labs   Lab 01/24/19 0132   WBC 13.14*   HGB 11.1*   HCT 34.7*          Significant Imaging: I have reviewed all pertinent imaging results/findings within the past 24 hours.    Assessment/Plan:      * Acute on chronic respiratory failure with hypoxia    2/2 to pulmonary edema and a chronic right pleural effusion, which appears to be worse post thoracentesis  (1/17)  Responded to IV furosemide. Now de-escalated to low flow NC and stable  Although leukocytosis of 13, has no neutrophilia, is afebrile, is non toxic appearing and degree of leukocytosis is very mild, therefore doubt infection. Stopped antibiotics.  Wean supplemental O2 as tolerated.  Appreciate Pulmonary input.  Possible thoracentesis if patient still requiring oxygen.       Gross hematuria    Looks to be secondary to traumatic watson placement.  Appreciate  input.       Acute diastolic heart failure    Grade 2 diastolic dysfunction on prior Echo  Diuresis as above       Pleurisy with effusion           Subarachnoid hemorrhage following injury with brief loss of consciousness but without open intracranial wound    Diagnosed August last year  Neuro eval planned prior to resumption of antiplt rx         NSVT (nonsustained ventricular tachycardia)    Stable  ICD in place. Interrogated and working adequately, per Cardiology  Watch potassium level while on IV lasix       Mixed hyperlipidemia    Resume statin       CKD (chronic kidney disease), stage III    Stable       S/P TAVR (transcatheter aortic valve replacement)    No acute issues       Essential hypertension    Stable without antihypertensive therapy  Increasing BP.  Will restart home Lopressor.     Type 2 diabetes mellitus with stage 3 chronic kidney disease    Stage 3  Currently stable  Monitoring closely while on IV furosemide  I/Os         VTE Risk Mitigation (From admission, onward)        Ordered     heparin (porcine) injection 5,000 Units  Every 12 hours      01/24/19 0558     IP VTE HIGH RISK PATIENT  Once      01/24/19 0523     Place DEYA hose  Until discontinued      01/24/19 0523              Severiano Galan MD  Department of Hospital Medicine   [facility]   79

## 2022-10-19 NOTE — PROGRESS NOTES
Subjective:       Patient ID: Timbo Gallagher is a 83 y.o. male who was last seen in this office 2/7/2019    Chief Complaint:   Chief Complaint   Patient presents with    Follow-up     Patient states he'as been fine.. no new issues stated      Urinary Retention  Patient admitted 1/24/19 with acute respiratory failure. He was seen as an inpatient consult on 1/25/19 by Dr. Lyon for urinary retention. Colunga placed. 800 ml of urine were drained when catheter was placed. Prior to this event voiding symptoms consisted of slow stream, intermittency. Prior treatments include possibly Avodart. Recent medications that may have affected his voiding include none.  He was given Flomax in the hospital but it is not on his discharge paperwork.    Flomax restarted by Dr. Lyon at previous visit which he is currently taking. He had an unsuccessful voiding trial with Dr. Lyon on 2/3/19. Colunga replaced. Repeat voiding trial on 2/7/19--cautiously successful.     He is here today for a PVR check. Denies dysuria, gross hematuria or pain. No new issues    PVR (bladder scan) today - 0 ml    ACTIVE MEDICAL ISSUES:  Patient Active Problem List   Diagnosis    Type 2 diabetes mellitus with stage 3 chronic kidney disease    Diverticular disease of esophagus    Gastroesophageal reflux disease without esophagitis    Benign prostatic hyperplasia without lower urinary tract symptoms    Anemia of chronic disease    Mild protein malnutrition    Incarcerated hiatal hernia    Chronic diastolic heart failure    Essential hypertension    Hiatal hernia with GERD and esophagitis    Aortic valve stenosis    Aortic valve stenosis, unspecified etiology    S/P TAVR (transcatheter aortic valve replacement)    CKD (chronic kidney disease), stage III    Blindness of one eye    Mixed hyperlipidemia    Acute on chronic combined systolic and diastolic heart failure    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     NSVT (nonsustained ventricular tachycardia)    AICD (automatic cardioverter/defibrillator) present    Traumatic subarachnoid bleed with LOC of 30 minutes or less, initial encounter    Subarachnoid hemorrhage following injury with brief loss of consciousness but without open intracranial wound    Oropharyngeal dysphagia    Acute renal failure superimposed on stage 3 chronic kidney disease    Anemia of chronic renal failure, stage 3 (moderate)    Contusion of rib on right side    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  Review of patient's allergies indicates:   Allergen Reactions    Ciprofloxacin (mixture) Itching     Extreme itching    Antibiotic hc     Hydrochlorothiazide      Leg swelling      Iodine and iodide containing products      Wife and pt unsure    Vancomycin analogues Itching     Current Outpatient Medications   Medication Sig    acarbose (PRECOSE) 25 MG Tab Take 25 mg by mouth 3 (three) times daily with meals.    acetaminophen (TYLENOL) 500 MG tablet Take 500 mg by mouth 2 (two) times daily.    amiodarone (PACERONE) 200 MG Tab Take 1 tablet (200 mg total) by mouth once daily.    amLODIPine (NORVASC) 10 MG tablet Take 10 mg by mouth once daily.    aspirin (ECOTRIN) 81 MG EC tablet Take 1 tablet (81 mg total) by mouth once daily. Hold until evaluated by Cardiology    atorvastatin (LIPITOR) 40 MG tablet Take 1 tablet (40 mg total) by mouth once daily.    cyanocobalamin (VITAMIN B-12) 1000 MCG tablet Take 100 mcg by mouth once a week.    diphenhydrAMINE (BENADRYL) 50 MG capsule Take 50 mg by mouth nightly.    famotidine (PEPCID) 20 MG tablet Take 20 mg by mouth 2 (two) times daily.     ferrous gluconate (FERGON) 324 MG tablet Take 1 tablet (324 mg total) by mouth daily with breakfast.    furosemide (LASIX) 20 MG  "tablet Take 2 tablets (40 mg total) by mouth once daily.    metoprolol succinate (TOPROL-XL) 50 MG 24 hr tablet Take 50 mg by mouth 2 (two) times daily.     mupirocin (BACTROBAN) 2 % ointment Apply topically 3 (three) times daily.    tamsulosin (FLOMAX) 0.4 mg Cap Take 1 capsule (0.4 mg total) by mouth once daily.     No current facility-administered medications for this visit.        Review of Systems   Constitutional: Negative for activity change, chills, fatigue, fever and unexpected weight change.   Eyes: Negative for discharge, redness and visual disturbance.   Respiratory: Negative for cough, shortness of breath and wheezing.    Cardiovascular: Negative for chest pain and leg swelling.   Gastrointestinal: Negative for abdominal distention, abdominal pain, constipation, diarrhea, nausea and vomiting.   Genitourinary: Negative for decreased urine volume, difficulty urinating, discharge, dysuria, flank pain, frequency, hematuria, scrotal swelling, testicular pain and urgency.   Musculoskeletal: Negative for arthralgias, joint swelling and myalgias.   Skin: Negative for color change and rash.   Neurological: Negative for dizziness and light-headedness.   Psychiatric/Behavioral: Negative for behavioral problems and confusion. The patient is not nervous/anxious.        Objective:      Vitals:    02/21/19 1050   BP: 121/60   Weight: 83.3 kg (183 lb 12.1 oz)   Height: 5' 9" (1.753 m)     Physical Exam   Constitutional: He is oriented to person, place, and time. He appears well-developed.   HENT:   Head: Normocephalic and atraumatic.   Nose: Nose normal.   Eyes: Conjunctivae are normal. Right eye exhibits no discharge. Left eye exhibits no discharge.   Neck: Normal range of motion. Neck supple. No tracheal deviation present. No thyromegaly present.   Cardiovascular: Normal rate and regular rhythm.    Pulmonary/Chest: Effort normal. No respiratory distress. He has no wheezes.   Abdominal: Soft. He exhibits no " distension. There is no hepatosplenomegaly. There is no tenderness. There is no CVA tenderness. No hernia.   Genitourinary:   Genitourinary Comments: Patient declined exam   Musculoskeletal: Normal range of motion. He exhibits no edema.   Neurological: He is alert and oriented to person, place, and time.   Skin: Skin is warm and dry. No rash noted. No erythema.     Psychiatric: He has a normal mood and affect. His behavior is normal. Judgment normal.       Urine dipstick shows trace LE.     Assessment:       1. Urinary retention    2. BPH with obstruction/lower urinary tract symptoms    3. Nocturia          Plan:       1. Urinary retention  -Voiding trial 2/3/19--unsuccessful. Colunga replaced  -Voiding trial 2/7/19--cautiously successful  -PVR today--0 ml  -Voiding well  -Stay on Flomax  -Adequate hydration  - POCT urinalysis, dipstick or tablet reag  - POCT Bladder Scan    2. BPH with obstruction/lower urinary tract symptoms  -Continue Flomax  - US Retroperitoneal Complete (Kidney and; Future    3. Nocturia  -Limit evening fluids          Follow-up in about 3 months (around 5/21/2019).     Detail Level: Simple Render Risk Assessment In Note?: no Note Text (......Xxx Chief Complaint.): This diagnosis correlates with the Other (Free Text): $75

## 2023-03-08 NOTE — TELEPHONE ENCOUNTER
----- Message from Antonio Starr sent at 1/10/2017 11:03 AM CST -----  Contact: Corrine//Wife  Caller states that she needs to speak with nurse in ref to a call she recieved about a test for the pt//please call back at [phone]//thank you  
Returned call to confirm the scheduling of PFT's for 2/6 at [facility] for 9am and his appointment with Dr. Manzo for 11am.  Verbalized understanding and informed that appointment slip was sent for the PFT's.  
show

## 2023-05-26 NOTE — HOSPITAL COURSE
I have reviewed and agree with the resident's findings and plan as documented in encounter.     Marek Henderson, DO   Pt has been seen by cardiology and neuro  Dr. Coleman has seen pt for vasc surgery and is planning to take him for L CEA tomorrow

## 2023-06-29 NOTE — CONSULTS
EP Consult Initial Evaluation    Reason for Consult: PPM evaluation post TAVR  Requesting Physician:Dr LAURA Keene    History of Present Illness:   81 Y/O M with PMH significant for severe AS UNA= 0.75 cm2, MG= 57 mmHg, PV= 4.8 m/s, EF= 40%, severe MR. Patient has PMH significant for HTN, Dm, paraesophageal hernia with esophagitis severe As, severe Mr, COPD, A Fib. Patient is undergoing TAVR with S3 26 mm. TVP placed and EP consulted for evaluation of PPM requirement post TAVR    EKG:   Pre: Sinus with frequent APCs and PVCs, QRS 92 msec  Post: Sinus Bradycardia wit APCs, narrow QRS, no AVB    Cardiac Marker:   No results for input(s): CPK, TROPONINI, MB, BNP in the last 168 hours.    Prior Cardiology Work up:   TTE  CONCLUSIONS     1 - Mild left ventricular enlargement.     2 - Mildly to moderately depressed left ventricular systolic function (EF 40-45%).     3 - Normal right ventricular systolic function .     4 - Biatrial enlargement.     5 - Moderate to severe tricuspid regurgitation.     6 - Severe mitral regurgitation.     7 - Severe aortic stenosis, UNA = 0.75 cm2, peak velocity = 3.9 m/s, mean gradient = 36.0 mmHg.  Velocity and gradient increase with dobutamine as above    8 - Pulmonary hypertension. The estimated PA systolic pressure is 68 mmHg.     9 - Mildly elevated central venous pressure.     Review of patient's allergies indicates:   Allergen Reactions    Vancomycin analogues Itching    Ciprofloxacin (mixture) Itching     Extreme itching and redness      Past Medical History:   Diagnosis Date    Arthritis     Blind right eye     BPH (benign prostatic hypertrophy)     Bronchitis, chronic     CHF (congestive heart failure)     Colon polyp     Diabetes mellitus     Encounter for blood transfusion     GERD (gastroesophageal reflux disease)     Hearing aid worn     bilateral    Hernia     Hypertension     Irregular heart beat     Snoring    .  Past Surgical History:   Procedure Laterality Date     CATARACT EXTRACTION, BILATERAL      EYE SURGERY      RETINAL DETACHMENT SURGERY       Family History   Problem Relation Age of Onset    No Known Problems Mother     No Known Problems Father     No Known Problems Sister     No Known Problems Brother     No Known Problems Maternal Aunt     No Known Problems Maternal Uncle     No Known Problems Paternal Aunt     No Known Problems Paternal Uncle     No Known Problems Maternal Grandmother     No Known Problems Maternal Grandfather     No Known Problems Paternal Grandmother     No Known Problems Paternal Grandfather     Anemia Neg Hx     Arrhythmia Neg Hx     Asthma Neg Hx     Clotting disorder Neg Hx     Fainting Neg Hx     Heart attack Neg Hx     Heart disease Neg Hx     Heart failure Neg Hx     Hyperlipidemia Neg Hx     Hypertension Neg Hx     Stroke Neg Hx     Atrial Septal Defect Neg Hx      Social History   Substance Use Topics    Smoking status: Former Smoker     Packs/day: 3.00     Years: 40.00     Quit date: 8/2/1990    Smokeless tobacco: Never Used    Alcohol use No      PTA Medications   Medication Sig    aspirin (ECOTRIN) 81 MG EC tablet Take 1 tablet (81 mg total) by mouth once daily. Take 4 tab tonight then 1 tab daily    clopidogrel (PLAVIX) 75 mg tablet Take 1 tablet (75 mg total) by mouth once daily. Take 4 tab tonight then 1 tab daily    furosemide (LASIX) 20 MG tablet Take 1 tablet (20 mg total) by mouth once daily.    GLUCOSAMINE HCL/CHONDR BAUTISTA A NA (OSTEO BI-FLEX ORAL) Take 2 tablets by mouth once daily.    lisinopril 10 MG tablet Take 1 tablet (10 mg total) by mouth once daily.    losartan-hydrochlorothiazide 100-12.5 mg (HYZAAR) 100-12.5 mg Tab Take 1 tablet by mouth once daily.    metoprolol succinate (TOPROL-XL) 25 MG 24 hr tablet Take 25 mg by mouth 2 (two) times daily.     ascorbic acid, vitamin C, (VITAMIN C) 1000 MG tablet 1,000 mg.    metformin (GLUCOPHAGE) 500 MG tablet Take 1,000 mg by mouth 2 (two)  times daily with meals.      Review of Systems:  Cannot evaluate patient sedated    Vital Signs (Most Recent)  Temp: 96.9 °F (36.1 °C) (10/17/17 1000)  Pulse: (!) 47 (10/17/17 1000)  Resp: 17 (10/17/17 1000)  BP: (!) 162/101 (10/17/17 1027)  SpO2: 95 % (10/17/17 1000)    Vital Signs Range (Last 24H):  Temp:  [96.9 °F (36.1 °C)]   Pulse:  [47-67]   Resp:  [17]   BP: (151-162)/()   SpO2:  [95 %-96 %]     I&O: No intake or output data in the 24 hours ending 10/17/17 1339    Physical Exam:  General: Patient in no acute distress or discomfort  HEENT: No JVD, moist mucous membranes  Cardiac: S1S2 RRR no GMR  Chest: CTABL, no wheezing or rales  Abd:Soft NTND  Ext: No Edema No swelling  Neuro: A and O X 3, non focal      Laboratory:  Cardiac Biomarker:   No results for input(s): CPK, TROPONINI, MB, BNP in the last 168 hours.    CBC with Diff:     Recent Labs  Lab 10/16/17  1343   WBC 10.17   HGB 12.5*   HCT 38.2*          COAG:    Recent Labs  Lab 10/16/17  1343   APTT 23.9   INR 1.0       CMP:   Recent Labs  Lab 10/16/17  1343      CALCIUM 10.2   ALBUMIN 3.4*      K 5.1   CO2 26      BUN 22   CREATININE 1.4     Estimated Creatinine Clearance: 43.3 mL/min (based on SCr of 1.4 mg/dL).      Active Problems:   Present on Admission:   Aortic valve stenosis, unspecified etiology      ASSESSMENT/PLAN:   81 Y/O M with severe AS S/P TAVR S3 26 mm valve. TVP was placed and EP consulted to evaluate patient for possible requirement for PPM.  Currently, no evidence of heart block or requirement of pacing at the moment.   TVP set at HR 40, V output of 25mA, threshold of 0.4       Recommendations:    -Will Watch on the tele overnight with the TVP.  -Please Keep NPO after MN.  -Please get EKG in the AM.      Discussed with Dr Huerta EP Attending.  Thank you for allowing us to participate in care of Mr Gallagher.     Mimi Wilson MD  Cardiology fellow, PGY 5     Quality 226: Preventive Care And Screening: Tobacco Use: Screening And Cessation Intervention: Patient screened for tobacco use and is an ex/non-smoker Quality 130: Documentation Of Current Medications In The Medical Record: Current Medications Documented Detail Level: Detailed

## 2023-08-31 NOTE — PROGRESS NOTES
8/31/2023         RE: Liu Ragsdale  [id number] [address] [facility] [address]        Dear Colleague,    Thank you for referring your patient, Liu Ragsdale, to the [facility]. Please see a copy of my visit note below.    Radiation Oncology Note    HPI:Mr. 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 He underwent chemoradiation therapy with plan for re-evaluation for surgical resection.     Radiation Treatment  7/24/23 to 8/25/23: Esophagus + omar regions, 5000 cGy  in 25 fractions    Patient returns for follow-up.    Posttreatment, the patient was admitted to the hospital from 8/29 to 8/30/2023 for anemia likely secondary to GI bleed as well as generalized weakness.  He received 2 units of red blood cell.  Now discharged home.    Overall, the patient remains weak but is slowly trying to improve.  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  Currently taking oxycodone oral solution as needed for pain.  Using Magic mouthwash as needed.  Continues to have nausea although slightly improved today.  Using Zofran ODT.    Follow-up with medical oncology team (Dr. Haines).    Following with surgery team (Dr. Levi). Posttreatment PET scan scheduled for 9/28/2023.    Plan:  Acute toxicities are lingering, but expect continued recovery with time.  Radiation esophagitis.  Magic mouthwash as needed.  Antacid as needed  Nausea.  Zofran ODT.  We will add low-dose Ativan as needed.  Continue to advance diet as tolerated.  Continue to push fluids.  Return to clinic in 1 week.    Jarred Alcantar M.D.  Department of Radiation Oncology  [facility]        [facility] Hospital Medicine  Progress Note    Patient Name: Timbo Gallagher  MRN: [id number]  Patient Class: IP- Inpatient   Admission Date: 11/29/2018  Length of Stay: 1 days  Attending Physician: Aniya Dong MD  Primary Care Provider: Cirilo Montero MD        Subjective:     Principal Problem:Cellulitis of left arm    HPI:  Mr Timbo Gallagher is a 83 y.o. man with HFpEF, DM, CKD3, achalasia, HTN who presents with left arm swelling. Normal state of health yesterday. Woke up this AM with erythema to the dorsal and ventral surface of his left forearm that spread up his arm. No injury. No animal/bug bites. No contact with dirty water. No fevers, chills, lumps/bumps, pruritis. No other complaints.     Hospital Course:  Admitted with L arm cellulitis vs erysipelas. Started on linezolid due to vanc allergy. Blood cultures 11/29 grew GNRs. Repeat cultures pending. Added cefepime.     Interval History: Some nausea. Erythema on arm is about the same. No new complaints.     Review of Systems   Constitutional: Negative for chills, fatigue and fever.   Respiratory: Negative for cough and shortness of breath.    Cardiovascular: Negative for chest pain.   Gastrointestinal: Positive for nausea. Negative for abdominal pain, constipation, diarrhea and vomiting.   Genitourinary: Negative for difficulty urinating.   Skin: Positive for color change and rash.     Objective:     Vital Signs (Most Recent):  Temp: 98.5 °F (36.9 °C) (11/30/18 1126)  Pulse: 63 (11/30/18 1126)  Resp: 17 (11/30/18 1126)  BP: (!) 144/67 (11/30/18 1126)  SpO2: (!) 94 % (11/30/18 1126) Vital Signs (24h Range):  Temp:  [98.4 °F (36.9 °C)-99.3 °F (37.4 °C)] 98.5 °F (36.9 °C)  Pulse:  [61-77] 63  Resp:  [16-20] 17  SpO2:  [92 %-96 %] 94 %  BP: (126-147)/(57-69) 144/67     Weight: 88.4 kg (194 lb 12.5 oz)  Body mass index is 27.95 kg/m².    Intake/Output Summary (Last 24 hours) at 11/30/2018 4805  Last data filed at 11/30/2018  1232  Gross per 24 hour   Intake 2070 ml   Output 960 ml   Net 1110 ml      Physical Exam   Constitutional: He is oriented to person, place, and time. He appears well-developed and well-nourished. No distress.   HENT:   Head: Normocephalic and atraumatic.   Mouth/Throat: Oropharynx is clear and moist.   Cardiovascular: Normal rate, regular rhythm, normal heart sounds and intact distal pulses. Exam reveals no gallop and no friction rub.   No murmur heard.  Pulmonary/Chest: Effort normal and breath sounds normal. No stridor. No respiratory distress. He has no wheezes. He has no rales.   Abdominal: Soft. Bowel sounds are normal. He exhibits no distension and no mass. There is no tenderness. There is no guarding.   Neurological: He is alert and oriented to person, place, and time.   Skin: Skin is warm and dry. Rash (left arm to elbow, unchanged from yesterday) noted. He is not diaphoretic. There is erythema.   Vitals reviewed.      Significant Labs: All pertinent labs within the past 24 hours have been reviewed.    Significant Imaging: I have reviewed and interpreted all pertinent imaging results/findings within the past 24 hours.    Assessment/Plan:      * Cellulitis of left arm    - Presents with <1 day of nonpurulent erythema to left arm consistent with cellulitis vs erysipelas. Given acuity and lack of purulence, more likely a Strep infection. Given linezolid (vanc allergy) and zosyn in ED. Can continue linezolid for now which will cover both Strep and Staph. Borders outlined on 11/29 for comparison. WBC 20 but no signs of sepsis.   - blood cultures 11/29 with GNR in 2 bottles. Repeat blood cultures ordered  - WBC trending down. Cellulitis appears unchanged. Added cefepime to regimen for GNR bacteremia        Bacteremia    GNR in 2 bottles on 11/29  Repeat cultures today  Added cefepime        Hypokalemia    Replete and monitor       Acute renal failure superimposed on stage 3 chronic kidney disease    Presents with  Cr 2 from baseline 1.4  UA not consistent with infection   Suspect prerenal due to cellulitis  IVF overnight  Cr now at baseline.        AICD (automatic cardioverter/defibrillator) present    No issues       Mixed hyperlipidemia    Continue statin       S/P TAVR (transcatheter aortic valve replacement)    For AS       Aortic valve stenosis    S/p TAVR  No acute issues       Essential hypertension    Continue metoprolol  Resume losartan   Patient no longer on amlodipine per wife due to lower extremity edema       Chronic diastolic heart failure    No signs of acute exacerbation  Does have bilateral lower extremity edema- attributed to amlodipine  Monitor  Continue metoprolol and losartan. Resume lasix tomorrow        Mild protein malnutrition    Encourage diet       Anemia of chronic disease    Hgb at baseline       Benign prostatic hyperplasia without lower urinary tract symptoms    Not on meds at home. Monitor UOP. Bladder scan if low urine output.        Gastroesophageal reflux disease without esophagitis    With prior diagnosis of achalasia. Not on PPI at home. Monitor        Type 2 diabetes mellitus with stage 3 chronic kidney disease    A1c: 7.1%  Meds: januvia and acarbose at home. Hold. SSI PRN  ADA diet, accuchecks ACHS, hypoglycemic protocol           VTE Risk Mitigation (From admission, onward)        Ordered     enoxaparin injection 40 mg  Daily      11/29/18 1408     IP VTE HIGH RISK PATIENT  Once      11/29/18 1408              Aniya Dong MD  Department of Hospital Medicine   [facility]

## 2023-09-20 NOTE — ASSESSMENT & PLAN NOTE
- Start Flomax if able to tolerate   - Colunga x 2-4 days   - Avoid/treat constipation   Qbrexza Pregnancy And Lactation Text: There is no available data on Qbrexza use in pregnant women.  There is no available data on Qbrexza use in lactation.

## 2023-11-17 NOTE — ASSESSMENT & PLAN NOTE
Caller: Dameon Luu Jr.    Relationship: Self    Best call back number: [phone]    Requested Prescriptions:   Requested Prescriptions     Pending Prescriptions Disp Refills    amLODIPine (NORVASC) 10 MG tablet 90 tablet 1     Sig: Take 1 tablet by mouth Daily.        Pharmacy where request should be sent: [id number] - [place], [place] - [address] - [phone]  - [phone] FX     Last office visit with prescribing clinician: Visit date not found   Last telemedicine visit with prescribing clinician: Visit date not found   Next office visit with prescribing clinician: 4/8/2024     Additional details provided by patient:     Does the patient have less than a 3 day supply:  [x] Yes  [] No    Would you like a call back once the refill request has been completed: [] Yes [x] No    If the office needs to give you a call back, can they leave a voicemail: [] Yes [x] No    Candy Millan Rep   11/17/23 14:25 EST      2/2 to pulmonary edema and a chronic right pleural effusion, which appears to be worse post thoracentesis (1/17)  Responded to IV furosemide. Now de-escalated to low flow NC and stable  Although leukocytosis of 13, has no neutrophilia, is afebrile, is non toxic appearing and degree of leukocytosis is very mild, therefore doubt infection. Stop antibiotics  Will consult Pulmonology to eval for repeat therapeutic thoracentesis if does not continue to improve IV   Will need fluid analysis as this was not done on prior thoracentesis  Wean supplemental O2 as tolerated

## 2023-12-22 NOTE — PLAN OF CARE
"TN completed discharge needs assessment. TN provided and reviewed with patient "[facility] My Health Packet" , "Help At Home" .TN discussed with patient the things the patient is responsible for to manage patient's  healthcare at home. Patient verbalized understanding & teachback implemented. Patient prefers morning doctor appointments.     02/18/18 1201   Discharge Assessment   Assessment Type Discharge Planning Assessment   Confirmed/corrected address and phone number on facesheet? Yes   Assessment information obtained from? Patient   Communicated expected length of stay with patient/caregiver no   Prior to hospitilization cognitive status: Alert/Oriented;No Deficits   Prior to hospitalization functional status: Independent   Current cognitive status: Alert/Oriented;No Deficits   Current Functional Status: Independent   Lives With spouse   Able to Return to Prior Arrangements yes   Is patient able to care for self after discharge? Yes   Who are your caregiver(s) and their phone number(s)? (spouse, Corrine Gallagher [phone])   Patient's perception of discharge disposition admitted as an inpatient   Readmission Within The Last 30 Days no previous admission in last 30 days   Patient currently being followed by outpatient case management? No   Patient currently receives any other outside agency services? No   Equipment Currently Used at Home glucometer   Do you have any problems affording any of your prescribed medications? No   Is the patient taking medications as prescribed? yes   Does the patient have transportation home? Yes   Transportation Available car   Does the patient receive [facility] at [facility]? No   Discharge Plan A Home with family   Discharge Plan B Home with family   Patient/Family In Agreement With Plan yes     [facility] [address] [address]  Phone: [phone] Fax: [phone]      " normal...

## 2024-11-29 NOTE — ASSESSMENT & PLAN NOTE
- Flomax   - Voiding trial when pt less confused/more alert   - Hospice/palliative care plans noted. Could continue watson for comfort if desired. Pt unable to answer this questions today. Consider attempted void trial if pt/family desire.    - Avoid/treat constipation   24.4

## 2025-04-03 NOTE — ASSESSMENT & PLAN NOTE
GFR 43  Has been on Mobic for a few days  Metformin has been stopped   Patient's belongings returned

## (undated) DEVICE — SEE MEDLINE ITEM 157117

## (undated) DEVICE — DRESSING TELFA STRL 4X3 LF

## (undated) DEVICE — GLOVE BIOGEL ECLIPSE SZ 7.5

## (undated) DEVICE — UNDERGLOVE BIOGEL PI SZ 6.5 LF

## (undated) DEVICE — SUT 3-0 12-18IN SILK

## (undated) DEVICE — DRAIN CHEST DRY SUCTION

## (undated) DEVICE — GOWN SURGICAL X-LARGE

## (undated) DEVICE — STAPLER SKIN ROTATING HEAD

## (undated) DEVICE — CATH THORACIC 28FR ST

## (undated) DEVICE — GAUZE SPONGE 4X4 12PLY

## (undated) DEVICE — KIT PROBE COVER WITH GEL

## (undated) DEVICE — SEE MEDLINE ITEM 152622

## (undated) DEVICE — DRAPE STERI INSTRUMENT 1018

## (undated) DEVICE — SUT 2/0 36IN COATED VICRYL

## (undated) DEVICE — ELECTRODE REM PLYHSV RETURN 9

## (undated) DEVICE — NDL HYPO REG 25G X 1 1/2

## (undated) DEVICE — Device

## (undated) DEVICE — SUT SILK 3-0 SH 18IN BLACK

## (undated) DEVICE — ADHESIVE DERMABOND ADVANCED

## (undated) DEVICE — PACK ENDOSCOPY GENERAL

## (undated) DEVICE — SUT 7/0 24IN PROLENE BL MO

## (undated) DEVICE — SUT PROLENE 6-0 C-1 30IN BL

## (undated) DEVICE — HEMOSTAT SURGICEL 4X8IN

## (undated) DEVICE — DRESSING ABSRBNT ISLAND 3.6X8

## (undated) DEVICE — SYR 10CC LUER LOCK

## (undated) DEVICE — COVER OVERHEAD SURG LT BLUE

## (undated) DEVICE — SYR 1CC TB SG 27GX1/2

## (undated) DEVICE — TOWEL WHITE OR DISP

## (undated) DEVICE — SET DECANTER MEDICHOICE

## (undated) DEVICE — GLOVE BIOGEL PI MICRO SZ 6.5

## (undated) DEVICE — SEE MEDLINE ITEM 157194

## (undated) DEVICE — CLIP MED TICALL

## (undated) DEVICE — LOOP VESSEL BLUE MAXI

## (undated) DEVICE — SUT CTD VICRYL 3-0 CR/SH

## (undated) DEVICE — SEE MEDLINE ITEM 156911

## (undated) DEVICE — SPONGE LAP 18X18 PREWASHED

## (undated) DEVICE — APPLICATOR CHLORAPREP ORN 26ML

## (undated) DEVICE — SUPPORT ULNA NERVE PROTECTOR

## (undated) DEVICE — DRESSING TRANS 4X4 TEGADERM

## (undated) DEVICE — SUT MCRYL PLUS 4-0 PS2 27IN

## (undated) DEVICE — DRESSING N ADH OIL EMUL 3X8 3S

## (undated) DEVICE — SYR ONLY LUER LOCK 20CC

## (undated) DEVICE — SEE MEDLINE ITEM 153688

## (undated) DEVICE — CLOSURE SKIN STERI STRIP 1/4X3

## (undated) DEVICE — SUT ETHILON 2-0 FSLX 30 BLK

## (undated) DEVICE — SPONGE GAUZE 16PLY 4X4